# Patient Record
Sex: MALE | Race: WHITE | NOT HISPANIC OR LATINO | Employment: OTHER | URBAN - METROPOLITAN AREA
[De-identification: names, ages, dates, MRNs, and addresses within clinical notes are randomized per-mention and may not be internally consistent; named-entity substitution may affect disease eponyms.]

---

## 2017-01-05 ENCOUNTER — TELEPHONE (OUTPATIENT)
Dept: INTERNAL MEDICINE | Facility: CLINIC | Age: 72
End: 2017-01-05

## 2017-01-05 NOTE — TELEPHONE ENCOUNTER
----- Message from Lynsey Caldwell sent at 1/5/2017  9:59 AM CST -----  Regarding: [facility]  Contact: [phone]  [facility], they were only suppose to do 1 but did 2 and hit something wrong and he now has internal bleeding.     Please call [phone]    [email or web address]    01/04/2017 had x2 bx of parathyroid and he had internal bleeding. [facility] wanted to [facility] but he refused. No bleeding today.  [facility]. Pt does not know what the test is for.  appt with Dr Duffy 01/12 at 10AM.  Mi Pratt RN 12:57 PM on 1/5/2017.

## 2017-01-12 ENCOUNTER — OFFICE VISIT (OUTPATIENT)
Dept: INTERNAL MEDICINE | Facility: CLINIC | Age: 72
End: 2017-01-12

## 2017-01-12 VITALS — DIASTOLIC BLOOD PRESSURE: 81 MMHG | SYSTOLIC BLOOD PRESSURE: 123 MMHG | HEART RATE: 92 BPM

## 2017-01-12 DIAGNOSIS — I10 BENIGN ESSENTIAL HYPERTENSION: Primary | ICD-10-CM

## 2017-01-12 ASSESSMENT — PAIN SCALES - GENERAL: PAINLEVEL: MODERATE PAIN (5)

## 2017-01-12 NOTE — PATIENT INSTRUCTIONS
[facility] Medication Refill Request Information:  * Please contact your pharmacy regarding ANY request for medication refills.  ** [facility] Prescription Fax = [phone]  * Please allow 3 business days for routine medication refills.  * Please allow 5 business days for controlled substance medication refills.     [facility] Test Result notification information:  *You will be notified with in 7-10 days of your appointment day regarding the results of your test.  If you are on MyChart you will be notified as soon as the provider has reviewed the results and signed off on them.      Primary Care Center [phone] (4th Floor [facility])

## 2017-01-12 NOTE — NURSING NOTE
Chief Complaint   Patient presents with     Throat Problem     pt states having throat pain since having biopsy last week     Frostbite     pt states having frostbite on fingers of both hands     Hypertension     pt would like to discuss high blood pressure, new medication from ANDRE Turner CMA at 9:40 AM on 1/12/2017.

## 2017-01-12 NOTE — PROGRESS NOTES
HPI:    Pt. Comes in for follow up today. He was seen in Dermatology 11/28/16. He was seen in ortho for R knee pain 11/30/16. He was seen in the ED 12/4/16 for increased BP.  No other HEENT, cardiopulmonary, abdominal, , neurological complaints. No F/C/NS. He is being see at [facility] for elevated Ca and possible hyperparathyroidism. He parathyroid bx, last week. He may have R knee surgery/replacement?.     PE:    Vitals noted gen nad cooperative alert,  HEENT, ears normal oropharynx clear no exudate, neck supple nl rom no adenopathy, no tenderness, no bruising, no neck swelling,  LCTA, B, RRR, S1, S2, murmur present, . B ankle swelling L more than R. Good distal pulses. No B knee swelling. Grossly normal neurological exam.           A/P:    1. HTN; he states he is on 10 mg Lisinopril and BP well controlled today  2. Cardiac echo for murmur ordered 12/26/16 and I recommend he schedule this.  3. He will continue to follow in ortho for knee complaints  4. B LE U/S 12/26/16 negative for DVT for leg swelling  5. He will continue to be seen at [facility] for elevated Ca and parathyroid issues and had bx. Last week  6. Increased cholesterol done on 12/26/16; I recommend he start medication. He was on Simvastatin but stopped this. He DOES not want to restart at this point and wants to work on exercise/diet and recheck.   7. He states colonoscopy at [facility] about 2 years ago.       I will see him back in about 3 weeks.       Total time spent 25 minutes.  More than 50% of the time spent with Mr. Velasco on counseling / coordinating his care

## 2017-01-12 NOTE — MR AVS SNAPSHOT
After Visit Summary   1/12/2017    Lux Velasco    MRN: [id number]           Patient Information     Date Of Birth          1945        Visit Information        Provider Department      1/12/2017 10:00 AM Nam Duffy MD [facility]        Care Instructions    [facility] Medication Refill Request Information:  * Please contact your pharmacy regarding ANY request for medication refills.  ** [facility] Prescription Fax = [phone]  * Please allow 3 business days for routine medication refills.  * Please allow 5 business days for controlled substance medication refills.     [facility] Test Result notification information:  *You will be notified with in 7-10 days of your appointment day regarding the results of your test.  If you are on MyChart you will be notified as soon as the provider has reviewed the results and signed off on them.      Primary Care Center [phone] (4th Floor [facility])           Follow-ups after your visit        Your next 10 appointments already scheduled     Jan 17, 2017  2:30 PM   Ech Complete with [id number]   [facility] Echo ([facility])    [address] [place] [address] [phone]           1.  Please bring or wear a comfortable two-piece outfit. 2.  You may eat, drink and take your normal medicines. 3.  For any questions that cannot be answered, please contact the ordering physician            Feb 13, 2017 12:05 PM   (Arrive by 11:50 AM)   Return Visit with Nam Duffy MD   [facility] ([facility])    [address] [place] [address] [phone]              Who to contact     Please call your clinic at [phone] to:    Ask questions about your health    Make or cancel appointments    Discuss your medicines    Learn about your test results    Speak to your doctor   If you have compliments or  concerns about an experience at your clinic, or if you wish to file a complaint, please contact [facility] Patient Relations at [phone] or email us at [email or web address]         Additional Information About Your Visit        [employer] Information     [employer] is an electronic gateway that provides easy, online access to your medical records. With [employer], you can request a clinic appointment, read your test results, renew a prescription or communicate with your care team.     To sign up for [employer] visit the website at [email or web address]   You will be asked to enter the access code listed below, as well as some personal information. Please follow the directions to create your username and password.     Your access code is: [email or web address]  Expires: 2017  2:00 PM     Your access code will  in 90 days. If you need help or a new code, please contact your [facility] or call [phone] for assistance.        Care EveryWhere ID     This is your Care EveryWhere ID. This could be used by other organizations to access your [place] medical records  [id number]        Your Vitals Were     Pulse                   92            Blood Pressure from Last 3 Encounters:   17 123/81   16 150/90   16 141/87    Weight from Last 3 Encounters:   16 110.768 kg (244 lb 3.2 oz)   16 122.471 kg (270 lb)   16 120.203 kg (265 lb)              Today, you had the following     No orders found for display       Primary Care Provider Office Phone # Fax #    Nam Duffy [phone]       [address] [address]        Thank you!     Thank you for choosing [facility]  for your care. Our goal is always to provide you with excellent care. Hearing back from our patients is one way we can continue to improve our services. Please take a few minutes  to complete the written survey that you may receive in the mail after your visit with us. Thank you!             Your Updated Medication List - Protect others around you: Learn how to safely use, store and throw away your medicines at www.disposemymeds.org.          This list is accurate as of: 1/12/17 10:30 AM.  Always use your most recent med list.                   Brand Name Dispense Instructions for use    acetaminophen 500 MG Caps      Take 2 tablets by mouth daily.       acetic acid-hydrocortisone otic solution    ACETASOL HC    10 mL    Place 4 drops into both ears 2 times daily.       ammonium lactate 12 % cream    AMLACTIN     Apply  topically daily.       artificial tears Soln      Use one drop to both eye PRN.       calcium carbonate 500 MG chewable tablet    TUMS    150 tablet    Take 1 tablet (500 mg) by mouth daily       diclofenac 1 % Gel topical gel    VOLTAREN    100 g    Apply 4 grams to knees four times daily using enclosed dosing card.       Fish Oil 500 MG Caps          LISINOPRIL PO      Take 10 mg by mouth daily       METFORMIN HCL PO      Take by mouth 2 times daily (with meals)       omeprazole 20 MG CR capsule    priLOSEC     Take 20 mg by mouth daily.       TAMSULOSIN HCL PO      Take 0.4 mg by mouth daily       VITAMIN D (CHOLECALCIFEROL) PO      Take 1,000 Units by mouth daily

## 2017-01-17 ENCOUNTER — RADIANT APPOINTMENT (OUTPATIENT)
Dept: CARDIOLOGY | Facility: CLINIC | Age: 72
End: 2017-01-17
Attending: INTERNAL MEDICINE

## 2017-01-17 DIAGNOSIS — I10 BENIGN ESSENTIAL HYPERTENSION: ICD-10-CM

## 2017-01-29 ENCOUNTER — TRANSFERRED RECORDS (OUTPATIENT)
Dept: HEALTH INFORMATION MANAGEMENT | Facility: CLINIC | Age: 72
End: 2017-01-29

## 2017-01-31 ENCOUNTER — TELEPHONE (OUTPATIENT)
Dept: INTERNAL MEDICINE | Facility: CLINIC | Age: 72
End: 2017-01-31

## 2017-01-31 NOTE — TELEPHONE ENCOUNTER
----- Message from Sheridan Isidro sent at 1/31/2017 12:19 PM CST -----  Regarding: Pt requesting call back  Contact: [phone]  Pt called requesting an appointment with Dr. Duffy as soon as possible. I let the pt know that the appointment he has scheduled 2/13/17 is the soonest available. I offered to add him on a wait list and check other provider's schedules- he declined. Pt stated he needs more medication for his coughing and bronchitis symptoms. I offered the appointment with Dr. Mckinney 2/2/17 at 7:00am, he declines. Pt is requesting a call back; pt can be reached at [phone]. Pt stated he will be available in about an hour from now.    Pt called and he is  demanding to see Dr Duffy for a cough. [facility] codeine for cough 5 days ago. Pt states neither are helping with his cough. Pt also states he prostrate problems-would not elaborate. Wants to be seen by Dr Duffy-offered other providers and pt refused.  Mi Pratt RN 1:35 PM on 1/31/2017.

## 2017-02-02 NOTE — TELEPHONE ENCOUNTER
"Dear Mi;    I advise he see anyone in Clinic and if worse go to ED or urgent care. JIM Duffy    Pt called and REFUSED all providers except Dr duffy. Pt states that the other Dr will only \"pat him on the back and send him on his way\". Encouraged pt to be seen by one of the PCP Dr -PT REFUSES. Pt has an appt with Dr Duffy on the 13th and will wait till then.  Mi Pratt RN 10:43 AM on 2/2/2017.    "

## 2017-02-13 ENCOUNTER — OFFICE VISIT (OUTPATIENT)
Dept: INTERNAL MEDICINE | Facility: CLINIC | Age: 72
End: 2017-02-13

## 2017-02-13 VITALS
WEIGHT: 239 LBS | DIASTOLIC BLOOD PRESSURE: 88 MMHG | HEART RATE: 97 BPM | SYSTOLIC BLOOD PRESSURE: 150 MMHG | BODY MASS INDEX: 33.33 KG/M2 | OXYGEN SATURATION: 95 %

## 2017-02-13 DIAGNOSIS — I10 BENIGN ESSENTIAL HYPERTENSION: ICD-10-CM

## 2017-02-13 DIAGNOSIS — R05.9 COUGH: Primary | ICD-10-CM

## 2017-02-13 RX ORDER — LOSARTAN POTASSIUM 50 MG/1
50 TABLET ORAL DAILY
Qty: 30 TABLET | Refills: 3 | Status: SHIPPED | OUTPATIENT
Start: 2017-02-13 | End: 2018-11-27 | Stop reason: ALTCHOICE

## 2017-02-13 RX ORDER — GUAIFENESIN/DEXTROMETHORPHAN 1200-60MG
1 TABLET, EXTENDED RELEASE 12 HR ORAL EVERY 12 HOURS
COMMUNITY
End: 2017-03-13

## 2017-02-13 ASSESSMENT — PAIN SCALES - GENERAL: PAINLEVEL: MODERATE PAIN (4)

## 2017-02-13 NOTE — MR AVS SNAPSHOT
After Visit Summary   2/13/2017    Lux Velasco    MRN: [id number]           Patient Information     Date Of Birth          1945        Visit Information        Provider Department      2/13/2017 12:05 PM Nam Duffy MD [facility]        Today's Diagnoses     Cough    -  1       Follow-ups after your visit        Your next 10 appointments already scheduled     Feb 13, 2017  1:50 PM CST   (Arrive by 1:35 PM)   XR CHEST 2 VIEWS with UCXR1   [facility] Imaging [facility] Xray ([facility])    [address] [address] [phone]           Please bring a list of your current medicines to your exam. (Include vitamins, minerals and over-thecounter medicines.) Leave your valuables at home.  Tell your doctor if there is a chance you may be pregnant.  You do not need to do anything special for this exam.            Mar 06, 2017 11:45 AM CST   (Arrive by 11:30 AM)   Return Visit with Corinne Gleason PA-C   [facility] Dermatology ([facility])    [address] [address] [phone]              Who to contact     Please call your clinic at [phone] to:    Ask questions about your health    Make or cancel appointments    Discuss your medicines    Learn about your test results    Speak to your doctor   If you have compliments or concerns about an experience at your clinic, or if you wish to file a complaint, please contact [facility] Patient Relations at [phone] or email us at [email or web address]         Additional Information About Your Visit        MyChart Information     [employer] is an electronic gateway that provides easy, online access to your medical records. With [employer], you can request a clinic appointment, read your test results, renew a prescription or communicate with your care team.     To sign up for  [employer] visit the website at [email or web address]   You will be asked to enter the access code listed below, as well as some personal information. Please follow the directions to create your username and password.     Your access code is: [email or web address]  Expires: 2017 12:47 PM     Your access code will  in 90 days. If you need help or a new code, please contact your [facility] or call [phone] for assistance.        Care EveryWhere ID     This is your Care EveryWhere ID. This could be used by other organizations to access your [place] medical records  [id number]        Your Vitals Were     Pulse Pulse Oximetry BMI (Body Mass Index)             97 95% 33.33 kg/m2          Blood Pressure from Last 3 Encounters:   17 150/88   17 123/81   16 150/90    Weight from Last 3 Encounters:   17 108.4 kg (239 lb)   16 110.8 kg (244 lb 3.2 oz)   16 122.5 kg (270 lb)              We Performed the Following     XR Chest 2 Views        Primary Care Provider Office Phone # Fax #    Nam Duffy [phone]       [address] [address]        Thank you!     Thank you for choosing [facility]  for your care. Our goal is always to provide you with excellent care. Hearing back from our patients is one way we can continue to improve our services. Please take a few minutes to complete the written survey that you may receive in the mail after your visit with us. Thank you!             Your Updated Medication List - Protect others around you: Learn how to safely use, store and throw away your medicines at www.disposemymeds.org.          This list is accurate as of: 17 12:47 PM.  Always use your most recent med list.                   Brand Name Dispense Instructions for use    acetaminophen 500 MG Caps      Take 2 tablets by mouth daily.       acetic acid-hydrocortisone  otic solution    ACETASOL HC    10 mL    Place 4 drops into both ears 2 times daily.       ammonium lactate 12 % cream    AMLACTIN     Apply  topically daily.       artificial tears Soln      Use one drop to both eye PRN.       calcium carbonate 500 MG chewable tablet    TUMS    150 tablet    Take 1 tablet (500 mg) by mouth daily       diclofenac 1 % Gel topical gel    VOLTAREN    100 g    Apply 4 grams to knees four times daily using enclosed dosing card.       Fish Oil 500 MG Caps          FLUTICASONE PROPIONATE NA      Spray 50 mcg in nostril       LISINOPRIL PO      Take 10 mg by mouth daily       METFORMIN HCL PO      Take by mouth 2 times daily (with meals)       MUCUS-DM MAX  MG Tb12      Take 1 tablet by mouth every 12 hours       omeprazole 20 MG CR capsule    priLOSEC     Take 20 mg by mouth daily.       TAMSULOSIN HCL PO      Take 0.4 mg by mouth daily       VITAMIN D (CHOLECALCIFEROL) PO      Take 1,000 Units by mouth daily

## 2017-02-13 NOTE — NURSING NOTE
Chief Complaint   Patient presents with     Cough     Patient here for follow up of bronchitis.       Mikayla Carnes CMA at 12:15 PM on 2/13/2017.

## 2017-02-13 NOTE — LETTER
Patient:  Lux Velasco  :   1945  MRN:     [id number]        Mr. Lux Velasco  [address]     [facility] [address]        2017    Dear Mr. Velasco,    Thank you for choosing the [facility] for your healthcare needs.  We appreciate the opportunity to serve you.    The following are your recent test results.     Results for orders placed or performed in visit on 17   XR Chest 2 Views    Narrative    EXAMINATION: XR CHEST 2 VW  2017 1:58 PM      CLINICAL HISTORY: Cough     COMPARISON: 2014        FINDINGS:  PA and lateral views of the chest. Cardiac silhouette within normal  limits. Left greater than right bibasilar linear opacities. No pleural  effusion or pneumothorax. Partially visualized upper abdomen is  unremarkable.        Impression    IMPRESSION:  Left greater than right bibasilar linear opacities most favoring  atelectasis.    I have personally reviewed the examination and initial interpretation  and I agree with the findings.    MIKE MOSQUEDA MD   Your Chest X-ray does not suggest pneumonia. Please follow up as we discussed.     Please contact us if you have any questions or concerns.  We look forward to serving your needs in the future.      Sincerely,    Nam Duffy MD/ky

## 2017-02-13 NOTE — PATIENT INSTRUCTIONS
[facility] Medication Refill Request Information:  * Please contact your pharmacy regarding ANY request for medication refills.  ** [facility] Prescription Fax = [phone]  * Please allow 3 business days for routine medication refills.  * Please allow 5 business days for controlled substance medication refills.     [facility] Test Result notification information:  *You will be notified with in 7-10 days of your appointment day regarding the results of your test.  If you are on MyChart you will be notified as soon as the provider has reviewed the results and signed off on them.    [facility] [phone]

## 2017-02-13 NOTE — PROGRESS NOTES
HPI:    Pt. Comes in for follow up today. He was seen in Dermatology 11/28/16. He was seen in ortho for R knee pain 11/30/16. He was seen in the ED 12/4/16 for increased BP.  No other HEENT, cardiopulmonary, abdominal, , neurological complaints. No F/C/NS. He is being see at [facility] for elevated Ca and possible hyperparathyroidism. He parathyroid bx, last week. He may have R knee surgery/replacement?.     PE:    Vitals noted gen nad cooperative alert,  HEENT, ears normal oropharynx clear no exudate, neck supple nl rom no adenopathy, no tenderness, no bruising, no neck swelling,  LCTA, B, RRR, S1, S2, murmur present,  B ankle swelling L more than R. Good distal pulses. No B knee swelling. Grossly normal neurological exam.       Preliminary read on CXR no obvious acute cardiopulmonary findings    A/P:    1. HTN; he states he is on 10 mg Lisinopril and BP elevated today  2. Cardiac echo for murmur done 1/17/17 as stable.  3. He will continue to follow in ortho for knee complaints  4. B LE U/S 12/26/16 negative for DVT for leg swelling  5. He will continue to be seen at [facility] for elevated Ca and parathyroid issues and had bx. Last week  6. Increased cholesterol done on 12/26/16; I recommend he start medication. He was on Simvastatin but stopped this. He DOES not want to restart at this point and wants to work on exercise/diet and recheck.   7. He states colonoscopy at [facility] about 2 years ago.   8. Cough; he states [facility]. CXR today. Will stop Lisinopril and start Losartan. Will send in Rx. For Singular and then follow up in about 1-2 weeks            Total time spent 15 minutes.  More than 50% of the time spent with Mr. Velasco on counseling / coordinating his care

## 2017-02-14 ENCOUNTER — TELEPHONE (OUTPATIENT)
Dept: INTERNAL MEDICINE | Facility: CLINIC | Age: 72
End: 2017-02-14

## 2017-02-14 NOTE — TELEPHONE ENCOUNTER
----- Message from Archana Henao sent at 2/14/2017 10:22 AM CST -----  Regarding: Sooner appt for pt.  Contact: [phone]  Hello,    Pt called today to make a follow up appt for his wife, and himself. Pt would like to get before the first on March. Chelsea explained to the pt that our first available isn't until March 1st, and he stated that he needed to get in before due to his wife having surgery on the 1st. Pt can be reached at: [phone].    Pt calling for appt-March 6th at 10:05AM with Dr Duffy.  Mi Pratt RN 10:55 AM on 2/14/2017.

## 2017-02-16 ENCOUNTER — TELEPHONE (OUTPATIENT)
Dept: INTERNAL MEDICINE | Facility: CLINIC | Age: 72
End: 2017-02-16

## 2017-02-16 DIAGNOSIS — R05.9 COUGH: ICD-10-CM

## 2017-02-16 RX ORDER — MONTELUKAST SODIUM 10 MG/1
10 TABLET ORAL AT BEDTIME
Qty: 5 TABLET | Refills: 0 | COMMUNITY
Start: 2017-02-16 | End: 2017-02-17

## 2017-02-16 RX ORDER — CODEINE PHOSPHATE AND GUAIFENESIN 10; 100 MG/5ML; MG/5ML
1-2 SOLUTION ORAL EVERY 4 HOURS PRN
Qty: 420 ML | Refills: 0 | COMMUNITY
Start: 2017-02-16 | End: 2017-02-17

## 2017-02-16 NOTE — TELEPHONE ENCOUNTER
----- Message from Anita Patel RN sent at 2/16/2017 11:18 AM CST -----  Regarding: FW: PT requesting stronger RX  Contact: [phone]      ----- Message -----     From: Sadaf Talia     Sent: 2/16/2017   9:32 AM       To: Mi Pratt RN  Subject: PT requesting stronger RX                        PT called today to relay some info to Dr. Duffy and see if he could get a stronger RX. PT stated he still has bronchitis and is coughing a lot and the losartan (COZAAR) 50 MG tablet isn't working. PT said the only thing giving him relief is Robitussin. PT also wanted to pass along some info about his daughter Jumana Velasco ([id number]). Jumana's right ankle pain has subsided but she is now experiencing some cracking in the heel and it is still having some swelling. Pt was wondering if they should come in for an X-Ray. Please follow up with PT at [phone].    Thank You!  Talia  Call Center    Please DO NOT send this message and/or reply back to sender.  Call Center Representatives DO NOT respond to messages.

## 2017-02-16 NOTE — TELEPHONE ENCOUNTER
Dear Amaya    I reviewed Lux's CXR from 2/13/17 and no obvious infection. He told me he had just finished doxycycline. The cozaar was a substitute for Lisinopril (that can cause cough).    (1) If clinically much worse, I recommend he be seen again and I placed future Pertussis PCR this encounter    (2) placed historical order for Robitussin; please call into his pharmacy of choice    (3) placed historical order for Singular this encounter    (4) he should keep his 3/16/17 follow up with me    Thanks, JIM Duffy

## 2017-02-17 RX ORDER — CODEINE PHOSPHATE AND GUAIFENESIN 10; 100 MG/5ML; MG/5ML
1-2 SOLUTION ORAL EVERY 4 HOURS PRN
Qty: 420 ML | Refills: 0 | Status: SHIPPED | OUTPATIENT
Start: 2017-02-17 | End: 2017-02-27

## 2017-02-17 RX ORDER — MONTELUKAST SODIUM 10 MG/1
10 TABLET ORAL AT BEDTIME
Qty: 5 TABLET | Refills: 0 | Status: SHIPPED | OUTPATIENT
Start: 2017-02-17 | End: 2017-03-13

## 2017-02-17 NOTE — TELEPHONE ENCOUNTER
I spoke to patient this morning he is still very adamant that he has an infection. He is willing to try the Singular and Robitussin with codeine. He tentatively scheduled an appointment for Tuesday morning if he is not doing better.  RX sent to [facility] pharmacy.

## 2017-02-21 ENCOUNTER — OFFICE VISIT (OUTPATIENT)
Dept: INTERNAL MEDICINE | Facility: CLINIC | Age: 72
End: 2017-02-21

## 2017-02-21 VITALS
RESPIRATION RATE: 20 BRPM | SYSTOLIC BLOOD PRESSURE: 160 MMHG | DIASTOLIC BLOOD PRESSURE: 84 MMHG | BODY MASS INDEX: 33.89 KG/M2 | OXYGEN SATURATION: 94 % | HEART RATE: 63 BPM | WEIGHT: 243 LBS

## 2017-02-21 DIAGNOSIS — R05.9 COUGH: ICD-10-CM

## 2017-02-21 DIAGNOSIS — R05.9 COUGH: Primary | ICD-10-CM

## 2017-02-21 LAB
ALBUMIN SERPL-MCNC: 3.6 G/DL (ref 3.4–5)
ALP SERPL-CCNC: 88 U/L (ref 40–150)
ALT SERPL W P-5'-P-CCNC: 59 U/L (ref 0–70)
ANION GAP SERPL CALCULATED.3IONS-SCNC: 9 MMOL/L (ref 3–14)
AST SERPL W P-5'-P-CCNC: 33 U/L (ref 0–45)
BILIRUB SERPL-MCNC: 0.6 MG/DL (ref 0.2–1.3)
BUN SERPL-MCNC: 12 MG/DL (ref 7–30)
CALCIUM SERPL-MCNC: 10.6 MG/DL (ref 8.5–10.1)
CHLORIDE SERPL-SCNC: 107 MMOL/L (ref 94–109)
CO2 SERPL-SCNC: 26 MMOL/L (ref 20–32)
CREAT SERPL-MCNC: 0.73 MG/DL (ref 0.66–1.25)
ERYTHROCYTE [DISTWIDTH] IN BLOOD BY AUTOMATED COUNT: 13.6 % (ref 10–15)
GFR SERPL CREATININE-BSD FRML MDRD: ABNORMAL ML/MIN/1.7M2
GLUCOSE SERPL-MCNC: 102 MG/DL (ref 70–99)
HCT VFR BLD AUTO: 41 % (ref 40–53)
HGB BLD-MCNC: 13.4 G/DL (ref 13.3–17.7)
MCH RBC QN AUTO: 27.6 PG (ref 26.5–33)
MCHC RBC AUTO-ENTMCNC: 32.7 G/DL (ref 31.5–36.5)
MCV RBC AUTO: 85 FL (ref 78–100)
PLATELET # BLD AUTO: 157 10E9/L (ref 150–450)
POTASSIUM SERPL-SCNC: 4.2 MMOL/L (ref 3.4–5.3)
PROT SERPL-MCNC: 7 G/DL (ref 6.8–8.8)
RBC # BLD AUTO: 4.85 10E12/L (ref 4.4–5.9)
SODIUM SERPL-SCNC: 142 MMOL/L (ref 133–144)
WBC # BLD AUTO: 5.2 10E9/L (ref 4–11)

## 2017-02-21 ASSESSMENT — PAIN SCALES - GENERAL: PAINLEVEL: MODERATE PAIN (4)

## 2017-02-21 NOTE — PROGRESS NOTES
HPI:    Pt. Comes in for follow up today. He was seen in Dermatology 11/28/16. He was seen in ortho for R knee pain 11/30/16. He was seen in the ED 12/4/16 for increased BP.  No other HEENT, cardiopulmonary, abdominal, , neurological complaints. No F/C/NS. He is being see at [facility] for elevated Ca and possible hyperparathyroidism. He parathyroid bx, last week. He may have R knee surgery/replacement?. Cough is less on Z-pack (from [facility]) and Singular.    PE:    Vitals noted gen nad cooperative alert,  HEENT, ears normal oropharynx clear no exudate, neck supple nl rom no adenopathy, no tenderness, no bruising, no neck swelling,  LCTA, B, RRR, S1, S2, murmur present,  B ankle swelling L more than R. Good distal pulses. No B knee swelling. Grossly normal neurological exam.         A/P:    1. HTN; he is now on 50 mg Losartan. Will check labs today  2. Cardiac echo for murmur done 1/17/17 as stable.  3. He will continue to follow in ortho for knee complaints  4. B LE U/S 12/26/16 negative for DVT for leg swelling  5. He will continue to be seen at [facility] for elevated Ca and parathyroid issues and had bx. Last week  6. Increased cholesterol done on 12/26/16; I recommend he start medication. He was on Simvastatin but stopped this. He DOES not want to restart at this point and wants to work on exercise/diet and recheck.   7. He states colonoscopy at [facility] about 2 years ago.   8. Cough; getting less now on Z-pack and Singular.       I will see him back in 3 weeks for BP and cough      Total time spent 15 minutes.  More than 50% of the time spent with Mr. Velasco on counseling / coordinating his care

## 2017-02-21 NOTE — NURSING NOTE
Chief Complaint   Patient presents with     Cough     Pt is here to follow up on a cough.     Jacey Stanton LPN February 21, 2017 7:47 AM

## 2017-02-21 NOTE — PATIENT INSTRUCTIONS
Primary Care Center Medication Refill Request Information:  * Please contact your pharmacy regarding ANY request for medication refills.  ** [facility] Prescription Fax = [phone]  * Please allow 3 business days for routine medication refills.  * Please allow 5 business days for controlled substance medication refills.     Primary Care Center Test Result notification information:  *You will be notified with in 7-10 days of your appointment day regarding the results of your test.  If you are on MyChart you will be notified as soon as the provider has reviewed the results and signed off on them.

## 2017-02-21 NOTE — MR AVS SNAPSHOT
After Visit Summary   2/21/2017    Lux Velasco    MRN: [id number]           Patient Information     Date Of Birth          1945        Visit Information        Provider Department      2/21/2017 7:35 AM Nam Duffy MD [facility]        Today's Diagnoses     Cough    -  1      Care Instructions    Primary Care Center Medication Refill Request Information:  * Please contact your pharmacy regarding ANY request for medication refills.  ** [facility] Prescription Fax = [phone]  * Please allow 3 business days for routine medication refills.  * Please allow 5 business days for controlled substance medication refills.     Primary Care Center Test Result notification information:  *You will be notified with in 7-10 days of your appointment day regarding the results of your test.  If you are on MyChart you will be notified as soon as the provider has reviewed the results and signed off on them.          Follow-ups after your visit        Your next 10 appointments already scheduled     Feb 21, 2017  8:45 AM CST   LAB with [facility] ([facility])    [address] [place] [id number] [phone]           Patient must bring picture ID.  Patient should be prepared to give a urine specimen  Please do not eat 10-12 hours before your appointment if you are coming in fasting for labs on lipids, cholesterol, or glucose (sugar).  Pregnant women should follow their Care Team instructions. Water with medications is okay. Do not drink coffee or other fluids.   If you have concerns about taking  your medications, please ask at office or if scheduling via [employer], send a message by clicking on Secure Messaging, Message Your Care Team.            Mar 06, 2017 10:05 AM CST   (Arrive by 9:50 AM)   Return Visit with Nam Duffy MD   [facility] ([facility])    [address] [place] [address]  Floor  [facility] [id number] [phone]            Mar 06, 2017 11:45 AM CST   (Arrive by 11:30 AM)   Return Visit with [facility] ([facility])    [address] [facility]  3rd Floor  [facility] [id number] [phone]              Future tests that were ordered for you today     Open Future Orders        Priority Expected Expires Ordered    CBC with platelets Routine 2017 3/7/2017 2017    Comprehensive metabolic panel Routine 2017            Who to contact     Please call your clinic at [phone] to:    Ask questions about your health    Make or cancel appointments    Discuss your medicines    Learn about your test results    Speak to your doctor   If you have compliments or concerns about an experience at your clinic, or if you wish to file a complaint, please contact [facility] Patient Relations at [phone] or email us at [email or web address]         Additional Information About Your Visit        Flurry Information     Flurry is an electronic gateway that provides easy, online access to your medical records. With Flurry, you can request a clinic appointment, read your test results, renew a prescription or communicate with your care team.     To sign up for Flurry visit the website at [email or web address]   You will be asked to enter the access code listed below, as well as some personal information. Please follow the directions to create your username and password.     Your access code is: [email or web address]  Expires: 2017 12:47 PM     Your access code will  in 90 days. If you need help or a new code, please contact your [facility] or call [phone] for assistance.        Care EveryWhere ID     This is your Care EveryWhere ID. This could be used by other organizations to access your [facility]  records  [id number]        Your Vitals Were     Pulse Respirations Pulse Oximetry BMI (Body Mass Index)          63 20 94% 33.89 kg/m2         Blood Pressure from Last 3 Encounters:   02/21/17 160/84   02/13/17 150/88   01/12/17 123/81    Weight from Last 3 Encounters:   02/21/17 110.2 kg (243 lb)   02/13/17 108.4 kg (239 lb)   12/28/16 110.8 kg (244 lb 3.2 oz)               Primary Care Provider Office Phone # Fax #    Nam Duffy [phone]       [facility] [address] [address]        Thank you!     Thank you for choosing [facility]  for your care. Our goal is always to provide you with excellent care. Hearing back from our patients is one way we can continue to improve our services. Please take a few minutes to complete the written survey that you may receive in the mail after your visit with us. Thank you!             Your Updated Medication List - Protect others around you: Learn how to safely use, store and throw away your medicines at www.disposemymeds.org.          This list is accurate as of: 2/21/17  8:11 AM.  Always use your most recent med list.                   Brand Name Dispense Instructions for use    acetaminophen 500 MG Caps      Take 2 tablets by mouth daily.       acetic acid-hydrocortisone otic solution    ACETASOL HC    10 mL    Place 4 drops into both ears 2 times daily.       ammonium lactate 12 % cream    AMLACTIN     Apply  topically daily.       artificial tears Soln      Use one drop to both eye PRN.       calcium carbonate 500 MG chewable tablet    TUMS    150 tablet    Take 1 tablet (500 mg) by mouth daily       diclofenac 1 % Gel topical gel    VOLTAREN    100 g    Apply 4 grams to knees four times daily using enclosed dosing card.       Fish Oil 500 MG Caps          FLUTICASONE PROPIONATE NA      Spray 50 mcg in nostril       guaiFENesin-codeine 100-10 MG/5ML Soln solution    ROBITUSSIN AC    420 mL    Take  5-10 mLs by mouth every 4 hours as needed for cough       LISINOPRIL PO      Take 10 mg by mouth daily       losartan 50 MG tablet    COZAAR    30 tablet    Take 1 tablet (50 mg) by mouth daily       METFORMIN HCL PO      Take by mouth 2 times daily (with meals)       montelukast 10 MG tablet    SINGULAIR    5 tablet    Take 1 tablet (10 mg) by mouth At Bedtime       MUCUS-DM MAX  MG Tb12      Take 1 tablet by mouth every 12 hours       omeprazole 20 MG CR capsule    priLOSEC     Take 20 mg by mouth daily.       TAMSULOSIN HCL PO      Take 0.4 mg by mouth daily       VITAMIN D (CHOLECALCIFEROL) PO      Take 1,000 Units by mouth daily

## 2017-02-22 ENCOUNTER — DOCUMENTATION ONLY (OUTPATIENT)
Dept: VASCULAR SURGERY | Facility: CLINIC | Age: 72
End: 2017-02-22

## 2017-02-23 DIAGNOSIS — R05.9 COUGH: ICD-10-CM

## 2017-02-24 RX ORDER — MONTELUKAST SODIUM 10 MG/1
TABLET ORAL
Qty: 5 TABLET | Refills: 0 | OUTPATIENT
Start: 2017-02-24

## 2017-02-27 DIAGNOSIS — R05.9 COUGH: ICD-10-CM

## 2017-02-27 RX ORDER — CODEINE PHOSPHATE AND GUAIFENESIN 10; 100 MG/5ML; MG/5ML
1-2 SOLUTION ORAL EVERY 4 HOURS PRN
Qty: 420 ML | Refills: 0 | Status: SHIPPED | OUTPATIENT
Start: 2017-02-27 | End: 2018-10-26

## 2017-02-27 NOTE — TELEPHONE ENCOUNTER
Cheratussin w/ codeine Syrup      Last Written Prescription Date:  02/17/17  Last Fill Quantity: 420,   # refills: 0  [place] Office Visit with [facility] prescribing provider: 02/21/17  Future Office visit:       Routing refill request to provider for review/approval because:  Drug not on the [facility] or [facility] refill protocol or controlled substance    Armond Laura  [facility]  [phone]    Refill for Cheratussin w/ codeine Syrup sent to pharmacy.  Mi Pratt RN 3:08 PM on 2/27/2017.

## 2017-03-03 ENCOUNTER — TRANSFERRED RECORDS (OUTPATIENT)
Dept: HEALTH INFORMATION MANAGEMENT | Facility: CLINIC | Age: 72
End: 2017-03-03

## 2017-03-06 ENCOUNTER — OFFICE VISIT (OUTPATIENT)
Dept: DERMATOLOGY | Facility: CLINIC | Age: 72
End: 2017-03-06

## 2017-03-06 ENCOUNTER — OFFICE VISIT (OUTPATIENT)
Dept: INTERNAL MEDICINE | Facility: CLINIC | Age: 72
End: 2017-03-06

## 2017-03-06 ENCOUNTER — PRE VISIT (OUTPATIENT)
Dept: SURGERY | Facility: CLINIC | Age: 72
End: 2017-03-06

## 2017-03-06 VITALS
SYSTOLIC BLOOD PRESSURE: 128 MMHG | WEIGHT: 248.1 LBS | DIASTOLIC BLOOD PRESSURE: 81 MMHG | BODY MASS INDEX: 34.6 KG/M2 | HEART RATE: 75 BPM

## 2017-03-06 DIAGNOSIS — R05.9 COUGH: ICD-10-CM

## 2017-03-06 DIAGNOSIS — K62.5 RECTAL BLEEDING: Primary | ICD-10-CM

## 2017-03-06 DIAGNOSIS — Z87.2 HISTORY OF ACTINIC KERATOSES: ICD-10-CM

## 2017-03-06 DIAGNOSIS — L82.0 INFLAMED SEBORRHEIC KERATOSIS: Primary | ICD-10-CM

## 2017-03-06 DIAGNOSIS — E21.5 PARATHYROID ABNORMALITY (H): ICD-10-CM

## 2017-03-06 DIAGNOSIS — L57.0 ACTINIC KERATOSIS: ICD-10-CM

## 2017-03-06 RX ORDER — MONTELUKAST SODIUM 10 MG/1
10 TABLET ORAL AT BEDTIME
Qty: 7 TABLET | Refills: 0 | Status: SHIPPED | OUTPATIENT
Start: 2017-03-06 | End: 2017-03-13

## 2017-03-06 ASSESSMENT — PAIN SCALES - GENERAL
PAINLEVEL: MODERATE PAIN (4)
PAINLEVEL: NO PAIN (0)
PAINLEVEL: NO PAIN (1)

## 2017-03-06 NOTE — TELEPHONE ENCOUNTER
1.  Date/reason for appt: 3/14/17 8:45AM Rectal Bleeding Per [facility]  2.  Referring provider: Dr. Duffy   3.  Call to patient (Yes / No - short description): No, pt was referred.   4.  Previous care at / records requested from:  O[facility] notes w/ Dr. Duffy - 03/06/17   [facility] fax cover sheet to redarius hardy     Colonoscopy

## 2017-03-06 NOTE — PROGRESS NOTES
HPI:    Pt. Comes in for follow up today. He was seen in Dermatology 11/28/16. He was seen in ortho for R knee pain 11/30/16. He was seen in the ED 12/4/16 for increased BP.  No other HEENT, cardiopulmonary, abdominal, , neurological complaints. No F/C/NS. He is being see at [facility] for elevated Ca and possible hyperparathyroidism. He parathyroid bx, last week. He may have R knee surgery/replacement?. Cough is less on Z-pack (from [facility]) and Singular.    PE:    Vitals noted gen nad cooperative alert,  HEENT, ears normal oropharynx clear no exudate, neck supple nl rom no adenopathy, no tenderness, no bruising, no neck swelling,  LCTA, B, RRR, S1, S2, murmur present,  B ankle swelling L more than R. Good distal pulses. No B knee swelling. Grossly normal neurological exam.         A/P:    1. HTN; he is now on 50 mg Losartan. Labs checked 1/17  2. Cardiac echo for murmur done 1/17/17 as stable.  3. He will continue to follow in ortho for knee complaints  4. B LE U/S 12/26/16 negative for DVT for leg swelling  5. He will continue to be seen at [facility] for elevated Ca and parathyroid issues and had bx. Several weeks ago. He wants opinion here and placed referral to Dr. Short  6. Increased cholesterol done on 12/26/16; I recommend he start medication. He was on Simvastatin but stopped this. He DOES not want to restart at this point and wants to work on exercise/diet and recheck.   7. He states colonoscopy at [facility] about 2 years ago. Will refer to C/R for rectal bleeding today; constipation he can try Miralax  8. Cough; gone and treated with  Z-pack and Singular.  Gave another Rx. For Singular for 7 days if cough resumes      I will see him back in in about 3 weeks. He states surgery at [facility] 3/22/17 for parathyroids. As above he wants another opinion here.      Total time spent 25 minutes.  More than 50% of the time spent with Mr. Velasco on counseling / coordinating his care

## 2017-03-06 NOTE — NURSING NOTE
Dermatology Rooming Note    Lux Velasco's goals for this visit include:   Chief Complaint   Patient presents with     Derm Problem     AK recheck on the right forearm.      Krysta Wilson CMA

## 2017-03-06 NOTE — LETTER
3/6/2017       RE: Lux Velasco  [address]     [facility] [address]     Dear Colleague,    Thank you for referring your patient, Lux Velasco, to the [facility] DERMATOLOGY at [facility]. Please see a copy of my visit note below.    [facility] Dermatology Note    Dermatology Problem List:  1. History of NMSC: BCC - right upper back s/p ED&C, Unknown Type - right cheek  2. Actinic keratoses s/p cryo  3. Verruca vulgaris, right second finger s/p cryo  4  bx - right forearm hypertrophic ak, left lower inferior orbital rim actinic keratosis a/p biopsy 11/28/16    CC:   Chief Complaint   Patient presents with     Derm Problem     AK recheck on the right forearm.      Date of Service: Mar 6, 2017    History of Present Illness:  Mr. Lux Velasco is a 71 year old male who presents after three months for a follow of of actinic keratoses and biopsies on his right forearm and left lower inferior orbital rim. These returned as an hypertrophic actinic keratosis and actinic keratosis, respectively. He also had five actinic keratoses treated on his face and one on his right forearm. He thinks he has some rough patches on his arms and hands. The keeps picking at the areas. The patient reports no other lesions of concern at this time.    Otherwise, the patient reports no painful, bleeding, nonhealing, or pruritic lesions, and denies new or changing moles.    Past Medical History:   Patient Active Problem List   Diagnosis     Plantar fascial fibromatosis     Ear pain     Left arm weakness     History of agent Orange exposure     Cervicalgia     Degenerative arthritis of cervical spine     Stroke (H)     Myocardial infarct (H)     Shoulder pain     Hypercalcemia     HPTH (hyperparathyroidism) (H)     Skin exam, screening for cancer     Primary hyperparathyroidism (H)     Past Medical History   Diagnosis Date     Diabetes mellitus type II 2005     History  of agent [place] exposure 4/17/2013     Myocardial infarct (H) 4/17/2013     Primary hyperparathyroidism (H) Dx approx 2003     Stroke (H) 4/17/2013     Past Surgical History   Procedure Laterality Date     Mohs micrographic procedure       Biopsy of skin lesion       Social History:  Not addressed at this visit.    Family History:  Melanoma in parents.     Medications:  Current Outpatient Prescriptions   Medication Sig Dispense Refill     montelukast (SINGULAIR) 10 MG tablet Take 1 tablet (10 mg) by mouth At Bedtime 7 tablet 0     guaiFENesin-codeine (ROBITUSSIN AC) 100-10 MG/5ML SOLN solution Take 5-10 mLs by mouth every 4 hours as needed for cough 420 mL 0     montelukast (SINGULAIR) 10 MG tablet Take 1 tablet (10 mg) by mouth At Bedtime 5 tablet 0     FLUTICASONE PROPIONATE NA Spray 50 mcg in nostril       Dextromethorphan-Guaifenesin (MUCUS-DM MAX)  MG TB12 Take 1 tablet by mouth every 12 hours       losartan (COZAAR) 50 MG tablet Take 1 tablet (50 mg) by mouth daily 30 tablet 3     LISINOPRIL PO Take 10 mg by mouth daily       VITAMIN D, CHOLECALCIFEROL, PO Take 1,000 Units by mouth daily       TAMSULOSIN HCL PO Take 0.4 mg by mouth daily       METFORMIN HCL PO Take by mouth 2 times daily (with meals)       Omega-3 Fatty Acids (FISH OIL) 500 MG CAPS        diclofenac (VOLTAREN) 1 % GEL Apply 4 grams to knees four times daily using enclosed dosing card. 100 g 1     calcium carbonate (TUMS) 500 MG chewable tablet Take 1 tablet (500 mg) by mouth daily 150 tablet 0     acetaminophen 500 MG CAPS Take 2 tablets by mouth daily.       acetic acid-hydrocortisone (ACETASOL HC) otic solution Place 4 drops into both ears 2 times daily. 10 mL 10     ammonium lactate (AMLACTIN) 12 % cream Apply  topically daily.       artificial tears SOLN Use one drop to both eye PRN.       omeprazole (PRILOSEC) 20 MG capsule Take 20 mg by mouth daily.       Allergies:  Allergies   Allergen Reactions     Eggs Other (See Comments)      Pt states no longer having reaction, childhood allergy, eats eggs       Penicillins Other (See Comments)     Pt states PCN allergy is due to egg allergy     Propoxyphene Other (See Comments)     Pain     Review of Systems:  - Skin: As above in HPI. No additional skin concerns.    Physical exam:  Vitals: There were no vitals taken for this visit.  GEN: This is a well developed, well-nourished male in no acute distress, in a pleasant mood.      SKIN: Exam includes the head/face, neck, both arms, chest, back, abdomen, digits and/or nails was examined, including the lower legs.  -  No gritty, pink papules on the face, including the left lower inferior orbital rim  There is a light pink atrophic patch on the right forearm.    - There is a 1.5 cm pink scaly plaque on the right forearm  - There are a few scattered waxy, stuck on papules on an erythematous base on his dorsal hands and forearms.   - No other lesions of concern on areas examined.     Impression/Plan:  1. Actinic keratosis x 3 on the forearms  - Cryotherapy procedure note: After verbal consent and discussion of risks and benefits including but no limited to dyspigmentation/scar, blister, and pain, 3 was(were) treated with 1-2mm freeze border for 2 cycles with liquid nitrogen. Post cryotherapy instructions were provided.   2. Hx of actinic keratoses on the right forearm and left lower inferior orbital rim per pathology. No signs of recurrence.   3.  Inflamed Seborrheic keratoses - on the dorsal hands and forearms x 6.   Cryotherapy procedure note: After verbal consent and discussion of risks and benefits including but no limited to dyspigmentation/scar, blister, and pain, 6 was(were) treated with 1-2mm freeze border for 2 cycles with liquid nitrogen. Post cryotherapy instructions were provided.           Staff Involved:  Staff Only    All risk, benefits and alternatives were discussed with patient.  Patient is in agreement and understands the assessment and  plan.  All questions were answered.  Sun Screen Education was given.   Return to Clinic for annual screening, or sooner as needed per concerns.   Corinne Gleason PA-C

## 2017-03-06 NOTE — PROGRESS NOTES
[facility] Dermatology Note    Dermatology Problem List:  1. History of NMSC: BCC - right upper back s/p ED&C, Unknown Type - right cheek  2. Actinic keratoses s/p cryo  3. Verruca vulgaris, right second finger s/p cryo  4  bx - right forearm hypertrophic ak, left lower inferior orbital rim actinic keratosis a/p biopsy 11/28/16    CC:   Chief Complaint   Patient presents with     Derm Problem     AK recheck on the right forearm.      Date of Service: Mar 6, 2017    History of Present Illness:  Mr. Lux Velasco is a 71 year old male who presents after three months for a follow of of actinic keratoses and biopsies on his right forearm and left lower inferior orbital rim. These returned as an hypertrophic actinic keratosis and actinic keratosis, respectively. He also had five actinic keratoses treated on his face and one on his right forearm. He thinks he has some rough patches on his arms and hands. The keeps picking at the areas. The patient reports no other lesions of concern at this time.    Otherwise, the patient reports no painful, bleeding, nonhealing, or pruritic lesions, and denies new or changing moles.    Past Medical History:   Patient Active Problem List   Diagnosis     Plantar fascial fibromatosis     Ear pain     Left arm weakness     History of agent Orange exposure     Cervicalgia     Degenerative arthritis of cervical spine     Stroke (H)     Myocardial infarct (H)     Shoulder pain     Hypercalcemia     HPTH (hyperparathyroidism) (H)     Skin exam, screening for cancer     Primary hyperparathyroidism (H)     Past Medical History   Diagnosis Date     Diabetes mellitus type II 2005     History of agent Orange exposure 4/17/2013     Myocardial infarct (H) 4/17/2013     Primary hyperparathyroidism (H) Dx approx 2003     Stroke (H) 4/17/2013     Past Surgical History   Procedure Laterality Date     Mohs micrographic procedure       Biopsy of skin lesion       Social History:  Not  addressed at this visit.    Family History:  Melanoma in parents.     Medications:  Current Outpatient Prescriptions   Medication Sig Dispense Refill     montelukast (SINGULAIR) 10 MG tablet Take 1 tablet (10 mg) by mouth At Bedtime 7 tablet 0     guaiFENesin-codeine (ROBITUSSIN AC) 100-10 MG/5ML SOLN solution Take 5-10 mLs by mouth every 4 hours as needed for cough 420 mL 0     montelukast (SINGULAIR) 10 MG tablet Take 1 tablet (10 mg) by mouth At Bedtime 5 tablet 0     FLUTICASONE PROPIONATE NA Spray 50 mcg in nostril       Dextromethorphan-Guaifenesin (MUCUS-DM MAX)  MG TB12 Take 1 tablet by mouth every 12 hours       losartan (COZAAR) 50 MG tablet Take 1 tablet (50 mg) by mouth daily 30 tablet 3     LISINOPRIL PO Take 10 mg by mouth daily       VITAMIN D, CHOLECALCIFEROL, PO Take 1,000 Units by mouth daily       TAMSULOSIN HCL PO Take 0.4 mg by mouth daily       METFORMIN HCL PO Take by mouth 2 times daily (with meals)       Omega-3 Fatty Acids (FISH OIL) 500 MG CAPS        diclofenac (VOLTAREN) 1 % GEL Apply 4 grams to knees four times daily using enclosed dosing card. 100 g 1     calcium carbonate (TUMS) 500 MG chewable tablet Take 1 tablet (500 mg) by mouth daily 150 tablet 0     acetaminophen 500 MG CAPS Take 2 tablets by mouth daily.       acetic acid-hydrocortisone (ACETASOL HC) otic solution Place 4 drops into both ears 2 times daily. 10 mL 10     ammonium lactate (AMLACTIN) 12 % cream Apply  topically daily.       artificial tears SOLN Use one drop to both eye PRN.       omeprazole (PRILOSEC) 20 MG capsule Take 20 mg by mouth daily.       Allergies:  Allergies   Allergen Reactions     Eggs Other (See Comments)     Pt states no longer having reaction, childhood allergy, eats eggs       Penicillins Other (See Comments)     Pt states PCN allergy is due to egg allergy     Propoxyphene Other (See Comments)     Pain     Review of Systems:  - Skin: As above in HPI. No additional skin  concerns.    Physical exam:  Vitals: There were no vitals taken for this visit.  GEN: This is a well developed, well-nourished male in no acute distress, in a pleasant mood.      SKIN: Exam includes the head/face, neck, both arms, chest, back, abdomen, digits and/or nails was examined, including the lower legs.  -  No gritty, pink papules on the face, including the left lower inferior orbital rim  There is a light pink atrophic patch on the right forearm.    - There is a 1.5 cm pink scaly plaque on the right forearm  - There are a few scattered waxy, stuck on papules on an erythematous base on his dorsal hands and forearms.   - No other lesions of concern on areas examined.     Impression/Plan:  1. Actinic keratosis x 3 on the forearms  - Cryotherapy procedure note: After verbal consent and discussion of risks and benefits including but no limited to dyspigmentation/scar, blister, and pain, 3 was(were) treated with 1-2mm freeze border for 2 cycles with liquid nitrogen. Post cryotherapy instructions were provided.   2. Hx of actinic keratoses on the right forearm and left lower inferior orbital rim per pathology. No signs of recurrence.   3.  Inflamed Seborrheic keratoses - on the dorsal hands and forearms x 6.   Cryotherapy procedure note: After verbal consent and discussion of risks and benefits including but no limited to dyspigmentation/scar, blister, and pain, 6 was(were) treated with 1-2mm freeze border for 2 cycles with liquid nitrogen. Post cryotherapy instructions were provided.           Staff Involved:  Staff Only    All risk, benefits and alternatives were discussed with patient.  Patient is in agreement and understands the assessment and plan.  All questions were answered.  Sun Screen Education was given.   Return to Clinic for annual screening, or sooner as needed per concerns.   Corinne Gleason PA-C

## 2017-03-06 NOTE — PATIENT INSTRUCTIONS
Primary Care Center Phone Number [phone]  Primary Care Center Medication Refill Request Information:  * Please contact your pharmacy regarding ANY request for medication refills.  ** [facility] Prescription Fax = [phone]  * Please allow 3 business days for routine medication refills.  * Please allow 5 business days for controlled substance medication refills.     Primary Care Center Test Result notification information:  *You will be notified with in 7-10 days of your appointment day regarding the results of your test.  If you are on MyChart you will be notified as soon as the provider has reviewed the results and signed off on them.      Please schedule the following appointments:  Colon and Rectal Surgery [phone] ([facility] 4th floor)    [phone] ([facility] 4th floor)

## 2017-03-06 NOTE — PATIENT INSTRUCTIONS
Cryotherapy    What is it?    Use of a very cold liquid, such as liquid nitrogen, to freeze and destroy abnormal skin cells that need to be removed    What should I expect?    Tenderness and redness    A small blister that might grow and fill with dark purple blood. There may be crusting.    More than one treatment may be needed if the lesions do not go away.    How do I care for the treated area?    Gently wash the area with your hands when bathing.    Use a thin layer of Vaseline to help with healing. You may use a Band-Aid.     The area should heal within 7-10 days and may leave behind a pink or lighter color.     Do not use an antibiotic or Neosporin ointment.     You may take acetaminophen (Tylenol) for pain.     Call your Doctor if you have:    Severe pain    Signs of infection (warmth, redness, cloudy yellow drainage, and or a bad smell)    Questions or concerns    Who should I call with questions?       [facility]: [phone]       [facility]: [phone]       For urgent needs outside of business hours call the [facility] at [phone]        and ask for the dermatology resident on call

## 2017-03-06 NOTE — MR AVS SNAPSHOT
After Visit Summary   3/6/2017    Lux Velasco    MRN: [id number]           Patient Information     Date Of Birth          1945        Visit Information        Provider Department      3/6/2017 10:05 AM Nam Duffy MD [facility]        Today's Diagnoses     Rectal bleeding    -  1    Parathyroid abnormality (H)        Cough          Care Instructions    Primary Care Center Phone Number [phone]  Primary Care Center Medication Refill Request Information:  * Please contact your pharmacy regarding ANY request for medication refills.  ** [facility] Prescription Fax = [phone]  * Please allow 3 business days for routine medication refills.  * Please allow 5 business days for controlled substance medication refills.     Primary Care Center Test Result notification information:  *You will be notified with in 7-10 days of your appointment day regarding the results of your test.  If you are on MyChart you will be notified as soon as the provider has reviewed the results and signed off on them.      Please schedule the following appointments:  Colon and Rectal Surgery [phone] ([facility] 4th [facility])    [phone] ([facility] 4th [facility])          Follow-ups after your visit        Additional Services     COLORECTAL SURGERY REFERRAL       Rectal bleeding            OTOLARYNGOLOGY REFERRAL       Parathyroid surgery opinion wants to see Dr. Short                  Your next 10 appointments already scheduled     Mar 06, 2017 11:45 AM CST   (Arrive by 11:30 AM)   Return Visit with Corinne Gleason PA-C   [facility] Dermatology ([facility] [place])    [address] [place] [address]  3rd Floor  [facility] [address] [phone]            Mar 13, 2017  1:00 PM CDT   (Arrive by 12:45 PM)   New Patient Visit with Julianna Short MD   [facility] Ear Nose and Throat ([facility] [place])    [address] [facility]  4th Floor  [facility] [address]    [phone]            Mar 14, 2017  8:45 AM CDT   (Arrive by 8:30 AM)   New Patient Visit with ORION Sargent [facility] Colon and Rectal Surgery ([facility] [place])    [address] [place] [address] [phone]              Who to contact     Please call your clinic at [phone] to:    Ask questions about your health    Make or cancel appointments    Discuss your medicines    Learn about your test results    Speak to your doctor   If you have compliments or concerns about an experience at your clinic, or if you wish to file a complaint, please contact [facility] Patient Relations at [phone] or email us at [email or web address]         Additional Information About Your Visit        MyChart Information     CircleCI is an electronic gateway that provides easy, online access to your medical records. With CircleCI, you can request a clinic appointment, read your test results, renew a prescription or communicate with your care team.     To sign up for CircleCI visit the website at [email or web address]   You will be asked to enter the access code listed below, as well as some personal information. Please follow the directions to create your username and password.     Your access code is: [email or web address]  Expires: 2017 12:47 PM     Your access code will  in 90 days. If you need help or a new code, please contact your [facility] or call [phone] for assistance.        Care EveryWhere ID     This is your Care EveryWhere ID. This could be used by other organizations to access your [place] medical records  [id number]        Your Vitals Were     Pulse BMI (Body Mass Index)                75 34.6 kg/m2           Blood Pressure from Last 3 Encounters:   17 128/81   17 160/84   17 150/88    Weight from Last 3 Encounters:   17 112.5 kg  (248 lb 1.6 oz)   02/21/17 110.2 kg (243 lb)   02/13/17 108.4 kg (239 lb)              We Performed the Following     COLORECTAL SURGERY REFERRAL     OTOLARYNGOLOGY REFERRAL          Today's Medication Changes          These changes are accurate as of: 3/6/17 10:31 AM.  If you have any questions, ask your nurse or doctor.               These medicines have changed or have updated prescriptions.        Dose/Directions    * montelukast 10 MG tablet   Commonly known as:  SINGULAIR   This may have changed:  Another medication with the same name was added. Make sure you understand how and when to take each.   Used for:  Cough   Changed by:  Nam Duffy MD        Dose:  10 mg   Take 1 tablet (10 mg) by mouth At Bedtime   Quantity:  5 tablet   Refills:  0       * montelukast 10 MG tablet   Commonly known as:  SINGULAIR   This may have changed:  You were already taking a medication with the same name, and this prescription was added. Make sure you understand how and when to take each.   Used for:  Cough   Changed by:  Nam Duffy MD        Dose:  10 mg   Take 1 tablet (10 mg) by mouth At Bedtime   Quantity:  7 tablet   Refills:  0       * Notice:  This list has 2 medication(s) that are the same as other medications prescribed for you. Read the directions carefully, and ask your doctor or other care provider to review them with you.         Where to get your medicines      Some of these will need a paper prescription and others can be bought over the counter.  Ask your nurse if you have questions.     Bring a paper prescription for each of these medications     montelukast 10 MG tablet                Primary Care Provider Office Phone # Fax #    Nam Duffy [phone]       [facility] [address] [place] [address]        Thank you!     Thank you for choosing [facility] PRIMARY CARE [facility]  for your care. Our goal is always to provide you with excellent care. Hearing  back from our patients is one way we can continue to improve our services. Please take a few minutes to complete the written survey that you may receive in the mail after your visit with us. Thank you!             Your Updated Medication List - Protect others around you: Learn how to safely use, store and throw away your medicines at www.disposemymeds.org.          This list is accurate as of: 3/6/17 10:31 AM.  Always use your most recent med list.                   Brand Name Dispense Instructions for use    acetaminophen 500 MG Caps      Take 2 tablets by mouth daily.       acetic acid-hydrocortisone otic solution    ACETASOL HC    10 mL    Place 4 drops into both ears 2 times daily.       ammonium lactate 12 % cream    AMLACTIN     Apply  topically daily.       artificial tears Soln      Use one drop to both eye PRN.       calcium carbonate 500 MG chewable tablet    TUMS    150 tablet    Take 1 tablet (500 mg) by mouth daily       diclofenac 1 % Gel topical gel    VOLTAREN    100 g    Apply 4 grams to knees four times daily using enclosed dosing card.       Fish Oil 500 MG Caps          FLUTICASONE PROPIONATE NA      Spray 50 mcg in nostril       guaiFENesin-codeine 100-10 MG/5ML Soln solution    ROBITUSSIN AC    420 mL    Take 5-10 mLs by mouth every 4 hours as needed for cough       LISINOPRIL PO      Take 10 mg by mouth daily       losartan 50 MG tablet    COZAAR    30 tablet    Take 1 tablet (50 mg) by mouth daily       METFORMIN HCL PO      Take by mouth 2 times daily (with meals)       * montelukast 10 MG tablet    SINGULAIR    5 tablet    Take 1 tablet (10 mg) by mouth At Bedtime       * montelukast 10 MG tablet    SINGULAIR    7 tablet    Take 1 tablet (10 mg) by mouth At Bedtime       MUCUS-DM MAX  MG Tb12      Take 1 tablet by mouth every 12 hours       omeprazole 20 MG CR capsule    priLOSEC     Take 20 mg by mouth daily.       TAMSULOSIN HCL PO      Take 0.4 mg by mouth daily       VITAMIN D  (CHOLECALCIFEROL) PO      Take 1,000 Units by mouth daily       * Notice:  This list has 2 medication(s) that are the same as other medications prescribed for you. Read the directions carefully, and ask your doctor or other care provider to review them with you.

## 2017-03-06 NOTE — NURSING NOTE
Chief Complaint   Patient presents with     Recheck Medication     pt is here for a medication follow up        Linn Stokes CMA at 9:45 AM on 3/6/2017

## 2017-03-06 NOTE — MR AVS SNAPSHOT
After Visit Summary   3/6/2017    Lux Velasco    MRN: [id number]           Patient Information     Date Of Birth          1945        Visit Information        Provider Department      3/6/2017 11:45 AM Corinne Gleason PA-C [facility] Dermatology        Care Instructions    Cryotherapy    What is it?    Use of a very cold liquid, such as liquid nitrogen, to freeze and destroy abnormal skin cells that need to be removed    What should I expect?    Tenderness and redness    A small blister that might grow and fill with dark purple blood. There may be crusting.    More than one treatment may be needed if the lesions do not go away.    How do I care for the treated area?    Gently wash the area with your hands when bathing.    Use a thin layer of Vaseline to help with healing. You may use a Band-Aid.     The area should heal within 7-10 days and may leave behind a pink or lighter color.     Do not use an antibiotic or Neosporin ointment.     You may take acetaminophen (Tylenol) for pain.     Call your Doctor if you have:    Severe pain    Signs of infection (warmth, redness, cloudy yellow drainage, and or a bad smell)    Questions or concerns    Who should I call with questions?       [facility]: [phone]       [facility]: [phone]       For urgent needs outside of business hours call the [facility] at [phone]        and ask for the dermatology resident on call          Follow-ups after your visit        Your next 10 appointments already scheduled     Mar 06, 2017 11:45 AM CST   (Arrive by 11:30 AM)   Return Visit with SAPPHIRE Salter [facility] Dermatology ([facility])    [address] [place] [address] [phone]            Mar 13, 2017  1:00 PM CDT   (Arrive by 12:45 PM)   New Patient Visit with Julianna Short MD   [facility] Ear Nose and  Throat ([facility] [place])    [address] [facility] [address] [facility]  4th [facility] [address] [phone]            Mar 14, 2017  8:45 AM CDT   (Arrive by 8:30 AM)   New Patient Visit with ORION Sargent [facility] Colon and Rectal Surgery ([facility])    [address] [address] [phone]              Who to contact     Please call your clinic at [phone] to:    Ask questions about your health    Make or cancel appointments    Discuss your medicines    Learn about your test results    Speak to your doctor   If you have compliments or concerns about an experience at your clinic, or if you wish to file a complaint, please contact [facility] Patient Relations at [phone] or email us at [email or web address]         Additional Information About Your Visit        Sequenthart Information     Vinja is an electronic gateway that provides easy, online access to your medical records. With Vinja, you can request a clinic appointment, read your test results, renew a prescription or communicate with your care team.     To sign up for Vinja visit the website at [email or web address]   You will be asked to enter the access code listed below, as well as some personal information. Please follow the directions to create your username and password.     Your access code is: [email or web address]  Expires: 2017 12:47 PM     Your access code will  in 90 days. If you need help or a new code, please contact your [facility] or call [phone] for assistance.        Care EveryWhere ID     This is your Care EveryWhere ID. This could be used by other organizations to access your [place] medical records  [id number]         Blood Pressure from Last 3 Encounters:   17 128/81   17 160/84   17 150/88    Weight from Last 3 Encounters:    03/06/17 112.5 kg (248 lb 1.6 oz)   02/21/17 110.2 kg (243 lb)   02/13/17 108.4 kg (239 lb)              Today, you had the following     No orders found for display         Today's Medication Changes          These changes are accurate as of: 3/6/17 11:29 AM.  If you have any questions, ask your nurse or doctor.               These medicines have changed or have updated prescriptions.        Dose/Directions    * montelukast 10 MG tablet   Commonly known as:  SINGULAIR   This may have changed:  Another medication with the same name was added. Make sure you understand how and when to take each.   Used for:  Cough   Changed by:  Nam Duffy MD        Dose:  10 mg   Take 1 tablet (10 mg) by mouth At Bedtime   Quantity:  5 tablet   Refills:  0       * montelukast 10 MG tablet   Commonly known as:  SINGULAIR   This may have changed:  You were already taking a medication with the same name, and this prescription was added. Make sure you understand how and when to take each.   Used for:  Cough   Changed by:  Nam Duffy MD        Dose:  10 mg   Take 1 tablet (10 mg) by mouth At Bedtime   Quantity:  7 tablet   Refills:  0       * Notice:  This list has 2 medication(s) that are the same as other medications prescribed for you. Read the directions carefully, and ask your doctor or other care provider to review them with you.         Where to get your medicines      Some of these will need a paper prescription and others can be bought over the counter.  Ask your nurse if you have questions.     Bring a paper prescription for each of these medications     montelukast 10 MG tablet                Primary Care Provider Office Phone # Fax #    Nam Duffy [phone]       [address] [address]        Thank you!     Thank you for choosing [facility]  for your care. Our goal is always to provide you with excellent care. Hearing back from our  patients is one way we can continue to improve our services. Please take a few minutes to complete the written survey that you may receive in the mail after your visit with us. Thank you!             Your Updated Medication List - Protect others around you: Learn how to safely use, store and throw away your medicines at www.disposemymeds.org.          This list is accurate as of: 3/6/17 11:29 AM.  Always use your most recent med list.                   Brand Name Dispense Instructions for use    acetaminophen 500 MG Caps      Take 2 tablets by mouth daily.       acetic acid-hydrocortisone otic solution    ACETASOL HC    10 mL    Place 4 drops into both ears 2 times daily.       ammonium lactate 12 % cream    AMLACTIN     Apply  topically daily.       artificial tears Soln      Use one drop to both eye PRN.       calcium carbonate 500 MG chewable tablet    TUMS    150 tablet    Take 1 tablet (500 mg) by mouth daily       diclofenac 1 % Gel topical gel    VOLTAREN    100 g    Apply 4 grams to knees four times daily using enclosed dosing card.       Fish Oil 500 MG Caps          FLUTICASONE PROPIONATE NA      Spray 50 mcg in nostril       guaiFENesin-codeine 100-10 MG/5ML Soln solution    ROBITUSSIN AC    420 mL    Take 5-10 mLs by mouth every 4 hours as needed for cough       LISINOPRIL PO      Take 10 mg by mouth daily       losartan 50 MG tablet    COZAAR    30 tablet    Take 1 tablet (50 mg) by mouth daily       METFORMIN HCL PO      Take by mouth 2 times daily (with meals)       * montelukast 10 MG tablet    SINGULAIR    5 tablet    Take 1 tablet (10 mg) by mouth At Bedtime       * montelukast 10 MG tablet    SINGULAIR    7 tablet    Take 1 tablet (10 mg) by mouth At Bedtime       MUCUS-DM MAX  MG Tb12      Take 1 tablet by mouth every 12 hours       omeprazole 20 MG CR capsule    priLOSEC     Take 20 mg by mouth daily.       TAMSULOSIN HCL PO      Take 0.4 mg by mouth daily       VITAMIN D  (CHOLECALCIFEROL) PO      Take 1,000 Units by mouth daily       * Notice:  This list has 2 medication(s) that are the same as other medications prescribed for you. Read the directions carefully, and ask your doctor or other care provider to review them with you.

## 2017-03-07 ENCOUNTER — PRE VISIT (OUTPATIENT)
Dept: OTOLARYNGOLOGY | Facility: CLINIC | Age: 72
End: 2017-03-07

## 2017-03-07 NOTE — TELEPHONE ENCOUNTER
1.  Date/reason for appt:3/13/17, Parathyroid abnormality   2.  Referring provider: TEZ HERNANDEZ  3.  Call to patient (Yes / No - short description): No, referred   4.  Previous care at / records requested from:   [facility]- office notes are in epic.

## 2017-03-08 NOTE — TELEPHONE ENCOUNTER
[facility], stating [facility] are no [facility] pertaining to pt's dx's. Still need records for the pt at the [facility] in regards to his outside recs and will fax another cover sheet.

## 2017-03-09 NOTE — TELEPHONE ENCOUNTER
Records received from [facility].   Included  Office notes: 12/2/16, 8/20/15, 6/19/13, 3/3/17, 2/6/17  Radiology reports: CTA chest on 1/29/17   Chest on 1/29/17   parathyroid on 1/6/17, 12/17/12   transcatheter biopsy on 1/4/17   US thyroid on 11/29/16   Bone density on 11/29/16   Abdomen on 11/15/16, 6/14/12   Localization of tumor on 12/17/12  Op/Procedure notes: left eye on 12/7/16, colonoscopy on 8/20/15, echo on 3/2/11

## 2017-03-13 ENCOUNTER — OFFICE VISIT (OUTPATIENT)
Dept: OTOLARYNGOLOGY | Facility: CLINIC | Age: 72
End: 2017-03-13

## 2017-03-13 ENCOUNTER — OFFICE VISIT (OUTPATIENT)
Dept: SURGERY | Facility: CLINIC | Age: 72
End: 2017-03-13

## 2017-03-13 ENCOUNTER — ANESTHESIA EVENT (OUTPATIENT)
Dept: SURGERY | Facility: AMBULATORY SURGERY CENTER | Age: 72
End: 2017-03-13

## 2017-03-13 ENCOUNTER — ALLIED HEALTH/NURSE VISIT (OUTPATIENT)
Dept: SURGERY | Facility: CLINIC | Age: 72
End: 2017-03-13

## 2017-03-13 VITALS
OXYGEN SATURATION: 95 % | WEIGHT: 262 LBS | DIASTOLIC BLOOD PRESSURE: 71 MMHG | HEIGHT: 70 IN | RESPIRATION RATE: 14 BRPM | HEART RATE: 56 BPM | SYSTOLIC BLOOD PRESSURE: 137 MMHG | BODY MASS INDEX: 37.51 KG/M2 | TEMPERATURE: 97.7 F

## 2017-03-13 VITALS — BODY MASS INDEX: 37.51 KG/M2 | HEIGHT: 70 IN | WEIGHT: 262 LBS

## 2017-03-13 DIAGNOSIS — Z01.818 PRE-OP EXAM: Primary | ICD-10-CM

## 2017-03-13 DIAGNOSIS — E21.3 HYPERPARATHYROIDISM (H): Primary | ICD-10-CM

## 2017-03-13 ASSESSMENT — PAIN SCALES - GENERAL: PAINLEVEL: NO PAIN (0)

## 2017-03-13 NOTE — ADDENDUM NOTE
Addendum  created 03/13/17 1615 by Leonor Ames APRN CNP    Actions taken from a BestPractice Advisory, SmartSet accessed

## 2017-03-13 NOTE — TELEPHONE ENCOUNTER
Received Rad. Reports and 2 Disc from [facility] - Will send to film room  Included:   Disc 1:  NM, OT, CT  12/12/17 Parathyroid Scan (appears to not be related)   CR Chest & CTA Chest  1/29/17   NM, OT, CT  1/06/17 Parathyroid Planer (appears to not be related)   Disc 2:  XR ABD Exam 6/14/12  ABD KUB 11/15/16  DXA Bone. Thy U/S Soft neck tissue & DXA Bone 11/29/16 (appears to not be related)   IR Transcatheter Bx's 1/04/17(appears to not be related)

## 2017-03-13 NOTE — MR AVS SNAPSHOT
After Visit Summary   3/13/2017    Lux Velasco    MRN: [id number]           Patient Information     Date Of Birth          1945        Visit Information        Provider Department      3/13/2017 2:00 PM Rn, [facility]        Care Instructions    Preparing for Your Surgery      Name:  Lux Velasco   MRN:  [id number]   :  1945   Today's Date:  3/13/2017     Arriving for surgery:  Surgery date:  3/21/17  Surgery time:  10:00  Arrival time:  08:30  Please come to:     [facility]  [address] [place] [id number]    -  Proceed to the 5th floor to check into the Ambulatory Surgery Center.              >> There will be patient concierges on the 1st and 5th floor, for assistance or an escort, if you would like.              >> Please call [phone] with any questions.    What can I eat or drink?  -  You may have solid food or milk products until 8 hours prior to your surgery.  -  You may have water, apple juice or 7up/Sprite until 2 hours prior to your surgery.    Which medicines can I take?  -  Do NOT take these medications in the morning, the day of surgery:  Aspirin and ibuprofen x 7 days before surgery, supplements, losartan and lisinopril and metformin if normally taken in the morning.    -  Please take these medications the day of surgery:  Tylenol if needed, and prilosec if normally taken in the morning.    How do I prepare myself?  -  Take two showers: one the night before surgery; and one the morning of surgery.         Use Scrubcare or Hibiclens to wash from neck down.  You may use your own shampoo and conditioner. No other hair products.   -  Do NOT use lotion, powder, deodorant, or antiperspirant the day of your surgery.  -  Do NOT wear any makeup, fingernail polish or jewelry.  -  Begin using Incentive Spirometer 1 week prior to surgery.  Use 4 times per day, up to 5-10 breaths each time.  Bring Incentive  Spirometer to hospital.  -Do not bring your own medications to the hospital, except for inhalers and eye drops.  -  Bring your ID and insurance card.    Questions or Concerns:  If you have questions or concerns, please call the  Preoperative Assessment Center, Monday-Friday 7AM-7PM:  [phone]                  Follow-ups after your visit        Your next 10 appointments already scheduled     Mar 13, 2017  2:30 PM CDT   (Arrive by 2:15 PM)   PAC EVALUATION with  Pac Abeba 4   [facility] ([facility])    [address] [place] [address]  4th [facility] [address]   [phone]            Mar 13, 2017  3:20 PM CDT   (Arrive by 3:05 PM)   PAC Anesthesia Consult with  Pac Anesthesiologist   [facility] ([facility])    [address] [place] [address]  4th [facility] [address]   [phone]            Mar 14, 2017  8:45 AM CDT   (Arrive by 8:30 AM)   New Patient Visit with ORION Sargent [facility] ([facility])    [address] [place] [address]  4th [facility] [address]   [phone]            Mar 21, 2017   Procedure with Julianna Short MD   [facility] ([facility])    [address] [place] [address]  5th [facility] [address]   [phone]           Located in the Clinics and Surgery Center at [address] [place] [address].   parking is very convenient and highly recommended.  is a $6 flat rate fee.  Both  and self parkers should enter the main arrival plaza from [facility]; parking attendants will direct you based on your parking preference.            Apr 17, 2017  1:45 PM CDT   (Arrive by 1:30 PM)   Return Visit with Julianna Short MD   [facility] Nose [facility] Th[facility]at ([facility])    [address] [place] [address]  Floor  [facility] [address] [phone]              Who to contact     Please call your clinic at [phone] to:    Ask questions about your health    Make or cancel appointments    Discuss your medicines    Learn about your test results    Speak to your doctor   If you have compliments or concerns about an experience at your clinic, or if you wish to file a complaint, please contact [facility] Patient Relations at [phone] or email us at [email or web address]         Additional Information About Your Visit        MetaCure Information     MetaCure is an electronic gateway that provides easy, online access to your medical records. With MetaCure, you can request a clinic appointment, read your test results, renew a prescription or communicate with your care team.     To sign up for MetaCure visit the website at [email or web address]   You will be asked to enter the access code listed below, as well as some personal information. Please follow the directions to create your username and password.     Your access code is: [email or web address]  Expires: 2017  1:47 PM     Your access code will  in 90 days. If you need help or a new code, please contact your [facility] or call [phone] for assistance.        Care EveryWhere ID     This is your Care EveryWhere ID. This could be used by other organizations to access your [facility] medical records  [id number]         Blood Pressure from Last 3 Encounters:   17 128/81   17 160/84   17 150/88    Weight from Last 3 Encounters:   17 118.8 kg (262 lb)   17 112.5 kg (248 lb 1.6 oz)   17 110.2 kg (243 lb)              Today, you had the following     No orders found for display       Primary Care Provider Office Phone # Fax #    Nam Duffy [phone]       [facility] [address] [place] [address]        Thank  you!     Thank you for choosing [facility]  for your care. Our goal is always to provide you with excellent care. Hearing back from our patients is one way we can continue to improve our services. Please take a few minutes to complete the written survey that you may receive in the mail after your visit with us. Thank you!             Your Updated Medication List - Protect others around you: Learn how to safely use, store and throw away your medicines at www.disposemymeds.org.          This list is accurate as of: 3/13/17  2:29 PM.  Always use your most recent med list.                   Brand Name Dispense Instructions for use    acetaminophen 500 MG Caps      Take 2 tablets by mouth daily.       acetic acid-hydrocortisone otic solution    ACETASOL HC    10 mL    Place 4 drops into both ears 2 times daily.       ammonium lactate 12 % cream    AMLACTIN     Apply  topically daily.       artificial tears Soln      Use one drop to both eye PRN.       calcium carbonate 500 MG chewable tablet    TUMS    150 tablet    Take 1 tablet (500 mg) by mouth daily       diclofenac 1 % Gel topical gel    VOLTAREN    100 g    Apply 4 grams to knees four times daily using enclosed dosing card.       Fish Oil 500 MG Caps          FLUTICASONE PROPIONATE NA      Spray 50 mcg in nostril       guaiFENesin-codeine 100-10 MG/5ML Soln solution    ROBITUSSIN AC    420 mL    Take 5-10 mLs by mouth every 4 hours as needed for cough       LISINOPRIL PO      Take 10 mg by mouth daily       losartan 50 MG tablet    COZAAR    30 tablet    Take 1 tablet (50 mg) by mouth daily       METFORMIN HCL PO      Take by mouth 2 times daily (with meals)       * montelukast 10 MG tablet    SINGULAIR    5 tablet    Take 1 tablet (10 mg) by mouth At Bedtime       * montelukast 10 MG tablet    SINGULAIR    7 tablet    Take 1 tablet (10 mg) by mouth At Bedtime       MUCUS-DM MAX  MG Tb12      Take 1 tablet by mouth every 12 hours        omeprazole 20 MG CR capsule    priLOSEC     Take 20 mg by mouth daily.       TAMSULOSIN HCL PO      Take 0.4 mg by mouth daily       VITAMIN D (CHOLECALCIFEROL) PO      Take 1,000 Units by mouth daily       * Notice:  This list has 2 medication(s) that are the same as other medications prescribed for you. Read the directions carefully, and ask your doctor or other care provider to review them with you.

## 2017-03-13 NOTE — ANESTHESIA PREPROCEDURE EVALUATION
Anesthesia Evaluation     . Pt has had prior anesthetic. Type: General      ROS/MED HX    ENT/Pulmonary:     (+)SEYMOUR risk factors hypertension, obese, , . .    Neurologic:     (+)CVA date: 1999 without deficits    Cardiovascular:     (+) hypertension-range: well managed, ---. : . . . :. . Previous cardiac testing Echodate:01/2017results:Interpretation Summary  Global and regional left ventricular function is normal with an EF of 60-65%.  Global right ventricular function is normal.  Trileaflet aortic sclerosis without stenosis.  No pericardial effusion is present.date: results:ECG reviewed date: results:12/16- NSR with PVC, T wave abnormality (unchanged) date: results:          METS/Exercise Tolerance:  >4 METS   Hematologic:  - neg hematologic  ROS       Musculoskeletal:  - neg musculoskeletal ROS       GI/Hepatic:     (+) GERD Asymptomatic on medication,       Renal/Genitourinary:  - ROS Renal section negative       Endo:     (+) type II DM Last HgA1c: ? - states he is well manage Not using insulin - not using insulin pump Normal glucose range: 100s  not previously admitted for DM/DKA thyroid problem .      Psychiatric:         Infectious Disease:  - neg infectious disease ROS       Malignancy:      - no malignancy   Other:    (+) No chance of pregnancy C-spine cleared: N/A, no H/O Chronic Pain,no other significant disability              Physical Exam  Normal systems: cardiovascular and pulmonary    Airway   Mallampati: III  TM distance: <3 FB  Neck ROM: full    Dental   (+) missing    Cardiovascular   Rhythm and rate: regular and normal  (+) murmur     PE comment: Hx of murmur      Pulmonary    breath sounds clear to auscultation    Other findings: Last Basic Metabolic Panel:  Lab Results      Component                Value               Date                      NA                       142                 02/21/2017             Lab Results      Component                Value               Date                       POTASSIUM                4.2                 02/21/2017            Lab Results      Component                Value               Date                      CHLORIDE                 107                 02/21/2017            Lab Results      Component                Value               Date                      TRINA                      10.6                02/21/2017            Lab Results      Component                Value               Date                      CO2                      26                  02/21/2017            Lab Results      Component                Value               Date                      BUN                      12                  02/21/2017            Lab Results      Component                Value               Date                      CR                       0.73                02/21/2017            Lab Results      Component                Value               Date                      GLC                      102                 02/21/2017            CBC RESULTS: Lab Test        02/21/17                       0855          WBC          5.2           RBC          4.85          HGB          13.4          HCT          41.0          MCV          85            MCH          27.6          MCHC         32.7          RDW          13.6          PLT          157                    PAC Discussion and Assessment    ASA Classification: 3  Case is suitable for: ASC  Anesthetic techniques and relevant risks discussed: GA  Invasive monitoring and risk discussed: No  Types:   Possibility and Risk of blood transfusion discussed: No  NPO instructions given: NPO after midnight  Additional anesthetic preparation and risks discussed:   Needs early admission to pre-op area:   Other:     PAC Resident/NP Anesthesia Assessment:  ASSESSMENT and PLAN  Lux Velasco is a 71 year old male scheduled to undergo  Parathyroidectomy with Dr. Short  on 03/21/2017  at [facility].  . He has the following specific operative considerations:     1. 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  > 4 METS without symptoms, Recent ECHO EF 60-65%, LVH, aortic sclerosis no stenosis.    - Instructed to hold Losartan and Lisinopril the DOS  2. ENDO- DM2- well managed per patient, Hyperparathyroidism- last calcium 10.6  - instructed to hold Metformin the DOS  - pre-prandial sugars in 100s  3. GI- GERD- well managed  - Continue Omeprazole the DOS  4. Denies previous complications from anesthesia.       - RCRI :  6.6%   risk of major adverse cardiac event.   - VTE risk:1.8%  - SEYMOUR # of risks /8 = Moderate risk- denies snoring or apneic periods  - Risk of PONV score = 1 If > 2, anti-emetic intervention recommended.Patient was discussed with Dr. Torrez.    ORION Hernandez CNP        Mid-Level Provider/Resident:   Date:   Time:     Attending Anesthesiologist Anesthesia Assessment:  STAFF:  71 y.o. man with hypercalcemia (10.6) disease for parathroidectomy  by Dr. OWENS  [place] [facility]   History summarized above.  REcent ECHO =   Global and regional left ventricular function is normal with an EF of 60-65%.  Global right ventricular function is normal.  Trileaflet aortic sclerosis without stenosis.  No pericardial effusion is present.  Instructions given and questions answered.   Final plans by anesthesiology team on DOS.   ---rcp      Reviewed and Signed by PAC Anesthesiologist  Anesthesiologist: lali  Date: 3/13  Time: 1530  Pass/Fail: Pass  Disposition:     PAC Pharmacist Assessment:        Pharmacist:   Date:   Time:      Anesthesia Plan      History & Physical Review  History and physical reviewed and following examination; no interval change.    ASA Status:  2 .    NPO Status:  > 8 hours    Plan for General and ETT with Intravenous induction. Maintenance will be Balanced.    PONV prophylaxis:  Ondansetron (or other 5HT-3) and Dexamethasone or  Solumedrol       Postoperative Care  Postoperative pain management:  Oral pain medications and Multi-modal analgesia.      Consents  Anesthetic plan, risks, benefits and alternatives discussed with:  Patient..                          .

## 2017-03-13 NOTE — PATIENT INSTRUCTIONS
Preparing for Your Surgery      Name:  Lux Velasco   MRN:  [id number]   :  1945   Today's Date:  3/13/2017     Arriving for surgery:  Surgery date:  3/21/17  Surgery time:  10:00  Arrival time:  08:30  Please come to:     [facility]  [address] [place] [id number]    -  Proceed to the 5th floor to check into the Ambulatory Surgery Center.              >> There will be patient concierges on the 1st and 5th floor, for assistance or an escort, if you would like.              >> Please call [phone] with any questions.    What can I eat or drink?  -  You may have solid food or milk products until 8 hours prior to your surgery.  -  You may have water, apple juice or 7up/Sprite until 2 hours prior to your surgery.    Which medicines can I take?  -  Do NOT take these medications in the morning, the day of surgery:  Aspirin and ibuprofen x 7 days before surgery, supplements and lisinopril and metformin if normally taken in the morning.    -  Please take these medications the day of surgery:  Tylenol if needed, and prilosec prostate medications    How do I prepare myself?  -  Take two showers: one the night before surgery; and one the morning of surgery.         Use Scrubcare or Hibiclens to wash from neck down.  You may use your own shampoo and conditioner. No other hair products.   -  Do NOT use lotion, powder, deodorant, or antiperspirant the day of your surgery.  -  Do NOT wear any makeup, fingernail polish or jewelry.   -Do not bring your own medications to the hospital, except for inhalers and eye drops.  -  Bring your ID and insurance card.    Questions or Concerns:  If you have questions or concerns, please call the  Preoperative Assessment Center, Monday-Friday 7AM-7PM:  [phone]

## 2017-03-13 NOTE — PATIENT INSTRUCTIONS
Nurse teaching given on parathyroidectomy and the patient expresses understanding and acceptance of instructions. Kushal Muller 3/13/2017 1:43 PM

## 2017-03-13 NOTE — NURSING NOTE
Chief Complaint   Patient presents with     Consult     parathyroid abnormality     Joce Ford LPN

## 2017-03-13 NOTE — Clinical Note
Dr Short,  I had the pleasure of seeing your patient in the PAC. Please find the attached H&P.  If you have any questions or concerns I can be reached in the PAC.  Best Regards,  BROCK Gordon  [facility] Phone: [phone] Fax: [phone]

## 2017-03-13 NOTE — MR AVS SNAPSHOT
After Visit Summary   3/13/2017    Lux Velasco    MRN: [id number]           Patient Information     Date Of Birth          1945        Visit Information        Provider Department      3/13/2017 1:00 PM Julianna Short MD [facility] Ear Nose and Throat        Today's Diagnoses     Hyperparathyroidism (H)    -  1      Care Instructions    Nurse teaching given on parathyroidectomy and the patient expresses understanding and acceptance of instructions. Kushal Muller 3/13/2017 1:43 PM        Follow-ups after your visit        Additional Services     PAC Visit Referral (For [facility] Only)       Does this visit require an Anesthesia consult?  Yes - Evaluate for medical necessity related to one of the following conditions:  Heart Failure    H&P done by:  N/A and Other (Specify): H&P in pacs       Please be aware that coverage of these services is subject to the terms and limitations of your health insurance plan.  Call member services at your health plan with any benefit or coverage questions.      Please bring the following to your appointment:  >>   Any x-rays, CTs or MRIs which have been performed.  Contact the facility where they were done to arrange for  prior to your scheduled appointment.  Any new CT, MRI or other procedures ordered by your specialist must be performed at a [facility] facility or coordinated by your clinic's referral office.    >>   List of current medications  >>   This referral request   >>   Any documents/labs given to you for this referral                  Your next 10 appointments already scheduled     Mar 30, 2017  9:00 AM CDT   (Arrive by 8:45 AM)   Return Visit with Nam Duffy MD   [facility] ([facility])    [address] [place] [address]  [facility] [id number] [phone]            Apr 17, 2017 12:00 PM CDT   (Arrive by 11:45 AM)   NEW BENIGN PROSTATIC HYPERTROPHY with Pawan Denney DO   [facility]  "Urology and Inst for Prostate and Urologic Cancers ([facility])    [address] [address] [phone]            2017  1:45 PM CDT   (Arrive by 1:30 PM)   Return Visit with Julianna Short MD   [facility] Ear Nose [facility] Throat ([facility])    [address] [address] [phone]              Who to contact     Please call your clinic at [phone] to:    Ask questions about your health    Make or cancel appointments    Discuss your medicines    Learn about your test results    Speak to your doctor   If you have compliments or concerns about an experience at your clinic, or if you wish to file a complaint, please contact [facility] Patient Relations at [phone] or email us at [email or web address]         Additional Information About Your Visit        [employer] Information     Vimessa is an electronic gateway that provides easy, online access to your medical records. With Vimessa, you can request a clinic appointment, read your test results, renew a prescription or communicate with your care team.     To sign up for Vimessa visit the website at [email or web address]   You will be asked to enter the access code listed below, as well as some personal information. Please follow the directions to create your username and password.     Your access code is: [email or web address]  Expires: 2017  1:47 PM     Your access code will  in 90 days. If you need help or a new code, please contact your [facility] or call [phone] for assistance.        Care EveryWhere ID     This is your Care EveryWhere ID. This could be used by other organizations to access your [place] medical records  [id number]        Your Vitals Were     Height BMI (Body Mass Index)                1.78 m (5' 10.08\") 37.51 kg/m2           Blood " Pressure from Last 3 Encounters:   03/27/17 144/81   03/21/17 146/87   03/14/17 110/76    Weight from Last 3 Encounters:   03/21/17 120.3 kg (265 lb 4.8 oz)   03/14/17 120.3 kg (265 lb 4.8 oz)   03/13/17 118.8 kg (262 lb)              We Performed the Following     PAC Visit Referral (For [facility] Only)     Sameera-Operative Worksheet (Head & Neck)          Today's Medication Changes          These changes are accurate as of: 3/13/17 11:59 PM.  If you have any questions, ask your nurse or doctor.               These medicines have changed or have updated prescriptions.        Dose/Directions    losartan 50 MG tablet   Commonly known as:  COZAAR   This may have changed:  when to take this   Used for:  Benign essential hypertension        Dose:  50 mg   Take 1 tablet (50 mg) by mouth daily   Quantity:  30 tablet   Refills:  3                Primary Care Provider Office Phone # Fax #    Nam Duffy [phone]       [address] [address]        Thank you!     Thank you for choosing [facility] EAR NOSE AND TH[facility]AT  for your care. Our goal is always to provide you with excellent care. Hearing back from our patients is one way we can continue to improve our services. Please take a few minutes to complete the written survey that you may receive in the mail after your visit with us. Thank you!             Your Updated Medication List - Protect others around you: Learn how to safely use, store and throw away your medicines at www.disposemymeds.org.          This list is accurate as of: 3/13/17 11:59 PM.  Always use your most recent med list.                   Brand Name Dispense Instructions for use    acetaminophen 500 MG Caps      Take 2 tablets by mouth daily.       acetic acid-hydrocortisone otic solution    ACETASOL HC    10 mL    Place 4 drops into both ears 2 times daily.       ammonium lactate 12 % cream    AMLACTIN     Apply  topically daily.       artificial  tears Soln      Use one drop to both eye PRN.       FLUTICASONE PROPIONATE NA      Spray 50 mcg in nostril       guaiFENesin-codeine 100-10 MG/5ML Soln solution    ROBITUSSIN AC    420 mL    Take 5-10 mLs by mouth every 4 hours as needed for cough       HYDROcodone-acetaminophen 5-325 MG per tablet    NORCO    20 tablet    Take 1-2 tablets by mouth every 4 hours as needed for other (Moderate to Severe Pain)       losartan 50 MG tablet    COZAAR    30 tablet    Take 1 tablet (50 mg) by mouth daily       METFORMIN HCL PO      Take by mouth 2 times daily (with meals)       omeprazole 20 MG CR capsule    priLOSEC     Take 20 mg by mouth daily.       senna-docusate 8.6-50 MG per tablet    SENOKOT-S;PERICOLACE    10 tablet    Take 1-2 tablets by mouth 2 times daily as needed for constipation Take while on oral narcotics to prevent or treat constipation.       TAMSULOSIN HCL PO      Take 0.4 mg by mouth every morning       VITAMIN D (CHOLECALCIFEROL) PO      Take 1,000 Units by mouth every morning

## 2017-03-13 NOTE — NURSING NOTE
Relevant Diagnosis: hyperparathyroidism   Teaching Topic: parathyroidectomy   Person(s) involved in teaching: Patient     Teaching Concerns Addressed:  Pre op teaching included the need for an H&P, NPO status pre op, hospital routines, expected recovery, activity  restrictions, antimicrobial scrub, s/s of infection, pain control methods and the importance of follow up appointments.  The patient voiced an understanding of all instructions and will call with questions.     Motivation Level:  Asks Questions:   Yes  Eager to Learn:   Yes  Cooperative:   Yes  Receptive (willing/able to accept information):   Yes     Patient  demonstrates understanding of the following:  Reason for the appointment, diagnosis and treatment plan:   Yes  Knowledge of proper use of medications and conditions for which they are ordered (with special attention to potential side effects or drug interactions):   Yes  Which situations necessitate calling provider and whom to contact:   Yes        Proper use and care of  (medical equip, care aids, etc.):   NA  Nutritional needs and diet plan:   Yes  Pain management techniques:   Yes  Patient instructed on hand hygiene:  Yes  How and/when to access community resources:   NA     Infection Prevention:  Patient   demonstrates understanding of the following:  Surgical procedure site care taught   Signs and symptoms of infection taught Yes  Wound care taught Yes     Instructional Materials Used/Given: Pre op booklet.

## 2017-03-13 NOTE — LETTER
3/13/2017     RE: Lux Velasco  [address]   [place] [facility] [address]     Dear Colleague,    Thank you for referring your patient, Lux Velasco, to the [facility] EAR NOSE AND THROAT at [facility]. Please see a copy of my visit note below.    REASON FOR CONSULTATION:  I was asked by Dr. Nam Duffy to see this patient for surgical options for primary hyperparathyroidism.      HISTORY OF PRESENT ILLNESS:  This is a 71-year-old gentleman who has been evaluated at the [facility] until recently.  He has been noted to be hypercalcemic since 2006.  A parathyroid hormone level was checked in 2010.  The most recent calcium from 02/2017 is 11.1 and elevated with a PTH at that same time was 119 and a minimally low vitamin D level.  Based on this, the patient then underwent an ultrasound of the neck including a sestamibi SPECT CT scan.  The ultrasound of the neck did identify some small very tiny nodules within the right lobe of the thyroid and then about a 1.5 cm lesion just posterior to the inferior aspect of the right lobe of the thyroid.  On the CT SPECT sestamibi scan the impression was findings as described above may represent a right inferior parathyroid adenoma.      Regarding symptoms of the patient's hyperparathyroidism, he admits to some memory changes, sleep patterns have been altered and muscle and joint fatigue more so than that he would expect.  He has no nephrolithiasis.  No recent fractures.  There is no family history of parathyroid abnormalities.  No recent fractures.  No family history of parathyroid or thyroid abnormalities.      PAST MEDICAL HISTORY:     1.  Angina.   2.  Obesity/   3.  Esophageal reflux.   4.  Hypercholesterolemia.   5.  Mild cognitive impairment.   6.  Hypercalcemia.   7.  Type 2 diabetes.      PAST SURGICAL HISTORY:  Includes multiple orthopedic surgeries.      ALLERGIES:  Eggs, penicillin, acetaminophen,  and lisinopril.       MEDICATIONS:  Have been reviewed and are available in the EMR.      REVIEW OF SYSTEMS:  A 10-point review of systems is pertinent for that noted in the HPI.      PHYSICAL EXAMINATION:  The neck is soft.  There are no palpable masses present.  The thyroid gland is palpable and moves nicely with swallowing.  No nodules were noted within the thyroid gland.  No cervical lymphadenopathy identified.      LABORATORY DATA AND RADIOGRAPHIC IMAGES:  Are discussed above.      ASSESSMENT:  Primary hyperparathyroidism.      PLAN:  Based on the above, I would recommend the patient undergo a neck exploration, resection of parathyroid adenoma.  Intraoperative parathyroid hormone testing will also be utilized.  I discussed the surgical procedure with the patient, including but not limited to the risks of bleeding, infection, injury to the recurrent laryngeal nerve or nerves or missed parathyroid adenoma.  The patient is aware of this and has agreed to proceed with surgery and we will schedule him for surgery accordingly.         HERNANDEZ HERNANDEZ MD        D: 2017 14:43   T: 2017 06:30   MT: nh    Name:     MANDY GARCIA   MRN:      [id number]        Account:      [id number]   :      1945           Service Date: 2017    Document: [id number]

## 2017-03-13 NOTE — H&P
Pre-Operative H & P     CC:  Preoperative exam to assess for increased cardiopulmonary risk while undergoing surgery and anesthesia.    Date of Encounter: 3/13/2017  Primary Care Physician:  Nam Duffy  Lux Velasco is a 71 year old male who presents for pre-operative H & P in preparation for Parathyroidectomy with Dr. Short  on 03/21/2017  at [facility].   He has been diagnosed with hyperparathyroidism and has elevated calcium levels.    He also has a history of an MI in approximately 1999 with CABG, DM2, HTN and stroke in 1999 with no residual symptoms.  He can perform > 4 METS and denies chest pain, SOB, orthopnea, palpitations, or current edema.    He denies cough, congestion, sore throat, rhinitis or fever/chills.    History is obtained from the patient.     Past Medical History  Past Medical History   Diagnosis Date     Diabetes mellitus type II 2005     History of agent Orange exposure 4/17/2013     Myocardial infarct (H) 4/17/2013     Primary hyperparathyroidism (H) Dx approx 2003     Stroke (H) 4/17/2013       Past Surgical History  Past Surgical History   Procedure Laterality Date     Mohs micrographic procedure       Biopsy of skin lesion         Hx of Blood transfusions/reactions: Denies     Hx of abnormal bleeding or anti-platelet use:Denies    Menstrual history: No LMP for male patient.:     Steroid use in the last year: Denies    Personal or FH with difficulty with Anesthesia:  Denies    Prior to Admission Medications  Current Outpatient Prescriptions   Medication Sig Dispense Refill     montelukast (SINGULAIR) 10 MG tablet Take 1 tablet (10 mg) by mouth At Bedtime 7 tablet 0     guaiFENesin-codeine (ROBITUSSIN AC) 100-10 MG/5ML SOLN solution Take 5-10 mLs by mouth every 4 hours as needed for cough 420 mL 0     montelukast (SINGULAIR) 10 MG tablet Take 1 tablet (10 mg) by mouth At Bedtime 5 tablet 0     FLUTICASONE PROPIONATE NA [place] 50  mcg in nostril       Dextromethorphan-Guaifenesin (MUCUS-DM MAX)  MG TB12 Take 1 tablet by mouth every 12 hours       losartan (COZAAR) 50 MG tablet Take 1 tablet (50 mg) by mouth daily 30 tablet 3     LISINOPRIL PO Take 10 mg by mouth daily       VITAMIN D, CHOLECALCIFEROL, PO Take 1,000 Units by mouth daily       TAMSULOSIN HCL PO Take 0.4 mg by mouth daily       METFORMIN HCL PO Take by mouth 2 times daily (with meals)       Omega-3 Fatty Acids (FISH OIL) 500 MG CAPS        diclofenac (VOLTAREN) 1 % GEL Apply 4 grams to knees four times daily using enclosed dosing card. 100 g 1     calcium carbonate (TUMS) 500 MG chewable tablet Take 1 tablet (500 mg) by mouth daily 150 tablet 0     acetaminophen 500 MG CAPS Take 2 tablets by mouth daily.       acetic acid-hydrocortisone (ACETASOL HC) otic solution Place 4 drops into both ears 2 times daily. 10 mL 10     ammonium lactate (AMLACTIN) 12 % cream Apply  topically daily.       artificial tears SOLN Use one drop to both eye PRN.       omeprazole (PRILOSEC) 20 MG capsule Take 20 mg by mouth daily.         Allergies  Allergies   Allergen Reactions     Eggs Other (See Comments)     Pt states no longer having reaction, childhood allergy, eats eggs       Penicillins Other (See Comments)     Pt states PCN allergy is due to egg allergy     Propoxyphene Other (See Comments)     Pain       Social History  Social History     Social History     Marital status:      Spouse name: N/A     Number of children: N/A     Years of education: N/A     Occupational History     Not on file.     Social History Main Topics     Smoking status: Former Smoker     Smokeless tobacco: Former User     Alcohol use Yes      Comment: occasionally     Drug use: Not on file     Sexual activity: Not on file     Other Topics Concern     Not on file     Social History Narrative       Family History  Family History   Problem Relation Age of Onset     Melanoma Mother      Melanoma Father       "CANCER No family hx of      no skin cancer     Glaucoma No family hx of      Macular Degeneration No family hx of        Review of Systems  Functional status: Independent in ADL's.   METS > 4 .  If no, limited by .    The complete review of systems is negative other than noted in the HPI or here.     C: NEGATIVE for fever, chills, change in weight  INTEGUMENTARY/SKIN: NEGATIVE for worrisome rashes, moles or lesions  E/M: NEGATIVE for ear, mouth and throat problems  R: NEGATIVE for significant cough or SOB  CV: NEGATIVE for chest pain, palpitations or peripheral edema  GI: NEGATIVE for nausea, abdominal pain, heartburn, or change in bowel habits  : negative for dysuria, hematuria, decreased urinary stream,  MUSCULOSKELETAL: NEGATIVE for significant arthralgias or myalgia  NEURO: NEGATIVE for weakness, dizziness or paresthesias  ENDOCRINE: NEGATIVE for temperature intolerance, skin/hair changes  HEME/ALLERGY/IMMUNE: NEGATIVE for bleeding problems  PSYCHIATRIC: NEGATIVE for changes in mood or affect      Temp: 97.7  F (36.5  C) Temp src: Oral BP: 137/71 Pulse: 56   Resp: 14 SpO2: 95 %         262 lbs 0 oz  5' 10\"   Body mass index is 37.59 kg/(m^2).       Physical Exam  Constitutional: Awake, alert, cooperative, no apparent distress, and appears stated age.  Eyes: Pupils equal, round and reactive to light, extra ocular muscles intact, sclera clear, conjunctiva normal.  HENT: Normocephalic, oral pharynx with moist mucus membranes, abnormal dentition. No goiter appreciated.   Respiratory: Clear to auscultation bilaterally, no crackles or wheezing.  Cardiovascular: Regular rate and rhythm, normal S1 and S2, and didn't assess a murmur today.  Carotids +2, no bruits. Trace bilateral pedal edema. Palpable pulses to radial  DP and PT arteries.   GI: Normal bowel sounds, soft, non-distended, non-tender, difficult to assess masses and hepatosplenomegaly d/t body habitus  Lymph/Hematologic: No cervical lymphadenopathy and no " supraclavicular lymphadenopathy.  Genitourinary:  N/A  Skin: Warm and dry.  No rashes at anticipated surgical site.   Musculoskeletal: Full ROM of neck. There is no redness, warmth, or swelling of the joints. Gross motor strength is normal.    Neurologic: Awake, alert, oriented to name, place and time. Cranial nerves II-XII are grossly intact. Gait is normal.   Neuropsychiatric: Calm, cooperative. Normal affect.     Labs: (personally reviewed)  Last Basic Metabolic Panel:  Lab Results   Component Value Date     2017      Lab Results   Component Value Date    POTASSIUM 4.2 2017     Lab Results   Component Value Date    CHLORIDE 107 2017     Lab Results   Component Value Date    TRINA 10.6 2017     Lab Results   Component Value Date    CO2 26 2017     Lab Results   Component Value Date    BUN 12 2017     Lab Results   Component Value Date    CR 0.73 2017     Lab Results   Component Value Date     2017     CBC RESULTS:   Recent Labs   Lab Test  17   0855   WBC  5.2   RBC  4.85   HGB  13.4   HCT  41.0   MCV  85   MCH  27.6   MCHC  32.7   RDW  13.6   PLT  157       EK- NSR with PVC, T wave abnormality (unchanged)  Cardiac echo:    Left Ventricle  Left ventricular size is normal. Global and regional left ventricular  function is normal with an EF of 60-65%. Mild concentric wall thickening  consistent with left ventricular hypertrophy is present. Left ventricular  diastolic function is indeterminate.         Outside records reviewed from:     ASSESSMENT and PLAN  Lux Velasco is a 71 year old male scheduled to undergo  Parathyroidectomy with Dr. Short  on 2017  at [facility].  . He has the following specific operative considerations:     1. Cardiac- HTN, MI with CABG late  or early , HLD.  > 4 METS without symptoms, ECHO EF 60-65%, LVH, aortic sclerosis no stenosis.    - Instructed to hold  Losartan and Lisinopril the DOS  2. ENDO- DM2- well managed per patient, Hyperparathyroidism- last calcium 10.6  - instructed to hold Metformin the DOS  - pre-prandial sugars in 100s  3. GI- GERD- well managed  - Continue Omeprazole the DOS  4. Denies previous complications from anesthesia.       - RCRI :  6.6%   risk of major adverse cardiac event.   - Anesthesia considerations:  Refer to PAC assessment in anesthesia records  - VTE risk:1.8%  - SEYMOUR # of risks /8 = Moderate risk- denies snoring or apneic periods  - Risk of PONV score = 1 If > 2, anti-emetic intervention recommended.Patient was discussed with Dr. Torrez.    ORION Hernandez CNP  Preoperative Assessment Center  [facility]  Phone: [phone]  Fax: [phone]

## 2017-03-14 ENCOUNTER — OFFICE VISIT (OUTPATIENT)
Dept: SURGERY | Facility: CLINIC | Age: 72
End: 2017-03-14

## 2017-03-14 VITALS
WEIGHT: 265.3 LBS | DIASTOLIC BLOOD PRESSURE: 76 MMHG | BODY MASS INDEX: 37.14 KG/M2 | OXYGEN SATURATION: 92 % | TEMPERATURE: 97.5 F | HEIGHT: 71 IN | SYSTOLIC BLOOD PRESSURE: 110 MMHG | HEART RATE: 54 BPM

## 2017-03-14 DIAGNOSIS — K59.09 OTHER CONSTIPATION: Primary | ICD-10-CM

## 2017-03-14 DIAGNOSIS — K64.8 INTERNAL HEMORRHOIDS: ICD-10-CM

## 2017-03-14 ASSESSMENT — PAIN SCALES - GENERAL: PAINLEVEL: MODERATE PAIN (4)

## 2017-03-14 NOTE — MR AVS SNAPSHOT
After Visit Summary   3/14/2017    Lux Velasco    MRN: [id number]           Patient Information     Date Of Birth          1945        Visit Information        Provider Department      3/14/2017 8:45 AM Georgina Ramirez APRN CNP [facility] Colon and Rectal Surgery        Care Instructions    1. Citrucel. Start twice a day and increase after one week to three times a day.  2. High fiber diet and drink a lot of water  3. Take Miralax once a day. After one week can increase up to twice a day if still constipated. After one more week can increase up to three times a day if still constipated.  4. If constipation does not resolve with the above interventions, would recommend you meet with gastroenterology.        Follow-ups after your visit        Your next 10 appointments already scheduled     Mar 21, 2017   Procedure with Julianna Short MD   [facility] ([facility] [place])    [address] [place] [address]  5th [facility] [address] [phone]           Located in the Clinics and Surgery Center at [address] [place] [address].   parking is very convenient and highly recommended.  is a $6 flat rate fee.  Both  and self parkers should enter the main arrival plaza from [facility]; parking attendants will direct you based on your parking preference.            Apr 17, 2017  1:45 PM CDT   (Arrive by 1:30 PM)   Return Visit with Julianna Short MD   [facility] Nose [facility] Throat ([facility] [place])    [address] [place] [address]  4th [facility] [address] [phone]              Who to contact     Please call your clinic at [phone] to:    Ask questions about your health    Make or cancel appointments    Discuss your medicines    Learn about your test results    Speak to your doctor   If you have compliments or concerns about an experience at your clinic, or if you wish to file  "a complaint, please contact [facility] Patient Relations at [phone] or email us at [email or web address]         Additional Information About Your Visit        Haofangtong Information     Haofangtong is an electronic gateway that provides easy, online access to your medical records. With Haofangtong, you can request a clinic appointment, read your test results, renew a prescription or communicate with your care team.     To sign up for Haofangtong visit the website at [email or web address]   You will be asked to enter the access code listed below, as well as some personal information. Please follow the directions to create your username and password.     Your access code is: [email or web address]  Expires: 2017  1:47 PM     Your access code will  in 90 days. If you need help or a new code, please contact your [facility] or call [phone] for assistance.        Care EveryWhere ID     This is your Care EveryWhere ID. This could be used by other organizations to access your [facility] medical records  [id number]        Your Vitals Were     Pulse Temperature Height Pulse Oximetry BMI (Body Mass Index)       54 97.5  F (36.4  C) (Oral) 5' 11\" 92% 37 kg/m2        Blood Pressure from Last 3 Encounters:   17 110/76   17 137/71   17 128/81    Weight from Last 3 Encounters:   17 265 lb 4.8 oz   17 262 lb   17 262 lb              Today, you had the following     No orders found for display         Today's Medication Changes          These changes are accurate as of: 3/14/17  9:20 AM.  If you have any questions, ask your nurse or doctor.               These medicines have changed or have updated prescriptions.        Dose/Directions    losartan 50 MG tablet   Commonly known as:  COZAAR   This may have changed:  when to take this   Used for:  Benign essential hypertension        Dose:  50 mg   Take 1 tablet (50 mg) by mouth " daily   Quantity:  30 tablet   Refills:  3                Primary Care Provider Office Phone # Fax #    Nam Duffy [phone]       [address] [address]        Thank you!     Thank you for choosing [facility] COLON AND RECTAL SURGERY  for your care. Our goal is always to provide you with excellent care. Hearing back from our patients is one way we can continue to improve our services. Please take a few minutes to complete the written survey that you may receive in the mail after your visit with us. Thank you!             Your Updated Medication List - Protect others around you: Learn how to safely use, store and throw away your medicines at www.disposemymeds.org.          This list is accurate as of: 3/14/17  9:20 AM.  Always use your most recent med list.                   Brand Name Dispense Instructions for use    acetaminophen 500 MG Caps      Take 2 tablets by mouth daily.       acetic acid-hydrocortisone otic solution    ACETASOL HC    10 mL    Place 4 drops into both ears 2 times daily.       ammonium lactate 12 % cream    AMLACTIN     Apply  topically daily.       artificial tears Soln      Use one drop to both eye PRN.       FLUTICASONE PROPIONATE NA      Spray 50 mcg in nostril       guaiFENesin-codeine 100-10 MG/5ML Soln solution    ROBITUSSIN AC    420 mL    Take 5-10 mLs by mouth every 4 hours as needed for cough       losartan 50 MG tablet    COZAAR    30 tablet    Take 1 tablet (50 mg) by mouth daily       METFORMIN HCL PO      Take by mouth 2 times daily (with meals)       omeprazole 20 MG CR capsule    priLOSEC     Take 20 mg by mouth daily.       TAMSULOSIN HCL PO      Take 0.4 mg by mouth every morning       VITAMIN D (CHOLECALCIFEROL) PO      Take 1,000 Units by mouth every morning

## 2017-03-14 NOTE — PATIENT INSTRUCTIONS
1. Citrucel. Start twice a day and increase after one week to three times a day.  2. High fiber diet and drink a lot of water  3. Take Miralax once a day. After one week can increase up to twice a day if still constipated. After one more week can increase up to three times a day if still constipated.  4. If constipation does not resolve with the above interventions, would recommend you meet with gastroenterology.

## 2017-03-14 NOTE — LETTER
"3/14/2017      RE: Lux Velasco  [address]     [facility] [address]       Colon and Rectal Surgery Consult Clinic Note    Date: 3/14/2017     Referring provider:  Nam Duffy MD  [address] [address]     RE: Lux Velasco  : 1945  MAXWELL: 3/14/2017    Lux Velasco is a very pleasant 71 year old male with a past medical history of type 2 diabetes, MI, and CVA with a recent diagnosis of rectal bleeding.  Given these findings they were subsequently sent to the Colon and Rectal Surgery Clinic for an opinion on this and a new patient consultation.     Lxu plans to undergo a parathyroidectomy next week. He presents today for constipation. He states that he has had constipation for many years and that it is getting progressively worse. He says he is a \"bad case\". He is currently taking docusate, prune juice, and mineral oil. He has a bowel movement daily but states that this is hard and he has to strain. He has some pain when straining but this is not significant. He has noticed occasional small spots of bright red blood with bowel movements but reports that this is rare. He denies any tissue that comes in and out with bowel movements. He has never had any anorectal procedures. He denies any family history of colon cancer. He is not on any blood thinners.  He underwent a colonoscopy on 2015 which was normal except for diverticulosis. He reportedly has had polyps in the past which were hyperplastic.    Assessment/Plan: 71 year old male with constipation. No change in bowel habits but constipation for many years that has been worsening. Recommended he start a daily fiber supplement such as Citrucel and increase this up to three times a day. He can additionally take daily Miralax and use this up to three times a day. If symptoms persist, recommended he meet with a gastroenterologist. He denies any abdominal pain, nausea, vomiting, or " unexplained weight loss. I do not feel a repeat colonoscopy is needed at this time, although the patient is fairly insistent that he needs a colonoscopy every 3 years. Only 2 years from last colonoscopy and we can reevaluate if constipation continues or if rectal bleeding returns.   Patient's questions were answered to his stated satisfaction and he is in agreement with this plan.    Medical history:  Past Medical History   Diagnosis Date     Diabetes mellitus type II 2005     History of agent Orange exposure 4/17/2013     Myocardial infarct (H) 01/01/1999     Primary hyperparathyroidism (H) Dx approx 2003     Stroke (H) 01/01/1998     recovered fully       Surgical history:  Past Surgical History   Procedure Laterality Date     Mohs micrographic procedure       Biopsy of skin lesion       Bypass graft artery coronary         Problem list:    Patient Active Problem List    Diagnosis Date Noted     Primary hyperparathyroidism (H)      Priority: Medium     Skin exam, screening for cancer 08/20/2013     Priority: Medium     Hypercalcemia 07/17/2013     Priority: Medium     HPTH (hyperparathyroidism) (H) 07/17/2013     Priority: Medium     Shoulder pain 05/21/2013     Priority: Medium     Left arm weakness 04/17/2013     Priority: Medium     History of agent Orange exposure 04/17/2013     Priority: Medium     Cervicalgia 04/17/2013     Priority: Medium     Degenerative arthritis of cervical spine 04/17/2013     Priority: Medium     Stroke (H) 04/17/2013     Priority: Medium     Myocardial infarct (H) 04/17/2013     Priority: Medium     Ear pain 07/16/2012     Priority: Medium     Plantar fascial fibromatosis 07/10/2012     Priority: Medium       Medications:  Current Outpatient Prescriptions   Medication Sig Dispense Refill     guaiFENesin-codeine (ROBITUSSIN AC) 100-10 MG/5ML SOLN solution Take 5-10 mLs by mouth every 4 hours as needed for cough 420 mL 0     FLUTICASONE PROPIONATE NA Spray 50 mcg in nostril        "losartan (COZAAR) 50 MG tablet Take 1 tablet (50 mg) by mouth daily (Patient taking differently: Take 50 mg by mouth every morning ) 30 tablet 3     VITAMIN D, CHOLECALCIFEROL, PO Take 1,000 Units by mouth every morning        TAMSULOSIN HCL PO Take 0.4 mg by mouth every morning        METFORMIN HCL PO Take by mouth 2 times daily (with meals)       acetaminophen 500 MG CAPS Take 2 tablets by mouth daily.       acetic acid-hydrocortisone (ACETASOL HC) otic solution Place 4 drops into both ears 2 times daily. 10 mL 10     ammonium lactate (AMLACTIN) 12 % cream Apply  topically daily.       artificial tears SOLN Use one drop to both eye PRN.       omeprazole (PRILOSEC) 20 MG capsule Take 20 mg by mouth daily.         Allergies:  Allergies   Allergen Reactions     Eggs Other (See Comments)     Pt states no longer having reaction, childhood allergy, eats eggs       Penicillins Other (See Comments)     Pt states PCN allergy is due to egg allergy     Propoxyphene Other (See Comments)     Pain       Family history:  Family History   Problem Relation Age of Onset     Melanoma Mother      Melanoma Father      CANCER No family hx of      no skin cancer     Glaucoma No family hx of      Macular Degeneration No family hx of        Social history:  Social History   Substance Use Topics     Smoking status: Former Smoker     Quit date: 1970     Smokeless tobacco: Former User     Alcohol use Yes      Comment: occasionally    Marital status: .      Nursing Notes:   Ivania Cuba LPN  3/14/2017  8:56 AM  Signed  Chief Complaint   Patient presents with     Clinic Care Coordination - Initial       Vitals:    03/14/17 0854   BP: 110/76   Pulse: 54   Temp: 97.5  F (36.4  C)   TempSrc: Oral   SpO2: 92%   Weight: 265 lb 4.8 oz   Height: 5' 11\"       Body mass index is 37 kg/(m^2).    Ivania DO LPN                             Physical Examination: Performed in the presence of IOANA Guevara  /76  Pulse 54  Temp 97.5  F (36.4  C) " "(Oral)  Ht 5' 11\"  Wt 265 lb 4.8 oz  SpO2 92%  BMI 37 kg/m2  General: alert, oriented, in no acute distress, sitting comfortably  HEENT: mucous membranes moist  Perianal external examination:  Perianal skin: Intact with no excoriation or lichenification.  Lesions: No.  Eversion of buttocks: There was not evidence of an anal fissure. Details: N/A.  Skin tags or external hemorrhoids: None.  Digital rectal examination: Was performed.   Sphincter tone: Good.  Palpable lesions: No.  Prostate: Normal.  Other: None..    Anoscopy: Was performed.   Hemorrhoids: Yes. Grade 1-2 internal hemorrhoids without bleeding  Lesions: No.    Total face to face time was 30 minutes, >50% counseling.    ORION Robertson, NP-C  Colon and Rectal Surgery   [facility]    This note was created using speech recognition software and may contain unintended word substitutions.      ORION Robertson CNP      "

## 2017-03-14 NOTE — PROGRESS NOTES
"Colon and Rectal Surgery Consult Clinic Note    Date: 3/14/2017     Referring provider:  Nam Duffy MD  [address] [address]     RE: Lux Velasco  : 1945  MAXWELL: 3/14/2017    Lux Velasco is a very pleasant 71 year old male with a past medical history of type 2 diabetes, MI, and CVA with a recent diagnosis of rectal bleeding.  Given these findings they were subsequently sent to the Colon and Rectal Surgery Clinic for an opinion on this and a new patient consultation.     Lux plans to undergo a parathyroidectomy next week. He presents today for constipation. He states that he has had constipation for many years and that it is getting progressively worse. He says he is a \"bad case\". He is currently taking docusate, prune juice, and mineral oil. He has a bowel movement daily but states that this is hard and he has to strain. He has some pain when straining but this is not significant. He has noticed occasional small spots of bright red blood with bowel movements but reports that this is rare. He denies any tissue that comes in and out with bowel movements. He has never had any anorectal procedures. He denies any family history of colon cancer. He is not on any blood thinners.  He underwent a colonoscopy on 2015 which was normal except for diverticulosis. He reportedly has had polyps in the past which were hyperplastic.    Assessment/Plan: 71 year old male with constipation. No change in bowel habits but constipation for many years that has been worsening. Recommended he start a daily fiber supplement such as Citrucel and increase this up to three times a day. He can additionally take daily Miralax and use this up to three times a day. If symptoms persist, recommended he meet with a gastroenterologist. He denies any abdominal pain, nausea, vomiting, or unexplained weight loss. I do not feel a repeat colonoscopy is needed at this time, although the " patient is fairly insistent that he needs a colonoscopy every 3 years. Only 2 years from last colonoscopy and we can reevaluate if constipation continues or if rectal bleeding returns.   Patient's questions were answered to his stated satisfaction and he is in agreement with this plan.    Medical history:  Past Medical History   Diagnosis Date     Diabetes mellitus type II 2005     History of agent Orange exposure 4/17/2013     Myocardial infarct (H) 01/01/1999     Primary hyperparathyroidism (H) Dx approx 2003     Stroke (H) 01/01/1998     recovered fully       Surgical history:  Past Surgical History   Procedure Laterality Date     Mohs micrographic procedure       Biopsy of skin lesion       Bypass graft artery coronary         Problem list:    Patient Active Problem List    Diagnosis Date Noted     Primary hyperparathyroidism (H)      Priority: Medium     Skin exam, screening for cancer 08/20/2013     Priority: Medium     Hypercalcemia 07/17/2013     Priority: Medium     HPTH (hyperparathyroidism) (H) 07/17/2013     Priority: Medium     Shoulder pain 05/21/2013     Priority: Medium     Left arm weakness 04/17/2013     Priority: Medium     History of agent Orange exposure 04/17/2013     Priority: Medium     Cervicalgia 04/17/2013     Priority: Medium     Degenerative arthritis of cervical spine 04/17/2013     Priority: Medium     Stroke (H) 04/17/2013     Priority: Medium     Myocardial infarct (H) 04/17/2013     Priority: Medium     Ear pain 07/16/2012     Priority: Medium     Plantar fascial fibromatosis 07/10/2012     Priority: Medium       Medications:  Current Outpatient Prescriptions   Medication Sig Dispense Refill     guaiFENesin-codeine (ROBITUSSIN AC) 100-10 MG/5ML SOLN solution Take 5-10 mLs by mouth every 4 hours as needed for cough 420 mL 0     FLUTICASONE PROPIONATE NA Spray 50 mcg in nostril       losartan (COZAAR) 50 MG tablet Take 1 tablet (50 mg) by mouth daily (Patient taking differently: Take  "50 mg by mouth every morning ) 30 tablet 3     VITAMIN D, CHOLECALCIFEROL, PO Take 1,000 Units by mouth every morning        TAMSULOSIN HCL PO Take 0.4 mg by mouth every morning        METFORMIN HCL PO Take by mouth 2 times daily (with meals)       acetaminophen 500 MG CAPS Take 2 tablets by mouth daily.       acetic acid-hydrocortisone (ACETASOL HC) otic solution Place 4 drops into both ears 2 times daily. 10 mL 10     ammonium lactate (AMLACTIN) 12 % cream Apply  topically daily.       artificial tears SOLN Use one drop to both eye PRN.       omeprazole (PRILOSEC) 20 MG capsule Take 20 mg by mouth daily.         Allergies:  Allergies   Allergen Reactions     Eggs Other (See Comments)     Pt states no longer having reaction, childhood allergy, eats eggs       Penicillins Other (See Comments)     Pt states PCN allergy is due to egg allergy     Propoxyphene Other (See Comments)     Pain       Family history:  Family History   Problem Relation Age of Onset     Melanoma Mother      Melanoma Father      CANCER No family hx of      no skin cancer     Glaucoma No family hx of      Macular Degeneration No family hx of        Social history:  Social History   Substance Use Topics     Smoking status: Former Smoker     Quit date: 1970     Smokeless tobacco: Former User     Alcohol use Yes      Comment: occasionally    Marital status: .      Nursing Notes:   Ivania Cuba LPN  3/14/2017  8:56 AM  Signed  Chief Complaint   Patient presents with     Clinic Care Coordination - Initial       Vitals:    03/14/17 0854   BP: 110/76   Pulse: 54   Temp: 97.5  F (36.4  C)   TempSrc: Oral   SpO2: 92%   Weight: 265 lb 4.8 oz   Height: 5' 11\"       Body mass index is 37 kg/(m^2).    Ivania DO LPN                             Physical Examination: Performed in the presence of IOANA Guevara  /76  Pulse 54  Temp 97.5  F (36.4  C) (Oral)  Ht 5' 11\"  Wt 265 lb 4.8 oz  SpO2 92%  BMI 37 kg/m2  General: alert, oriented, in no acute " distress, sitting comfortably  HEENT: mucous membranes moist  Perianal external examination:  Perianal skin: Intact with no excoriation or lichenification.  Lesions: No.  Eversion of buttocks: There was not evidence of an anal fissure. Details: N/A.  Skin tags or external hemorrhoids: None.  Digital rectal examination: Was performed.   Sphincter tone: Good.  Palpable lesions: No.  Prostate: Normal.  Other: None..    Anoscopy: Was performed.   Hemorrhoids: Yes. Grade 1-2 internal hemorrhoids without bleeding  Lesions: No.    Total face to face time was 30 minutes, >50% counseling.    ORION Robertson, NP-C  Colon and Rectal Surgery   [facility]    This note was created using speech recognition software and may contain unintended word substitutions.

## 2017-03-14 NOTE — NURSING NOTE
"Chief Complaint   Patient presents with     Clinic Care Coordination - Initial       Vitals:    03/14/17 0854   BP: 110/76   Pulse: 54   Temp: 97.5  F (36.4  C)   TempSrc: Oral   SpO2: 92%   Weight: 265 lb 4.8 oz   Height: 5' 11\"       Body mass index is 37 kg/(m^2).    Ivania DO LPN                          "

## 2017-03-15 ENCOUNTER — TELEPHONE (OUTPATIENT)
Dept: NURSING | Facility: CLINIC | Age: 72
End: 2017-03-15

## 2017-03-16 NOTE — TELEPHONE ENCOUNTER
Call Type: Triage Call    Presenting Problem: Lux says he started a new Prostate medication  Fenastraide, [facility], about 3-4 days ago.  He says it makes him have to void every 30-60minutes. He is asking  if he can stop taking it. Advised he would have to speak with the  prescriber.  Triage Note:  Guideline Title: Medication Questions - Adult  Recommended Disposition: Provide Health Information  Original Inclination: Wanted to speak with a nurse  Override Disposition:  Intended Action: Follow advice given  Physician Contacted: No  Caller has medication question(s) that was answered with available resources ?  YES  Pregnant and has medication questions regarding prescribed and/or nonprescribed  medication(s) not covered by available resources ? NO  Breastfeeding and has medication questions regarding prescribed and/or  nonprescribed medication(s) not covered by available resources ? NO  Requested information on how to safely dispose of  or unused medications ?  NO  Sign(s) or symptom(s) associated with a diagnosed condition or with a new illness  ? NO  Prescription ordered today and not available at pharmacy putting patient at  clinical risk ? NO  Recurrence of a symptom(s) or illness post prescribed medication treatment AND  provider instructed patient to call if symptom(s) returned. ? NO  Unable to obtain prescribed medication related to available resources AND  situation poses immediate clinical risk ? NO  Pharmacy calling to clarify prescription order. ? NO  Requests refill of prescribed medication that does NOT have a valid refill; lack  of medication may cause clinical risk to patient if not available. ? NO  Has questions about prescribed and/or nonprescribed medications not covered by  available resources ? NO  Pharmacy calling with prescription question; answered per department policy. ? NO  Requests refill of prescribed medication without valid refills OR requests refill  of  prescribed medication with valid refills but does not have prescription number  (no RX container); lack of medication does not put patient at clinical risk ? NO  Physician Instructions:  Care Advice:

## 2017-03-19 ENCOUNTER — TELEPHONE (OUTPATIENT)
Dept: INTERNAL MEDICINE | Facility: CLINIC | Age: 72
End: 2017-03-19

## 2017-03-19 DIAGNOSIS — N40.0 BENIGN NODULAR PROSTATIC HYPERPLASIA WITHOUT LOWER URINARY TRACT SYMPTOMS: Primary | ICD-10-CM

## 2017-03-20 RX ORDER — ACETAMINOPHEN 325 MG/1
975 TABLET ORAL ONCE
Status: CANCELLED | OUTPATIENT
Start: 2017-03-20 | End: 2017-03-20

## 2017-03-21 ENCOUNTER — ANESTHESIA (OUTPATIENT)
Dept: SURGERY | Facility: AMBULATORY SURGERY CENTER | Age: 72
End: 2017-03-21

## 2017-03-21 ENCOUNTER — HOSPITAL ENCOUNTER (OUTPATIENT)
Facility: AMBULATORY SURGERY CENTER | Age: 72
End: 2017-03-21
Attending: SURGERY

## 2017-03-21 ENCOUNTER — PRE VISIT (OUTPATIENT)
Dept: UROLOGY | Facility: CLINIC | Age: 72
End: 2017-03-21

## 2017-03-21 VITALS
DIASTOLIC BLOOD PRESSURE: 87 MMHG | RESPIRATION RATE: 16 BRPM | HEIGHT: 71 IN | TEMPERATURE: 97.4 F | SYSTOLIC BLOOD PRESSURE: 146 MMHG | BODY MASS INDEX: 37.14 KG/M2 | WEIGHT: 265.3 LBS | OXYGEN SATURATION: 94 %

## 2017-03-21 DIAGNOSIS — E83.52 HYPERCALCEMIA: ICD-10-CM

## 2017-03-21 DIAGNOSIS — E21.0 HYPERPARATHYROIDISM, PRIMARY (H): ICD-10-CM

## 2017-03-21 DIAGNOSIS — E21.0 PRIMARY HYPERPARATHYROIDISM (H): Primary | ICD-10-CM

## 2017-03-21 LAB
GLUCOSE BLDC GLUCOMTR-MCNC: 113 MG/DL (ref 70–99)
PTH-INTACT SERPL-MCNC: 236 PG/ML (ref 12–72)

## 2017-03-21 PROCEDURE — 88305 TISSUE EXAM BY PATHOLOGIST: CPT | Performed by: SURGERY

## 2017-03-21 PROCEDURE — 83970 ASSAY OF PARATHORMONE: CPT | Performed by: SURGERY

## 2017-03-21 PROCEDURE — 88331 PATH CONSLTJ SURG 1 BLK 1SPC: CPT | Performed by: SURGERY

## 2017-03-21 RX ORDER — OXYCODONE HYDROCHLORIDE 5 MG/1
5 TABLET ORAL
Status: COMPLETED | OUTPATIENT
Start: 2017-03-21 | End: 2017-03-21

## 2017-03-21 RX ORDER — LIDOCAINE 40 MG/G
CREAM TOPICAL
Status: DISCONTINUED | OUTPATIENT
Start: 2017-03-21 | End: 2017-03-21 | Stop reason: HOSPADM

## 2017-03-21 RX ORDER — EPHEDRINE SULFATE 50 MG/ML
INJECTION, SOLUTION INTRAMUSCULAR; INTRAVENOUS; SUBCUTANEOUS PRN
Status: DISCONTINUED | OUTPATIENT
Start: 2017-03-21 | End: 2017-03-21

## 2017-03-21 RX ORDER — ONDANSETRON 4 MG/1
4 TABLET, ORALLY DISINTEGRATING ORAL EVERY 30 MIN PRN
Status: DISCONTINUED | OUTPATIENT
Start: 2017-03-21 | End: 2017-03-22 | Stop reason: HOSPADM

## 2017-03-21 RX ORDER — BUPIVACAINE HYDROCHLORIDE 2.5 MG/ML
INJECTION, SOLUTION EPIDURAL; INFILTRATION; INTRACAUDAL PRN
Status: DISCONTINUED | OUTPATIENT
Start: 2017-03-21 | End: 2017-03-21 | Stop reason: HOSPADM

## 2017-03-21 RX ORDER — SODIUM CHLORIDE, SODIUM LACTATE, POTASSIUM CHLORIDE, CALCIUM CHLORIDE 600; 310; 30; 20 MG/100ML; MG/100ML; MG/100ML; MG/100ML
INJECTION, SOLUTION INTRAVENOUS CONTINUOUS
Status: DISCONTINUED | OUTPATIENT
Start: 2017-03-21 | End: 2017-03-21 | Stop reason: HOSPADM

## 2017-03-21 RX ORDER — NALOXONE HYDROCHLORIDE 0.4 MG/ML
.1-.4 INJECTION, SOLUTION INTRAMUSCULAR; INTRAVENOUS; SUBCUTANEOUS
Status: DISCONTINUED | OUTPATIENT
Start: 2017-03-21 | End: 2017-03-22 | Stop reason: HOSPADM

## 2017-03-21 RX ORDER — ACETAMINOPHEN 325 MG/1
975 TABLET ORAL ONCE
Status: COMPLETED | OUTPATIENT
Start: 2017-03-21 | End: 2017-03-21

## 2017-03-21 RX ORDER — PROPOFOL 10 MG/ML
INJECTION, EMULSION INTRAVENOUS PRN
Status: DISCONTINUED | OUTPATIENT
Start: 2017-03-21 | End: 2017-03-21

## 2017-03-21 RX ORDER — GLYCOPYRROLATE 0.2 MG/ML
INJECTION, SOLUTION INTRAMUSCULAR; INTRAVENOUS PRN
Status: DISCONTINUED | OUTPATIENT
Start: 2017-03-21 | End: 2017-03-21

## 2017-03-21 RX ORDER — FENTANYL CITRATE 50 UG/ML
INJECTION, SOLUTION INTRAMUSCULAR; INTRAVENOUS PRN
Status: DISCONTINUED | OUTPATIENT
Start: 2017-03-21 | End: 2017-03-21

## 2017-03-21 RX ORDER — HYDROCODONE BITARTRATE AND ACETAMINOPHEN 5; 325 MG/1; MG/1
1-2 TABLET ORAL EVERY 4 HOURS PRN
Qty: 20 TABLET | Refills: 0 | Status: SHIPPED | OUTPATIENT
Start: 2017-03-21 | End: 2017-10-10

## 2017-03-21 RX ORDER — AMOXICILLIN 250 MG
1-2 CAPSULE ORAL 2 TIMES DAILY PRN
Qty: 10 TABLET | Refills: 0 | Status: SHIPPED | OUTPATIENT
Start: 2017-03-21

## 2017-03-21 RX ORDER — SODIUM CHLORIDE, SODIUM LACTATE, POTASSIUM CHLORIDE, CALCIUM CHLORIDE 600; 310; 30; 20 MG/100ML; MG/100ML; MG/100ML; MG/100ML
INJECTION, SOLUTION INTRAVENOUS CONTINUOUS
Status: DISCONTINUED | OUTPATIENT
Start: 2017-03-21 | End: 2017-03-22 | Stop reason: HOSPADM

## 2017-03-21 RX ORDER — ACETAMINOPHEN 325 MG/1
650 TABLET ORAL
Status: DISCONTINUED | OUTPATIENT
Start: 2017-03-21 | End: 2017-03-22 | Stop reason: HOSPADM

## 2017-03-21 RX ORDER — FENTANYL CITRATE 50 UG/ML
25-50 INJECTION, SOLUTION INTRAMUSCULAR; INTRAVENOUS
Status: DISCONTINUED | OUTPATIENT
Start: 2017-03-21 | End: 2017-03-21 | Stop reason: HOSPADM

## 2017-03-21 RX ORDER — HYDROCODONE BITARTRATE AND ACETAMINOPHEN 5; 325 MG/1; MG/1
1-2 TABLET ORAL
Status: DISCONTINUED | OUTPATIENT
Start: 2017-03-21 | End: 2017-03-22 | Stop reason: HOSPADM

## 2017-03-21 RX ORDER — ONDANSETRON 2 MG/ML
4 INJECTION INTRAMUSCULAR; INTRAVENOUS EVERY 30 MIN PRN
Status: DISCONTINUED | OUTPATIENT
Start: 2017-03-21 | End: 2017-03-22 | Stop reason: HOSPADM

## 2017-03-21 RX ORDER — LIDOCAINE HYDROCHLORIDE 20 MG/ML
INJECTION, SOLUTION INFILTRATION; PERINEURAL PRN
Status: DISCONTINUED | OUTPATIENT
Start: 2017-03-21 | End: 2017-03-21

## 2017-03-21 RX ADMIN — Medication 200 MCG: at 09:59

## 2017-03-21 RX ADMIN — Medication 200 MCG: at 09:46

## 2017-03-21 RX ADMIN — LIDOCAINE HYDROCHLORIDE 100 MG: 20 INJECTION, SOLUTION INFILTRATION; PERINEURAL at 09:10

## 2017-03-21 RX ADMIN — Medication 200 MCG: at 09:32

## 2017-03-21 RX ADMIN — FENTANYL CITRATE 25 MCG: 50 INJECTION, SOLUTION INTRAMUSCULAR; INTRAVENOUS at 11:09

## 2017-03-21 RX ADMIN — GLYCOPYRROLATE 0.2 MG: 0.2 INJECTION, SOLUTION INTRAMUSCULAR; INTRAVENOUS at 09:30

## 2017-03-21 RX ADMIN — EPHEDRINE SULFATE 15 MG: 50 INJECTION, SOLUTION INTRAMUSCULAR; INTRAVENOUS; SUBCUTANEOUS at 09:24

## 2017-03-21 RX ADMIN — OXYCODONE HYDROCHLORIDE 5 MG: 5 TABLET ORAL at 10:56

## 2017-03-21 RX ADMIN — FENTANYL CITRATE 25 MCG: 50 INJECTION, SOLUTION INTRAMUSCULAR; INTRAVENOUS at 11:16

## 2017-03-21 RX ADMIN — FENTANYL CITRATE 100 MCG: 50 INJECTION, SOLUTION INTRAMUSCULAR; INTRAVENOUS at 09:10

## 2017-03-21 RX ADMIN — EPHEDRINE SULFATE 10 MG: 50 INJECTION, SOLUTION INTRAMUSCULAR; INTRAVENOUS; SUBCUTANEOUS at 09:50

## 2017-03-21 RX ADMIN — PROPOFOL 100 MG: 10 INJECTION, EMULSION INTRAVENOUS at 09:15

## 2017-03-21 RX ADMIN — SODIUM CHLORIDE, SODIUM LACTATE, POTASSIUM CHLORIDE, CALCIUM CHLORIDE: 600; 310; 30; 20 INJECTION, SOLUTION INTRAVENOUS at 08:54

## 2017-03-21 RX ADMIN — FENTANYL CITRATE 25 MCG: 50 INJECTION, SOLUTION INTRAMUSCULAR; INTRAVENOUS at 11:24

## 2017-03-21 RX ADMIN — Medication 200 MCG: at 09:38

## 2017-03-21 RX ADMIN — Medication 0.15 MCG/KG/MIN: at 10:07

## 2017-03-21 RX ADMIN — FENTANYL CITRATE 25 MCG: 50 INJECTION, SOLUTION INTRAMUSCULAR; INTRAVENOUS at 11:03

## 2017-03-21 RX ADMIN — ACETAMINOPHEN 975 MG: 325 TABLET ORAL at 08:54

## 2017-03-21 RX ADMIN — PROPOFOL 300 MG: 10 INJECTION, EMULSION INTRAVENOUS at 09:10

## 2017-03-21 NOTE — BRIEF OP NOTE
[facility]    Brief Operative Note    Pre-operative diagnosis: Hyperparathryoidism  Post-operative diagnosis Same  Procedure: Procedure(s):  Right Inferior Parathyroidectomy with intraop nerve monitoring - Wound Class: I-Clean  Surgeon: Surgeon(s) and Role:     * Julianna Short MD - Primary  Anesthesia: General   Estimated blood loss: 5 ml  Drains: None  Specimens:   ID Type Source Tests Collected by Time Destination   A : Right inferior Parathyroid Tissue Neck SURGICAL PATHOLOGY EXAM Julianna Short MD 3/21/2017  9:44 AM      Findings:   Right inferior parathyroid c/w adenoma, partially intrathyroidal at right inferior pole of thyroid. Right superior parathyroid identified and appeared WNL. Pre and post-removal PTH drawn (at time of incision, and 15 mins post-excision)..  Complications: None.  Implants: None.

## 2017-03-21 NOTE — ANESTHESIA POSTPROCEDURE EVALUATION
Patient: [facility]    Procedure(s):  Right Inferior Parathyroidectomy with intraop nerve monitoring - Wound Class: I-Clean    Diagnosis:Hyperparathryoidism  Diagnosis Additional Information: No value filed.    Anesthesia Type:  General, ETT    Note:  Anesthesia Post Evaluation    Patient location during evaluation: PACU  Patient participation: Able to fully participate in evaluation  Level of consciousness: awake and alert  Pain management: adequate  Airway patency: patent  Cardiovascular status: hemodynamically stable  Respiratory status: nonlabored ventilation, spontaneous ventilation and room air  Hydration status: euvolemic  PONV: none     Anesthetic complications: None          Last vitals:  Vitals:    03/21/17 0825   BP: (!) 154/93   Resp: 16   Temp: 35.8  C (96.4  F)   SpO2: 96%         Electronically Signed By: Marc Alan MD  March 21, 2017  10:43 AM

## 2017-03-21 NOTE — IP AVS SNAPSHOT
[facility]    [address] [place] [address]    Phone:  [phone]    Fax:  [phone]                                       After Visit Summary   3/21/2017    Lux Velasco    MRN: [id number]           After Visit Summary Signature Page     I have received my discharge instructions, and my questions have been answered. I have discussed any challenges I see with this plan with the nurse or doctor.    ..........................................................................................................................................  Patient/Patient Representative Signature      ..........................................................................................................................................  Patient Representative Print Name and Relationship to Patient    ..................................................               ................................................  Date                                            Time    ..........................................................................................................................................  Reviewed by Signature/Title    ...................................................              ..............................................  Date                                                            Time

## 2017-03-21 NOTE — ANESTHESIA CARE TRANSFER NOTE
Patient: Lux Salper    Procedure(s):  Right Inferior Parathyroidectomy with intraop nerve monitoring - Wound Class: I-Clean    Diagnosis: Hyperparathryoidism  Diagnosis Additional Information: No value filed.    Anesthesia Type:   General, ETT     Note:  Airway :Room Air  Patient transferred to:PACU  Comments: VSS/WNL. Responds to commands.      Vitals: (Last set prior to Anesthesia Care Transfer)    CRNA VITALS  3/21/2017 1010 - 3/21/2017 1046      3/21/2017             Resp Rate (observed): (!)  2                Electronically Signed By: ORION Mccollum CRNA  March 21, 2017  10:46 AM

## 2017-03-21 NOTE — DISCHARGE INSTRUCTIONS
"[facility]  Home Care Following Anesthesia  For 24 hours after surgery:  1. Get plenty of rest.  A responsible adult must stay with you for at least 24 hours after you leave the surgery center.  2. Do not drive or use heavy equipment.  If you have weakness or tingling, don't drive or use heavy equipment until this feeling goes away.   3. Do not drink alcohol.   4. Avoid strenuous or risky activities.  Ask for help when climbing stairs.  5. You may feel lightheaded.  IF so, sit for a few minutes before standing.  Have someone help you get up.   6. If you have nausea (feel sick to your stomach): Drink only clear liquids such as apple juice, ginger ale, broth or 7-Up.  Rest may also help.  Be sure to drink enough fluids.  Move to a regular diet as you feel able.   7. You may have a slight fever.  Call the doctor if your fever is over 100 F (37.7 C) (taken under the tongue) or lasts longer than 24 hours.  8. You may have a dry mouth, a sore throat, muscle aches or trouble sleeping. These should go away after 24 hours.  9. Do not make important or legal decisions.        Today you received a Marcaine or bupivacaine block to numb the nerves near your surgery site.  This is a block using local anesthetic or \"numbing\" medication injected around the nerves to anesthetize or \"numb\" the area supplied by those nerves.  This block is injected into the muscle layer near your surgical site.  The medication may numb the location where you had surgery for 6-18 hours, but may last up to 24 hours.  If your surgical site is an arm or leg you should be careful with your affected limb, since it is possible to injure your limb without being aware of it due to the numbing.  Until full feeling returns, you should guard against bumping or hitting your limb, and avoid extreme hot or cold temperatures on the skin.  As the block wears off, the feeling will return as a tingling or prickly sensation near your " surgical site.  You will experience more discomfort from your incision as the feeling returns.  You may want to take a pain pill (a narcotic or Tylenol if this was prescribed by your surgeon) when you start to experience mild pain before the pain beccomes more severe.  If your pain medications do not control your pain you should notifiy your surgeon.    Tips for taking pain medications  To get the best pain relief possible, remember these points:    Take pain medications as directed, before pain becomes severe.    Pain medication can upset your stomach: taking it with food may help.    Constipation is a common side effect of pain medication. Drink plenty of  fluids.    Eat foods high in fiber. Take a stool softener if recommended by your doctor or pharmacist.    Do not drink alcohol, drive or operate machinery while taking pain medications.    Ask about other ways to control pain, such as with heat, ice or relaxation.    Call a doctor for any of the followin. Signs of infection (fever, growing tenderness at the surgery site, a large amount of drainage or bleeding, severe pain, foul-smelling drainage, redness, swelling).  2. It has been over 8 to 10 hours since surgery and you are still not able to urinate (pass water).  3. Headache for over 24 hours.  4. Numbness, tingling or weakness the day after surgery (if you had spinal anesthesia).  Your doctor is:  Dr. Julianna Short, ENT Otolaryngology: [phone]  Or dial [phone] and ask for the resident on call for:  ENT Otolaryngology  For emergency care, call the:  [facility]:  [phone] (TTY for hearing impaired: [phone])

## 2017-03-21 NOTE — OR NURSING
Patient c/o pain in throat and hypertensive in phase 1.  Ice pack placed on incision.  Given fentanyl 25 mcg iv and oxycodone 5 mg po with no relief.  Given another fentanyl 25 mcg iv x 3 with no relief.  Patient says his throat is sore.  Instructed that he should expect some soreness after his procedure.  Blood pressure decreased with fentanyl.  Anesthesia notified of pain, hypertension and some occasional PACs.  Anesthesia was okay with patient moving to phase 2 when blood pressure decreased. Dr. Short notified of pain.  Appears comfortable, talking easily, swallowing well.  Instructed patient and daughter about discharge instructions and meds (norco and sennakot).  Patient said he needed oxycodone instead.  Patient believed that hydrocodone was not strong enough.  Instructed patient that hydrocodone is very strong and similar to oxycodone.  Dr. Short notified of patient's request but she was in surgery and unable to write a script for two hours.  Patient felt that he wasn't being prescribed oxycodone because it's addictive.  Instructed patient that hydrocodone is a narcotic and also addictive.  Patient said he hasn't taken hydrocodone before.  Reassured patient that hydrocodone is very strong and should do well at controlling his pain.  Reinstructed patient about dosage and timing of norco.  Instructed patient not to take acetaminophen/tylenol at the same time as norco.  Highlighted Dr. Short's phone number in his discharge instructions.  Told him to call the number if he has uncontrolled pain.  Patient comfortable at discharge.

## 2017-03-21 NOTE — IP AVS SNAPSHOT
MRN:[id number]                      After Visit Summary   3/21/2017    Lux Velasco    MRN: [id number]           Thank you!     Thank you for choosing [facility] for your care. Our goal is always to provide you with excellent care. Hearing back from our patients is one way we can continue to improve our services. Please take a few minutes to complete the written survey that you may receive in the mail after you visit with us. Thank you!        Patient Information     Date Of Birth          1945        About your hospital stay     You were admitted on:  March 21, 2017 [facility]    You were discharged on:  March 21, 2017       Who to Call     For medical emergencies, please call 911.  For non-urgent questions about your medical care, please call your primary care provider or clinic, [phone]  For questions related to your surgery, please call your surgery clinic        Attending Provider     Provider Specialty    Julianna Sohrt MD General Surgery       Primary Care Provider Office Phone # Fax #    Nam Duffy [phone]       [address] [address]        After Care Instructions     Diet Instructions       Resume pre procedure diet            Discharge Instructions       Patient to follow up with surgeon in as previously scheduled (4/17/17)            Discharge Instructions - Lifting restrictions       Lifting Restrictions 10 pounds and no strenuous activity until seen at Post-op follow up appointment            No Alcohol       For 24 hours following procedure            No Aspirin, Ibuprofen or Naproxen products       for 7 - 10 days following surgery            No driving or operating machinery       until the day after procedure. Do not drive or operate machinery while taking narcotic pain medication.            Notify Physician        Please notify   Deja (cell phone [phone]) if you experience increasing pain, fever above 101 degrees, wound breakdown, sustained bleeding from the wound site, or increasing redness, swelling, and/or pus draining from the wound site which may indicate infection. If you experience symptoms that are sudden, severe, or potentially life-threatening please present to the emergency department or call 911            Wound care       There is surgical glue on your incision; this will wear off on its own in 10-14 days. The sutures are all underneath the skin and will dissolve; they do not need to be removed. You may shower and let water run over the incision, but do not soak or scrub the surgical site.                  Your next 10 appointments already scheduled     Apr 17, 2017 12:00 PM CDT   (Arrive by 11:45 AM)   NEW BENIGN PROSTATIC HYPERTROPHY with Pawan Denney DO   [facility] ([facility])    [address] [place] [id number] [phone]            Apr 17, 2017  1:45 PM CDT   (Arrive by 1:30 PM)   Return Visit with Julianna Short MD   [facility] ([facility])    [address] [place] [id number] [phone]              Further instructions from your care team       [facility]  Home Care Following Anesthesia  For 24 hours after surgery:  1. Get plenty of rest.  A responsible adult must stay with you for at least 24 hours after you leave the surgery center.  2. Do not drive or use heavy equipment.  If you have weakness or tingling, don't drive or use heavy equipment until this feeling goes away.   3. Do not drink alcohol.   4. Avoid strenuous or risky activities.  Ask for help when climbing stairs.  5. You may feel lightheaded.  IF so, sit for a few minutes before standing.  Have someone help you get up.  "  6. If you have nausea (feel sick to your stomach): Drink only clear liquids such as apple juice, ginger ale, broth or 7-Up.  Rest may also help.  Be sure to drink enough fluids.  Move to a regular diet as you feel able.   7. You may have a slight fever.  Call the doctor if your fever is over 100 F (37.7 C) (taken under the tongue) or lasts longer than 24 hours.  8. You may have a dry mouth, a sore throat, muscle aches or trouble sleeping. These should go away after 24 hours.  9. Do not make important or legal decisions.        Today you received a Marcaine or bupivacaine block to numb the nerves near your surgery site.  This is a block using local anesthetic or \"numbing\" medication injected around the nerves to anesthetize or \"numb\" the area supplied by those nerves.  This block is injected into the muscle layer near your surgical site.  The medication may numb the location where you had surgery for 6-18 hours, but may last up to 24 hours.  If your surgical site is an arm or leg you should be careful with your affected limb, since it is possible to injure your limb without being aware of it due to the numbing.  Until full feeling returns, you should guard against bumping or hitting your limb, and avoid extreme hot or cold temperatures on the skin.  As the block wears off, the feeling will return as a tingling or prickly sensation near your surgical site.  You will experience more discomfort from your incision as the feeling returns.  You may want to take a pain pill (a narcotic or Tylenol if this was prescribed by your surgeon) when you start to experience mild pain before the pain beccomes more severe.  If your pain medications do not control your pain you should notifiy your surgeon.    Tips for taking pain medications  To get the best pain relief possible, remember these points:    Take pain medications as directed, before pain becomes severe.    Pain medication can upset your stomach: taking it with food may " "help.    Constipation is a common side effect of pain medication. Drink plenty of  fluids.    Eat foods high in fiber. Take a stool softener if recommended by your doctor or pharmacist.    Do not drink alcohol, drive or operate machinery while taking pain medications.    Ask about other ways to control pain, such as with heat, ice or relaxation.    Call a doctor for any of the followin. Signs of infection (fever, growing tenderness at the surgery site, a large amount of drainage or bleeding, severe pain, foul-smelling drainage, redness, swelling).  2. It has been over 8 to 10 hours since surgery and you are still not able to urinate (pass water).  3. Headache for over 24 hours.  4. Numbness, tingling or weakness the day after surgery (if you had spinal anesthesia).  Your doctor is:  Dr. Julianna Short, ENT Otolaryngology: [phone]  Or dial [phone] and ask for the resident on call for:  ENT Otolaryngology  For emergency care, call the:  [facility]:  [phone] (TTY for hearing impaired: [phone])                Pending Results     No orders found from 3/19/2017 to 3/22/2017.            Admission Information     Date & Time Provider Department Dept. Phone    3/21/2017 Julinana Short MD [facility] [phone]      Your Vitals Were     Blood Pressure Temperature Respirations Height Weight Pulse Oximetry    154/93 96.4  F (35.8  C) (Temporal) 16 1.803 m (5' 10.98\") 120.3 kg (265 lb 4.8 oz) 96%    BMI (Body Mass Index)                   37.02 kg/m2           [employer] Information     [employer] is an electronic gateway that provides easy, online access to your medical records. With [employer], you can request a clinic appointment, read your test results, renew a prescription or communicate with your care team.     To sign up for [employer] visit the website at [email or web address]   You will be asked to enter the access code listed below, as well as " some personal information. Please follow the directions to create your username and password.     Your access code is: [email or web address]  Expires: 2017  1:47 PM     Your access code will  in 90 days. If you need help or a new code, please contact your [facility] or call [phone] for assistance.        Care EveryWhere ID     This is your Care EveryWhere ID. This could be used by other organizations to access your [facility] medical records  [id number]           Review of your medicines      START taking        Dose / Directions    HYDROcodone-acetaminophen 5-325 MG per tablet   Commonly known as:  NORCO   Used for:  Hypercalcemia, Primary hyperparathyroidism (H)        Dose:  1-2 tablet   Take 1-2 tablets by mouth every 4 hours as needed for other (Moderate to Severe Pain)   Quantity:  20 tablet   Refills:  0       senna-docusate 8.6-50 MG per tablet   Commonly known as:  SENOKOT-S;PERICOLACE   Used for:  Hypercalcemia, Primary hyperparathyroidism (H)        Dose:  1-2 tablet   Take 1-2 tablets by mouth 2 times daily as needed for constipation Take while on oral narcotics to prevent or treat constipation.   Quantity:  10 tablet   Refills:  0         CONTINUE these medicines which may have CHANGED, or have new prescriptions. If we are uncertain of the size of tablets/capsules you have at home, strength may be listed as something that might have changed.        Dose / Directions    losartan 50 MG tablet   Commonly known as:  COZAAR   This may have changed:  when to take this   Used for:  Benign essential hypertension        Dose:  50 mg   Take 1 tablet (50 mg) by mouth daily   Quantity:  30 tablet   Refills:  3         CONTINUE these medicines which have NOT CHANGED        Dose / Directions    acetaminophen 500 MG Caps        Dose:  2 tablet   Take 2 tablets by mouth daily.   Refills:  0       acetic acid-hydrocortisone otic solution   Commonly known as:  ACETASOL HC   Used for:   Otitis externa        Dose:  4 drop   Place 4 drops into both ears 2 times daily.   Quantity:  10 mL   Refills:  10       ammonium lactate 12 % cream   Commonly known as:  AMLACTIN        Apply  topically daily.   Refills:  0       artificial tears Soln        Use one drop to both eye PRN.   Refills:  0       FLUTICASONE PROPIONATE NA   Used for:  Cough        Dose:  50 mcg   Spray 50 mcg in nostril   Refills:  0       guaiFENesin-codeine 100-10 MG/5ML Soln solution   Commonly known as:  ROBITUSSIN AC   Used for:  Cough        Dose:  1-2 tsp.   Take 5-10 mLs by mouth every 4 hours as needed for cough   Quantity:  420 mL   Refills:  0       METFORMIN HCL PO   Used for:  Benign nodular prostatic hyperplasia without lower urinary tract symptoms, Need for prophylactic vaccination against Streptococcus pneumoniae (pneumococcus), Screening for AAA (abdominal aortic aneurysm), History of smoking        Take by mouth 2 times daily (with meals)   Refills:  0       omeprazole 20 MG CR capsule   Commonly known as:  priLOSEC        Dose:  20 mg   Take 20 mg by mouth daily.   Refills:  0       TAMSULOSIN HCL PO   Used for:  Benign nodular prostatic hyperplasia without lower urinary tract symptoms, Need for prophylactic vaccination against Streptococcus pneumoniae (pneumococcus), Screening for AAA (abdominal aortic aneurysm), History of smoking        Dose:  0.4 mg   Take 0.4 mg by mouth every morning   Refills:  0       VITAMIN D (CHOLECALCIFEROL) PO        Dose:  1000 Units   Take 1,000 Units by mouth every morning   Refills:  0            Where to get your medicines      These medications were sent to [facility] [place], [place] - [address] [facility] [address] [place] [address] [address] [address]    Hours:  TRANSPLANT PHONE NUMBER [phone] Phone:  [phone]     senna-docusate 8.6-50 MG per tablet         Some of these will need a paper prescription and others can be bought over the  counter. Ask your nurse if you have questions.     Bring a paper prescription for each of these medications     HYDROcodone-acetaminophen 5-325 MG per tablet                Protect others around you: Learn how to safely use, store and throw away your medicines at www.disposemymeds.org.             Medication List: This is a list of all your medications and when to take them. Check marks below indicate your daily home schedule. Keep this list as a reference.      Medications           Morning Afternoon Evening Bedtime As Needed    acetaminophen 500 MG Caps   Take 2 tablets by mouth daily.                                acetic acid-hydrocortisone otic solution   Commonly known as:  ACETASOL HC   Place 4 drops into both ears 2 times daily.                                ammonium lactate 12 % cream   Commonly known as:  AMLACTIN   Apply  topically daily.                                artificial tears Soln   Use one drop to both eye PRN.                                FLUTICASONE PROPIONATE NA   Spray 50 mcg in nostril                                guaiFENesin-codeine 100-10 MG/5ML Soln solution   Commonly known as:  ROBITUSSIN AC   Take 5-10 mLs by mouth every 4 hours as needed for cough                                HYDROcodone-acetaminophen 5-325 MG per tablet   Commonly known as:  NORCO   Take 1-2 tablets by mouth every 4 hours as needed for other (Moderate to Severe Pain)                                losartan 50 MG tablet   Commonly known as:  COZAAR   Take 1 tablet (50 mg) by mouth daily                                METFORMIN HCL PO   Take by mouth 2 times daily (with meals)                                omeprazole 20 MG CR capsule   Commonly known as:  priLOSEC   Take 20 mg by mouth daily.                                senna-docusate 8.6-50 MG per tablet   Commonly known as:  SENOKOT-S;PERICOLACE   Take 1-2 tablets by mouth 2 times daily as needed for constipation Take while on oral narcotics to prevent  or treat constipation.                                TAMSULOSIN HCL PO   Take 0.4 mg by mouth every morning                                VITAMIN D (CHOLECALCIFEROL) PO   Take 1,000 Units by mouth every morning

## 2017-03-21 NOTE — ANESTHESIA POSTPROCEDURE EVALUATION
Patient: [facility]    Procedure(s):  Right Inferior Parathyroidectomy with intraop nerve monitoring - Wound Class: I-Clean    Diagnosis:Hyperparathryoidism  Diagnosis Additional Information: No value filed.    Anesthesia Type:  General, ETT    Note:  Anesthesia Post Evaluation    Patient location during evaluation: PACU  Patient participation: Able to fully participate in evaluation  Level of consciousness: awake and alert  Pain management: adequate  Airway patency: patent  Cardiovascular status: acceptable and hemodynamically stable  Respiratory status: acceptable  Hydration status: acceptable  PONV: none     Anesthetic complications: None          Last vitals:  Vitals:    03/21/17 1100 03/21/17 1109 03/21/17 1115   BP: (!) 175/101 (!) 166/100 (!) 158/94   Resp: 14 12 14   Temp: 36.2  C (97.2  F)  36.2  C (97.2  F)   SpO2: 94% 95% 94%         Electronically Signed By: Win Bhandari MD  March 21, 2017  11:19 AM

## 2017-03-22 ENCOUNTER — TELEPHONE (OUTPATIENT)
Dept: INTERNAL MEDICINE | Facility: CLINIC | Age: 72
End: 2017-03-22

## 2017-03-22 ENCOUNTER — TELEPHONE (OUTPATIENT)
Dept: OTOLARYNGOLOGY | Facility: CLINIC | Age: 72
End: 2017-03-22

## 2017-03-22 LAB — COPATH REPORT: NORMAL

## 2017-03-22 NOTE — TELEPHONE ENCOUNTER
"Patient calls post op parathyroid removal yesterday with concerns as follows:  \" No one told me anything about my surgery after it was done\"  He wonders how many parathyroid glands were removed- op note not final, asked to review at post op visit.  \"I was given no pain medication- I was promised pain medication. It was the worst night of my life\"- prescribed Hydrocodone, which he said was \"tylenol- worthless'  Attempt to review care and symptoms, and usual protocol post surgery was met with continued review of sameissues as above.  Patient affect seemed angry and not able to engage well with review and triage questions.   Will page Dr. Short to clarify recommendations for care, and if additional pain medication is to be prescribed, will need to arrange for  here.  "

## 2017-03-22 NOTE — PROGRESS NOTES
REASON FOR CONSULTATION:  I was asked by Dr. Nam Duffy to see this patient for surgical options for primary hyperparathyroidism.      HISTORY OF PRESENT ILLNESS:  This is a 71-year-old gentleman who has been evaluated at the [facility] until recently.  He has been noted to be hypercalcemic since 2006.  A parathyroid hormone level was checked in 2010.  The most recent calcium from 02/2017 is 11.1 and elevated with a PTH at that same time was 119 and a minimally low vitamin D level.  Based on this, the patient then underwent an ultrasound of the neck including a sestamibi SPECT CT scan.  The ultrasound of the neck did identify some small very tiny nodules within the right lobe of the thyroid and then about a 1.5 cm lesion just posterior to the inferior aspect of the right lobe of the thyroid.  On the CT SPECT sestamibi scan the impression was findings as described above may represent a right inferior parathyroid adenoma.      Regarding symptoms of the patient's hyperparathyroidism, he admits to some memory changes, sleep patterns have been altered and muscle and joint fatigue more so than that he would expect.  He has no nephrolithiasis.  No recent fractures.  There is no family history of parathyroid abnormalities.  No recent fractures.  No family history of parathyroid or thyroid abnormalities.      PAST MEDICAL HISTORY:     1.  Angina.   2.  Obesity/   3.  Esophageal reflux.   4.  Hypercholesterolemia.   5.  Mild cognitive impairment.   6.  Hypercalcemia.   7.  Type 2 diabetes.      PAST SURGICAL HISTORY:  Includes multiple orthopedic surgeries.      ALLERGIES:  Eggs, penicillin, acetaminophen,  and lisinopril.      MEDICATIONS:  Have been reviewed and are available in the EMR.      REVIEW OF SYSTEMS:  A 10-point review of systems is pertinent for that noted in the HPI.      PHYSICAL EXAMINATION:  The neck is soft.  There are no palpable masses present.  The thyroid gland is palpable and moves nicely  with swallowing.  No nodules were noted within the thyroid gland.  No cervical lymphadenopathy identified.      LABORATORY DATA AND RADIOGRAPHIC IMAGES:  Are discussed above.      ASSESSMENT:  Primary hyperparathyroidism.      PLAN:  Based on the above, I would recommend the patient undergo a neck exploration, resection of parathyroid adenoma.  Intraoperative parathyroid hormone testing will also be utilized.  I discussed the surgical procedure with the patient, including but not limited to the risks of bleeding, infection, injury to the recurrent laryngeal nerve or nerves or missed parathyroid adenoma.  The patient is aware of this and has agreed to proceed with surgery and we will schedule him for surgery accordingly.         HERNANDEZ HERNANDEZ MD             D: 2017 14:43   T: 2017 06:30   MT: nh      Name:     MANDY GARCIA   MRN:      [id number]        Account:      [id number]   :      1945           Service Date: 2017      Document: [id number]

## 2017-03-22 NOTE — TELEPHONE ENCOUNTER
Reviewed with Dr Short.   She reviewed his surgery postoperatively yesterday with him during his time in post-anesthesia area.  Will review again at post op appointment as well.   Removed large parathyroid gland and surgery went well.We expect him to continue to feel better both post parathyroidectomy and/ PTH levels normalize, and as surgical site heals. The surgery  was 35 minutes long and went well from surgeon's perspective  Hydrocodone is medication that she is willing to prescribe given the surgical parameters.

## 2017-03-22 NOTE — TELEPHONE ENCOUNTER
"----- Message from Genaflavio Fam sent at 3/22/2017 11:25 AM CDT -----  Regarding: Dr Arreola - pt returning phone call   Contact: [phone]  Pt is returning a phone call from the nurse. Yesterday's surgery did not go too well. Per pt, stated it was scheduled for 10AM, however the clinic was trying to get a hold of him prior to that to let him know that they want him to be in surgery by 8am, so when he got there at 10AM, they rushed him into surgery, after surgery, per pt, no one came out to f/u with him about how the surgery went, and they called [facility] to get him 10 mins early, so then he was rushed out as well. Pt stated he was \"promised\" pain meds, and never got any but was given Tylenol. Pt stated that they messed up his arm b/c they put the IV in wrong and now his arm is swollen and he has been in a lot of pain since last night. Pt is req to speak to Dr Arreola's nurse about his experience. Pt can be reached at [phone], thanks    Pt had surgery 03/21/2017. Pt upset that he was rushed and did not get any pain medications. Pt given RX for Norco-pt states that is like Tylenol. Pt states he called the clinic for pain meds and he was refused. Pt was told he was not to eat dairy and ate quart last night to stop the pain. Pt states he woke up this morning and had blood on his pillow-not clear on how much blood. Pt kept stating that the Dr Short never talked to him post surgery. Pt upset that he was unable to go to the cafe to have soup before [facility] came to pick them up. Pt wants Dr Arreola know that he was ripped off and the experience was awful.  Mi Pratt RN 2:08 PM on 3/22/2017.          "

## 2017-03-23 ENCOUNTER — TELEPHONE (OUTPATIENT)
Dept: OTOLARYNGOLOGY | Facility: CLINIC | Age: 72
End: 2017-03-23

## 2017-03-23 NOTE — TELEPHONE ENCOUNTER
"The patient was called in regards to the surgery he had on 3-21-17. He reports that his pain is much better, but that he was \"suffering\" for the first 2 days. He acknowledges getting the pain pills, (Vicodin) but states they did not help at all. He also listed an array of complaints related to the surgery and all communications and interactions surrounding it. The RN apologized for that and that our goal is always to give the best care. He ended the call abruptly.   "

## 2017-03-26 ENCOUNTER — TELEPHONE (OUTPATIENT)
Dept: OTHER | Facility: CLINIC | Age: 72
End: 2017-03-26

## 2017-03-27 ENCOUNTER — HOSPITAL ENCOUNTER (EMERGENCY)
Facility: CLINIC | Age: 72
Discharge: HOME | End: 2017-03-27
Attending: EMERGENCY MEDICINE | Admitting: EMERGENCY MEDICINE
Payer: MEDICARE

## 2017-03-27 VITALS
OXYGEN SATURATION: 96 % | HEART RATE: 71 BPM | DIASTOLIC BLOOD PRESSURE: 81 MMHG | RESPIRATION RATE: 16 BRPM | SYSTOLIC BLOOD PRESSURE: 144 MMHG | TEMPERATURE: 97.5 F

## 2017-03-27 DIAGNOSIS — G89.18 POSTOPERATIVE PAIN: ICD-10-CM

## 2017-03-27 DIAGNOSIS — T82.898A: ICD-10-CM

## 2017-03-27 DIAGNOSIS — E11.9 DM TYPE 2 (DIABETES MELLITUS, TYPE 2) (H): ICD-10-CM

## 2017-03-27 LAB
ANION GAP SERPL CALCULATED.3IONS-SCNC: 9 MMOL/L (ref 3–14)
BASOPHILS # BLD AUTO: 0 10E9/L (ref 0–0.2)
BASOPHILS NFR BLD AUTO: 0.4 %
BUN SERPL-MCNC: 10 MG/DL (ref 7–30)
CALCIUM SERPL-MCNC: 8 MG/DL (ref 8.5–10.1)
CHLORIDE SERPL-SCNC: 106 MMOL/L (ref 94–109)
CO2 SERPL-SCNC: 26 MMOL/L (ref 20–32)
CREAT SERPL-MCNC: 0.82 MG/DL (ref 0.66–1.25)
DIFFERENTIAL METHOD BLD: ABNORMAL
EOSINOPHIL # BLD AUTO: 0.3 10E9/L (ref 0–0.7)
EOSINOPHIL NFR BLD AUTO: 5.6 %
ERYTHROCYTE [DISTWIDTH] IN BLOOD BY AUTOMATED COUNT: 13.7 % (ref 10–15)
GFR SERPL CREATININE-BSD FRML MDRD: ABNORMAL ML/MIN/1.7M2
GLUCOSE SERPL-MCNC: 104 MG/DL (ref 70–99)
HCT VFR BLD AUTO: 36.9 % (ref 40–53)
HGB BLD-MCNC: 12 G/DL (ref 13.3–17.7)
IMM GRANULOCYTES # BLD: 0 10E9/L (ref 0–0.4)
IMM GRANULOCYTES NFR BLD: 0 %
LYMPHOCYTES # BLD AUTO: 1.6 10E9/L (ref 0.8–5.3)
LYMPHOCYTES NFR BLD AUTO: 36.2 %
MCH RBC QN AUTO: 27.5 PG (ref 26.5–33)
MCHC RBC AUTO-ENTMCNC: 32.5 G/DL (ref 31.5–36.5)
MCV RBC AUTO: 85 FL (ref 78–100)
MONOCYTES # BLD AUTO: 0.3 10E9/L (ref 0–1.3)
MONOCYTES NFR BLD AUTO: 6 %
NEUTROPHILS # BLD AUTO: 2.3 10E9/L (ref 1.6–8.3)
NEUTROPHILS NFR BLD AUTO: 51.8 %
NRBC # BLD AUTO: 0 10*3/UL
NRBC BLD AUTO-RTO: 0 /100
PLATELET # BLD AUTO: 134 10E9/L (ref 150–450)
POTASSIUM SERPL-SCNC: 4 MMOL/L (ref 3.4–5.3)
RBC # BLD AUTO: 4.36 10E12/L (ref 4.4–5.9)
SODIUM SERPL-SCNC: 141 MMOL/L (ref 133–144)
WBC # BLD AUTO: 4.5 10E9/L (ref 4–11)

## 2017-03-27 PROCEDURE — A9270 NON-COVERED ITEM OR SERVICE: HCPCS | Mod: GY | Performed by: EMERGENCY MEDICINE

## 2017-03-27 PROCEDURE — 85025 COMPLETE CBC W/AUTO DIFF WBC: CPT | Performed by: EMERGENCY MEDICINE

## 2017-03-27 PROCEDURE — 99283 EMERGENCY DEPT VISIT LOW MDM: CPT | Mod: Z6 | Performed by: EMERGENCY MEDICINE

## 2017-03-27 PROCEDURE — 80048 BASIC METABOLIC PNL TOTAL CA: CPT | Performed by: EMERGENCY MEDICINE

## 2017-03-27 PROCEDURE — 25000132 ZZH RX MED GY IP 250 OP 250 PS 637: Mod: GY | Performed by: EMERGENCY MEDICINE

## 2017-03-27 PROCEDURE — 99283 EMERGENCY DEPT VISIT LOW MDM: CPT | Performed by: EMERGENCY MEDICINE

## 2017-03-27 RX ORDER — OXYCODONE HYDROCHLORIDE 5 MG/1
5 TABLET ORAL ONCE
Status: COMPLETED | OUTPATIENT
Start: 2017-03-27 | End: 2017-03-27

## 2017-03-27 RX ORDER — OXYCODONE HYDROCHLORIDE 5 MG/1
5 TABLET ORAL EVERY 6 HOURS PRN
Qty: 12 TABLET | Refills: 0 | Status: SHIPPED | OUTPATIENT
Start: 2017-03-27 | End: 2017-03-30

## 2017-03-27 RX ADMIN — OXYCODONE HYDROCHLORIDE 5 MG: 5 TABLET ORAL at 05:27

## 2017-03-27 ASSESSMENT — ENCOUNTER SYMPTOMS
ARTHRALGIAS: 1
WOUND: 1

## 2017-03-27 NOTE — ED AVS SNAPSHOT
[facility], [place], Emergency Department    [address] [place] [id number]    Phone:  [phone]                                       Lux Velasco   MRN: [id number]    Department:  [facility], Emergency Department   Date of Visit:  3/27/2017           After Visit Summary Signature Page     I have received my discharge instructions, and my questions have been answered. I have discussed any challenges I see with this plan with the nurse or doctor.    ..........................................................................................................................................  Patient/Patient Representative Signature      ..........................................................................................................................................  Patient Representative Print Name and Relationship to Patient    ..................................................               ................................................  Date                                            Time    ..........................................................................................................................................  Reviewed by Signature/Title    ...................................................              ..............................................  Date                                                            Time

## 2017-03-27 NOTE — DISCHARGE INSTRUCTIONS
-Please make an appointment to follow up with your primary doctor and Dr. Short as scheduled or as soon as possible if not improving.      Pain Management After Surgery  Once you re home after surgery, you may have some pain, since even minor surgery causes swelling and breakdown of tissue. When it comes to effective pain management, the tips you may have learned in the hospital also work at home. To get the best pain relief possible, remember these points:  Special note: Be sure to follow any specific post-operative instructions from your surgeon or nurse.     Use your medicine only as directed    If your pain is not relieved or if it gets worse, call your health care provider.    If pain lessens, try taking your medicine less often or in smaller doses.  Remember that medicines need time to work    Most pain relievers taken by mouth need at least 20 to 30 minutes to take effect. They may not reach their maximum effect for close to an hour.     Take pain medicine at regular times as directed. Don t wait until the pain gets bad to take it.  Time your medicine    Try to time your medicine so that you take it before beginning an activity, such as dressing or sitting at the table for dinner.    Taking your medicine at night may help you get a good night s rest.  Eat lots of fruit and vegetables    Constipation is a common side effect with some pain medicines. Eating fruit, vegetables, and other foods high in fiber can help.    Drink lots of fluids.    Talk to your health care provider about taking a preventive bowel regimen.  Avoid drinking alcohol while taking pain medicine    Mixing alcohol and pain medicine can cause dizziness and slow your respiratory system. It can even be fatal.  Avoid driving or operating machinery while taking pain medicines that can cause drowsiness.    3800-0376 [employer]. [address] [place] [address], [place] [address]. All rights reserved. This information is not intended as a  substitute for professional medical care. Always follow your healthcare professional's instructions.

## 2017-03-27 NOTE — ED NOTES
"Pt arrived to ED with throat pain s/t parathyroidectomy 3/21. Pt states \"the pain hasn't gone away\". Pt also presents with left wrist pain where he states an IV was attempted 3/21 but failed and the pain has persisted.   "

## 2017-03-27 NOTE — ED PROVIDER NOTES
History     Chief Complaint   Patient presents with     Throat Pain     Wrist Pain     HPI  Lux Velasco is a 71 year old male with a history of diabetes, MI, and primary hyperparathyroidism who presents to the ER with post-operative pain.  Patient had partial parathyroid gland removal on March 21 with otolaryngology, Dr. Short.  He states that since then he's had persistent pain at the site that has improved but still hurting.  He has used up all his hydrocodone and states that Tylenol and ibuprofen are not covering the pain.  He has tried calling his primary doctor and ENT about this and was told to come to the ER if the pain was uncontrolled.  Overnight he had difficulty sleeping as he still had pain at the site and around the area from the surgery and so came to the ER.  He denies any fevers or chills, no drainage from site, no breakdown aside, no redness or swelling.  He reports that much of the postoperative bruising and redness have gone down.  Additional patient is concerned about the IV site on the left wrist.  He states that there was a large bruiser after surgery and he was instructed to perform range of motion exercises of his wrist which only helped a little bit and he still feels some aching in that right wrist area where the IV was.    I have reviewed the Medications, Allergies, Past Medical and Surgical History, and Social History in the TBS system.  Past Medical History:   Diagnosis Date     Diabetes mellitus type II 2005     History of agent Orange exposure 4/17/2013     Myocardial infarct (H) 01/01/1999     Primary hyperparathyroidism (H) Dx approx 2003     Stroke (H) 01/01/1998    recovered fully       Past Surgical History:   Procedure Laterality Date     BIOPSY OF SKIN LESION       BYPASS GRAFT ARTERY CORONARY       MOHS MICROGRAPHIC PROCEDURE       PARATHYROIDECTOMY N/A 3/21/2017    Procedure: PARATHYROIDECTOMY;  Surgeon: Julianna Short MD;  Location: [facility] OR     PARATHYROIDECTOMY          Family History   Problem Relation Age of Onset     Melanoma Mother      Melanoma Father      CANCER No family hx of      no skin cancer     Glaucoma No family hx of      Macular Degeneration No family hx of        Social History   Substance Use Topics     Smoking status: Former Smoker     Quit date: 1970     Smokeless tobacco: Former User     Alcohol use Yes      Comment: rarely        Allergies   Allergen Reactions     Eggs Other (See Comments)     Pt states no longer having reaction, childhood allergy, eats eggs       Penicillins Other (See Comments)     Pt states PCN allergy is due to egg allergy     Propoxyphene Other (See Comments)     Pain     No current facility-administered medications for this encounter.      Current Outpatient Prescriptions   Medication     oxyCODONE (ROXICODONE) 5 MG IR tablet     HYDROcodone-acetaminophen (NORCO) 5-325 MG per tablet     senna-docusate (SENOKOT-S;PERICOLACE) 8.6-50 MG per tablet     guaiFENesin-codeine (ROBITUSSIN AC) 100-10 MG/5ML SOLN solution     FLUTICASONE PROPIONATE NA     losartan (COZAAR) 50 MG tablet     VITAMIN D, CHOLECALCIFEROL, PO     TAMSULOSIN HCL PO     METFORMIN HCL PO     acetaminophen 500 MG CAPS     acetic acid-hydrocortisone (ACETASOL HC) otic solution     ammonium lactate (AMLACTIN) 12 % cream     artificial tears SOLN     omeprazole (PRILOSEC) 20 MG capsule     Review of Systems   Musculoskeletal: Positive for arthralgias.   Skin: Positive for wound.   All other systems reviewed and are negative.      Physical Exam   BP: 155/90  Pulse: 72  Temp: 97.5  F (36.4  C)  Resp: 14  SpO2: 99 %  Physical Exam   Constitutional: He appears well-developed and well-nourished. No distress.   HENT:   Head: Normocephalic and atraumatic.   Mouth/Throat: Oropharynx is clear and moist.   Neck: Normal range of motion. Neck supple.       Cardiovascular: Normal rate and normal heart sounds.    Pulmonary/Chest: Effort normal and breath sounds normal. No respiratory  distress. He exhibits no tenderness.   Abdominal: Soft.   Musculoskeletal: Normal range of motion. He exhibits no edema.        Left wrist: He exhibits tenderness.        Arms:  Neurological: He is alert.   Skin: Skin is warm and dry. He is not diaphoretic. No erythema. No pallor.   Psychiatric: His mood appears anxious.   Nursing note and vitals reviewed.    ED Course     ED Course     Procedures        Critical Care time:  none        Labs Ordered and Resulted from Time of ED Arrival Up to the Time of Departure from the ED   CBC WITH PLATELETS DIFFERENTIAL - Abnormal; Notable for the following:        Result Value    RBC Count 4.36 (*)     Hemoglobin 12.0 (*)     Hematocrit 36.9 (*)     Platelet Count 134 (*)     All other components within normal limits   BASIC METABOLIC PANEL - Abnormal; Notable for the following:     Glucose 104 (*)     Calcium 8.0 (*)     All other components within normal limits     Assessments & Plan (with Medical Decision Making)   I was physically present and have reviewed and verified the accuracy of this note documented by (myself).     Disclaimer: This note consists of symbols derived from keyboarding, dictation, and/or voice recognition software. As a result, there may be errors in the script that have gone undetected.  Please consider this when interpreting information found in the chart.These sections of the chart were reviewed for accuracy to the best of my knowledge and ability.    Patient was clinically assessed and consented to treatment. After assessment, medical decision making and workup were discussed with the patient. The patient was agreeable to plan for testing, workup, and treatment.  Lux Velasco is a 71 year old male who presents today for pain and surgical site and IV site.  Patient really anxious about his surgery and concerned about the pain in the incision.  On examination the wound does not have any breakdown, no drainage, no signs of swelling over increasing  tenderness consistent with infection.  I did agree to check some labs for any infectious markers as I do not feel imaging would be necessary unless there was dramatic rise in his leukocytosis.  Patient's white blood count was consistent with previous and no signs of leukocytosis.  Hemoglobin was stable and electrolytes were only remarkable for hypocalcemia of 8.0 which would be consistent with removal of the parathyroid gland.  Patient is scheduled to see his primary doctor to discuss calcium supplements or changes in his diet given the removal of the parathyroid gland.  At this time he had no signs of symptomatic hypocalcemia.  Patient was given his lab results and after discussion feel this is likely postoperative pain.  On his examination of his wrists as well that was no thrombophlebitis or signs of infection and most consistent with a hematoma at the IV site which is resolving.  Patient is ready taken Tylenol and Motrin tonight and will be given oxycodone for pain which gave him good relief.  I did agree to write him a small prescription to go home on and he is to talk to his primary doctor about any further pain medication but likely he will need it after another week.  He is to transition to solely Tylenol and ibuprofen and follow up with ENT and his primary doctor as scheduled.    I have reviewed the nursing notes.    I have reviewed the findings, diagnosis, plan and need for follow up with the patient.    Discharge Medication List as of 3/27/2017  6:10 AM      START taking these medications    Details   oxyCODONE (ROXICODONE) 5 MG IR tablet Take 1 tablet (5 mg) by mouth every 6 hours as needed for breakthrough pain or severe pain, Disp-12 tablet, R-0, Local Print             Final diagnoses:   Postoperative pain   Hematoma of IV site, initial encounter       3/27/2017   [facility], [place], EMERGENCY DEPARTMENT     Turner Vasquez MD  03/27/17 [id number]

## 2017-03-27 NOTE — ED AVS SNAPSHOT
[facility], Emergency Department    [address] [address]    Phone:  [phone]                                       Lux Velasco   MRN: [id number]    Department:  [facility], Emergency Department   Date of Visit:  3/27/2017           Patient Information     Date Of Birth          1945        Your diagnoses for this visit were:     Postoperative pain     Hematoma of IV site, initial encounter        You were seen by Turner Vasquez MD.        Discharge Instructions       -Please make an appointment to follow up with your primary doctor and Dr. Short as scheduled or as soon as possible if not improving.      Pain Management After Surgery  Once you re home after surgery, you may have some pain, since even minor surgery causes swelling and breakdown of tissue. When it comes to effective pain management, the tips you may have learned in the hospital also work at home. To get the best pain relief possible, remember these points:  Special note: Be sure to follow any specific post-operative instructions from your surgeon or nurse.     Use your medicine only as directed    If your pain is not relieved or if it gets worse, call your health care provider.    If pain lessens, try taking your medicine less often or in smaller doses.  Remember that medicines need time to work    Most pain relievers taken by mouth need at least 20 to 30 minutes to take effect. They may not reach their maximum effect for close to an hour.     Take pain medicine at regular times as directed. Don t wait until the pain gets bad to take it.  Time your medicine    Try to time your medicine so that you take it before beginning an activity, such as dressing or sitting at the table for dinner.    Taking your medicine at night may help you get a good night s rest.  Eat lots of fruit and vegetables    Constipation is a common side effect with some pain medicines. Eating fruit, vegetables, and other foods high  in fiber can help.    Drink lots of fluids.    Talk to your health care provider about taking a preventive bowel regimen.  Avoid drinking alcohol while taking pain medicine    Mixing alcohol and pain medicine can cause dizziness and slow your respiratory system. It can even be fatal.  Avoid driving or operating machinery while taking pain medicines that can cause drowsiness.    3474-9480 [employer]. [address] [place] [address], [place] [address]. All rights reserved. This information is not intended as a substitute for professional medical care. Always follow your healthcare professional's instructions.            Future Appointments        Provider Department Dept Phone Center    4/17/2017 12:00 PM Pawan Denney DO [facility] Urology and [facility] for Prostate and Urologic Cancers [phone] [facility]    4/17/2017 1:45 PM Julianna Short MD [facility] Ear Nose and Throat [phone] [facility]      24 Hour Appointment Hotline       To make an appointment at any [facility], call [phone] ([phone]). If you don't have a family doctor or clinic, we will help you find one. [facility] clinics are conveniently located to serve the needs of you and your family.             Review of your medicines      START taking        Dose / Directions Last dose taken    oxyCODONE 5 MG IR tablet   Commonly known as:  ROXICODONE   Dose:  5 mg   Quantity:  12 tablet        Take 1 tablet (5 mg) by mouth every 6 hours as needed for breakthrough pain or severe pain   Refills:  0          CONTINUE these medicines which may have CHANGED, or have new prescriptions. If we are uncertain of the size of tablets/capsules you have at home, strength may be listed as something that might have changed.        Dose / Directions Last dose taken    losartan 50 MG tablet   Commonly known as:  COZAAR   Dose:  50 mg   What changed:  when to take this   Quantity:  30 tablet        Take 1 tablet (50 mg) by mouth daily   Refills:  3           Our records show that you are taking the medicines listed below. If these are incorrect, please call your family doctor or clinic.        Dose / Directions Last dose taken    acetaminophen 500 MG Caps   Dose:  2 tablet        Take 2 tablets by mouth daily.   Refills:  0        acetic acid-hydrocortisone otic solution   Commonly known as:  ACETASOL HC   Dose:  4 drop   Quantity:  10 mL        Place 4 drops into both ears 2 times daily.   Refills:  10        ammonium lactate 12 % cream   Commonly known as:  AMLACTIN        Apply  topically daily.   Refills:  0        artificial tears Soln        Use one drop to both eye PRN.   Refills:  0        FLUTICASONE PROPIONATE NA   Dose:  50 mcg        Spray 50 mcg in nostril   Refills:  0        guaiFENesin-codeine 100-10 MG/5ML Soln solution   Commonly known as:  ROBITUSSIN AC   Dose:  1-2 tsp.   Quantity:  420 mL        Take 5-10 mLs by mouth every 4 hours as needed for cough   Refills:  0        HYDROcodone-acetaminophen 5-325 MG per tablet   Commonly known as:  NORCO   Dose:  1-2 tablet   Quantity:  20 tablet        Take 1-2 tablets by mouth every 4 hours as needed for other (Moderate to Severe Pain)   Refills:  0        METFORMIN HCL PO        Take by mouth 2 times daily (with meals)   Refills:  0        omeprazole 20 MG CR capsule   Commonly known as:  priLOSEC   Dose:  20 mg        Take 20 mg by mouth daily.   Refills:  0        senna-docusate 8.6-50 MG per tablet   Commonly known as:  SENOKOT-S;PERICOLACE   Dose:  1-2 tablet   Quantity:  10 tablet        Take 1-2 tablets by mouth 2 times daily as needed for constipation Take while on oral narcotics to prevent or treat constipation.   Refills:  0        TAMSULOSIN HCL PO   Dose:  0.4 mg        Take 0.4 mg by mouth every morning   Refills:  0        VITAMIN D (CHOLECALCIFEROL) PO   Dose:  1000 Units        Take 1,000 Units by mouth every morning   Refills:  0                Prescriptions were sent or printed at these  "locations (1 Prescription)                   Other Prescriptions                Printed at Department/Unit printer (1 of 1)         oxyCODONE (ROXICODONE) 5 MG IR tablet                Procedures and tests performed during your visit     Basic metabolic panel    CBC with platelets differential      Orders Needing Specimen Collection     None      Pending Results     No orders found from 3/25/2017 to 3/28/2017.            Pending Culture Results     No orders found from 3/25/2017 to 3/28/2017.            Thank you for choosing [facility]       Thank you for choosing [facility] for your care. Our goal is always to provide you with excellent care. Hearing back from our patients is one way we can continue to improve our services. Please take a few minutes to complete the written survey that you may receive in the mail after you visit with us. Thank you!        [employer] Information     [employer] lets you send messages to your doctor, view your test results, renew your prescriptions, schedule appointments and more. To sign up, go to [email or web address] . Click on \"Log in\" on the left side of the screen, which will take you to the Welcome page. Then click on \"Sign up Now\" on the right side of the page.     You will be asked to enter the access code listed below, as well as some personal information. Please follow the directions to create your username and password.     Your access code is: [email or web address]  Expires: 2017  1:47 PM     Your access code will  in 90 days. If you need help or a new code, please call your [facility] clinic or [phone].        Care EveryWhere ID     This is your Care EveryWhere ID. This could be used by other organizations to access your [facility] medical records  [id number]        After Visit Summary       This is your record. Keep this with you and show to your community pharmacist(s) and doctor(s) at your next visit.                  "

## 2017-03-27 NOTE — TELEPHONE ENCOUNTER
"03/26/2017    General Surgery Telephone Note    Was called by patient at 11p regarding ongoing pain in \"throat and IV site.\" Patient had parathyroidectomy on 3/21 with Dr. Short. He has called multiple times since surgery expressing concerns regarding his madina-operative and post-operative care. Throughout the conversation, he reiterated many of these compaints, which were not related to his reasons for calling tonight.    He notes that he is still having throat pain which is constant. Patient cannot specify whether the pain is incisional or inside his oropharynx. He states that hydrocodone and cool water helps with the pain. It hurts when he swallows, but says the pain is better than what is was immediately after surgery. He denies fevers, chills, nausea or vomiting. He is able to eat and drink without problem. Is intermittently constipated and he is frustrated by that because removing the parathyroid was supposed to fix that    He is also having L wrist pain where they put in IV. There is no redness swelling or purulent drainage. The area is black/blue. He has tried ice and circular motion but neither seem to help much.    He wants to know what he can have dairy products again. I explained that he will need to ask Dr. Short's office about that detail.    Patient requested stronger pain mediation than tylenol or motrin. I explained that I cannot prescribe narcotic pain medication over the phone. I told the patient that if he is in too much pain tonight, he could present to the ED for evaluation and treatment of his pain and if he felt unsafe driving to the ED to call 911 if any life threatening or emergent concerns. However, I explained that if he was able to make it through the night, he could call his PCP or Dr. Short's office in the AM regarding his ongoing pain.    Mikey Strickland, PGY-2  General Surgery Resident    "

## 2017-03-28 NOTE — OP NOTE
PREOPERATIVE DIAGNOSIS:  Primary hyperparathyroidism.      POSTOPERATIVE DIAGNOSIS:  Primary hyperparathyroidism.      SURGICAL PROCEDURE PERFORMED:  Neck exploration with resection of right inferior parathyroid adenoma with 30 minutes of intraoperative recurrent laryngeal nerve monitoring.      SURGEON:  Julianna Short MD      ASSISTANT:  Emelia Perez MD       ANESTHESIA:  General endotracheal with nerve monitoring endotracheal tube.      COMPLICATIONS:  None.      ESTIMATED BLOOD LOSS:  Less than 5 mL.      CLINICAL INDICATIONS FOR THE PROCEDURE:  This is a 71-year-old gentleman who has a long history of hypercalcemia.  A recent workup confirmed primary hyperparathyroidism.  A localizing scan localized this to the right side.  Based on this, it was recommended the patient undergo a neck exploration with resection of the parathyroid adenoma with intraoperative parathyroid hormone testing.  The patient was aware of this and agreed to proceed with surgery, and consent was obtained, and the site was marked.  I should note that I discussed with the patient prior to the consent the risks, including, but not limited to, bleeding, infection, injury to the recurrent laryngeal nerve or nerves, potential missed parathyroid adenoma and potential permanent hypocalcemia.      DETAILS OF PROCEDURE:  The patient was brought to the operating room in stable condition and placed on the operating table in supine position.  After appropriate general anesthesia was obtained, the patient was prepped and draped in a sterile fashion.  A time-out was then performed.  A preincision parathyroid hormone level was drawn.  A 4 cm incision was made over the anterior neck following a natural skin crease.  The platysma was then divided, and subplatysmal planes were then created.  The strap muscles were divided at the midline and retracted laterally.  Based on the localizing scan, we evaluated the right side first.  The right lobe of the  thyroid gland was grasped and retracted medially.  As soon as we rotated the gland medially, there was a large parathyroid gland that was very consistent with an adenoma.  This was dissected, and its blood supply was clipped and then divided.  This was sent for frozen section and confirmed to be a hypercellular parathyroid gland.  The recurrent laryngeal nerve was followed from an inferior to superior manner.  As we did so, anterior and lateral to this, we identified the right superior parathyroid gland.  This was of normal size and normal position.  We then inspected the left side.  The left lobe of the thyroid gland was retracted medially.  The left inferior parathyroid gland was of normal size and normal position.  We then irrigated the area and confirmed both nerves were intact visibly and with nerve stimulation.  Surgicel SNoW was then placed in the operative bed.  The strap muscles were then approximated at the midline using a 3-0 chromic interrupted suture.  The platysma was approximated using a 3-0 chromic interrupted suture.  The skin incision was approximated with a 5-0 Monocryl running subcuticular suture.  Prior to closing the wound, a 15 minute post excision parathyroid hormone level was drawn.  The skin was then dressed with Dermabond.  The patient was then extubated and returned to the recovery room in stable condition.        I was present during the entire surgical procedure.         HERNANDEZ HERNANDEZ MD             D: 2017 20:58   T: 2017 08:51   MT: fernanda      Name:     MANDY GARCIA   MRN:      [id number]        Account:        [id number]   :      1945           Procedure Date: 2017      Document: [id number]

## 2017-03-30 ENCOUNTER — OFFICE VISIT (OUTPATIENT)
Dept: INTERNAL MEDICINE | Facility: CLINIC | Age: 72
End: 2017-03-30

## 2017-03-30 VITALS
BODY MASS INDEX: 33.5 KG/M2 | DIASTOLIC BLOOD PRESSURE: 81 MMHG | RESPIRATION RATE: 22 BRPM | SYSTOLIC BLOOD PRESSURE: 159 MMHG | HEART RATE: 66 BPM | WEIGHT: 240.1 LBS

## 2017-03-30 DIAGNOSIS — D35.1 PARATHYROID ADENOMA: Primary | ICD-10-CM

## 2017-03-30 DIAGNOSIS — D35.1 PARATHYROID ADENOMA: ICD-10-CM

## 2017-03-30 LAB
ALBUMIN SERPL-MCNC: 3.7 G/DL (ref 3.4–5)
ALP SERPL-CCNC: 95 U/L (ref 40–150)
ALT SERPL W P-5'-P-CCNC: 54 U/L (ref 0–70)
ANION GAP SERPL CALCULATED.3IONS-SCNC: 9 MMOL/L (ref 3–14)
AST SERPL W P-5'-P-CCNC: 36 U/L (ref 0–45)
BASOPHILS # BLD AUTO: 0.1 10E9/L (ref 0–0.2)
BASOPHILS NFR BLD AUTO: 1.1 %
BILIRUB SERPL-MCNC: 0.4 MG/DL (ref 0.2–1.3)
BUN SERPL-MCNC: 10 MG/DL (ref 7–30)
CALCIUM SERPL-MCNC: 8.4 MG/DL (ref 8.5–10.1)
CHLORIDE SERPL-SCNC: 109 MMOL/L (ref 94–109)
CO2 SERPL-SCNC: 26 MMOL/L (ref 20–32)
CREAT SERPL-MCNC: 0.77 MG/DL (ref 0.66–1.25)
DIFFERENTIAL METHOD BLD: ABNORMAL
EOSINOPHIL # BLD AUTO: 0.3 10E9/L (ref 0–0.7)
EOSINOPHIL NFR BLD AUTO: 5.9 %
ERYTHROCYTE [DISTWIDTH] IN BLOOD BY AUTOMATED COUNT: 13.6 % (ref 10–15)
GFR SERPL CREATININE-BSD FRML MDRD: ABNORMAL ML/MIN/1.7M2
GLUCOSE SERPL-MCNC: 124 MG/DL (ref 70–99)
HCT VFR BLD AUTO: 38.9 % (ref 40–53)
HGB BLD-MCNC: 12.7 G/DL (ref 13.3–17.7)
IMM GRANULOCYTES # BLD: 0 10E9/L (ref 0–0.4)
IMM GRANULOCYTES NFR BLD: 0.2 %
LYMPHOCYTES # BLD AUTO: 1.5 10E9/L (ref 0.8–5.3)
LYMPHOCYTES NFR BLD AUTO: 26.9 %
MCH RBC QN AUTO: 27.7 PG (ref 26.5–33)
MCHC RBC AUTO-ENTMCNC: 32.6 G/DL (ref 31.5–36.5)
MCV RBC AUTO: 85 FL (ref 78–100)
MONOCYTES # BLD AUTO: 0.3 10E9/L (ref 0–1.3)
MONOCYTES NFR BLD AUTO: 6.1 %
NEUTROPHILS # BLD AUTO: 3.2 10E9/L (ref 1.6–8.3)
NEUTROPHILS NFR BLD AUTO: 59.8 %
NRBC # BLD AUTO: 0 10*3/UL
NRBC BLD AUTO-RTO: 0 /100
PLATELET # BLD AUTO: 188 10E9/L (ref 150–450)
POTASSIUM SERPL-SCNC: 3.7 MMOL/L (ref 3.4–5.3)
PROT SERPL-MCNC: 7.3 G/DL (ref 6.8–8.8)
PTH-INTACT SERPL-MCNC: 20 PG/ML (ref 12–72)
RBC # BLD AUTO: 4.58 10E12/L (ref 4.4–5.9)
SODIUM SERPL-SCNC: 143 MMOL/L (ref 133–144)
WBC # BLD AUTO: 5.4 10E9/L (ref 4–11)

## 2017-03-30 PROCEDURE — 83970 ASSAY OF PARATHORMONE: CPT | Performed by: INTERNAL MEDICINE

## 2017-03-30 ASSESSMENT — PAIN SCALES - GENERAL: PAINLEVEL: MILD PAIN (3)

## 2017-03-30 NOTE — MR AVS SNAPSHOT
After Visit Summary   3/30/2017    Lux Velasco    MRN: [id number]           Patient Information     Date Of Birth          1945        Visit Information        Provider Department      3/30/2017 9:00 AM Nam Duffy MD [facility]        Today's Diagnoses     Parathyroid adenoma    -  1      Care Instructions    Primary Care Center Medication Refill Request Information:  * Please contact your pharmacy regarding ANY request for medication refills.  ** [facility] Prescription Fax = [phone]  * Please allow 3 business days for routine medication refills.  * Please allow 5 business days for controlled substance medication refills.     Primary Care Center Test Result notification information:  *You will be notified with in 7-10 days of your appointment day regarding the results of your test.  If you are on MyChart you will be notified as soon as the provider has reviewed the results and signed off on them.      Primary Care Center   [facility]  [address] [place] [address] (4th Floor )   [place] [address] [phone]  [phone]          Follow-ups after your visit        Additional Services     ENDOCRINOLOGY ADULT REFERRAL       parathyroid surgery follow up for hypercalcemia                  Your next 10 appointments already scheduled     Mar 30, 2017  9:45 AM CDT   LAB with  LAB   [facility] ([facility])    [address] [place] [address]  [address] [facility] [address] [phone]           Patient must bring picture ID.  Patient should be prepared to give a urine specimen  Please do not eat 10-12 hours before your appointment if you are coming in fasting for labs on lipids, cholesterol, or glucose (sugar).  Pregnant women should follow their Care Team instructions. Water with medications is okay. Do not drink coffee or other fluids.   If you have concerns about taking  your medications, please ask at office or if scheduling via [employer], send a message  by clicking on Secure Messaging, Message Your Care Team.            Apr 17, 2017 12:00 PM CDT   (Arrive by 11:45 AM)   NEW BENIGN PROSTATIC HYPERTROPHY with Pawan Denney DO   [facility] Urology and Inst for Prostate and Urologic Cancers ([facility])    [address]  [address] [facility] [address] [phone]            Apr 17, 2017  1:45 PM CDT   (Arrive by 1:30 PM)   Return Visit with Julianna Short MD   [facility] Ear Nose and Throat ([facility])    [address] [place] [address]  [address]th [facility] [id number] [phone]            Apr 24, 2017  1:05 PM CDT   (Arrive by 12:50 PM)   Return Visit with Nam Duffy MD   [facility] ([facility])    [address] [place] [id number] [phone]            May 31, 2017  9:00 AM CDT   (Arrive by 8:45 AM)   RETURN ENDOCRINE with Marc Chou MD   [facility] Endocrinology ([facility])    [address] [place] [address]  3rd [facility] [id number] [phone]              Future tests that were ordered for you today     Open Future Orders        Priority Expected Expires Ordered    Parathyroid Hormone Intact Routine 3/30/2017 3/30/2018 3/30/2017    CBC with platelets differential Routine 3/30/2017 4/13/2017 3/30/2017    Comprehensive metabolic panel Routine 3/30/2017 3/30/2018 3/30/2017            Who to contact     Please call your clinic at [phone] to:    Ask questions about your health    Make or cancel appointments    Discuss your medicines    Learn about your test results    Speak to your doctor   If you have compliments or concerns about an experience at your clinic, or if you wish to file a complaint, please contact [facility] Patient Relations at [phone] or email us at [email or web address]         Additional Information About Your Visit         [employer] Information     [employer] is an electronic gateway that provides easy, online access to your medical records. With [employer], you can request a clinic appointment, read your test results, renew a prescription or communicate with your care team.     To sign up for [employer] visit the website at [email or web address]   You will be asked to enter the access code listed below, as well as some personal information. Please follow the directions to create your username and password.     Your access code is: [email or web address]  Expires: 2017  1:47 PM     Your access code will  in 90 days. If you need help or a new code, please contact your [facility] or call [phone] for assistance.        Care EveryWhere ID     This is your Care EveryWhere ID. This could be used by other organizations to access your [facility] medical records  [id number]        Your Vitals Were     Pulse Respirations BMI (Body Mass Index)             66 22 33.5 kg/m2          Blood Pressure from Last 3 Encounters:   17 159/81   17 144/81   17 146/87    Weight from Last 3 Encounters:   17 108.9 kg (240 lb 1.6 oz)   17 120.3 kg (265 lb 4.8 oz)   17 120.3 kg (265 lb 4.8 oz)              We Performed the Following     ENDOCRINOLOGY ADULT REFERRAL          Today's Medication Changes          These changes are accurate as of: 3/30/17  9:33 AM.  If you have any questions, ask your nurse or doctor.               These medicines have changed or have updated prescriptions.        Dose/Directions    losartan 50 MG tablet   Commonly known as:  COZAAR   This may have changed:  when to take this   Used for:  Benign essential hypertension        Dose:  50 mg   Take 1 tablet (50 mg) by mouth daily   Quantity:  30 tablet   Refills:  3         Stop taking these medicines if you haven't already. Please contact your care team if you have questions.     oxyCODONE 5 MG IR tablet   Commonly  known as:  ROXICODONE   Stopped by:  Nam Duffy MD           TAMSULOSIN HCL PO   Stopped by:  Nam Duffy MD                    Primary Care Provider Office Phone # Fax #    Nam Duffy [phone]       [address] [address]        Thank you!     Thank you for choosing [facility] PRIMARY CARE [facility]  for your care. Our goal is always to provide you with excellent care. Hearing back from our patients is one way we can continue to improve our services. Please take a few minutes to complete the written survey that you may receive in the mail after your visit with us. Thank you!             Your Updated Medication List - Protect others around you: Learn how to safely use, store and throw away your medicines at www.disposemymeds.org.          This list is accurate as of: 3/30/17  9:33 AM.  Always use your most recent med list.                   Brand Name Dispense Instructions for use    acetaminophen 500 MG Caps      Take 2 tablets by mouth daily.       acetic acid-hydrocortisone otic solution    ACETASOL HC    10 mL    Place 4 drops into both ears 2 times daily.       ammonium lactate 12 % cream    AMLACTIN     Apply  topically daily.       artificial tears Soln      Use one drop to both eye PRN.       FLUTICASONE PROPIONATE NA      Spray 50 mcg in nostril       guaiFENesin-codeine 100-10 MG/5ML Soln solution    ROBITUSSIN AC    420 mL    Take 5-10 mLs by mouth every 4 hours as needed for cough       HYDROcodone-acetaminophen 5-325 MG per tablet    NORCO    20 tablet    Take 1-2 tablets by mouth every 4 hours as needed for other (Moderate to Severe Pain)       losartan 50 MG tablet    COZAAR    30 tablet    Take 1 tablet (50 mg) by mouth daily       METFORMIN HCL PO      Take by mouth 2 times daily (with meals)       omeprazole 20 MG CR capsule    priLOSEC     Take 20 mg by mouth daily.       senna-docusate 8.6-50 MG per tablet     SENOKOT-S;PERICOLACE    10 tablet    Take 1-2 tablets by mouth 2 times daily as needed for constipation Take while on oral narcotics to prevent or treat constipation.       VITAMIN D (CHOLECALCIFEROL) PO      Take 1,000 Units by mouth every morning

## 2017-03-30 NOTE — PATIENT INSTRUCTIONS
Primary Care Center Medication Refill Request Information:  * Please contact your pharmacy regarding ANY request for medication refills.  ** [facility] Prescription Fax = [phone]  * Please allow 3 business days for routine medication refills.  * Please allow 5 business days for controlled substance medication refills.     Primary Care Center Test Result notification information:  *You will be notified with in 7-10 days of your appointment day regarding the results of your test.  If you are on MyChart you will be notified as soon as the provider has reviewed the results and signed off on them.      Primary Care Center   [facility]  [address] [place] [address] (4th Floor )   [place] [address] [phone]  [phone]

## 2017-03-30 NOTE — NURSING NOTE
Chief Complaint   Patient presents with     Hospital F/U     pt is here following thyroid surgery      Bella Turner CMA at 9:08 AM on 3/30/2017.

## 2017-03-30 NOTE — PROGRESS NOTES
HPI:    Pt. Comes in for follow up today. He had parathyroid surgery with Dr. Short 3/21/17 for hypercalcinemia. He was in the ED for pain 3/27/17. He was seen in Dermatology 11/28/16. He was seen in ortho for R knee pain 11/30/16. He was seen in the ED 12/4/16 for increased BP.  No other HEENT, cardiopulmonary, abdominal, , neurological complaints. No F/C/NS. Overall he is doing better. No F/C/NS. Less pain. He is eating his normal diet.     PE:    Vitals noted gen nad cooperative alert,  HEENT, ears normal oropharynx clear no exudate, neck supple nl rom no adenopathy, he has healing horizontal scar in the lower neck, no tenderness, no bruising, no neck swelling,  LCTA, B, RRR, S1, S2, Grossly normal neurological exam.         A/P:    1. Follow up parathyroid surgery. Will check labs and place endo referral to Dr. Chou    I will see him back in in about 3 weeks.       Total time spent 15 minutes.  More than 50% of the time spent with Mr. Velasco on counseling / coordinating his care

## 2017-04-05 ENCOUNTER — OFFICE VISIT (OUTPATIENT)
Dept: ENDOCRINOLOGY | Facility: CLINIC | Age: 72
End: 2017-04-05

## 2017-04-05 VITALS
WEIGHT: 255.9 LBS | DIASTOLIC BLOOD PRESSURE: 77 MMHG | HEIGHT: 71 IN | SYSTOLIC BLOOD PRESSURE: 148 MMHG | BODY MASS INDEX: 35.82 KG/M2 | HEART RATE: 80 BPM

## 2017-04-05 DIAGNOSIS — E21.0 PRIMARY HYPERPARATHYROIDISM (H): ICD-10-CM

## 2017-04-05 DIAGNOSIS — E11.9 TYPE 2 DIABETES MELLITUS WITHOUT COMPLICATION, WITHOUT LONG-TERM CURRENT USE OF INSULIN (H): ICD-10-CM

## 2017-04-05 DIAGNOSIS — R05.9 COUGH: ICD-10-CM

## 2017-04-05 DIAGNOSIS — E83.51 HYPOCALCEMIA: Primary | ICD-10-CM

## 2017-04-05 DIAGNOSIS — E83.51 HYPOCALCEMIA: ICD-10-CM

## 2017-04-05 LAB
ALBUMIN SERPL-MCNC: 3.7 G/DL (ref 3.4–5)
CALCIUM SERPL-MCNC: 8.4 MG/DL (ref 8.5–10.1)
HBA1C MFR BLD: 5.7 % (ref 4.3–6)
MAGNESIUM SERPL-MCNC: 2.2 MG/DL (ref 1.6–2.3)
PHOSPHATE SERPL-MCNC: 2.7 MG/DL (ref 2.5–4.5)
PTH-INTACT SERPL-MCNC: 60 PG/ML (ref 12–72)

## 2017-04-05 PROCEDURE — 82306 VITAMIN D 25 HYDROXY: CPT | Performed by: INTERNAL MEDICINE

## 2017-04-05 PROCEDURE — 83970 ASSAY OF PARATHORMONE: CPT | Performed by: INTERNAL MEDICINE

## 2017-04-05 RX ORDER — IBUPROFEN 200 MG
CAPSULE ORAL
Qty: 60 TABLET | Refills: 11 | Status: SHIPPED | OUTPATIENT
Start: 2017-04-05 | End: 2018-01-31

## 2017-04-05 ASSESSMENT — PAIN SCALES - GENERAL: PAINLEVEL: NO PAIN (0)

## 2017-04-05 NOTE — PATIENT INSTRUCTIONS
Dr. Chou will follow up with results of lab tests.  He will let you know whether to start the calcium pills or not.

## 2017-04-05 NOTE — LETTER
4/5/2017       RE: Lux Velasco  [address] [facility]   [place] [address]     Dear Colleague,    Thank you for referring your patient, Lux Velasco, to the [facility] ENDOCRINOLOGY at [facility]. Please see a copy of my visit note below.    DIAGNOSIS:  A 71-year-old man with primary hyperparathyroidism, status post recent parathyroidectomy of a 1.02 gram parathyroid adenoma.      HISTORY OF PRESENT ILLNESS:  Mr. Velasco is here for a visit.  I had seen him in the past, initially for what proved to be mild type 2 diabetes and then subsequently for primary hyperparathyroidism.  It has been about 2-1/2 years since I have seen him in clinic.  My understanding was he was getting in his care and followup for his endocrine issues with the [facility].      He is here today at the suggestion of Dr. Duffy, who sees him here in our Primary Care Clinic.  The patient had a recent parathyroidectomy of a single parathyroid adenoma done by Dr. Short on 03/21.  It was the same day outpatient procedure.      Subsequent to that, the patient has been having a variety of complaints including cramping in the left hand.  He is also complaining of constipation and says that he cannot sleep.  He is hoping we may have some assistance or answers for him.      I reviewed his history regarding the hyperparathyroidism.  He had known hyperparathyroidism which had been followed for a number of years.  It appears that it was initially diagnosed around 2002.  The patient has always had a number of ongoing non-specific complaints and it was not entirely clear whether which, if any of his complaints, may have been related to his hypercalcemia and hyperparathyroidism.      During the time we followed him, we felt that his calcium levels, although elevated, were not in a range that would necessitate surgery.  He had no evidence of renal damage.  He had a DEXA in the past that had been normal.   Our recommendation when we saw him last was to continue to follow things more conservatively.      It appears in the interim that he has had a progressive increase in his calcium levels with levels rising about 11.  Finally, he did come here to the [place] saw Dr. Short and underwent surgical treatment on 03/21 as noted above.      The patient reports that yesterday he noticed some cramping in the left hand.  Apparently this has not recurred today.  He is not having any perioral numbness or tingling.  He did not note any cramping today when the nurse checked his blood pressure.      He was discharged with vitamin D 1000 units a day after his surgery which he is taking.  He is not on any calcium supplement.  He has some milk in his diet with cereal in the morning.      He complains of constipation which seems to have predated his surgery, he was given a brief course of opiate pain medications postoperatively, but he says he stopped them in part because of concerns for constipation.  He says he has been taking several therapies for the constipation, I see he has prescriptions for Senokot in our records, he has also been using what sounds like over-the-counter fiber.      He complains of difficulty sleeping, it is not clear how long this has been going on.      Finally, he is complaining of some continued pain in the neck from his surgery.  It appears to be improving.  It now primarily if he extends the neck.      He was prescribed Metformin 1000 mg twice a day for type 2 diabetes.  [facility]  He said recently he changed to 1000 mg once a day because he felt that might be playing a role in his constipation and hand cramping.  He does not recall a recent hemoglobin A1c, so we did one in clinic today and it was quite good at 5.7%.      His other medications include Losartan 50 mg daily for blood pressure control.  He has omeprazole listed in his medications in our record.  He has a  prescription for Norco, but he says he has stopped taking this.      REVIEW OF SYSTEMS:  As above.      PHYSICAL EXAMINATION:  He appears anxious but well, weight was 255 pounds.  The last time we saw him in 08/2014 his weight was 236 pounds.  Pulse 80, blood pressure 148/77.  Chvostek sign was negative.  We applied a blood pressure cuff to his arm and trousseau was negative.  I did not palpate the neck because of his recent  surgery, but the neck  appears clean with no signs of infection.  Deep tendon reflexes are trace with normal relaxation phase.      LABORATORY:  We reviewed his records related to the recent events.  Preoperatively in February he had a calcium in our system of 10.6, PTH level in March preoperatively was 236.  A PTH postoperatively done on 03/30 was 20, calcium level postoperatively on 03/27 was 8, a recheck on 03/31 was 8.4.  Creatinine on 03/30 was 0.77.  Last vitamin D we have in our system was in 08/2014 at a value of 17.      ASSESSMENT:  A 71-year-old man who has had primary hyperparathyroidism which has been followed conservatively in the past.  The patient appears to have had a progressive increase in his calcium levels and underwent parathyroid surgery with removal of a single parathyroid adenoma.      Postoperatively, the patient's PTH has normalized and his calcium is coming down into the mildly low range.  He has had some recent symptoms of possible tetany, although his exam is unremarkable today for obvious signs of hypocalcemia.      The patient has a number of complaints which I do not think are related to his calcium status.      I discussed the above with Mr. Velasco.  Certainly we should recheck his calcium level, we can check a PTH at the same time.  He may need to go on a calcium supplement in addition to his vitamin D.      With regard to his diabetes, it appears to be very well controlled.  I recommend he go back on his full dose of metformin 1000 mg twice a day and as if  anything this may be helpful in terms of his constipation, it certainly will not make it worse.      [facility] so [facility] would [facility] of his calcium status postoperatively.      PLAN:   1.  Check calcium, PTH level today, we will also check a vitamin D.   2.  Continue the Vitamin D 1000 units once a day.   3.  We gave him a prescription for Calcium carbonate 500 mg tablets to take 2 tablets once a day.  I told him to keep the prescription and not fill it until we get the results of laboratory tests and then we would be in touch with him as to whether he should proceed with calcium supplementation or not.   4.  Reassured him that his diabetes is in quite good control, recommended he go ahead and go back on his metformin 1000 mg twice a day.   5.  I recommend follow up with his primary clinic either [facility] regarding his issues of [facility], difficulty sleeping.      I have not scheduled him back to see me as I think he can be followed through his other clinics.  We will follow up with him regarding the laboratory tests we are doing today and whether he should start a calcium supplement.         Again, thank you for allowing me to participate in the care of your patient.      Sincerely,    Marc Chou MD

## 2017-04-05 NOTE — NURSING NOTE
Chief Complaint   Patient presents with     RECHECK     Hyperparathyroidism      Nessa Branham CMA

## 2017-04-05 NOTE — MR AVS SNAPSHOT
After Visit Summary   4/5/2017    Lux Velasco    MRN: [id number]           Patient Information     Date Of Birth          1945        Visit Information        Provider Department      4/5/2017 10:00 AM Marc Chou MD [facility]        Today's Diagnoses     Hypocalcemia    -  1      Care Instructions    Dr. Chou will follow up with results of lab tests.  He will let you know whether to start the calcium pills or not.        Follow-ups after your visit        Follow-up notes from your care team     Return will fu with pt after labs.      Your next 10 appointments already scheduled     Apr 05, 2017 10:30 AM CDT   LAB with  LAB   [facility] ([facility])    [address] [address] [phone]           Patient must bring picture ID.  Patient should be prepared to give a urine specimen  Please do not eat 10-12 hours before your appointment if you are coming in fasting for labs on lipids, cholesterol, or glucose (sugar).  Pregnant women should follow their Care Team instructions. Water with medications is okay. Do not drink coffee or other fluids.   If you have concerns about taking  your medications, please ask at office or if scheduling via [facility], send a message by clicking on Secure Messaging, Message Your Care Team.            Apr 17, 2017 12:00 PM CDT   (Arrive by 11:45 AM)   NEW BENIGN PROSTATIC HYPERTROPHY with Pawan Denney DO   [facility] ([facility])    [address] [address] [phone]            Apr 17, 2017  1:45 PM CDT   (Arrive by 1:30 PM)   Return Visit with Julianna Short MD   [facility] ([facility])    [address] [address] [phone]            Apr 24, 2017  1:05 PM CDT   (Arrive by 12:50  PM)   Return Visit with Nam Duffy MD   [facility] ([facility])    [address] [place] [address] [phone]            May 12, 2017  1:30 PM CDT   (Arrive by 1:15 PM)   New Patient Visit with Mikey Tamayo MD   [facility] ([facility])    [address] [place] [address] [phone]              Future tests that were ordered for you today     Open Future Orders        Priority Expected Expires Ordered    25 Hydroxyvitamin D2 and D3 Routine  4/5/2018 4/5/2017    Albumin level Routine  4/5/2018 4/5/2017    Calcium Routine  4/5/2018 4/5/2017    Parathyroid Hormone Intact Routine  4/5/2018 4/5/2017    Phosphorus Routine  4/5/2018 4/5/2017    Magnesium Routine  4/5/2018 4/5/2017            Who to contact     Please call your clinic at [phone] to:    Ask questions about your health    Make or cancel appointments    Discuss your medicines    Learn about your test results    Speak to your doctor   If you have compliments or concerns about an experience at your clinic, or if you wish to file a complaint, please contact [facility] Patient Relations at [phone] or email us at [email or web address]         Additional Information About Your Visit        Lee[employer]art Information     [employer] is an electronic gateway that provides easy, online access to your medical records. With [email or web address], you can request a clinic appointment, read your test results, renew a prescription or communicate with your care team.     To sign up for [employer] visit the website at [email or web address]   You will be asked to enter the access code listed below, as well as some personal information. Please follow the directions to create your username and password.     Your access code is: [email or web address]  Expires: 5/14/2017  1:47 PM     Your access code  "will  in 90 days. If you need help or a new code, please contact your [facility] or call [phone] for assistance.        Care EveryWhere ID     This is your Care EveryWhere ID. This could be used by other organizations to access your [facility] medical records  [id number]        Your Vitals Were     Pulse Height BMI (Body Mass Index)             80 1.803 m (5' 11\") 35.69 kg/m2          Blood Pressure from Last 3 Encounters:   17 148/77   17 159/81   17 144/81    Weight from Last 3 Encounters:   17 116.1 kg (255 lb 14.4 oz)   17 108.9 kg (240 lb 1.6 oz)   17 120.3 kg (265 lb 4.8 oz)                 Today's Medication Changes          These changes are accurate as of: 17 10:25 AM.  If you have any questions, ask your nurse or doctor.               Start taking these medicines.        Dose/Directions    calcium carbonate 1250 (500 CA) MG Tabs tablet   Commonly known as:  OSCAL 500   Used for:  Hypocalcemia   Started by:  Marc Chou MD        Take 2 tablets once a day with food.   Quantity:  60 tablet   Refills:  11         These medicines have changed or have updated prescriptions.        Dose/Directions    losartan 50 MG tablet   Commonly known as:  COZAAR   This may have changed:  when to take this   Used for:  Benign essential hypertension        Dose:  50 mg   Take 1 tablet (50 mg) by mouth daily   Quantity:  30 tablet   Refills:  3            Where to get your medicines      Some of these will need a paper prescription and others can be bought over the counter.  Ask your nurse if you have questions.     Bring a paper prescription for each of these medications     calcium carbonate 1250 (500 CA) MG Tabs tablet                Primary Care Provider Office Phone # Fax #    Nam Duffy [phone]       [facility] [address] [address]        Thank you!     Thank you for " choosing [facility] ENDOCRINOLOGY  for your care. Our goal is always to provide you with excellent care. Hearing back from our patients is one way we can continue to improve our services. Please take a few minutes to complete the written survey that you may receive in the mail after your visit with us. Thank you!             Your Updated Medication List - Protect others around you: Learn how to safely use, store and throw away your medicines at www.disposemymeds.org.          This list is accurate as of: 4/5/17 10:25 AM.  Always use your most recent med list.                   Brand Name Dispense Instructions for use    acetaminophen 500 MG Caps      Take 2 tablets by mouth daily.       acetic acid-hydrocortisone otic solution    ACETASOL HC    10 mL    Place 4 drops into both ears 2 times daily.       ammonium lactate 12 % cream    AMLACTIN     Apply  topically daily.       artificial tears Soln      Use one drop to both eye PRN.       calcium carbonate 1250 (500 CA) MG Tabs tablet    OSCAL 500    60 tablet    Take 2 tablets once a day with food.       FLUTICASONE PROPIONATE NA      Spray 50 mcg in nostril       guaiFENesin-codeine 100-10 MG/5ML Soln solution    ROBITUSSIN AC    420 mL    Take 5-10 mLs by mouth every 4 hours as needed for cough       HYDROcodone-acetaminophen 5-325 MG per tablet    NORCO    20 tablet    Take 1-2 tablets by mouth every 4 hours as needed for other (Moderate to Severe Pain)       losartan 50 MG tablet    COZAAR    30 tablet    Take 1 tablet (50 mg) by mouth daily       METFORMIN HCL PO      Take by mouth 2 times daily (with meals)       omeprazole 20 MG CR capsule    priLOSEC     Take 20 mg by mouth daily.       senna-docusate 8.6-50 MG per tablet    SENOKOT-S;PERICOLACE    10 tablet    Take 1-2 tablets by mouth 2 times daily as needed for constipation Take while on oral narcotics to prevent or treat constipation.       VITAMIN D (CHOLECALCIFEROL) PO      Take 1,000 Units by mouth every  morning

## 2017-04-05 NOTE — PROGRESS NOTES
DIAGNOSIS:  A 71-year-old man with primary hyperparathyroidism, status post recent parathyroidectomy of a 1.02 gram parathyroid adenoma.      HISTORY OF PRESENT ILLNESS:  Mr. Velasco is here for a visit.  I had seen him in the past, initially for what proved to be mild type 2 diabetes and then subsequently for primary hyperparathyroidism.  It has been about 2-1/2 years since I have seen him in clinic.  My understanding was he was getting in his care and followup for his endocrine issues with the [facility].      He is here today at the suggestion of Dr. Duffy, who sees him here in our Primary Care Clinic.  The patient had a recent parathyroidectomy of a single parathyroid adenoma done by Dr. Short on 03/21.  It was the same day outpatient procedure.      Subsequent to that, the patient has been having a variety of complaints including cramping in the left hand.  He is also complaining of constipation and says that he cannot sleep.  He is hoping we may have some assistance or answers for him.      I reviewed his history regarding the hyperparathyroidism.  He had known hyperparathyroidism which had been followed for a number of years.  It appears that it was initially diagnosed around 2002.  The patient has always had a number of ongoing non-specific complaints and it was not entirely clear whether which, if any of his complaints, may have been related to his hypercalcemia and hyperparathyroidism.      During the time we followed him, we felt that his calcium levels, although elevated, were not in a range that would necessitate surgery.  He had no evidence of renal damage.  He had a DEXA in the past that had been normal.  Our recommendation when we saw him last was to continue to follow things more conservatively.      It appears in the interim that he has had a progressive increase in his calcium levels with levels rising about 11.  Finally, he did come here to the [place] saw Dr. Short and  underwent surgical treatment on 03/21 as noted above.      The patient reports that yesterday he noticed some cramping in the left hand.  Apparently this has not recurred today.  He is not having any perioral numbness or tingling.  He did not note any cramping today when the nurse checked his blood pressure.      He was discharged with vitamin D 1000 units a day after his surgery which he is taking.  He is not on any calcium supplement.  He has some milk in his diet with cereal in the morning.      He complains of constipation which seems to have predated his surgery, he was given a brief course of opiate pain medications postoperatively, but he says he stopped them in part because of concerns for constipation.  He says he has been taking several therapies for the constipation, I see he has prescriptions for Senokot in our records, he has also been using what sounds like over-the-counter fiber.      He complains of difficulty sleeping, it is not clear how long this has been going on.      Finally, he is complaining of some continued pain in the neck from his surgery.  It appears to be improving.  It now primarily if he extends the neck.      He was prescribed Metformin 1000 mg twice a day for type 2 diabetes.  [facility]  He said recently he changed to 1000 mg once a day because he felt that might be playing a role in his constipation and hand cramping.  He does not recall a recent hemoglobin A1c, so we did one in clinic today and it was quite good at 5.7%.      His other medications include Losartan 50 mg daily for blood pressure control.  He has omeprazole listed in his medications in our record.  He has a prescription for Norco, but he says he has stopped taking this.      REVIEW OF SYSTEMS:  As above.      PHYSICAL EXAMINATION:  He appears anxious but well, weight was 255 pounds.  The last time we saw him in 08/2014 his weight was 236 pounds.  Pulse 80, blood pressure 148/77.  Chvostek sign  was negative.  We applied a blood pressure cuff to his arm and trousseau was negative.  I did not palpate the neck because of his recent  surgery, but the neck  appears clean with no signs of infection.  Deep tendon reflexes are trace with normal relaxation phase.      LABORATORY:  We reviewed his records related to the recent events.  Preoperatively in February he had a calcium in our system of 10.6, PTH level in March preoperatively was 236.  A PTH postoperatively done on 03/30 was 20, calcium level postoperatively on 03/27 was 8, a recheck on 03/31 was 8.4.  Creatinine on 03/30 was 0.77.  Last vitamin D we have in our system was in 08/2014 at a value of 17.      ASSESSMENT:  A 71-year-old man who has had primary hyperparathyroidism which has been followed conservatively in the past.  The patient appears to have had a progressive increase in his calcium levels and underwent parathyroid surgery with removal of a single parathyroid adenoma.      Postoperatively, the patient's PTH has normalized and his calcium is coming down into the mildly low range.  He has had some recent symptoms of possible tetany, although his exam is unremarkable today for obvious signs of hypocalcemia.      The patient has a number of complaints which I do not think are related to his calcium status.      I discussed the above with Mr. Velasco.  Certainly we should recheck his calcium level, we can check a PTH at the same time.  He may need to go on a calcium supplement in addition to his vitamin D.      With regard to his diabetes, it appears to be very well controlled.  I recommend he go back on his full dose of metformin 1000 mg twice a day and as if anything this may be helpful in terms of his constipation, it certainly will not make it worse.      [facility] so I would [facility] of his calcium status postoperatively.      PLAN:   1.   Check calcium, PTH level today, we will also check a vitamin D.   2.  Continue the Vitamin D 1000 units once a day.   3.  We gave him a prescription for Calcium carbonate 500 mg tablets to take 2 tablets once a day.  I told him to keep the prescription and not fill it until we get the results of laboratory tests and then we would be in touch with him as to whether he should proceed with calcium supplementation or not.   4.  Reassured him that his diabetes is in quite good control, recommended he go ahead and go back on his metformin 1000 mg twice a day.   5.  I recommend follow up with his primary clinic either with [facility] his issues of [facility], difficulty sleeping.      I have not scheduled him back to see me as I think he can be followed through his other clinics.  We will follow up with him regarding the laboratory tests we are doing today and whether he should start a calcium supplement.

## 2017-04-07 ENCOUNTER — TELEPHONE (OUTPATIENT)
Dept: INTERNAL MEDICINE | Facility: CLINIC | Age: 72
End: 2017-04-07

## 2017-04-07 DIAGNOSIS — D50.0 IRON DEFICIENCY ANEMIA DUE TO CHRONIC BLOOD LOSS: Primary | ICD-10-CM

## 2017-04-07 LAB
DEPRECATED CALCIDIOL+CALCIFEROL SERPL-MC: NORMAL UG/L (ref 20–75)
VITAMIN D2 SERPL-MCNC: <5 UG/L
VITAMIN D3 SERPL-MCNC: 19 UG/L

## 2017-04-07 NOTE — TELEPHONE ENCOUNTER
Sent pt. Results letter future labs for mild anemia and MCV  84      Dear Lux;    Your labs tests are attached and I have a few suggests:    (1) I recommend you keep your 4/17/17 scheduled ENT follow up with Dr. Short    (2) I recommend  You keep your 4/24/17 scheduled follow up with me    (3) Your hemoglobin is down a little and I would like to check some additional lab tests when you see me on 4/24/17. I placed future lab orders for this today and you can call [phone] to schedule this appointment.    JIM Duffy MD

## 2017-04-17 ENCOUNTER — OFFICE VISIT (OUTPATIENT)
Dept: UROLOGY | Facility: CLINIC | Age: 72
End: 2017-04-17

## 2017-04-17 ENCOUNTER — OFFICE VISIT (OUTPATIENT)
Dept: OTOLARYNGOLOGY | Facility: CLINIC | Age: 72
End: 2017-04-17

## 2017-04-17 VITALS — BODY MASS INDEX: 36.94 KG/M2 | WEIGHT: 258 LBS | HEIGHT: 70 IN

## 2017-04-17 DIAGNOSIS — Z98.890 S/P PARATHYROIDECTOMY: Primary | ICD-10-CM

## 2017-04-17 DIAGNOSIS — Z90.89 S/P PARATHYROIDECTOMY: Primary | ICD-10-CM

## 2017-04-17 DIAGNOSIS — D50.0 IRON DEFICIENCY ANEMIA DUE TO CHRONIC BLOOD LOSS: ICD-10-CM

## 2017-04-17 DIAGNOSIS — Z98.890 S/P PARATHYROIDECTOMY: ICD-10-CM

## 2017-04-17 DIAGNOSIS — Z90.89 S/P PARATHYROIDECTOMY: ICD-10-CM

## 2017-04-17 DIAGNOSIS — R39.9 LOWER URINARY TRACT SYMPTOMS (LUTS): Primary | ICD-10-CM

## 2017-04-17 LAB
BASOPHILS # BLD AUTO: 0 10E9/L (ref 0–0.2)
BASOPHILS NFR BLD AUTO: 0.7 %
CALCIUM SERPL-MCNC: 9 MG/DL (ref 8.5–10.1)
DIFFERENTIAL METHOD BLD: ABNORMAL
EOSINOPHIL # BLD AUTO: 0.2 10E9/L (ref 0–0.7)
EOSINOPHIL NFR BLD AUTO: 4.1 %
ERYTHROCYTE [DISTWIDTH] IN BLOOD BY AUTOMATED COUNT: 13.7 % (ref 10–15)
FERRITIN SERPL-MCNC: 328 NG/ML (ref 26–388)
FOLATE SERPL-MCNC: 19.7 NG/ML
HCT VFR BLD AUTO: 40.6 % (ref 40–53)
HGB BLD-MCNC: 13.1 G/DL (ref 13.3–17.7)
IMM GRANULOCYTES # BLD: 0 10E9/L (ref 0–0.4)
IMM GRANULOCYTES NFR BLD: 0.2 %
IRON SATN MFR SERPL: 27 % (ref 15–46)
IRON SERPL-MCNC: 83 UG/DL (ref 35–180)
LYMPHOCYTES # BLD AUTO: 1.9 10E9/L (ref 0.8–5.3)
LYMPHOCYTES NFR BLD AUTO: 31.4 %
MCH RBC QN AUTO: 27.6 PG (ref 26.5–33)
MCHC RBC AUTO-ENTMCNC: 32.3 G/DL (ref 31.5–36.5)
MCV RBC AUTO: 86 FL (ref 78–100)
MONOCYTES # BLD AUTO: 0.4 10E9/L (ref 0–1.3)
MONOCYTES NFR BLD AUTO: 6.4 %
NEUTROPHILS # BLD AUTO: 3.4 10E9/L (ref 1.6–8.3)
NEUTROPHILS NFR BLD AUTO: 57.2 %
NRBC # BLD AUTO: 0 10*3/UL
NRBC BLD AUTO-RTO: 0 /100
PLATELET # BLD AUTO: 170 10E9/L (ref 150–450)
PTH-INTACT SERPL-MCNC: 34 PG/ML (ref 12–72)
RBC # BLD AUTO: 4.74 10E12/L (ref 4.4–5.9)
TIBC SERPL-MCNC: 306 UG/DL (ref 240–430)
VIT B12 SERPL-MCNC: 382 PG/ML (ref 193–986)
WBC # BLD AUTO: 5.9 10E9/L (ref 4–11)

## 2017-04-17 PROCEDURE — 83970 ASSAY OF PARATHORMONE: CPT | Performed by: SURGERY

## 2017-04-17 PROCEDURE — 82306 VITAMIN D 25 HYDROXY: CPT | Performed by: SURGERY

## 2017-04-17 PROCEDURE — 82746 ASSAY OF FOLIC ACID SERUM: CPT | Performed by: SURGERY

## 2017-04-17 RX ORDER — ALFUZOSIN HYDROCHLORIDE 10 MG/1
10 TABLET, EXTENDED RELEASE ORAL DAILY
Qty: 90 TABLET | Refills: 1 | Status: SHIPPED | OUTPATIENT
Start: 2017-04-17 | End: 2017-07-10

## 2017-04-17 ASSESSMENT — PAIN SCALES - GENERAL: PAINLEVEL: MILD PAIN (3)

## 2017-04-17 NOTE — MR AVS SNAPSHOT
After Visit Summary   4/17/2017    Lux Velasco    MRN: [id number]           Patient Information     Date Of Birth          1945        Visit Information        Provider Department      4/17/2017 12:00 PM Pawan Denney DO [facility] Urology and [facility] for Prostate and Urologic Cancers        Today's Diagnoses     Lower urinary tract symptoms (LUTS)    -  1      Care Instructions    Follow up with Sofiya CHIU on 7/17/17 at 1230 pm arrive to the visit with a full badder for urine test    It was a pleasure meeting with you today.  Thank you for allowing me and my team the privilege of caring for you today.  YOU are the reason we are here, and I truly hope we provided you with the excellent service you deserve.  Please let us know if there is anything else we can do for you so that we can be sure you are leaving completely satisfied with your care experience.                Follow-ups after your visit        Follow-up notes from your care team     Return in about 3 months (around 7/17/2017).      Your next 10 appointments already scheduled     Apr 17, 2017  1:45 PM CDT   (Arrive by 1:30 PM)   Return Visit with Julianna Short MD   [facility] Ear Nose and Throat ([facility])    [address] [place] [address]   [phone]            Apr 24, 2017  1:05 PM CDT   (Arrive by 12:50 PM)   Return Visit with Nam Duffy MD   [facility] Primary [facility] Clinic ([facility])    [address] [place] [address]   [phone]            May 12, 2017  1:30 PM CDT   (Arrive by 1:15 PM)   New Patient Visit with Mikey Tamayo MD   [facility] Ear Nose and Throat ([facility])    [address] [place] [address]   [phone]            Jul 17, 2017 12:30 PM CDT   (Arrive by 12:15 PM)   Return Visit with Sofiya Contreras PA-C   [facility] Urology  and [facility] for Prostate and Urologic Cancers ([facility])    [address]  [address] [facility] [address] [phone]              Who to contact     Please call your clinic at [phone] to:    Ask questions about your health    Make or cancel appointments    Discuss your medicines    Learn about your test results    Speak to your doctor   If you have compliments or concerns about an experience at your clinic, or if you wish to file a complaint, please contact [facility] Patient Relations at [phone] or email us at [email or web address]         Additional Information About Your Visit        [employer]ingo Information     Wizpertt is an electronic gateway that provides easy, online access to your medical records. With Skillset, you can request a clinic appointment, read your test results, renew a prescription or communicate with your care team.     To sign up for Wizpertt visit the website at [email or web address]   You will be asked to enter the access code listed below, as well as some personal information. Please follow the directions to create your username and password.     Your access code is: [email or web address]  Expires: 2017  1:47 PM     Your access code will  in 90 days. If you need help or a new code, please contact your [facility] or call [phone] for assistance.        Care EveryWhere ID     This is your Care EveryWhere ID. This could be used by other organizations to access your [place] medical records  [id number]         Blood Pressure from Last 3 Encounters:   17 148/77   17 159/81   17 144/81    Weight from Last 3 Encounters:   17 116.1 kg (255 lb 14.4 oz)   17 108.9 kg (240 lb 1.6 oz)   17 120.3 kg (265 lb 4.8 oz)              We Performed the Following     POST-VOID RESIDUAL BLADDER SCAN          Today's Medication Changes          These  changes are accurate as of: 4/17/17 12:35 PM.  If you have any questions, ask your nurse or doctor.               Start taking these medicines.        Dose/Directions    alfuzosin 10 MG 24 hr tablet   Commonly known as:  UROXATRAL   Used for:  Lower urinary tract symptoms (LUTS)   Started by:  Pawan Denney DO        Dose:  10 mg   Take 1 tablet (10 mg) by mouth daily   Quantity:  90 tablet   Refills:  1         These medicines have changed or have updated prescriptions.        Dose/Directions    losartan 50 MG tablet   Commonly known as:  COZAAR   This may have changed:  when to take this   Used for:  Benign essential hypertension        Dose:  50 mg   Take 1 tablet (50 mg) by mouth daily   Quantity:  30 tablet   Refills:  3            Where to get your medicines      Some of these will need a paper prescription and others can be bought over the counter.  Ask your nurse if you have questions.     Bring a paper prescription for each of these medications     alfuzosin 10 MG 24 hr tablet                Primary Care Provider Office Phone # Fax #    Nam Duffy [phone]       [address] [address]        Thank you!     Thank you for choosing [facility] UROLOGY AND [facility] FOR PROSTATE AND UROLOGIC CANCERS  for your care. Our goal is always to provide you with excellent care. Hearing back from our patients is one way we can continue to improve our services. Please take a few minutes to complete the written survey that you may receive in the mail after your visit with us. Thank you!             Your Updated Medication List - Protect others around you: Learn how to safely use, store and throw away your medicines at www.disposemymeds.org.          This list is accurate as of: 4/17/17 12:35 PM.  Always use your most recent med list.                   Brand Name Dispense Instructions for use    acetaminophen 500 MG Caps      Take 2 tablets by mouth daily.        acetic acid-hydrocortisone otic solution    ACETASOL HC    10 mL    Place 4 drops into both ears 2 times daily.       alfuzosin 10 MG 24 hr tablet    UROXATRAL    90 tablet    Take 1 tablet (10 mg) by mouth daily       ammonium lactate 12 % cream    AMLACTIN     Apply  topically daily.       artificial tears Soln      Use one drop to both eye PRN.       calcium carbonate 1250 (500 CA) MG Tabs tablet    OSCAL 500    60 tablet    Take 2 tablets once a day with food.       FINASTERIDE PO          FLUTICASONE PROPIONATE NA      Spray 50 mcg in nostril       guaiFENesin-codeine 100-10 MG/5ML Soln solution    ROBITUSSIN AC    420 mL    Take 5-10 mLs by mouth every 4 hours as needed for cough       HYDROcodone-acetaminophen 5-325 MG per tablet    NORCO    20 tablet    Take 1-2 tablets by mouth every 4 hours as needed for other (Moderate to Severe Pain)       losartan 50 MG tablet    COZAAR    30 tablet    Take 1 tablet (50 mg) by mouth daily       METFORMIN HCL PO      Take by mouth 2 times daily (with meals)       omeprazole 20 MG CR capsule    priLOSEC     Take 20 mg by mouth daily.       senna-docusate 8.6-50 MG per tablet    SENOKOT-S;PERICOLACE    10 tablet    Take 1-2 tablets by mouth 2 times daily as needed for constipation Take while on oral narcotics to prevent or treat constipation.       VITAMIN D (CHOLECALCIFEROL) PO      Take 1,000 Units by mouth every morning

## 2017-04-17 NOTE — MR AVS SNAPSHOT
After Visit Summary   4/17/2017    Lux Velasco    MRN: [id number]           Patient Information     Date Of Birth          1945        Visit Information        Provider Department      4/17/2017 1:45 PM Julianna Short MD [facility] Ear Nose and Throat        Today's Diagnoses     S/P parathyroidectomy (H)    -  1      Care Instructions    Follow up is on an as needed basis. For any questions or concerns please call the RN care coordinator.  Kushal Muller RN  [phone]            Follow-ups after your visit        Your next 10 appointments already scheduled     May 22, 2017  3:05 PM CDT   (Arrive by 2:50 PM)   Return Visit with Nam Duffy MD   [facility] Primary Care Clinic ([facility])    [address] [place] [id number] [phone]            Jul 06, 2017  9:00 AM CDT   (Arrive by 8:45 AM)   New Patient Visit with Mikey Tamayo MD   [facility] Ear Nose and Throat ([facility])    [address] [place] [id number] [phone]            Jul 10, 2017  1:00 PM CDT   (Arrive by 12:45 PM)   Return Visit with Sofiya Contreras PA-C   [facility] Urology and Inst for Prostate and Urologic Cancers ([facility])    [address] [place] [id number] [phone]            Aug 03, 2017  1:00 PM CDT   FULL PULMONARY FUNCTION with  PFL A   [facility] Pulmonary Function Testing ([facility])    [address] [place] [id number] [phone]            Aug 03, 2017  2:20 PM CDT   (Arrive by 2:05 PM)   New Patient Visit with Krysta Goddard MD   [facility] ([facility])    [address] [place] [id number] [phone]              Who to contact     Please call your clinic at [phone]  "to:    Ask questions about your health    Make or cancel appointments    Discuss your medicines    Learn about your test results    Speak to your doctor   If you have compliments or concerns about an experience at your clinic, or if you wish to file a complaint, please contact [facility] Patient Relations at [phone] or email us at [email or web address]         Additional Information About Your Visit        [employer] Services Information     Digitwhiz is an electronic gateway that provides easy, online access to your medical records. With Digitwhiz, you can request a clinic appointment, read your test results, renew a prescription or communicate with your care team.     To sign up for Digitwhiz visit the website at [email or web address]   You will be asked to enter the access code listed below, as well as some personal information. Please follow the directions to create your username and password.     Your access code is: [email or web address]  Expires: 2017  1:47 PM     Your access code will  in 90 days. If you need help or a new code, please contact your [facility] or call [phone] for assistance.        Care EveryWhere ID     This is your Care EveryWhere ID. This could be used by other organizations to access your [place] medical records  [id number]        Your Vitals Were     Height BMI (Body Mass Index)                1.79 m (5' 10.47\") 36.52 kg/m2           Blood Pressure from Last 3 Encounters:   17 161/83   17 151/82   17 148/77    Weight from Last 3 Encounters:   17 117 kg (258 lb)   17 117 kg (258 lb)   17 116.1 kg (255 lb 14.4 oz)              We Performed the Following     Calcium     CBC with platelets differential     Ferritin     Folate     Iron and iron binding capacity     Parathyroid Hormone Intact     Vitamin B12     Vitamin D Deficiency          Today's Medication Changes          These " changes are accurate as of: 4/17/17 11:59 PM.  If you have any questions, ask your nurse or doctor.               Start taking these medicines.        Dose/Directions    alfuzosin 10 MG 24 hr tablet   Commonly known as:  UROXATRAL   Used for:  Lower urinary tract symptoms (LUTS)   Started by:  Pawan Denney DO        Dose:  10 mg   Take 1 tablet (10 mg) by mouth daily   Quantity:  90 tablet   Refills:  1         These medicines have changed or have updated prescriptions.        Dose/Directions    losartan 50 MG tablet   Commonly known as:  COZAAR   This may have changed:  when to take this   Used for:  Benign essential hypertension        Dose:  50 mg   Take 1 tablet (50 mg) by mouth daily   Quantity:  30 tablet   Refills:  3            Where to get your medicines      Some of these will need a paper prescription and others can be bought over the counter.  Ask your nurse if you have questions.     Bring a paper prescription for each of these medications     alfuzosin 10 MG 24 hr tablet                Primary Care Provider Office Phone # Fax #    Nam Duffy [phone]       [address] [address]        Thank you!     Thank you for choosing [facility] EAR NOSE AND THROAT  for your care. Our goal is always to provide you with excellent care. Hearing back from our patients is one way we can continue to improve our services. Please take a few minutes to complete the written survey that you may receive in the mail after your visit with us. Thank you!             Your Updated Medication List - Protect others around you: Learn how to safely use, store and throw away your medicines at www.disposemymeds.org.          This list is accurate as of: 4/17/17 11:59 PM.  Always use your most recent med list.                   Brand Name Dispense Instructions for use    acetaminophen 500 MG Caps      Take 2 tablets by mouth daily.       acetic acid-hydrocortisone  otic solution    ACETASOL HC    10 mL    Place 4 drops into both ears 2 times daily.       alfuzosin 10 MG 24 hr tablet    UROXATRAL    90 tablet    Take 1 tablet (10 mg) by mouth daily       ammonium lactate 12 % cream    AMLACTIN     Apply  topically daily.       artificial tears Soln      Use one drop to both eye PRN.       calcium carbonate 1250 (500 CA) MG Tabs tablet    OSCAL 500    60 tablet    Take 2 tablets once a day with food.       FINASTERIDE PO          FLUTICASONE PROPIONATE NA      Spray 50 mcg in nostril       guaiFENesin-codeine 100-10 MG/5ML Soln solution    ROBITUSSIN AC    420 mL    Take 5-10 mLs by mouth every 4 hours as needed for cough       HYDROcodone-acetaminophen 5-325 MG per tablet    NORCO    20 tablet    Take 1-2 tablets by mouth every 4 hours as needed for other (Moderate to Severe Pain)       losartan 50 MG tablet    COZAAR    30 tablet    Take 1 tablet (50 mg) by mouth daily       METFORMIN HCL PO      Take by mouth 2 times daily (with meals)       omeprazole 20 MG CR capsule    priLOSEC     Take 20 mg by mouth daily.       senna-docusate 8.6-50 MG per tablet    SENOKOT-S;PERICOLACE    10 tablet    Take 1-2 tablets by mouth 2 times daily as needed for constipation Take while on oral narcotics to prevent or treat constipation.       VITAMIN D (CHOLECALCIFEROL) PO      Take 1,000 Units by mouth every morning

## 2017-04-17 NOTE — PATIENT INSTRUCTIONS
Follow up with Sofiya CHIU on 7/17/17 at 1230 pm arrive to the visit with a full badder for urine test    It was a pleasure meeting with you today.  Thank you for allowing me and my team the privilege of caring for you today.  YOU are the reason we are here, and I truly hope we provided you with the excellent service you deserve.  Please let us know if there is anything else we can do for you so that we can be sure you are leaving completely satisfied with your care experience.

## 2017-04-17 NOTE — NURSING NOTE
Chief Complaint   Patient presents with     Consult     BPH     pvr was obtained via ultrasound    Turner SPENCE

## 2017-04-17 NOTE — LETTER
4/17/2017       RE: Lux Velasco  [address]   [facility] [address]     Dear Colleague,    Thank you for referring your patient, Lux Velasco, to the [facility] UROLOGY AND INST FOR PROSTATE AND UROLOGIC CANCERS at [facility]. Please see a copy of my visit note below.    Urology Consultation  Name: Lux Velasco    MRN: [id number]   YOB: 1945                 Chief Complaint:   LUTS          History of Present Illness:   Mr. Lux Velasco is a 71 year old male seen in consultation for LUTS.    He has been on Finasteride for 3-4 weeks, he was also on tamsulosin, but stopped if after a week as he feels it increased his frequency.    Currently he voids ever 1-3 hours. He does have urgency with daily urge urinary urinary incontinence requiring a pad. Nocturia 2-3x/night (sometimes he wakes up to void, other times he wakes up for another reason such as anxiety and ends up having to void). In order to have a normal nights sleep, he does take cough medicine.     The most bothersome symptom he has is a slow, prolonged stream with post void residual.    Today his PVR was 40 cc.    He is also a bit constipated, noting small caliber stools. He had a colonoscopy in 2015, that was normal.           Past Medical History:     Past Medical History:   Diagnosis Date     Diabetes mellitus type II 2005     History of agent Orange exposure 4/17/2013     Myocardial infarct (H) 01/01/1999     Primary hyperparathyroidism (H) Dx approx 2003     Stroke (H) 01/01/1998    recovered fully            Past Surgical History:     Past Surgical History:   Procedure Laterality Date     BIOPSY OF SKIN LESION       BYPASS GRAFT ARTERY CORONARY       MOHS MICROGRAPHIC PROCEDURE       PARATHYROIDECTOMY N/A 3/21/2017    Procedure: PARATHYROIDECTOMY;  Surgeon: Julianna Short MD;  Location: [facility] OR     PARATHYROIDECTOMY              Social History:     Social History   Substance Use  Topics     Smoking status: Former Smoker     Quit date: 1970     Smokeless tobacco: Former User     Alcohol use Yes      Comment: rarely            Family History:     Family History   Problem Relation Age of Onset     Melanoma Mother      Melanoma Father      CANCER No family hx of      no skin cancer     Glaucoma No family hx of      Macular Degeneration No family hx of             Allergies:     Allergies   Allergen Reactions     Eggs Other (See Comments)     Pt states no longer having reaction, childhood allergy, eats eggs       Penicillins Other (See Comments)     Pt states PCN allergy is due to egg allergy     Propoxyphene Other (See Comments)     Pain            Medications:     Current Outpatient Prescriptions   Medication Sig     FINASTERIDE PO      calcium carbonate (OSCAL 500) 1250 (500 CA) MG TABS tablet Take 2 tablets once a day with food.     HYDROcodone-acetaminophen (NORCO) 5-325 MG per tablet Take 1-2 tablets by mouth every 4 hours as needed for other (Moderate to Severe Pain)     senna-docusate (SENOKOT-S;PERICOLACE) 8.6-50 MG per tablet Take 1-2 tablets by mouth 2 times daily as needed for constipation Take while on oral narcotics to prevent or treat constipation.     guaiFENesin-codeine (ROBITUSSIN AC) 100-10 MG/5ML SOLN solution Take 5-10 mLs by mouth every 4 hours as needed for cough     FLUTICASONE PROPIONATE NA Spray 50 mcg in nostril     losartan (COZAAR) 50 MG tablet Take 1 tablet (50 mg) by mouth daily (Patient taking differently: Take 50 mg by mouth every morning )     VITAMIN D, CHOLECALCIFEROL, PO Take 1,000 Units by mouth every morning      METFORMIN HCL PO Take by mouth 2 times daily (with meals)     acetaminophen 500 MG CAPS Take 2 tablets by mouth daily.     acetic acid-hydrocortisone (ACETASOL HC) otic solution Place 4 drops into both ears 2 times daily.     ammonium lactate (AMLACTIN) 12 % cream Apply  topically daily.     artificial tears SOLN Use one drop to both eye PRN.      "omeprazole (PRILOSEC) 20 MG capsule Take 20 mg by mouth daily.     No current facility-administered medications for this visit.              Review of Systems:    ROS: 10 point ROS neg other than the symptoms noted above in the HPI and PMH.          Physical Exam:   B/P: Data Unavailable, T: Data Unavailable, P: Data Unavailable, R: Data Unavailable  Estimated body mass index is 35.69 kg/(m^2) as calculated from the following:    Height as of 4/5/17: 1.803 m (5' 11\").    Weight as of 4/5/17: 116.1 kg (255 lb 14.4 oz).  General: age-appropriate appearing male in NAD, generally anxious/nervous  HEENT: Head AT/NC  Resp: unlabored, no audible wheezes  Back: bony spine is non-tender, flanks are nontender  Abdomen: moderate obesity , soft, non-distended, non-tender.   LE: warm and well perfused, no edema.   Neuro: grossly intact  Motor: excellent strength throughout  Skin: warm and dry       Labs:    All laboratory data reviewed with patient  none      Imaging:    All imaging reviewed with patient.  Significant for none      Outside records:    I spent 15 minutes reviewing outside records.           Assessment and Plan:   Mr. Lux Velasco is a 71 year old man with LUTS    -Finasteride maximal efficacy does take about 6 months for appropriate gland consolidation  -Tamsulosin not well tolerated, as such will try Uroxatrol  -If not improved with combo alpha blocker and finasteride after 6 months and PVR < 150, can add anticholinergic or Mirabegron, or consider TRUS to evaluate prostate size for pre-operative planning for TURP/HoLEP.      F/U 3 months with Sofiya Contreras or Shelbi Ordonez      I spent 20 minutes with the patient discussing the above mentioned findings and reviewing the assessment and plan, greater than 50% of which was spent on counseling and coordinating care. All questions were answered to the patients satisfaction.    Pawan Denney DO  Fellow/Instructor  Department of Urology  April 17, 2017       "

## 2017-04-17 NOTE — PROGRESS NOTES
Urology Consultation  Name: Lux Velasco    MRN: [id number]   YOB: 1945                 Chief Complaint:   LUTS          History of Present Illness:   Mr. Lux Velasco is a 71 year old male seen in consultation for LUTS.    He has been on Finasteride for 3-4 weeks, he was also on tamsulosin, but stopped if after a week as he feels it increased his frequency.    Currently he voids ever 1-3 hours. He does have urgency with daily urge urinary urinary incontinence requiring a pad. Nocturia 2-3x/night (sometimes he wakes up to void, other times he wakes up for another reason such as anxiety and ends up having to void). In order to have a normal nights sleep, he does take cough medicine.     The most bothersome symptom he has is a slow, prolonged stream with post void residual.    Today his PVR was 40 cc.    He is also a bit constipated, noting small caliber stools. He had a colonoscopy in 2015, that was normal.           Past Medical History:     Past Medical History:   Diagnosis Date     Diabetes mellitus type II 2005     History of agent Orange exposure 4/17/2013     Myocardial infarct (H) 01/01/1999     Primary hyperparathyroidism (H) Dx approx 2003     Stroke (H) 01/01/1998    recovered fully            Past Surgical History:     Past Surgical History:   Procedure Laterality Date     BIOPSY OF SKIN LESION       BYPASS GRAFT ARTERY CORONARY       MOHS MICROGRAPHIC PROCEDURE       PARATHYROIDECTOMY N/A 3/21/2017    Procedure: PARATHYROIDECTOMY;  Surgeon: Julianna Short MD;  [facility]     PARATHYROIDECTOMY              Social History:     Social History   Substance Use Topics     Smoking status: Former Smoker     Quit date: 1970     Smokeless tobacco: Former User     Alcohol use Yes      Comment: rarely            Family History:     Family History   Problem Relation Age of Onset     Melanoma Mother      Melanoma Father      CANCER No family hx of      no skin cancer     Glaucoma No family hx  of      Macular Degeneration No family hx of             Allergies:     Allergies   Allergen Reactions     Eggs Other (See Comments)     Pt states no longer having reaction, childhood allergy, eats eggs       Penicillins Other (See Comments)     Pt states PCN allergy is due to egg allergy     Propoxyphene Other (See Comments)     Pain            Medications:     Current Outpatient Prescriptions   Medication Sig     FINASTERIDE PO      calcium carbonate (OSCAL 500) 1250 (500 CA) MG TABS tablet Take 2 tablets once a day with food.     HYDROcodone-acetaminophen (NORCO) 5-325 MG per tablet Take 1-2 tablets by mouth every 4 hours as needed for other (Moderate to Severe Pain)     senna-docusate (SENOKOT-S;PERICOLACE) 8.6-50 MG per tablet Take 1-2 tablets by mouth 2 times daily as needed for constipation Take while on oral narcotics to prevent or treat constipation.     guaiFENesin-codeine (ROBITUSSIN AC) 100-10 MG/5ML SOLN solution Take 5-10 mLs by mouth every 4 hours as needed for cough     FLUTICASONE PROPIONATE NA Spray 50 mcg in nostril     losartan (COZAAR) 50 MG tablet Take 1 tablet (50 mg) by mouth daily (Patient taking differently: Take 50 mg by mouth every morning )     VITAMIN D, CHOLECALCIFEROL, PO Take 1,000 Units by mouth every morning      METFORMIN HCL PO Take by mouth 2 times daily (with meals)     acetaminophen 500 MG CAPS Take 2 tablets by mouth daily.     acetic acid-hydrocortisone (ACETASOL HC) otic solution Place 4 drops into both ears 2 times daily.     ammonium lactate (AMLACTIN) 12 % cream Apply  topically daily.     artificial tears SOLN Use one drop to both eye PRN.     omeprazole (PRILOSEC) 20 MG capsule Take 20 mg by mouth daily.     No current facility-administered medications for this visit.              Review of Systems:    ROS: 10 point ROS neg other than the symptoms noted above in the HPI and PMH.          Physical Exam:   B/P: Data Unavailable, T: Data Unavailable, P: Data Unavailable,  "R: Data Unavailable  Estimated body mass index is 35.69 kg/(m^2) as calculated from the following:    Height as of 4/5/17: 1.803 m (5' 11\").    Weight as of 4/5/17: 116.1 kg (255 lb 14.4 oz).  General: age-appropriate appearing male in NAD, generally anxious/nervous  HEENT: Head AT/NC  Resp: unlabored, no audible wheezes  Back: bony spine is non-tender, flanks are nontender  Abdomen: moderate obesity , soft, non-distended, non-tender.   LE: warm and well perfused, no edema.   Neuro: grossly intact  Motor: excellent strength throughout  Skin: warm and dry       Labs:    All laboratory data reviewed with patient  none      Imaging:    All imaging reviewed with patient.  Significant for none      Outside records:    I spent 15 minutes reviewing outside records.           Assessment and Plan:   Mr. Lux Velasco is a 71 year old man with LUTS    -Finasteride maximal efficacy does take about 6 months for appropriate gland consolidation  -Tamsulosin not well tolerated, as such will try Uroxatrol  -If not improved with combo alpha blocker and finasteride after 6 months and PVR < 150, can add anticholinergic or Mirabegron, or consider TRUS to evaluate prostate size for pre-operative planning for TURP/HoLEP.      F/U 3 months with Sofiya Contreras or Shelbi Ordonez      I spent 20 minutes with the patient discussing the above mentioned findings and reviewing the assessment and plan, greater than 50% of which was spent on counseling and coordinating care. All questions were answered to the patients satisfaction.    Pawan Denney DO  Fellow/Instructor  Department of Urology  April 17, 2017         "

## 2017-04-17 NOTE — PATIENT INSTRUCTIONS
Follow up is on an as needed basis. For any questions or concerns please call the RN care coordinator.  Kushal Muller RN  [phone]

## 2017-04-17 NOTE — LETTER
2017       RE: Mandy Garcia  [address]   [facility] [address]     Dear Colleague,    Thank you for referring your patient, Mandy Garcia, to the [facility] EAR NOSE AND THROAT at [facility]. Please see a copy of my visit note below.    This is a 71-year-old gentleman who underwent a neck exploration, resection of right inferior parathyroid adenoma.  His postoperative calcium at 6 days postop was 8.0.  He was taking 2 Tums 3 times a day, however, has not taken any Tums or vitamin D recently.  He has had no problems with voice quality, inspiration or swallowing.  No symptoms of hypocalcemia.      PHYSICAL EXAMINATION:  His wound is healing well.  The Dermabond was removed and the sutures were clipped at the skin edges.  There is no evidence of hematoma, seroma or infection.      The pathology was reviewed with the patient.      ASSESSMENT:  Followup status post resection of parathyroid adenoma.      PLAN:  Today, I will check a calcium, vitamin D and parathyroid hormone.  However, based on the last calcium from , I would expect that it is likely normalized.  The patient will follow up with his primary care physician hereafter.         HERNANDEZ HERNANDEZ MD         D: 2017 11:27   T: 2017 20:46   MT: FELIPE   Name:     MANDY GARCIA   MRN:      [id number]        Account:      [id number]   :      1945           Service Date: 2017   Document: [id number]

## 2017-04-18 LAB — DEPRECATED CALCIDIOL+CALCIFEROL SERPL-MC: 21 UG/L (ref 20–75)

## 2017-04-22 NOTE — PROGRESS NOTES
This is a 71-year-old gentleman who underwent a neck exploration, resection of right inferior parathyroid adenoma.  His postoperative calcium at 6 days postop was 8.0.  He was taking 2 Tums 3 times a day, however, has not taken any Tums or vitamin D recently.  He has had no problems with voice quality, inspiration or swallowing.  No symptoms of hypocalcemia.      PHYSICAL EXAMINATION:  His wound is healing well.  The Dermabond was removed and the sutures were clipped at the skin edges.  There is no evidence of hematoma, seroma or infection.      The pathology was reviewed with the patient.      ASSESSMENT:  Followup status post resection of parathyroid adenoma.      PLAN:  Today, I will check a calcium, vitamin D and parathyroid hormone.  However, based on the last calcium from , I would expect that it is likely normalized.  The patient will follow up with his primary care physician hereafter.         HERNANDEZ HERNANDEZ MD             D: 2017 11:27   T: 2017 20:46   MT: FELIPE      Name:     MANDY GARCIA   MRN:      [id number]        Account:      [id number]   :      1945           Service Date: 2017      Document: [id number]

## 2017-04-24 ENCOUNTER — TELEPHONE (OUTPATIENT)
Dept: NURSING | Facility: CLINIC | Age: 72
End: 2017-04-24

## 2017-04-24 ENCOUNTER — OFFICE VISIT (OUTPATIENT)
Dept: INTERNAL MEDICINE | Facility: CLINIC | Age: 72
End: 2017-04-24

## 2017-04-24 VITALS
HEART RATE: 89 BPM | SYSTOLIC BLOOD PRESSURE: 151 MMHG | BODY MASS INDEX: 36.52 KG/M2 | RESPIRATION RATE: 20 BRPM | WEIGHT: 258 LBS | OXYGEN SATURATION: 97 % | DIASTOLIC BLOOD PRESSURE: 82 MMHG | TEMPERATURE: 99 F

## 2017-04-24 DIAGNOSIS — R05.9 COUGH: Primary | ICD-10-CM

## 2017-04-24 RX ORDER — AZITHROMYCIN 250 MG/1
250 TABLET, FILM COATED ORAL DAILY
Qty: 6 TABLET | Refills: 1 | Status: SHIPPED | OUTPATIENT
Start: 2017-04-24 | End: 2017-05-02

## 2017-04-24 ASSESSMENT — PAIN SCALES - GENERAL: PAINLEVEL: MODERATE PAIN (4)

## 2017-04-24 NOTE — NURSING NOTE
"Chief Complaint   Patient presents with     Surgical Followup     Follow up thyroidectomy (3/21/17)     URI     Has had URI \"quite awhile but it's getting worse.Coughing alot\". coughing up yellow sputum,can't sleep   Anamaria Butler LPN 1:26 PM on 4/24/2017  "

## 2017-04-24 NOTE — TELEPHONE ENCOUNTER
Call Type: Triage Call    Presenting Problem: Lux states he started on Amoxicillin for a sinus  infection. He is asking if it is ok to take Tylenol in addition to  the antibiotic. Advised that it is.  Triage Note:  Guideline Title: Medication Questions - Adult  Recommended Disposition: Provide Health Information  Original Inclination: Wanted to speak with a nurse  Override Disposition:  Intended Action: Follow advice given  Physician Contacted: No  Caller has medication question(s) that was answered with available resources ?  YES  Pregnant and has medication questions regarding prescribed and/or nonprescribed  medication(s) not covered by available resources ? NO  Breastfeeding and has medication questions regarding prescribed and/or  nonprescribed medication(s) not covered by available resources ? NO  Requested information on how to safely dispose of  or unused medications ?  NO  Sign(s) or symptom(s) associated with a diagnosed condition or with a new illness  ? NO  Prescription ordered today and not available at pharmacy putting patient at  clinical risk ? NO  Recurrence of a symptom(s) or illness post prescribed medication treatment AND  provider instructed patient to call if symptom(s) returned. ? NO  Unable to obtain prescribed medication related to available resources AND  situation poses immediate clinical risk ? NO  Pharmacy calling to clarify prescription order. ? NO  Requests refill of prescribed medication that does NOT have a valid refill; lack  of medication may cause clinical risk to patient if not available. ? NO  Has questions about prescribed and/or nonprescribed medications not covered by  available resources ? NO  Pharmacy calling with prescription question; answered per department policy. ? NO  Requests refill of prescribed medication without valid refills OR requests refill  of prescribed medication with valid refills but does not have prescription number  (no RX container); lack of  medication does not put patient at clinical risk ? NO  Physician Instructions:  Care Advice:

## 2017-04-24 NOTE — MR AVS SNAPSHOT
After Visit Summary   4/24/2017    Lux Velasco    MRN: [id number]           Patient Information     Date Of Birth          1945        Visit Information        Provider Department      4/24/2017 1:05 PM Nam Duffy MD [facility]        Today's Diagnoses     Cough    -  1      Care Instructions    Primary Care Center Medication Refill Request Information:  * Please contact your pharmacy regarding ANY request for medication refills.  ** [facility] Prescription Fax = [phone]  * Please allow 3 business days for routine medication refills.  * Please allow 5 business days for controlled substance medication refills.     Primary Care Center Test Result notification information:  *You will be notified with in 7-10 days of your appointment day regarding the results of your test.  If you are on MyChart you will be notified as soon as the provider has reviewed the results and signed off on them.        Follow-ups after your visit        Your next 10 appointments already scheduled     Apr 24, 2017  2:25 PM CDT   (Arrive by 2:10 PM)   XR CHEST 2 VIEWS with [facility]1   [facility] Xray ([facility])    [address] [place] [address] [phone]           Please bring a list of your current medicines to your exam. (Include vitamins, minerals and over-thecounter medicines.) Leave your valuables at home.  Tell your doctor if there is a chance you may be pregnant.  You do not need to do anything special for this exam.            Apr 24, 2017  3:40 PM CDT   CT MAXILLOFACIAL W/O CONTRAST with UCCT2   [facility] CT ([facility])    [address] [address] [phone]           Please bring any scans or X-rays taken at other hospitals, if similar tests were done. Also bring a list of your medicines, including vitamins, minerals and over-the-counter drugs. It  is safest to leave personal items at home.  Be sure to tell your doctor:   If you have any allergies.   If there s any chance you are pregnant.   If you are breastfeeding.   If you have any special needs.  You do not need to do anything special to prepare.  Please wear loose clothing, such as a sweat suit or jogging clothes. Avoid snaps, zippers and other metal. We may ask you to undress and put on a hospital gown.            May 02, 2017  7:35 AM CDT   (Arrive by 7:20 AM)   Return Visit with Nam Duffy MD   [facility] ([facility])    [address] [place] [id number] [phone]            May 12, 2017  1:30 PM CDT   (Arrive by 1:15 PM)   New Patient Visit with Mikey Tamayo MD   [facility] Ear Nose and Throat ([facility])    [address] [place] [id number] [phone]            Jul 17, 2017 12:30 PM CDT   (Arrive by 12:15 PM)   Return Visit with Sofiya Contreras PA-C   [facility] Urology and [facility] ([facility])    [address] [place] [id number] [phone]              Who to contact     Please call your clinic at [phone] to:    Ask questions about your health    Make or cancel appointments    Discuss your medicines    Learn about your test results    Speak to your doctor   If you have compliments or concerns about an experience at your clinic, or if you wish to file a complaint, please contact [facility] Patient Relations at [phone] or email us at [email or web address]         Additional Information About Your Visit        [employer] Information     [employer] is an electronic gateway that provides easy, online access to your medical records. With [employer], you can request a clinic appointment, read your test results, renew a prescription or  communicate with your care team.     To sign up for [employer] visit the website at [email or web address]   You will be asked to enter the access code listed below, as well as some personal information. Please follow the directions to create your username and password.     Your access code is: [email or web address]  Expires: 2017  1:47 PM     Your access code will  in 90 days. If you need help or a new code, please contact your [facility] or call [phone] for assistance.        Care EveryWhere ID     This is your Care EveryWhere ID. This could be used by other organizations to access your [facility] medical records  [id number]        Your Vitals Were     Pulse Temperature Respirations Pulse Oximetry BMI (Body Mass Index)       89 99  F (37.2  C) (Oral) 20 97% 36.52 kg/m2        Blood Pressure from Last 3 Encounters:   17 151/82   17 148/77   17 159/81    Weight from Last 3 Encounters:   17 117 kg (258 lb)   17 117 kg (258 lb)   17 116.1 kg (255 lb 14.4 oz)              We Performed the Following     CT Maxillofacial w/o contrast (CT Sinus)     XR Chest 2 Views          Today's Medication Changes          These changes are accurate as of: 17  2:19 PM.  If you have any questions, ask your nurse or doctor.               Start taking these medicines.        Dose/Directions    azithromycin 250 MG tablet   Commonly known as:  ZITHROMAX   Used for:  Cough   Started by:  Nam Duffy MD        Dose:  250 mg   Take 1 tablet (250 mg) by mouth daily   Quantity:  6 tablet   Refills:  1         These medicines have changed or have updated prescriptions.        Dose/Directions    losartan 50 MG tablet   Commonly known as:  COZAAR   This may have changed:  when to take this   Used for:  Benign essential hypertension        Dose:  50 mg   Take 1 tablet (50 mg) by mouth daily   Quantity:  30 tablet   Refills:  3            Where to get your  medicines      These medications were sent to [facility] [place], [place] - [address]  [address], [facility] [address]    Hours:  TRANSPLANT PHONE NUMBER [phone] Phone:  [phone]     azithromycin 250 MG tablet                Primary Care Provider Office Phone # Fax #    Nam Duffy [phone]       [facility] [address] [facility] [address]        Thank you!     Thank you for choosing [facility]  for your care. Our goal is always to provide you with excellent care. Hearing back from our patients is one way we can continue to improve our services. Please take a few minutes to complete the written survey that you may receive in the mail after your visit with us. Thank you!             Your Updated Medication List - Protect others around you: Learn how to safely use, store and throw away your medicines at www.disposemymeds.org.          This list is accurate as of: 4/24/17  2:19 PM.  Always use your most recent med list.                   Brand Name Dispense Instructions for use    acetaminophen 500 MG Caps      Take 2 tablets by mouth daily.       acetic acid-hydrocortisone otic solution    ACETASOL HC    10 mL    Place 4 drops into both ears 2 times daily.       alfuzosin 10 MG 24 hr tablet    UROXATRAL    90 tablet    Take 1 tablet (10 mg) by mouth daily       ammonium lactate 12 % cream    AMLACTIN     Apply  topically daily.       amoxicillin-clavulanate 875-125 MG per tablet    AUGMENTIN     Take 1 tablet by mouth 2 times daily       artificial tears Soln      Use one drop to both eye PRN.       azithromycin 250 MG tablet    ZITHROMAX    6 tablet    Take 1 tablet (250 mg) by mouth daily       calcium carbonate 1250 (500 CA) MG Tabs tablet    OSCAL 500    60 tablet    Take 2 tablets once a day with food.       FINASTERIDE PO          FLUTICASONE PROPIONATE NA      Spray 50 mcg in  nostril       guaiFENesin-codeine 100-10 MG/5ML Soln solution    ROBITUSSIN AC    420 mL    Take 5-10 mLs by mouth every 4 hours as needed for cough       HYDROcodone-acetaminophen 5-325 MG per tablet    NORCO    20 tablet    Take 1-2 tablets by mouth every 4 hours as needed for other (Moderate to Severe Pain)       losartan 50 MG tablet    COZAAR    30 tablet    Take 1 tablet (50 mg) by mouth daily       METFORMIN HCL PO      Take by mouth 2 times daily (with meals)       omeprazole 20 MG CR capsule    priLOSEC     Take 20 mg by mouth daily.       senna-docusate 8.6-50 MG per tablet    SENOKOT-S;PERICOLACE    10 tablet    Take 1-2 tablets by mouth 2 times daily as needed for constipation Take while on oral narcotics to prevent or treat constipation.       VITAMIN D (CHOLECALCIFEROL) PO      Take 1,000 Units by mouth every morning

## 2017-04-24 NOTE — LETTER
Patient:  Lux Velasco  :   1945  MRN:     [id number]        Mr. Lux Velasco  [address]   [place] [facility] [address]        May 1, 2017    Dear Mr. Velasco,    Thank you for choosing the [facility] for your healthcare needs.  We appreciate the opportunity to serve you.    The following are your recent test results.     Dear Lux;     Your sinus CT scan does not show acute sinus disease. Please follow up with me as scheduled.     Results for orders placed or performed in visit on 17   XR Chest 2 Views    Narrative    EXAMINATION: XR CHEST 2 VW  2017 2:33 PM      CLINICAL HISTORY: Cough     COMPARISON: 2017        FINDINGS:  PA and lateral views of the chest. Cardiac silhouette within normal  limits. Left greater than right bibasilar linear opacities. No pleural  effusion or pneumothorax. Partially visualized upper abdomen is  unremarkable. Low lung volumes.        Impression    IMPRESSION:  Left greater than right bibasilar linear opacities most favoring  atelectasis in the setting of low lung volumes.    I have personally reviewed the examination and initial interpretation  and I agree with the findings.    MIKE MOSQUEDA MD   CT Maxillofacial w/o contrast (CT Sinus)    Narrative    CT MAXILLOFACIAL W/O CONTRAST 2017 2:39 PM    Provided History: Cough     Comparison: None     Technique:  Using thin collimation multidetector helical acquisition  technique, axial, coronal, and sagittal thin section CT images were  reconstructed through the paranasal sinuses. Images were reviewed in  bone and soft tissue windows.    Findings:   Maxillary sinuses: Mild bilateral mucosal thickening, primarily at the  inferior aspect.  Sphenoid sinus: clear.  Frontal sinus: Minimal mucosal thickening along the inferior aspect.  Ethmoid air cells: Minimal mucosal thickening along left anterior  ethmoid air cell.    The ostiomeatal units appear patent  bilaterally. The bony walls of the  paranasal sinuses are intact.    Normal retromaxillary and pterygopalatine fat.    The adenoid tonsils in the nasopharynx are unremarkable.     The visualized bones show no acute defects or suspicious focal  lesions. Minimal nasal septal deviation towards the left.      Impression    Impression:  Minimal inflammatory mucosal thickening along the floors  of the maxillary sinuses and to lesser extent along the inferior  aspect of the frontal sinus. No fluid levels to suggest acute  sinusitis.    I have personally reviewed the examination and initial interpretation  and I agree with the findings.    LULU HOLGUIN MD       Please contact your provider if you have any questions or concerns.  We look forward to serving your needs in the future.      Sincerely,  JIM Duffy

## 2017-04-24 NOTE — PROGRESS NOTES
HPI:    Pt. Comes in for follow up today. He had parathyroid surgery with Dr. Short 3/21/17 for hypercalcinemia. He was in the ED for pain 3/27/17. He was seen in Dermatology 11/28/16. He was seen in ortho for R knee pain 11/30/16. He was seen in the ED 12/4/16 for increased BP.  No other HEENT, cardiopulmonary, abdominal, , neurological complaints. No F/C/NS. Overall he is doing better. No F/C/NS. Less pain. He is eating his normal diet.     PE:    Vitals noted gen nad cooperative alert,  HEENT, ears normal oropharynx clear no exudate, neck supple nl rom no adenopathy, he has healing horizontal scar in the lower neck, no tenderness, no bruising, no neck swelling,  LCTA, B, RRR, S1, S2, Grossly normal neurological exam.         A/P:    1. Follow up parathyroid surgery. He was seen by Dr. Chou, [place] 4/5/17 and by Dr. Short 4/21/17; labs stable  2. URI; he was given Amoxicillin at the [facility]; CXR and Sinus CT scan today and switched to Z-pack    I will see him back next week      Total time spent 25 minutes.  More than 50% of the time spent with Mr. Velasco on counseling / coordinating his care

## 2017-04-24 NOTE — PATIENT INSTRUCTIONS
Primary Care Center Medication Refill Request Information:  * Please contact your pharmacy regarding ANY request for medication refills.  ** [facility] Prescription Fax = [phone]  * Please allow 3 business days for routine medication refills.  * Please allow 5 business days for controlled substance medication refills.     Primary Care Center Test Result notification information:  *You will be notified with in 7-10 days of your appointment day regarding the results of your test.  If you are on MyChart you will be notified as soon as the provider has reviewed the results and signed off on them.

## 2017-04-24 NOTE — LETTER
Patient:  Lux Velasco  :   1945  MRN:     [id number]        Mr. Lux Velasco  [address]   [facility] [address]        2017    Dear Mr. Velasco,    Thank you for choosing the [facility] for your healthcare needs.  We appreciate the opportunity to serve you.    The following are your recent test results.     Results for orders placed or performed in visit on 17   XR Chest 2 Views    Narrative    EXAMINATION: XR CHEST 2 VW  2017 2:33 PM      CLINICAL HISTORY: Cough     COMPARISON: 2017        FINDINGS:  PA and lateral views of the chest. Cardiac silhouette within normal  limits. Left greater than right bibasilar linear opacities. No pleural  effusion or pneumothorax. Partially visualized upper abdomen is  unremarkable. Low lung volumes.        Impression    IMPRESSION:  Left greater than right bibasilar linear opacities most favoring  atelectasis in the setting of low lung volumes.    I have personally reviewed the examination and initial interpretation  and I agree with the findings.    MIKE MOSQUEDA MD   CT Maxillofacial w/o contrast (CT Sinus)    Narrative    CT MAXILLOFACIAL W/O CONTRAST 2017 2:39 PM    Provided History: Cough     Comparison: None     Technique:  Using thin collimation multidetector helical acquisition  technique, axial, coronal, and sagittal thin section CT images were  reconstructed through the paranasal sinuses. Images were reviewed in  bone and soft tissue windows.    Findings:   Maxillary sinuses: Mild bilateral mucosal thickening, primarily at the  inferior aspect.  Sphenoid sinus: clear.  Frontal sinus: Minimal mucosal thickening along the inferior aspect.  Ethmoid air cells: Minimal mucosal thickening along left anterior  ethmoid air cell.    The ostiomeatal units appear patent bilaterally. The bony walls of the  paranasal sinuses are intact.    Normal retromaxillary and pterygopalatine  fat.    The adenoid tonsils in the nasopharynx are unremarkable.     The visualized bones show no acute defects or suspicious focal  lesions. Minimal nasal septal deviation towards the left.      Impression    Impression:  Minimal inflammatory mucosal thickening along the floors  of the maxillary sinuses and to lesser extent along the inferior  aspect of the frontal sinus. No fluid levels to suggest acute  sinusitis.    I have personally reviewed the examination and initial interpretation  and I agree with the findings.    LULU HOLGUIN MD       Your chest x-ray findings are stable.    I recommend you keep your follow up with me as scheduled.    Please contact us if you have any questions or concerns.  We look forward to serving your needs in the future.      Sincerely,    Nam Duffy MD/ky

## 2017-05-02 ENCOUNTER — OFFICE VISIT (OUTPATIENT)
Dept: INTERNAL MEDICINE | Facility: CLINIC | Age: 72
End: 2017-05-02

## 2017-05-02 VITALS
TEMPERATURE: 98.1 F | DIASTOLIC BLOOD PRESSURE: 83 MMHG | RESPIRATION RATE: 20 BRPM | SYSTOLIC BLOOD PRESSURE: 161 MMHG | HEART RATE: 70 BPM | OXYGEN SATURATION: 93 %

## 2017-05-02 DIAGNOSIS — I10 BENIGN ESSENTIAL HYPERTENSION: ICD-10-CM

## 2017-05-02 DIAGNOSIS — R03.0 ELEVATED BLOOD PRESSURE READING WITHOUT DIAGNOSIS OF HYPERTENSION: ICD-10-CM

## 2017-05-02 DIAGNOSIS — D50.0 IRON DEFICIENCY ANEMIA DUE TO CHRONIC BLOOD LOSS: ICD-10-CM

## 2017-05-02 DIAGNOSIS — R05.9 COUGH: ICD-10-CM

## 2017-05-02 DIAGNOSIS — R53.83 OTHER FATIGUE: ICD-10-CM

## 2017-05-02 DIAGNOSIS — R05.9 COUGH: Primary | ICD-10-CM

## 2017-05-02 DIAGNOSIS — I10 BENIGN ESSENTIAL HYPERTENSION: Primary | ICD-10-CM

## 2017-05-02 LAB
ALBUMIN SERPL-MCNC: 3.6 G/DL (ref 3.4–5)
ALP SERPL-CCNC: 92 U/L (ref 40–150)
ALT SERPL W P-5'-P-CCNC: 69 U/L (ref 0–70)
ANION GAP SERPL CALCULATED.3IONS-SCNC: 9 MMOL/L (ref 3–14)
AST SERPL W P-5'-P-CCNC: 40 U/L (ref 0–45)
BASOPHILS # BLD AUTO: 0 10E9/L (ref 0–0.2)
BASOPHILS NFR BLD AUTO: 0.4 %
BILIRUB SERPL-MCNC: 0.6 MG/DL (ref 0.2–1.3)
BUN SERPL-MCNC: 9 MG/DL (ref 7–30)
CALCIUM SERPL-MCNC: 8.4 MG/DL (ref 8.5–10.1)
CHLORIDE SERPL-SCNC: 107 MMOL/L (ref 94–109)
CO2 SERPL-SCNC: 26 MMOL/L (ref 20–32)
CREAT SERPL-MCNC: 0.71 MG/DL (ref 0.66–1.25)
DIFFERENTIAL METHOD BLD: ABNORMAL
EOSINOPHIL # BLD AUTO: 0.2 10E9/L (ref 0–0.7)
EOSINOPHIL NFR BLD AUTO: 3 %
ERYTHROCYTE [DISTWIDTH] IN BLOOD BY AUTOMATED COUNT: 13.2 % (ref 10–15)
FOLATE SERPL-MCNC: 20.4 NG/ML
GFR SERPL CREATININE-BSD FRML MDRD: ABNORMAL ML/MIN/1.7M2
GLUCOSE SERPL-MCNC: 108 MG/DL (ref 70–99)
HCT VFR BLD AUTO: 38.9 % (ref 40–53)
HGB BLD-MCNC: 12.7 G/DL (ref 13.3–17.7)
IMM GRANULOCYTES # BLD: 0 10E9/L (ref 0–0.4)
IMM GRANULOCYTES NFR BLD: 0.4 %
LYMPHOCYTES # BLD AUTO: 1.5 10E9/L (ref 0.8–5.3)
LYMPHOCYTES NFR BLD AUTO: 29.6 %
MCH RBC QN AUTO: 27.5 PG (ref 26.5–33)
MCHC RBC AUTO-ENTMCNC: 32.6 G/DL (ref 31.5–36.5)
MCV RBC AUTO: 84 FL (ref 78–100)
MONOCYTES # BLD AUTO: 0.4 10E9/L (ref 0–1.3)
MONOCYTES NFR BLD AUTO: 7.1 %
NEUTROPHILS # BLD AUTO: 2.9 10E9/L (ref 1.6–8.3)
NEUTROPHILS NFR BLD AUTO: 59.5 %
NRBC # BLD AUTO: 0 10*3/UL
NRBC BLD AUTO-RTO: 0 /100
PLATELET # BLD AUTO: 191 10E9/L (ref 150–450)
POTASSIUM SERPL-SCNC: 4 MMOL/L (ref 3.4–5.3)
PROT SERPL-MCNC: 7.3 G/DL (ref 6.8–8.8)
RBC # BLD AUTO: 4.62 10E12/L (ref 4.4–5.9)
SODIUM SERPL-SCNC: 142 MMOL/L (ref 133–144)
TROPONIN I SERPL-MCNC: NORMAL UG/L (ref 0–0.04)
TSH SERPL DL<=0.005 MIU/L-ACNC: 0.96 MU/L (ref 0.4–4)
WBC # BLD AUTO: 4.9 10E9/L (ref 4–11)

## 2017-05-02 PROCEDURE — 82746 ASSAY OF FOLIC ACID SERUM: CPT | Performed by: SURGERY

## 2017-05-02 ASSESSMENT — PAIN SCALES - GENERAL: PAINLEVEL: MODERATE PAIN (4)

## 2017-05-02 NOTE — NURSING NOTE
"Chief Complaint   Patient presents with     URI     \" I am still am sick. I have been sick for a couple of months\" Productive cough-yellow sputum,headache, body aches   Anamaria Butler LPN 7:50 AM on 5/2/2017  "

## 2017-05-02 NOTE — LETTER
Patient:  Lux Velasco  :   1945  MRN:     [id number]        Mr. Lux Velasco  [address]   [facility] [address]        May 5, 2017    Dear Mr. Velasco,    Thank you for choosing the [facility] for your healthcare needs.  We appreciate the opportunity to serve you.    The following are your recent test results.     Dear Lux;     Your CT scan shows some mild abnormalities and I recommend you see the Pulmonary doctors and I have placed a referral today and you can call [phone] to schedule this appointment.     Results for orders placed or performed in visit on 17   CT Chest w/o contrast    Narrative    EXAMINATION: CT CHEST W/O CONTRAST, 2017 8:56 AM    TECHNIQUE:  Helical CT images from the thoracic inlet through the lung  bases were obtained without IV contrast. Contrast dose: None    COMPARISON: 2017    HISTORY: Essential (primary) hypertension, Elevated blood-pressure  reading, without diagnosis of hypertension, Cough, Other fatigue    FINDINGS:    The central tracheobronchial tree is patent. Mild bronchial wall  thickening. Mild basilar predominant mosaic attenuation. Mild  bibasilar atelectasis. No pneumothorax or pleural effusion. No  suspicious pulmonary nodules or masses.    Heart size is normal. Normal caliber and configuration of the thoracic  great vessels. No thoracic adenopathy.    Small splenule in the splenic hilum. Partially visualized simple  appearing cyst in the superior pole of the left kidney. The upper  abdomen is otherwise within normal limits. No worrisome bony or soft  tissue lesions.      Impression    IMPRESSION: Mild bronchial wall thickening and basilar predominant  mosaic attenuation which can be seen in small airway disease.     I have personally reviewed the examination and initial interpretation  and I agree with the findings.    MIKE MOSQUEDA MD   EKG Performed in Clinic w/ Provider Reading  Fee   Result Value Ref Range    Interpretation ECG Click View Image link to view waveform and result        Please contact your provider if you have any questions or concerns.  We look forward to serving your needs in the future.      Sincerely,    JIM Duffy

## 2017-05-02 NOTE — MR AVS SNAPSHOT
After Visit Summary   5/2/2017    Lux Velasco    MRN: [id number]           Patient Information     Date Of Birth          1945        Visit Information        Provider Department      5/2/2017 7:35 AM Nam Duffy MD [facility]        Today's Diagnoses     Benign essential hypertension    -  1    Elevated blood pressure reading without diagnosis of hypertension         Cough        Other fatigue          Care Instructions    Primary Care Center Medication Refill Request Information:  * Please contact your pharmacy regarding ANY request for medication refills.  ** [facility] Prescription Fax = [phone]  * Please allow 3 business days for routine medication refills.  * Please allow 5 business days for controlled substance medication refills.     [facility] Center Test Result notification information:  *You will be notified with in 7-10 days of your appointment day regarding the results of your test.  If you are on MyChart you will be notified as soon as the provider has reviewed the results and signed off on them.    Primary Care Center [phone]         Follow-ups after your visit        Additional Services     PULMONARY MEDICINE REFERRAL       Chronic cough                  Your next 10 appointments already scheduled     May 02, 2017  9:20 AM CDT   (Arrive by 9:05 AM)   CT CHEST W/O CONTRAST with UCCT1   [facility] ([facility])    [address] [address] [phone]           Please bring any scans or X-rays taken at other hospitals, if similar tests were done. Also bring a list of your medicines, including vitamins, minerals and over-the-counter drugs. It is safest to leave personal items at home.  Be sure to tell your doctor:   If you have any allergies.   If there s any chance you are pregnant.   If you are breastfeeding.   If you have any special needs.  You do not need to do anything  special to prepare.  Please wear loose clothing, such as a sweat suit or jogging clothes. Avoid snaps, zippers and other metal. We may ask you to undress and put on a hospital gown.            May 12, 2017  1:30 PM CDT   (Arrive by 1:15 PM)   New Patient Visit with Mikey Tamayo MD   [facility] Nose [facility] Throat ([facility])    [address] [address]   [phone]            May 22, 2017  3:05 PM CDT   (Arrive by 2:50 PM)   Return Visit with Nam Duffy MD   [facility] ([facility])    [address] [place] [address]   [phone]            Jul 10, 2017  1:00 PM CDT   (Arrive by 12:45 PM)   Return Visit with Sofiya Contreras PA-C   [facility]rology [facility] Inst for Prostate and Urologic Cancers ([facility])    [address] [place] [address]   [phone]            Aug 03, 2017  1:00 PM CDT   FULL PULMONARY FUNCTION with  PFL A   [facility] Pulmonary Function Testing ([facility])    [address] [address]   [phone]            Aug 03, 2017  2:20 PM CDT   (Arrive by 2:05 PM)   New Patient Visit with Krysta Goddard MD   [facility] ([facility])    [address] [place] [address]   [phone]              Future tests that were ordered for you today     Open Future Orders        Priority Expected Expires Ordered    Pulmonary Function Test Routine  5/2/2018 5/2/2017    Troponin I Routine 5/2/2017 5/16/2017 5/2/2017    CBC with platelets differential Routine 5/2/2017 5/16/2017 5/2/2017    Comprehensive metabolic panel Routine 5/2/2017 5/2/2018 5/2/2017    TSH with free T4 reflex Routine 5/2/2017 5/2/2018 5/2/2017    24 Hour Blood Pressure Monitor - Adult Routine   2017            Who to contact     Please call your clinic at [phone] to:    Ask questions about your health    Make or cancel appointments    Discuss your medicines    Learn about your test results    Speak to your doctor   If you have compliments or concerns about an experience at your clinic, or if you wish to file a complaint, please contact [facility] Patient Relations at [phone] or email us at [email or web address]         Additional Information About Your Visit        [employer] Information     [employer] is an electronic gateway that provides easy, online access to your medical records. With [employer], you can request a clinic appointment, read your test results, renew a prescription or communicate with your care team.     To sign up for [employer] visit the website at [email or web address]   You will be asked to enter the access code listed below, as well as some personal information. Please follow the directions to create your username and password.     Your access code is: [email or web address]  Expires: 2017  1:47 PM     Your access code will  in 90 days. If you need help or a new code, please contact your [facility] or call [phone] for assistance.        Care EveryWhere ID     This is your Care EveryWhere ID. This could be used by other organizations to access your [place] medical records  [id number]        Your Vitals Were     Pulse Temperature Respirations Pulse Oximetry          70 98.1  F (36.7  C) 20 93%         Blood Pressure from Last 3 Encounters:   17 161/83   17 151/82   17 148/77    Weight from Last 3 Encounters:   17 117 kg (258 lb)   17 117 kg (258 lb)   17 116.1 kg (255 lb 14.4 oz)              We Performed the Following     CT Chest w/o contrast     EKG Performed in Clinic w/ Provider Reading Fee     PULMONARY MEDICINE REFERRAL          Today's Medication  Changes          These changes are accurate as of: 5/2/17  8:35 AM.  If you have any questions, ask your nurse or doctor.               These medicines have changed or have updated prescriptions.        Dose/Directions    losartan 50 MG tablet   Commonly known as:  COZAAR   This may have changed:  when to take this   Used for:  Benign essential hypertension        Dose:  50 mg   Take 1 tablet (50 mg) by mouth daily   Quantity:  30 tablet   Refills:  3                Primary Care Provider Office Phone # Fax #    Nam Duffy [phone]       [address] [address]        Thank you!     Thank you for choosing [facility]  for your care. Our goal is always to provide you with excellent care. Hearing back from our patients is one way we can continue to improve our services. Please take a few minutes to complete the written survey that you may receive in the mail after your visit with us. Thank you!             Your Updated Medication List - Protect others around you: Learn how to safely use, store and throw away your medicines at www.disposemymeds.org.          This list is accurate as of: 5/2/17  8:35 AM.  Always use your most recent med list.                   Brand Name Dispense Instructions for use    acetaminophen 500 MG Caps      Take 2 tablets by mouth daily.       acetic acid-hydrocortisone otic solution    ACETASOL HC    10 mL    Place 4 drops into both ears 2 times daily.       alfuzosin 10 MG 24 hr tablet    UROXATRAL    90 tablet    Take 1 tablet (10 mg) by mouth daily       ammonium lactate 12 % cream    AMLACTIN     Apply  topically daily.       amoxicillin-clavulanate 875-125 MG per tablet    AUGMENTIN     Take 1 tablet by mouth 2 times daily       artificial tears Soln      Use one drop to both eye PRN.       calcium carbonate 1250 (500 CA) MG Tabs tablet    OSCAL 500    60 tablet    Take 2 tablets once a day with food.        FINASTERIDE PO          FLUTICASONE PROPIONATE NA      Spray 50 mcg in nostril       guaiFENesin-codeine 100-10 MG/5ML Soln solution    ROBITUSSIN AC    420 mL    Take 5-10 mLs by mouth every 4 hours as needed for cough       HYDROcodone-acetaminophen 5-325 MG per tablet    NORCO    20 tablet    Take 1-2 tablets by mouth every 4 hours as needed for other (Moderate to Severe Pain)       losartan 50 MG tablet    COZAAR    30 tablet    Take 1 tablet (50 mg) by mouth daily       METFORMIN HCL PO      Take by mouth 2 times daily (with meals)       omeprazole 20 MG CR capsule    priLOSEC     Take 20 mg by mouth daily.       senna-docusate 8.6-50 MG per tablet    SENOKOT-S;PERICOLACE    10 tablet    Take 1-2 tablets by mouth 2 times daily as needed for constipation Take while on oral narcotics to prevent or treat constipation.       VITAMIN D (CHOLECALCIFEROL) PO      Take 1,000 Units by mouth every morning

## 2017-05-02 NOTE — PATIENT INSTRUCTIONS
[facility] Medication Refill Request Information:  * Please contact your pharmacy regarding ANY request for medication refills.  ** [facility] Prescription Fax = [phone]  * Please allow 3 business days for routine medication refills.  * Please allow 5 business days for controlled substance medication refills.     [facility] Test Result notification information:  *You will be notified with in 7-10 days of your appointment day regarding the results of your test.  If you are on MyChart you will be notified as soon as the provider has reviewed the results and signed off on them.    [facility] [phone]

## 2017-05-02 NOTE — PROGRESS NOTES
HPI:    Pt. Comes in for follow up today. He had parathyroid surgery with Dr. Short 3/21/17 for hypercalciemia. He was in the ED for pain 3/27/17. He was seen in Dermatology 11/28/16. He was seen in ortho for R knee pain 11/30/16. He was seen in the ED 12/4/16 for increased BP.  No other HEENT, cardiopulmonary, abdominal, , neurological complaints. No F/C/NS. Overall he is doing better. No F/C/NS. Less pain. He is eating his normal diet.     PE:    Vitals noted gen nad cooperative alert,  HEENT, ears normal oropharynx clear no exudate, neck supple nl rom no adenopathy, he has healing horizontal scar in the lower neck, no tenderness, no bruising, no neck swelling,  LCTA, B, RRR, S1, S2, Grossly normal neurological exam.     EKG: SR at 64; PAC. Chronic TWI III; less than 12/4/16. Flat T waves inferior leads; less than 12/4/16.    A/P:    1. Follow up parathyroid surgery. He was seen by Dr. Chou, Endo 4/5/17 and by Dr. Short 4/21/17; labs stable  2. URI; he was given Amoxicillin at the [facility]; CXR and Sinus CT scan today and switched to Z-pack. He had unremarkable sinus CT scan and CXR on 4/24/17. CT chest today; he is on Augmentin. He is has ENT follow up 5/12/17. Will get PFT's and have him see Pulmonary  3. PSA checked 12/16  4. Colonoscopy at [facility] 8/15 repeat in 10 years.   5. He has follow up 7/17 in Urology for BPH.  6. Increased BP; will get 24 hour BP monitor; he states he is taking his 50 mg Cozzar  7. Will check labs today as well as thyroid        Total time spent 40 minutes.  More than 50% of the time spent with Mr. Velasco on counseling / coordinating his care

## 2017-05-03 LAB — INTERPRETATION ECG - MUSE: NORMAL

## 2017-05-04 ENCOUNTER — PRE VISIT (OUTPATIENT)
Dept: OTOLARYNGOLOGY | Facility: CLINIC | Age: 72
End: 2017-05-04

## 2017-05-04 ENCOUNTER — TELEPHONE (OUTPATIENT)
Dept: INTERNAL MEDICINE | Facility: CLINIC | Age: 72
End: 2017-05-04

## 2017-05-04 DIAGNOSIS — D50.0 IRON DEFICIENCY ANEMIA DUE TO CHRONIC BLOOD LOSS: Primary | ICD-10-CM

## 2017-05-04 DIAGNOSIS — R05.9 COUGH: Primary | ICD-10-CM

## 2017-05-04 NOTE — TELEPHONE ENCOUNTER
1.  Date/reason for appt: 5/12/17 -- ear wax removal, last seen 9/12/13 with Dr. Js Epstein   2.  Referring provider: self  3.  Call to patient (Yes / No - short description): no, seen previously in ENT  4.  Previous care at / records requested from: [facility] -- records in Epic.  5.  Other: Per appt notes, pt has no outside records.

## 2017-05-04 NOTE — TELEPHONE ENCOUNTER
I sent pt. Results letter and placed pulmonary referral this encounter    Dear Lux;    Your CT scan shows some mild abnormalities and I recommend you see the Pulmonary doctors and I have placed a referral today and you can call [phone] to schedule this appointment    JIM Duffy

## 2017-05-04 NOTE — TELEPHONE ENCOUNTER
Sent Lux lab note and future labs this encounter        Dear Lux;    Your labs are stable. Please follow up with me as recommended. Your hemoglobin is a little on the lower side and I recommend just rechecking.    I placed a future laboratory order for this and you can get these done at our next visit on 5/22/17.    JIM Duffy

## 2017-05-17 ENCOUNTER — OFFICE VISIT (OUTPATIENT)
Dept: OPHTHALMOLOGY | Facility: CLINIC | Age: 72
End: 2017-05-17
Attending: OPHTHALMOLOGY
Payer: MEDICARE

## 2017-05-17 DIAGNOSIS — H01.01A ULCERATIVE BLEPHARITIS OF UPPER AND LOWER EYELIDS OF BOTH EYES: ICD-10-CM

## 2017-05-17 DIAGNOSIS — H01.01B ULCERATIVE BLEPHARITIS OF UPPER AND LOWER EYELIDS OF BOTH EYES: ICD-10-CM

## 2017-05-17 DIAGNOSIS — Z96.1 PSEUDOPHAKIA OF BOTH EYES: ICD-10-CM

## 2017-05-17 DIAGNOSIS — H04.123 DRY EYE SYNDROME, BILATERAL: Primary | ICD-10-CM

## 2017-05-17 PROCEDURE — 99213 OFFICE O/P EST LOW 20 MIN: CPT | Mod: ZF

## 2017-05-17 RX ORDER — AZITHROMYCIN 250 MG/1
250 TABLET, FILM COATED ORAL EVERY OTHER DAY
Qty: 36 TABLET | Refills: 1 | Status: SHIPPED | OUTPATIENT
Start: 2017-05-17 | End: 2017-07-10

## 2017-05-17 ASSESSMENT — EXTERNAL EXAM - RIGHT EYE: OD_EXAM: NORMAL

## 2017-05-17 ASSESSMENT — VISUAL ACUITY
OS_SC: 20/25
OS_SC+: -1
METHOD: SNELLEN - LINEAR
OD_SC: 20/30

## 2017-05-17 ASSESSMENT — EXTERNAL EXAM - LEFT EYE: OS_EXAM: NORMAL

## 2017-05-17 ASSESSMENT — CONF VISUAL FIELD
OD_NORMAL: 1
METHOD: COUNTING FINGERS
OS_NORMAL: 1

## 2017-05-17 ASSESSMENT — SLIT LAMP EXAM - LIDS
COMMENTS: MGD, BLEPHARITIS
COMMENTS: MGD, BLEPHARITIS

## 2017-05-17 ASSESSMENT — TONOMETRY
IOP_METHOD: ICARE
OS_IOP_MMHG: 14
OD_IOP_MMHG: 12

## 2017-05-17 NOTE — PROGRESS NOTES
HPI  Lux Velasco is a 71 year old male who presents in follow-up. Had CEIOL in both eyes about 2-4 months ago. Reports he has irritation/dryness of both eyes and that he uses artificial tears BID and ointment qHS and warm compresses and eyelid scrubs    POH: Dry eye/blepharitis  PMH: Diabetes type 2  FH: No FH of AMD or glc  SH: Former smoker    Assessment & Plan      (H04.123) Dry eye syndrome, bilateral  Comment: Using warm compresses and eyelid scrubs at least daily, ATs during day and ointment QHS  Plan:   -Start azithromycin 250mg M,W,Sa (avoid doxycycline bc of light sensitivity)  -Start fish oil 1000 mg daily    -Consider starting Xiidra/Restasis  -Consider punctal plugs at next visit if not improving.    (E11.9,  Z01.00) Diabetic eye exam (HCC)  Comment: Annual exam at [facility]    (Z96.1) Pseudophakia of both eyes  Comment: Patient not happy that he doesn't have 20/20 vision. Discussed lens exchange, contact lenses, PRK.  Plan: Would defer options until he gets glasses    (H52.13,  H52.203) Myopia with astigmatism and presbyopia, bilateral  Comment: Annual exam at [facility]  -----------------------------------------------------------------------------------    Patient disposition:   Return in 3 months or sooner as needed.    Teaching statement:  Complete documentation of historical and exam elements from today's encounter can be found in the full encounter summary report (not reduplicated in this progress note). I personally obtained the chief complaint(s) and history of present illness.  I confirmed and edited as necessary the review of systems, past medical/surgical history, family history, social history, and examination findings as documented by others; and I examined the patient myself. I personally reviewed the relevant tests, images, and reports as documented above.     I formulated and edited as necessary the assessment and plan and discussed the findings and management plan with the patient and  family.      Migdalia Hussein MD  Comprehensive Ophthalmology & Ocular Pathology  Department of Ophthalmology and Visual Neurosciences  [email or web address]  Pager [phone]

## 2017-05-17 NOTE — MR AVS SNAPSHOT
After Visit Summary   5/17/2017    Lux Velasco    MRN: [id number]           Patient Information     Date Of Birth          1945        Visit Information        Provider Department      5/17/2017 9:15 AM Migdalia Hussein MD [facility]        Today's Diagnoses     Dry eye syndrome, bilateral    -  1    Pseudophakia of both eyes        Ulcerative blepharitis of upper and lower eyelids of both eyes           Follow-ups after your visit        Follow-up notes from your care team     Return in about 3 months (around 8/17/2017) for ocular surface check.      Your next 10 appointments already scheduled     May 22, 2017  3:05 PM CDT   (Arrive by 2:50 PM)   Return Visit with Nam Duffy MD   [facility] ([facility])    [address] [place] [address]   [phone]            Jul 06, 2017  9:00 AM CDT   (Arrive by 8:45 AM)   New Patient Visit with Mikey Tamayo MD   [facility] ([facility])    [address] [place] [address]   [phone]            Jul 10, 2017  1:00 PM CDT   (Arrive by 12:45 PM)   Return Visit with Sofiya Contreras PA-C   [facility] for Prostate and Urologic Cancers ([facility])    [address] [place] [id number] [phone]            Aug 03, 2017  1:00 PM CDT   FULL PULMONARY FUNCTION with  PFL A   [facility] Pulmonary Function Testing ([facility])    [address] [place] [address]   [phone]            Aug 03, 2017  2:20 PM CDT   (Arrive by 2:05 PM)   New Patient Visit with Krysta Goddard MD   [facility] ([facility])    [address] [place] [id number] [phone]              Who to contact     Please call your  clinic at [phone] to:    Ask questions about your health    Make or cancel appointments    Discuss your medicines    Learn about your test results    Speak to your doctor   If you have compliments or concerns about an experience at your clinic, or if you wish to file a complaint, please contact [facility] Patient Relations at [phone] or email us at [email or web address]         Additional Information About Your Visit        [employer] System ([place]) Information     [employer] ([employer]) is an electronic gateway that provides easy, online access to your medical records. With [employer] ([employer]), you can request a clinic appointment, read your test results, renew a prescription or communicate with your care team.     To sign up for [employer] ([place]) visit the website at [email or web address]   You will be asked to enter the access code listed below, as well as some personal information. Please follow the directions to create your username and password.     Your access code is: [email or web address]  Expires: 2017  6:30 AM     Your access code will  in 90 days. If you need help or a new code, please contact your [facility] or call [phone] for assistance.        Care EveryWhere ID     This is your Care EveryWhere ID. This could be used by other organizations to access your [facility] medical records  [id number]         Blood Pressure from Last 3 Encounters:   17 161/83   17 151/82   17 148/77    Weight from Last 3 Encounters:   17 117 kg (258 lb)   17 117 kg (258 lb)   17 116.1 kg (255 lb 14.4 oz)              Today, you had the following     No orders found for display         Today's Medication Changes          These changes are accurate as of: 17 10:42 AM.  If you have any questions, ask your nurse or doctor.               Start taking these medicines.        Dose/Directions    azithromycin 250 MG tablet   Commonly known as:  ZITHROMAX    Used for:  Ulcerative blepharitis of upper and lower eyelids of both eyes   Started by:  Migdalia Hussein MD        Dose:  250 mg   Take 1 tablet (250 mg) by mouth every other day Mon, Wed, Sat   Quantity:  36 tablet   Refills:  1         These medicines have changed or have updated prescriptions.        Dose/Directions    losartan 50 MG tablet   Commonly known as:  COZAAR   This may have changed:  when to take this   Used for:  Benign essential hypertension        Dose:  50 mg   Take 1 tablet (50 mg) by mouth daily   Quantity:  30 tablet   Refills:  3            Where to get your medicines      Some of these will need a paper prescription and others can be bought over the counter.  Ask your nurse if you have questions.     Bring a paper prescription for each of these medications     azithromycin 250 MG tablet                Primary Care Provider Office Phone # Fax #    Nam Duffy [phone]       [address] [address]        Thank you!     Thank you for choosing EYE CLINIC  for your care. Our goal is always to provide you with excellent care. Hearing back from our patients is one way we can continue to improve our services. Please take a few minutes to complete the written survey that you may receive in the mail after your visit with us. Thank you!             Your Updated Medication List - Protect others around you: Learn how to safely use, store and throw away your medicines at www.disposemymeds.org.          This list is accurate as of: 5/17/17 10:42 AM.  Always use your most recent med list.                   Brand Name Dispense Instructions for use    acetaminophen 500 MG Caps      Take 2 tablets by mouth daily.       acetic acid-hydrocortisone otic solution    ACETASOL HC    10 mL    Place 4 drops into both ears 2 times daily.       alfuzosin 10 MG 24 hr tablet    UROXATRAL    90 tablet    Take 1 tablet (10 mg) by mouth daily       ammonium  lactate 12 % cream    AMLACTIN     Apply  topically daily.       amoxicillin-clavulanate 875-125 MG per tablet    AUGMENTIN     Take 1 tablet by mouth 2 times daily       artificial tears Soln      Use one drop to both eye PRN.       azithromycin 250 MG tablet    ZITHROMAX    36 tablet    Take 1 tablet (250 mg) by mouth every other day Mon, Wed, Sat       calcium carbonate 1250 (500 CA) MG Tabs tablet    OSCAL 500    60 tablet    Take 2 tablets once a day with food.       FINASTERIDE PO          FLUTICASONE PROPIONATE NA      Spray 50 mcg in nostril       guaiFENesin-codeine 100-10 MG/5ML Soln solution    ROBITUSSIN AC    420 mL    Take 5-10 mLs by mouth every 4 hours as needed for cough       HYDROcodone-acetaminophen 5-325 MG per tablet    NORCO    20 tablet    Take 1-2 tablets by mouth every 4 hours as needed for other (Moderate to Severe Pain)       losartan 50 MG tablet    COZAAR    30 tablet    Take 1 tablet (50 mg) by mouth daily       METFORMIN HCL PO      Take by mouth 2 times daily (with meals)       omeprazole 20 MG CR capsule    priLOSEC     Take 20 mg by mouth daily.       senna-docusate 8.6-50 MG per tablet    SENOKOT-S;PERICOLACE    10 tablet    Take 1-2 tablets by mouth 2 times daily as needed for constipation Take while on oral narcotics to prevent or treat constipation.       VITAMIN D (CHOLECALCIFEROL) PO      Take 1,000 Units by mouth every morning

## 2017-05-17 NOTE — NURSING NOTE
Chief Complaints and History of Present Illnesses   Patient presents with     Eye Problem Both Eyes     Red, tearing OU     HPI    Affected eye(s):  Both   Symptoms:     Blurred vision   Redness   Foreign body sensation   Tearing   Dryness   Itching   Burning   Photophobia   Eye discharge         Do you have eye pain now?:  No      Comments:  Pt states he does not want routine exam. [facility]. Wants eye irritation symptoms addressed. Has to continually clean on eyes.  Selena FISHER 9:16 AM May 17, 2017

## 2017-05-22 ENCOUNTER — OFFICE VISIT (OUTPATIENT)
Dept: INTERNAL MEDICINE | Facility: CLINIC | Age: 72
End: 2017-05-22

## 2017-05-22 VITALS
OXYGEN SATURATION: 95 % | TEMPERATURE: 97.3 F | HEART RATE: 78 BPM | BODY MASS INDEX: 34.05 KG/M2 | WEIGHT: 240.5 LBS | SYSTOLIC BLOOD PRESSURE: 121 MMHG | DIASTOLIC BLOOD PRESSURE: 76 MMHG

## 2017-05-22 DIAGNOSIS — R07.89 ATYPICAL CHEST PAIN: Primary | ICD-10-CM

## 2017-05-22 DIAGNOSIS — R07.89 ATYPICAL CHEST PAIN: ICD-10-CM

## 2017-05-22 LAB
ALBUMIN SERPL-MCNC: 3.6 G/DL (ref 3.4–5)
ALP SERPL-CCNC: 101 U/L (ref 40–150)
ALT SERPL W P-5'-P-CCNC: 62 U/L (ref 0–70)
ANION GAP SERPL CALCULATED.3IONS-SCNC: 9 MMOL/L (ref 3–14)
AST SERPL W P-5'-P-CCNC: 40 U/L (ref 0–45)
BASOPHILS # BLD AUTO: 0 10E9/L (ref 0–0.2)
BASOPHILS NFR BLD AUTO: 0.5 %
BILIRUB SERPL-MCNC: 0.4 MG/DL (ref 0.2–1.3)
BUN SERPL-MCNC: 14 MG/DL (ref 7–30)
CALCIUM SERPL-MCNC: 8.9 MG/DL (ref 8.5–10.1)
CHLORIDE SERPL-SCNC: 106 MMOL/L (ref 94–109)
CO2 SERPL-SCNC: 27 MMOL/L (ref 20–32)
CREAT SERPL-MCNC: 0.83 MG/DL (ref 0.66–1.25)
D DIMER PPP FEU-MCNC: 0.4 UG/ML FEU (ref 0–0.5)
DIFFERENTIAL METHOD BLD: ABNORMAL
EOSINOPHIL # BLD AUTO: 0.2 10E9/L (ref 0–0.7)
EOSINOPHIL NFR BLD AUTO: 3 %
ERYTHROCYTE [DISTWIDTH] IN BLOOD BY AUTOMATED COUNT: 13.9 % (ref 10–15)
GFR SERPL CREATININE-BSD FRML MDRD: ABNORMAL ML/MIN/1.7M2
GLUCOSE SERPL-MCNC: 137 MG/DL (ref 70–99)
HCT VFR BLD AUTO: 39.8 % (ref 40–53)
HGB BLD-MCNC: 12.9 G/DL (ref 13.3–17.7)
IMM GRANULOCYTES # BLD: 0 10E9/L (ref 0–0.4)
IMM GRANULOCYTES NFR BLD: 0.2 %
LYMPHOCYTES # BLD AUTO: 1.6 10E9/L (ref 0.8–5.3)
LYMPHOCYTES NFR BLD AUTO: 27.2 %
MCH RBC QN AUTO: 28.2 PG (ref 26.5–33)
MCHC RBC AUTO-ENTMCNC: 32.4 G/DL (ref 31.5–36.5)
MCV RBC AUTO: 87 FL (ref 78–100)
MONOCYTES # BLD AUTO: 0.4 10E9/L (ref 0–1.3)
MONOCYTES NFR BLD AUTO: 5.9 %
NEUTROPHILS # BLD AUTO: 3.7 10E9/L (ref 1.6–8.3)
NEUTROPHILS NFR BLD AUTO: 63.2 %
NRBC # BLD AUTO: 0 10*3/UL
NRBC BLD AUTO-RTO: 0 /100
PLATELET # BLD AUTO: 184 10E9/L (ref 150–450)
POTASSIUM SERPL-SCNC: 4.3 MMOL/L (ref 3.4–5.3)
PROT SERPL-MCNC: 7.5 G/DL (ref 6.8–8.8)
RBC # BLD AUTO: 4.58 10E12/L (ref 4.4–5.9)
SODIUM SERPL-SCNC: 142 MMOL/L (ref 133–144)
TROPONIN I SERPL-MCNC: NORMAL UG/L (ref 0–0.04)
WBC # BLD AUTO: 5.9 10E9/L (ref 4–11)

## 2017-05-22 PROCEDURE — 85379 FIBRIN DEGRADATION QUANT: CPT | Performed by: INTERNAL MEDICINE

## 2017-05-22 ASSESSMENT — PAIN SCALES - GENERAL: PAINLEVEL: MODERATE PAIN (5)

## 2017-05-22 NOTE — MR AVS SNAPSHOT
After Visit Summary   5/22/2017    Lux Velasco    MRN: [id number]           Patient Information     Date Of Birth          1945        Visit Information        Provider Department      5/22/2017 3:05 PM Nam Duffy MD [facility]        Today's Diagnoses     Atypical chest pain    -  1       Follow-ups after your visit        Your next 10 appointments already scheduled     May 23, 2017  3:00 PM CDT   CTA ANGIOGRAM CORONARY ARTERY with UUCT4   [facility], [place] ([facility])    [address] [facility] [address] [phone]           Please allow two hours for this test.  Follow the instructions below:   The day before your exam, drink extra fluids at least six 8-ounce glasses (unless your doctor wants you to restrict your fluids).   No caffeine and no smoking the day of the test.   Do not eat or drink for 3 hours before your exam. You may take your morning medicines with small sips of water.   You may have or need a blood test (creatinine test) within 30 days of your exam. Go to your clinic or Diagnostic Imaging Department for this test.   If you take Viagra, Levitra or Cialis, stop taking it for 48 hours before your test.   If you are diabetic and take oral hypoglycemics, do not take them on the day of your test. Also, wait 48 hours before re-starting metformin (Avandamet, Glucophage, Glucovance, Metaglip).   Do not take diuretics on day of the test. This includes Furosemide (Lasix), Torsemide, Bumetanide (Bumex), Metolazone (Zaroxolyn) and Hydrochlorothiazide.   Do not take NSAIDS on the day of the test. This includes ibuprofen (Advil or Motrin), Naproxen and Indomethacin.  Bring any scans or X-rays taken at other hospitals, if similar tests were done. Also bring a list of your medicines, including vitamins, minerals and over-the-counter drugs. It is safest to leave personal items at home.  Be sure to tell your  doctor:   If you have any allergies, including any reaction to contrast.   If there s any chance you are pregnant.   If you are breastfeeding.   If you have any special needs.  Please wear loose clothing, such as a sweat suit or jogging clothes. Avoid snaps, zippers and other metal. We may ask you to undress and put on a hospital gown.  If you have any questions, please call the Imaging Department where you will have your exam.            Jul 10, 2017 10:00 AM CDT   RETURN GENERAL with Migdalia Hussein MD   [facility] Clinic ([facility])    [address]  [address] [facility]  [address] [address]  [facility] [address]   [phone]            Jul 10, 2017  1:00 PM CDT   (Arrive by 12:45 PM)   Return Visit with Sofiya Contreras PA-C   [facility] for Prostate and Urologic Cancers ([facility])    [address] [place] [address]  [address] [facility] [address]   [phone]            Aug 03, 2017  1:00 PM CDT   FULL PULMONARY FUNCTION with UC PFL A   [facility] Pulmonary Function Testing ([facility])    [address] [facility]  [address] [address]   [phone]            Aug 03, 2017  2:20 PM CDT   (Arrive by 2:05 PM)   New Patient Visit with Krysta Goddard MD   [facility] ([facility])    [address] [place] [address]   [phone]            Aug 30, 2017 11:30 AM CDT   FULL PULMONARY FUNCTION with UC PFL D   [facility] Pulmonary Function Testing ([facility])    [address] [facility]  [address] [facility] [address]   [phone]            Aug 30, 2017 12:45 PM CDT   (Arrive by 12:30 PM)   New Patient Visit with Kenia Carranza MD   [facility] ([facility])    [address] [address]   [phone]               Future tests that were ordered for you today     Open Future Orders        Priority Expected Expires Ordered    CTA Angiogram Coronary Artery Routine  2018            Who to contact     Please call your clinic at [phone] to:    Ask questions about your health    Make or cancel appointments    Discuss your medicines    Learn about your test results    Speak to your doctor   If you have compliments or concerns about an experience at your clinic, or if you wish to file a complaint, please contact [facility] Patient Relations at [phone] or email us at [email or web address]         Additional Information About Your Visit        [email or web address] Information     WittyParrott is an electronic gateway that provides easy, online access to your medical records. With [employer], you can request a clinic appointment, read your test results, renew a prescription or communicate with your care team.     To sign up for [employer]Par[employer]tt visit the website at [email or web address]   You will be asked to enter the access code listed below, as well as some personal information. Please follow the directions to create your username and password.     Your access code is: [email or web address]  Expires: 2017  6:30 AM     Your access code will  in 90 days. If you need help or a new code, please contact your [facility] or call [phone] for assistance.        Care EveryWhere ID     This is your Care EveryWhere ID. This could be used by other organizations to access your [place] medical records  [id number]        Your Vitals Were     Pulse Temperature Pulse Oximetry BMI (Body Mass Index)          78 97.3  F (36.3  C) (Oral) 95% 34.05 kg/m2         Blood Pressure from Last 3 Encounters:   17 121/76   17 161/83   17 151/82    Weight from Last 3 Encounters:   17 109.1 kg (240 lb 8 oz)   17 117 kg (258 lb)   17 117 kg (258 lb)               We Performed the Following     EKG Performed in Clinic w/ Provider Reading Fee     XR Chest 2 Views          Today's Medication Changes          These changes are accurate as of: 5/22/17  5:27 PM.  If you have any questions, ask your nurse or doctor.               These medicines have changed or have updated prescriptions.        Dose/Directions    losartan 50 MG tablet   Commonly known as:  COZAAR   This may have changed:  when to take this   Used for:  Benign essential hypertension        Dose:  50 mg   Take 1 tablet (50 mg) by mouth daily   Quantity:  30 tablet   Refills:  3                Primary Care Provider Office Phone # Fax #    Nam Duffy [phone]       [place] [id number] [address] [address]        Thank you!     Thank you for choosing [facility]  for your care. Our goal is always to provide you with excellent care. Hearing back from our patients is one way we can continue to improve our services. Please take a few minutes to complete the written survey that you may receive in the mail after your visit with us. Thank you!             Your Updated Medication List - Protect others around you: Learn how to safely use, store and throw away your medicines at www.disposemymeds.org.          This list is accurate as of: 5/22/17  5:27 PM.  Always use your most recent med list.                   Brand Name Dispense Instructions for use    acetaminophen 500 MG Caps      Take 2 tablets by mouth daily.       acetic acid-hydrocortisone otic solution    ACETASOL HC    10 mL    Place 4 drops into both ears 2 times daily.       alfuzosin 10 MG 24 hr tablet    UROXATRAL    90 tablet    Take 1 tablet (10 mg) by mouth daily       ammonium lactate 12 % cream    AMLACTIN     Apply  topically daily.       amoxicillin-clavulanate 875-125 MG per tablet    AUGMENTIN     Take 1 tablet by mouth 2 times daily       artificial tears Soln      Use one drop  to both eye PRN.       azithromycin 250 MG tablet    ZITHROMAX    36 tablet    Take 1 tablet (250 mg) by mouth every other day Mon, Wed, Sat       calcium carbonate 1250 (500 CA) MG Tabs tablet    OSCAL 500    60 tablet    Take 2 tablets once a day with food.       FINASTERIDE PO          FLUTICASONE PROPIONATE NA      Spray 50 mcg in nostril       guaiFENesin-codeine 100-10 MG/5ML Soln solution    ROBITUSSIN AC    420 mL    Take 5-10 mLs by mouth every 4 hours as needed for cough       HYDROcodone-acetaminophen 5-325 MG per tablet    NORCO    20 tablet    Take 1-2 tablets by mouth every 4 hours as needed for other (Moderate to Severe Pain)       losartan 50 MG tablet    COZAAR    30 tablet    Take 1 tablet (50 mg) by mouth daily       METFORMIN HCL PO      Take by mouth 2 times daily (with meals)       omeprazole 20 MG CR capsule    priLOSEC     Take 20 mg by mouth daily.       senna-docusate 8.6-50 MG per tablet    SENOKOT-S;PERICOLACE    10 tablet    Take 1-2 tablets by mouth 2 times daily as needed for constipation Take while on oral narcotics to prevent or treat constipation.       VITAMIN D (CHOLECALCIFEROL) PO      Take 1,000 Units by mouth every morning

## 2017-05-22 NOTE — PROGRESS NOTES
HPI:    Pt. Comes in for follow up today. He had parathyroid surgery with Dr. Short 3/21/17 for hypercalciemia. He was in the ED for pain 3/27/17. He was seen in Dermatology 11/28/16. He was seen in ortho for R knee pain 11/30/16. He was seen in the ED 12/4/16 for increased BP.  He has chest pain last few days. He states this is chronic for him. He had more yesterday and a little today. He has some SOB No other HEENT, cardiopulmonary, abdominal, , neurological complaints. No F/C/NS. Overall he is doing better. No F/C/NS. Less pain. He is eating his normal diet.     PE:    Vitals noted gen nad cooperative alert,  HEENT, ears normal oropharynx clear no exudate, neck supple nl rom no adenopathy, he has healing horizontal scar in the lower neck, no tenderness, no bruising, no neck swelling,  LCTA, B, RRR, S1, S2, Grossly normal neurological exam.     EKG: SR at 79; Chronic TWI III;  Flat T waves inferior leads; no change from 12/4/16    A/P:    1. Follow up parathyroid surgery. He was seen by Dr. Chou, [place] 4/5/17 and by Dr. Short 4/21/17; labs stable  2. He has follow up in Pulmonary 8/17  3. PSA checked 12/16  4. Colonoscopy at [facility] 8/15 repeat in 10 years.   5. He has follow up 7/17 in Urology for BPH.  6. BP stable today  7. New Chest pain. I strongly recommended he go to the ED today but he declined. He is aware of missing fatal coronary artery disease. Ordered labs, EKG, and CXR today. Ordered D-dimer as well. I recommended if this is positive for him to get a CT pulmonary angiogram TODAY. Ordered CT coronary angiogram for ASAP. Recommended call 911 or go to ED for worse CP.    Total time spent 40 minutes.  More than 50% of the time spent with Mr. Velasco on counseling / coordinating his care        Addendum; I reviewed Lux's test that were back. Stable CBC, comp. Metabolic and negative troponin. D-dimer still pending. I still recommended he go to the ED but again he declined and he wanted to go  home. I stated that if the D-dimer was elevated he could have a blood clot in his lungs (pulmonary embolism) and I would still recommend he go to the ED but stated he was returning tomorrow for the CT coronary angiogram testing.     JIM Duffy

## 2017-05-22 NOTE — NURSING NOTE
Chief Complaint   Patient presents with     Cough     Patient here for ongoing cough and lung problems     Ana Garcia LPN at 3:12 PM on 5/22/2017.

## 2017-05-23 ENCOUNTER — HOSPITAL ENCOUNTER (OUTPATIENT)
Dept: CT IMAGING | Facility: CLINIC | Age: 72
Discharge: HOME | End: 2017-05-23
Attending: INTERNAL MEDICINE | Admitting: INTERNAL MEDICINE
Payer: MEDICARE

## 2017-05-23 VITALS — SYSTOLIC BLOOD PRESSURE: 105 MMHG | DIASTOLIC BLOOD PRESSURE: 76 MMHG | HEART RATE: 75 BPM | RESPIRATION RATE: 18 BRPM

## 2017-05-23 DIAGNOSIS — R07.89 ATYPICAL CHEST PAIN: ICD-10-CM

## 2017-05-23 LAB — INTERPRETATION ECG - MUSE: NORMAL

## 2017-05-23 PROCEDURE — 25000132 ZZH RX MED GY IP 250 OP 250 PS 637: Mod: GY | Performed by: INTERNAL MEDICINE

## 2017-05-23 PROCEDURE — A9270 NON-COVERED ITEM OR SERVICE: HCPCS | Mod: GY | Performed by: INTERNAL MEDICINE

## 2017-05-23 PROCEDURE — 75574 CT ANGIO HRT W/3D IMAGE: CPT

## 2017-05-23 PROCEDURE — 25000125 ZZHC RX 250: Performed by: INTERNAL MEDICINE

## 2017-05-23 PROCEDURE — 75574 CT ANGIO HRT W/3D IMAGE: CPT | Mod: 26 | Performed by: INTERNAL MEDICINE

## 2017-05-23 PROCEDURE — 25000128 H RX IP 250 OP 636: Performed by: INTERNAL MEDICINE

## 2017-05-23 RX ORDER — IOPAMIDOL 755 MG/ML
140 INJECTION, SOLUTION INTRAVASCULAR ONCE
Status: COMPLETED | OUTPATIENT
Start: 2017-05-23 | End: 2017-05-23

## 2017-05-23 RX ORDER — METOPROLOL TARTRATE 50 MG
50-100 TABLET ORAL
Status: COMPLETED | OUTPATIENT
Start: 2017-05-23 | End: 2017-05-23

## 2017-05-23 RX ORDER — NITROGLYCERIN 0.4 MG/1
0.4 TABLET SUBLINGUAL
Status: DISCONTINUED | OUTPATIENT
Start: 2017-05-23 | End: 2017-05-24 | Stop reason: HOSPADM

## 2017-05-23 RX ORDER — METOPROLOL TARTRATE 1 MG/ML
5-15 INJECTION, SOLUTION INTRAVENOUS
Status: DISCONTINUED | OUTPATIENT
Start: 2017-05-23 | End: 2017-05-24 | Stop reason: HOSPADM

## 2017-05-23 RX ORDER — ACYCLOVIR 200 MG/1
0-1 CAPSULE ORAL
Status: DISCONTINUED | OUTPATIENT
Start: 2017-05-23 | End: 2017-05-24 | Stop reason: HOSPADM

## 2017-05-23 RX ADMIN — METOPROLOL TARTRATE 100 MG: 100 TABLET, FILM COATED ORAL at 12:32

## 2017-05-23 RX ADMIN — SODIUM CHLORIDE, PRESERVATIVE FREE 100 ML: 5 INJECTION INTRAVENOUS at 14:54

## 2017-05-23 RX ADMIN — NITROGLYCERIN 0.4 MG: 0.4 TABLET SUBLINGUAL at 13:50

## 2017-05-23 RX ADMIN — IOPAMIDOL 125 ML: 755 INJECTION, SOLUTION INTRAVENOUS at 14:15

## 2017-05-23 NOTE — LETTER
Patient:  Lux Velasco  :   1945  MRN:     [id number]        Mr. Lux Velasco  [address]   [facility] [address]        May 24, 2017    Dear Mr. Velasco,    Thank you for choosing the [facility] for your healthcare needs.  We appreciate the opportunity to serve you.    The following are your recent test results.     Results for orders placed or performed during the hospital encounter of 17   CTA Angiogram Coronary Artery    Narrative    Procedure: CTA ANGIOGRAM CORONARY ARTERY   Examination Date: 2017 2:56 PM   Indication: 71 year-old male with CAD, DM, CVA who presents for  coronary CT for further evaluation of chest pain.   Ordering Provider: Nam Duffy MD  Overall quality of the study: Good.     PROCEDURE: ECG gated multi-slice computed tomography of the heart   with and without intravenous contrast  (Isovue 370, 125 mL, wasted 0  mL) was  performed on a Siemens Dual Source Flash scanner without  incident. Beta-blockers were used to optimize heart rate (Metoprolol  100 mg Oral/ Metoprolol 0 mg IV). Sublingual Nitrostat 0.4 mg was  given prior to scanning. Coronary artery calcium score was performed  using the Flash scanner protocol. CTA was performed in the spiral mode  at a heart rate of 48 bpm with 120 kVp. Images were reconstructed and  analyzed on a [employer] workstation. Scan protocol was optimized to  minimize radiation exposure. The total radiation exposure including  calcium score was calculated to be 787 DLP, and 11 mSv.        Impression    IMPRESSION:  1.  Minimal non-obstructive coronary artery disease.   2.  Total Agatston score 102 placing the patient in the 39th  percentile when compared to age and gender matched control group.  3.  Please review Radiology report for incidental noncardiac findings  that will follow separately.    FINDINGS:    CORONARY CALCIUM SCORE    Total Agatston calcium score: 102   Left  main: 72  Left anterior descendin  Left circumflex: 1  Right coronary artery: 27   This places the patient in the 39th  percentile when compared to age  and gender matched control group.    CORONARY ANGIOGRAPHY    DOMINANCE: Right dominant system.   Normal coronary origins and course.    LEFT MAIN:   The left main arises normally from the left coronary cusp and is  widely patent without any detectable stenosis.   There is focal calcification at the left main ostium without causing  any detectable stenosis.    LEFT ANTERIOR DESCENDING:   The left anterior descending and its 2 diagonal branches are widely  patent with minimal luminal irregularities.     LEFT CIRCUMFLEX:   The left circumflex and its major marginal branches (OM1, OM2) are  patent with minimal luminal irregularities.     RIGHT CORONARY ARTERY:   Distal RCA: Minimal (<25%) stenosis composed of noncalcified plaque.  The remainder of the right coronary and the posterior descending  artery are patent with minimal luminal irregularities.    ADDITIONAL FINDINGS:     The proximal ascending aorta is normal in size. Mild calcification of  the aortic valve.  Normal pulmonary venous anatomy with all four pulmonary veins draining  into the left atrium.    There is no left ventricular mass or thrombus.   Normal pericardial thickness. There is no pericardial effusion.  Please review Radiology report for incidental noncardiac findings that  will follow separately.    I have personally reviewed the examination and initial interpretation  and I agree with the findings.    VICKY JACOBO MD   Radiologist Consult For Cardiology    Narrative    Exam: Radiology consult for cardiology 2017 2:56 PM     History: Other chest pain    Comparison: Chest CT, 2017 and outside chest CT, 2017    TECHNIQUE: Cardiac CT angiography images from the aortic root through  the diaphragm were reviewed.    FINDINGS: The heart size is within normal limits. No pericardial  or  pleural effusion. No pneumothorax. No enlarged lymph nodes in the  visualized chest. The visualized portions of the great vessels of the  chest are within normal limits. The previously noted bronchial wall  thickening is most conspicuous in the current study. Linear opacities  are most consistent with scarring or atelectasis. No suspicious bony  lesions. Limited evaluation of the abdomen is within normal limits.      Impression    IMPRESSION: No acute findings in the chest, please refer to the  cardiology report dictated separately for further details.    I have personally reviewed the examination and initial interpretation  and I agree with the findings.    MIKE MOSQUEDA MD       Your CT scan looks fine.    Please contact us if you have any questions or concerns.  We look forward to serving your needs in the future.      Sincerely,    Nam Duffy MD/ky

## 2017-05-23 NOTE — PROGRESS NOTES
Pt arrived for CTA.  Test, meds and side effects reviewed with pt.  Resting HR greater than 70  bpm.  Pt not on beta blocker at home, given 100 mg PO Metoprolol per protocol.  Pt given  0.4 mg SL nitro on CTA table per protocol.  CTA completed, pt tolerated procedure well.  Post monitoring completed and VSS.  D/C instructions reviewed with pt and they verbalized understanding of need to increase PO fluids today.  D/C to [facility] waiting room accompanied by staff.

## 2017-05-23 NOTE — LETTER
Patient:  Lux Velasco  :   1945  MRN:     [id number]        Mr. Lux Velasco  [address]   [facility] [address]        May 25, 2017    Dear Mr. Velasco,    Thank you for choosing the [facility] for your healthcare needs.  We appreciate the opportunity to serve you.    The following are your recent test results.     Dear Lux;      Your coronary CT scan looks fine     I recommend you follow up with me in a few weeks. You can call [phone] to schedule this appointment     Results for orders placed or performed during the hospital encounter of 17   CTA Angiogram Coronary Artery    Narrative    Procedure: CTA ANGIOGRAM CORONARY ARTERY   Examination Date: 2017 2:56 PM   Indication: 71 year-old male with CAD, DM, CVA who presents for  coronary CT for further evaluation of chest pain.   Ordering Provider: Nam Duffy MD  Overall quality of the study: Good.     PROCEDURE: ECG gated multi-slice computed tomography of the heart   with and without intravenous contrast  (Isovue 370, 125 mL, wasted 0  mL) was  performed on a Siemens Dual Source Flash scanner without  incident. Beta-blockers were used to optimize heart rate (Metoprolol  100 mg Oral/ Metoprolol 0 mg IV). Sublingual Nitrostat 0.4 mg was  given prior to scanning. Coronary artery calcium score was performed  using the Flash scanner protocol. CTA was performed in the spiral mode  at a heart rate of 48 bpm with 120 kVp. Images were reconstructed and  analyzed on a [employer] workstation. Scan protocol was optimized to  minimize radiation exposure. The total radiation exposure including  calcium score was calculated to be 787 DLP, and 11 mSv.        Impression    IMPRESSION:  1.  Minimal non-obstructive coronary artery disease.   2.  Total Agatston score 102 placing the patient in the 39th  percentile when compared to age and gender matched control group.  3.  Please review  Radiology report for incidental noncardiac findings  that will follow separately.    FINDINGS:    CORONARY CALCIUM SCORE    Total Agatston calcium score: 102   Left main: 72  Left anterior descendin  Left circumflex: 1  Right coronary artery: 27   This places the patient in the 39th  percentile when compared to age  and gender matched control group.    CORONARY ANGIOGRAPHY    DOMINANCE: Right dominant system.   Normal coronary origins and course.    LEFT MAIN:   The left main arises normally from the left coronary cusp and is  widely patent without any detectable stenosis.   There is focal calcification at the left main ostium without causing  any detectable stenosis.    LEFT ANTERIOR DESCENDING:   The left anterior descending and its 2 diagonal branches are widely  patent with minimal luminal irregularities.     LEFT CIRCUMFLEX:   The left circumflex and its major marginal branches (OM1, OM2) are  patent with minimal luminal irregularities.     RIGHT CORONARY ARTERY:   Distal RCA: Minimal (<25%) stenosis composed of noncalcified plaque.  The remainder of the right coronary and the posterior descending  artery are patent with minimal luminal irregularities.    ADDITIONAL FINDINGS:     The proximal ascending aorta is normal in size. Mild calcification of  the aortic valve.  Normal pulmonary venous anatomy with all four pulmonary veins draining  into the left atrium.    There is no left ventricular mass or thrombus.   Normal pericardial thickness. There is no pericardial effusion.  Please review Radiology report for incidental noncardiac findings that  will follow separately.    I have personally reviewed the examination and initial interpretation  and I agree with the findings.    VICKY JACOBO MD   Radiologist Consult For Cardiology    Narrative    Exam: Radiology consult for cardiology 2017 2:56 PM     History: Other chest pain    Comparison: Chest CT, 2017 and outside chest CT,  1/29/2017    TECHNIQUE: Cardiac CT angiography images from the aortic root through  the diaphragm were reviewed.    FINDINGS: The heart size is within normal limits. No pericardial or  pleural effusion. No pneumothorax. No enlarged lymph nodes in the  visualized chest. The visualized portions of the great vessels of the  chest are within normal limits. The previously noted bronchial wall  thickening is most conspicuous in the current study. Linear opacities  are most consistent with scarring or atelectasis. No suspicious bony  lesions. Limited evaluation of the abdomen is within normal limits.      Impression    IMPRESSION: No acute findings in the chest, please refer to the  cardiology report dictated separately for further details.    I have personally reviewed the examination and initial interpretation  and I agree with the findings.    MIKE MOSQUEDA MD       Please contact your provider if you have any questions or concerns.  We look forward to serving your needs in the future.      Sincerely,    JIM Duffy MD

## 2017-06-26 ENCOUNTER — PRE VISIT (OUTPATIENT)
Dept: UROLOGY | Facility: CLINIC | Age: 72
End: 2017-06-26

## 2017-06-26 NOTE — TELEPHONE ENCOUNTER
Patient is coming in to see Sofiya Contreras PA-C for BPH, called patient to please come with a full bladder.

## 2017-07-10 ENCOUNTER — OFFICE VISIT (OUTPATIENT)
Dept: OPHTHALMOLOGY | Facility: CLINIC | Age: 72
End: 2017-07-10
Attending: OPHTHALMOLOGY
Payer: MEDICARE

## 2017-07-10 ENCOUNTER — OFFICE VISIT (OUTPATIENT)
Dept: UROLOGY | Facility: CLINIC | Age: 72
End: 2017-07-10

## 2017-07-10 VITALS
DIASTOLIC BLOOD PRESSURE: 77 MMHG | HEART RATE: 60 BPM | BODY MASS INDEX: 35.96 KG/M2 | SYSTOLIC BLOOD PRESSURE: 121 MMHG | WEIGHT: 254 LBS

## 2017-07-10 DIAGNOSIS — H04.123 DRY EYES, BILATERAL: ICD-10-CM

## 2017-07-10 DIAGNOSIS — R39.9 LOWER URINARY TRACT SYMPTOMS (LUTS): Primary | ICD-10-CM

## 2017-07-10 DIAGNOSIS — H01.01A ULCERATIVE BLEPHARITIS OF UPPER AND LOWER EYELIDS OF BOTH EYES: Primary | ICD-10-CM

## 2017-07-10 DIAGNOSIS — H01.01B ULCERATIVE BLEPHARITIS OF UPPER AND LOWER EYELIDS OF BOTH EYES: Primary | ICD-10-CM

## 2017-07-10 PROCEDURE — 68761 CLOSE TEAR DUCT OPENING: CPT | Mod: ZF | Performed by: OPHTHALMOLOGY

## 2017-07-10 PROCEDURE — 99213 OFFICE O/P EST LOW 20 MIN: CPT | Mod: ZF

## 2017-07-10 RX ORDER — DOXYCYCLINE 100 MG/1
100 CAPSULE ORAL 2 TIMES DAILY
Qty: 60 CAPSULE | Refills: 1 | Status: SHIPPED | OUTPATIENT
Start: 2017-07-10 | End: 2017-10-11

## 2017-07-10 RX ORDER — ALFUZOSIN HYDROCHLORIDE 10 MG/1
10 TABLET, EXTENDED RELEASE ORAL DAILY
Qty: 90 TABLET | Refills: 3 | Status: SHIPPED | OUTPATIENT
Start: 2017-07-10 | End: 2018-10-26

## 2017-07-10 RX ORDER — FINASTERIDE 5 MG/1
5 TABLET, FILM COATED ORAL DAILY
Qty: 90 TABLET | Refills: 3 | Status: SHIPPED | OUTPATIENT
Start: 2017-07-10 | End: 2018-10-26

## 2017-07-10 ASSESSMENT — REFRACTION_WEARINGRX
OS_ADD: +2.50
OD_CYLINDER: +2.25
OD_AXIS: 178
OS_SPHERE: -4.50
OS_CYLINDER: SPHERE
SPECS_TYPE: BIFOCAL
OD_SPHERE: -3.50
OD_ADD: +2.50

## 2017-07-10 ASSESSMENT — SLIT LAMP EXAM - LIDS: COMMENTS: MGD, BLEPHARITIS

## 2017-07-10 ASSESSMENT — VISUAL ACUITY
OD_CC: 20/20
OD_CC+: -2
OS_CC: 20/50
CORRECTION_TYPE: GLASSES
METHOD: SNELLEN - LINEAR

## 2017-07-10 ASSESSMENT — TONOMETRY
IOP_METHOD: TONOPEN
OD_IOP_MMHG: 14
OS_IOP_MMHG: 13

## 2017-07-10 ASSESSMENT — CONF VISUAL FIELD
METHOD: COUNTING FINGERS
OD_NORMAL: 1
OS_NORMAL: 1

## 2017-07-10 ASSESSMENT — EXTERNAL EXAM - LEFT EYE: OS_EXAM: NORMAL

## 2017-07-10 ASSESSMENT — EXTERNAL EXAM - RIGHT EYE: OD_EXAM: NORMAL

## 2017-07-10 NOTE — PATIENT INSTRUCTIONS
1. Continue taking alfuzosin (uroxatral) - blue pill - once daily in the morning.  2. Continue taking finasteride (Proscar) - white pill - once daily in the evening.    Refills of these medications were sent to the [facility] and will be on file when you need refills.    See me back in 6 months. Call [phone] sooner with any issues.    Sofiya Contreras PA-C  Urology

## 2017-07-10 NOTE — MR AVS SNAPSHOT
After Visit Summary   7/10/2017    Lux Velasco    MRN: [id number]           Patient Information     Date Of Birth          1945        Visit Information        Provider Department      7/10/2017 1:00 PM Sofiya Contreras PA-C [facility]        Today's Diagnoses     Lower urinary tract symptoms (LUTS)          Care Instructions    1. Continue taking alfuzosin (uroxatral) - blue pill - once daily in the morning.  2. Continue taking finasteride (Proscar) - white pill - once daily in the evening.    Refills of these medications were sent to the [facility] and will be on file when you need refills.    See me back in 6 months. Call [phone] sooner with any issues.    Sofiya Contreras PA-C  Urology          Follow-ups after your visit        Your next 10 appointments already scheduled     Aug 03, 2017  1:00 PM CDT   FULL PULMONARY FUNCTION with UC PFL A   [facility] Pulmonary Function Testing ([facility])    [address] [place] [address]   [phone]            Aug 03, 2017  2:20 PM CDT   (Arrive by 2:05 PM)   New Patient Visit with Krysta Goddard MD   [facility] ([facility])    [address] [place] [address]   [phone]            Aug 30, 2017 11:30 AM CDT   FULL PULMONARY FUNCTION with UC PFL MITRA   [facility] Function Testing ([facility])    [address] [place] [address]   [phone]            Aug 30, 2017 12:45 PM CDT   (Arrive by 12:30 PM)   New Patient Visit with Kenia Carranza MD   [facility] ([facility])    [address] [place] [address]   [phone]            Oct 11, 2017 10:30 AM CDT   RETURN GENERAL with Migdalia  SILVANA Hussein MD   [facility] Clinic ([facility])    [address]  [address]  [facility] [address] [phone]            Edmund 15, 2018 12:30 PM CST   (Arrive by 12:15 PM)   Return Visit with Sofiya Contreras PA-C   [facility] and [facility]rolog[facility] ([facility])    [address]  4th [address]  [place] [address] [phone]              Who to contact     Please call your clinic at [phone] to:    Ask questions about your health    Make or cancel appointments    Discuss your medicines    Learn about your test results    Speak to your doctor   If you have compliments or concerns about an experience at your clinic, or if you wish to file a complaint, please contact [facility] Patient Relations at [phone] or email us at [email or web address]         Additional Information About Your Visit        [email or web address] Payments Information     kajeet is an electronic gateway that provides easy, online access to your medical records. With kajeet, you can request a clinic appointment, read your test results, renew a prescription or communicate with your care team.     To sign up for kajeet visit the website at [email or web address]   You will be asked to enter the access code listed below, as well as some personal information. Please follow the directions to create your username and password.     Your access code is: [email or web address]  Expires: 2017  6:30 AM     Your access code will  in 90 days. If you need help or a new code, please contact your [facility] or call [phone] for assistance.        Care EveryWhere ID     This is your Care EveryWhere ID. This could be used by other organizations to access your [facility] medical records  [id number]        Your Vitals Were     Pulse BMI (Body Mass Index)                60 35.96 kg/m2           Blood  Pressure from Last 3 Encounters:   07/10/17 121/77   05/23/17 105/76   05/22/17 121/76    Weight from Last 3 Encounters:   07/10/17 115.2 kg (254 lb)   05/22/17 109.1 kg (240 lb 8 oz)   04/24/17 117 kg (258 lb)              Today, you had the following     No orders found for display         Today's Medication Changes          These changes are accurate as of: 7/10/17  1:48 PM.  If you have any questions, ask your nurse or doctor.               Start taking these medicines.        Dose/Directions    doxycycline 100 MG capsule   Commonly known as:  VIBRAMYCIN   Used for:  Ulcerative blepharitis of upper and lower eyelids of both eyes   Started by:  Migdalia Hussein MD        Dose:  100 mg   Take 1 capsule (100 mg) by mouth 2 times daily   Quantity:  60 capsule   Refills:  1       finasteride 5 MG tablet   Commonly known as:  PROSCAR   Used for:  Lower urinary tract symptoms (LUTS)   Started by:  Sofiya Contreras PA-C        Dose:  5 mg   Take 1 tablet (5 mg) by mouth daily   Quantity:  90 tablet   Refills:  3         These medicines have changed or have updated prescriptions.        Dose/Directions    losartan 50 MG tablet   Commonly known as:  COZAAR   This may have changed:  when to take this   Used for:  Benign essential hypertension        Dose:  50 mg   Take 1 tablet (50 mg) by mouth daily   Quantity:  30 tablet   Refills:  3         Stop taking these medicines if you haven't already. Please contact your care team if you have questions.     azithromycin 250 MG tablet   Commonly known as:  ZITHROMAX   Stopped by:  Migdalia Hussein MD                Where to get your medicines      These medications were sent to [place], [place] - [facility] [address] [place] [address] [address] [address]    Hours:  TRANSPLANT PHONE NUMBER [phone] Phone:  [phone]     alfuzosin 10 MG 24 hr tablet    finasteride 5 MG tablet         Some of these will need a paper  prescription and others can be bought over the counter.  Ask your nurse if you have questions.     Bring a paper prescription for each of these medications     doxycycline 100 MG capsule                Primary Care Provider Office Phone # Fax #    Nam Duffy [phone]       [address] [address]        Equal Access to Services     [facility]: Hadii aad ku hadasho Soomaali, waaxda luqadaha, qaybta kaalmada adeegyada, waxay idiin hayaan adeeg kharash laparul . So [facility] [phone].    ATENCIÓN: Si habla español, tiene a back disposición servicios gratuitos de asistencia lingüística. Haydee al [phone].    We comply with applicable federal civil rights laws and [place] laws. We do not discriminate on the basis of race, color, national origin, age, disability sex, sexual orientation or gender identity.            Thank you!     Thank you for choosing [facility] AND [facility] PR[facility] UROLOGIC [facility]  for your care. Our goal is always to provide you with excellent care. Hearing back from our patients is one way we can continue to improve our services. Please take a few minutes to complete the written survey that you may receive in the mail after your visit with us. Thank you!             Your Updated Medication List - Protect others around you: Learn how to safely use, store and throw away your medicines at www.disposemymeds.org.          This list is accurate as of: 7/10/17  1:48 PM.  Always use your most recent med list.                   Brand Name Dispense Instructions for use Diagnosis    acetaminophen 500 MG Caps      Take 2 tablets by mouth daily.        acetic acid-hydrocortisone otic solution    ACETASOL HC    10 mL    Place 4 drops into both ears 2 times daily.    Otitis externa       alfuzosin 10 MG 24 hr tablet    UROXATRAL    90 tablet    Take 1 tablet (10 mg) by mouth daily    Lower urinary tract symptoms (LUTS)        ammonium lactate 12 % cream    AMLACTIN     Apply  topically daily.        amoxicillin-clavulanate 875-125 MG per tablet    AUGMENTIN     Take 1 tablet by mouth 2 times daily    Cough       artificial tears Soln      Use one drop to both eye PRN.        calcium carbonate 1250 (500 CA) MG Tabs tablet    OSCAL 500    60 tablet    Take 2 tablets once a day with food.    Hypocalcemia       doxycycline 100 MG capsule    VIBRAMYCIN    60 capsule    Take 1 capsule (100 mg) by mouth 2 times daily    Ulcerative blepharitis of upper and lower eyelids of both eyes       finasteride 5 MG tablet    PROSCAR    90 tablet    Take 1 tablet (5 mg) by mouth daily    Lower urinary tract symptoms (LUTS)       FLUTICASONE PROPIONATE NA      Spray 50 mcg in nostril    Cough       guaiFENesin-codeine 100-10 MG/5ML Soln solution    ROBITUSSIN AC    420 mL    Take 5-10 mLs by mouth every 4 hours as needed for cough    Cough       HYDROcodone-acetaminophen 5-325 MG per tablet    NORCO    20 tablet    Take 1-2 tablets by mouth every 4 hours as needed for other (Moderate to Severe Pain)    Hypercalcemia, Primary hyperparathyroidism (H)       losartan 50 MG tablet    COZAAR    30 tablet    Take 1 tablet (50 mg) by mouth daily    Benign essential hypertension       METFORMIN HCL PO      Take by mouth 2 times daily (with meals)    Benign nodular prostatic hyperplasia without lower urinary tract symptoms, Need for prophylactic vaccination against Streptococcus pneumoniae (pneumococcus), Screening for AAA (abdominal aortic aneurysm), History of smoking       omeprazole 20 MG CR capsule    priLOSEC     Take 20 mg by mouth daily.        senna-docusate 8.6-50 MG per tablet    SENOKOT-S;PERICOLACE    10 tablet    Take 1-2 tablets by mouth 2 times daily as needed for constipation Take while on oral narcotics to prevent or treat constipation.    Hypercalcemia, Primary hyperparathyroidism (H)       VITAMIN D (CHOLECALCIFEROL) PO      Take 1,000 Units by  mouth every morning

## 2017-07-10 NOTE — MR AVS SNAPSHOT
After Visit Summary   7/10/2017    Lux Velasco    MRN: [id number]           Patient Information     Date Of Birth          1945        Visit Information        Provider Department      7/10/2017 10:00 AM Migdalia Hussein MD [facility]        Today's Diagnoses     Ulcerative blepharitis of upper and lower eyelids of both eyes    -  1       Follow-ups after your visit        Your next 10 appointments already scheduled     Jul 10, 2017  1:00 PM CDT   (Arrive by 12:45 PM)   Return Visit with Sofiya Contreras PA-C   [facility] Prostate [facility] Urologic Cancers ([facility])    [address]  [address] [facility] [address]   [phone]            Aug 03, 2017  1:00 PM CDT   FULL PULMONARY FUNCTION with UC PFL A   [facility] Pulmonary Function Testing ([facility])    [address]  [address] [facility] [address]   [phone]            Aug 03, 2017  2:20 PM CDT   (Arrive by 2:05 PM)   New Patient Visit with Krysta Goddard MD   [facility] ([facility])    [address]  [address] [facility] [address]   [phone]            Aug 30, 2017 11:30 AM CDT   FULL PULMONARY FUNCTION with UC PFL D   [facility] Function [facility] ([facility])    [address]  [address] [facility] [address]   [phone]            Aug 30, 2017 12:45 PM CDT   (Arrive by 12:30 PM)   New Patient Visit with Kenia Carranza MD   [facility] ([facility])    [address]  [address] [facility] [address]   [phone]            Oct 11, 2017 10:30 AM CDT   RETURN GENERAL with Migdalia Hussein MD   [facility] ([facility])    [address]  [address]  [facility] [address]   [phone]               Who to contact     Please call your clinic at [phone] to:    Ask questions about your health    Make or cancel appointments    Discuss your medicines    Learn about your test results    Speak to your doctor   If you have compliments or concerns about an experience at your clinic, or if you wish to file a complaint, please contact [facility] Patient Relations at [phone] or email us at [email or web address]         Additional Information About Your Visit        [employer] Information     [employer] is an electronic gateway that provides easy, online access to your medical records. With [employer], you can request a clinic appointment, read your test results, renew a prescription or communicate with your care team.     To sign up for [employer] visit the website at [email or web address]   You will be asked to enter the access code listed below, as well as some personal information. Please follow the directions to create your username and password.     Your access code is: [email or web address]  Expires: 2017  6:30 AM     Your access code will  in 90 days. If you need help or a new code, please contact your [facility] Clinic or call [phone] for assistance.        Care EveryWhere ID     This is your Care EveryWhere ID. This could be used by other organizations to access your [place] medical records  [id number]         Blood Pressure from Last 3 Encounters:   17 105/76   17 121/76   17 161/83    Weight from Last 3 Encounters:   17 109.1 kg (240 lb 8 oz)   17 117 kg (258 lb)   17 117 kg (258 lb)              Today, you had the following     No orders found for display         Today's Medication Changes          These changes are accurate as of: 7/10/17 11:47 AM.  If you have any questions, ask your nurse or doctor.               Start taking these medicines.        Dose/Directions    doxycycline 100 MG capsule    Commonly known as:  VIBRAMYCIN   Used for:  Ulcerative blepharitis of upper and lower eyelids of both eyes   Started by:  Migdalia Hussein MD        Dose:  100 mg   Take 1 capsule (100 mg) by mouth 2 times daily   Quantity:  60 capsule   Refills:  1         These medicines have changed or have updated prescriptions.        Dose/Directions    losartan 50 MG tablet   Commonly known as:  COZAAR   This may have changed:  when to take this   Used for:  Benign essential hypertension        Dose:  50 mg   Take 1 tablet (50 mg) by mouth daily   Quantity:  30 tablet   Refills:  3         Stop taking these medicines if you haven't already. Please contact your care team if you have questions.     azithromycin 250 MG tablet   Commonly known as:  ZITHROMAX   Stopped by:  Migdalia Hussein MD                Where to get your medicines      Some of these will need a paper prescription and others can be bought over the counter.  Ask your nurse if you have questions.     Bring a paper prescription for each of these medications     doxycycline 100 MG capsule                Primary Care Provider Office Phone # Fax #    Nam Duffy [phone]       [address] [address]        Equal Access to Services     [facility]: Hadpedro Alicia, waelijah marshall, qazeenatta gregoria blanco, taty gambino. [facility] [phone].    ATENCIÓN: Si habla español, tiene a back disposición servicios gratuitos de asistencia lingüística. [facility] [phone].    We comply with applicable federal civil rights laws and [place] laws. We do not discriminate on the basis of race, color, national origin, age, disability sex, sexual orientation or gender identity.            Thank you!     Thank you for choosing [facility]  for your care. Our goal is always to provide you with excellent care. Hearing back from our patients is one way we can continue to  improve our services. Please take a few minutes to complete the written survey that you may receive in the mail after your visit with us. Thank you!             Your Updated Medication List - Protect others around you: Learn how to safely use, store and throw away your medicines at www.disposemymeds.org.          This list is accurate as of: 7/10/17 11:47 AM.  Always use your most recent med list.                   Brand Name Dispense Instructions for use Diagnosis    acetaminophen 500 MG Caps      Take 2 tablets by mouth daily.        acetic acid-hydrocortisone otic solution    ACETASOL HC    10 mL    Place 4 drops into both ears 2 times daily.    Otitis externa       alfuzosin 10 MG 24 hr tablet    UROXATRAL    90 tablet    Take 1 tablet (10 mg) by mouth daily    Lower urinary tract symptoms (LUTS)       ammonium lactate 12 % cream    AMLACTIN     Apply  topically daily.        amoxicillin-clavulanate 875-125 MG per tablet    AUGMENTIN     Take 1 tablet by mouth 2 times daily    Cough       artificial tears Soln      Use one drop to both eye PRN.        calcium carbonate 1250 (500 CA) MG Tabs tablet    OSCAL 500    60 tablet    Take 2 tablets once a day with food.    Hypocalcemia       doxycycline 100 MG capsule    VIBRAMYCIN    60 capsule    Take 1 capsule (100 mg) by mouth 2 times daily    Ulcerative blepharitis of upper and lower eyelids of both eyes       FINASTERIDE PO           FLUTICASONE PROPIONATE NA      Spray 50 mcg in nostril    Cough       guaiFENesin-codeine 100-10 MG/5ML Soln solution    ROBITUSSIN AC    420 mL    Take 5-10 mLs by mouth every 4 hours as needed for cough    Cough       HYDROcodone-acetaminophen 5-325 MG per tablet    NORCO    20 tablet    Take 1-2 tablets by mouth every 4 hours as needed for other (Moderate to Severe Pain)    Hypercalcemia, Primary hyperparathyroidism (H)       losartan 50 MG tablet    COZAAR    30 tablet    Take 1 tablet (50 mg) by mouth daily    Benign essential  hypertension       METFORMIN HCL PO      Take by mouth 2 times daily (with meals)    Benign nodular prostatic hyperplasia without lower urinary tract symptoms, Need for prophylactic vaccination against Streptococcus pneumoniae (pneumococcus), Screening for AAA (abdominal aortic aneurysm), History of smoking       omeprazole 20 MG CR capsule    priLOSEC     Take 20 mg by mouth daily.        senna-docusate 8.6-50 MG per tablet    SENOKOT-S;PERICOLACE    10 tablet    Take 1-2 tablets by mouth 2 times daily as needed for constipation Take while on oral narcotics to prevent or treat constipation.    Hypercalcemia, Primary hyperparathyroidism (H)       VITAMIN D (CHOLECALCIFEROL) PO      Take 1,000 Units by mouth every morning

## 2017-07-10 NOTE — NURSING NOTE
"Chief Complaint   Patient presents with     RECHECK     Frequent urination and BPH.       Initial /77  Pulse 60  Wt 115.2 kg (254 lb)  BMI 35.96 kg/m2 Estimated body mass index is 35.96 kg/(m^2) as calculated from the following:    Height as of 4/17/17: 1.79 m (5' 10.47\").    Weight as of this encounter: 115.2 kg (254 lb).  Medication Reconciliation: complete     Doreen Orr, student    "

## 2017-07-10 NOTE — PROGRESS NOTES
HPI  Lux Velasco is a 71 year old male who presents in follow-up for eye dryness. Had CEIOL in both eyes in 2016, and he feels as though the vision is getting worse in both eyes. Reports he has irritation/dryness of both eyes and that he uses artificial tears BID and ointment qHS, sometimes BID, and warm compresses and eyelid scrubs. He stopped the azithromycin because of constipation.     POH: Dry eye/blepharitis  PMH: Diabetes type 2  FH: No FH of AMD or glc  SH: Former smoker    Assessment & Plan      (H04.123) Dry eye syndrome, bilateral  Comment: Using warm compresses and eyelid scrubs at least daily, ATs during day and ointment QHS, sometimes BID.  Did not tolerate azitheromycin (constipation)  Plan:   Continue above regiment  Try doxycycline 100mg PO BID for 1 month, then daily for 2 months (decrease to 50mg on follow-up)  Discussed r/b/a of punctal plugs and he would like to proceed. (1.0 plug placed RLL, 0.9 LLL)    -Consider starting Xiidra/Restasis    (E11.9,  Z01.00) Diabetic eye exam (HCC)  Comment: Annual exam at [facility]    (Z96.1) Pseudophakia of both eyes  Comment: Patient not happy that he doesn't have 20/20 vision. Discussed lens exchange, contact lenses, PRK - do not recommend any of this until ocular surface improved.  Plan: Would defer options for now    (H52.13,  H52.203) Myopia with astigmatism and presbyopia, bilateral  Comment: Annual exam at [facility]  -----------------------------------------------------------------------------------    Patient disposition:   Return in 3 months for ocular surface check or sooner as needed.    Teaching statement:  Complete documentation of historical and exam elements from today's encounter can be found in the full encounter summary report (not reduplicated in this progress note). I personally obtained the chief complaint(s) and history of present illness.  I confirmed and edited as necessary the review of systems, past medical/surgical history, family history,  social history, and examination findings as documented by others; and I examined the patient myself. I personally reviewed the relevant tests, images, and reports as documented above.     I formulated and edited as necessary the assessment and plan and discussed the findings and management plan with the patient and family.      Migdalia Hussein MD  Comprehensive Ophthalmology & Ocular Pathology  Department of Ophthalmology and Visual Neurosciences  [email or web address]  Pager [phone]

## 2017-07-10 NOTE — PROGRESS NOTES
UROLOGY OFFICE VISIT - FOLLOW UP    CC: follow up LUTS    HPI: Mr. Lux Velasco is a 71 year old male who returns for follow up of LUTS. Seen in initial consultation by Dr. Denney on 4/17/17. HPI from that date as follows:     He has been on Finasteride for 3-4 weeks, he was also on tamsulosin, but stopped if after a week as he feels it increased his frequency. Currently he voids ever 1-3 hours. He does have urgency with daily urge urinary urinary incontinence requiring a pad. Nocturia 2-3x/night (sometimes he wakes up to void, other times he wakes up for another reason such as anxiety and ends up having to void). In order to have a normal nights sleep, he does take cough medicine. The most bothersome symptom he has is a slow, prolonged stream with post void residual. Today his PVR was 40 cc. He is also a bit constipated, noting small caliber stools. He had a colonoscopy in 2015, that was normal.    INTERVAL HISTORY:  He was started on Uroxatrol in place of tamsulosin which is better tolerated. He has noted significant improvement in his symptoms over the past few months. Frequency and urgency improved and he has not been incontinence in a few months which he is very pleased with. If he misses a dose of either medication, he feels his stream will be noticeably slower/weaker. But with both medications, he is currently happy with the way things are.     PEx  /77  Pulse 60  Wt 115.2 kg (254 lb)  BMI 35.96 kg/m2  GEN: well-appearing male in NAD  RESP: no increased respiratory effort    ASSESSMENT/PLAN  Mr. Lux Velasco is a 71 year old man with LUTS - improved with combination therapy with alfuzosin and finasteride.     He is happy with the way things are with both of these medications.   - Continue alfuzosin 10 mg daily. Refills provided.  - Continue finasteride 5 mg daily. Refills provided    F/U 6 months with me with uroflow/PVR.    - If urgency, incontinence worsen and PVR < 150, could add anticholinergic or  Jb. If obstructive LUTS worsen, consider TRUS to evaluate prostate size for pre-operative planning for TURP/HoLEP.     I spent 20 minutes with the patient discussing the above mentioned findings and reviewing the assessment and plan, greater than 50% of which was spent on counseling and coordinating care. All questions were answered to the patients satisfaction.     Sofiya Contreras PA-C  Department of Urology  July 10, 2017

## 2017-07-10 NOTE — NURSING NOTE
"Chief Complaints and History of Present Illnesses   Patient presents with     Follow Up For     2 month follow up  Dry eye syndrome, bilateral     HPI    Affected eye(s):  Both   Symptoms:     No floaters   No flashes   No redness   No Dryness         Do you have eye pain now?:  No      Comments:  Pt states vision seems better since last visit. Pt having less \"crud\" in the corner of both eyes. Pt notes that it is difficult to see after using ointment in the morning. Pt concerned that his vision is not 20/20 like it was before.  DM2 BS: unknown to pt.  Lab Results       Component                Value               Date                       A1C                      6.6                 12/08/2008                 A1C                      5.8                 06/26/2007                 A1C                      5.6                 02/07/2007                 A1C                      5.8                 09/06/2006                 A1C                      5.8                 04/26/2006              Macie Silva [facility] July 10, 2017 10:28 AM               "

## 2017-07-10 NOTE — LETTER
7/10/2017       RE: Lux Velasco  [address]   [place] [address]     Dear Colleague,    Thank you for referring your patient, Lux Velasco, to the [facility] UR[facility] AND [facility]ST FOR PROSTATE AND UROLOGIC CANCERS at [facility]. Please see a copy of my visit note below.    UROLOGY OFFICE VISIT - FOLLOW UP    CC: follow up LUTS    HPI: Mr. Lux Velasco is a 71 year old male  who returns for follow up of LUTS. Seen in initial consultation by Dr. Denney on 4/17/17. HPI from that date as follows:     He has been on Finasteride for 3-4 weeks, he was also on tamsulosin, but stopped if after a week as he feels it increased his frequency. Currently he voids ever 1-3 hours. He does have urgency with daily urge urinary urinary incontinence requiring a pad. Nocturia 2-3x/night (sometimes he wakes up to void, other times he wakes up for another reason such as anxiety and ends up having to void). In order to have a normal nights sleep, he does take cough medicine. The most bothersome symptom he has is a slow, prolonged stream with post void residual. Today his PVR was 40 cc. He is also a bit constipated, noting small caliber stools. He had a colonoscopy in 2015, that was normal.    INTERVAL HISTORY:  He was started on Uroxatrol in place of tamsulosin which is better tolerated. He has noted significant improvement in his symptoms over the past few months. Frequency and urgency improved and he has not been incontinence in a few months which he is very pleased with. If he misses a dose of either medication, he feels his stream will be noticeably slower/weaker. But with both medications, he is currently happy with the way things are.     PEx  /77  Pulse 60  Wt 115.2 kg (254 lb)  BMI 35.96 kg/m2  GEN: well-appearing male in NAD  RESP: no increased respiratory effort    ASSESSMENT/PLAN  Mr. Lux Velasco is a 71 year old man with LUTS - improved with combination therapy with  alfuzosin and finasteride.     He is happy with the way things are with both of these medications.   - Continue alfuzosin 10 mg daily. Refills provided.  - Continue finasteride 5 mg daily. Refills provided    F/U 6 months with me with uroflow/PVR.    - If urgency, incontinence worsen and PVR < 150, could add anticholinergic or Mirabegron. If obstructive LUTS worsen, consider TRUS to evaluate prostate size for pre-operative planning for TURP/HoLEP.     I spent 20 minutes with the patient discussing the above mentioned findings and reviewing the assessment and plan, greater than 50% of which was spent on counseling and coordinating care. All questions were answered to the patients satisfaction.     Sofiya Contreras PA-C  Department of Urology  July 10, 2017

## 2017-08-23 ENCOUNTER — PRE VISIT (OUTPATIENT)
Dept: PULMONOLOGY | Facility: CLINIC | Age: 72
End: 2017-08-23

## 2017-08-23 NOTE — TELEPHONE ENCOUNTER
1.  Date/reason for appt:8/30/17, COPD  2.  Referring provider: TEZ HERNANDEZ  3.  Call to patient (Yes / No - short description): No, referred   4.  Previous care at / records requested from:   [facility]

## 2017-08-30 ENCOUNTER — OFFICE VISIT (OUTPATIENT)
Dept: PULMONOLOGY | Facility: CLINIC | Age: 72
End: 2017-08-30
Attending: INTERNAL MEDICINE
Payer: MEDICARE

## 2017-08-30 VITALS
DIASTOLIC BLOOD PRESSURE: 80 MMHG | BODY MASS INDEX: 36.36 KG/M2 | HEIGHT: 70 IN | OXYGEN SATURATION: 96 % | HEART RATE: 61 BPM | SYSTOLIC BLOOD PRESSURE: 134 MMHG | WEIGHT: 254 LBS

## 2017-08-30 DIAGNOSIS — R06.02 SHORTNESS OF BREATH: Primary | ICD-10-CM

## 2017-08-30 DIAGNOSIS — R05.9 COUGH: ICD-10-CM

## 2017-08-30 RX ORDER — ALBUTEROL SULFATE 90 UG/1
2 AEROSOL, METERED RESPIRATORY (INHALATION) EVERY 4 HOURS PRN
Qty: 1 INHALER | Refills: 3 | Status: SHIPPED | OUTPATIENT
Start: 2017-08-30

## 2017-08-30 ASSESSMENT — PAIN SCALES - GENERAL: PAINLEVEL: NO PAIN (0)

## 2017-08-30 NOTE — PROGRESS NOTES
"[facility]  Pulmonary Medicine - Initial Visit Clinic Note  August 30, 2017         ASSESSMENT & PLAN   Mr. Lux Velasco is a 71-year old male with history of CAD/MI, CVA in 2003 (no sequela), obesity (BMI 36), who was referred for evaluation of dyspnea, cough, and chest pain since at least 2015. He also had CT chest from 5/2017 showing possible evidence of small airways disease and PFT suggestive of air trapping.     1. Shortness of breath  2. Chest pain  3. Cough  4. Obesity (BMI 36) and weight gain over the years  5. Physical deconditioning  6. GERD    Suspect etiology for dyspnea since 2015 is multifactorial including possible small airways disease (such as asthma) vs chronic physical deconditioning in the setting of increasing weight/obesity. He has very minimal tobacco use history (<2 pack years, quit in the 1970s). Chest pain and cough also could be related possible small airways disease. Review of cardiac work-up negative, though I couldn't find results for his stress test. He has minimal coronary disease on CTA and his TTE results are normal. I suspect weight reduction and increasing physical activities should help in the long run. Given history of heartburn, would consider PPI twice daily as this could also worsen cough.     RECOMMENDATIONS:  1. Trial albuterol prior to activities and as needed for dyspnea and chest pain. Teaching done today.  2. Consider PPI twice daily.  3. Encouraged increasing physical activities and weight loss. Patient very hesitant to push himself.    Routine health maintenance: Up-to-date on immunizations -- PPSV 23 (2005) and PCV 13 (2016).     RTC in 3 months.      Patient was staffed with Dr. Vasquez.    Kenia Carranza MD   Pulmonary and Critical Care Medicine Fellow       Today's visit note:     REASON FOR VISIT: Referred by Dr. Duffy for evaluation of \"cough.\"    HISTORY OF PRESENT ILLNESS:  Mr. Lux Velasco is a 71-year old male with history of CAD/MI " "and CVA in 2003 (no sequela), who was referred for evaluation of \"cough.\"     Patient reports 2 years of slightly worsening shortness of breath, both at rest and with activities. He is only able to walk 1-2 blocks before stopping to catch his breath. Before 2 years ago, he thinks he was able to walk twice as much. He also reports sharp, dull, and/or throbbing chest pain >2 years ago, even before he had MI. His PCP has been evaluating his cardiac status -- patient reports negative stress test, though I couldn't find the test results. He also had a CTA angiogram coronaries showing \"minimal non-obstructive CAD.\" He also reports 2 years of dry, intermittent cough. Denies seasonal allergies/allergic rhinitis symptoms. He does report heartburn, he reports is well controlled with once daily omeprazole 20 mg. He reports intermittent wheezing also.     He also reports weight gain over the years. He has decreased his physical activities. He reports prior episodes of bronchitis.     Of note, he also had a CT chest in 5/2/2017 showing \"mild bronchial wall thickening and basilar-predominant mosaic attenuation.\"     He has tried an inhaler in the past, but does not recall what inhaler it was. He doesn't think it helped much.     REVIEW OF SYSTEMS:  12-systems reviewed with pertinent positives and negatives mentioned in the HPI, and:  He has stable bilateral lower extremity swelling.     Pulmonary ROS:  Reports remote history of tobacco use (smoked for 2 years unclear how much, last in the 1970s). He was in the . 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     PHYSICAL EXAM:  /80 (BP Location: Right arm, Patient Position: Sitting, Cuff Size: Adult Large)  Pulse 61  Ht 1.778 m (5' 10\")  Wt 115.2 kg (254 lb)  SpO2 96%  BMI 36.45 kg/m2    Constitutional:    Awake, alert and in no apparent distress   Eyes:    Anicteric, PERRL.   ENT:    Oral " mucosa moist without lesions    Neck:    Supple without supraclavicular or cervical lymphadenopathy    Lungs:    Good air entry both lungs. No crackles. No rhonchi. No wheezes.    Cardiovascular:    Normal S1 and S2. No JVD, murmur, rub, jessee.   Musculoskeletal:    Trace bilateral lower extremity edema, left slightly more than right.    Neurologic:    Alert and conversant.    Skin:    Warm, dry. No rash on limited exam.               Past Medical and Surgical History:     Past Medical History:   Diagnosis Date     Diabetes mellitus type II 2005     History of agent Orange exposure 4/17/2013     Myocardial infarct (H) 01/01/1999     Primary hyperparathyroidism (H) Dx approx 2003     Stroke (H) 01/01/1998    recovered fully     Past Surgical History:   Procedure Laterality Date     BIOPSY OF SKIN LESION       BYPASS GRAFT ARTERY CORONARY       MOHS MICROGRAPHIC PROCEDURE       PARATHYROIDECTOMY N/A 3/21/2017    Procedure: PARATHYROIDECTOMY;  Surgeon: Julianna Short MD;  Location: [facility] OR     PARATHYROIDECTOMY             Family History:     Family History   Problem Relation Age of Onset     Melanoma Mother      Melanoma Father      CANCER No family hx of      no skin cancer     Glaucoma No family hx of      Macular Degeneration No family hx of      No known lung diseases in the family.          Social History:     Social History     Social History     Marital status:      Spouse name: N/A     Number of children: N/A     Years of education: N/A     Occupational History     Not on file.     Social History Main Topics     Smoking status: Former Smoker     Quit date: 1970     Smokeless tobacco: Former User     Alcohol use Yes      Comment: rarely     Drug use: No     Sexual activity: Not on file     Other Topics Concern     Not on file     Social History Narrative            Medications:     Current Outpatient Prescriptions   Medication     doxycycline (VIBRAMYCIN) 100 MG capsule     alfuzosin (UROXATRAL)  10 MG 24 hr tablet     finasteride (PROSCAR) 5 MG tablet     amoxicillin-clavulanate (AUGMENTIN) 875-125 MG per tablet     calcium carbonate (OSCAL 500) 1250 (500 CA) MG TABS tablet     HYDROcodone-acetaminophen (NORCO) 5-325 MG per tablet     senna-docusate (SENOKOT-S;PERICOLACE) 8.6-50 MG per tablet     guaiFENesin-codeine (ROBITUSSIN AC) 100-10 MG/5ML SOLN solution     FLUTICASONE PROPIONATE NA     losartan (COZAAR) 50 MG tablet     VITAMIN D, CHOLECALCIFEROL, PO     METFORMIN HCL PO     acetaminophen 500 MG CAPS     acetic acid-hydrocortisone (ACETASOL HC) otic solution     ammonium lactate (AMLACTIN) 12 % cream     artificial tears SOLN     omeprazole (PRILOSEC) 20 MG capsule     No current facility-administered medications for this visit.               Data:   All laboratory and imaging data reviewed.      PFT:   8/30/2017: Reviewed by me. Normal spirometry. Lung volumes suggestive of air trapping (elevated RV and RV/TLC ratio). DLCO is 118%.     CT chest 5/2/2017: Reviewed by me. Slightly thickened bronchi especially in the bases. There is significant motion artifact. Per radiology official read, there is evidence of mosaic attenuation, suggestive of small airways disease.     CXR 8/30/2017: Reviewed by me. No acute airway abnormalities.       I reviewed the patient's history, obtained additional history, conducted an examination to confirm key findings and reviewed the studies and labs. Discussed the case with Dr. Carranza and agree with the findings and plan as outlined in the note as written above.    Briefly, nonspecific chest symptoms that could be consistent mild intermittent asthma. Minimal physical activity, no indication of severe disease causing symptoms (normal PFTs, stress, minimal CAD on CTC). Counseled on the importance and benefit of physical activity.      Lauren Vasquez MD

## 2017-08-30 NOTE — PATIENT INSTRUCTIONS
1. Try albuterol 2 puffs as needed 10-15 minutes before activities. You can also use the inhaler when you are having shortness of breath or chest pain.   2. Lose weight.   3. Stay physical active. Push your self.   4. Follow-up in clinic in 3 months.

## 2017-08-30 NOTE — LETTER
8/30/2017       RE: Lux Velasco  [address]   [facility] [id number]     Dear Colleague,    Thank you for referring your patient, Lux Velasco, to the [facility] LUNG SCIENCE AND [facility] at [facility]. Please see a copy of my visit note below.    [facility]  Pulmonary Medicine - Initial Visit Clinic Note  August 30, 2017         ASSESSMENT & PLAN   Mr. Lux Velasco is a 71-year old male with history of CAD/MI, CVA in 2003 (no sequela), obesity (BMI 36), who was referred for evaluation of dyspnea, cough, and chest pain since at least 2015. He also had CT chest from 5/2017 showing possible evidence of small airways disease and PFT suggestive of air trapping.     1. Shortness of breath  2. Chest pain  3. Cough  4. Obesity (BMI 36) and weight gain over the years  5. Physical deconditioning  6. GERD    Suspect etiology for dyspnea since 2015 is multifactorial including possible small airways disease (such as asthma) vs chronic physical deconditioning in the setting of increasing weight/obesity. He has very minimal tobacco use history (<2 pack years, quit in the 1970s). Chest pain and cough also could be related possible small airways disease. Review of cardiac work-up negative, though I couldn't find results for his stress test. He has minimal coronary disease on CTA and his TTE results are normal. I suspect weight reduction and increasing physical activities should help in the long run. Given history of heartburn, would consider PPI twice daily as this could also worsen cough.     RECOMMENDATIONS:  1. Trial albuterol prior to activities and as needed for dyspnea and chest pain. Teaching done today.  2. Consider PPI twice daily.  3. Encouraged increasing physical activities and weight loss. Patient very hesitant to push himself.    Routine health maintenance: Up-to-date on immunizations -- PPSV 23 (2005) and PCV 13 (2016).     RTC in 3  "months.      Patient was staffed with Dr. Vasquez.    Kenia Carranza MD   Pulmonary and Critical Care Medicine Fellow       Today's visit note:     REASON FOR VISIT: Referred by Dr. Duffy for evaluation of \"cough.\"    HISTORY OF PRESENT ILLNESS:  Mr. Lux Velasco is a 71-year old male with history of CAD/MI and CVA in 2003 (no sequela), who was referred for evaluation of \"cough.\"     Patient reports 2 years of slightly worsening shortness of breath, both at rest and with activities. He is only able to walk 1-2 blocks before stopping to catch his breath. Before 2 years ago, he thinks he was able to walk twice as much. He also reports sharp, dull, and/or throbbing chest pain >2 years ago, even before he had MI. His PCP has been evaluating his cardiac status -- patient reports negative stress test, though I couldn't find the test results. He also had a CTA angiogram coronaries showing \"minimal non-obstructive CAD.\" He also reports 2 years of dry, intermittent cough. Denies seasonal allergies/allergic rhinitis symptoms. He does report heartburn, he reports is well controlled with once daily omeprazole 20 mg. He reports intermittent wheezing also.     He also reports weight gain over the years. He has decreased his physical activities. He reports prior episodes of bronchitis.     Of note, he also had a CT chest in 5/2/2017 showing \"mild bronchial wall thickening and basilar-predominant mosaic attenuation.\"     He has tried an inhaler in the past, but does not recall what inhaler it was. He doesn't think it helped much.     REVIEW OF SYSTEMS:  12-systems reviewed with pertinent positives and negatives mentioned in the HPI, and:  He has stable bilateral lower extremity swelling.     Pulmonary ROS:  Reports remote history of tobacco use (smoked for 2 years unclear how much, last in the 1970s). He was in the . 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     PHYSICAL EXAM:  /80 (BP Location: Right arm, Patient Position: Sitting, Cuff Size: Adult Large)  Pulse 61  Ht 1.778 m (5' 10\")  Wt 115.2 kg (254 lb)  SpO2 96%  BMI 36.45 kg/m2    Constitutional:    Awake, alert and in no apparent distress   Eyes:    Anicteric, PERRL.   ENT:    Oral mucosa moist without lesions    Neck:    Supple without supraclavicular or cervical lymphadenopathy    Lungs:    Good air entry both lungs. No crackles. No rhonchi. No wheezes.    Cardiovascular:    Normal S1 and S2. No JVD, murmur, rub, jessee.   Musculoskeletal:    Trace bilateral lower extremity edema, left slightly more than right.    Neurologic:    Alert and conversant.    Skin:    Warm, dry. No rash on limited exam.               Past Medical and Surgical History:     Past Medical History:   Diagnosis Date     Diabetes mellitus type II 2005     History of agent Orange exposure 4/17/2013     Myocardial infarct (H) 01/01/1999     Primary hyperparathyroidism (H) Dx approx 2003     Stroke (H) 01/01/1998    recovered fully     Past Surgical History:   Procedure Laterality Date     BIOPSY OF SKIN LESION       BYPASS GRAFT ARTERY CORONARY       MOHS MICROGRAPHIC PROCEDURE       PARATHYROIDECTOMY N/A 3/21/2017    Procedure: PARATHYROIDECTOMY;  Surgeon: Julianna Short MD;  Location: [facility] OR     PARATHYROIDECTOMY             Family History:     Family History   Problem Relation Age of Onset     Melanoma Mother      Melanoma Father      CANCER No family hx of      no skin cancer     Glaucoma No family hx of      Macular Degeneration No family hx of      No known lung diseases in the family.          Social History:     Social History     Social History     Marital status:      Spouse name: N/A     Number of children: N/A     Years of education: N/A     Occupational History     Not on file.     Social History Main Topics     Smoking status: Former Smoker     Quit date: 1970     Smokeless tobacco: " Former User     Alcohol use Yes      Comment: rarely     Drug use: No     Sexual activity: Not on file     Other Topics Concern     Not on file     Social History Narrative            Medications:     Current Outpatient Prescriptions   Medication     doxycycline (VIBRAMYCIN) 100 MG capsule     alfuzosin (UROXATRAL) 10 MG 24 hr tablet     finasteride (PROSCAR) 5 MG tablet     amoxicillin-clavulanate (AUGMENTIN) 875-125 MG per tablet     calcium carbonate (OSCAL 500) 1250 (500 CA) MG TABS tablet     HYDROcodone-acetaminophen (NORCO) 5-325 MG per tablet     senna-docusate (SENOKOT-S;PERICOLACE) 8.6-50 MG per tablet     guaiFENesin-codeine (ROBITUSSIN AC) 100-10 MG/5ML SOLN solution     FLUTICASONE PROPIONATE NA     losartan (COZAAR) 50 MG tablet     VITAMIN D, CHOLECALCIFEROL, PO     METFORMIN HCL PO     acetaminophen 500 MG CAPS     acetic acid-hydrocortisone (ACETASOL HC) otic solution     ammonium lactate (AMLACTIN) 12 % cream     artificial tears SOLN     omeprazole (PRILOSEC) 20 MG capsule     No current facility-administered medications for this visit.               Data:   All laboratory and imaging data reviewed.      PFT:   8/30/2017: Reviewed by me. Normal spirometry. Lung volumes suggestive of air trapping (elevated RV and RV/TLC ratio). DLCO is 118%.     CT chest 5/2/2017: Reviewed by me. Slightly thickened bronchi especially in the bases. There is significant motion artifact. Per radiology official read, there is evidence of mosaic attenuation, suggestive of small airways disease.     CXR 8/30/2017: Reviewed by me. No acute airway abnormalities.       I reviewed the patient's history, obtained additional history, conducted an examination to confirm key findings and reviewed the studies and labs. Discussed the case with Dr. Carranza and agree with the findings and plan as outlined in the note as written above.    Briefly, nonspecific chest symptoms that could be consistent mild intermittent asthma. Minimal  physical activity, no indication of severe disease causing symptoms (normal PFTs, stress, minimal CAD on CTC). Counseled on the importance and benefit of physical activity.      Lauren Vasquez MD      Again, thank you for allowing me to participate in the care of your patient.      Sincerely,    Kenia Carranza MD

## 2017-08-30 NOTE — MR AVS SNAPSHOT
After Visit Summary   8/30/2017    Lux Velasco    MRN: [id number]           Patient Information     Date Of Birth          1945        Visit Information        Provider Department      8/30/2017 12:45 PM Kenia Carranza MD [facility]        Today's Diagnoses     Shortness of breath    -  1      Care Instructions    1. Try albuterol 2 puffs as needed 10-15 minutes before activities. You can also use the inhaler when you are having shortness of breath or chest pain.   2. Lose weight.   3. Stay physical active. Push your self.   4. Follow-up in clinic in 3 months.           Follow-ups after your visit        Follow-up notes from your care team     Return in about 3 months (around 11/30/2017).      Your next 10 appointments already scheduled     Oct 11, 2017 10:30 AM CDT   RETURN GENERAL with Migdalia Hussein MD   Eye Clinic ([facility])    [address] [facility]  [address]  [facility] [address] [phone]            Nov 22, 2017 12:55 PM CST   (Arrive by 12:40 PM)   Return Visit with Kenia Carranza MD   [facility] for Lung Science and Health ([facility])    [address]  3rd [facility] [address] [phone]            Edmund 15, 2018 12:30 PM CST   (Arrive by 12:15 PM)   Return Visit with Sofiya Contreras PA-C   [facility] Urology and [facility] for Prostate and Urologic Cancers ([facility])    [address]  4th [facility] [address] [phone]              Who to contact     If you have questions or need follow up information about today's clinic visit or your schedule please contact [facility] LUNG SCIENCE AND HEALTH directly at [phone].  Normal or non-critical lab and imaging results will be communicated to you by MyChart, letter or phone within 4 business days after the clinic has received the  "results. If you do not hear from us within 7 days, please contact the clinic through [employer] or phone. If you have a critical or abnormal lab result, we will notify you by phone as soon as possible.  Submit refill requests through [employer] or call your pharmacy and they will forward the refill request to us. Please allow 3 business days for your refill to be completed.          Additional Information About Your Visit        [employer] Information     [employer] lets you send messages to your doctor, view your test results, renew your prescriptions, schedule appointments and more. To sign up, go to [email or web address] . Click on \"Log in\" on the left side of the screen, which will take you to the Welcome page. Then click on \"Sign up Now\" on the right side of the page.     You will be asked to enter the access code listed below, as well as some personal information. Please follow the directions to create your username and password.     Your access code is: [email or web address]  Expires: 2017  6:30 AM     Your access code will  in 90 days. If you need help or a new code, please call your [facility] clinic or [phone].        Care EveryWhere ID     This is your Care EveryWhere ID. This could be used by other organizations to access your [facility] medical records  [id number]        Your Vitals Were     Pulse Height Pulse Oximetry BMI (Body Mass Index)          61 1.778 m (5' 10\") 96% 36.45 kg/m2         Blood Pressure from Last 3 Encounters:   17 134/80   07/10/17 121/77   17 105/76    Weight from Last 3 Encounters:   17 115.2 kg (254 lb)   07/10/17 115.2 kg (254 lb)   17 109.1 kg (240 lb 8 oz)              Today, you had the following     No orders found for display         Today's Medication Changes          These changes are accurate as of: 17  2:07 PM.  If you have any questions, ask your nurse or doctor.               Start taking these medicines.        Dose/Directions    albuterol " 108 (90 BASE) MCG/ACT Inhaler   Commonly known as:  PROAIR HFA   Used for:  Shortness of breath   Started by:  Kenia Carranza MD        Dose:  2 puff   Inhale 2 puffs into the lungs every 4 hours as needed for shortness of breath / dyspnea, wheezing or other (use prior to exertion/activities)   Quantity:  1 Inhaler   Refills:  3         These medicines have changed or have updated prescriptions.        Dose/Directions    losartan 50 MG tablet   Commonly known as:  COZAAR   This may have changed:  when to take this   Used for:  Benign essential hypertension        Dose:  50 mg   Take 1 tablet (50 mg) by mouth daily   Quantity:  30 tablet   Refills:  3            Where to get your medicines      Some of these will need a paper prescription and others can be bought over the counter.  Ask your nurse if you have questions.     Bring a paper prescription for each of these medications     albuterol 108 (90 BASE) MCG/ACT Inhaler                Primary Care Provider Office Phone # Fax #    Nam Duffy [phone]       [address] [address]        Equal Access to Services     [facility]: Hadii suresh navao Sokailee, waaxda luqadaha, qaybta kaalmada adetanya, taty chase . So [facility] [phone].    ATENCIÓN: Si habla español, tiene a back disposición servicios gratuitos de asistencia lingüística. Llame al [phone].    We comply with applicable federal civil rights laws and [place] laws. We do not discriminate on the basis of race, color, national origin, age, disability sex, sexual orientation or gender identity.            Thank you!     Thank you for choosing [facility] FOR LUNG SCIENCE AND HEALTH  for your care. Our goal is always to provide you with excellent care. Hearing back from our patients is one way we can continue to improve our services. Please take a few minutes to complete the written survey that you may  receive in the mail after your visit with us. Thank you!             Your Updated Medication List - Protect others around you: Learn how to safely use, store and throw away your medicines at www.disposemymeds.org.          This list is accurate as of: 8/30/17  2:07 PM.  Always use your most recent med list.                   Brand Name Dispense Instructions for use Diagnosis    acetaminophen 500 MG Caps      Take 2 tablets by mouth daily.        acetic acid-hydrocortisone otic solution    ACETASOL HC    10 mL    Place 4 drops into both ears 2 times daily.    Otitis externa       albuterol 108 (90 BASE) MCG/ACT Inhaler    PROAIR HFA    1 Inhaler    Inhale 2 puffs into the lungs every 4 hours as needed for shortness of breath / dyspnea, wheezing or other (use prior to exertion/activities)    Shortness of breath       alfuzosin 10 MG 24 hr tablet    UROXATRAL    90 tablet    Take 1 tablet (10 mg) by mouth daily    Lower urinary tract symptoms (LUTS)       ammonium lactate 12 % cream    AMLACTIN     Apply  topically daily.        amoxicillin-clavulanate 875-125 MG per tablet    AUGMENTIN     Take 1 tablet by mouth 2 times daily    Cough       artificial tears Soln      Use one drop to both eye PRN.        calcium carbonate 1250 (500 CA) MG Tabs tablet    OSCAL 500    60 tablet    Take 2 tablets once a day with food.    Hypocalcemia       doxycycline 100 MG capsule    VIBRAMYCIN    60 capsule    Take 1 capsule (100 mg) by mouth 2 times daily    Ulcerative blepharitis of upper and lower eyelids of both eyes       finasteride 5 MG tablet    PROSCAR    90 tablet    Take 1 tablet (5 mg) by mouth daily    Lower urinary tract symptoms (LUTS)       FLUTICASONE PROPIONATE NA      Spray 50 mcg in nostril    Cough       guaiFENesin-codeine 100-10 MG/5ML Soln solution    ROBITUSSIN AC    420 mL    Take 5-10 mLs by mouth every 4 hours as needed for cough    Cough       HYDROcodone-acetaminophen 5-325 MG per tablet    NORCO    20  tablet    Take 1-2 tablets by mouth every 4 hours as needed for other (Moderate to Severe Pain)    Hypercalcemia, Primary hyperparathyroidism (H)       losartan 50 MG tablet    COZAAR    30 tablet    Take 1 tablet (50 mg) by mouth daily    Benign essential hypertension       METFORMIN HCL PO      Take by mouth 2 times daily (with meals)    Benign nodular prostatic hyperplasia without lower urinary tract symptoms, Need for prophylactic vaccination against Streptococcus pneumoniae (pneumococcus), Screening for AAA (abdominal aortic aneurysm), History of smoking       omeprazole 20 MG CR capsule    priLOSEC     Take 20 mg by mouth daily.        senna-docusate 8.6-50 MG per tablet    SENOKOT-S;PERICOLACE    10 tablet    Take 1-2 tablets by mouth 2 times daily as needed for constipation Take while on oral narcotics to prevent or treat constipation.    Hypercalcemia, Primary hyperparathyroidism (H)       VITAMIN D (CHOLECALCIFEROL) PO      Take 1,000 Units by mouth every morning

## 2017-09-06 ENCOUNTER — TELEPHONE (OUTPATIENT)
Dept: INTERNAL MEDICINE | Facility: CLINIC | Age: 72
End: 2017-09-06

## 2017-09-06 LAB
DLCOUNC-%PRED-PRE: 118 %
DLCOUNC-PRE: 30.64 ML/MIN/MMHG
DLCOUNC-PRED: 25.94 ML/MIN/MMHG
ERV-%PRED-PRE: 52 %
ERV-PRE: 0.36 L
ERV-PRED: 0.68 L
EXPTIME-PRE: 4.31 SEC
FEF2575-%PRED-PRE: 151 %
FEF2575-PRE: 3.6 L/SEC
FEF2575-PRED: 2.38 L/SEC
FEFMAX-%PRED-PRE: 87 %
FEFMAX-PRE: 7.27 L/SEC
FEFMAX-PRED: 8.31 L/SEC
FEV1-%PRED-PRE: 98 %
FEV1-PRE: 3.17 L
FEV1FEV6-PRE: 84 %
FEV1FEV6-PRED: 77 %
FEV1FVC-PRE: 83 %
FEV1FVC-PRED: 75 %
FEV1SVC-PRE: 95 %
FEV1SVC-PRED: 66 %
FIFMAX-PRE: 5.31 L/SEC
FRCPLETH-%PRED-PRE: 113 %
FRCPLETH-PRE: 4.29 L
FRCPLETH-PRED: 3.78 L
FVC-%PRED-PRE: 88 %
FVC-PRE: 3.82 L
FVC-PRED: 4.29 L
IC-%PRED-PRE: 71 %
IC-PRE: 2.99 L
IC-PRED: 4.19 L
RVPLETH-%PRED-PRE: 144 %
RVPLETH-PRE: 3.93 L
RVPLETH-PRED: 2.73 L
TLCPLETH-%PRED-PRE: 99 %
TLCPLETH-PRE: 7.29 L
TLCPLETH-PRED: 7.33 L
VA-%PRED-PRE: 89 %
VA-PRE: 6.24 L
VC-%PRED-PRE: 68 %
VC-PRE: 3.35 L
VC-PRED: 4.87 L

## 2017-09-06 NOTE — TELEPHONE ENCOUNTER
----- Message from Ayesha Frey sent at 9/6/2017 11:01 AM CDT -----  Regarding: PT WANTS TO KNOW STATUS OF GETTING HIS TALL TOILET   Contact: [phone]  SPECIAL PAPER WORK WAS SENT TO [facility] BUT NOTHING CAME BACK FROM DR. HERNANDEZ. THESE PAPER WORK IS FOR HIS TALL TOILET. PT CAN BE REACHED [phone].    THANK YOU,  AYESHA BUTTS

## 2017-09-18 ENCOUNTER — TRANSFERRED RECORDS (OUTPATIENT)
Dept: HEALTH INFORMATION MANAGEMENT | Facility: CLINIC | Age: 72
End: 2017-09-18

## 2017-10-09 ENCOUNTER — NURSE TRIAGE (OUTPATIENT)
Dept: NURSING | Facility: CLINIC | Age: 72
End: 2017-10-09

## 2017-10-09 NOTE — TELEPHONE ENCOUNTER
"Caller states he has a boil at his belt line for  several days; [facility] but he has not heard from them and today is a holiday.  Boil is > 1\" and red, hard painful and oozing a small amount of pus  Inquiring where he can go to get it drained  REiage protocol and home care reviewed with patient  Contacted [facility] and was able to secure an appointment for patient tomorrow a.m. with PCP  Reason for Disposition    Boil > 1/2 inch across (> 12 mm; larger than a marble)    Protocols used: BOIL (SKIN ABSCESS)-ADULT-    Mirta Benito RN  FNA    "

## 2017-10-10 ENCOUNTER — OFFICE VISIT (OUTPATIENT)
Dept: INTERNAL MEDICINE | Facility: CLINIC | Age: 72
End: 2017-10-10

## 2017-10-10 ENCOUNTER — OFFICE VISIT (OUTPATIENT)
Dept: SURGERY | Facility: CLINIC | Age: 72
End: 2017-10-10

## 2017-10-10 VITALS
TEMPERATURE: 98.7 F | WEIGHT: 277.8 LBS | SYSTOLIC BLOOD PRESSURE: 122 MMHG | BODY MASS INDEX: 39.77 KG/M2 | HEIGHT: 70 IN | OXYGEN SATURATION: 96 % | DIASTOLIC BLOOD PRESSURE: 71 MMHG | HEART RATE: 66 BPM

## 2017-10-10 VITALS
HEART RATE: 66 BPM | WEIGHT: 277.5 LBS | DIASTOLIC BLOOD PRESSURE: 71 MMHG | BODY MASS INDEX: 39.82 KG/M2 | SYSTOLIC BLOOD PRESSURE: 122 MMHG | RESPIRATION RATE: 16 BRPM

## 2017-10-10 DIAGNOSIS — M62.838 MUSCLE SPASM: ICD-10-CM

## 2017-10-10 DIAGNOSIS — M62.838 MUSCLE SPASM: Primary | ICD-10-CM

## 2017-10-10 DIAGNOSIS — R53.83 OTHER FATIGUE: ICD-10-CM

## 2017-10-10 DIAGNOSIS — L72.3 SEBACEOUS CYST: ICD-10-CM

## 2017-10-10 DIAGNOSIS — L72.3 SEBACEOUS CYST: Primary | ICD-10-CM

## 2017-10-10 DIAGNOSIS — R73.09 INCREASED GLUCOSE LEVEL: ICD-10-CM

## 2017-10-10 DIAGNOSIS — Z23 NEED FOR PROPHYLACTIC VACCINATION WITH TETANUS-DIPHTHERIA (TD): ICD-10-CM

## 2017-10-10 LAB
ALBUMIN SERPL-MCNC: 3.4 G/DL (ref 3.4–5)
ALP SERPL-CCNC: 92 U/L (ref 40–150)
ALT SERPL W P-5'-P-CCNC: 39 U/L (ref 0–70)
ANION GAP SERPL CALCULATED.3IONS-SCNC: 5 MMOL/L (ref 3–14)
AST SERPL W P-5'-P-CCNC: 21 U/L (ref 0–45)
BASOPHILS # BLD AUTO: 0 10E9/L (ref 0–0.2)
BASOPHILS NFR BLD AUTO: 0.8 %
BILIRUB SERPL-MCNC: 0.4 MG/DL (ref 0.2–1.3)
BUN SERPL-MCNC: 16 MG/DL (ref 7–30)
CALCIUM SERPL-MCNC: 8.8 MG/DL (ref 8.5–10.1)
CHLORIDE SERPL-SCNC: 105 MMOL/L (ref 94–109)
CO2 SERPL-SCNC: 27 MMOL/L (ref 20–32)
CREAT SERPL-MCNC: 0.81 MG/DL (ref 0.66–1.25)
DIFFERENTIAL METHOD BLD: ABNORMAL
EOSINOPHIL # BLD AUTO: 0.2 10E9/L (ref 0–0.7)
EOSINOPHIL NFR BLD AUTO: 4.7 %
ERYTHROCYTE [DISTWIDTH] IN BLOOD BY AUTOMATED COUNT: 13.6 % (ref 10–15)
GFR SERPL CREATININE-BSD FRML MDRD: >90 ML/MIN/1.7M2
GLUCOSE SERPL-MCNC: 147 MG/DL (ref 70–99)
HBA1C MFR BLD: 6.8 % (ref 4.3–6)
HCT VFR BLD AUTO: 37.9 % (ref 40–53)
HGB BLD-MCNC: 12.3 G/DL (ref 13.3–17.7)
IMM GRANULOCYTES # BLD: 0 10E9/L (ref 0–0.4)
IMM GRANULOCYTES NFR BLD: 0.2 %
LYMPHOCYTES # BLD AUTO: 1.7 10E9/L (ref 0.8–5.3)
LYMPHOCYTES NFR BLD AUTO: 32.9 %
MAGNESIUM SERPL-MCNC: 1.9 MG/DL (ref 1.6–2.3)
MCH RBC QN AUTO: 27.3 PG (ref 26.5–33)
MCHC RBC AUTO-ENTMCNC: 32.5 G/DL (ref 31.5–36.5)
MCV RBC AUTO: 84 FL (ref 78–100)
MONOCYTES # BLD AUTO: 0.4 10E9/L (ref 0–1.3)
MONOCYTES NFR BLD AUTO: 7.1 %
NEUTROPHILS # BLD AUTO: 2.8 10E9/L (ref 1.6–8.3)
NEUTROPHILS NFR BLD AUTO: 54.3 %
NRBC # BLD AUTO: 0 10*3/UL
NRBC BLD AUTO-RTO: 0 /100
PLATELET # BLD AUTO: 140 10E9/L (ref 150–450)
POTASSIUM SERPL-SCNC: 4 MMOL/L (ref 3.4–5.3)
PROT SERPL-MCNC: 7.4 G/DL (ref 6.8–8.8)
RBC # BLD AUTO: 4.5 10E12/L (ref 4.4–5.9)
SODIUM SERPL-SCNC: 137 MMOL/L (ref 133–144)
TSH SERPL DL<=0.005 MIU/L-ACNC: 1.91 MU/L (ref 0.4–4)
WBC # BLD AUTO: 5.1 10E9/L (ref 4–11)

## 2017-10-10 RX ORDER — CLINDAMYCIN HCL 150 MG
150 CAPSULE ORAL 3 TIMES DAILY
COMMUNITY
End: 2018-01-05

## 2017-10-10 ASSESSMENT — PAIN SCALES - GENERAL: PAINLEVEL: MILD PAIN (3)

## 2017-10-10 NOTE — MR AVS SNAPSHOT
After Visit Summary   10/10/2017    Lux Velasco    MRN: [id number]           Patient Information     Date Of Birth          1945        Visit Information        Provider Department      10/10/2017 7:05 AM Nam Duffy MD [facility]        Today's Diagnoses     Muscle spasm    -  1    Other fatigue        Sebaceous cyst        Need for prophylactic vaccination with tetanus-diphtheria (TD)        Increased glucose level          Care Instructions    Primary Care Center: [phone]     Primary Care Center Medication Refill Request Information:  * Please contact your pharmacy regarding ANY request for medication refills.  ** [facility] Prescription Fax = [phone]  * Please allow 3 business days for routine medication refills.  * Please allow 5 business days for controlled substance medication refills.     Primary Care Center Test Result notification information:  *You will be notified with in 7-10 days of your appointment day regarding the results of your test.  If you are on MyChart you will be notified as soon as the provider has reviewed the results and signed off on them.            Follow-ups after your visit        Additional Services     GENERAL SURG ADULT REFERRAL       Back cyst                  Your next 10 appointments already scheduled     Oct 11, 2017 10:30 AM CDT   RETURN GENERAL with Migdalia Hussein MD   Eye Clinic ([facility])    [address]  [address] [facility]  [address] [address]  [facility] [address]   [phone]            Nov 22, 2017 12:55 PM CST   (Arrive by 12:40 PM)   Return Visit with Kenia Carranza MD   [facility] ([facility])    [address]  3rd [facility] [address] [phone]            Dec 06, 2017 10:45 AM CST   (Arrive by 10:30 AM)   Return Visit with Corinne Gleason PA-C   [facility] ([facility])    [address]  3rd [facility] [address] [phone]            2018  1:00 PM CST   (Arrive by 12:45 PM)   Return Visit with Sofiya Contreras PA-C   [facility] Urology and [facility] for Prostate and Urologic Cancers ([facility])    [address]  4th [facility] [address] [phone]              Who to contact     Please call your clinic at [phone] to:    Ask questions about your health    Make or cancel appointments    Discuss your medicines    Learn about your test results    Speak to your doctor   If you have compliments or concerns about an experience at your clinic, or if you wish to file a complaint, please contact [facility] Patient Relations at [phone] or email us at [email or web address]         Additional Information About Your Visit        Zymetishart Information     [employer] is an electronic gateway that provides easy, online access to your medical records. With Placely, you can request a clinic appointment, read your test results, renew a prescription or communicate with your care team.     To sign up for Placely visit the website at [email or web address]   You will be asked to enter the access code listed below, as well as some personal information. Please follow the directions to create your username and password.     Your access code is: [email or web address]  Expires: 2017  6:30 AM     Your access code will  in 90 days. If you need help or a new code, please contact your [facility] Clinic or call [phone] for assistance.        Care EveryWhere ID     This is your Care EveryWhere ID. This could be used by other organizations to access your [place] medical records  [id number]        Your Vitals Were     Pulse Respirations BMI (Body Mass Index)             66 16 39.82 kg/m2          Blood Pressure from Last 3 Encounters:   10/10/17 122/71    10/10/17 122/71   08/30/17 134/80    Weight from Last 3 Encounters:   10/10/17 126 kg (277 lb 12.8 oz)   10/10/17 125.9 kg (277 lb 8 oz)   08/30/17 115.2 kg (254 lb)              We Performed the Following     GENERAL SURG ADULT REFERRAL     Hemoglobin A1c     TDAP ( BOOSTRIX AGES 10-64)          Today's Medication Changes          These changes are accurate as of: 10/10/17  3:14 PM.  If you have any questions, ask your nurse or doctor.               These medicines have changed or have updated prescriptions.        Dose/Directions    losartan 50 MG tablet   Commonly known as:  COZAAR   This may have changed:  when to take this   Used for:  Benign essential hypertension        Dose:  50 mg   Take 1 tablet (50 mg) by mouth daily   Quantity:  30 tablet   Refills:  3         Stop taking these medicines if you haven't already. Please contact your care team if you have questions.     HYDROcodone-acetaminophen 5-325 MG per tablet   Commonly known as:  NORCO   Stopped by:  Nam Duffy MD                    Primary Care Provider Office Phone # Fax #    Nam Duffy [phone] [address] [address]        Equal Access to Services     [facility]: Hadii suresh navao Sokailee, waaxda luqadaha, qaybta kaalmada adeegyada, taty gambino. So [facility] [phone].    ATENCIÓN: Si habla español, tiene a back disposición servicios gratuitos de asistencia lingüística. Llame al [phone].    We comply with applicable federal civil rights laws and [place] laws. We do not discriminate on the basis of race, color, national origin, age, disability, sex, sexual orientation, or gender identity.            Thank you!     Thank you for choosing [facility]  for your care. Our goal is always to provide you with excellent care. Hearing back from our patients is one way we can continue to improve our services. Please take a few minutes  to complete the written survey that you may receive in the mail after your visit with us. Thank you!             Your Updated Medication List - Protect others around you: Learn how to safely use, store and throw away your medicines at www.disposemymeds.org.          This list is accurate as of: 10/10/17  3:14 PM.  Always use your most recent med list.                   Brand Name Dispense Instructions for use Diagnosis    acetaminophen 500 MG Caps      Take 2 tablets by mouth daily.        acetic acid-hydrocortisone otic solution    ACETASOL HC    10 mL    Place 4 drops into both ears 2 times daily.    Otitis externa       albuterol 108 (90 BASE) MCG/ACT Inhaler    PROAIR HFA    1 Inhaler    Inhale 2 puffs into the lungs every 4 hours as needed for shortness of breath / dyspnea, wheezing or other (use prior to exertion/activities)    Shortness of breath       alfuzosin 10 MG 24 hr tablet    UROXATRAL    90 tablet    Take 1 tablet (10 mg) by mouth daily    Lower urinary tract symptoms (LUTS)       ammonium lactate 12 % cream    AMLACTIN     Apply  topically daily.        amoxicillin-clavulanate 875-125 MG per tablet    AUGMENTIN     Take 1 tablet by mouth 2 times daily    Cough       artificial tears Soln      Use one drop to both eye PRN.        calcium carbonate 1250 (500 CA) MG Tabs tablet    OSCAL 500    60 tablet    Take 2 tablets once a day with food.    Hypocalcemia       clindamycin 150 MG capsule    CLEOCIN     Take 150 mg by mouth 3 times daily    Muscle spasm, Other fatigue       doxycycline 100 MG capsule    VIBRAMYCIN    60 capsule    Take 1 capsule (100 mg) by mouth 2 times daily    Ulcerative blepharitis of upper and lower eyelids of both eyes       finasteride 5 MG tablet    PROSCAR    90 tablet    Take 1 tablet (5 mg) by mouth daily    Lower urinary tract symptoms (LUTS)       FLUTICASONE PROPIONATE NA      Spray 50 mcg in nostril    Cough       guaiFENesin-codeine 100-10 MG/5ML Soln solution     ROBITUSSIN AC    420 mL    Take 5-10 mLs by mouth every 4 hours as needed for cough    Cough       losartan 50 MG tablet    COZAAR    30 tablet    Take 1 tablet (50 mg) by mouth daily    Benign essential hypertension       METFORMIN HCL PO      Take by mouth 2 times daily (with meals)    Benign nodular prostatic hyperplasia without lower urinary tract symptoms, Need for prophylactic vaccination against Streptococcus pneumoniae (pneumococcus), Screening for AAA (abdominal aortic aneurysm), History of smoking       omeprazole 20 MG CR capsule    priLOSEC     Take 20 mg by mouth daily.        senna-docusate 8.6-50 MG per tablet    SENOKOT-S;PERICOLACE    10 tablet    Take 1-2 tablets by mouth 2 times daily as needed for constipation Take while on oral narcotics to prevent or treat constipation.    Hypercalcemia, Primary hyperparathyroidism (H)       VITAMIN D (CHOLECALCIFEROL) PO      Take 1,000 Units by mouth every morning

## 2017-10-10 NOTE — LETTER
10/10/2017       RE: Lux Velasco  [address]   Located within [facility] [address]     Dear Colleague,    Thank you for referring your patient, Lux Velasco, to the [facility] GENERAL SURGERY at [facility]. Please see a copy of my visit note below.    Patient with recurrent left flank cyst. Presently taking antibiotics and covering with bandage. Some drainage. No fevers or other constitutional symptoms.   Past medical and surgical history reviewed and noted in chart. History stroke. At home with daughter who assists with cares.  [facility] now with [facility].  PHYSICAL EXAM  General appearance- healthy, alert, has slow speech and reaction.  Neck- Neck is supple with no obvious adenopathy.  Lungs- Respiratory effort unlabored.  Abdomen overweight soft.  Left flank with 4 cm inflamed cyst without fluctuance.    Impression: Inflamed sebaceous cyst left flank.  Recommend: Hot pack TID (daughter to assist)  Continue po antibiotics as RX until finished.  Follow up for excision of cyst in 3 weeks (postpone if cyst still inflamed.  My RN coordinator to make arrangements for excision under local anesthesia at [facility].  The total time spent with this patient was 10 minutes.  Of this time, greater than 50% was spent counseling and coordinating care.        Sincerely,    Jose Higgins MD

## 2017-10-10 NOTE — NURSING NOTE
Pre and Post op Patient Education/Teaching Flowsheet  Relevant Diagnosis:  Mass  Teaching Topic:  Pre and post op teaching  Person(s) Involved in teaching:  Patient     Motivation Level:  Asks Questions:  Yes  Eager to Learn:  Yes  Cooperative:  Yes  Receptive (willing/able to accept information):  Yes- with repeating (hx stroke)  Any cultural factors/Christian beliefs that may influence understanding or compliance?  No    Patient/caregiver/family demonstrates understanding of the following:  Reason for the appointment, diagnosis, and treatment plan:  Yes  Patient demonstrates understanding of the following:  Pre-op bowel prep:  No  Post-op pain management recommendations (medications, ice compress, binder/athletic supporter (if applicable), etc.:  Yes  Inguinal hernia patients:  Post-op urinary retention- discussed signs/symptoms and visit to ER for Lloyd catheter placement and to stay in place for at least 48 hours:  NA  Restrictions:  NA  Medications to take the day of surgery:  Per PCP  Blood thinner medications discussed and when to stop (if applicable):  Yes  Wound care:  Yes  Diabetes medication management (if applicable):  Per PCP  Which situations necessitate calling provider and whom to contact:  Discussed how to contact the hospital, nurse, and clinic scheduling staff if necessary      Date and time of surgery:  TBD  Location of surgery:  [facility] [place]- 5th Floor Procedure area  History and Physical and any other testing necessary prior to surgery:  At time of procedure  Required time line for completion of History and Physical and any pre-op testing:  As above  Discuss need for someone to drive patient home and stay with them for 24 hours:  No  Pre-op showering/scrub information with Surgical Scrub:  Yes  NPO Guidelines:  No restrictions      Infection Prevention: Patient demonstrates understanding of the following:  Patient instructed on hand hygiene:  Yes  Surgical  procedure site care will be taught and will be reviewed at the time of discharge  Signs and symptoms of infection taught:  Yes  Wound care reviewed and will be taught at the time of discharge  Central venous catheter care will be taught at the time of discharge (if applicable)    Post-op follow-up:  Instructional materials used/given/mailed:  [facility] Booklet, post op teaching sheet, Map, Soap, and arrival/location information    Surgical instructions mailed to patient

## 2017-10-10 NOTE — NURSING NOTE
Chief Complaint   Patient presents with     Musculoskeletal Problem     patient states he has an infection in his lower back     KATHY CHOI at 7:01 AM on 10/10/2017.

## 2017-10-10 NOTE — PATIENT INSTRUCTIONS
Mass/lump Excision Teaching Sheet      1.  Wound care:  Remove the gauze dressing 24 hours after procedure but leave the medical tape in place in place.  The tape should stay on for 5-7 days.  You may remove the Steri-Strips after one week.   Please wash your hands before touching your incisions or removing dressings    2.  You may resume all of your home medications after surgery.  Please do not start Aspirin or blood thinners until the day after surgery.    3.  You may shower 24 hours after surgery.  Do not submerge yourself in water for 7 days (bath, whirlpool, hot tub, pool, lake, etc).      4.  Restrictions:  None    5.  Pain management:  Apply ice pack to incision area for 24 hours protecting the skin with a cloth.  Take prescribed pain medication as directed and only as needed (if applicable).  Please do not take any additional Acetaminophen or Tylenol products while taking narcotic pain medications.  We encourage the use of Ibuprofen, Advil, or Motrin after surgery except if you have an allergy, ulcer, kidney problems, or it is contraindicated by a provider.    6.  Our office will call you 2-4 days after your procedure to review post-op teaching, answer questions, and help arrange after surgery care.    7.  Watch for signs of infection:  Redness of the wound, drainage, increasing pain, and/or fever/chills (greater than 101 degrees F).    9.  Constipation:  Please take prescribed stool softener as directed.  You may stop taking it when you are no longer taking narcotic pain medications and your bowels have returned to normal.  If you become constipated it is safe to take an over-the-counter laxative as directed on the bottle.    10.  Driving:  You may drive  when you are no longer taking prescription pain medications  and you feel comfortable operating a vehicle.       11.  Diet:  You may resume your regular diet after surgery.      12.  Pathology:  Pathology results are generally available after 5-7 business  days.    If you have questions or concerns please contact our office Monday through Friday during regular office hours at [phone].  If you are calling during nonbusiness hours, the weekend, or holiday please call the hospital  at [phone] and ask for the on-call General Surgeon.

## 2017-10-10 NOTE — PATIENT INSTRUCTIONS
[facility]: [phone]     [facility] Medication Refill Request Information:  * Please contact your pharmacy regarding ANY request for medication refills.  ** [facility] Prescription Fax = [phone]  * Please allow 3 business days for routine medication refills.  * Please allow 5 business days for controlled substance medication refills.     [facility] Test Result notification information:  *You will be notified with in 7-10 days of your appointment day regarding the results of your test.  If you are on MyChart you will be notified as soon as the provider has reviewed the results and signed off on them.

## 2017-10-10 NOTE — PROGRESS NOTES
HPI:    Pt. Comes in for follow up today. He had parathyroid surgery with Dr. Short 3/21/17 for hypercalciemia. He was seen in Dermatology 11/28/16. He was seen in ortho for R knee pain 11/30/16. No other HEENT, cardiopulmonary, abdominal, , neurological complaints. No F/C/NS. Overall he is doing better. No F/C/NS. Less pain. He is eating his normal diet. R heel pain seen both at [facility] and [facility] and getting PT. Back cyst on Clindamycin since last Friday. He has on and off muscle spasms.     PE:    Vitals noted gen nad cooperative alert,  HEENT, ears normal oropharynx clear no exudate, neck supple nl rom no adenopathy, he has healing horizontal scar in the lower neck, no tenderness, no bruising, no neck swelling,  LCTA, B, RRR, S1, S2, Grossly normal neurological exam. Lower L back with open draining wound with erythema. R heel w/o erythema, no swelling; minimal tenderness.       A/P:    1. Follow up parathyroid surgery. He was seen by Dr. Chou, Endo 4/5/17 and by Dr. Short 4/21/17; labs stable  2. He was seen in  Pulmonary 8/30/17  3. PSA checked 12/16  4. Colonoscopy at [facility] 8/15 repeat in 10 years.   5. He has follow up 7/17 in Urology for BPH.  6. BP stable today  7. He had minimal non-obstructive coronary CTA 5/23/17.  8. Back cyst. On clindamycin; needs Td, Gen. Surgery referral today  9. Seeing [facility] for R heel pain  10. Labs for muscle spasms stable today  11. He has egg allergy and does not get flu vaccination.   12. Ordered A1C for elevated glucose    Total time spent 25 minutes.  More than 50% of the time spent with Mr. Velasco on counseling / coordinating his care

## 2017-10-10 NOTE — NURSING NOTE
"Chief Complaint   Patient presents with     Consult     consult        Vitals:    10/10/17 1006   BP: 122/71   BP Location: Left arm   Patient Position: Chair   Cuff Size: Adult Large   Pulse: 66   Temp: 98.7  F (37.1  C)   SpO2: 96%   Weight: 277 lb 12.8 oz   Height: 5' 10\"       Body mass index is 39.86 kg/(m^2).      Donnell Neil MA                            "

## 2017-10-10 NOTE — PROGRESS NOTES
Patient with recurrent left flank cyst. Presently taking antibiotics and covering with bandage. Some drainage. No fevers or other constitutional symptoms.   Past medical and surgical history reviewed and noted in chart. History stroke. At home with daughter who assists with cares.  [facility], now with recurrence.  PHYSICAL EXAM  General appearance- healthy, alert, has slow speech and reaction.  Neck- Neck is supple with no obvious adenopathy.  Lungs- Respiratory effort unlabored.  Abdomen overweight soft.  Left flank with 4 cm inflamed cyst without fluctuance.    Impression: Inflamed sebaceous cyst left flank.  Recommend: Hot pack TID (daughter to assist)  Continue po antibiotics as RX until finished.  Follow up for excision of cyst in 3 weeks (postpone if cyst still inflamed.  My RN coordinator to make arrangements for excision under local anesthesia at [facility].  The total time spent with this patient was 10 minutes.  Of this time, greater than 50% was spent counseling and coordinating care.

## 2017-10-10 NOTE — MR AVS SNAPSHOT
After Visit Summary   10/10/2017    Lux Velasco    MRN: [id number]           Patient Information     Date Of Birth          1945        Visit Information        Provider Department      10/10/2017 9:45 AM Jose Higgins MD [facility]        Care Instructions    Mass/lump Excision Teaching Sheet      1.  Wound care:  Remove the gauze dressing 24 hours after procedure but leave the medical tape in place in place.  The tape should stay on for 5-7 days.  You may remove the Steri-Strips after one week.   Please wash your hands before touching your incisions or removing dressings    2.  You may resume all of your home medications after surgery.  Please do not start Aspirin or blood thinners until the day after surgery.    3.  You may shower 24 hours after surgery.  Do not submerge yourself in water for 7 days (bath, whirlpool, hot tub, pool, lake, etc).      4.  Restrictions:  None    5.  Pain management:  Apply ice pack to incision area for 24 hours protecting the skin with a cloth.  Take prescribed pain medication as directed and only as needed (if applicable).  Please do not take any additional Acetaminophen or Tylenol products while taking narcotic pain medications.  We encourage the use of Ibuprofen, Advil, or Motrin after surgery except if you have an allergy, ulcer, kidney problems, or it is contraindicated by a provider.    6.  Our office will call you 2-4 days after your procedure to review post-op teaching, answer questions, and help arrange after surgery care.    7.  Watch for signs of infection:  Redness of the wound, drainage, increasing pain, and/or fever/chills (greater than 101 degrees F).    9.  Constipation:  Please take prescribed stool softener as directed.  You may stop taking it when you are no longer taking narcotic pain medications and your bowels have returned to normal.  If you become constipated it is safe to take an over-the-counter laxative as directed  on the bottle.    10.  Driving:  You may drive  when you are no longer taking prescription pain medications  and you feel comfortable operating a vehicle.       11.  Diet:  You may resume your regular diet after surgery.      12.  Pathology:  Pathology results are generally available after 5-7 business days.    If you have questions or concerns please contact our office Monday through Friday during regular office hours at [phone].  If you are calling during nonbusiness hours, the weekend, or holiday please call the hospital  at [phone] and ask for the on-call General Surgeon.              Follow-ups after your visit        Your next 10 appointments already scheduled     Oct 11, 2017 10:30 AM CDT   RETURN GENERAL with Migdalia Hussein MD   Eye Clinic ([facility])    [facility]  [address] [id number] [phone]            Nov 22, 2017 12:55 PM CST   (Arrive by 12:40 PM)   Return Visit with Kenia Carranza MD   [facility] ([facility] [place])    [address] [place] [id number] [phone]            Dec 06, 2017 10:45 AM CST   (Arrive by 10:30 AM)   Return Visit with Corinne Gleason PA-C   [facility] ([facility])    [address] [place] [id number] [phone]            Jan 08, 2018  1:00 PM CST   (Arrive by 12:45 PM)   Return Visit with SAPPHIRE Muhammad [facility] ([facility])    [address] [place] [id number] [phone]              Who to contact     Please call your clinic at [phone] to:    Ask questions about your health    Make or cancel appointments    Discuss your medicines    Learn about your test results    Speak to your doctor   If you have  "compliments or concerns about an experience at your clinic, or if you wish to file a complaint, please contact [facility] Patient Relations at [phone] or email us at [email or web address]         Additional Information About Your Visit        [employer] Information     [employer] is an electronic gateway that provides easy, online access to your medical records. With [employer], you can request a clinic appointment, read your test results, renew a prescription or communicate with your care team.     To sign up for [employer] visit the website at [email or web address]   You will be asked to enter the access code listed below, as well as some personal information. Please follow the directions to create your username and password.     Your access code is: [email or web address]  Expires: 2017  6:30 AM     Your access code will  in 90 days. If you need help or a new code, please contact your [facility] or call [phone] for assistance.        Care EveryWhere ID     This is your Care EveryWhere ID. This could be used by other organizations to access your [place] medical records  [id number]        Your Vitals Were     Pulse Temperature Height Pulse Oximetry BMI (Body Mass Index)       66 98.7  F (37.1  C) 1.778 m (5' 10\") 96% 39.86 kg/m2        Blood Pressure from Last 3 Encounters:   10/10/17 122/71   10/10/17 122/71   17 134/80    Weight from Last 3 Encounters:   10/10/17 126 kg (277 lb 12.8 oz)   10/10/17 125.9 kg (277 lb 8 oz)   17 115.2 kg (254 lb)              Today, you had the following     No orders found for display         Today's Medication Changes          These changes are accurate as of: 10/10/17 10:41 AM.  If you have any questions, ask your nurse or doctor.               These medicines have changed or have updated prescriptions.        Dose/Directions    losartan 50 MG tablet   Commonly known as:  COZAAR   This may " have changed:  when to take this   Used for:  Benign essential hypertension        Dose:  50 mg   Take 1 tablet (50 mg) by mouth daily   Quantity:  30 tablet   Refills:  3         Stop taking these medicines if you haven't already. Please contact your care team if you have questions.     HYDROcodone-acetaminophen 5-325 MG per tablet   Commonly known as:  NORCO   Stopped by:  Nam Duffy MD                    Primary Care Provider Office Phone # Fax #    Nam Duffy [phone] [address] [address]        Equal Access to Services     [facility]: Hadii aad ku hadasho Soomaali, waaxda luqadaha, qaybta kaalmada adeegyajer, taty chase . So [facility] [phone].    ATENCIÓN: Si habla español, tiene a back disposición servicios gratuitos de asistencia lingüística. Ll[facility] [phone].    We comply with applicable federal civil rights laws and [place] laws. We do not discriminate on the basis of race, color, national origin, age, disability, sex, sexual orientation, or gender identity.            Thank you!     Thank you for choosing [facility]  for your care. Our goal is always to provide you with excellent care. Hearing back from our patients is one way we can continue to improve our services. Please take a few minutes to complete the written survey that you may receive in the mail after your visit with us. Thank you!             Your Updated Medication List - Protect others around you: Learn how to safely use, store and throw away your medicines at www.disposemymeds.org.          This list is accurate as of: 10/10/17 10:41 AM.  Always use your most recent med list.                   Brand Name Dispense Instructions for use Diagnosis    acetaminophen 500 MG Caps      Take 2 tablets by mouth daily.        acetic acid-hydrocortisone otic solution    ACETASOL HC    10 mL    Place 4 drops into both ears 2 times daily.     Otitis externa       albuterol 108 (90 BASE) MCG/ACT Inhaler    PROAIR HFA    1 Inhaler    Inhale 2 puffs into the lungs every 4 hours as needed for shortness of breath / dyspnea, wheezing or other (use prior to exertion/activities)    Shortness of breath       alfuzosin 10 MG 24 hr tablet    UROXATRAL    90 tablet    Take 1 tablet (10 mg) by mouth daily    Lower urinary tract symptoms (LUTS)       ammonium lactate 12 % cream    AMLACTIN     Apply  topically daily.        amoxicillin-clavulanate 875-125 MG per tablet    AUGMENTIN     Take 1 tablet by mouth 2 times daily    Cough       artificial tears Soln      Use one drop to both eye PRN.        calcium carbonate 1250 (500 CA) MG Tabs tablet    OSCAL 500    60 tablet    Take 2 tablets once a day with food.    Hypocalcemia       clindamycin 150 MG capsule    CLEOCIN     Take 150 mg by mouth 3 times daily    Muscle spasm, Other fatigue       doxycycline 100 MG capsule    VIBRAMYCIN    60 capsule    Take 1 capsule (100 mg) by mouth 2 times daily    Ulcerative blepharitis of upper and lower eyelids of both eyes       finasteride 5 MG tablet    PROSCAR    90 tablet    Take 1 tablet (5 mg) by mouth daily    Lower urinary tract symptoms (LUTS)       FLUTICASONE PROPIONATE NA      Spray 50 mcg in nostril    Cough       guaiFENesin-codeine 100-10 MG/5ML Soln solution    ROBITUSSIN AC    420 mL    Take 5-10 mLs by mouth every 4 hours as needed for cough    Cough       losartan 50 MG tablet    COZAAR    30 tablet    Take 1 tablet (50 mg) by mouth daily    Benign essential hypertension       METFORMIN HCL PO      Take by mouth 2 times daily (with meals)    Benign nodular prostatic hyperplasia without lower urinary tract symptoms, Need for prophylactic vaccination against Streptococcus pneumoniae (pneumococcus), Screening for AAA (abdominal aortic aneurysm), History of smoking       omeprazole 20 MG CR capsule    priLOSEC     Take 20 mg by mouth daily.        senna-docusate  8.6-50 MG per tablet    SENOKOT-S;PERICOLACE    10 tablet    Take 1-2 tablets by mouth 2 times daily as needed for constipation Take while on oral narcotics to prevent or treat constipation.    Hypercalcemia, Primary hyperparathyroidism (H)       VITAMIN D (CHOLECALCIFEROL) PO      Take 1,000 Units by mouth every morning

## 2017-10-11 ENCOUNTER — OFFICE VISIT (OUTPATIENT)
Dept: OPHTHALMOLOGY | Facility: CLINIC | Age: 72
End: 2017-10-11
Attending: OPHTHALMOLOGY
Payer: MEDICARE

## 2017-10-11 ENCOUNTER — CARE COORDINATION (OUTPATIENT)
Dept: SURGERY | Facility: CLINIC | Age: 72
End: 2017-10-11

## 2017-10-11 DIAGNOSIS — L72.3 SEBACEOUS CYST: Primary | ICD-10-CM

## 2017-10-11 DIAGNOSIS — H01.01A ULCERATIVE BLEPHARITIS OF UPPER AND LOWER EYELIDS OF BOTH EYES: ICD-10-CM

## 2017-10-11 DIAGNOSIS — H01.01B ULCERATIVE BLEPHARITIS OF UPPER AND LOWER EYELIDS OF BOTH EYES: ICD-10-CM

## 2017-10-11 PROCEDURE — 99213 OFFICE O/P EST LOW 20 MIN: CPT | Mod: ZF

## 2017-10-11 RX ORDER — DOXYCYCLINE 100 MG/1
100 CAPSULE ORAL DAILY
Qty: 30 CAPSULE | Refills: 11 | Status: SHIPPED | OUTPATIENT
Start: 2017-10-11 | End: 2019-08-12

## 2017-10-11 RX ORDER — DOXYCYCLINE 100 MG/1
CAPSULE ORAL DAILY
COMMUNITY
End: 2018-01-31

## 2017-10-11 RX ORDER — DOXYCYCLINE 100 MG/1
100 CAPSULE ORAL DAILY
Qty: 30 CAPSULE | Refills: 11 | Status: SHIPPED | OUTPATIENT
Start: 2017-10-11 | End: 2017-10-11

## 2017-10-11 ASSESSMENT — REFRACTION_WEARINGRX
OD_SPHERE: -0.50
OD_ADD: +2.00
SPECS_TYPE: BIFOCAL
OS_CYLINDER: +0.50
OS_ADD: +2.00
OS_SPHERE: -0.50
OD_AXIS: 007
OS_AXIS: 180
OD_CYLINDER: +1.50

## 2017-10-11 ASSESSMENT — VISUAL ACUITY
METHOD: SNELLEN - LINEAR
CORRECTION_TYPE: GLASSES
OS_CC: 20/20
OD_CC: 20/20

## 2017-10-11 ASSESSMENT — EXTERNAL EXAM - LEFT EYE: OS_EXAM: NORMAL

## 2017-10-11 ASSESSMENT — TONOMETRY
OD_IOP_MMHG: 12
OS_IOP_MMHG: 14
IOP_METHOD: ICARE

## 2017-10-11 ASSESSMENT — CONF VISUAL FIELD
OD_NORMAL: 1
OS_NORMAL: 1

## 2017-10-11 ASSESSMENT — EXTERNAL EXAM - RIGHT EYE: OD_EXAM: NORMAL

## 2017-10-11 NOTE — PROGRESS NOTES
YING Velasco is a 71 year old male who presents in follow-up for eye dryness. He has noted improvement with his current regimen and his vision has gotten better.    POH: Dry eye/blepharitis  PMH: Diabetes type 2  FH: No FH of AMD or glc  SH: Former smoker    Assessment & Plan      (H04.123) Dry eye syndrome, bilateral  Comment: Using warm compresses and eyelid scrubs at least daily, ATs during day and ointment QHS, sometimes BID.  Did not tolerate azithromycin (constipation)  Plan:   Warm compresses and eyelid scrubs BID  ATs during day  Ointment QHS  Doxycycline 100mg daily  LL plug in place (1.0 OD, 0.9 OS)    -Consider starting Xiidra/Restasis if needed in the future    (E11.9,  Z01.00) Diabetic eye exam (HCC)  Comment: Annual exam at [facility]    (Z96.1) Pseudophakia of both eyes  Comment: Vision improved with ocular surface lubrication  Plan: Observe    (H52.13,  H52.203) Myopia with astigmatism and presbyopia, bilateral  Comment: Annual exam at [facility]  -----------------------------------------------------------------------------------    Patient disposition:   Return in 6 months for ocular surface check or sooner as needed.    Teaching statement:  Complete documentation of historical and exam elements from today's encounter can be found in the full encounter summary report (not reduplicated in this progress note). I personally obtained the chief complaint(s) and history of present illness.  I confirmed and edited as necessary the review of systems, past medical/surgical history, family history, social history, and examination findings as documented by others; and I examined the patient myself. I personally reviewed the relevant tests, images, and reports as documented above.     I formulated and edited as necessary the assessment and plan and discussed the findings and management plan with the patient and family.      Migdalia Hussein MD  Comprehensive Ophthalmology & Ocular Pathology  Department of  Ophthalmology and Visual Neurosciences  [email or web address]  Pager [phone]

## 2017-10-11 NOTE — LETTER
Lux Velasco  [address]   [facility] [address]      SURGERY PACKET            Your surgery is scheduled:    Date: November 3, 2017  ________________________________    Time: 1:30 PM  ________________________________    Please arrive at:  12:45 PM  ______________________    Surgeon's Name: Dr. Gambino  _______________________        Pre-Op Physical Fax Numbers:          [facility] Pre-Admissions  Fax: [phone]  Phone: [phone]        Your surgery is located at:  [facility]- 5th Floor  [address] [place] [address]  [phone]       Before Your Surgery  For Patients and Visitors at [place]    Welcome  As you get ready for surgery, you may have a lot of questions.  This brochure will help you know what to expect before and after surgery.  You and your family are the most important members of your health care team.  You will need to take an active role in your care.    Be sure to ask questions and learn all that you can about your surgery.  If you have any safety concerns, we urge you to tell a nurse as soon as possible.   This brochure is for information only.  It does not replace the advice of your doctor.  Always follow your doctor's advice.    Please tell us if you need a .    GETTING READY FOR SURGERY  Always follow your surgeon's instructions.  If you don't, your surgery could be canceled.  Please use the following checklist.    Within 30 Days of Surgery:    Have a pre-surgery physical exam with your family doctor or partner- this will be done by Dr. Gambino at the time of your procedure.  Tell the doctor if:    You are allergic to latex or rubber  (Latex and rubber gloves are often used in medical care).    You are taking any medicines (including aspirin), vitamins (Vitamin E, Fish Oil, Omegas) or herbal products.  You will need to stop taking some medicines 5 days before surgery.    You have any medical  problems (allergies, diabetes or heart disease, for example).    You have a pacemaker or an AICD (automatic implanted cardiac defibrillator).  If you do, please bring the ID card with you on the day of surgery.    You are a smoker.  People who smoke have a higher risk of infection after surgery.  Ask your doctor how you can quit smoking.  Within 7 days of Surgery:    Pre-register with the hospital.  Please use the hospital's phone number listed on the first page of this brochure.  Or, to register online, go to [email or web address].      Prior to your surgical procedure, a nurse will be contacting you to obtain a health history ([phone]).  Additionally, someone from the Admissions Department will also contact you for preregistration ([phone]).      The Day Before Surgery:     Call your surgeon if there are any changes in your health.  This includes signs of a cold or flu (such as a sore throat, runny nose, cough, rash or fever).    Do not smoke, drink alcohol or take over-the-counter medicine (unless your surgeon tells you to) for 24 hours before and after surgery.    If you take prescribed drugs:  You may need to stop them until after the surgery.  Follow your doctor's orders.  You may resume Aspirin and/or blood thinners after your surgery as directed by your physician/surgeon.    You may eat/drink as you wish- no restrictions.  A nurse will call you within a few days of surgery to go over these and other instructions.  If you do not hear from them, please call them at [phone]  The Day of Surgery:    Take a shower or bath the night before and the morning of surgery.  Use antiseptic soap or the soap your surgeon gave you.  Gently clean the skin.  Do not shave or scrub your surgery site.    Please remove deodorant, cologne, scented lotion, makeup, nail polish and jewelry (including rings and body piercings).  If you wear artificial nails, please remove at least one nail before coming to the  hospital.    Wear clean, loose clothing to the hospital.    Bring these items to the hospital:  1. Your insurance card.  2. Money for parking and co-pays (for medicines or the surgery), if needed.   3. A list of all the medicines you take.  Include vitamins, minerals, herbs and over-the-counter drugs.  Note any drug allergies.  4. A copy of your advance health care directive, if you have one.  This tells us what treatment you would want -- and who would make health care decisions -- if you could no longer speak for yourself.  You may request this form in advance or download it from www."[employer][email or web address].  5. A case for any glasses, contact lenses, hearing aids or dentures.  6. Your inhaler or CPAP machine, if you use these at home.  Leave extra cash, jewelry and other valuables at home.    When You Arrive:  When you get to the hospital, you will:    Check in.  If you are under age 18, you must be with a parent or legal guardian.    Sign consent forms, if you haven't already.  These forms state that you know the risks and benefits of surgery.  When you sign the forms, you give us permission to do the surgery.  Do not sign them unless you understand what will happen during and after your surgery.  If you have any questions about your surgery, ask to speak with your doctor before you sign the forms.  If you don't understand the answers, ask again.    Receive a copy of the Patients Bill of Rights.  If you do not receive a copy, please ask for one.    Change into hospital clothes.  Your belongings will be placed in a bag.  We will return them to you after surgery.    Meet with the anesthesia provider.  He or she will tell you what kind of anesthesia (medicine) will be used to keep you comfortable during surgery.  Remember: It's okay to remind doctors and nurses to wash their hands before touching you.   In most cases, your surgeon will use a marker to write his or her initials on the surgery site.  This ensures that  "the exact site is operated on.  For safety reasons, we will ask you the same questions many times.  For example, we may ask your name and birth date over and over again.  Friends and family can stay with you until it's time for surgery.  While you're in surgery, they will be in the waiting area.  Please note that cell phones are not allowed in some patient care areas.  If you have questions about what will happen in the operating room, talk to your care team.  After Surgery:    We will move you to a recovery room where we will watch you closely.  If you have any pain or discomfort, tell your nurse.  He or she will try to make you comfortable.      If you are going home we will move you to another room.  Friends and family may be able to join you.  The length of time you spend in recovery depends on the type of medicine you received, your medical condition, and the type of surgery you had.  Dealing with pain:  A nurse will check your comfort level often during your stay.  He or she will work with you to manage your pain.  Remember:    All pain is real.  There are many ways to control pain.  We can help you decide what works best for you.    Ask for pain medicine when you need it.  Don't try to \"tough it out\" -- this can make you feel worse.  Always take your medicine as ordered.    Medicine doesn't work the same for everyone.  If your medicine isn't working tell your nurse.  There may be other medicines or treatments we can try.  Going Home:  We will let you know when you're ready to leave the hospital.  Before you leave, we will tell you how to care for yourself at home and prevent infections.  If you do not understand something, please say so.  We will answer any questions you have.  We will then help you get ready to leave.  You must have an adult with you for the first 24 hours after you leave the hospital. Take it easy when you get home.  You will need some time to recover -- you may be more tired than you " realize at first.  Rest and relax for at least the first 24 hours at home.  You'll feel better and heal faster if you take good care of yourself.                                                                              Pre-Operative Surgery Scrub    Purpose:   The skin harbors a large variety of bacteria, both infectious and noninfectious.  Showering with an antiseptic soap prior to an invasive procedure will decrease the number of transient and resident (good and bad) bacteria that is normally found on the skin.    Procedure:      Shower or bathe with 1/2 of the bottle of antiseptic soap (enclosed) the evening before and 1/2 of the bottle the morning of surgery (bathing the day of the procedure is most important).       Apply the soap at full strength (use the entire bottle).  Gently clean the skin, rinse, and dry with a clean towel that is freshly laundered (out of the dryer) and then put on clean clothing that is freshly laundered.        We have given you information regarding pre-op showering.  We recommend that patients wash with an antiseptic soap prior to surgery.  This cleanser will be given to you at the clinic or mailed to your home.  It is advised that you liberally wash the specific area surgery area the night before, and again in the morning before the surgery.  Do not apply lotion afterward.  We would like to keep the skin as clean as possible.    Thank you for following these important instructions.      You have been scheduled for surgery and we would like to give you some information that will assist in helping get the best possible outcome.      Before Surgery:   If for any reason you decide not to have the surgery, please contact our office.  We can easily cancel or reschedule the procedure. Please call the  at [phone].      Any pain related to the surgery that occurs before the surgery needs to be reported and managed by your primary care or referring doctor.      Please keep  in mind that the time of surgery is subject to change.  Make sure you have nothing to eat or drink after midnight.  If your surgery is later in the afternoon, this recommendation might change, but not until the day before surgery after the actual time of the surgery has been established.      After Surgery:  When you are discharged from the recovery room, the nurses will review instructions with you and your caregiver.      Please wash your hands every time you touch the wound or change bandages or dressings.      Do not submerge the wound in water.  You may not use a bathtub or hot tub until the wound is closed.  The wait time frame is generally 2-3 weeks but any open area can be a source of incoming bacteria, so it is better to be on the safe side and avoid the tub until your wound is fully healed.      You may take a shower 24 hours after surgery.  Double check with your surgeon if it is ok for water to run over a wound, whether it has been sutured, stapled, glued or is open.  You may gently wash the wound using the antiseptic soap provided for your pre-surgery showering (do not use a washcloth).  Any mild soap will work as well.      Many surgical wounds will have small white strips of tape on them called Steri Strips.  Do not remove these.  The edges will curl and fall off within 7-10 days with normal showering.      If you are going home with sutures (stitches) or staples, you must return to the clinic to have them taken out, usually within 1-2 weeks.      Signs and symptoms of infection include:  1. Fever, temperature over 101.5 ' F  2. Redness  3. Swelling  4. Increasing pain  5. Green or yellow drainage which may or may not have a foul odor.    Symptoms of infection need to be reported to your surgery office. Please call the nurse at [phone], Option #3.    If you or your  are deaf or hard of hearing, or prefer a language other than English, please let us know.  We have many free services,  including interpreters and other aids to help you communicate. You may ask for help  through any member of your care team or by calling Language Services at [phone], option 2.

## 2017-10-11 NOTE — NURSING NOTE
Chief Complaints and History of Present Illnesses   Patient presents with     Follow Up For     Dry eye syndrome, bilateral     HPI    Affected eye(s):  Both   Symptoms:     No blurred vision   No decreased vision   Dryness         Do you have eye pain now?:  No      Comments:  Follow up for Dry eye syndrome, bilateral.    The patient is using the lids scrubs in the AM, the ketotifen fumarate twice daily,  Refresh Plus three times daily and the warm compresses   daily.    NATALIA Amanda 10:37 AM 10/11/2017

## 2017-10-11 NOTE — MR AVS SNAPSHOT
After Visit Summary   10/11/2017    Lux Velasco    MRN: [id number]           Patient Information     Date Of Birth          1945        Visit Information        Provider Department      10/11/2017 10:30 AM Migdalia Hussein MD [facility]        Today's Diagnoses     Ulcerative blepharitis of upper and lower eyelids of both eyes           Follow-ups after your visit        Follow-up notes from your care team     Return in about 6 months (around 4/11/2018) for ocular surface check.      Your next 10 appointments already scheduled     Nov 03, 2017   Procedure with Marybeth Gambino MD   [facility] ([facility])    [address] [place] [address]  [address]th [facility] [address]   [phone]           Located in the Clinics and Surgery Center at [address] [place] [address].   parking is very convenient and highly recommended.  is a $6 flat rate fee.  Both  and self parkers should enter the main arrival plaza from [facility]; parking attendants will direct you based on your parking preference.            Nov 22, 2017 12:55 PM CST   (Arrive by 12:40 PM)   Return Visit with Kenia Carranza MD   [facility] ([facility])    [address] [place] [address]  [address]rd [facility] [address]   [phone]            Dec 06, 2017 10:45 AM CST   (Arrive by 10:30 AM)   Return Visit with Corinne Gleason PA-C   [facility] ([facility])    [address] [place] [address]  3rd [facility] [address]   [phone]            Jan 08, 2018  1:00 PM CST   (Arrive by 12:45 PM)   Return Visit with Sofiya Contreras PA-C   [facility] ([facility])    [address] [place] [address]  [address]th [facility] [address]   [phone]            Apr 11, 2018 10:30 AM CDT    RETURN GENERAL with Migdalia Hussein MD   Eye Clinic ([facility])    Shahbaz Webb g  [address] [facility]  9th Fl Clin [address]  [facility] [id number] [phone]              Who to contact     Please call your clinic at [phone] to:    Ask questions about your health    Make or cancel appointments    Discuss your medicines    Learn about your test results    Speak to your doctor   If you have compliments or concerns about an experience at your clinic, or if you wish to file a complaint, please contact [facility] Patient Relations at [phone] or email us at [email or web address]         Additional Information About Your Visit        [employer] Information     [employer] is an electronic gateway that provides easy, online access to your medical records. With [employer], you can request a clinic appointment, read your test results, renew a prescription or communicate with your care team.     To sign up for [employer] visit the website at [email or web address]   You will be asked to enter the access code listed below, as well as some personal information. Please follow the directions to create your username and password.     Your access code is: [email or web address]  Expires: 2017  6:30 AM     Your access code will  in 90 days. If you need help or a new code, please contact your [facility] or call [phone] for assistance.        Care EveryWhere ID     This is your Care EveryWhere ID. This could be used by other organizations to access your [facility] medical records  [id number]         Blood Pressure from Last 3 Encounters:   10/10/17 122/71   10/10/17 122/71   17 134/80    Weight from Last 3 Encounters:   10/10/17 126 kg (277 lb 12.8 oz)   10/10/17 125.9 kg (277 lb 8 oz)   17 115.2 kg (254 lb)              Today, you had the following     No orders found for display         Today's Medication Changes           These changes are accurate as of: 10/11/17 11:43 AM.  If you have any questions, ask your nurse or doctor.               These medicines have changed or have updated prescriptions.        Dose/Directions    * doxycycline 100 MG capsule   Commonly known as:  VIBRAMYCIN   This may have changed:  Another medication with the same name was added. Make sure you understand how and when to take each.   Changed by:  Migdalia Hussein MD        Take by mouth daily   Refills:  0       * doxycycline 100 MG capsule   Commonly known as:  VIBRAMYCIN   This may have changed:  You were already taking a medication with the same name, and this prescription was added. Make sure you understand how and when to take each.   Used for:  Ulcerative blepharitis of upper and lower eyelids of both eyes   Changed by:  Migdalia Hussein MD        Dose:  100 mg   Take 1 capsule (100 mg) by mouth daily   Quantity:  30 capsule   Refills:  11       losartan 50 MG tablet   Commonly known as:  COZAAR   This may have changed:  when to take this   Used for:  Benign essential hypertension        Dose:  50 mg   Take 1 tablet (50 mg) by mouth daily   Quantity:  30 tablet   Refills:  3       * Notice:  This list has 2 medication(s) that are the same as other medications prescribed for you. Read the directions carefully, and ask your doctor or other care provider to review them with you.         Where to get your medicines      Some of these will need a paper prescription and others can be bought over the counter.  Ask your nurse if you have questions.     Bring a paper prescription for each of these medications     doxycycline 100 MG capsule                Primary Care Provider Office Phone # Fax #    Nam Duffy [phone]       [address] [address]        Equal Access to Services     SANDHYA DYE AH: Christiana Alicia, janel marshall, taty borrero  laparul gambino. So [facility] [phone].    ATENCIÓN: Si habla damon, tiene a back disposición servicios gratuitos de asistencia lingüística. Haydee darling [phone].    We comply with applicable federal civil rights laws and [place] laws. We do not discriminate on the basis of race, color, national origin, age, disability, sex, sexual orientation, or gender identity.            Thank you!     Thank you for choosing [facility]  for your care. Our goal is always to provide you with excellent care. Hearing back from our patients is one way we can continue to improve our services. Please take a few minutes to complete the written survey that you may receive in the mail after your visit with us. Thank you!             Your Updated Medication List - Protect others around you: Learn how to safely use, store and throw away your medicines at www.disposemymeds.org.          This list is accurate as of: 10/11/17 11:43 AM.  Always use your most recent med list.                   Brand Name Dispense Instructions for use Diagnosis    acetaminophen 500 MG Caps      Take 2 tablets by mouth daily.        acetic acid-hydrocortisone otic solution    ACETASOL HC    10 mL    Place 4 drops into both ears 2 times daily.    Otitis externa       albuterol 108 (90 BASE) MCG/ACT Inhaler    PROAIR HFA    1 Inhaler    Inhale 2 puffs into the lungs every 4 hours as needed for shortness of breath / dyspnea, wheezing or other (use prior to exertion/activities)    Shortness of breath       alfuzosin 10 MG 24 hr tablet    UROXATRAL    90 tablet    Take 1 tablet (10 mg) by mouth daily    Lower urinary tract symptoms (LUTS)       ammonium lactate 12 % cream    AMLACTIN     Apply  topically daily.        amoxicillin-clavulanate 875-125 MG per tablet    AUGMENTIN     Take 1 tablet by mouth 2 times daily    Cough       artificial tears Soln      Use one drop to both eye PRN.        calcium carbonate 1250 (500 CA) MG Tabs tablet    OSCAL 500    60 tablet    Take  2 tablets once a day with food.    Hypocalcemia       clindamycin 150 MG capsule    CLEOCIN     Take 150 mg by mouth 3 times daily    Muscle spasm, Other fatigue       * doxycycline 100 MG capsule    VIBRAMYCIN     Take by mouth daily        * doxycycline 100 MG capsule    VIBRAMYCIN    30 capsule    Take 1 capsule (100 mg) by mouth daily    Ulcerative blepharitis of upper and lower eyelids of both eyes       finasteride 5 MG tablet    PROSCAR    90 tablet    Take 1 tablet (5 mg) by mouth daily    Lower urinary tract symptoms (LUTS)       FLUTICASONE PROPIONATE NA      Spray 50 mcg in nostril    Cough       guaiFENesin-codeine 100-10 MG/5ML Soln solution    ROBITUSSIN AC    420 mL    Take 5-10 mLs by mouth every 4 hours as needed for cough    Cough       * ketotifen 0.025 % Soln ophthalmic solution    ZADITOR/REFRESH ANTI-ITCH     Place 1 drop into both eyes 2 times daily        * ketotifen 0.025 % Soln ophthalmic solution    ZADITOR/REFRESH ANTI-ITCH     Place 1 drop into both eyes 2 times daily        losartan 50 MG tablet    COZAAR    30 tablet    Take 1 tablet (50 mg) by mouth daily    Benign essential hypertension       METFORMIN HCL PO      Take by mouth 2 times daily (with meals)    Benign nodular prostatic hyperplasia without lower urinary tract symptoms, Need for prophylactic vaccination against Streptococcus pneumoniae (pneumococcus), Screening for AAA (abdominal aortic aneurysm), History of smoking       omeprazole 20 MG CR capsule    priLOSEC     Take 20 mg by mouth daily.        senna-docusate 8.6-50 MG per tablet    SENOKOT-S;PERICOLACE    10 tablet    Take 1-2 tablets by mouth 2 times daily as needed for constipation Take while on oral narcotics to prevent or treat constipation.    Hypercalcemia, Primary hyperparathyroidism (H)       VITAMIN D (CHOLECALCIFEROL) PO      Take 1,000 Units by mouth every morning        * Notice:  This list has 4 medication(s) that are the same as other medications  prescribed for you. Read the directions carefully, and ask your doctor or other care provider to review them with you.

## 2017-10-12 ENCOUNTER — TELEPHONE (OUTPATIENT)
Dept: INTERNAL MEDICINE | Facility: CLINIC | Age: 72
End: 2017-10-12

## 2017-10-12 DIAGNOSIS — D69.6 PLATELETS DECREASED (H): Primary | ICD-10-CM

## 2017-10-12 DIAGNOSIS — R73.09 INCREASED GLUCOSE LEVEL: ICD-10-CM

## 2017-10-12 DIAGNOSIS — D50.0 IRON DEFICIENCY ANEMIA DUE TO CHRONIC BLOOD LOSS: ICD-10-CM

## 2017-10-12 NOTE — TELEPHONE ENCOUNTER
Sent letter future testing    Dear Lux;    Your lab results are attached and I have some recommendations:    (1) Your glucose is elevated as well as the A1C test. This may well be related to early diabetes. I recommend continued exercise and watch your diet and we should follow up on this in a month or so.    (2) You have some mild anemia and lower platelets  And I recommend we check some additional tests and and an abdominal ultrasound. I have placed future orders for this today    (3) I also recommend you see me back after above in a month or so to review all your tests    You can call [phone] to schedule all these appointments.    JIM Duffy MD

## 2017-10-30 ENCOUNTER — TRANSFERRED RECORDS (OUTPATIENT)
Dept: HEALTH INFORMATION MANAGEMENT | Facility: CLINIC | Age: 72
End: 2017-10-30

## 2017-11-03 ENCOUNTER — HOSPITAL ENCOUNTER (OUTPATIENT)
Facility: AMBULATORY SURGERY CENTER | Age: 72
End: 2017-11-03
Attending: SURGERY

## 2017-11-03 VITALS
DIASTOLIC BLOOD PRESSURE: 70 MMHG | TEMPERATURE: 98.6 F | OXYGEN SATURATION: 99 % | SYSTOLIC BLOOD PRESSURE: 120 MMHG | RESPIRATION RATE: 16 BRPM

## 2017-11-03 DIAGNOSIS — Z98.890 HX OF REMOVAL OF CYST: Primary | ICD-10-CM

## 2017-11-03 LAB — GLUCOSE BLDC GLUCOMTR-MCNC: 116 MG/DL (ref 70–99)

## 2017-11-03 PROCEDURE — 88304 TISSUE EXAM BY PATHOLOGIST: CPT | Performed by: SURGERY

## 2017-11-03 RX ORDER — BUPIVACAINE HYDROCHLORIDE 5 MG/ML
INJECTION, SOLUTION PERINEURAL PRN
Status: DISCONTINUED | OUTPATIENT
Start: 2017-11-03 | End: 2017-11-03 | Stop reason: HOSPADM

## 2017-11-03 RX ORDER — MAGNESIUM HYDROXIDE 1200 MG/15ML
LIQUID ORAL PRN
Status: DISCONTINUED | OUTPATIENT
Start: 2017-11-03 | End: 2017-11-03 | Stop reason: HOSPADM

## 2017-11-03 RX ORDER — OXYCODONE AND ACETAMINOPHEN 5; 325 MG/1; MG/1
1-2 TABLET ORAL EVERY 6 HOURS PRN
Qty: 20 TABLET | Refills: 0 | Status: SHIPPED | OUTPATIENT
Start: 2017-11-03 | End: 2018-01-31

## 2017-11-03 NOTE — IP AVS SNAPSHOT
[facility]    [address] [place] [address]    Phone:  [phone]    Fax:  [phone]                                       After Visit Summary   11/3/2017    Lux Velasco    MRN: [id number]           After Visit Summary Signature Page     I have received my discharge instructions, and my questions have been answered. I have discussed any challenges I see with this plan with the nurse or doctor.    ..........................................................................................................................................  Patient/Patient Representative Signature      ..........................................................................................................................................  Patient Representative Print Name and Relationship to Patient    ..................................................               ................................................  Date                                            Time    ..........................................................................................................................................  Reviewed by Signature/Title    ...................................................              ..............................................  Date                                                            Time

## 2017-11-03 NOTE — PROGRESS NOTES
Patient seen and evaluated.  Cyst area of left lower back examined.  There is no sign of active infection.  There is an opening with leakage of cyst contents when pressure placed on surrounding skin. Risks of surgery including recurrence discussed.  He is understanding of discussion and agreeable to proceed.  He is also understanding of the fact that I am running behind and there will be a delay.

## 2017-11-03 NOTE — DISCHARGE INSTRUCTIONS
[facility]  Home Care Following Your Procedure  Call a doctor if you have signs of infection (fever, growing tenderness at the surgery site, a large amount of drainage or bleeding, severe pain, foul-smelling drainage, redness, swelling).  Your doctor is:  Dr. Marybeth Gambino, General Surgery: [phone]                                    Or dial [phone] and ask for the resident on call for:  General Surgery  For emergency care, call the:  [place]:  [phone] (TTY for hearing impaired: [phone])  Caring for Your Incision    You ll need to care for your incision after surgery and certain medical procedures. To close an incision, your doctor used sutures (stitches), steri-strips, staples, or dermabond. Follow the tips on this sheet to help heal and prevent infection of your incision.   Types of Incision Closure:    Surgical Sutures (stitches) are placed by sewing the edges of an incision together with surgical thread. Sutures are either absorbable or non-absorbable. Absorbable sutures break down in the body over time. Non-absorbable sutures need to be removed.     Steri-strips are made of adhesive (sticky) material to help hold the edges of an incision together. Steri-strips usually fall off by themselves in 7 to 10 days.     Surgical Staples are made or steel or titanium. They are often used to close shallow incisions. They are not used on certain body areas, such as the face and hands. This is because these areas have nerves that are close to the surface. Staples are usually removed within a week.     Dermabond (skin glue) is used to close a cut or small incision. The skin glue is less painful than stitches (sutures). In some cases, a lower layer of skin may be sutured before Dermabond is applied. The skin glue closes the cut/incision within a few minutes. It also provides a water-resistant covering. No bandage is required. Dermabond peels off on its own within  5 to 10 days.  Home Care for Your  Incision:    Keep the incision clean and dry. You should bathe only as directed by the doctor. It is okay to wash around the incision, but don t spray water directly on it.     Check the incision site daily for pain, redness, drainage, swelling, or separation of the incision edges.     If you have a dressing over the incision, change the dressing as directed by the doctor.    Make sure any clothing that touches the incision is loose fitting. This will prevent rubbing. If the incision is on the head, avoid wearing caps or other head coverings. These may rub against the incision.    Avoid rough play, contact sports, or physical activities. This can put you at risk of opening an incision.     As your incision heals, the skin may appear pink or red. It may also feel slightly bumpy or raised. This is called a healing ridge. Over time, the color should fade and the raised skin will become less noticeable.   Call the doctor right away if you have any of the following:    Increased pain, redness, drainage, swelling or bleeding at the incision site    Numbness, coldness or tingling around the incision site  Fever of 101 F degrees or higher

## 2017-11-03 NOTE — IP AVS SNAPSHOT
MRN:[id number]                      After Visit Summary   11/3/2017    Lux Velasco    MRN: [id number]           Thank you!     Thank you for choosing [facility] for your care. Our goal is always to provide you with excellent care. Hearing back from our patients is one way we can continue to improve our services. Please take a few minutes to complete the written survey that you may receive in the mail after you visit with us. Thank you!        Patient Information     Date Of Birth          1945        About your hospital stay     You were admitted on:  November 3, 2017 You last received care in the:  [facility]    You were discharged on:  November 3, 2017       Who to Call     For medical emergencies, please call 911.  For non-urgent questions about your medical care, please call your primary care provider or clinic, [phone]  For questions related to your surgery, please call your surgery clinic        Attending Provider     Provider Specialty    Marybeth Gambino MD General Surgery       Primary Care Provider Office Phone # Fax #    Nam Duffy [phone]      Your next 10 appointments already scheduled     Nov 22, 2017 12:55 PM CST   (Arrive by 12:40 PM)   Return Visit with Kenia Carranza MD   [facility] ([facility])    [address] [place] [id number] [phone]            Nov 24, 2017 10:05 AM CST   (Arrive by 9:50 AM)   Return Visit with Nam Duffy MD   [facility] ([facility])    [address] [place] [id number] [phone]            Dec 06, 2017 10:45 AM CST   (Arrive by 10:30 AM)   Return Visit with Corinne Gleason PA-C   [facility]at[facility] ([facility])    [address] [place] [address]  [address]   [phone]            Jan 08, 2018  1:00 PM CST   (Arrive by 12:45 PM)   Return Visit with Sofiya Contreras PA-C   [facility] ([facility])    [address]  4th Floor  [facility] [address] [phone]            Apr 11, 2018 10:30 AM CDT   RETURN GENERAL with Migdalia Hussein MD   Eye Clinic ([facility])    [address]  [address]  [address]  [facility] [address] [phone]              Further instructions from your care team       [facility]  Home Care Following Your Procedure  Call a doctor if you have signs of infection (fever, growing tenderness at the surgery site, a large amount of drainage or bleeding, severe pain, foul-smelling drainage, redness, swelling).  Your doctor is:  Dr. Marybeth Gambino, General Surgery: [phone]                                    Or dial [phone] and ask for the resident on call for:  General Surgery  For emergency care, call the:  [place]:  [phone] (TTY for hearing impaired: [phone])  Caring for Your Incision    You ll need to care for your incision after surgery and certain medical procedures. To close an incision, your doctor used sutures (stitches), steri-strips, staples, or dermabond. Follow the tips on this sheet to help heal and prevent infection of your incision.   Types of Incision Closure:    Surgical Sutures (stitches) are placed by sewing the edges of an incision together with surgical thread. Sutures are either absorbable or non-absorbable. Absorbable sutures break down in the body over time. Non-absorbable sutures need to be removed.     Steri-strips are made of adhesive (sticky) material to help hold the edges of an incision together. Steri-strips usually fall off by themselves in 7 to 10 days.     Surgical Staples are made or steel or titanium. They are often used  to close shallow incisions. They are not used on certain body areas, such as the face and hands. This is because these areas have nerves that are close to the surface. Staples are usually removed within a week.     Dermabond (skin glue) is used to close a cut or small incision. The skin glue is less painful than stitches (sutures). In some cases, a lower layer of skin may be sutured before Dermabond is applied. The skin glue closes the cut/incision within a few minutes. It also provides a water-resistant covering. No bandage is required. Dermabond peels off on its own within 5 to 10 days.  Home Care for Your  Incision:    Keep the incision clean and dry. You should bathe only as directed by the doctor. It is okay to wash around the incision, but don t spray water directly on it.     Check the incision site daily for pain, redness, drainage, swelling, or separation of the incision edges.     If you have a dressing over the incision, change the dressing as directed by the doctor.    Make sure any clothing that touches the incision is loose fitting. This will prevent rubbing. If the incision is on the head, avoid wearing caps or other head coverings. These may rub against the incision.    Avoid rough play, contact sports, or physical activities. This can put you at risk of opening an incision.     As your incision heals, the skin may appear pink or red. It may also feel slightly bumpy or raised. This is called a healing ridge. Over time, the color should fade and the raised skin will become less noticeable.   Call the doctor right away if you have any of the following:    Increased pain, redness, drainage, swelling or bleeding at the incision site    Numbness, coldness or tingling around the incision site  Fever of 101 F degrees or higher                  Pending Results     No orders found from 11/1/2017 to 11/4/2017.            Admission Information     Date & Time Provider Department Dept. Phone    11/3/2017  Marybeth Gambino MD [facility] [phone]      Your Vitals Were     Blood Pressure Temperature Respirations Pulse Oximetry          142/79 97.4  F (36.3  C) (Oral) 16 98%        MyChart Information     [employer]Ot is an electronic gateway that provides easy, online access to your medical records. With [email or web address], you can request a clinic appointment, read your test results, renew a prescription or communicate with your care team.     To sign up for OQOt visit the website at [email or web address]   You will be asked to enter the access code listed below, as well as some personal information. Please follow the directions to create your username and password.     Your access code is: [email or web address]  Expires: 2017  6:30 AM     Your access code will  in 90 days. If you need help or a new code, please contact your [facility] or call [phone] for assistance.        Care EveryWhere ID     This is your Care EveryWhere ID. This could be used by other organizations to access your [facility] medical records  [id number]        Equal Access to Services     SANDHYA DYE : Hadii suresh Alicia, waelijah marshall, qahusam kaalperez blanco, taty chase . So [facility] [phone].    ATENCIÓN: Si habla español, tiene a back disposición servicios gratuitos de asistencia lingüística. Llame al [phone].    We comply with applicable federal civil rights laws and [place] laws. We do not discriminate on the basis of race, color, national origin, age, disability, sex, sexual orientation, or gender identity.               Review of your medicines      UNREVIEWED medicines. Ask your doctor about these medicines        Dose / Directions    acetaminophen 500 MG Caps        Dose:  2 tablet   Take 2 tablets by mouth daily.   Refills:  0       acetic acid-hydrocortisone otic solution   Commonly known as:  ACETASOL HC   Used  for:  Otitis externa        Dose:  4 drop   Place 4 drops into both ears 2 times daily.   Quantity:  10 mL   Refills:  10       albuterol 108 (90 BASE) MCG/ACT Inhaler   Commonly known as:  PROAIR HFA   Used for:  Shortness of breath        Dose:  2 puff   Inhale 2 puffs into the lungs every 4 hours as needed for shortness of breath / dyspnea, wheezing or other (use prior to exertion/activities)   Quantity:  1 Inhaler   Refills:  3       alfuzosin 10 MG 24 hr tablet   Commonly known as:  UROXATRAL   Used for:  Lower urinary tract symptoms (LUTS)        Dose:  10 mg   Take 1 tablet (10 mg) by mouth daily   Quantity:  90 tablet   Refills:  3       ammonium lactate 12 % cream   Commonly known as:  AMLACTIN        Apply  topically daily.   Refills:  0       amoxicillin-clavulanate 875-125 MG per tablet   Commonly known as:  AUGMENTIN   Used for:  Cough        Dose:  1 tablet   Take 1 tablet by mouth 2 times daily   Refills:  0       artificial tears Soln        Use one drop to both eye PRN.   Refills:  0       calcium carbonate 1250 (500 CA) MG Tabs tablet   Commonly known as:  OSCAL 500   Used for:  Hypocalcemia        Take 2 tablets once a day with food.   Quantity:  60 tablet   Refills:  11       clindamycin 150 MG capsule   Commonly known as:  CLEOCIN   Used for:  Muscle spasm, Other fatigue        Dose:  150 mg   Take 150 mg by mouth 3 times daily   Refills:  0       * doxycycline 100 MG capsule   Commonly known as:  VIBRAMYCIN        Take by mouth daily   Refills:  0       * doxycycline 100 MG capsule   Commonly known as:  VIBRAMYCIN   Used for:  Ulcerative blepharitis of upper and lower eyelids of both eyes        Dose:  100 mg   Take 1 capsule (100 mg) by mouth daily   Quantity:  30 capsule   Refills:  11       finasteride 5 MG tablet   Commonly known as:  PROSCAR   Used for:  Lower urinary tract symptoms (LUTS)        Dose:  5 mg   Take 1 tablet (5 mg) by mouth daily   Quantity:  90 tablet   Refills:  3        FLUTICASONE PROPIONATE NA   Used for:  Cough        Dose:  50 mcg   Spray 50 mcg in nostril   Refills:  0       guaiFENesin-codeine 100-10 MG/5ML Soln solution   Commonly known as:  ROBITUSSIN AC   Used for:  Cough        Dose:  1-2 tsp.   Take 5-10 mLs by mouth every 4 hours as needed for cough   Quantity:  420 mL   Refills:  0       * ketotifen 0.025 % Soln ophthalmic solution   Commonly known as:  ZADITOR/REFRESH ANTI-ITCH        Dose:  1 drop   Place 1 drop into both eyes 2 times daily   Refills:  0       * ketotifen 0.025 % Soln ophthalmic solution   Commonly known as:  ZADITOR/REFRESH ANTI-ITCH        Dose:  1 drop   Place 1 drop into both eyes 2 times daily   Refills:  0       losartan 50 MG tablet   Commonly known as:  COZAAR   Used for:  Benign essential hypertension        Dose:  50 mg   Take 1 tablet (50 mg) by mouth daily   Quantity:  30 tablet   Refills:  3       METFORMIN HCL PO   Used for:  Benign nodular prostatic hyperplasia without lower urinary tract symptoms, Need for prophylactic vaccination against Streptococcus pneumoniae (pneumococcus), Screening for AAA (abdominal aortic aneurysm), History of smoking        Take by mouth 2 times daily (with meals)   Refills:  0       omeprazole 20 MG CR capsule   Commonly known as:  priLOSEC        Dose:  20 mg   Take 20 mg by mouth daily.   Refills:  0       senna-docusate 8.6-50 MG per tablet   Commonly known as:  SENOKOT-S;PERICOLACE   Used for:  Hypercalcemia, Primary hyperparathyroidism (H)        Dose:  1-2 tablet   Take 1-2 tablets by mouth 2 times daily as needed for constipation Take while on oral narcotics to prevent or treat constipation.   Quantity:  10 tablet   Refills:  0       VITAMIN D (CHOLECALCIFEROL) PO        Dose:  1000 Units   Take 1,000 Units by mouth every morning   Refills:  0       * Notice:  This list has 4 medication(s) that are the same as other medications prescribed for you. Read the directions carefully, and ask your doctor or  other care provider to review them with you.             Protect others around you: Learn how to safely use, store and throw away your medicines at www.disposemymeds.org.             Medication List: This is a list of all your medications and when to take them. Check marks below indicate your daily home schedule. Keep this list as a reference.      Medications           Morning Afternoon Evening Bedtime As Needed    acetaminophen 500 MG Caps   Take 2 tablets by mouth daily.                                acetic acid-hydrocortisone otic solution   Commonly known as:  ACETASOL HC   Place 4 drops into both ears 2 times daily.                                albuterol 108 (90 BASE) MCG/ACT Inhaler   Commonly known as:  PROAIR HFA   Inhale 2 puffs into the lungs every 4 hours as needed for shortness of breath / dyspnea, wheezing or other (use prior to exertion/activities)                                alfuzosin 10 MG 24 hr tablet   Commonly known as:  UROXATRAL   Take 1 tablet (10 mg) by mouth daily                                ammonium lactate 12 % cream   Commonly known as:  AMLACTIN   Apply  topically daily.                                amoxicillin-clavulanate 875-125 MG per tablet   Commonly known as:  AUGMENTIN   Take 1 tablet by mouth 2 times daily                                artificial tears Soln   Use one drop to both eye PRN.                                calcium carbonate 1250 (500 CA) MG Tabs tablet   Commonly known as:  OSCAL 500   Take 2 tablets once a day with food.                                clindamycin 150 MG capsule   Commonly known as:  CLEOCIN   Take 150 mg by mouth 3 times daily                                * doxycycline 100 MG capsule   Commonly known as:  VIBRAMYCIN   Take by mouth daily                                * doxycycline 100 MG capsule   Commonly known as:  VIBRAMYCIN   Take 1 capsule (100 mg) by mouth daily                                finasteride 5 MG tablet   Commonly  known as:  PROSCAR   Take 1 tablet (5 mg) by mouth daily                                FLUTICASONE PROPIONATE NA   Spray 50 mcg in nostril                                guaiFENesin-codeine 100-10 MG/5ML Soln solution   Commonly known as:  ROBITUSSIN AC   Take 5-10 mLs by mouth every 4 hours as needed for cough                                * ketotifen 0.025 % Soln ophthalmic solution   Commonly known as:  ZADITOR/REFRESH ANTI-ITCH   Place 1 drop into both eyes 2 times daily                                * ketotifen 0.025 % Soln ophthalmic solution   Commonly known as:  ZADITOR/REFRESH ANTI-ITCH   Place 1 drop into both eyes 2 times daily                                losartan 50 MG tablet   Commonly known as:  COZAAR   Take 1 tablet (50 mg) by mouth daily                                METFORMIN HCL PO   Take by mouth 2 times daily (with meals)                                omeprazole 20 MG CR capsule   Commonly known as:  priLOSEC   Take 20 mg by mouth daily.                                senna-docusate 8.6-50 MG per tablet   Commonly known as:  SENOKOT-S;PERICOLACE   Take 1-2 tablets by mouth 2 times daily as needed for constipation Take while on oral narcotics to prevent or treat constipation.                                VITAMIN D (CHOLECALCIFEROL) PO   Take 1,000 Units by mouth every morning                                * Notice:  This list has 4 medication(s) that are the same as other medications prescribed for you. Read the directions carefully, and ask your doctor or other care provider to review them with you.

## 2017-11-06 ENCOUNTER — CARE COORDINATION (OUTPATIENT)
Dept: SURGERY | Facility: CLINIC | Age: 72
End: 2017-11-06

## 2017-11-06 NOTE — PROGRESS NOTES
RN Post-Op/Post-Discharge Care Coordination Note    Mr. Lux Velasco is a 71 year old male who underwent excision of a left flank mass on 11/3 with  Dr. Marybeth Gambino.  Spoke with Patient.    Support  Patient able to care for self independently- daughter helping with wound assessment/drsg if needed     Health Status  Fevers/chills: Patient denies any fever or chills.  Nausea/Vomiting: Patient reports nausea and vomiting.  Eating/drinking: Patient is able to eat and drink without any complaints.  Drains (DAVE): N/A  Incisions: Patient denies any signs and symptoms of infection.  Sutures intact without redness or drainage  Pain: Minimal and improving.  No longer taking Percocet or analgesics.  New Medications:  Percocet    Activity/Restrictions  No restrictions    Equipment  None    Pathology reviewed with patient:  No: Not yet available    All of his questions were answered.  He will call this office if he has any further questions and/or concerns.      External sutures need to be removed 11/17 or 11/20.  Patient may be hunting and would like to be called 11/16 to arrange suture removal.    Whom and When to Call  Patient acknowledges understanding of how to manage any medication changes and   when to seek medical care.     Patient advised that if after hour medical concerns arise to please call [phone] and choose option 4 to speak to the physician on call.     A copy of this note was routed to the primary surgeon.

## 2017-11-08 LAB — COPATH REPORT: NORMAL

## 2017-11-13 NOTE — OP NOTE
DATE OF PROCEDURE:  2017      PREOPERATIVE DIAGNOSIS:  Mass.      POSTOPERATIVE DIAGNOSIS:  Mass.      ANESTHESIA:  Local.      ESTIMATED BLOOD LOSS:  40 mL.      INDICATIONS FOR PROCEDURE:  Mandy Velasco is a 71-year-old healthy male with a cyst that was infected recently with some drainage.  He received antibiotics and now presents for excision of the area to minimize risk of recurrence.  The patient has been doing well since being off the antibiotics.  Risks and benefits of procedure were discussed with the patient and he was agreeable to proceed.      DESCRIPTION OF PROCEDURE:  The patient was placed in a lateral position and the area was prepped and draped in the usual sterile fashion.  The area that was affected was palpated and a 5 cm long elliptical excision was made after local had been infiltrated.  Incision was carried through the skin and then included some underlying soft tissue.  All tissue that was dissected through was healthy-appearing.  Once the specimen was removed, it was examined on the back table and the opening of the skin was probed and no hole in the capsule was noted.  The wound was irrigated.  Good hemostasis.  Then proceded with closing the wound with several 3-0 Vicryls in the dermis and 6 individual 2-0 nylon vertical mattress sutures in the skin.  The patient tolerated the procedure well.  Dressings were applied.  The patient was taken to recovery in good condition.         GLORY CYR MD             D: 2017 15:38   T: 2017 20:21   MT: LQ      Name:     MANDY VELASCO   MRN:      [id number]        Account:        [id number]   :      1945           Procedure Date: 2017      Document: [id number]

## 2017-11-15 ENCOUNTER — CARE COORDINATION (OUTPATIENT)
Dept: SURGERY | Facility: CLINIC | Age: 72
End: 2017-11-15

## 2017-11-15 ENCOUNTER — OFFICE VISIT (OUTPATIENT)
Dept: SURGERY | Facility: CLINIC | Age: 72
End: 2017-11-15

## 2017-11-15 VITALS
BODY MASS INDEX: 40.26 KG/M2 | HEIGHT: 70 IN | HEART RATE: 64 BPM | TEMPERATURE: 98.1 F | SYSTOLIC BLOOD PRESSURE: 145 MMHG | OXYGEN SATURATION: 97 % | DIASTOLIC BLOOD PRESSURE: 82 MMHG | WEIGHT: 281.2 LBS

## 2017-11-15 DIAGNOSIS — Z09 FOLLOW-UP EXAMINATION FOLLOWING SURGERY: Primary | ICD-10-CM

## 2017-11-15 ASSESSMENT — PAIN SCALES - GENERAL: PAINLEVEL: MILD PAIN (3)

## 2017-11-15 NOTE — LETTER
11/15/2017       RE: Lux Velasco  [address]   [facility] [address]     Dear Colleague,    Thank you for referring your patient, Lux Velasco, to the [facility] SURGERY at [facility]. Please see a copy of my visit note below.    patinet here for followup after removal of cyst on back.  Doing well.  Stitches are bothering him.  No fevers/chills.  No drainage noted.  On exam, he has a well healing wound.  Stitches removed without difficulty.  Dressing applied.    A/p - normal postoperative recovery.  No further f/u needed unless new issues arise.    Again, thank you for allowing me to participate in the care of your patient.      Sincerely,    Marybeth Gambino MD

## 2017-11-15 NOTE — NURSING NOTE
"Chief Complaint   Patient presents with     Surgical Followup     Post op wound check, suture removal       Vitals:    11/15/17 1520   BP: 145/82   Pulse: 64   Temp: 98.1  F (36.7  C)   TempSrc: Oral   SpO2: 97%   Weight: 281 lb 3.2 oz   Height: 5' 10\"       Body mass index is 40.35 kg/(m^2).    Mario Huffman CMA                          "

## 2017-11-15 NOTE — PROGRESS NOTES
Called and spoke with patient about scheduling an appointment. Discussed that Dr. Gambino is seeing a patient in clinic today. Patient states that he could come in today for evaluation of his wound and to have his sutures removed. He is scheduled to see Dr. Gambino 11/15 at 3:30.      Susan Gómez Dawn Ann, RN; Phillip Rob RN        Phone Number: [phone]                     Pt calling wanting to get his stitches removed from  and was not happy with first available on 11/22/17. Pt is asking for a call back about possible stitch removal this week.   Pt can be reached at [phone]     Thank you,   Susan

## 2017-11-16 NOTE — PROGRESS NOTES
patinet here for followup after removal of cyst on back.  Doing well.  Stitches are bothering him.  No fevers/chills.  No drainage noted.  On exam, he has a well healing wound.  Stitches removed without difficulty.  Dressing applied.    A/p - normal postoperative recovery.  No further f/u needed unless new issues arise.

## 2017-11-30 ENCOUNTER — OFFICE VISIT (OUTPATIENT)
Dept: INTERNAL MEDICINE | Facility: CLINIC | Age: 72
End: 2017-11-30

## 2017-11-30 ENCOUNTER — OFFICE VISIT (OUTPATIENT)
Dept: PULMONOLOGY | Facility: CLINIC | Age: 72
End: 2017-11-30
Attending: INTERNAL MEDICINE
Payer: MEDICARE

## 2017-11-30 VITALS
DIASTOLIC BLOOD PRESSURE: 75 MMHG | OXYGEN SATURATION: 96 % | HEART RATE: 64 BPM | SYSTOLIC BLOOD PRESSURE: 130 MMHG | RESPIRATION RATE: 16 BRPM

## 2017-11-30 DIAGNOSIS — R35.1 BENIGN PROSTATIC HYPERPLASIA WITH NOCTURIA: Primary | ICD-10-CM

## 2017-11-30 DIAGNOSIS — Z12.5 ENCOUNTER FOR SCREENING FOR MALIGNANT NEOPLASM OF PROSTATE: ICD-10-CM

## 2017-11-30 DIAGNOSIS — N40.1 BENIGN PROSTATIC HYPERPLASIA WITH NOCTURIA: ICD-10-CM

## 2017-11-30 DIAGNOSIS — Z77.098 HISTORY OF AGENT ORANGE EXPOSURE: ICD-10-CM

## 2017-11-30 DIAGNOSIS — E66.01 MORBID OBESITY (H): ICD-10-CM

## 2017-11-30 DIAGNOSIS — J45.20 MILD INTERMITTENT ASTHMA, UNSPECIFIED WHETHER COMPLICATED: Primary | ICD-10-CM

## 2017-11-30 DIAGNOSIS — D69.6 PLATELETS DECREASED (H): ICD-10-CM

## 2017-11-30 DIAGNOSIS — D50.0 IRON DEFICIENCY ANEMIA DUE TO CHRONIC BLOOD LOSS: ICD-10-CM

## 2017-11-30 DIAGNOSIS — R73.09 INCREASED GLUCOSE LEVEL: ICD-10-CM

## 2017-11-30 DIAGNOSIS — R06.09 DYSPNEA ON EXERTION: ICD-10-CM

## 2017-11-30 DIAGNOSIS — N40.1 BENIGN PROSTATIC HYPERPLASIA WITH NOCTURIA: Primary | ICD-10-CM

## 2017-11-30 DIAGNOSIS — R35.1 BENIGN PROSTATIC HYPERPLASIA WITH NOCTURIA: ICD-10-CM

## 2017-11-30 LAB
ALBUMIN SERPL-MCNC: 3.6 G/DL (ref 3.4–5)
ALP SERPL-CCNC: 80 U/L (ref 40–150)
ALT SERPL W P-5'-P-CCNC: 56 U/L (ref 0–70)
ANION GAP SERPL CALCULATED.3IONS-SCNC: 8 MMOL/L (ref 3–14)
AST SERPL W P-5'-P-CCNC: 25 U/L (ref 0–45)
BASOPHILS # BLD AUTO: 0 10E9/L (ref 0–0.2)
BASOPHILS NFR BLD AUTO: 0.6 %
BILIRUB SERPL-MCNC: 0.3 MG/DL (ref 0.2–1.3)
BUN SERPL-MCNC: 16 MG/DL (ref 7–30)
CALCIUM SERPL-MCNC: 9.1 MG/DL (ref 8.5–10.1)
CHLORIDE SERPL-SCNC: 107 MMOL/L (ref 94–109)
CO2 SERPL-SCNC: 26 MMOL/L (ref 20–32)
CREAT SERPL-MCNC: 0.82 MG/DL (ref 0.66–1.25)
DIFFERENTIAL METHOD BLD: ABNORMAL
EOSINOPHIL # BLD AUTO: 0.3 10E9/L (ref 0–0.7)
EOSINOPHIL NFR BLD AUTO: 5.3 %
ERYTHROCYTE [DISTWIDTH] IN BLOOD BY AUTOMATED COUNT: 14.2 % (ref 10–15)
FERRITIN SERPL-MCNC: 172 NG/ML (ref 26–388)
FOLATE SERPL-MCNC: 12.6 NG/ML
GFR SERPL CREATININE-BSD FRML MDRD: >90 ML/MIN/1.7M2
GLUCOSE SERPL-MCNC: 119 MG/DL (ref 70–99)
HBA1C MFR BLD: 6.3 % (ref 4.3–6)
HBV CORE AB SERPL QL IA: NONREACTIVE
HBV SURFACE AB SERPL IA-ACNC: 0.26 M[IU]/ML
HBV SURFACE AG SERPL QL IA: NONREACTIVE
HCT VFR BLD AUTO: 37.9 % (ref 40–53)
HCV AB SERPL QL IA: NONREACTIVE
HGB BLD-MCNC: 11.9 G/DL (ref 13.3–17.7)
IMM GRANULOCYTES # BLD: 0 10E9/L (ref 0–0.4)
IMM GRANULOCYTES NFR BLD: 0.2 %
IRON SATN MFR SERPL: 22 % (ref 15–46)
IRON SERPL-MCNC: 61 UG/DL (ref 35–180)
LYMPHOCYTES # BLD AUTO: 1.6 10E9/L (ref 0.8–5.3)
LYMPHOCYTES NFR BLD AUTO: 32.7 %
MCH RBC QN AUTO: 27.4 PG (ref 26.5–33)
MCHC RBC AUTO-ENTMCNC: 31.4 G/DL (ref 31.5–36.5)
MCV RBC AUTO: 87 FL (ref 78–100)
MONOCYTES # BLD AUTO: 0.3 10E9/L (ref 0–1.3)
MONOCYTES NFR BLD AUTO: 6.5 %
NEUTROPHILS # BLD AUTO: 2.7 10E9/L (ref 1.6–8.3)
NEUTROPHILS NFR BLD AUTO: 54.7 %
NRBC # BLD AUTO: 0 10*3/UL
NRBC BLD AUTO-RTO: 0 /100
PLATELET # BLD AUTO: 141 10E9/L (ref 150–450)
POTASSIUM SERPL-SCNC: 4.4 MMOL/L (ref 3.4–5.3)
PROT SERPL-MCNC: 7.3 G/DL (ref 6.8–8.8)
PSA SERPL-ACNC: 0.06 UG/L (ref 0–4)
RBC # BLD AUTO: 4.34 10E12/L (ref 4.4–5.9)
RETICS # AUTO: 67.7 10E9/L (ref 25–95)
RETICS/RBC NFR AUTO: 1.6 % (ref 0.5–2)
SODIUM SERPL-SCNC: 140 MMOL/L (ref 133–144)
TIBC SERPL-MCNC: 276 UG/DL (ref 240–430)
VIT B12 SERPL-MCNC: 355 PG/ML (ref 193–986)
WBC # BLD AUTO: 4.9 10E9/L (ref 4–11)

## 2017-11-30 PROCEDURE — 86803 HEPATITIS C AB TEST: CPT | Performed by: INTERNAL MEDICINE

## 2017-11-30 PROCEDURE — 82746 ASSAY OF FOLIC ACID SERUM: CPT | Performed by: INTERNAL MEDICINE

## 2017-11-30 PROCEDURE — 86706 HEP B SURFACE ANTIBODY: CPT | Performed by: INTERNAL MEDICINE

## 2017-11-30 PROCEDURE — G0499 HEPB SCREEN HIGH RISK INDIV: HCPCS | Performed by: INTERNAL MEDICINE

## 2017-11-30 PROCEDURE — 86704 HEP B CORE ANTIBODY TOTAL: CPT | Performed by: INTERNAL MEDICINE

## 2017-11-30 RX ORDER — FLUTICASONE PROPIONATE 110 UG/1
1 AEROSOL, METERED RESPIRATORY (INHALATION) 2 TIMES DAILY
Qty: 3 INHALER | Refills: 1 | Status: SHIPPED | OUTPATIENT
Start: 2017-11-30 | End: 2018-10-29 | Stop reason: ALTCHOICE

## 2017-11-30 ASSESSMENT — PAIN SCALES - GENERAL: PAINLEVEL: NO PAIN (0)

## 2017-11-30 NOTE — PROGRESS NOTES
Pulmonary Clinic Return Visit  History of Present Illness    Mr. Lux Velasco is a 71-year old male with history of CAD/MI, CVA in 2003 (no sequela), obesity (BMI 40), who presents to pulmonary clinic today for follow up of SOB.  He was previously seen by one of our fellows in August 2017 for initial consultation.  At that time a CT chest from 5/2017 was reviewed and notable for slightly thickened bronchi at lung bases and mosaic attenuation.  Dr. Carranza felt that his shortness of breath was multi-factorial in etiology and due in parts to asthma, obesity, and deconditioning.  She recommended PPI, albuterol as needed and regular aerobic exercise.    The patient returns to clinic today feeling relatively symptomatically unchanged. He continues to have trouble breathing including dyspnea on exertion as well as some shortness of breath at rest.  His dyspnea has not worsened since his last visit though. He endorses a mild occasional cough productive of clear phlegm. He did try the albuterol inhaler a few times but stopped using it as he felt it didn't help at all. He denies any fevers, chills, or recent upper or lower respiratory infections. He continues to have occasional chest pain which occurs independent of his shortness of breath. He has plans to discuss this with his PCP at an upcoming appointment.  Review of medical records suggests he has continued to gain weight (BMI 36 > 40) since July.  He is trying to improve his functional status. He has joined the [email or web address] and has been walking some.  He has a history of agent orange exposure and wonders whether any of his respiratory symptoms could be related to this.  [facility] symptoms associated with his exposure.      Review of Systems:  10 of 14 systems reviewed and are negative unless otherwise stated in HPI.    Past Medical History:   Diagnosis Date     Diabetes mellitus type II 2005      History of agent Orange exposure 4/17/2013     Hypertension      Myocardial infarct 01/01/1999     Primary hyperparathyroidism (H) Dx approx 2003     Stroke (H) 01/01/1998    recovered fully       Past Surgical History:   Procedure Laterality Date     BIOPSY OF SKIN LESION       BYPASS GRAFT ARTERY CORONARY       CATARACT IOL, RT/LT Bilateral 2017    [facility]     EXCISE MASS LOWER EXTREMITY Left 11/3/2017    Procedure: EXCISE MASS LOWER EXTREMITY;  Excision Mass Left Flank;  Surgeon: Marybeth Gambino MD;  Location:  OR     MOHS MICROGRAPHIC PROCEDURE       PARATHYROIDECTOMY N/A 3/21/2017    Procedure: PARATHYROIDECTOMY;  Surgeon: Julianna Short MD;  Location: [facility] OR     PARATHYROIDECTOMY         Family History   Problem Relation Age of Onset     Melanoma Mother      Melanoma Father      CANCER No family hx of      no skin cancer     Glaucoma No family hx of      Macular Degeneration No family hx of        Social History     Social History     Marital status:      Spouse name: N/A     Number of children: N/A     Years of education: N/A     Social History Main Topics     Smoking status: Former Smoker     Quit date: 1970     Smokeless tobacco: Former User     Alcohol use Yes      Comment: rarely     Drug use: No     Sexual activity: Not Asked     Other Topics Concern     None     Social History Narrative         Allergies   Allergen Reactions     Clindamycin      Eggs Other (See Comments)     Pt states no longer having reaction, childhood allergy, eats eggs       Penicillins Other (See Comments)     Pt states PCN allergy is due to egg allergy     Propoxyphene Other (See Comments)     Pain         Current Outpatient Prescriptions:      fluticasone (FLOVENT HFA) 110 MCG/ACT Inhaler, Inhale 1 puff into the lungs 2 times daily, Disp: 3 Inhaler, Rfl: 1     oxyCODONE-acetaminophen (PERCOCET) 5-325 MG per tablet, Take 1-2 tablets by mouth every 6 hours as needed for moderate to severe pain or  pain (moderate to severe), Disp: 20 tablet, Rfl: 0     ketotifen (ZADITOR/REFRESH ANTI-ITCH) 0.025 % SOLN ophthalmic solution, Place 1 drop into both eyes 2 times daily, Disp: , Rfl:      doxycycline (VIBRAMYCIN) 100 MG capsule, Take by mouth daily, Disp: , Rfl:      ketotifen (ZADITOR/REFRESH ANTI-ITCH) 0.025 % SOLN ophthalmic solution, Place 1 drop into both eyes 2 times daily, Disp: , Rfl:      doxycycline (VIBRAMYCIN) 100 MG capsule, Take 1 capsule (100 mg) by mouth daily, Disp: 30 capsule, Rfl: 11     clindamycin (CLEOCIN) 150 MG capsule, Take 150 mg by mouth 3 times daily, Disp: , Rfl:      albuterol (PROAIR HFA) 108 (90 BASE) MCG/ACT Inhaler, Inhale 2 puffs into the lungs every 4 hours as needed for shortness of breath / dyspnea, wheezing or other (use prior to exertion/activities), Disp: 1 Inhaler, Rfl: 3     alfuzosin (UROXATRAL) 10 MG 24 hr tablet, Take 1 tablet (10 mg) by mouth daily, Disp: 90 tablet, Rfl: 3     finasteride (PROSCAR) 5 MG tablet, Take 1 tablet (5 mg) by mouth daily, Disp: 90 tablet, Rfl: 3     amoxicillin-clavulanate (AUGMENTIN) 875-125 MG per tablet, Take 1 tablet by mouth 2 times daily, Disp: , Rfl:      calcium carbonate (OSCAL 500) 1250 (500 CA) MG TABS tablet, Take 2 tablets once a day with food., Disp: 60 tablet, Rfl: 11     senna-docusate (SENOKOT-S;PERICOLACE) 8.6-50 MG per tablet, Take 1-2 tablets by mouth 2 times daily as needed for constipation Take while on oral narcotics to prevent or treat constipation., Disp: 10 tablet, Rfl: 0     guaiFENesin-codeine (ROBITUSSIN AC) 100-10 MG/5ML SOLN solution, Take 5-10 mLs by mouth every 4 hours as needed for cough, Disp: 420 mL, Rfl: 0     FLUTICASONE PROPIONATE NA, Spray 50 mcg in nostril, Disp: , Rfl:      losartan (COZAAR) 50 MG tablet, Take 1 tablet (50 mg) by mouth daily (Patient taking differently: Take 50 mg by mouth every morning ), Disp: 30 tablet, Rfl: 3     VITAMIN D, CHOLECALCIFEROL, PO, Take 1,000 Units by mouth every  morning , Disp: , Rfl:      METFORMIN HCL PO, Take by mouth 2 times daily (with meals), Disp: , Rfl:      acetaminophen 500 MG CAPS, Take 2 tablets by mouth daily., Disp: , Rfl:      acetic acid-hydrocortisone (ACETASOL HC) otic solution, Place 4 drops into both ears 2 times daily., Disp: 10 mL, Rfl: 10     ammonium lactate (AMLACTIN) 12 % cream, Apply  topically daily., Disp: , Rfl:      artificial tears SOLN, Use one drop to both eye PRN., Disp: , Rfl:      omeprazole (PRILOSEC) 20 MG capsule, Take 20 mg by mouth daily., Disp: , Rfl:       Physical Exam:  /75 (BP Location: Right arm, Patient Position: Chair, Cuff Size: Adult Large)  Pulse 64  Resp 16  SpO2 96%  GENERAL: Well developed, well nourished, alert, and in no apparent distress.  HEENT: Normocephalic, atraumatic. PERRL, EOMI. Oral mucosa is moist. No perioral cyanosis.  NECK: supple, no masses, no thyromegaly.  RESP:  Normal respiratory effort.  CTAB.  No rales, wheezes, rhonchi.  Normal to percussion.  No cyanosis or clubbing.  CV: Normal S1, S2, regular rhythm, normal rate. No murmur.  No LE edema.   ABDOMEN:  Soft, non-tender, non-distended.   SKIN: warm and dry. No rash.  NEURO: AAOx3.  Normal gait.  No focal neuro deficits.  PSYCH: mentation appears normal. and affect normal/bright    Results:  No new results.    Assessment and Plan:   Lux Velasco is a 71 year old male with a history of  CAD/MI, CVA in 2003 (no sequela), obesity (BMI 36), who presents to pulmonary clinic today for follow up of SOB.  Since he was last seen in clinic he very briefly used his albuterol but stopped this due to no perceived benefit. He is otherwise symptomatically unchanged and persistent shortness of breath but that dyspnea and at rest. A agree with the previous assessment in that his shortness of breath is likely multifactorial in etiology due in part to possible underlying asthma given findings of inflammation and mosaic attenuation on his CT, as well as due to  obesity and deconditioning. I have again stressed to him the importance of weight loss as well as regular aerobic activity. I have a clotted his efforts at walking at the [facility] and encouraged him to keep this up. In regards to his likely asthma, I have provided him with a prescription for a low-dose inhaled corticosteroid to be used daily to see if this will help his symptoms. 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 I explained to him that asthma is an extremely common diagnosis and that there is no definitive test that I know of to prove causation between the development of asthma and his exposures.  I told him I did not know what the [facility] decision would be on this, but that I would defer to them and their judgement.  Questions and concerns were answered to the patient's satisfaction.  He was provided with my contact information should new questions or concerns arise in the interim.  he should return to clinic in 6 months for follow up.  He is up to date on a seasonal influenza vaccine, pneumovax (2005), and Prevnar (2013).     Nya Jacques MD  Pulmonary and Critical Care Medicine    The above note was dictated using voice recognition software and may include typographical errors. Please contact the author for any clarifications.  I spent a total of 30 minutes face to face with Lux Velasco during today's office visit. Over 50% of this time was spent counseling the patient and/or coordinating care regarding their pulmonary disease.

## 2017-11-30 NOTE — MR AVS SNAPSHOT
After Visit Summary   11/30/2017    Lux Velasco    MRN: [id number]           Patient Information     Date Of Birth          1945        Visit Information        Provider Department      11/30/2017 10:00 AM María Jacques MD [facility]        Today's Diagnoses     Mild intermittent asthma, unspecified whether complicated    -  1    Dyspnea on exertion           Follow-ups after your visit        Follow-up notes from your care team     Return in about 6 months (around 5/30/2018).      Your next 10 appointments already scheduled     Nov 30, 2017 11:30 AM CST   (Arrive by 11:15 AM)   Return Visit with Nam Duffy MD   [facility] ([facility])    [address] [place] [address]  [address] [facility] [address]   [phone]            Dec 06, 2017 10:45 AM CST   (Arrive by 10:30 AM)   Return Visit with Corinne Gleason PA-C   [facility] ([facility])    [address] [place] [address]  [address] [facility] [address]   [phone]            Jan 08, 2018  1:00 PM CST   (Arrive by 12:45 PM)   Return Visit with Sofiya Contreras PA-C   [facility] ([facility])    [address] [place] [address]  [address] [facility] [address] [phone]            Apr 11, 2018 10:30 AM CDT   RETURN GENERAL with Migdalia Hussein MD   Eye Clinic ([facility])    [address]  [address] [facility]  [address] [address]  [facility] [address]   [phone]            May 31, 2018 11:00 AM CDT   (Arrive by 10:45 AM)   Return Visit with María Jacques MD   [facility] ([facility])    [address] [place] [address]  [address] [facility] [id number] [phone]              Who to contact     If you have questions or need follow up information about today's clinic visit or  "your schedule please contact [facility] directly at [phone].  Normal or non-critical lab and imaging results will be communicated to you by MyChart, letter or phone within 4 business days after the clinic has received the results. If you do not hear from us within 7 days, please contact the clinic through [employer] or phone. If you have a critical or abnormal lab result, we will notify you by phone as soon as possible.  Submit refill requests through [employer] or call your pharmacy and they will forward the refill request to us. Please allow 3 business days for your refill to be completed.          Additional Information About Your Visit        Cure[employer]achar[employer]iny Post Information     Sigasi lets you send messages to your doctor, view your test results, renew your prescriptions, schedule appointments and more. To sign up, go to [email or web address] . Click on \"Log in\" on the left side of the screen, which will take you to the Welcome page. Then click on \"Sign up Now\" on the right side of the page.     You will be asked to enter the access code listed below, as well as some personal information. Please follow the directions to create your username and password.     Your access code is: [email or web address]  Expires: 2018  6:30 AM     Your access code will  in 90 days. If you need help or a new code, please call your [facility] clinic or [phone].        Care EveryWhere ID     This is your Care EveryWhere ID. This could be used by other organizations to access your [facility] medical records  [id number]        Your Vitals Were     Pulse Respirations Pulse Oximetry             64 16 96%          Blood Pressure from Last 3 Encounters:   17 130/75   11/15/17 145/82   17 120/70    Weight from Last 3 Encounters:   11/15/17 127.6 kg (281 lb 3.2 oz)   10/10/17 126 kg (277 lb 12.8 oz)   10/10/17 125.9 kg (277 lb 8 oz)              Today, you had the following     No orders found for " display         Today's Medication Changes          These changes are accurate as of: 11/30/17 11:07 AM.  If you have any questions, ask your nurse or doctor.               Start taking these medicines.        Dose/Directions    fluticasone 110 MCG/ACT Inhaler   Commonly known as:  FLOVENT HFA   Used for:  Mild intermittent asthma, unspecified whether complicated, Dyspnea on exertion   Started by:  María Jacques MD        Dose:  1 puff   Inhale 1 puff into the lungs 2 times daily   Quantity:  3 Inhaler   Refills:  1         These medicines have changed or have updated prescriptions.        Dose/Directions    losartan 50 MG tablet   Commonly known as:  COZAAR   This may have changed:  when to take this   Used for:  Benign essential hypertension        Dose:  50 mg   Take 1 tablet (50 mg) by mouth daily   Quantity:  30 tablet   Refills:  3            Where to get your medicines      Some of these will need a paper prescription and others can be bought over the counter.  Ask your nurse if you have questions.     Bring a paper prescription for each of these medications     fluticasone 110 MCG/ACT Inhaler                Primary Care Provider Office Phone # Fax #    Nam Duffy [phone] [address] [address]        Equal Access to Services     [facility] AH: Hadii suresh traore hadgayathrio Sokailee, waaxda luqadaha, qaybta kaalmada adejerardoyada, taty gambino. So [facility] [phone].    ATENCIÓN: Si habla español, tiene a back disposición servicios gratuitos de asistencia lingüística. Llame al [phone].    We comply with applicable federal civil rights laws and [place] laws. We do not discriminate on the basis of race, color, national origin, age, disability, sex, sexual orientation, or gender identity.            Thank you!     Thank you for choosing [facility]  for your care. Our goal is always to provide you  with excellent care. Hearing back from our patients is one way we can continue to improve our services. Please take a few minutes to complete the written survey that you may receive in the mail after your visit with us. Thank you!             Your Updated Medication List - Protect others around you: Learn how to safely use, store and throw away your medicines at www.disposemymeds.org.          This list is accurate as of: 11/30/17 11:07 AM.  Always use your most recent med list.                   Brand Name Dispense Instructions for use Diagnosis    acetaminophen 500 MG Caps      Take 2 tablets by mouth daily.        acetic acid-hydrocortisone otic solution    ACETASOL HC    10 mL    Place 4 drops into both ears 2 times daily.    Otitis externa       albuterol 108 (90 BASE) MCG/ACT Inhaler    PROAIR HFA    1 Inhaler    Inhale 2 puffs into the lungs every 4 hours as needed for shortness of breath / dyspnea, wheezing or other (use prior to exertion/activities)    Shortness of breath       alfuzosin 10 MG 24 hr tablet    UROXATRAL    90 tablet    Take 1 tablet (10 mg) by mouth daily    Lower urinary tract symptoms (LUTS)       ammonium lactate 12 % cream    AMLACTIN     Apply  topically daily.        amoxicillin-clavulanate 875-125 MG per tablet    AUGMENTIN     Take 1 tablet by mouth 2 times daily    Cough       artificial tears Soln      Use one drop to both eye PRN.        calcium carbonate 1250 (500 CA) MG Tabs tablet    OSCAL 500    60 tablet    Take 2 tablets once a day with food.    Hypocalcemia       clindamycin 150 MG capsule    CLEOCIN     Take 150 mg by mouth 3 times daily    Muscle spasm, Other fatigue       * doxycycline 100 MG capsule    VIBRAMYCIN     Take by mouth daily        * doxycycline 100 MG capsule    VIBRAMYCIN    30 capsule    Take 1 capsule (100 mg) by mouth daily    Ulcerative blepharitis of upper and lower eyelids of both eyes       finasteride 5 MG tablet    PROSCAR    90 tablet    Take 1  tablet (5 mg) by mouth daily    Lower urinary tract symptoms (LUTS)       fluticasone 110 MCG/ACT Inhaler    FLOVENT HFA    3 Inhaler    Inhale 1 puff into the lungs 2 times daily    Mild intermittent asthma, unspecified whether complicated, Dyspnea on exertion       FLUTICASONE PROPIONATE NA      Spray 50 mcg in nostril    Cough       guaiFENesin-codeine 100-10 MG/5ML Soln solution    ROBITUSSIN AC    420 mL    Take 5-10 mLs by mouth every 4 hours as needed for cough    Cough       * ketotifen 0.025 % Soln ophthalmic solution    ZADITOR/REFRESH ANTI-ITCH     Place 1 drop into both eyes 2 times daily        * ketotifen 0.025 % Soln ophthalmic solution    ZADITOR/REFRESH ANTI-ITCH     Place 1 drop into both eyes 2 times daily        losartan 50 MG tablet    COZAAR    30 tablet    Take 1 tablet (50 mg) by mouth daily    Benign essential hypertension       METFORMIN HCL PO      Take by mouth 2 times daily (with meals)    Benign nodular prostatic hyperplasia without lower urinary tract symptoms, Need for prophylactic vaccination against Streptococcus pneumoniae (pneumococcus), Screening for AAA (abdominal aortic aneurysm), History of smoking       omeprazole 20 MG CR capsule    priLOSEC     Take 20 mg by mouth daily.        oxyCODONE-acetaminophen 5-325 MG per tablet    PERCOCET    20 tablet    Take 1-2 tablets by mouth every 6 hours as needed for moderate to severe pain or pain (moderate to severe)    Hx of removal of cyst       senna-docusate 8.6-50 MG per tablet    SENOKOT-S;PERICOLACE    10 tablet    Take 1-2 tablets by mouth 2 times daily as needed for constipation Take while on oral narcotics to prevent or treat constipation.    Hypercalcemia, Primary hyperparathyroidism (H)       VITAMIN D (CHOLECALCIFEROL) PO      Take 1,000 Units by mouth every morning        * Notice:  This list has 4 medication(s) that are the same as other medications prescribed for you. Read the directions carefully, and ask your doctor or  other care provider to review them with you.

## 2017-11-30 NOTE — PROGRESS NOTES
HPI:    Pt. Comes in for follow up today. He had parathyroid surgery with Dr. Short 3/21/17 for hypercalciemia. He was seen in Dermatology 11/28/16.  No other HEENT, cardiopulmonary, abdominal, , neurological complaints. No F/C/NS. No F/C/NS.  He is eating his normal diet.    PE:    Vitals noted gen nad cooperative alert,  HEENT, ears normal oropharynx clear no exudate, neck supple nl rom no adenopathy, he has healing horizontal scar in the lower neck, no tenderness, no bruising, no neck swelling,  LCTA, B, RRR, S1, S2, Grossly normal neurological exam. Lower L back with healed surgical scar, no drainage, no erythema, no swelling and no tenderness.. R heel w/o erythema, no swelling; minimal tenderness.       A/P:    1. Follow up parathyroid surgery. He was seen by Dr. Chou, Endo 4/5/17 and by Dr. Short 4/21/17; labs stable  2. He was seen in  Pulmonary 8/30/17 and again today by Dr. Jacques 11/30/17. He was given a Rx. For daily flovent  3. PSA checked 12/16. Ordered PSA todasy  4. Colonoscopy at [facility] 8/15 repeat in 10 years.   5. He was seen  7/17 in Urology for BPH and has follow up 1/8/2018  6. BP stable today  7. He had minimal non-obstructive coronary CTA 5/23/17.  8. Back cyst. He had surgery with Dr. Gambino 11/3/17. Doing well.   9. Seeing [place] ortho for R heel pain on 10/30/17 and note scanned into EMR.  10. He has egg allergy and does not get flu vaccination.   11. Ordered A1C for elevated glucose for today  12. Ordered labs 10/12/17 for mild anemia and lower platelets and he will do this today    Total time spent 25 minutes.  More than 50% of the time spent with Mr. Velasco on counseling / coordinating his care

## 2017-11-30 NOTE — LETTER
Patient:  Lux Velasco  :   1945  MRN:     [id number]        Mr. Lux Velasco  [address]   [place] [address]        2017    Dear Mr. Velasco,    Thank you for choosing the [facility] for your healthcare needs.  We appreciate the opportunity to serve you.    The following are your recent test results.     Dear Lux;     The results of your laboratory tests are attached and you have a lower hemoglobin and platelets. I have the following recommendations:     (1) Schedule and complete the ordered (17) abdominal ultrasound     (2) I recommend you see the hematology doctors for the laboratory abnormalities and I placed a Hematology referral today     (3) I would like to see you back in my clinic afterwards to go over all the results.     You can call [phone] to schedule ALL these appointments.       JIM Duffy MD     Results for orders placed or performed in visit on 17   Hemoglobin A1c   Result Value Ref Range    Hemoglobin A1C 6.3 (H) 4.3 - 6.0 %   CBC with platelets differential   Result Value Ref Range    WBC 4.9 4.0 - 11.0 10e9/L    RBC Count 4.34 (L) 4.4 - 5.9 10e12/L    Hemoglobin 11.9 (L) 13.3 - 17.7 g/dL    Hematocrit 37.9 (L) 40.0 - 53.0 %    MCV 87 78 - 100 fl    MCH 27.4 26.5 - 33.0 pg    MCHC 31.4 (L) 31.5 - 36.5 g/dL    RDW 14.2 10.0 - 15.0 %    Platelet Count 141 (L) 150 - 450 10e9/L    Diff Method Automated Method     % Neutrophils 54.7 %    % Lymphocytes 32.7 %    % Monocytes 6.5 %    % Eosinophils 5.3 %    % Basophils 0.6 %    % Immature Granulocytes 0.2 %    Nucleated RBCs 0 0 /100    Absolute Neutrophil 2.7 1.6 - 8.3 10e9/L    Absolute Lymphocytes 1.6 0.8 - 5.3 10e9/L    Absolute Monocytes 0.3 0.0 - 1.3 10e9/L    Absolute Eosinophils 0.3 0.0 - 0.7 10e9/L    Absolute Basophils 0.0 0.0 - 0.2 10e9/L    Abs Immature Granulocytes 0.0 0 - 0.4 10e9/L    Absolute Nucleated RBC 0.0    Reticulocyte count    Result Value Ref Range    % Retic 1.6 0.5 - 2.0 %    Absolute Retic 67.7 25 - 95 10e9/L   Comprehensive metabolic panel   Result Value Ref Range    Sodium 140 133 - 144 mmol/L    Potassium 4.4 3.4 - 5.3 mmol/L    Chloride 107 94 - 109 mmol/L    Carbon Dioxide 26 20 - 32 mmol/L    Anion Gap 8 3 - 14 mmol/L    Glucose 119 (H) 70 - 99 mg/dL    Urea Nitrogen 16 7 - 30 mg/dL    Creatinine 0.82 0.66 - 1.25 mg/dL    GFR Estimate >90 >60 mL/min/1.7m2    GFR Estimate If Black >90 >60 mL/min/1.7m2    Calcium 9.1 8.5 - 10.1 mg/dL    Bilirubin Total 0.3 0.2 - 1.3 mg/dL    Albumin 3.6 3.4 - 5.0 g/dL    Protein Total 7.3 6.8 - 8.8 g/dL    Alkaline Phosphatase 80 40 - 150 U/L    ALT 56 0 - 70 U/L    AST 25 0 - 45 U/L   Iron and iron binding capacity   Result Value Ref Range    Iron 61 35 - 180 ug/dL    Iron Binding Cap 276 240 - 430 ug/dL    Iron Saturation Index 22 15 - 46 %   Ferritin   Result Value Ref Range    Ferritin 172 26 - 388 ng/mL   Vitamin B12   Result Value Ref Range    Vitamin B12 355 193 - 986 pg/mL   Folate   Result Value Ref Range    Folate 12.6 >5.4 ng/mL   Hepatitis B core antibody   Result Value Ref Range    Hepatitis B Core Madelyn Nonreactive NR^Nonreactive   Hepatitis B Surface Antibody   Result Value Ref Range    Hepatitis B Surface Antibody 0.26 <8.00 m[IU]/mL   Hepatitis B surface antigen   Result Value Ref Range    Hep B Surface Agn Nonreactive NR^Nonreactive   HCV Screen with Reflex   Result Value Ref Range    Hepatitis C Antibody Nonreactive NR^Nonreactive     Please contact your provider if you have any questions or concerns.  We look forward to serving your needs in the future.

## 2017-11-30 NOTE — LETTER
11/30/2017       RE: Lux Velasco  [address]   [facility] [address]     Dear Colleague,    Thank you for referring your patient, Lux Velasco, to the [facility]. Please see a copy of my visit note below.    Pulmonary Clinic Return Visit  History of Present Illness    Mr. Lux Velasco is a 71-year old male with history of CAD/MI, CVA in 2003 (no sequela), obesity (BMI 40), who presents to pulmonary clinic today for follow up of SOB.  He was previously seen by one of our fellows in August 2017 for initial consultation.  At that time a CT chest from 5/2017 was reviewed and notable for slightly thickened bronchi at lung bases and mosaic attenuation.  Dr. Carranza felt that his shortness of breath was multi-factorial in etiology and due in parts to asthma, obesity, and deconditioning.  She recommended PPI, albuterol as needed and regular aerobic exercise.    The patient returns to clinic today feeling relatively symptomatically unchanged. He continues to have trouble breathing including dyspnea on exertion as well as some shortness of breath at rest.  His dyspnea has not worsened since his last visit though. He endorses a mild occasional cough productive of clear phlegm. He did try the albuterol inhaler a few times but stopped using it as he felt it didn't help at all. He denies any fevers, chills, or recent upper or lower respiratory infections. He continues to have occasional chest pain which occurs independent of his shortness of breath. He has plans to discuss this with his PCP at an upcoming appointment.  Review of medical records suggests he has continued to gain weight (BMI 36 > 40) since July.  He is trying to improve his functional status. He has joined the [employer] and has been walking some.  He has a history of agent orange exposure and wonders whether any of his respiratory symptoms could be related to this.   [facility] symptoms associated with his exposure.      Review of Systems:  10 of 14 systems reviewed and are negative unless otherwise stated in HPI.    Past Medical History:   Diagnosis Date     Diabetes mellitus type II 2005     History of agent Orange exposure 4/17/2013     Hypertension      Myocardial infarct 01/01/1999     Primary hyperparathyroidism (H) Dx approx 2003     Stroke (H) 01/01/1998    recovered fully       Past Surgical History:   Procedure Laterality Date     BIOPSY OF SKIN LESION       BYPASS GRAFT ARTERY CORONARY       CATARACT IOL, RT/LT Bilateral 2017    [facility]     EXCISE MASS LOWER EXTREMITY Left 11/3/2017    Procedure: EXCISE MASS LOWER EXTREMITY;  Excision Mass Left Flank;  Surgeon: Marybeth Gambino MD;  Location:  OR     MOHS MICROGRAPHIC PROCEDURE       PARATHYROIDECTOMY N/A 3/21/2017    Procedure: PARATHYROIDECTOMY;  Surgeon: Julianna Short MD;  Location:  OR     PARATHYROIDECTOMY         Family History   Problem Relation Age of Onset     Melanoma Mother      Melanoma Father      CANCER No family hx of      no skin cancer     Glaucoma No family hx of      Macular Degeneration No family hx of        Social History     Social History     Marital status:      Spouse name: N/A     Number of children: N/A     Years of education: N/A     Social History Main Topics     Smoking status: Former Smoker     Quit date: 1970     Smokeless tobacco: Former User     Alcohol use Yes      Comment: rarely     Drug use: No     Sexual activity: Not Asked     Other Topics Concern     None     Social History Narrative         Allergies   Allergen Reactions     Clindamycin      Eggs Other (See Comments)     Pt states no longer having reaction, childhood allergy, eats eggs       Penicillins Other (See Comments)     Pt states PCN allergy is due to egg allergy     Propoxyphene Other (See Comments)      Pain         Current Outpatient Prescriptions:      fluticasone (FLOVENT HFA) 110 MCG/ACT Inhaler, Inhale 1 puff into the lungs 2 times daily, Disp: 3 Inhaler, Rfl: 1     oxyCODONE-acetaminophen (PERCOCET) 5-325 MG per tablet, Take 1-2 tablets by mouth every 6 hours as needed for moderate to severe pain or pain (moderate to severe), Disp: 20 tablet, Rfl: 0     ketotifen (ZADITOR/REFRESH ANTI-ITCH) 0.025 % SOLN ophthalmic solution, Place 1 drop into both eyes 2 times daily, Disp: , Rfl:      doxycycline (VIBRAMYCIN) 100 MG capsule, Take by mouth daily, Disp: , Rfl:      ketotifen (ZADITOR/REFRESH ANTI-ITCH) 0.025 % SOLN ophthalmic solution, Place 1 drop into both eyes 2 times daily, Disp: , Rfl:      doxycycline (VIBRAMYCIN) 100 MG capsule, Take 1 capsule (100 mg) by mouth daily, Disp: 30 capsule, Rfl: 11     clindamycin (CLEOCIN) 150 MG capsule, Take 150 mg by mouth 3 times daily, Disp: , Rfl:      albuterol (PROAIR HFA) 108 (90 BASE) MCG/ACT Inhaler, Inhale 2 puffs into the lungs every 4 hours as needed for shortness of breath / dyspnea, wheezing or other (use prior to exertion/activities), Disp: 1 Inhaler, Rfl: 3     alfuzosin (UROXATRAL) 10 MG 24 hr tablet, Take 1 tablet (10 mg) by mouth daily, Disp: 90 tablet, Rfl: 3     finasteride (PROSCAR) 5 MG tablet, Take 1 tablet (5 mg) by mouth daily, Disp: 90 tablet, Rfl: 3     amoxicillin-clavulanate (AUGMENTIN) 875-125 MG per tablet, Take 1 tablet by mouth 2 times daily, Disp: , Rfl:      calcium carbonate (OSCAL 500) 1250 (500 CA) MG TABS tablet, Take 2 tablets once a day with food., Disp: 60 tablet, Rfl: 11     senna-docusate (SENOKOT-S;PERICOLACE) 8.6-50 MG per tablet, Take 1-2 tablets by mouth 2 times daily as needed for constipation Take while on oral narcotics to prevent or treat constipation., Disp: 10 tablet, Rfl: 0     guaiFENesin-codeine (ROBITUSSIN AC) 100-10 MG/5ML SOLN solution, Take 5-10 mLs by mouth every 4 hours as needed for cough, Disp: 420 mL, Rfl:  0     FLUTICASONE PROPIONATE NA, Spray 50 mcg in nostril, Disp: , Rfl:      losartan (COZAAR) 50 MG tablet, Take 1 tablet (50 mg) by mouth daily (Patient taking differently: Take 50 mg by mouth every morning ), Disp: 30 tablet, Rfl: 3     VITAMIN D, CHOLECALCIFEROL, PO, Take 1,000 Units by mouth every morning , Disp: , Rfl:      METFORMIN HCL PO, Take by mouth 2 times daily (with meals), Disp: , Rfl:      acetaminophen 500 MG CAPS, Take 2 tablets by mouth daily., Disp: , Rfl:      acetic acid-hydrocortisone (ACETASOL HC) otic solution, Place 4 drops into both ears 2 times daily., Disp: 10 mL, Rfl: 10     ammonium lactate (AMLACTIN) 12 % cream, Apply  topically daily., Disp: , Rfl:      artificial tears SOLN, Use one drop to both eye PRN., Disp: , Rfl:      omeprazole (PRILOSEC) 20 MG capsule, Take 20 mg by mouth daily., Disp: , Rfl:       Physical Exam:  /75 (BP Location: Right arm, Patient Position: Chair, Cuff Size: Adult Large)  Pulse 64  Resp 16  SpO2 96%  GENERAL: Well developed, well nourished, alert, and in no apparent distress.  HEENT: Normocephalic, atraumatic. PERRL, EOMI. Oral mucosa is moist. No perioral cyanosis.  NECK: supple, no masses, no thyromegaly.  RESP:  Normal respiratory effort.  CTAB.  No rales, wheezes, rhonchi.  Normal to percussion.  No cyanosis or clubbing.  CV: Normal S1, S2, regular rhythm, normal rate. No murmur.  No LE edema.   ABDOMEN:  Soft, non-tender, non-distended.   SKIN: warm and dry. No rash.  NEURO: AAOx3.  Normal gait.  No focal neuro deficits.  PSYCH: mentation appears normal. and affect normal/bright    Results:  No new results.    Assessment and Plan:   Lux Velasco is a 71 year old male with a history of  CAD/MI, CVA in 2003 (no sequela), obesity (BMI 36), who presents to pulmonary clinic today for follow up of SOB.  Since he was last seen in clinic he very briefly used his albuterol but stopped this due to no perceived benefit. He is otherwise symptomatically  unchanged and persistent shortness of breath but that dyspnea and at rest. A agree with the previous assessment in that his shortness of breath is likely multifactorial in etiology due in part to possible underlying asthma given findings of inflammation and mosaic attenuation on his CT, as well as due to obesity and deconditioning. I have again stressed to him the importance of weight loss as well as regular aerobic activity. I have a clotted his efforts at walking at the [facility] and encouraged him to keep this up. In regards to his likely asthma, I have provided him with a prescription for a low-dose inhaled corticosteroid to be used daily to see if this will help his symptoms. 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 I explained to him that asthma is an extremely common diagnosis and that there is no definitive test that I know of to prove causation between the development of asthma and his exposures.  I told him I did not know what the [facility] decision would be on this, but that I would defer to them and their judgement.  Questions and concerns were answered to the patient's satisfaction.  He was provided with my contact information should new questions or concerns arise in the interim.  he should return to clinic in 6 months for follow up.  He is up to date on a seasonal influenza vaccine, pneumovax (2005), and Prevnar (2013).     Nya Jacques MD  Pulmonary and Critical Care Medicine    The above note was dictated using voice recognition software and may include typographical errors. Please contact the author for any clarifications.  I spent a total of 30 minutes face to face with Lux Velasco during today's office visit. Over 50% of this time was spent counseling  the patient and/or coordinating care regarding their pulmonary disease.              Again, thank you for allowing me to participate in the care of your patient.      Sincerely,    María Jacques MD

## 2017-11-30 NOTE — NURSING NOTE
Chief Complaint   Patient presents with     Results     Patient here to go over results.     Ana Garcia LPN at 11:21 AM on 11/30/2017.

## 2017-11-30 NOTE — NURSING NOTE
Chief Complaint   Patient presents with     Breathing Problem     Patient is  being seen for follow up of breathing issues      Shannan Corona CMA at 10:15 AM on 11/30/2017

## 2017-11-30 NOTE — MR AVS SNAPSHOT
After Visit Summary   11/30/2017    Lux Velasco    MRN: [id number]           Patient Information     Date Of Birth          1945        Visit Information        Provider Department      11/30/2017 11:30 AM Nam Duffy MD [facility]        Today's Diagnoses     Benign prostatic hyperplasia with nocturia    -  1    Encounter for screening for malignant neoplasm of prostate         Platelets decreased (H)        Iron deficiency anemia due to chronic blood loss        Increased glucose level           Follow-ups after your visit        Your next 10 appointments already scheduled     Dec 06, 2017 10:45 AM CST   (Arrive by 10:30 AM)   Return Visit with Corinne Gleason PA-C   [facility] ([facility])    [address]  3rd Floor  [place] [address] [phone]            Jan 08, 2018  1:00 PM CST   (Arrive by 12:45 PM)   Return Visit with Sofiya Contreras PA-C   [facility] ([facility])    [address]  4th [address]  [place] [address] [phone]            Apr 11, 2018 10:30 AM CDT   RETURN GENERAL with Migdalia Hussein MD   [facility] ([facility])    [address]  [address]  [address] [address]  [place] [address] [phone]            May 31, 2018 11:00 AM CDT   (Arrive by 10:45 AM)   Return Visit with María Jacques MD   [facility] ([facility])    [address]  3rd [facility] [address] [phone]              Who to contact     Please call your clinic at [phone] to:    Ask questions about your health    Make or cancel appointments    Discuss your medicines    Learn about your test results    Speak to your doctor   If you have compliments or concerns about an experience at your clinic, or if you wish to file a  complaint, please contact [facility] Patient Relations at [phone] or email us at [email or web address]         Additional Information About Your Visit        [facility] Information     [employer] is an electronic gateway that provides easy, online access to your medical records. With [employer], you can request a clinic appointment, read your test results, renew a prescription or communicate with your care team.     To sign up for [employer] visit the website at [email or web address]   You will be asked to enter the access code listed below, as well as some personal information. Please follow the directions to create your username and password.     Your access code is: [email or web address]  Expires: 2018  6:30 AM     Your access code will  in 90 days. If you need help or a new code, please contact your [facility] or call [phone] for assistance.        Care EveryWhere ID     This is your Care EveryWhere ID. This could be used by other organizations to access your [place] medical records  [id number]         Blood Pressure from Last 3 Encounters:   17 130/75   11/15/17 145/82   17 120/70    Weight from Last 3 Encounters:   11/15/17 127.6 kg (281 lb 3.2 oz)   10/10/17 126 kg (277 lb 12.8 oz)   10/10/17 125.9 kg (277 lb 8 oz)              We Performed the Following     CBC with platelets differential     Comprehensive metabolic panel     Ferritin     Folate     HCV Screen with Reflex     Hemoglobin A1c     Hepatitis B core antibody     Hepatitis B Surface Antibody     Hepatitis B surface antigen     Iron and iron binding capacity     Reticulocyte count     US Abdomen complete     Vitamin B12          Today's Medication Changes          These changes are accurate as of: 17  4:08 PM.  If you have any questions, ask your nurse or doctor.               Start taking these medicines.        Dose/Directions    fluticasone 110 MCG/ACT  Inhaler   Commonly known as:  FLOVENT HFA   Used for:  Mild intermittent asthma, unspecified whether complicated, Dyspnea on exertion   Started by:  María Jacques MD        Dose:  1 puff   Inhale 1 puff into the lungs 2 times daily   Quantity:  3 Inhaler   Refills:  1         These medicines have changed or have updated prescriptions.        Dose/Directions    losartan 50 MG tablet   Commonly known as:  COZAAR   This may have changed:  when to take this   Used for:  Benign essential hypertension        Dose:  50 mg   Take 1 tablet (50 mg) by mouth daily   Quantity:  30 tablet   Refills:  3            Where to get your medicines      Some of these will need a paper prescription and others can be bought over the counter.  Ask your nurse if you have questions.     Bring a paper prescription for each of these medications     fluticasone 110 MCG/ACT Inhaler                Primary Care Provider Office Phone # Fax #    Nam Duffy [phone] [address] [address]        Equal Access to Services     [facility] Hospital[facility] : Hadii suresh traore hadasho Soomaali, waaxda luqadaha, qaybta kaalmada adeegyada, waxay gómezin haygenoveva chase . So [facility] [phone].    ATENCIÓN: Si habla español, tiene a back disposición servicios gratuitos de asistencia lingüística. Llame al [phone].    We comply with applicable federal civil rights laws and [place] laws. We do not discriminate on the basis of race, color, national origin, age, disability, sex, sexual orientation, or gender identity.            Thank you!     Thank you for choosing [facility]  for your care. Our goal is always to provide you with excellent care. Hearing back from our patients is one way we can continue to improve our services. Please take a few minutes to complete the written survey that you may receive in the mail after your visit with us. Thank you!             Your Updated Medication  List - Protect others around you: Learn how to safely use, store and throw away your medicines at www.disposemymeds.org.          This list is accurate as of: 11/30/17  4:08 PM.  Always use your most recent med list.                   Brand Name Dispense Instructions for use Diagnosis    acetaminophen 500 MG Caps      Take 2 tablets by mouth daily.        acetic acid-hydrocortisone otic solution    ACETASOL HC    10 mL    Place 4 drops into both ears 2 times daily.    Otitis externa       albuterol 108 (90 BASE) MCG/ACT Inhaler    PROAIR HFA    1 Inhaler    Inhale 2 puffs into the lungs every 4 hours as needed for shortness of breath / dyspnea, wheezing or other (use prior to exertion/activities)    Shortness of breath       alfuzosin 10 MG 24 hr tablet    UROXATRAL    90 tablet    Take 1 tablet (10 mg) by mouth daily    Lower urinary tract symptoms (LUTS)       ammonium lactate 12 % cream    AMLACTIN     Apply  topically daily.        amoxicillin-clavulanate 875-125 MG per tablet    AUGMENTIN     Take 1 tablet by mouth 2 times daily    Cough       artificial tears Soln      Use one drop to both eye PRN.        calcium carbonate 1250 (500 CA) MG Tabs tablet    OSCAL 500    60 tablet    Take 2 tablets once a day with food.    Hypocalcemia       clindamycin 150 MG capsule    CLEOCIN     Take 150 mg by mouth 3 times daily    Muscle spasm, Other fatigue       * doxycycline 100 MG capsule    VIBRAMYCIN     Take by mouth daily        * doxycycline 100 MG capsule    VIBRAMYCIN    30 capsule    Take 1 capsule (100 mg) by mouth daily    Ulcerative blepharitis of upper and lower eyelids of both eyes       finasteride 5 MG tablet    PROSCAR    90 tablet    Take 1 tablet (5 mg) by mouth daily    Lower urinary tract symptoms (LUTS)       fluticasone 110 MCG/ACT Inhaler    FLOVENT HFA    3 Inhaler    Inhale 1 puff into the lungs 2 times daily    Mild intermittent asthma, unspecified whether complicated, Dyspnea on exertion        FLUTICASONE PROPIONATE NA      Spray 50 mcg in nostril    Cough       guaiFENesin-codeine 100-10 MG/5ML Soln solution    ROBITUSSIN AC    420 mL    Take 5-10 mLs by mouth every 4 hours as needed for cough    Cough       * ketotifen 0.025 % Soln ophthalmic solution    ZADITOR/REFRESH ANTI-ITCH     Place 1 drop into both eyes 2 times daily        * ketotifen 0.025 % Soln ophthalmic solution    ZADITOR/REFRESH ANTI-ITCH     Place 1 drop into both eyes 2 times daily        losartan 50 MG tablet    COZAAR    30 tablet    Take 1 tablet (50 mg) by mouth daily    Benign essential hypertension       METFORMIN HCL PO      Take by mouth 2 times daily (with meals)    Benign nodular prostatic hyperplasia without lower urinary tract symptoms, Need for prophylactic vaccination against Streptococcus pneumoniae (pneumococcus), Screening for AAA (abdominal aortic aneurysm), History of smoking       omeprazole 20 MG CR capsule    priLOSEC     Take 20 mg by mouth daily.        oxyCODONE-acetaminophen 5-325 MG per tablet    PERCOCET    20 tablet    Take 1-2 tablets by mouth every 6 hours as needed for moderate to severe pain or pain (moderate to severe)    Hx of removal of cyst       senna-docusate 8.6-50 MG per tablet    SENOKOT-S;PERICOLACE    10 tablet    Take 1-2 tablets by mouth 2 times daily as needed for constipation Take while on oral narcotics to prevent or treat constipation.    Hypercalcemia, Primary hyperparathyroidism (H)       VITAMIN D (CHOLECALCIFEROL) PO      Take 1,000 Units by mouth every morning        * Notice:  This list has 4 medication(s) that are the same as other medications prescribed for you. Read the directions carefully, and ask your doctor or other care provider to review them with you.

## 2017-11-30 NOTE — LETTER
Patient:  Lux Velasco  :   1945  MRN:     [id number]        Mr. Lux Velasco  [address]   [facility] [address]        2017    Dear Mr. Velasco,    Thank you for choosing the [facility] for your healthcare needs.  We appreciate the opportunity to serve you.    The following are your recent test results.     This abdominal ultrasound has some findings that I recommend follow up evaluation:     (1) I recommend you see the Gastrointestinal doctors for abnormal liver findings on the ultrasound and I placed a referral today     (2) You have a large kidney cyst that I recommend you see the Urology doctors and I placed a referral today     (3) Your aorta is somewhat enlarged and I ordered a full ultrasound today     (4) I would like to see you back in my clinic in a month or so after you have completed the above     You can call [phone] to schedule all these appointments     Results for orders placed or performed in visit on 17   US Abdomen complete    Narrative    EXAMINATION: US ABDOMEN COMPLETE,  12/15/2017 1:06 PM     COMPARISON: Ultrasound 2/3/2011, CT 2017    HISTORY: Thrombocytopenia    TECHNIQUE: The abdomen was scanned in standard fashion with  specialized ultrasound transducer(s) using both gray-scale and limited  color Doppler techniques.    Findings:  Liver: The hepatic echogenicity is diffusely increased, compatible  with hepatic steatosis. No focal hepatic masses identified. The liver  is borderline enlarged measuring up to 18.8 cm. The main portal vein  is patent with antegrade flow, measuring 1.8 cm.    Gallbladder:  There is no wall thickening, pericholecystic fluid,  positive sonographic Galindo's sign or evidence for cholelithiasis.    Bile Ducts: Both the intra- and extrahepatic biliary system are of  normal caliber.  The common bile duct measures 6 mm in diameter.    Pancreas: Visualized portions of the  head and body of the pancreas are  unremarkable.     Kidneys: Both kidneys are of normal echotexture, without solid mass or  hydronephrosis.   The right kidney measures 13.8 cm in length. The  left kidney measures 14.6 cm in length. There is an anechoic avascular  exophytic cyst in the upper pole of the left kidney measuring 7.0 x  5.5 x 6.2 cm. This previously measured 3.4 x 2.6 x 4.5 cm on 2/3/2011.      Spleen: The spleen is enlarged measuring 17.5 cm in sagittal  dimension. Small soft tissue nodule near the splenic hilum presumably  a small splenule.    Aorta and IVC: The visualized portions of the aorta and IVC are  unremarkable. The proximal aorta measures 3.5 cm in diameter and the  IVC measures 2.4 cm in diameter.    Fluid: No evidence of ascites or pleural effusions.      Impression    Impression:   1.  Borderline enlarged diffusely hyperechoic liver is most compatible  with intrinsic parenchymal disease such as steatosis.  2.  Splenomegaly and mild dilation of the portal vein measuring up to  1.8 cm. These findings are nonspecific, but can be seen in portal  hypertension.  3.  Large exophytic left renal cyst, increased in size when compared  to prior examination, now measuring up to 7.0 cm.  4. Aneurysmal dilatation of the upper abdominal aorta, measuring up to  3.5 cm.    I have personally reviewed the examination and initial interpretation  and I agree with the findings.    CAYLA ANDRADE MD   Hemoglobin A1c   Result Value Ref Range    Hemoglobin A1C 6.3 (H) 4.3 - 6.0 %   CBC with platelets differential   Result Value Ref Range    WBC 4.9 4.0 - 11.0 10e9/L    RBC Count 4.34 (L) 4.4 - 5.9 10e12/L    Hemoglobin 11.9 (L) 13.3 - 17.7 g/dL    Hematocrit 37.9 (L) 40.0 - 53.0 %    MCV 87 78 - 100 fl    MCH 27.4 26.5 - 33.0 pg    MCHC 31.4 (L) 31.5 - 36.5 g/dL    RDW 14.2 10.0 - 15.0 %    Platelet Count 141 (L) 150 - 450 10e9/L    Diff Method Automated Method     % Neutrophils 54.7 %    % Lymphocytes 32.7 %    %  Monocytes 6.5 %    % Eosinophils 5.3 %    % Basophils 0.6 %    % Immature Granulocytes 0.2 %    Nucleated RBCs 0 0 /100    Absolute Neutrophil 2.7 1.6 - 8.3 10e9/L    Absolute Lymphocytes 1.6 0.8 - 5.3 10e9/L    Absolute Monocytes 0.3 0.0 - 1.3 10e9/L    Absolute Eosinophils 0.3 0.0 - 0.7 10e9/L    Absolute Basophils 0.0 0.0 - 0.2 10e9/L    Abs Immature Granulocytes 0.0 0 - 0.4 10e9/L    Absolute Nucleated RBC 0.0    Reticulocyte count   Result Value Ref Range    % Retic 1.6 0.5 - 2.0 %    Absolute Retic 67.7 25 - 95 10e9/L   Comprehensive metabolic panel   Result Value Ref Range    Sodium 140 133 - 144 mmol/L    Potassium 4.4 3.4 - 5.3 mmol/L    Chloride 107 94 - 109 mmol/L    Carbon Dioxide 26 20 - 32 mmol/L    Anion Gap 8 3 - 14 mmol/L    Glucose 119 (H) 70 - 99 mg/dL    Urea Nitrogen 16 7 - 30 mg/dL    Creatinine 0.82 0.66 - 1.25 mg/dL    GFR Estimate >90 >60 mL/min/1.7m2    GFR Estimate If Black >90 >60 mL/min/1.7m2    Calcium 9.1 8.5 - 10.1 mg/dL    Bilirubin Total 0.3 0.2 - 1.3 mg/dL    Albumin 3.6 3.4 - 5.0 g/dL    Protein Total 7.3 6.8 - 8.8 g/dL    Alkaline Phosphatase 80 40 - 150 U/L    ALT 56 0 - 70 U/L    AST 25 0 - 45 U/L   Iron and iron binding capacity   Result Value Ref Range    Iron 61 35 - 180 ug/dL    Iron Binding Cap 276 240 - 430 ug/dL    Iron Saturation Index 22 15 - 46 %   Ferritin   Result Value Ref Range    Ferritin 172 26 - 388 ng/mL   Vitamin B12   Result Value Ref Range    Vitamin B12 355 193 - 986 pg/mL   Folate   Result Value Ref Range    Folate 12.6 >5.4 ng/mL   Hepatitis B core antibody   Result Value Ref Range    Hepatitis B Core Madelyn Nonreactive NR^Nonreactive   Hepatitis B Surface Antibody   Result Value Ref Range    Hepatitis B Surface Antibody 0.26 <8.00 m[IU]/mL   Hepatitis B surface antigen   Result Value Ref Range    Hep B Surface Agn Nonreactive NR^Nonreactive   HCV Screen with Reflex   Result Value Ref Range    Hepatitis C Antibody Nonreactive NR^Nonreactive     Please  contact your provider if you have any questions or concerns.  We look forward to serving your needs in the future.      Sincerely,    JIM Duffy MD

## 2017-12-06 ENCOUNTER — OFFICE VISIT (OUTPATIENT)
Dept: DERMATOLOGY | Facility: CLINIC | Age: 72
End: 2017-12-06

## 2017-12-06 DIAGNOSIS — L57.0 AK (ACTINIC KERATOSIS): ICD-10-CM

## 2017-12-06 DIAGNOSIS — L21.9 DERMATITIS, SEBORRHEIC: ICD-10-CM

## 2017-12-06 DIAGNOSIS — L82.0 INFLAMED SEBORRHEIC KERATOSIS: Primary | ICD-10-CM

## 2017-12-06 DIAGNOSIS — Z12.83 SKIN CANCER SCREENING: ICD-10-CM

## 2017-12-06 ASSESSMENT — PAIN SCALES - GENERAL
PAINLEVEL: NO PAIN (0)
PAINLEVEL: MILD PAIN (2)

## 2017-12-06 NOTE — MR AVS SNAPSHOT
After Visit Summary   12/6/2017    Lux Velasco    MRN: [id number]           Patient Information     Date Of Birth          1945        Visit Information        Provider Department      12/6/2017 10:45 AM Corinne Gleason PA-C [facility] Dermatology        Today's Diagnoses     Inflamed seborrheic keratosis    -  1    AK (actinic keratosis)        Skin cancer screening        Dermatitis, seborrheic          Care Instructions    Cryotherapy    What is it?    Use of a very cold liquid, such as liquid nitrogen, to freeze and destroy abnormal skin cells that need to be removed    What should I expect?    Tenderness and redness    A small blister that might grow and fill with dark purple blood. There may be crusting.    More than one treatment may be needed if the lesions do not go away.    How do I care for the treated area?    Gently wash the area with your hands when bathing.    Use a thin layer of Vaseline to help with healing. You may use a Band-Aid.     The area should heal within 7-10 days and may leave behind a pink or lighter color.     Do not use an antibiotic or Neosporin ointment.     You may take acetaminophen (Tylenol) for pain.     Call your Doctor if you have:    Severe pain    Signs of infection (warmth, redness, cloudy yellow drainage, and or a bad smell)    Questions or concerns    Who should I call with questions?       [facility]: [phone]       [facility]: [phone]       For urgent needs outside of business hours call the [facility] at [phone]        and ask for the dermatology resident on call          Follow-ups after your visit        Your next 10 appointments already scheduled     Jan 08, 2018  1:00 PM CST   (Arrive by 12:45 PM)   Return Visit with SAPPHIRE [facility] Urology and [facility] ([facility])    [address] [place] [address]  [address]  4th Floor  [facility] [address] [phone]            Apr 11, 2018 10:30 AM CDT   RETURN GENERAL with Migdalia Hussein MD   Eye Clinic ([facility])    [address]  [address] [facility] [address] [facility] [address]  9th Fl Clin [address]a  [facility] [address] [phone]            May 31, 2018 11:00 AM CDT   (Arrive by 10:45 AM)   Return Visit with María Jacques MD   [facility] ([facility])    [address]  3rd Floor  [facility] [id number] [phone]              Who to contact     Please call your clinic at [phone] to:    Ask questions about your health    Make or cancel appointments    Discuss your medicines    Learn about your test results    Speak to your doctor   If you have compliments or concerns about an experience at your clinic, or if you wish to file a complaint, please contact [facility] Patient Relations at [phone] or email us at [email or web address]         Additional Information About Your Visit        Care EveryWhere ID     This is your Care EveryWhere ID. This could be used by other organizations to access your [facility] medical records  [id number]         Blood Pressure from Last 3 Encounters:   11/30/17 130/75   11/15/17 145/82   11/03/17 120/70    Weight from Last 3 Encounters:   11/15/17 127.6 kg (281 lb 3.2 oz)   10/10/17 126 kg (277 lb 12.8 oz)   10/10/17 125.9 kg (277 lb 8 oz)              We Performed the Following     DESTRUCT BENIGN LESION, UP TO 14     DESTRUCT PREMALIGNANT LESION, 2-14     DESTRUCT PREMALIGNANT LESION, FIRST          Today's Medication Changes          These changes are accurate as of: 12/6/17 11:19 AM.  If you have any questions, ask your nurse or doctor.               These medicines have changed or have updated prescriptions.        Dose/Directions    losartan 50 MG tablet   Commonly known as:  COZAAR   This may have changed:  when to  take this   Used for:  Benign essential hypertension        Dose:  50 mg   Take 1 tablet (50 mg) by mouth daily   Quantity:  30 tablet   Refills:  3                Primary Care Provider Office Phone # Fax #    Nam Duffy [phone] [address] [address]        Equal Access to Services     SRIKANTHJUAN HARDIK : Hadii aad ku hadasho Soomaali, waaxda luqadaha, qaybta kaalmada adeegyada, waxay idiin hayaan adejerardo khbarbiesh lamaryannn . So [facility] [phone].    ATENCIÓN: Si habla español, tiene a back disposición servicios gratuitos de asistencia lingüística. Llame al [phone].    We comply with applicable federal civil rights laws and [place] laws. We do not discriminate on the basis of race, color, national origin, age, disability, sex, sexual orientation, or gender identity.            Thank you!     Thank you for choosing [facility] DERMATOLOGY  for your care. Our goal is always to provide you with excellent care. Hearing back from our patients is one way we can continue to improve our services. Please take a few minutes to complete the written survey that you may receive in the mail after your visit with us. Thank you!             Your Updated Medication List - Protect others around you: Learn how to safely use, store and throw away your medicines at www.disposemymeds.org.          This list is accurate as of: 12/6/17 11:19 AM.  Always use your most recent med list.                   Brand Name Dispense Instructions for use Diagnosis    acetaminophen 500 MG Caps      Take 2 tablets by mouth daily.        acetic acid-hydrocortisone otic solution    ACETASOL HC    10 mL    Place 4 drops into both ears 2 times daily.    Otitis externa       albuterol 108 (90 BASE) MCG/ACT Inhaler    PROAIR HFA    1 Inhaler    Inhale 2 puffs into the lungs every 4 hours as needed for shortness of breath / dyspnea, wheezing or other (use prior to exertion/activities)    Shortness of breath        alfuzosin 10 MG 24 hr tablet    UROXATRAL    90 tablet    Take 1 tablet (10 mg) by mouth daily    Lower urinary tract symptoms (LUTS)       ammonium lactate 12 % cream    AMLACTIN     Apply  topically daily.        amoxicillin-clavulanate 875-125 MG per tablet    AUGMENTIN     Take 1 tablet by mouth 2 times daily    Cough       artificial tears Soln      Use one drop to both eye PRN.        calcium carbonate 1250 (500 CA) MG Tabs tablet    OSCAL 500    60 tablet    Take 2 tablets once a day with food.    Hypocalcemia       clindamycin 150 MG capsule    CLEOCIN     Take 150 mg by mouth 3 times daily    Muscle spasm, Other fatigue       * doxycycline 100 MG capsule    VIBRAMYCIN     Take by mouth daily        * doxycycline 100 MG capsule    VIBRAMYCIN    30 capsule    Take 1 capsule (100 mg) by mouth daily    Ulcerative blepharitis of upper and lower eyelids of both eyes       finasteride 5 MG tablet    PROSCAR    90 tablet    Take 1 tablet (5 mg) by mouth daily    Lower urinary tract symptoms (LUTS)       fluticasone 110 MCG/ACT Inhaler    FLOVENT HFA    3 Inhaler    Inhale 1 puff into the lungs 2 times daily    Mild intermittent asthma, unspecified whether complicated, Dyspnea on exertion       FLUTICASONE PROPIONATE NA      Spray 50 mcg in nostril    Cough       guaiFENesin-codeine 100-10 MG/5ML Soln solution    ROBITUSSIN AC    420 mL    Take 5-10 mLs by mouth every 4 hours as needed for cough    Cough       * ketotifen 0.025 % Soln ophthalmic solution    ZADITOR/REFRESH ANTI-ITCH     Place 1 drop into both eyes 2 times daily        * ketotifen 0.025 % Soln ophthalmic solution    ZADITOR/REFRESH ANTI-ITCH     Place 1 drop into both eyes 2 times daily        losartan 50 MG tablet    COZAAR    30 tablet    Take 1 tablet (50 mg) by mouth daily    Benign essential hypertension       METFORMIN HCL PO      Take by mouth 2 times daily (with meals)    Benign nodular prostatic hyperplasia without lower urinary tract  symptoms, Need for prophylactic vaccination against Streptococcus pneumoniae (pneumococcus), Screening for AAA (abdominal aortic aneurysm), History of smoking       omeprazole 20 MG CR capsule    priLOSEC     Take 20 mg by mouth daily.        oxyCODONE-acetaminophen 5-325 MG per tablet    PERCOCET    20 tablet    Take 1-2 tablets by mouth every 6 hours as needed for moderate to severe pain or pain (moderate to severe)    Hx of removal of cyst       senna-docusate 8.6-50 MG per tablet    SENOKOT-S;PERICOLACE    10 tablet    Take 1-2 tablets by mouth 2 times daily as needed for constipation Take while on oral narcotics to prevent or treat constipation.    Hypercalcemia, Primary hyperparathyroidism (H)       VITAMIN D (CHOLECALCIFEROL) PO      Take 1,000 Units by mouth every morning        * Notice:  This list has 4 medication(s) that are the same as other medications prescribed for you. Read the directions carefully, and ask your doctor or other care provider to review them with you.

## 2017-12-06 NOTE — PROGRESS NOTES
[facility] Dermatology Note    Dermatology Problem List:  1. History of NMSC  - BCC, right upper back s/p ED&C  - Unknown Type, right cheek  2. Actinic keratoses s/p cryo  - Hypertrophic AK, right forearm s/p biopsy 11/28/16  - AK, left lower inferior orbital rim s/p biopsy 11/28/16  3. Verruca vulgaris, right second finger s/p cryo  4. ISKs s/p cryo  5. Skin cancer screening 12/6/17    CC:   Chief Complaint   Patient presents with     Skin Check     Personal hx of BCC. Lux has a few spots of concern today.     Date of Service: Dec 6, 2017    History of Present Illness:  Mr. Lux Velasco is a 71 year old male with a history of BCC presents for a skin check. The patient was 3/6/17 when he had actinic keratoses and inflamed seborrheic keratoses treated with cryotherapy. Today the patient reports that he has an irritated lesion on his right cheek that catches when he shaves. He also has noticed some rough spots on right wrist area. He notes that he had a cyst removed in general surgery on his lower back a few weeks ago that is still healing. He also reports that his skin gets red on his left lower leg, but it improves after he puts amlactin 12% lotion on the area. [facility]    He notes that he has some sores on his scalp which he notices when he bowers his hair. He washes his scalp every few days with Selsun Blue shampoo. He states that this was recommended by his cousin when he had some dandruff.     He notes that he is on doxycyline daily.  He notes that he had cataract surgery in both eyes and the doxycyline is helping his eyes along with drops and ointment. He notes that he is allergic to eggs. The patient reports no other lesions of concern at this time.    Otherwise, the patient reports no painful, bleeding, nonhealing, or pruritic lesions, and denies new or changing moles.    Past Medical History:   Patient Active Problem List   Diagnosis     Plantar fascial  fibromatosis     Ear pain     Left arm weakness     History of agent Orange exposure     Cervicalgia     Degenerative arthritis of cervical spine     Stroke (H)     Myocardial infarct     Shoulder pain     Hypercalcemia     HPTH (hyperparathyroidism) (H)     Skin exam, screening for cancer     Primary hyperparathyroidism (H)     Past Medical History:   Diagnosis Date     Diabetes mellitus type II 2005     History of agent Orange exposure 4/17/2013     Hypertension      Myocardial infarct 01/01/1999     Primary hyperparathyroidism (H) Dx approx 2003     Stroke (H) 01/01/1998    recovered fully     Past Surgical History:   Procedure Laterality Date     BIOPSY OF SKIN LESION       BYPASS GRAFT ARTERY CORONARY       CATARACT IOL, RT/LT Bilateral 2017    [facility]     EXCISE MASS LOWER EXTREMITY Left 11/3/2017    Procedure: EXCISE MASS LOWER EXTREMITY;  Excision Mass Left Flank;  Surgeon: Marybeth Gambino MD;  Location: [facility] OR     MOHS MICROGRAPHIC PROCEDURE       PARATHYROIDECTOMY N/A 3/21/2017    Procedure: PARATHYROIDECTOMY;  Surgeon: Julianna Short MD;  Location: [facility] OR     PARATHYROIDECTOMY       Social History:  Not addressed at this visit.    Family History:  Melanoma in parents.     Medications:  Current Outpatient Prescriptions   Medication Sig Dispense Refill     fluticasone (FLOVENT HFA) 110 MCG/ACT Inhaler Inhale 1 puff into the lungs 2 times daily 3 Inhaler 1     oxyCODONE-acetaminophen (PERCOCET) 5-325 MG per tablet Take 1-2 tablets by mouth every 6 hours as needed for moderate to severe pain or pain (moderate to severe) 20 tablet 0     ketotifen (ZADITOR/REFRESH ANTI-ITCH) 0.025 % SOLN ophthalmic solution Place 1 drop into both eyes 2 times daily       doxycycline (VIBRAMYCIN) 100 MG capsule Take by mouth daily       ketotifen (ZADITOR/REFRESH ANTI-ITCH) 0.025 % SOLN ophthalmic solution Place 1 drop into both eyes 2 times daily       doxycycline (VIBRAMYCIN) 100 MG capsule Take 1 capsule  (100 mg) by mouth daily 30 capsule 11     clindamycin (CLEOCIN) 150 MG capsule Take 150 mg by mouth 3 times daily       albuterol (PROAIR HFA) 108 (90 BASE) MCG/ACT Inhaler Inhale 2 puffs into the lungs every 4 hours as needed for shortness of breath / dyspnea, wheezing or other (use prior to exertion/activities) 1 Inhaler 3     alfuzosin (UROXATRAL) 10 MG 24 hr tablet Take 1 tablet (10 mg) by mouth daily 90 tablet 3     finasteride (PROSCAR) 5 MG tablet Take 1 tablet (5 mg) by mouth daily 90 tablet 3     amoxicillin-clavulanate (AUGMENTIN) 875-125 MG per tablet Take 1 tablet by mouth 2 times daily       calcium carbonate (OSCAL 500) 1250 (500 CA) MG TABS tablet Take 2 tablets once a day with food. 60 tablet 11     senna-docusate (SENOKOT-S;PERICOLACE) 8.6-50 MG per tablet Take 1-2 tablets by mouth 2 times daily as needed for constipation Take while on oral narcotics to prevent or treat constipation. 10 tablet 0     guaiFENesin-codeine (ROBITUSSIN AC) 100-10 MG/5ML SOLN solution Take 5-10 mLs by mouth every 4 hours as needed for cough 420 mL 0     FLUTICASONE PROPIONATE NA Spray 50 mcg in nostril       losartan (COZAAR) 50 MG tablet Take 1 tablet (50 mg) by mouth daily (Patient taking differently: Take 50 mg by mouth every morning ) 30 tablet 3     VITAMIN D, CHOLECALCIFEROL, PO Take 1,000 Units by mouth every morning        METFORMIN HCL PO Take by mouth 2 times daily (with meals)       acetaminophen 500 MG CAPS Take 2 tablets by mouth daily.       acetic acid-hydrocortisone (ACETASOL HC) otic solution Place 4 drops into both ears 2 times daily. 10 mL 10     ammonium lactate (AMLACTIN) 12 % cream Apply  topically daily.       artificial tears SOLN Use one drop to both eye PRN.       omeprazole (PRILOSEC) 20 MG capsule Take 20 mg by mouth daily.       Allergies:  Allergies   Allergen Reactions     Clindamycin      Eggs Other (See Comments)     Pt states no longer having reaction, childhood allergy, eats eggs        Penicillins Other (See Comments)     Pt states PCN allergy is due to egg allergy     Propoxyphene Other (See Comments)     Pain     Review of Systems:  - Skin: As above in HPI. No additional skin concerns.    Physical exam:  Vitals: There were no vitals taken for this visit.  GEN: This is a well developed, well-nourished male in no acute distress, in a pleasant mood.      SKIN: Waist-up skin which includes the head/face, neck, both arms, chest, back, abdomen, digits and/or nails was examined, including bilateral lower legs. Defers further skin exam.   - Xerosis noted to lower legs  - Minimal scale to scalp, no papules or pustules  - Stuck on tan to brown papule on a base of erythema on the right cheek  - Gritty pink papules on the right dorsal hand, left dorsal hand  - No other lesions of concern on areas examined.     Impression/Plan:  1. History of NMSC    Continue regular skin checks.    2. Xerosis - legs    Continue using am lactin 12% lotion on body daily.    3. Patient reported folliculitis    Increase washing scalp more often    Continue using Selsun Blue shampoo, alternating with Head and Shoulders shampoo    4. Inflamed seborrheic keratosis - right cheek    Cryotherapy procedure note: After verbal consent and discussion of risks and benefits including but no limited to dyspigmentation/scar, blister, and pain, 1 was(were) treated with 1-2mm freeze border for 2 cycles with liquid nitrogen. Post cryotherapy instructions were provided.     4. Actinic keratoses - right dorsal hand, left dorsal hand    Cryotherapy procedure note: After verbal consent and discussion of risks and benefits including but no limited to dyspigmentation/scar, blister, and pain, 2 was(were) treated with 1-2mm freeze border for 2 cycles with liquid nitrogen. Post cryotherapy instructions were provided.     Follow up in 1 year, earlier for new or changing lesions.    Staff Involved:  Staff Only  Scribe Disclosure:   Sirena LOPEZ am  serving as a scribe to document services personally performed by Corinne Gleason PA-C, based on data collection and the provider's statements to me.    Provider Disclosure:   The documentation recorded by the scribe accurately reflects the services I personally performed and the decisions made by me.    All risks, benefits and alternatives were discussed with patient.  Patient is in agreement and understands the assessment and plan.  All questions were answered.  Sun Screen Education was given.   Return to Clinic annually or sooner as needed.   Corinne Gleason PA-C   [facility]

## 2017-12-06 NOTE — NURSING NOTE
Dermatology Rooming Note    Lux Velasco's goals for this visit include:   Chief Complaint   Patient presents with     Skin Check     Personal hx of BCC. Lux has a few spots of concern today.     Rhiannon Rehman, CMA

## 2017-12-06 NOTE — PATIENT INSTRUCTIONS
Cryotherapy    What is it?    Use of a very cold liquid, such as liquid nitrogen, to freeze and destroy abnormal skin cells that need to be removed    What should I expect?    Tenderness and redness    A small blister that might grow and fill with dark purple blood. There may be crusting.    More than one treatment may be needed if the lesions do not go away.    How do I care for the treated area?    Gently wash the area with your hands when bathing.    Use a thin layer of Vaseline to help with healing. You may use a Band-Aid.     The area should heal within 7-10 days and may leave behind a pink or lighter color.     Do not use an antibiotic or Neosporin ointment.     You may take acetaminophen (Tylenol) for pain.     Call your Doctor if you have:    Severe pain    Signs of infection (warmth, redness, cloudy yellow drainage, and or a bad smell)    Questions or concerns    Who should I call with questions?       [facility]: [phone]       [facility]: [phone]       For urgent needs outside of business hours call the [facility] at [phone]        and ask for the dermatology resident on call

## 2017-12-06 NOTE — LETTER
12/6/2017       RE: Lux Velasco  [address]   [place] [address]     Dear Colleague,    Thank you for referring your patient, Lux Velasco, to the [facility] DERMATOLOGY at [facility]. Please see a copy of my visit note below.    [facility] Dermatology Note    Dermatology Problem List:  1. History of NMSC  - BCC, right upper back s/p ED&C  - Unknown Type, right cheek  2. Actinic keratoses s/p cryo  - Hypertrophic AK, right forearm s/p biopsy 11/28/16  - AK, left lower inferior orbital rim s/p biopsy 11/28/16  3. Verruca vulgaris, right second finger s/p cryo  4. ISKs s/p cryo  5. Skin cancer screening 12/6/17    CC:   Chief Complaint   Patient presents with     Skin Check     Personal hx of BCC. Lux has a few spots of concern today.     Date of Service: Dec 6, 2017    History of Present Illness:  Mr. Lux Velasco is a 71 year old male with a history of BCC presents for a skin check. The patient was 3/6/17 when he had actinic keratoses and inflamed seborrheic keratoses treated with cryotherapy. Today the patient reports that he has an irritated lesion on his right cheek that catches when he shaves. He also has noticed some rough spots on right wrist area. He notes that he had a cyst removed in general surgery on his lower back a few weeks ago that is still healing. He also reports that his skin gets red on his left lower leg, but it improves after he puts amlactin 12% lotion on the area. [facility]    He notes that he has some sores on his scalp which he notices when he bowers his hair. He washes his scalp every few days with Selsun Blue shampoo. He states that this was recommended by his cousin when he had some dandruff.     He notes that he is on doxycyline daily.  He notes that he had cataract surgery in both eyes and the doxycyline is helping his eyes along with drops and ointment. He notes that he is allergic to  eggs. The patient reports no other lesions of concern at this time.    Otherwise, the patient reports no painful, bleeding, nonhealing, or pruritic lesions, and denies new or changing moles.    Past Medical History:   Patient Active Problem List   Diagnosis     Plantar fascial fibromatosis     Ear pain     Left arm weakness     History of agent Orange exposure     Cervicalgia     Degenerative arthritis of cervical spine     Stroke (H)     Myocardial infarct     Shoulder pain     Hypercalcemia     HPTH (hyperparathyroidism) (H)     Skin exam, screening for cancer     Primary hyperparathyroidism (H)     Past Medical History:   Diagnosis Date     Diabetes mellitus type II 2005     History of agent Orange exposure 4/17/2013     Hypertension      Myocardial infarct 01/01/1999     Primary hyperparathyroidism (H) Dx approx 2003     Stroke (H) 01/01/1998    recovered fully     Past Surgical History:   Procedure Laterality Date     BIOPSY OF SKIN LESION       BYPASS GRAFT ARTERY CORONARY       CATARACT IOL, RT/LT Bilateral 2017    [facility]     EXCISE MASS LOWER EXTREMITY Left 11/3/2017    Procedure: EXCISE MASS LOWER EXTREMITY;  Excision Mass Left Flank;  Surgeon: Marybeth Gambino MD;  Location: [facility] OR     MOHS MICROGRAPHIC PROCEDURE       PARATHYROIDECTOMY N/A 3/21/2017    Procedure: PARATHYROIDECTOMY;  Surgeon: Julianna Short MD;  Location: [facility] OR     PARATHYROIDECTOMY       Social History:  Not addressed at this visit.    Family History:  Melanoma in parents.     Medications:  Current Outpatient Prescriptions   Medication Sig Dispense Refill     fluticasone (FLOVENT HFA) 110 MCG/ACT Inhaler Inhale 1 puff into the lungs 2 times daily 3 Inhaler 1     oxyCODONE-acetaminophen (PERCOCET) 5-325 MG per tablet Take 1-2 tablets by mouth every 6 hours as needed for moderate to severe pain or pain (moderate to severe) 20 tablet 0     ketotifen (ZADITOR/REFRESH ANTI-ITCH) 0.025 % SOLN ophthalmic solution Place 1  drop into both eyes 2 times daily       doxycycline (VIBRAMYCIN) 100 MG capsule Take by mouth daily       ketotifen (ZADITOR/REFRESH ANTI-ITCH) 0.025 % SOLN ophthalmic solution Place 1 drop into both eyes 2 times daily       doxycycline (VIBRAMYCIN) 100 MG capsule Take 1 capsule (100 mg) by mouth daily 30 capsule 11     clindamycin (CLEOCIN) 150 MG capsule Take 150 mg by mouth 3 times daily       albuterol (PROAIR HFA) 108 (90 BASE) MCG/ACT Inhaler Inhale 2 puffs into the lungs every 4 hours as needed for shortness of breath / dyspnea, wheezing or other (use prior to exertion/activities) 1 Inhaler 3     alfuzosin (UROXATRAL) 10 MG 24 hr tablet Take 1 tablet (10 mg) by mouth daily 90 tablet 3     finasteride (PROSCAR) 5 MG tablet Take 1 tablet (5 mg) by mouth daily 90 tablet 3     amoxicillin-clavulanate (AUGMENTIN) 875-125 MG per tablet Take 1 tablet by mouth 2 times daily       calcium carbonate (OSCAL 500) 1250 (500 CA) MG TABS tablet Take 2 tablets once a day with food. 60 tablet 11     senna-docusate (SENOKOT-S;PERICOLACE) 8.6-50 MG per tablet Take 1-2 tablets by mouth 2 times daily as needed for constipation Take while on oral narcotics to prevent or treat constipation. 10 tablet 0     guaiFENesin-codeine (ROBITUSSIN AC) 100-10 MG/5ML SOLN solution Take 5-10 mLs by mouth every 4 hours as needed for cough 420 mL 0     FLUTICASONE PROPIONATE NA Spray 50 mcg in nostril       losartan (COZAAR) 50 MG tablet Take 1 tablet (50 mg) by mouth daily (Patient taking differently: Take 50 mg by mouth every morning ) 30 tablet 3     VITAMIN D, CHOLECALCIFEROL, PO Take 1,000 Units by mouth every morning        METFORMIN HCL PO Take by mouth 2 times daily (with meals)       acetaminophen 500 MG CAPS Take 2 tablets by mouth daily.       acetic acid-hydrocortisone (ACETASOL HC) otic solution Place 4 drops into both ears 2 times daily. 10 mL 10     ammonium lactate (AMLACTIN) 12 % cream Apply  topically daily.       artificial  tears SOLN Use one drop to both eye PRN.       omeprazole (PRILOSEC) 20 MG capsule Take 20 mg by mouth daily.       Allergies:  Allergies   Allergen Reactions     Clindamycin      Eggs Other (See Comments)     Pt states no longer having reaction, childhood allergy, eats eggs       Penicillins Other (See Comments)     Pt states PCN allergy is due to egg allergy     Propoxyphene Other (See Comments)     Pain     Review of Systems:  - Skin: As above in HPI. No additional skin concerns.    Physical exam:  Vitals: There were no vitals taken for this visit.  GEN: This is a well developed, well-nourished male in no acute distress, in a pleasant mood.      SKIN: Waist-up skin which includes the head/face, neck, both arms, chest, back, abdomen, digits and/or nails was examined, including bilateral lower legs. Defers further skin exam.   - Xerosis noted to lower legs  - Minimal scale to scalp, no papules or pustules  - Stuck on tan to brown papule on a base of erythema on the right cheek  - Gritty pink papules on the right dorsal hand, left dorsal hand  - No other lesions of concern on areas examined.     Impression/Plan:  1. History of NMSC    Continue regular skin checks.    2. Xerosis - legs    Continue using am lactin 12% lotion on body daily.    3. Patient reported folliculitis    Increase washing scalp more often    Continue using Selsun Blue shampoo, alternating with Head and Shoulders shampoo    4. Inflamed seborrheic keratosis - right cheek    Cryotherapy procedure note: After verbal consent and discussion of risks and benefits including but no limited to dyspigmentation/scar, blister, and pain, 1 was(were) treated with 1-2mm freeze border for 2 cycles with liquid nitrogen. Post cryotherapy instructions were provided.     4. Actinic keratoses - right dorsal hand, left dorsal hand    Cryotherapy procedure note: After verbal consent and discussion of risks and benefits including but no limited to dyspigmentation/scar,  blister, and pain, 2 was(were) treated with 1-2mm freeze border for 2 cycles with liquid nitrogen. Post cryotherapy instructions were provided.     Follow up in 1 year, earlier for new or changing lesions.    Staff Involved:  Staff Only  Scribe Disclosure:   I, Sirena Patel, am serving as a scribe to document services personally performed by Corinne Gleason PA-C, based on data collection and the provider's statements to me.    Provider Disclosure:   The documentation recorded by the scribe accurately reflects the services I personally performed and the decisions made by me.    All risks, benefits and alternatives were discussed with patient.  Patient is in agreement and understands the assessment and plan.  All questions were answered.  Sun Screen Education was given.   Return to Clinic annually or sooner as needed.   Corinne Gleason PA-C   [facility]

## 2017-12-07 ENCOUNTER — TELEPHONE (OUTPATIENT)
Dept: INTERNAL MEDICINE | Facility: CLINIC | Age: 72
End: 2017-12-07

## 2017-12-07 DIAGNOSIS — D64.9 ANEMIA, UNSPECIFIED TYPE: Primary | ICD-10-CM

## 2017-12-07 NOTE — TELEPHONE ENCOUNTER
Placed hematology referral for anemia and low platelets this encounter          Dear Lux;    The results of your laboratory tests are attached and you have a lower hemoglobin and platelets. I have the following recommendations:    (1) Schedule and complete the ordered (11/30/17) abdominal ultrasound    (2) I recommend you see the hematology doctors for the laboratory abnormalities and I placed a Hematology referral today    (3) I would like to see you back in my clinic afterwards to go over all the results.    You can call [phone] to schedule ALL these appointments.      JIM Duffy MD

## 2017-12-11 ENCOUNTER — PRE VISIT (OUTPATIENT)
Dept: UROLOGY | Facility: CLINIC | Age: 72
End: 2017-12-11

## 2017-12-11 NOTE — TELEPHONE ENCOUNTER
Patient is coming in to see Sofiya Contreras PA-C for LUTS, called patient and left a message to please come with a full bladder for a flow/PVR

## 2017-12-15 ENCOUNTER — RADIANT APPOINTMENT (OUTPATIENT)
Dept: ULTRASOUND IMAGING | Facility: CLINIC | Age: 72
End: 2017-12-15
Attending: INTERNAL MEDICINE
Payer: MEDICARE

## 2017-12-28 ENCOUNTER — TELEPHONE (OUTPATIENT)
Dept: INTERNAL MEDICINE | Facility: CLINIC | Age: 72
End: 2017-12-28

## 2017-12-28 DIAGNOSIS — R94.5 ABNORMAL RESULTS OF LIVER FUNCTION STUDIES: ICD-10-CM

## 2017-12-28 DIAGNOSIS — I10 BENIGN ESSENTIAL HYPERTENSION: Primary | ICD-10-CM

## 2017-12-28 DIAGNOSIS — N28.1 RENAL CYST: ICD-10-CM

## 2017-12-28 NOTE — TELEPHONE ENCOUNTER
Ordered tests and referrals        Dear Lux;    This abdominal ultrasound has some findings that I recommend follow up evaluation:    (1) I recommend you see the Gastrointestinal doctors for abnormal liver findings on the ultrasound and I placed a referral today    (2) You have a large kidney cyst that I recommend you see the Urology doctors and I placed a referral today    (3) Your aorta is somewhat enlarged and I ordered a full ultrasound today    (4) I would like to see you back in my clinic in a month or so after you have completed the above    You can call [phone] to schedule all these appointments    JIM Duffy MD      NA x15 rings.  Mi Pratt RN 5:40 PM on 1/2/2018.

## 2018-01-03 ENCOUNTER — TELEPHONE (OUTPATIENT)
Dept: INTERNAL MEDICINE | Facility: CLINIC | Age: 73
End: 2018-01-03

## 2018-01-03 NOTE — TELEPHONE ENCOUNTER
----- Message from Isai Plunkett sent at 1/2/2018 10:35 AM CST -----  Regarding: Dr. Duffy, Request provider to provider contact to Hepatology + Urology Clinics  Brandon Stark,     Happy New Year!    Patient called to schedule off of referrals issued by Dr. Duffy for Hepatology and Urology. First available in Hepatology is late March 2018. Patient would like Dr. Duffy to call in on his behalf to get him in sooner. He says it is urgent and he might be dead before March. Please follow-up. If you have questions please call patient at phones: [phone] or [phone].     Appt set up for Jan 5th at 2:30 pm with Dr Duffy.

## 2018-01-05 ENCOUNTER — OFFICE VISIT (OUTPATIENT)
Dept: INTERNAL MEDICINE | Facility: CLINIC | Age: 73
End: 2018-01-05
Payer: MEDICARE

## 2018-01-05 ENCOUNTER — ONCOLOGY VISIT (OUTPATIENT)
Dept: ONCOLOGY | Facility: CLINIC | Age: 73
End: 2018-01-05
Attending: INTERNAL MEDICINE
Payer: MEDICARE

## 2018-01-05 ENCOUNTER — RADIANT APPOINTMENT (OUTPATIENT)
Dept: ULTRASOUND IMAGING | Facility: CLINIC | Age: 73
End: 2018-01-05
Attending: INTERNAL MEDICINE
Payer: MEDICARE

## 2018-01-05 ENCOUNTER — RADIANT APPOINTMENT (OUTPATIENT)
Dept: GENERAL RADIOLOGY | Facility: CLINIC | Age: 73
End: 2018-01-05
Payer: MEDICARE

## 2018-01-05 VITALS
HEART RATE: 80 BPM | OXYGEN SATURATION: 97 % | HEIGHT: 70 IN | DIASTOLIC BLOOD PRESSURE: 100 MMHG | SYSTOLIC BLOOD PRESSURE: 165 MMHG | TEMPERATURE: 97.7 F | RESPIRATION RATE: 18 BRPM

## 2018-01-05 VITALS — RESPIRATION RATE: 16 BRPM

## 2018-01-05 DIAGNOSIS — D64.9 ANEMIA, UNSPECIFIED TYPE: ICD-10-CM

## 2018-01-05 DIAGNOSIS — R06.02 SHORTNESS OF BREATH: ICD-10-CM

## 2018-01-05 DIAGNOSIS — R53.82 CHRONIC FATIGUE: ICD-10-CM

## 2018-01-05 DIAGNOSIS — R60.0 BILATERAL LEG EDEMA: ICD-10-CM

## 2018-01-05 DIAGNOSIS — R06.02 SOB (SHORTNESS OF BREATH): Primary | ICD-10-CM

## 2018-01-05 DIAGNOSIS — R16.1 SPLENOMEGALY: ICD-10-CM

## 2018-01-05 DIAGNOSIS — I10 BENIGN ESSENTIAL HYPERTENSION: ICD-10-CM

## 2018-01-05 DIAGNOSIS — R06.02 SOB (SHORTNESS OF BREATH): ICD-10-CM

## 2018-01-05 DIAGNOSIS — R16.1 SPLENOMEGALY: Primary | ICD-10-CM

## 2018-01-05 LAB
ALBUMIN SERPL-MCNC: 3.8 G/DL (ref 3.4–5)
ALP SERPL-CCNC: 88 U/L (ref 40–150)
ALT SERPL W P-5'-P-CCNC: 52 U/L (ref 0–70)
ANION GAP SERPL CALCULATED.3IONS-SCNC: 4 MMOL/L (ref 3–14)
AST SERPL W P-5'-P-CCNC: 29 U/L (ref 0–45)
BASOPHILS # BLD AUTO: 0 10E9/L (ref 0–0.2)
BASOPHILS NFR BLD AUTO: 0.7 %
BILIRUB SERPL-MCNC: 0.4 MG/DL (ref 0.2–1.3)
BUN SERPL-MCNC: 15 MG/DL (ref 7–30)
CALCIUM SERPL-MCNC: 8.8 MG/DL (ref 8.5–10.1)
CHLORIDE SERPL-SCNC: 105 MMOL/L (ref 94–109)
CO2 SERPL-SCNC: 30 MMOL/L (ref 20–32)
CREAT SERPL-MCNC: 0.91 MG/DL (ref 0.66–1.25)
CRP SERPL-MCNC: <2.9 MG/L (ref 0–8)
D DIMER PPP FEU-MCNC: 0.5 UG/ML FEU (ref 0–0.5)
DAT POLY-SP REAG RBC QL: NORMAL
DIFFERENTIAL METHOD BLD: ABNORMAL
EOSINOPHIL # BLD AUTO: 0.2 10E9/L (ref 0–0.7)
EOSINOPHIL NFR BLD AUTO: 4 %
ERYTHROCYTE [DISTWIDTH] IN BLOOD BY AUTOMATED COUNT: 13.4 % (ref 10–15)
GFR SERPL CREATININE-BSD FRML MDRD: 82 ML/MIN/1.7M2
GLUCOSE SERPL-MCNC: 114 MG/DL (ref 70–99)
HCT VFR BLD AUTO: 40.7 % (ref 40–53)
HGB BLD-MCNC: 13 G/DL (ref 13.3–17.7)
IMM GRANULOCYTES # BLD: 0 10E9/L (ref 0–0.4)
IMM GRANULOCYTES NFR BLD: 0.2 %
LYMPHOCYTES # BLD AUTO: 1.7 10E9/L (ref 0.8–5.3)
LYMPHOCYTES NFR BLD AUTO: 29.9 %
MCH RBC QN AUTO: 27.5 PG (ref 26.5–33)
MCHC RBC AUTO-ENTMCNC: 31.9 G/DL (ref 31.5–36.5)
MCV RBC AUTO: 86 FL (ref 78–100)
MONOCYTES # BLD AUTO: 0.4 10E9/L (ref 0–1.3)
MONOCYTES NFR BLD AUTO: 6.7 %
NEUTROPHILS # BLD AUTO: 3.3 10E9/L (ref 1.6–8.3)
NEUTROPHILS NFR BLD AUTO: 58.5 %
NRBC # BLD AUTO: 0 10*3/UL
NRBC BLD AUTO-RTO: 0 /100
NT-PROBNP SERPL-MCNC: 193 PG/ML (ref 0–125)
PLATELET # BLD AUTO: 160 10E9/L (ref 150–450)
POTASSIUM SERPL-SCNC: 4 MMOL/L (ref 3.4–5.3)
PROT SERPL-MCNC: 7.9 G/DL (ref 6.8–8.8)
RBC # BLD AUTO: 4.73 10E12/L (ref 4.4–5.9)
SODIUM SERPL-SCNC: 139 MMOL/L (ref 133–144)
TROPONIN I SERPL-MCNC: <0.015 UG/L (ref 0–0.04)
WBC # BLD AUTO: 5.7 10E9/L (ref 4–11)

## 2018-01-05 PROCEDURE — 99205 OFFICE O/P NEW HI 60 MIN: CPT | Mod: ZP | Performed by: INTERNAL MEDICINE

## 2018-01-05 PROCEDURE — 86140 C-REACTIVE PROTEIN: CPT | Performed by: INTERNAL MEDICINE

## 2018-01-05 PROCEDURE — 82784 ASSAY IGA/IGD/IGG/IGM EACH: CPT | Performed by: INTERNAL MEDICINE

## 2018-01-05 PROCEDURE — G0463 HOSPITAL OUTPT CLINIC VISIT: HCPCS | Mod: ZF

## 2018-01-05 PROCEDURE — 00000402 ZZHCL STATISTIC TOTAL PROTEIN: Performed by: INTERNAL MEDICINE

## 2018-01-05 PROCEDURE — 84484 ASSAY OF TROPONIN QUANT: CPT | Performed by: INTERNAL MEDICINE

## 2018-01-05 PROCEDURE — 85379 FIBRIN DEGRADATION QUANT: CPT | Performed by: INTERNAL MEDICINE

## 2018-01-05 PROCEDURE — 80053 COMPREHEN METABOLIC PANEL: CPT | Performed by: INTERNAL MEDICINE

## 2018-01-05 PROCEDURE — 82668 ASSAY OF ERYTHROPOIETIN: CPT | Performed by: INTERNAL MEDICINE

## 2018-01-05 PROCEDURE — 85025 COMPLETE CBC W/AUTO DIFF WBC: CPT | Performed by: INTERNAL MEDICINE

## 2018-01-05 PROCEDURE — 83010 ASSAY OF HAPTOGLOBIN QUANT: CPT | Performed by: INTERNAL MEDICINE

## 2018-01-05 PROCEDURE — 84165 PROTEIN E-PHORESIS SERUM: CPT | Performed by: INTERNAL MEDICINE

## 2018-01-05 PROCEDURE — 86880 COOMBS TEST DIRECT: CPT | Performed by: INTERNAL MEDICINE

## 2018-01-05 PROCEDURE — 83880 ASSAY OF NATRIURETIC PEPTIDE: CPT | Performed by: INTERNAL MEDICINE

## 2018-01-05 PROCEDURE — 36415 COLL VENOUS BLD VENIPUNCTURE: CPT

## 2018-01-05 PROCEDURE — 86334 IMMUNOFIX E-PHORESIS SERUM: CPT | Performed by: INTERNAL MEDICINE

## 2018-01-05 ASSESSMENT — PAIN SCALES - GENERAL
PAINLEVEL: SEVERE PAIN (6)

## 2018-01-05 NOTE — PATIENT INSTRUCTIONS
[facility] Center Phone Number [phone]  [facility] Medication Refill Request Information:  * Please contact your pharmacy regarding ANY request for medication refills.  ** [facility] Prescription Fax = [phone]  * Please allow 3 business days for routine medication refills.  * Please allow 5 business days for controlled substance medication refills.     [facility] Test Result notification information:  *You will be notified with in 7-10 days of your appointment day regarding the results of your test.  If you are on MyChart you will be notified as soon as the provider has reviewed the results and signed off on them.

## 2018-01-05 NOTE — PROGRESS NOTES
Hematology consult note    Reason for consult: Mild anemia, thrombocytopenia-referred by Dr. Duffy    History of present illness: Mr. Cantu is a 72-year-old man who is been noted on some routine labs to have a mild anemia with hemoglobin of 11.9 and mild thrombocytopenia with platelets of 141.  He is already had ferritin and B12 checked which were both normal.  Also he had an abdominal ultrasound on 12/15/17 which showed liver steatosis, splenomegaly at 17.5 cm, left renal cyst, and a possible AAA.    He denies any bleeding symptoms.  He does complain of shortness of breath.  He cannot really describe how long the difficulty breathing is been going on but looking back to the chart it has been for many months to years.  He also has some chronic coughing with no sputum production.  He has been seen by pulmonology but is not using his inhalers because he says they do not help.  He also notes over the past few weeks to months that he has had bilateral leg edema.  He also complains of some chest pain that comes and goes.  He reports that he has agent orange exposure many years ago when he served 2 years in [employer].  He complains of some dry eyes.  He denies any fevers, chills, sweats, anorexia.    Past medical history:  -Hypertension  -Questionable history of MI, has had fairly recent coronary angiogram that showed no significant lesions, he has not had any stents or cardiac surgery.  -History of stroke in 1989 with left-sided weakness resolved  -Reactive airway disease  -Diabetes mellitus  -History of kidney stones  -Prostatic hypertrophy  -Status post parathyroid resection 3/21/17 for hypercalcemia  -Status post hernia repair with mesh placement 6 or 7 years ago.  -Recent skin abscess excised left lower back.    Medications:  Flovent inhaler twice daily-he says he is not using this  Ketotifen (Refresh anti-itch) eyedrops twice daily  Doxycycline 100 mg daily  Albuterol inhaler as needed-she says is not using  "this  alfuzosin 10 mg daily  Finasteride 5 mg daily  Calcium carbonate 2 tablets daily  Senokot as needed  Losartan 50 mg daily  Vitamin D thousand units daily  Metformin (unknown dose) twice daily  Omeprazole 20 mg daily  Artificial tears as needed    Family history: Mother  of throat cancer, she was a smoker.  Older brother had a brain tumor.    Social: Distant history of smoking quit in , drinks alcohol on rare occasions, he lives with his daughter.    Review of Systems:  As in history above.  He complains of some bilateral leg edema that he says is gotten worse over the past few weeks.  The rest of the >10 point ROS is negative.      PHYSICAL EXAMINATION:  BP (!) 165/100 (BP Location: Left arm, Patient Position: Sitting, Cuff Size: Adult Large)  Pulse 80  Temp 97.7  F (36.5  C) (Tympanic)  Resp 18  Ht 1.778 m (5' 10\")  SpO2 97%    General appearance:  Patient is 71 yo man in no acute distress.     HEENT:  No pallor, icterus, or mucositis.  No thyromegaly.   Lymph nodes:  No cervical, supraclavicular, axillary, or inguinal lymphadenopathy.   Lungs:  Clear to auscultation bilaterally.   Heart:  Regular rate and rhythm; no S3 S4 or murmer.  No wheezing or crackles.   Abdomen:  Obese. Positive bowel sounds, soft and nontender, nondistended.  No hepatomegaly. No splenomegaly appreciated.    Extremities:  No joint swelling or tenderness.  2+ bilateral ankle edema.     Skin:  Healing ~ 5 cm surgical scar left lower back. No erythema, exudate or tenderness around wound. No rash, no petechiae or ecchymoses.    Labs and imaging:  Reviewed.     Assessment and recommendation: 72-year-old man with mild anemia and thrombocytopenia who has a spleen enlarged at 17.5 cm.  The mildly low hemoglobin and platelets could be related to the splenomegaly.  Interestingly today his hemoglobin is better than a few weeks ago, and the platelet count is normal.  However I am still concerned about the enlarged spleen.  His " reticulocyte count is not been elevated and there is nothing to suggest that he has chronic hemolysis as a cause of splenomegaly.  He may have some liver dysfunction although usually I do not see splenomegaly unless there is a more cirrhotic appearing liver not just steatosis.  However liver disease is still possible because of his mildly enlarged spleen.  There is also a concern for lymphoma in this patient with agent orange exposure.  He had chest CT in May that showed no adenopathy so I do not think that needs to be repeated to be worthwhile to check an abdomen and pelvis with contrast to see if he has any abdominal lymphadenopathy.  If he does then a biopsy would be pursued.  If he has only the splenomegaly I would not do a splenectomy trying to make a diagnosis of lymphoma but would monitor.    He has bilateral leg edema.  I discussed with Dr. Duffy who saw him right after seeing me.  His NT-BNP is mildly elevated.  He might have some mild diastolic dysfunction contributing to the leg edema.  Dr. Duffy will follow up on his edema and symptoms of shortness of breath, chest pain, cough.    I will be in touch with the patient after I had the CT results and we can decide from there if he needs additional workup or follow-up with me.  Neelam Marina MD  Hematology    Addendum: On CT scan 1/12/18-no adenopathy and spleen is only borderline enlarged. Since platelet count is now normal, and spleen not significantly enlarged, I recommend no further evaluation at this time, and does not need routine f/u in hematology clinic.  Neelam Marina MD

## 2018-01-05 NOTE — LETTER
January 11, 2018       TO: Lux Velasco  [address]   [facility] [address]       Dear Mr. Velasco,    I am writing to inform you of your test results. The hemoglobin and platelets are improved, which is good. There is no abnormal protein in your blood, and the erythropoietin hormone is normal (signals your bone marrow to make red blood cells). The N erminal -pro BNP is slightly high, which indicates you may have some mild heart failure. I spoke with Dr. Duffy about your case and he will follow up with that. You should still go ahead with the abdominal CT exam, and I will be in touch about those results.  Sincerely,  Neelam Marina MD  Hematology        Resulted Orders   CBC with platelets differential   Result Value Ref Range    WBC 5.7 4.0 - 11.0 10e9/L    RBC Count 4.73 4.4 - 5.9 10e12/L    Hemoglobin 13.0 (L) 13.3 - 17.7 g/dL    Hematocrit 40.7 40.0 - 53.0 %    MCV 86 78 - 100 fl    MCH 27.5 26.5 - 33.0 pg    MCHC 31.9 31.5 - 36.5 g/dL    RDW 13.4 10.0 - 15.0 %    Platelet Count 160 150 - 450 10e9/L    Diff Method Automated Method     % Neutrophils 58.5 %    % Lymphocytes 29.9 %    % Monocytes 6.7 %    % Eosinophils 4.0 %    % Basophils 0.7 %    % Immature Granulocytes 0.2 %    Nucleated RBCs 0 0 /100    Absolute Neutrophil 3.3 1.6 - 8.3 10e9/L    Absolute Lymphocytes 1.7 0.8 - 5.3 10e9/L    Absolute Monocytes 0.4 0.0 - 1.3 10e9/L    Absolute Eosinophils 0.2 0.0 - 0.7 10e9/L    Absolute Basophils 0.0 0.0 - 0.2 10e9/L    Abs Immature Granulocytes 0.0 0 - 0.4 10e9/L    Absolute Nucleated RBC 0.0    Haptoglobin   Result Value Ref Range    Haptoglobin 109 35 - 175 mg/dL   Direct antiglobulin test   Result Value Ref Range    ELIZABETH  Broad Spectrum Neg    Erythropoietin   Result Value Ref Range    Erythropoietin 14 4 - 27 mU/mL      Comment:      (Note)  INTERPRETIVE INFORMATION: Erythropoietin  Normal serum concentrations of erythropoietin for 95% of   individuals with normal hematocrits  range from 4-27 mU/mL.  As the hematocrit is lowered by iron deficiency, aplastic,   or hemolytic anemia, the concentration of erythropoietin   increases as shown in the graph below. In the absence of   anemia, elevated concentrations are seen in renal tumors,   as a manifestation of renal transplant rejection, and in   secondary polycythemia. Low values may be observed in   hemochromatosis.       Expected Erythropoietin Concentrations in Patients                    with Uncomplicated Anemia    Erythropoietin (mU/mL)               100,000 - +                         +                10,000 - +.......                         + .......                 1,000 - +    .......                         +     ........                   100 - +       ........                         +        ........                    10 - +          ........                           +---+---+---+---+---+---+                        10   20  30  40  50  60  70                               (Hematocrit %)           (Contributions To Nephrology 1988:66:54-62)  Decreased erythropoietin concentrations with an elevated   hematocrit are observed in patients with polycythemia rubra   vera, and with a decreased hematocrit in patients with HIV   infection who are receiving AZT.  Patients on AZT who have   anemia and erythropoietin concentrations of less than or   equal to 500 mU/mL may benefit from therapy with   recombinant EPO (:1587-4250,1990).  Performed by [employer],  [address] [place] [id number] [phone]  [email or web address], Tae Zhou MD, Lab. Director     CRP inflammation   Result Value Ref Range    CRP Inflammation <2.9 0.0 - 8.0 mg/L   Protein electrophoresis   Result Value Ref Range    Albumin Fraction 4.3 3.7 - 5.1 g/dL    Alpha 1 Fraction 0.3 0.2 - 0.4 g/dL    Alpha 2 Fraction 0.7 0.5 - 0.9 g/dL    Beta Fraction 0.8 0.6 - 1.0 g/dL    Gamma Fraction 1.2 0.7 - 1.6 g/dL    Monoclonal Peak 0.0 0.0 g/dL    ELP  Interpretation:       Essentially normal electrophoretic pattern. No monoclonal protein seen. Pathologic   significance requires clinical correlation. GEGE Howe M.D., Ph.D., Pathologist ([phone]).      Protein Immunofixation Serum   Result Value Ref Range    Immunofixation ELP       No monoclonal protein seen on immunofixation.  Pathological significance requires clinical   correlation.        Comment:      GEGE Howe M.D., Ph.D  Pathologist ([phone])      IGG 1260 695 - 1620 mg/dL     70 - 380 mg/dL    IGM 51 (L) 60 - 265 mg/dL   N terminal pro BNP outpatient   Result Value Ref Range    N-Terminal Pro Bnp 193 (H) 0 - 125 pg/mL      Comment:         Reference range shown and results flagged as abnormal are for the outpatient,   non acute settings. Establishing a baseline value for each individual patient   is useful for follow-up.  Suggested inpatient cut points for confirming diagnosis of CHF in an acute   setting are:   >450 pg/mL (age 18 to less than 50)   >900 pg/mL (age 50 to less than 75)   >1800 pg/mL (75 yrs and older)  An inpatient or emergency department NT-proPBNP <300 pg/mL effectively rules   out acute CHF, with 99% negative predictive value.      D dimer quantitative   Result Value Ref Range    D Dimer 0.5 0.0 - 0.50 ug/ml FEU      Comment:      This D-dimer assay is intended for use in conjunction with a clinical pretest   probability assessment model to exclude pulmonary embolism (PE) and deep   venous thrombosis (DVT) in outpatients suspected of PE or DVT. The cut-off   value is 0.5 ug/mL FEU.     Troponin I   Result Value Ref Range    Troponin I ES <0.015 0.000 - 0.045 ug/L      Comment:      The 99th percentile for upper reference range is 0.045 ug/L.  Troponin values   in the range of 0.045 - 0.120 ug/L may be associated with risks of adverse   clinical events.     Comprehensive metabolic panel   Result Value Ref Range    Sodium 139 133 - 144 mmol/L    Potassium 4.0  3.4 - 5.3 mmol/L    Chloride 105 94 - 109 mmol/L    Carbon Dioxide 30 20 - 32 mmol/L    Anion Gap 4 3 - 14 mmol/L    Glucose 114 (H) 70 - 99 mg/dL    Urea Nitrogen 15 7 - 30 mg/dL    Creatinine 0.91 0.66 - 1.25 mg/dL    GFR Estimate 82 >60 mL/min/1.7m2      Comment:      Non  GFR Calc    GFR Estimate If Black >90 >60 mL/min/1.7m2      Comment:       GFR Calc    Calcium 8.8 8.5 - 10.1 mg/dL    Bilirubin Total 0.4 0.2 - 1.3 mg/dL    Albumin 3.8 3.4 - 5.0 g/dL    Protein Total 7.9 6.8 - 8.8 g/dL    Alkaline Phosphatase 88 40 - 150 U/L    ALT 52 0 - 70 U/L    AST 29 0 - 45 U/L

## 2018-01-05 NOTE — NURSING NOTE
"Oncology Rooming Note    January 5, 2018 1:25 PM   Lux Velasco is a 72 year old male who presents for:    Chief Complaint   Patient presents with     Oncology Clinic Visit     New patient visit related to Anemia     Initial Vitals: BP (!) 165/100 (BP Location: Left arm, Patient Position: Sitting, Cuff Size: Adult Large)  Pulse 80  Temp 97.7  F (36.5  C) (Tympanic)  Resp 18  Ht 1.778 m (5' 10\")  SpO2 97% Estimated body mass index is 40.35 kg/(m^2) as calculated from the following:    Height as of 11/15/17: 1.778 m (5' 10\").    Weight as of 11/15/17: 127.6 kg (281 lb 3.2 oz). There is no height or weight on file to calculate BSA.  Severe Pain (6) Comment: Generalized   No LMP for male patient.  Allergies reviewed: Yes  Medications reviewed: Yes    Medications: Medication refills not needed today.  Pharmacy name entered into [employer]:    [facility] [place], [place] - [address] [place] [address] [phone]  [place] [facility] [facility]    Clinical concerns: No new concerns. Provider was notified.    10 minutes for nursing intake (face to face time)     Angelica Hwang LPN            "

## 2018-01-05 NOTE — MR AVS SNAPSHOT
After Visit Summary   1/5/2018    Lux Velasco    MRN: [id number]           Patient Information     Date Of Birth          1945        Visit Information        Provider Department      1/5/2018 2:30 PM Nam Duffy MD [facility]        Today's Diagnoses     SOB (shortness of breath)    -  1      Care Instructions    Primary Care Center Phone Number [phone]  Primary Care Center Medication Refill Request Information:  * Please contact your pharmacy regarding ANY request for medication refills.  ** [facility] Prescription Fax = [phone]  * Please allow 3 business days for routine medication refills.  * Please allow 5 business days for controlled substance medication refills.     Primary Care Center Test Result notification information:  *You will be notified with in 7-10 days of your appointment day regarding the results of your test.  If you are on MyChart you will be notified as soon as the provider has reviewed the results and signed off on them.                  Follow-ups after your visit        Additional Services     CARDIOLOGY EVAL ADULT REFERRAL       CP and SOB            PULMONARY MEDICINE REFERRAL       ZAMARRIPA and SOB                  Your next 10 appointments already scheduled     Jan 12, 2018  2:40 PM CST   (Arrive by 2:25 PM)   CT ABDOMEN PELVIS W CONTRAST with UCCT2   [facility] ([facility])    [address] [address] [phone]           Please bring any scans or X-rays taken at other hospitals, if similar tests were done. Also bring a list of your medicines, including vitamins, minerals and over-the-counter drugs. It is safest to leave personal items at home.  Be sure to tell your doctor:   If you have any allergies.   If there s any chance you are pregnant.   If you are breastfeeding.   If you have any special needs.  You may have contrast for this exam. To prepare:   Do not eat  or drink for 2 hours before your exam. If you need to take medicine, you may take it with small sips of water. (We may ask you to take liquid medicine as well.)   The day before your exam, drink extra fluids at least six 8-ounce glasses (unless your doctor tells you to restrict your fluids).  Patients over 70 or patients with diabetes or kidney problems:   If you haven t had a blood test (creatinine test) within the last 30 days, go to your clinic or Diagnostic Imaging Department for this test.  If you have diabetes:   If your kidney function is normal, continue taking your metformin (Avandamet, Glucophage, Glucovance, Metaglip) on the day of your exam.   If your kidney function is abnormal, wait 48 hours before restarting this medicine.  You will have oral contrast for this exam:   You will drink the contrast at home. Get this from your clinic or Diagnostic Imaging Department. Please follow the directions given.  Please wear loose clothing, such as a sweat suit or jogging clothes. Avoid snaps, zippers and other metal. We may ask you to undress and put on a hospital gown.  If you have any questions, please call the Imaging Department where you will have your exam.            Jan 12, 2018  3:40 PM CST   (Arrive by 3:25 PM)   Return Visit with Marc Chapa MD   [facility] ([facility])    [address]  4th Floor  [facility] [address]   [phone]            Mar 19, 2018  1:00 PM CDT   (Arrive by 12:45 PM)   New General Liver with Jani Sanderson MD   [facility] ([facility])    [address]  [address]  [facility] [address]   [phone]            Apr 11, 2018 10:30 AM CDT   RETURN GENERAL with Migdalia Hussein MD   Eye Clinic ([facility])    [facility]  [address]  [address]  [facility] [address]   [phone]            May 31, 2018 11:00 AM  CDT   (Arrive by 10:45 AM)   Return Visit with María Jacques MD   [facility] ([facility])    [address]  [address] [place] [address] [phone]              Future tests that were ordered for you today     Open Future Orders        Priority Expected Expires Ordered    Echocardiogram Routine  1/5/2019 1/5/2018    CT Abdomen pelvis w contrast* Routine  1/5/2019 1/5/2018            Who to contact     Please call your clinic at [phone] to:    Ask questions about your health    Make or cancel appointments    Discuss your medicines    Learn about your test results    Speak to your doctor   If you have compliments or concerns about an experience at your clinic, or if you wish to file a complaint, please contact [facility] Patient Relations at [phone] or email us at [email or web address]         Additional Information About Your Visit        Care EveryWhere ID     This is your Care EveryWhere ID. This could be used by other organizations to access your [facility] medical records  [id number]        Your Vitals Were     Respirations                   16            Blood Pressure from Last 3 Encounters:   01/05/18 (!) 165/100   11/30/17 130/75   11/15/17 145/82    Weight from Last 3 Encounters:   11/15/17 127.6 kg (281 lb 3.2 oz)   10/10/17 126 kg (277 lb 12.8 oz)   10/10/17 125.9 kg (277 lb 8 oz)              We Performed the Following     CARDIOLOGY EVAL ADULT REFERRAL     EKG Performed in Clinic w/ Provider Reading Fee     PULMONARY MEDICINE REFERRAL     XR Chest 2 Views          Today's Medication Changes          These changes are accurate as of: 1/5/18  5:01 PM.  If you have any questions, ask your nurse or doctor.               These medicines have changed or have updated prescriptions.        Dose/Directions    losartan 50 MG tablet   Commonly known as:  COZAAR   This may have changed:   when to take this   Used for:  Benign essential hypertension        Dose:  50 mg   Take 1 tablet (50 mg) by mouth daily   Quantity:  30 tablet   Refills:  3         Stop taking these medicines if you haven't already. Please contact your care team if you have questions.     amoxicillin-clavulanate 875-125 MG per tablet   Commonly known as:  AUGMENTIN   Stopped by:  Neelam Marina MD           clindamycin 150 MG capsule   Commonly known as:  CLEOCIN   Stopped by:  Neelam Marina MD                    Primary Care Provider Office Phone # Fax #    Nam ZAFAR MD Tati [phone]       [address] [address]        Equal Access to Services     [facility]: Hadii aad ku hadasho Sokailee, waaxda luqadaha, qaybta kaalmada adeegyada, taty chase . So [facility] [phone].    ATENCIÓN: Si habla español, tiene a back disposición servicios gratuitos de asistencia lingüística. Llame al [phone].    We comply with applicable federal civil rights laws and [place] laws. We do not discriminate on the basis of race, color, national origin, age, disability, sex, sexual orientation, or gender identity.            Thank you!     Thank you for choosing [facility]  for your care. Our goal is always to provide you with excellent care. Hearing back from our patients is one way we can continue to improve our services. Please take a few minutes to complete the written survey that you may receive in the mail after your visit with us. Thank you!             Your Updated Medication List - Protect others around you: Learn how to safely use, store and throw away your medicines at www.disposemymeds.org.          This list is accurate as of: 1/5/18  5:01 PM.  Always use your most recent med list.                   Brand Name Dispense Instructions for use Diagnosis    acetaminophen 500 MG Caps      Take 2 tablets by mouth daily.        acetic  acid-hydrocortisone otic solution    ACETASOL HC    10 mL    Place 4 drops into both ears 2 times daily.    Otitis externa       albuterol 108 (90 BASE) MCG/ACT Inhaler    PROAIR HFA    1 Inhaler    Inhale 2 puffs into the lungs every 4 hours as needed for shortness of breath / dyspnea, wheezing or other (use prior to exertion/activities)    Shortness of breath       alfuzosin 10 MG 24 hr tablet    UROXATRAL    90 tablet    Take 1 tablet (10 mg) by mouth daily    Lower urinary tract symptoms (LUTS)       ammonium lactate 12 % cream    AMLACTIN     Apply  topically daily.        artificial tears Soln      Use one drop to both eye PRN.        calcium carbonate 1250 (500 CA) MG Tabs tablet    OSCAL 500    60 tablet    Take 2 tablets once a day with food.    Hypocalcemia       * doxycycline 100 MG capsule    VIBRAMYCIN     Take by mouth daily        * doxycycline 100 MG capsule    VIBRAMYCIN    30 capsule    Take 1 capsule (100 mg) by mouth daily    Ulcerative blepharitis of upper and lower eyelids of both eyes       finasteride 5 MG tablet    PROSCAR    90 tablet    Take 1 tablet (5 mg) by mouth daily    Lower urinary tract symptoms (LUTS)       fluticasone 110 MCG/ACT Inhaler    FLOVENT HFA    3 Inhaler    Inhale 1 puff into the lungs 2 times daily    Mild intermittent asthma, unspecified whether complicated, Dyspnea on exertion       FLUTICASONE PROPIONATE NA      Spray 50 mcg in nostril    Cough       guaiFENesin-codeine 100-10 MG/5ML Soln solution    ROBITUSSIN AC    420 mL    Take 5-10 mLs by mouth every 4 hours as needed for cough    Cough       * ketotifen 0.025 % Soln ophthalmic solution    ZADITOR/REFRESH ANTI-ITCH     Place 1 drop into both eyes 2 times daily        * ketotifen 0.025 % Soln ophthalmic solution    ZADITOR/REFRESH ANTI-ITCH     Place 1 drop into both eyes 2 times daily        losartan 50 MG tablet    COZAAR    30 tablet    Take 1 tablet (50 mg) by mouth daily    Benign essential hypertension        METFORMIN HCL PO      Take by mouth 2 times daily (with meals)    Benign nodular prostatic hyperplasia without lower urinary tract symptoms, Need for prophylactic vaccination against Streptococcus pneumoniae (pneumococcus), Screening for AAA (abdominal aortic aneurysm), History of smoking       omeprazole 20 MG CR capsule    priLOSEC     Take 20 mg by mouth daily.        oxyCODONE-acetaminophen 5-325 MG per tablet    PERCOCET    20 tablet    Take 1-2 tablets by mouth every 6 hours as needed for moderate to severe pain or pain (moderate to severe)    Hx of removal of cyst       senna-docusate 8.6-50 MG per tablet    SENOKOT-S;PERICOLACE    10 tablet    Take 1-2 tablets by mouth 2 times daily as needed for constipation Take while on oral narcotics to prevent or treat constipation.    Hypercalcemia, Primary hyperparathyroidism (H)       VITAMIN D (CHOLECALCIFEROL) PO      Take 1,000 Units by mouth every morning        * Notice:  This list has 4 medication(s) that are the same as other medications prescribed for you. Read the directions carefully, and ask your doctor or other care provider to review them with you.

## 2018-01-05 NOTE — MR AVS SNAPSHOT
After Visit Summary   1/5/2018    Lux Velasco    MRN: [id number]           Patient Information     Date Of Birth          1945        Visit Information        Provider Department      1/5/2018 1:30 PM Neelam Marina MD [facility]        Today's Diagnoses     Splenomegaly    -  1    Anemia, unspecified type        Bilateral leg edema        Chronic fatigue        Shortness of breath            Follow-ups after your visit        Follow-up notes from your care team     Return if symptoms worsen or fail to improve.      Your next 10 appointments already scheduled     Jan 05, 2018  3:30 PM CST   [facility] with  [facility]   [facility] ([facility])    [address]  [address]  [facility] [address] [phone]            Jan 12, 2018  2:40 PM CST   (Arrive by 2:25 PM)   CT ABDOMEN PELVIS W CONTRAST with UCCT2   [facility] CT ([facility])    [address]  1st Floor  [facility] [address] [phone]           Please bring any scans or X-rays taken at other hospitals, if similar tests were done. Also bring a list of your medicines, including vitamins, minerals and over-the-counter drugs. It is safest to leave personal items at home.  Be sure to tell your doctor:   If you have any allergies.   If there s any chance you are pregnant.   If you are breastfeeding.   If you have any special needs.  You may have contrast for this exam. To prepare:   Do not eat or drink for 2 hours before your exam. If you need to take medicine, you may take it with small sips of water. (We may ask you to take liquid medicine as well.)   The day before your exam, drink extra fluids at least six 8-ounce glasses (unless your doctor tells you to restrict your fluids).  Patients over 70 or patients with diabetes or kidney problems:   If you haven t had a blood test (creatinine test)  within the last 30 days, go to your clinic or Diagnostic Imaging Department for this test.  If you have diabetes:   If your kidney function is normal, continue taking your metformin (Avandamet, Glucophage, Glucovance, Metaglip) on the day of your exam.   If your kidney function is abnormal, wait 48 hours before restarting this medicine.  You will have oral contrast for this exam:   You will drink the contrast at home. Get this from your clinic or Diagnostic Imaging Department. Please follow the directions given.  Please wear loose clothing, such as a sweat suit or jogging clothes. Avoid snaps, zippers and other metal. We may ask you to undress and put on a hospital gown.  If you have any questions, please call the Imaging Department where you will have your exam.            Jan 12, 2018  3:40 PM CST   (Arrive by 3:25 PM)   Return Visit with Marc Chapa MD   [facility] Prostate and Urologic Cancers ([facility])    [address]  4th Floor  [facility] [address] [phone]            Mar 19, 2018  1:00 PM CDT   (Arrive by 12:45 PM)   New General Liver with Jani Sanderson MD   [facility] Hepatology ([facility])    [address]  [address]  [facility] [address] [phone]            Apr 11, 2018 10:30 AM CDT   RETURN GENERAL with Migdalia Hussein MD   Eye Clinic ([facility])    [facility]  [address]  [address] [address]  [facility] [address]   [phone]            May 31, 2018 11:00 AM CDT   (Arrive by 10:45 AM)   Return Visit with María Jacques MD   [facility] ([facility])    [address]  [address]  [facility] [id number] [phone]              Future tests that were ordered for you today     Open Future Orders        Priority Expected Expires Ordered    IgG Routine 1/5/2018 1/12/2018 1/5/2018    IgM  "Routine 1/5/2018 1/12/2018 1/5/2018    N terminal pro BNP outpatient Routine 1/5/2018 1/12/2018 1/5/2018    CBC with platelets differential Routine 1/5/2018 1/12/2018 1/5/2018    Haptoglobin Routine 1/5/2018 1/12/2018 1/5/2018    Direct antiglobulin test Routine 1/5/2018 1/12/2018 1/5/2018    Erythropoietin Routine 1/5/2018 1/12/2018 1/5/2018    CRP inflammation Routine 1/5/2018 1/12/2018 1/5/2018    Protein electrophoresis Routine 1/5/2018 1/12/2018 1/5/2018    Protein Immunofixation Serum Routine 1/5/2018 1/12/2018 1/5/2018    IgA Routine 1/5/2018 1/12/2018 1/5/2018    CT Abdomen pelvis w contrast* Routine  1/5/2019 1/5/2018            Who to contact     If you have questions or need follow up information about today's clinic visit or your schedule please contact [facility]IC directly at [phone].  Normal or non-critical lab and imaging results will be communicated to you by MyChart, letter or phone within 4 business days after the clinic has received the results. If you do not hear from us within 7 days, please contact the clinic through MyChart or phone. If you have a critical or abnormal lab result, we will notify you by phone as soon as possible.  Submit refill requests through [employer] or call your pharmacy and they will forward the refill request to us. Please allow 3 business days for your refill to be completed.          Additional Information About Your Visit        Care EveryWhere ID     This is your Care EveryWhere ID. This could be used by other organizations to access your [place] medical records  [id number]        Your Vitals Were     Pulse Temperature Respirations Height Pulse Oximetry       80 97.7  F (36.5  C) (Tympanic) 18 1.778 m (5' 10\") 97%        Blood Pressure from Last 3 Encounters:   01/05/18 (!) 165/100   11/30/17 130/75   11/15/17 145/82    Weight from Last 3 Encounters:   11/15/17 127.6 kg (281 lb 3.2 oz)   10/10/17 126 kg (277 lb 12.8 oz)   10/10/17 125.9 kg " (277 lb 8 oz)                 Today's Medication Changes          These changes are accurate as of: 1/5/18  2:40 PM.  If you have any questions, ask your nurse or doctor.               These medicines have changed or have updated prescriptions.        Dose/Directions    losartan 50 MG tablet   Commonly known as:  COZAAR   This may have changed:  when to take this   Used for:  Benign essential hypertension        Dose:  50 mg   Take 1 tablet (50 mg) by mouth daily   Quantity:  30 tablet   Refills:  3         Stop taking these medicines if you haven't already. Please contact your care team if you have questions.     amoxicillin-clavulanate 875-125 MG per tablet   Commonly known as:  AUGMENTIN   Stopped by:  Neelam Marina MD           clindamycin 150 MG capsule   Commonly known as:  CLEOCIN   Stopped by:  Neelam Marina MD                    Primary Care Provider Office Phone # Fax #    Nam ZAFAR MD Tati [phone]       [address] [address]        Equal Access to Services     [facility]: Hadii aad ku hadasho So[place]ali, waaxda luqadaha, qaybta kaalmada adeegyada, waxay idiin hayaan lali chase . So [facility] [phone].    ATENCIÓN: Si habla español, tiene a back disposición servicios gratuitos de asistencia lingüística. [facility] [phone].    We comply with applicable federal civil rights laws and [place] laws. We do not discriminate on the basis of race, color, national origin, age, disability, sex, sexual orientation, or gender identity.            Thank you!     Thank you for choosing [facility]  for your care. Our goal is always to provide you with excellent care. Hearing back from our patients is one way we can continue to improve our services. Please take a few minutes to complete the written survey that you may receive in the mail after your visit with us. Thank you!             Your Updated Medication List - Protect others  around you: Learn how to safely use, store and throw away your medicines at www.disposemymeds.org.          This list is accurate as of: 1/5/18  2:40 PM.  Always use your most recent med list.                   Brand Name Dispense Instructions for use Diagnosis    acetaminophen 500 MG Caps      Take 2 tablets by mouth daily.        acetic acid-hydrocortisone otic solution    ACETASOL HC    10 mL    Place 4 drops into both ears 2 times daily.    Otitis externa       albuterol 108 (90 BASE) MCG/ACT Inhaler    PROAIR HFA    1 Inhaler    Inhale 2 puffs into the lungs every 4 hours as needed for shortness of breath / dyspnea, wheezing or other (use prior to exertion/activities)    Shortness of breath       alfuzosin 10 MG 24 hr tablet    UROXATRAL    90 tablet    Take 1 tablet (10 mg) by mouth daily    Lower urinary tract symptoms (LUTS)       ammonium lactate 12 % cream    AMLACTIN     Apply  topically daily.        artificial tears Soln      Use one drop to both eye PRN.        calcium carbonate 1250 (500 CA) MG Tabs tablet    OSCAL 500    60 tablet    Take 2 tablets once a day with food.    Hypocalcemia       * doxycycline 100 MG capsule    VIBRAMYCIN     Take by mouth daily        * doxycycline 100 MG capsule    VIBRAMYCIN    30 capsule    Take 1 capsule (100 mg) by mouth daily    Ulcerative blepharitis of upper and lower eyelids of both eyes       finasteride 5 MG tablet    PROSCAR    90 tablet    Take 1 tablet (5 mg) by mouth daily    Lower urinary tract symptoms (LUTS)       fluticasone 110 MCG/ACT Inhaler    FLOVENT HFA    3 Inhaler    Inhale 1 puff into the lungs 2 times daily    Mild intermittent asthma, unspecified whether complicated, Dyspnea on exertion       FLUTICASONE PROPIONATE NA      Spray 50 mcg in nostril    Cough       guaiFENesin-codeine 100-10 MG/5ML Soln solution    ROBITUSSIN AC    420 mL    Take 5-10 mLs by mouth every 4 hours as needed for cough    Cough       * ketotifen 0.025 % Soln  ophthalmic solution    ZADITOR/REFRESH ANTI-ITCH     Place 1 drop into both eyes 2 times daily        * ketotifen 0.025 % Soln ophthalmic solution    ZADITOR/REFRESH ANTI-ITCH     Place 1 drop into both eyes 2 times daily        losartan 50 MG tablet    COZAAR    30 tablet    Take 1 tablet (50 mg) by mouth daily    Benign essential hypertension       METFORMIN HCL PO      Take by mouth 2 times daily (with meals)    Benign nodular prostatic hyperplasia without lower urinary tract symptoms, Need for prophylactic vaccination against Streptococcus pneumoniae (pneumococcus), Screening for AAA (abdominal aortic aneurysm), History of smoking       omeprazole 20 MG CR capsule    priLOSEC     Take 20 mg by mouth daily.        oxyCODONE-acetaminophen 5-325 MG per tablet    PERCOCET    20 tablet    Take 1-2 tablets by mouth every 6 hours as needed for moderate to severe pain or pain (moderate to severe)    Hx of removal of cyst       senna-docusate 8.6-50 MG per tablet    SENOKOT-S;PERICOLACE    10 tablet    Take 1-2 tablets by mouth 2 times daily as needed for constipation Take while on oral narcotics to prevent or treat constipation.    Hypercalcemia, Primary hyperparathyroidism (H)       VITAMIN D (CHOLECALCIFEROL) PO      Take 1,000 Units by mouth every morning        * Notice:  This list has 4 medication(s) that are the same as other medications prescribed for you. Read the directions carefully, and ask your doctor or other care provider to review them with you.

## 2018-01-05 NOTE — LETTER
Patient:  Lux Velasco  :   1945  MRN:     [id number]        Mr. Lux Velasco  [address] [place] [address]        January 10, 2018    Dear Mr. Velasco,    Thank you for choosing the [facility] for your healthcare needs.  We appreciate the opportunity to serve you.    The following are your recent test results.     Dear Lux;     Your labs look fine       Results for orders placed or performed in visit on 18   CBC with platelets differential   Result Value Ref Range    WBC 5.7 4.0 - 11.0 10e9/L    RBC Count 4.73 4.4 - 5.9 10e12/L    Hemoglobin 13.0 (L) 13.3 - 17.7 g/dL    Hematocrit 40.7 40.0 - 53.0 %    MCV 86 78 - 100 fl    MCH 27.5 26.5 - 33.0 pg    MCHC 31.9 31.5 - 36.5 g/dL    RDW 13.4 10.0 - 15.0 %    Platelet Count 160 150 - 450 10e9/L    Diff Method Automated Method     % Neutrophils 58.5 %    % Lymphocytes 29.9 %    % Monocytes 6.7 %    % Eosinophils 4.0 %    % Basophils 0.7 %    % Immature Granulocytes 0.2 %    Nucleated RBCs 0 0 /100    Absolute Neutrophil 3.3 1.6 - 8.3 10e9/L    Absolute Lymphocytes 1.7 0.8 - 5.3 10e9/L    Absolute Monocytes 0.4 0.0 - 1.3 10e9/L    Absolute Eosinophils 0.2 0.0 - 0.7 10e9/L    Absolute Basophils 0.0 0.0 - 0.2 10e9/L    Abs Immature Granulocytes 0.0 0 - 0.4 10e9/L    Absolute Nucleated RBC 0.0    Haptoglobin   Result Value Ref Range    Haptoglobin 109 35 - 175 mg/dL   Erythropoietin   Result Value Ref Range    Erythropoietin 14 4 - 27 mU/mL   CRP inflammation   Result Value Ref Range    CRP Inflammation <2.9 0.0 - 8.0 mg/L   Protein electrophoresis   Result Value Ref Range    Albumin Fraction 4.3 3.7 - 5.1 g/dL    Alpha 1 Fraction 0.3 0.2 - 0.4 g/dL    Alpha 2 Fraction 0.7 0.5 - 0.9 g/dL    Beta Fraction 0.8 0.6 - 1.0 g/dL    Gamma Fraction 1.2 0.7 - 1.6 g/dL    Monoclonal Peak 0.0 0.0 g/dL    ELP Interpretation:       Essentially normal electrophoretic pattern. No monoclonal protein seen.  Pathologic   significance requires clinical correlation. GEGE Howe M.D., Ph.D., Pathologist ([phone]).      Protein Immunofixation Serum   Result Value Ref Range    Immunofixation ELP       No monoclonal protein seen on immunofixation.  Pathological significance requires clinical   correlation.      IGG 1260 695 - 1620 mg/dL     70 - 380 mg/dL    IGM 51 (L) 60 - 265 mg/dL   N terminal pro BNP outpatient   Result Value Ref Range    N-Terminal Pro Bnp 193 (H) 0 - 125 pg/mL   D dimer quantitative   Result Value Ref Range    D Dimer 0.5 0.0 - 0.50 ug/ml FEU   Troponin I   Result Value Ref Range    Troponin I ES <0.015 0.000 - 0.045 ug/L   Comprehensive metabolic panel   Result Value Ref Range    Sodium 139 133 - 144 mmol/L    Potassium 4.0 3.4 - 5.3 mmol/L    Chloride 105 94 - 109 mmol/L    Carbon Dioxide 30 20 - 32 mmol/L    Anion Gap 4 3 - 14 mmol/L    Glucose 114 (H) 70 - 99 mg/dL    Urea Nitrogen 15 7 - 30 mg/dL    Creatinine 0.91 0.66 - 1.25 mg/dL    GFR Estimate 82 >60 mL/min/1.7m2    GFR Estimate If Black >90 >60 mL/min/1.7m2    Calcium 8.8 8.5 - 10.1 mg/dL    Bilirubin Total 0.4 0.2 - 1.3 mg/dL    Albumin 3.8 3.4 - 5.0 g/dL    Protein Total 7.9 6.8 - 8.8 g/dL    Alkaline Phosphatase 88 40 - 150 U/L    ALT 52 0 - 70 U/L    AST 29 0 - 45 U/L   Direct antiglobulin test   Result Value Ref Range    ELIZABETH  Broad Spectrum Neg        Please contact your provider if you have any questions or concerns.  We look forward to serving your needs in the future.      Sincerely,      Dr Duffy/JENNIFER

## 2018-01-05 NOTE — LETTER
1/5/2018       RE: Lux Velasco  [address]   [facility] [address]     Dear Colleague,    Thank you for referring your patient, Lux Velasco, to the [facility]. Please see a copy of my visit note below.    Hematology consult note    Reason for consult: Mild anemia, thrombocytopenia-referred by Dr. Duffy    History of present illness: Mr. Cantu is a 72-year-old man who is been noted on some routine labs to have a mild anemia with hemoglobin of 11.9 and mild thrombocytopenia with platelets of 141.  He is already had ferritin and B12 checked which were both normal.  Also he had an abdominal ultrasound on 12/15/17 which showed liver steatosis, splenomegaly at 17.5 cm, left renal cyst, and a possible AAA.    He denies any bleeding symptoms.  He does complain of shortness of breath.  He cannot really describe how long the difficulty breathing is been going on but looking back to the chart it has been for many months to years.  He also has some chronic coughing with no sputum production.  He has been seen by pulmonology but is not using his inhalers because he says they do not help.  He also notes over the past few weeks to months that he has had bilateral leg edema.  He also complains of some chest pain that comes and goes.  He reports that he has agent orange exposure many years ago when he served 2 years in [place].  He complains of some dry eyes.  He denies any fevers, chills, sweats, anorexia.    Past medical history:  -Hypertension  -Questionable history of MI, has had fairly recent coronary angiogram that showed no significant lesions, he has not had any stents or cardiac surgery.  -History of stroke in 1989 with left-sided weakness resolved  -Reactive airway disease  -Diabetes mellitus  -History of kidney stones  -Prostatic hypertrophy  -Status post parathyroid resection 3/21/17 for hypercalcemia  -Status post hernia repair with mesh placement 6 or 7 years ago.  -Recent  "skin abscess excised left lower back.    Medications:  Flovent inhaler twice daily-he says he is not using this  Ketotifen (Refresh anti-itch) eyedrops twice daily  Doxycycline 100 mg daily  Albuterol inhaler as needed-she says is not using this  alfuzosin 10 mg daily  Finasteride 5 mg daily  Calcium carbonate 2 tablets daily  Senokot as needed  Losartan 50 mg daily  Vitamin D thousand units daily  Metformin (unknown dose) twice daily  Omeprazole 20 mg daily  Artificial tears as needed    Family history: Mother  of throat cancer, she was a smoker.  Older brother had a brain tumor.    Social: Distant history of smoking quit in , drinks alcohol on rare occasions, he lives with his daughter.    Review of Systems:  As in history above.  He complains of some bilateral leg edema that he says is gotten worse over the past few weeks.  The rest of the >10 point ROS is negative.      PHYSICAL EXAMINATION:  BP (!) 165/100 (BP Location: Left arm, Patient Position: Sitting, Cuff Size: Adult Large)  Pulse 80  Temp 97.7  F (36.5  C) (Tympanic)  Resp 18  Ht 1.778 m (5' 10\")  SpO2 97%    General appearance:  Patient is 73 yo man in no acute distress.     HEENT:  No pallor, icterus, or mucositis.  No thyromegaly.   Lymph nodes:  No cervical, supraclavicular, axillary, or inguinal lymphadenopathy.   Lungs:  Clear to auscultation bilaterally.   Heart:  Regular rate and rhythm; no S3 S4 or murmer.  No wheezing or crackles.   Abdomen:  Obese. Positive bowel sounds, soft and nontender, nondistended.  No hepatomegaly. No splenomegaly appreciated.    Extremities:  No joint swelling or tenderness.  2+ bilateral ankle edema.     Skin:  Healing ~ 5 cm surgical scar left lower back. No erythema, exudate or tenderness around wound. No rash, no petechiae or ecchymoses.    Labs and imaging:  Reviewed.     Assessment and recommendation: 72-year-old man with mild anemia and thrombocytopenia who has a spleen enlarged at 17.5 cm.  The " mildly low hemoglobin and platelets could be related to the splenomegaly.  Interestingly today his hemoglobin is better than a few weeks ago,   In the platelet count is normal.  However I am still concerned about the enlarged spleen.  His reticulocyte count is not been elevated and there is nothing to suggest that he has chronic hemolysis as a cause of splenomegaly.  He may have some liver dysfunction although usually I do not see splenomegaly unless there is a more cirrhotic appearing liver not just steatosis.  However liver disease is still possible because of his mildly enlarged spleen.  There is also a concern for lymphoma in this patient with agent orange exposure.  He had chest CT in May that showed no adenopathy so I do not think that needs to be repeated to be worthwhile to check an abdomen and pelvis with contrast to see if he has any abdominal lymphadenopathy.  If he does then a biopsy would be pursued.  If he has only the splenomegaly I would not do a splenectomy trying to make a diagnosis of lymphoma but would monitor.    He has bilateral leg edema.  I discussed with Dr. Duffy who saw him right after seeing me.  His NT-BNP is mildly elevated.  He might have some mild diastolic dysfunction contributing to the leg edema.  Dr. Duffy will follow up on his edema and symptoms of shortness of breath, chest pain, cough.    I will be in touch with the patient after I had the CT results and we can decide from there if he needs additional workup or follow-up with me.  Neelam Marina MD  Hematology

## 2018-01-05 NOTE — NURSING NOTE
Chief Complaint   Patient presents with     Results     pt is here to review results        Linn Stokes CMA at 3:05 PM on 1/5/2018

## 2018-01-05 NOTE — PROGRESS NOTES
HPI:    Pt. Comes in for follow up today. He had parathyroid surgery with Dr. Short 3/21/17 for hypercalciemia. He was seen in Dermatology 11/28/16.  He states somewhat increased resting and ZAMARRIPA last several months and last few weeks. He states a few weeks of B ankle swelling. He states chronic central  chest pain that he states is unchanged over the last several months.      PE:    Vitals noted gen nad cooperative alert,  HEENT, ears normal oropharynx clear no exudate, neck supple nl rom no adenopathy, he has healing horizontal scar in the lower neck, no tenderness, no bruising, no neck swelling,  LCTA, B, RRR, S1, S2, systolic murmur present,  Grossly normal neurological exam. Minimal B LE swelling at ankles. No B lower leg tenderness.     EKG; SR with PAC's at 71; No acute findings. No significant change from 5/22/17. I personally reviewed with Dr. Jimenez Cardiology. Old S1,Q3, T3    CXR 8/30/17; no acute pulmonary disease    PFT's 8/30/17 normal    Resting echo 1/17/17 normal EF 60-65%    CTA 5/23/17; minimal non-obstructive CAD    A/P:    1. Follow up parathyroid surgery. He was seen by Dr. Chou, Endo 4/5/17 and by Dr. Short 4/21/17; labs stable  2. He was seen in  Pulmonary 8/30/17 and again today by Dr. Jacques 11/30/17. He was given a Rx. For daily flovent  3. PSA checked 11/30/17  4. Colonoscopy at [facility] 8/15 repeat in 10 years.   5. He was seen  7/17 in Urology for BPH and has follow up 1/8/2018  6. BP elevated  Today. Would increase Losartan to 100 mg PO QD   7. He had minimal non-obstructive coronary CTA 5/23/17.  8. Back cyst. He had surgery with Dr. Gambino 11/3/17. Doing well.   9. Seeing [facility] for R heel pain on 10/30/17 and note scanned into EMR.  10. He has egg allergy and does not get flu vaccination.   11.  A1C for elevated glucose for 11/30/17 6.3%  12. Ordered labs 10/12/17 for mild anemia and lower platelets seen by Dr. Marina, Hematology today 1/5/2018  13. He has GI  appt. For hepatic steatosis (liver U/S done 11/30/17) in 3/18  14. He has Urology appt. With Dr. Chapa, renal cyst 1/12/18.   15. ZAMARRIPA/SOB; CXR today, D-dimer and refer back to Pulmonary.   16. Chest pain. I recommended Lux be seen in the ED but he declined this recommendation. Will get repeat resting echo, EKG, troponin, Refer to Cardiology and consider Hilda scan. RHC?       Total time spent 40 minutes.  More than 50% of the time spent with Mr. Velasco on counseling / coordinating his care

## 2018-01-08 LAB
ALBUMIN SERPL ELPH-MCNC: 4.3 G/DL (ref 3.7–5.1)
ALPHA1 GLOB SERPL ELPH-MCNC: 0.3 G/DL (ref 0.2–0.4)
ALPHA2 GLOB SERPL ELPH-MCNC: 0.7 G/DL (ref 0.5–0.9)
B-GLOBULIN SERPL ELPH-MCNC: 0.8 G/DL (ref 0.6–1)
GAMMA GLOB SERPL ELPH-MCNC: 1.2 G/DL (ref 0.7–1.6)
HAPTOGLOB SERPL-MCNC: 109 MG/DL (ref 35–175)
INTERPRETATION ECG - MUSE: NORMAL
M PROTEIN SERPL ELPH-MCNC: 0 G/DL
PROT PATTERN SERPL ELPH-IMP: NORMAL

## 2018-01-09 LAB
EPO SERPL-ACNC: 14 MU/ML (ref 4–27)
IGA SERPL-MCNC: 123 MG/DL (ref 70–380)
IGG SERPL-MCNC: 1260 MG/DL (ref 695–1620)
IGM SERPL-MCNC: 51 MG/DL (ref 60–265)
PROT PATTERN SERPL IFE-IMP: ABNORMAL

## 2018-01-10 ENCOUNTER — TRANSFERRED RECORDS (OUTPATIENT)
Dept: HEALTH INFORMATION MANAGEMENT | Facility: CLINIC | Age: 73
End: 2018-01-10

## 2018-01-10 ENCOUNTER — PRE VISIT (OUTPATIENT)
Dept: UROLOGY | Facility: CLINIC | Age: 73
End: 2018-01-10

## 2018-01-12 ENCOUNTER — RADIANT APPOINTMENT (OUTPATIENT)
Dept: CT IMAGING | Facility: CLINIC | Age: 73
End: 2018-01-12
Attending: INTERNAL MEDICINE
Payer: MEDICARE

## 2018-01-12 ENCOUNTER — OFFICE VISIT (OUTPATIENT)
Dept: UROLOGY | Facility: CLINIC | Age: 73
End: 2018-01-12
Payer: MEDICARE

## 2018-01-12 VITALS — DIASTOLIC BLOOD PRESSURE: 83 MMHG | HEIGHT: 70 IN | SYSTOLIC BLOOD PRESSURE: 142 MMHG | HEART RATE: 62 BPM

## 2018-01-12 DIAGNOSIS — N13.8 BENIGN PROSTATIC HYPERPLASIA WITH URINARY OBSTRUCTION: Primary | ICD-10-CM

## 2018-01-12 DIAGNOSIS — R53.82 CHRONIC FATIGUE: ICD-10-CM

## 2018-01-12 DIAGNOSIS — R60.0 BILATERAL LEG EDEMA: ICD-10-CM

## 2018-01-12 DIAGNOSIS — R16.1 SPLENOMEGALY: ICD-10-CM

## 2018-01-12 DIAGNOSIS — N40.1 BENIGN PROSTATIC HYPERPLASIA WITH URINARY OBSTRUCTION: Primary | ICD-10-CM

## 2018-01-12 DIAGNOSIS — D64.9 ANEMIA, UNSPECIFIED TYPE: ICD-10-CM

## 2018-01-12 RX ORDER — OXYBUTYNIN CHLORIDE 5 MG/1
2.5 TABLET ORAL AT BEDTIME
Qty: 42 TABLET | Refills: 3 | Status: SHIPPED | OUTPATIENT
Start: 2018-01-12 | End: 2018-02-01

## 2018-01-12 RX ORDER — IOPAMIDOL 755 MG/ML
135 INJECTION, SOLUTION INTRAVASCULAR ONCE
Status: COMPLETED | OUTPATIENT
Start: 2018-01-12 | End: 2018-01-12

## 2018-01-12 RX ADMIN — IOPAMIDOL 135 ML: 755 INJECTION, SOLUTION INTRAVASCULAR at 13:06

## 2018-01-12 ASSESSMENT — PAIN SCALES - GENERAL: PAINLEVEL: MODERATE PAIN (5)

## 2018-01-12 NOTE — PATIENT INSTRUCTIONS
Follow up with Sofiya Contreras PA-C    It was a pleasure meeting with you today.  Thank you for allowing me and my team the privilege of caring for you today.  YOU are the reason we are here, and I truly hope we provided you with the excellent service you deserve.  Please let us know if there is anything else we can do for you so that we can be sure you are leaving completely satisfied with your care experience.

## 2018-01-12 NOTE — DISCHARGE INSTRUCTIONS

## 2018-01-12 NOTE — MR AVS SNAPSHOT
After Visit Summary   1/12/2018    Lux Velasco    MRN: [id number]           Patient Information     Date Of Birth          1945        Visit Information        Provider Department      1/12/2018 3:40 PM Marc Chapa MD [facility] Urologic C[facility]        Today's Diagnoses     Benign prostatic hyperplasia with urinary obstruction    -  1      Care Instructions    Follow up with Sofiya Contreras PA-C    It was a pleasure meeting with you today.  Thank you for allowing me and my team the privilege of caring for you today.  YOU are the reason we are here, and I truly hope we provided you with the excellent service you deserve.  Please let us know if there is anything else we can do for you so that we can be sure you are leaving completely satisfied with your care experience.              Follow-ups after your visit        Your next 10 appointments already scheduled     Jan 20, 2018 11:00 AM CST   (Arrive by 10:45 AM)   New Patient Visit with Jorge Hanks MD   [facility] ([facility])    [address]  [address]  [place] [facility] [address] [phone]            Jan 31, 2018  2:05 PM CST   (Arrive by 1:50 PM)   Return Visit with Nam Duffy MD   [facility] ([facility])    [address]  4th [address]  [facility] [address] [phone]            Mar 19, 2018  1:00 PM CDT   (Arrive by 12:45 PM)   New General Liver with Jani Sanderson MD   [facility] Hepatology ([facility])    [address]  [address]  [facility] [address] [phone]            Apr 11, 2018 10:30 AM CDT   RETURN GENERAL with Migdalia Hussein MD   [facility] ([facility])    [address]  [address]  [address]  [place] [facility] [address] [phone]            May 31, 2018 11:00 AM CDT   (Arrive by 10:45 AM)   Return Visit with  "María Jacques MD   [facility] ([facility])    [address]  [address]  [facility] [address] [phone]              Who to contact     Please call your clinic at [phone] to:    Ask questions about your health    Make or cancel appointments    Discuss your medicines    Learn about your test results    Speak to your doctor   If you have compliments or concerns about an experience at your clinic, or if you wish to file a complaint, please contact [facility] Patient Relations at [phone] or email us at [email or web address]         Additional Information About Your Visit        Care EveryWhere ID     This is your Care EveryWhere ID. This could be used by other organizations to access your [facility] medical records  [id number]        Your Vitals Were     Pulse Height                62 1.778 m (5' 10\")           Blood Pressure from Last 3 Encounters:   No data found for BP    Weight from Last 3 Encounters:   No data found for Wt              Today, you had the following     No orders found for display         Today's Medication Changes          These changes are accurate as of: 1/12/18 11:59 PM.  If you have any questions, ask your nurse or doctor.               Start taking these medicines.        Dose/Directions    oxybutynin 5 MG tablet   Commonly known as:  DITROPAN   Used for:  Benign prostatic hyperplasia with urinary obstruction   Started by:  Marc Chapa MD        Dose:  2.5 mg   Take 0.5 tablets (2.5 mg) by mouth At Bedtime   Quantity:  42 tablet   Refills:  3         These medicines have changed or have updated prescriptions.        Dose/Directions    losartan 50 MG tablet   Commonly known as:  COZAAR   This may have changed:  when to take this   Used for:  Benign essential hypertension        Dose:  50 mg   Take 1 tablet (50 mg) by mouth daily   Quantity:  30 tablet   Refills: "  3            Where to get your medicines      These medications were sent to [facility] [place], [place] - [address]  [address], [facility] [address]    Hours:  TRANSPLANT PHONE NUMBER [phone] Phone:  [phone]     oxybutynin 5 MG tablet                Primary Care Provider Office Phone # Fax #    Nam Duffy [phone]       [address] [address]        Equal Access to Services     SANDHYA DYE : Hadii aad ku hadasho Soomaali, waaxda luqadaha, qaybta kaalmada adeegyada, waxay idiin hayaan adeeg chivo chase . So [facility] [phone].    ATENCIÓN: Si habla español, tiene a back disposición servicios gratuitos de asistencia lingüística. Llame al [phone].    We comply with applicable federal civil rights laws and [place] laws. We do not discriminate on the basis of race, color, national origin, age, disability, sex, sexual orientation, or gender identity.            Thank you!     Thank you for choosing [facility]OLOG[facility] AND [facility] FOR PROSTATE AND UROLOGIC CANCERS  for your care. Our goal is always to provide you with excellent care. Hearing back from our patients is one way we can continue to improve our services. Please take a few minutes to complete the written survey that you may receive in the mail after your visit with us. Thank you!             Your Updated Medication List - Protect others around you: Learn how to safely use, store and throw away your medicines at www.disposemymeds.org.          This list is accurate as of: 1/12/18 11:59 PM.  Always use your most recent med list.                   Brand Name Dispense Instructions for use Diagnosis    acetaminophen 500 MG Caps      Take 2 tablets by mouth daily.        acetic acid-hydrocortisone otic solution    ACETASOL HC    10 mL    Place 4 drops into both ears 2 times daily.    Otitis externa       albuterol 108 (90 BASE) MCG/ACT Inhaler     PROAIR HFA    1 Inhaler    Inhale 2 puffs into the lungs every 4 hours as needed for shortness of breath / dyspnea, wheezing or other (use prior to exertion/activities)    Shortness of breath       alfuzosin 10 MG 24 hr tablet    UROXATRAL    90 tablet    Take 1 tablet (10 mg) by mouth daily    Lower urinary tract symptoms (LUTS)       ammonium lactate 12 % cream    AMLACTIN     Apply  topically daily.        artificial tears Soln      Use one drop to both eye PRN.        calcium carbonate 1250 (500 CA) MG Tabs tablet    OSCAL 500    60 tablet    Take 2 tablets once a day with food.    Hypocalcemia       * doxycycline 100 MG capsule    VIBRAMYCIN     Take by mouth daily        * doxycycline 100 MG capsule    VIBRAMYCIN    30 capsule    Take 1 capsule (100 mg) by mouth daily    Ulcerative blepharitis of upper and lower eyelids of both eyes       finasteride 5 MG tablet    PROSCAR    90 tablet    Take 1 tablet (5 mg) by mouth daily    Lower urinary tract symptoms (LUTS)       fluticasone 110 MCG/ACT Inhaler    FLOVENT HFA    3 Inhaler    Inhale 1 puff into the lungs 2 times daily    Mild intermittent asthma, unspecified whether complicated, Dyspnea on exertion       FLUTICASONE PROPIONATE NA      Spray 50 mcg in nostril    Cough       guaiFENesin-codeine 100-10 MG/5ML Soln solution    ROBITUSSIN AC    420 mL    Take 5-10 mLs by mouth every 4 hours as needed for cough    Cough       * ketotifen 0.025 % Soln ophthalmic solution    ZADITOR/REFRESH ANTI-ITCH     Place 1 drop into both eyes 2 times daily        * ketotifen 0.025 % Soln ophthalmic solution    ZADITOR/REFRESH ANTI-ITCH     Place 1 drop into both eyes 2 times daily        losartan 50 MG tablet    COZAAR    30 tablet    Take 1 tablet (50 mg) by mouth daily    Benign essential hypertension       METFORMIN HCL PO      Take by mouth 2 times daily (with meals)    Benign nodular prostatic hyperplasia without lower urinary tract symptoms, Need for prophylactic  vaccination against Streptococcus pneumoniae (pneumococcus), Screening for AAA (abdominal aortic aneurysm), History of smoking       omeprazole 20 MG CR capsule    priLOSEC     Take 20 mg by mouth daily.        oxybutynin 5 MG tablet    DITROPAN    42 tablet    Take 0.5 tablets (2.5 mg) by mouth At Bedtime    Benign prostatic hyperplasia with urinary obstruction       oxyCODONE-acetaminophen 5-325 MG per tablet    PERCOCET    20 tablet    Take 1-2 tablets by mouth every 6 hours as needed for moderate to severe pain or pain (moderate to severe)    Hx of removal of cyst       senna-docusate 8.6-50 MG per tablet    SENOKOT-S;PERICOLACE    10 tablet    Take 1-2 tablets by mouth 2 times daily as needed for constipation Take while on oral narcotics to prevent or treat constipation.    Hypercalcemia, Primary hyperparathyroidism (H)       VITAMIN D (CHOLECALCIFEROL) PO      Take 1,000 Units by mouth every morning        * Notice:  This list has 4 medication(s) that are the same as other medications prescribed for you. Read the directions carefully, and ask your doctor or other care provider to review them with you.

## 2018-01-12 NOTE — PROGRESS NOTES
ASSESSMENT AND PLAN    Left Renal Cysts  CT from today shows Bosniak 1 renal cysts.  These do not need further follow-up unless he develops flank pain.      BPH/LUTS  POST VOID RESIDUAL today is 45cc.  He is not interested in surgery.  I will start low dose Qhs ditropan.  Follow up with Sofiya Contreras.  _______________________________________________________________________    CHIEF COMPLAINT  It was my pleasure to see Lux Velasco who is a 72 year old male for evaluation of renal mass     HPI   He is here for his left renal cysts.  He denies any left flank pain or symptoms from the cysts.    He is taking medications (uroxatrol/finasteride) for prostate problems, which are not entirely effective. He reports urinary symptoms , primarily frequency (every 2 hours). He is seeing Sofiya Contreras for this.      RADIOLOGIC IMAGING  CT abdomen pelvis with contrast 1/12/2018   Findings:   Borderline enlarged spleen. Enhancing lesion in the spleen measuring  10 mm. This is unchanged since 8/17/2010, likely representing  hemangioma. Small splenule which is slightly hyperenhancing relative  to spleen also unchanged. Mild atrophy of the pancreas. Tiny hypodense  lesions of both kidneys are too small to characterize. At the upper  pole of the left kidney there is an exophytic lesion consistent with  benign cyst cyst abutting the skin in the posterior back as seen on  series 3 image 127 measuring up to 2.2 x 1.7 cm, new since 2010 but  similar since 5/22/2007, likely suspicious cyst. Nonspecific  perinephric stranding. The liver, gallbladder and adrenal glands are  unremarkable.      No abnormally dilated loops of small bowel or colon. No free air or  free fluid. No abdominal or pelvic lymphadenopathy. Large broad-based  ventral abdominal hernia containing large and small bowel. Tiny  adjacent fat-containing ventral hernia on the left. Bilateral  fat-containing inguinal hernias. Aortoiliac calcification.     Bibasilar atelectasis. T12  vertebral hemangioma. Degenerative findings  of the spine.         Impression:   1. Borderline enlargement of the spleen.  2. Stable presumed splenic hemangioma.  3. No lymphadenopathy in the abdomen or pelvis.    I reviewed the recent radiologic imaging and reports described above.  Patient Active Problem List    Diagnosis Date Noted     Primary hyperparathyroidism (H)      Priority: Medium     Skin exam, screening for cancer 08/20/2013     Priority: Medium     Hypercalcemia 07/17/2013     Priority: Medium     HPTH (hyperparathyroidism) (H) 07/17/2013     Priority: Medium     Shoulder pain 05/21/2013     Priority: Medium     Left arm weakness 04/17/2013     Priority: Medium     History of agent Orange exposure 04/17/2013     Priority: Medium     Cervicalgia 04/17/2013     Priority: Medium     Degenerative arthritis of cervical spine 04/17/2013     Priority: Medium     Stroke (H) 04/17/2013     Priority: Medium     Myocardial infarct 04/17/2013     Priority: Medium     Ear pain 07/16/2012     Priority: Medium     Plantar fascial fibromatosis 07/10/2012     Priority: Medium     Past Medical History:   Diagnosis Date     Diabetes mellitus type II 2005     History of agent Orange exposure 4/17/2013     Hypertension      Myocardial infarct 01/01/1999     Primary hyperparathyroidism (H) Dx approx 2003     Stroke (H) 01/01/1998    recovered fully     Past Surgical History:   Procedure Laterality Date     BIOPSY OF SKIN LESION       BYPASS GRAFT ARTERY CORONARY       CATARACT IOL, RT/LT Bilateral 2017    [facility]     EXCISE MASS LOWER EXTREMITY Left 11/3/2017    Procedure: EXCISE MASS LOWER EXTREMITY;  Excision Mass Left Flank;  Surgeon: Marybeth Gambino MD;  Location:  OR     MOHS MICROGRAPHIC PROCEDURE       PARATHYROIDECTOMY N/A 3/21/2017    Procedure: PARATHYROIDECTOMY;  Surgeon: Julianna Short MD;  Location:  OR     PARATHYROIDECTOMY       Current Outpatient Prescriptions   Medication Sig  Dispense Refill     fluticasone (FLOVENT HFA) 110 MCG/ACT Inhaler Inhale 1 puff into the lungs 2 times daily 3 Inhaler 1     oxyCODONE-acetaminophen (PERCOCET) 5-325 MG per tablet Take 1-2 tablets by mouth every 6 hours as needed for moderate to severe pain or pain (moderate to severe) 20 tablet 0     ketotifen (ZADITOR/REFRESH ANTI-ITCH) 0.025 % SOLN ophthalmic solution Place 1 drop into both eyes 2 times daily       doxycycline (VIBRAMYCIN) 100 MG capsule Take by mouth daily       ketotifen (ZADITOR/REFRESH ANTI-ITCH) 0.025 % SOLN ophthalmic solution Place 1 drop into both eyes 2 times daily       doxycycline (VIBRAMYCIN) 100 MG capsule Take 1 capsule (100 mg) by mouth daily 30 capsule 11     albuterol (PROAIR HFA) 108 (90 BASE) MCG/ACT Inhaler Inhale 2 puffs into the lungs every 4 hours as needed for shortness of breath / dyspnea, wheezing or other (use prior to exertion/activities) 1 Inhaler 3     alfuzosin (UROXATRAL) 10 MG 24 hr tablet Take 1 tablet (10 mg) by mouth daily 90 tablet 3     finasteride (PROSCAR) 5 MG tablet Take 1 tablet (5 mg) by mouth daily 90 tablet 3     calcium carbonate (OSCAL 500) 1250 (500 CA) MG TABS tablet Take 2 tablets once a day with food. 60 tablet 11     senna-docusate (SENOKOT-S;PERICOLACE) 8.6-50 MG per tablet Take 1-2 tablets by mouth 2 times daily as needed for constipation Take while on oral narcotics to prevent or treat constipation. 10 tablet 0     guaiFENesin-codeine (ROBITUSSIN AC) 100-10 MG/5ML SOLN solution Take 5-10 mLs by mouth every 4 hours as needed for cough 420 mL 0     FLUTICASONE PROPIONATE NA Spray 50 mcg in nostril       losartan (COZAAR) 50 MG tablet Take 1 tablet (50 mg) by mouth daily (Patient taking differently: Take 50 mg by mouth every morning ) 30 tablet 3     VITAMIN D, CHOLECALCIFEROL, PO Take 1,000 Units by mouth every morning        METFORMIN HCL PO Take by mouth 2 times daily (with meals)       acetaminophen 500 MG CAPS Take 2 tablets by mouth  "daily.       acetic acid-hydrocortisone (ACETASOL HC) otic solution Place 4 drops into both ears 2 times daily. 10 mL 10     ammonium lactate (AMLACTIN) 12 % cream Apply  topically daily.       artificial tears SOLN Use one drop to both eye PRN.       omeprazole (PRILOSEC) 20 MG capsule Take 20 mg by mouth daily.        Allergies   Allergen Reactions     Clindamycin      Eggs Other (See Comments)     Pt states no longer having reaction, childhood allergy, eats eggs       Penicillins Other (See Comments)     Pt states PCN allergy is due to egg allergy     Propoxyphene Other (See Comments)     Pain     Family History   Problem Relation Age of Onset     Melanoma Mother      Melanoma Father      CANCER No family hx of      no skin cancer     Glaucoma No family hx of      Macular Degeneration No family hx of       Social History     Occupational History     Not on file.     Social History Main Topics     Smoking status: Former Smoker     Quit date: 1970     Smokeless tobacco: Former User     Alcohol use Yes      Comment: rarely     Drug use: No     Sexual activity: Not on file       REVIEW OF SYSTEMS  The following systems were evaluated:   Constitutional, Eyes, Ears/Nose/Throat, Respiratory, Cardiovascular, Gastrointestinal, Genitourinary, Musculoskeletal, Skin/Integument, Neurologic, Psychiatric, Hematologic/Lymphatic, Allergic/Immunologic, Endocrine.  The only pertinent positives were as follows:  None.    PHYSICAL EXAM  Vitals:    01/12/18 1609   BP: 142/83   Pulse: 62   Height: 1.778 m (5' 10\")     Wt Readings from Last 3 Encounters:   11/15/17 127.6 kg (281 lb 3.2 oz)   10/10/17 126 kg (277 lb 12.8 oz)   10/10/17 125.9 kg (277 lb 8 oz)     Constitutional: Alert, no acute distress  Psychiatric: Normal mood and affect  Head: Normocephalic.  Neck: Neck supple. No adenopathy. Thyroid symmetric, normal size  Back: No spinal tenderness.  No costovertebral angle tenderness  Cardiovascular: RRR. No murmurs, clicks gallops " or rub  Respiratory: Good diaphragmatic excursion. Lungs clear  Gastrointestinal: Abdomen soft, non-tender. No masses, organomegaly. No hernia  Skin: no suspicious lesions or rashes on abdomen  Extremities: No lower extremity edema.  No clubbing or cyanosis.  Neurologic: Cranial nerves grossly intact.  Equal strength and sensation on bilateral extremities.   : Deferred     Recent Labs   Lab Test  01/05/18   1559  11/30/17   1215  10/10/17   0735  05/22/17   1633   WBC  5.7  4.9  5.1  5.9   HGB  13.0*  11.9*  12.3*  12.9*   HCT  40.7  37.9*  37.9*  39.8*   PLT  160  141*  140*  184     Recent Labs   Lab Test  01/05/18   1559  11/30/17   1215  10/10/17   0735  05/22/17   1633   NA  139  140  137  142   POTASSIUM  4.0  4.4  4.0  4.3   CHLORIDE  105  107  105  106   CO2  30  26  27  27   ANIONGAP  4  8  5  9   GLC  114*  119*  147*  137*   BUN  15  16  16  14   CR  0.91  0.82  0.81  0.83   GFRESTIMATED  82  >90  >90  >90  Non African American GFR Calc     GFRESTBLACK  >90  >90  >90  >90  African American GFR Calc     TRINA  8.8  9.1  8.8  8.9     Recent Labs   Lab Test  12/26/16   1638   COLOR  Yellow   APPEARANCE  Clear   URINEGLC  Negative   URINEBILI  Negative   URINEKETONE  Negative   SG  1.020   UBLD  Negative   URINEPH  6.0   PROTEIN  Negative   NITRITE  Negative   LEUKEST  Negative   RBCU  2   WBCU  1       Scribe Disclosure:   I,Alphonso Mcdonald, am serving as a scribe; to document services personally performed by Marc Chapa MD based on data collection and the provider's statements to me.     Alphonso Mcdonald served as the scribe for this patient's visit and documented my history and physical exam.  I performed the history and physical exam.  I have edited and agree with the note.   I reviewed the described radiologic imaging and reports. I reviewed the listed laboratory values.  RUTH Chapa MD        Patient Care Team:  Nam Duffy MD as PCP - General (Internal Medicine)  Ashok  Colton Llanes MD as MD (Family Medicine - Sports Medicine)  Mario Alberto Baptiste MD as MD (Orthopedics)  Pawan Denney DO as MD (Urology)  Estephania Rivas, RN as Registered Nurse  Sofiya Contreras PA-C as Physician Assistant (Physician Assistant)  Marc Chapa MD as MD (Urology)  SELF, REFERRED    Copy to patient  MANDY RADHA  [address] [address]

## 2018-01-12 NOTE — LETTER
1/12/2018       RE: Lux Vleasco  [address]   [facility] [address]     Dear Colleague,    Thank you for referring your patient, Lux Velasco, to the [facility] UROLOGY AND INST FOR PROSTATE AND UROLOGIC CANCERS at [facility]. Please see a copy of my visit note below.      ASSESSMENT AND PLAN    Left Renal Cysts  CT from today shows Bosniak 1 renal cysts.  These do not need further follow-up unless he develops flank pain.      BPH/LUTS  POST VOID RESIDUAL today is 45cc.  He is not interested in surgery.  I will start low dose Qhs ditropan.  Follow up with Sofiya Contreras.  _______________________________________________________________________    CHIEF COMPLAINT  It was my pleasure to see Lux Velasco who is a 72 year old male for evaluation of renal mass     HPI   He is here for his left renal cysts.  He denies any left flank pain or symptoms from the cysts.    He is taking medications (uroxatrol/finasteride) for prostate problems, which are not entirely effective. He reports urinary symptoms , primarily frequency (every 2 hours). He is seeing Sofiya Contreras for this.      RADIOLOGIC IMAGING  CT abdomen pelvis with contrast 1/12/2018   Findings:   Borderline enlarged spleen. Enhancing lesion in the spleen measuring  10 mm. This is unchanged since 8/17/2010, likely representing  hemangioma. Small splenule which is slightly hyperenhancing relative  to spleen also unchanged. Mild atrophy of the pancreas. Tiny hypodense  lesions of both kidneys are too small to characterize. At the upper  pole of the left kidney there is an exophytic lesion consistent with  benign cyst cyst abutting the skin in the posterior back as seen on  series 3 image 127 measuring up to 2.2 x 1.7 cm, new since 2010 but  similar since 5/22/2007, likely suspicious cyst. Nonspecific  perinephric stranding. The liver, gallbladder and adrenal glands are  unremarkable.      No abnormally dilated loops  of small bowel or colon. No free air or  free fluid. No abdominal or pelvic lymphadenopathy. Large broad-based  ventral abdominal hernia containing large and small bowel. Tiny  adjacent fat-containing ventral hernia on the left. Bilateral  fat-containing inguinal hernias. Aortoiliac calcification.     Bibasilar atelectasis. T12 vertebral hemangioma. Degenerative findings  of the spine.         Impression:   1. Borderline enlargement of the spleen.  2. Stable presumed splenic hemangioma.  3. No lymphadenopathy in the abdomen or pelvis.    I reviewed the recent radiologic imaging and reports described above.  Patient Active Problem List    Diagnosis Date Noted     Primary hyperparathyroidism (H)      Priority: Medium     Skin exam, screening for cancer 08/20/2013     Priority: Medium     Hypercalcemia 07/17/2013     Priority: Medium     HPTH (hyperparathyroidism) (H) 07/17/2013     Priority: Medium     Shoulder pain 05/21/2013     Priority: Medium     Left arm weakness 04/17/2013     Priority: Medium     History of agent Orange exposure 04/17/2013     Priority: Medium     Cervicalgia 04/17/2013     Priority: Medium     Degenerative arthritis of cervical spine 04/17/2013     Priority: Medium     Stroke (H) 04/17/2013     Priority: Medium     Myocardial infarct 04/17/2013     Priority: Medium     Ear pain 07/16/2012     Priority: Medium     Plantar fascial fibromatosis 07/10/2012     Priority: Medium     Past Medical History:   Diagnosis Date     Diabetes mellitus type II 2005     History of agent Orange exposure 4/17/2013     Hypertension      Myocardial infarct 01/01/1999     Primary hyperparathyroidism (H) Dx approx 2003     Stroke (H) 01/01/1998    recovered fully     Past Surgical History:   Procedure Laterality Date     BIOPSY OF SKIN LESION       BYPASS GRAFT ARTERY CORONARY       CATARACT IOL, RT/LT Bilateral 2017    done at [facility]     EXCISE MASS LOWER EXTREMITY Left 11/3/2017    Procedure: EXCISE MASS LOWER  EXTREMITY;  Excision Mass Left Flank;  Surgeon: Marybeth Gambino MD;  Location: [facility] OR     MOHS MICROGRAPHIC PROCEDURE       PARATHYROIDECTOMY N/A 3/21/2017    Procedure: PARATHYROIDECTOMY;  Surgeon: Julianna Short MD;  Location: [facility] OR     PARATHYROIDECTOMY       Current Outpatient Prescriptions   Medication Sig Dispense Refill     fluticasone (FLOVENT HFA) 110 MCG/ACT Inhaler Inhale 1 puff into the lungs 2 times daily 3 Inhaler 1     oxyCODONE-acetaminophen (PERCOCET) 5-325 MG per tablet Take 1-2 tablets by mouth every 6 hours as needed for moderate to severe pain or pain (moderate to severe) 20 tablet 0     ketotifen (ZADITOR/REFRESH ANTI-ITCH) 0.025 % SOLN ophthalmic solution Place 1 drop into both eyes 2 times daily       doxycycline (VIBRAMYCIN) 100 MG capsule Take by mouth daily       ketotifen (ZADITOR/REFRESH ANTI-ITCH) 0.025 % SOLN ophthalmic solution Place 1 drop into both eyes 2 times daily       doxycycline (VIBRAMYCIN) 100 MG capsule Take 1 capsule (100 mg) by mouth daily 30 capsule 11     albuterol (PROAIR HFA) 108 (90 BASE) MCG/ACT Inhaler Inhale 2 puffs into the lungs every 4 hours as needed for shortness of breath / dyspnea, wheezing or other (use prior to exertion/activities) 1 Inhaler 3     alfuzosin (UROXATRAL) 10 MG 24 hr tablet Take 1 tablet (10 mg) by mouth daily 90 tablet 3     finasteride (PROSCAR) 5 MG tablet Take 1 tablet (5 mg) by mouth daily 90 tablet 3     calcium carbonate (OSCAL 500) 1250 (500 CA) MG TABS tablet Take 2 tablets once a day with food. 60 tablet 11     senna-docusate (SENOKOT-S;PERICOLACE) 8.6-50 MG per tablet Take 1-2 tablets by mouth 2 times daily as needed for constipation Take while on oral narcotics to prevent or treat constipation. 10 tablet 0     guaiFENesin-codeine (ROBITUSSIN AC) 100-10 MG/5ML SOLN solution Take 5-10 mLs by mouth every 4 hours as needed for cough 420 mL 0     FLUTICASONE PROPIONATE NA Spray 50 mcg in nostril       losartan  "(COZAAR) 50 MG tablet Take 1 tablet (50 mg) by mouth daily (Patient taking differently: Take 50 mg by mouth every morning ) 30 tablet 3     VITAMIN D, CHOLECALCIFEROL, PO Take 1,000 Units by mouth every morning        METFORMIN HCL PO Take by mouth 2 times daily (with meals)       acetaminophen 500 MG CAPS Take 2 tablets by mouth daily.       acetic acid-hydrocortisone (ACETASOL HC) otic solution Place 4 drops into both ears 2 times daily. 10 mL 10     ammonium lactate (AMLACTIN) 12 % cream Apply  topically daily.       artificial tears SOLN Use one drop to both eye PRN.       omeprazole (PRILOSEC) 20 MG capsule Take 20 mg by mouth daily.        Allergies   Allergen Reactions     Clindamycin      Eggs Other (See Comments)     Pt states no longer having reaction, childhood allergy, eats eggs       Penicillins Other (See Comments)     Pt states PCN allergy is due to egg allergy     Propoxyphene Other (See Comments)     Pain     Family History   Problem Relation Age of Onset     Melanoma Mother      Melanoma Father      CANCER No family hx of      no skin cancer     Glaucoma No family hx of      Macular Degeneration No family hx of       Social History     Occupational History     Not on file.     Social History Main Topics     Smoking status: Former Smoker     Quit date: 1970     Smokeless tobacco: Former User     Alcohol use Yes      Comment: rarely     Drug use: No     Sexual activity: Not on file       REVIEW OF SYSTEMS  The following systems were evaluated:   Constitutional, Eyes, Ears/Nose/Throat, Respiratory, Cardiovascular, Gastrointestinal, Genitourinary, Musculoskeletal, Skin/Integument, Neurologic, Psychiatric, Hematologic/Lymphatic, Allergic/Immunologic, Endocrine.  The only pertinent positives were as follows:  None.    PHYSICAL EXAM  Vitals:    01/12/18 1609   BP: 142/83   Pulse: 62   Height: 1.778 m (5' 10\")     Wt Readings from Last 3 Encounters:   11/15/17 127.6 kg (281 lb 3.2 oz)   10/10/17 126 kg " (277 lb 12.8 oz)   10/10/17 125.9 kg (277 lb 8 oz)     Constitutional: Alert, no acute distress  Psychiatric: Normal mood and affect  Head: Normocephalic.  Neck: Neck supple. No adenopathy. Thyroid symmetric, normal size  Back: No spinal tenderness.  No costovertebral angle tenderness  Cardiovascular: RRR. No murmurs, clicks gallops or rub  Respiratory: Good diaphragmatic excursion. Lungs clear  Gastrointestinal: Abdomen soft, non-tender. No masses, organomegaly. No hernia  Skin: no suspicious lesions or rashes on abdomen  Extremities: No lower extremity edema.  No clubbing or cyanosis.  Neurologic: Cranial nerves grossly intact.  Equal strength and sensation on bilateral extremities.   : Deferred     Recent Labs   Lab Test  01/05/18   1559  11/30/17   1215  10/10/17   0735  05/22/17   1633   WBC  5.7  4.9  5.1  5.9   HGB  13.0*  11.9*  12.3*  12.9*   HCT  40.7  37.9*  37.9*  39.8*   PLT  160  141*  140*  184     Recent Labs   Lab Test  01/05/18   1559  11/30/17   1215  10/10/17   0735  05/22/17   1633   NA  139  140  137  142   POTASSIUM  4.0  4.4  4.0  4.3   CHLORIDE  105  107  105  106   CO2  30  26  27  27   ANIONGAP  4  8  5  9   GLC  114*  119*  147*  137*   BUN  15  16  16  14   CR  0.91  0.82  0.81  0.83   GFRESTIMATED  82  >90  >90  >90  Non African American GFR Calc     GFRESTBLACK  >90  >90  >90  >90  African American GFR Calc     TRINA  8.8  9.1  8.8  8.9     Recent Labs   Lab Test  12/26/16   1638   COLOR  Yellow   APPEARANCE  Clear   URINEGLC  Negative   URINEBILI  Negative   URINEKETONE  Negative   SG  1.020   UBLD  Negative   URINEPH  6.0   PROTEIN  Negative   NITRITE  Negative   LEUKEST  Negative   RBCU  2   WBCU  1       Scribe Disclosure:   Alpohnso LOPEZ, am serving as a scribe; to document services personally performed by Marc Chapa MD based on data collection and the provider's statements to me.     Alphonso Mcdonald served as the scribe for this patient's visit and documented my  history and physical exam.  I performed the history and physical exam.  I have edited and agree with the note.   I reviewed the described radiologic imaging and reports. I reviewed the listed laboratory values.     RUTH Chapa MD        Patient Care Team:  Nam Duffy MD as PCP - General (Internal Medicine)  Colton Pulido MD as MD (Family Medicine - Sports Medicine)  Mario Alberto Baptiste MD as MD (Orthopedics)  Pawan Denney DO as MD (Urology)  Estephania Rivas, RN as Registered Nurse  Sofiya Contreras PA-C as Physician Assistant (Physician Assistant)  Marc Chapa MD as MD (Urology)  SELF, REFERRED    Copy to patient  MANDY GARCIA  [address] [address]

## 2018-01-12 NOTE — LETTER
January 16, 2018       TO: Lux Velasco  [address]   [facility] [address]       Dear Mr. Velasco,    I am writing to inform you of your test results. Good news. On the CT scan you do not have any enlarged lymph nodes, and the spleen is only borderline enlarged. Therefore you do not have lymphoma. There is a cyst on your left kidney, but it is similar to previous, so likely a simple cyst (not suspicious). You do not need routine follow up in hematology clinic.   Sincerely,  Neelam Marina MD      Resulted Orders   CT Abdomen pelvis w contrast*    Narrative    Exam: CT abdomen pelvis with contrast 1/12/2018 1:21 PM.    History: Splenomegaly, assess lymphadenopathy.    Comparison: Ultrasound 12/15/2017, CT 5/2/2017. 8/17/2010    Technique: CT images from the lung bases through the symphysis pubis  after the uneventful administration of IV contrast     Findings:   Borderline enlarged spleen. Enhancing lesion in the spleen measuring  10 mm. This is unchanged since 8/17/2010, likely representing  hemangioma. Small splenule which is slightly hyperenhancing relative  to spleen also unchanged. Mild atrophy of the pancreas. Tiny hypodense  lesions of both kidneys are too small to characterize. At the upper  pole of the left kidney there is an exophytic lesion consistent with  benign cyst cyst abutting the skin in the posterior back as seen on  series 3 image 127 measuring up to 2.2 x 1.7 cm, new since 2010 but  similar since 5/22/2007, likely suspicious cyst. Nonspecific  perinephric stranding. The liver, gallbladder and adrenal glands are  unremarkable.     No abnormally dilated loops of small bowel or colon. No free air or  free fluid. No abdominal or pelvic lymphadenopathy. Large broad-based  ventral abdominal hernia containing large and small bowel. Tiny  adjacent fat-containing ventral hernia on the left. Bilateral  fat-containing inguinal hernias. Aortoiliac calcification.    Bibasilar  atelectasis. T12 vertebral hemangioma. Degenerative findings  of the spine.      Impression    Impression:   1. Borderline enlargement of the spleen.  2. Stable presumed splenic hemangioma.  3. No lymphadenopathy in the abdomen or pelvis.    I have personally reviewed the examination and initial interpretation  and I agree with the findings.    CARLOS BECERRA MD       Results for MANDY GARCIA (MRN [id number]) as of 1/16/2018 17:01   Ref. Range 1/5/2018 15:59   Sodium Latest Ref Range: 133 - 144 mmol/L 139   Potassium Latest Ref Range: 3.4 - 5.3 mmol/L 4.0   Chloride Latest Ref Range: 94 - 109 mmol/L 105   Carbon Dioxide Latest Ref Range: 20 - 32 mmol/L 30   Urea Nitrogen Latest Ref Range: 7 - 30 mg/dL 15   Creatinine Latest Ref Range: 0.66 - 1.25 mg/dL 0.91   GFR Estimate Latest Ref Range: >60 mL/min/1.7m2 82   GFR Estimate If Black Latest Ref Range: >60 mL/min/1.7m2 >90   Calcium Latest Ref Range: 8.5 - 10.1 mg/dL 8.8   Anion Gap Latest Ref Range: 3 - 14 mmol/L 4   Albumin Latest Ref Range: 3.4 - 5.0 g/dL 3.8   Protein Total Latest Ref Range: 6.8 - 8.8 g/dL 7.9   Bilirubin Total Latest Ref Range: 0.2 - 1.3 mg/dL 0.4   Alkaline Phosphatase Latest Ref Range: 40 - 150 U/L 88   ALT Latest Ref Range: 0 - 70 U/L 52   AST Latest Ref Range: 0 - 45 U/L 29   CRP Inflammation Latest Ref Range: 0.0 - 8.0 mg/L <2.9   Erythropoietin Latest Ref Range: 4 - 27 mU/mL 14   N-Terminal Pro Bnp Latest Ref Range: 0 - 125 pg/mL 193 (H)   Troponin I ES Latest Ref Range: 0.000 - 0.045 ug/L <0.015   Glucose Latest Ref Range: 70 - 99 mg/dL 114 (H)   WBC Latest Ref Range: 4.0 - 11.0 10e9/L 5.7   Hemoglobin Latest Ref Range: 13.3 - 17.7 g/dL 13.0 (L)   Hematocrit Latest Ref Range: 40.0 - 53.0 % 40.7   Platelet Count Latest Ref Range: 150 - 450 10e9/L 160   RBC Count Latest Ref Range: 4.4 - 5.9 10e12/L 4.73   MCV Latest Ref Range: 78 - 100 fl 86   MCH Latest Ref Range: 26.5 - 33.0 pg 27.5   MCHC Latest Ref Range: 31.5 - 36.5 g/dL 31.9   RDW  Latest Ref Range: 10.0 - 15.0 % 13.4   Diff Method Unknown Automated Method   % Neutrophils Latest Units: % 58.5   % Lymphocytes Latest Units: % 29.9   % Monocytes Latest Units: % 6.7   % Eosinophils Latest Units: % 4.0   % Basophils Latest Units: % 0.7   % Immature Granulocytes Latest Units: % 0.2   Nucleated RBCs Latest Ref Range: 0 /100 0   Absolute Neutrophil Latest Ref Range: 1.6 - 8.3 10e9/L 3.3   Absolute Lymphocytes Latest Ref Range: 0.8 - 5.3 10e9/L 1.7   Absolute Monocytes Latest Ref Range: 0.0 - 1.3 10e9/L 0.4   Absolute Eosinophils Latest Ref Range: 0.0 - 0.7 10e9/L 0.2   Absolute Basophils Latest Ref Range: 0.0 - 0.2 10e9/L 0.0   Abs Immature Granulocytes Latest Ref Range: 0 - 0.4 10e9/L 0.0   Absolute Nucleated RBC Unknown 0.0   D-Dimer Latest Ref Range: 0.0 - 0.50 ug/ml FEU 0.5   ELIZABETH  Broad Spectrum Unknown Neg   Albumin Fraction Latest Ref Range: 3.7 - 5.1 g/dL 4.3   Alpha 1 Fraction Latest Ref Range: 0.2 - 0.4 g/dL 0.3   Alpha 2 Fraction Latest Ref Range: 0.5 - 0.9 g/dL 0.7   Beta Fraction Latest Ref Range: 0.6 - 1.0 g/dL 0.8   ELP Interpretation: Unknown Essentially sulaiman...   Gamma Fraction Latest Ref Range: 0.7 - 1.6 g/dL 1.2   Haptoglobin Latest Ref Range: 35 - 175 mg/dL 109   IGA Latest Ref Range: 70 - 380 mg/dL 123   IGG Latest Ref Range: 695 - 1620 mg/dL 1260   IGM Latest Ref Range: 60 - 265 mg/dL 51 (L)   Immunofixation ELP Unknown No monoclonal pro...   Monoclonal Peak Latest Ref Range: 0.0 g/dL 0.0

## 2018-01-17 ENCOUNTER — TRANSFERRED RECORDS (OUTPATIENT)
Dept: HEALTH INFORMATION MANAGEMENT | Facility: CLINIC | Age: 73
End: 2018-01-17

## 2018-01-17 ENCOUNTER — MEDICAL CORRESPONDENCE (OUTPATIENT)
Dept: HEALTH INFORMATION MANAGEMENT | Facility: CLINIC | Age: 73
End: 2018-01-17

## 2018-01-18 ENCOUNTER — TRANSFERRED RECORDS (OUTPATIENT)
Dept: HEALTH INFORMATION MANAGEMENT | Facility: CLINIC | Age: 73
End: 2018-01-18

## 2018-01-20 ENCOUNTER — OFFICE VISIT (OUTPATIENT)
Dept: CARDIOLOGY | Facility: CLINIC | Age: 73
End: 2018-01-20
Attending: INTERNAL MEDICINE
Payer: MEDICARE

## 2018-01-20 ENCOUNTER — PRE VISIT (OUTPATIENT)
Dept: CARDIOLOGY | Facility: CLINIC | Age: 73
End: 2018-01-20

## 2018-01-20 VITALS
HEART RATE: 69 BPM | SYSTOLIC BLOOD PRESSURE: 135 MMHG | BODY MASS INDEX: 40.14 KG/M2 | HEIGHT: 71 IN | DIASTOLIC BLOOD PRESSURE: 79 MMHG | OXYGEN SATURATION: 94 % | WEIGHT: 286.7 LBS

## 2018-01-20 DIAGNOSIS — I20.9 ANGINA, CLASS III (H): Primary | ICD-10-CM

## 2018-01-20 DIAGNOSIS — R06.02 SOB (SHORTNESS OF BREATH): ICD-10-CM

## 2018-01-20 PROCEDURE — 99204 OFFICE O/P NEW MOD 45 MIN: CPT | Mod: ZP | Performed by: INTERNAL MEDICINE

## 2018-01-20 PROCEDURE — G0463 HOSPITAL OUTPT CLINIC VISIT: HCPCS | Mod: ZF

## 2018-01-20 RX ORDER — ISOSORBIDE MONONITRATE 30 MG/1
30 TABLET, EXTENDED RELEASE ORAL DAILY
Qty: 90 TABLET | Refills: 3 | Status: SHIPPED | OUTPATIENT
Start: 2018-01-20 | End: 2018-11-27

## 2018-01-20 ASSESSMENT — PAIN SCALES - GENERAL: PAINLEVEL: NO PAIN (0)

## 2018-01-20 NOTE — PATIENT INSTRUCTIONS
Patient Instructions:  It was a pleasure to see you in the cardiology clinic today.      If you have any questions, you can reach my nurse, Sakina Moura, at [phone].  Press Option #1 for the [facility], and then press Option #3 for nursing.  We are encouraging the use of NeurOpticst to communicate with your HealthCare Provider    Note the new medications: start a baby aspirin 81 mg by mouth everyday.  Imdur 30 mg by mouth by mouth daily  Stop the following medications: none      TAKE YOUR ATORVASTATIN FOR YOUR CHOLESTEROL    Follow the American Heart Association Diet and Lifestyle recommendations:  Limit saturated fat, trans fat, sodium, red meat, sweets and sugar-sweetened beverages. If you choose to eat red meat, compare labels and select the leanest cuts available.  Aim for at least 150 minutes of moderate physical activity or 75 minutes of vigorous physical activity - or an equal combination of both - each week.    The results from today include: none  Please follow up with phone call     Sincerely,    Jorge Hanks MD     If you have an urgent need after hours (8:00 am to 4:30 pm) please call [phone] and ask for the cardiology fellow on call.

## 2018-01-20 NOTE — NURSING NOTE
Diet: Patient instructed regarding a heart healthy diet, including discussion of reduced fat and sodium intake. Patient demonstrated understanding of this information and agreed to call with further questions or concerns.  New Medication: Patient was educated regarding newly prescribed medication, including discussion of  the indication, administration, side effects, and when to report to MD or RN. Patient demonstrated understanding of this information and agreed to call with further questions or concerns.  Return Appointment: Patient given instructions regarding scheduling next clinic visit. Patient demonstrated understanding of this information and agreed to call with further questions or concerns.  Medication Change: Patient was educated regarding prescribed medication change, including discussion of the indication, administration, side effects, and when to report to MD or RN. Patient demonstrated understanding of this information and agreed to call with further questions or concerns.  Patient stated he understood all health information given and agreed to call with further questions or concerns.

## 2018-01-20 NOTE — PROGRESS NOTES
"CARDIOLOGY NEW OFFICE VISIT    HPI: Lux Velasco is a 73 yo gentleman, risk factor profile (+) HTN, (+) Type II DM, (+) hyperlipidemia, (+) family Hx CAD, (-) tobacco use, presents to the cardiology clinic for evaluation of chest discomfort by referral from Dr. Duffy.     Mr. Roque has a long standing history of chest discomfort and ZAMARRIPA, going back to 1988.  He has had numerous noninvasive tests over the past decade.  He had a bicycle ECHO in May 2006 that showed normal LV function with a normal exercise response and no stress induced regional wall motion abnormalities. When I saw him for the first time in 2009, I obtained a stress nuclear study and that was normal.  He had an ECHO in Jan 2017 showing normal LV function with an LVEF 60-65% and trileaflet aortic sclerosis without stenosis.  He had a coronary CTA in May 2017 showing minimal nonobstructive CAD. He had an exercise nuclear study on Edmund 10, 2018, the results of which are pending at this time.  He recalls experiencing chest discomfort, SOB, and fatigue.  The patient's has no documented history of valvular heart disease, cardiac arrhythmia, and congestive heart failure.      The patient describes the most recent epsiode of chest discomfort occurring last night while sitting in an easy chair.  The chest discomfort was retromanubrial and \"painful\". It did not radiate.  He had associated SOB but no diaphoresis.  He took one SL NTG with relief in 10 minutes.  He experiences chest discomfort on a daily basis.  It occurs sporadically.  It can occur both at rest and with exertion.  The sharp chest discomfort is located over the upper left side of his chest and can feel both a dull ache as well as a ripping discomfort.   he pain can last anywhere from seconds to several minutes.  It is not associated with SOB or diaphoresis; he has had associated light-headedness but denies syncope.  The patient notes ZAMARRIPA with both walking quickly as well as walking up a hill " "(< 2 blocks). He also notes PND.  He denies PND, orthopnea,, palpitations, light-headedness, or syncope.   He had a stress nuclear study on Edmund 10, 2018 and notes he had a \"blocked\" artery and \"damage to the heart muscle.\"  [facility] and was told he had LVEF 56%, with small reversible defect in the apex of the LV and no wall motion abnormality.    The patient took ASA in the distant past but he had a single episode of epistaxis and decided to stop the ASA and has not resumed it.        PAST MEDICAL HISTORY:Past Medical History:   Diagnosis Date     Diabetes mellitus type II 2005     History of agent Orange exposure 4/17/2013     Hypertension      Myocardial infarct 01/01/1999     Primary hyperparathyroidism (H) Dx approx 2003     Stroke (H) 01/01/1998    recovered fully       CURRENT MEDICATIONS:  Current Outpatient Prescriptions   Medication Sig Dispense Refill     ketotifen (ZADITOR/REFRESH ANTI-ITCH) 0.025 % SOLN ophthalmic solution Place 1 drop into both eyes 2 times daily       doxycycline (VIBRAMYCIN) 100 MG capsule Take by mouth daily       ketotifen (ZADITOR/REFRESH ANTI-ITCH) 0.025 % SOLN ophthalmic solution Place 1 drop into both eyes 2 times daily       doxycycline (VIBRAMYCIN) 100 MG capsule Take 1 capsule (100 mg) by mouth daily 30 capsule 11     albuterol (PROAIR HFA) 108 (90 BASE) MCG/ACT Inhaler Inhale 2 puffs into the lungs every 4 hours as needed for shortness of breath / dyspnea, wheezing or other (use prior to exertion/activities) 1 Inhaler 3     alfuzosin (UROXATRAL) 10 MG 24 hr tablet Take 1 tablet (10 mg) by mouth daily 90 tablet 3     finasteride (PROSCAR) 5 MG tablet Take 1 tablet (5 mg) by mouth daily 90 tablet 3     senna-docusate (SENOKOT-S;PERICOLACE) 8.6-50 MG per tablet Take 1-2 tablets by mouth 2 times daily as needed for constipation Take while on oral narcotics to prevent or treat constipation. 10 tablet 0     guaiFENesin-codeine (ROBITUSSIN AC) 100-10 MG/5ML SOLN " solution Take 5-10 mLs by mouth every 4 hours as needed for cough 420 mL 0     losartan (COZAAR) 50 MG tablet Take 1 tablet (50 mg) by mouth daily (Patient taking differently: Take 50 mg by mouth every morning ) 30 tablet 3     VITAMIN D, CHOLECALCIFEROL, PO Take 1,000 Units by mouth every morning        METFORMIN HCL PO Take by mouth 2 times daily (with meals)       acetaminophen 500 MG CAPS Take 2 tablets by mouth daily.       acetic acid-hydrocortisone (ACETASOL HC) otic solution Place 4 drops into both ears 2 times daily. 10 mL 10     ammonium lactate (AMLACTIN) 12 % cream Apply  topically daily.       artificial tears SOLN Use one drop to both eye PRN.       omeprazole (PRILOSEC) 20 MG capsule Take 20 mg by mouth daily.       oxybutynin (DITROPAN) 5 MG tablet Take 0.5 tablets (2.5 mg) by mouth At Bedtime (Patient not taking: Reported on 1/20/2018) 42 tablet 3     fluticasone (FLOVENT HFA) 110 MCG/ACT Inhaler Inhale 1 puff into the lungs 2 times daily 3 Inhaler 1     oxyCODONE-acetaminophen (PERCOCET) 5-325 MG per tablet Take 1-2 tablets by mouth every 6 hours as needed for moderate to severe pain or pain (moderate to severe) (Patient not taking: Reported on 1/20/2018) 20 tablet 0     calcium carbonate (OSCAL 500) 1250 (500 CA) MG TABS tablet Take 2 tablets once a day with food. 60 tablet 11     FLUTICASONE PROPIONATE NA Spray 50 mcg in nostril         PAST SURGICAL HISTORY:  Past Surgical History:   Procedure Laterality Date     BIOPSY OF SKIN LESION       BYPASS GRAFT ARTERY CORONARY       CATARACT IOL, RT/LT Bilateral 2017    [facility]     EXCISE MASS LOWER EXTREMITY Left 11/3/2017    Procedure: EXCISE MASS LOWER EXTREMITY;  Excision Mass Left Flank;  Surgeon: Marybeth Gambino MD;  Location:  OR     MOHS MICROGRAPHIC PROCEDURE       PARATHYROIDECTOMY N/A 3/21/2017    Procedure: PARATHYROIDECTOMY;  Surgeon: Julianna Short MD;  Location:  OR     PARATHYROIDECTOMY    "      ALLERGIES  Clindamycin; Eggs; Penicillins; and Propoxyphene    FAMILY HX:  Family History   Problem Relation Age of Onset     Melanoma Mother      Melanoma Father      CANCER No family hx of      no skin cancer     Glaucoma No family hx of      Macular Degeneration No family hx of        SOCIAL HX:  Social History     Social History     Marital status:      Spouse name: N/A     Number of children: N/A     Years of education: N/A     Social History Main Topics     Smoking status: Former Smoker     Quit date: 1970     Smokeless tobacco: Former User     Alcohol use Yes      Comment: rarely     Drug use: No     Sexual activity: Not Asked     Other Topics Concern     None     Social History Narrative       ROS:  Constitutional: No fever, chills, or sweats. No weight gain/loss.   HEENT: No visual disturbance, ear ache, epistaxis, sore throat.   Allergies/Immunologic: Negative.   Respiratory: No cough, hemoptysis.   Cardiovascular: As per HPI.   GI: No nausea, vomiting, hematemesis, melena, or hematochezia.   : No urinary frequency, dysuria, or hematuria.   Integument: No rash.   Psychiatric: No anxiety / depression.   Neuro: No speech disturbance, focal sensory or motor deficit.   Endocrinology: No polyuria / polyphagia.   Musculoskeletal: No myalgia.    VITAL SIGNS:  /79 (BP Location: Left arm, Patient Position: Chair, Cuff Size: Adult Large)  Pulse 69  Ht 1.803 m (5' 11\")  Wt 130 kg (286 lb 11.2 oz)  SpO2 94%  BMI 39.99 kg/m2  Body mass index is 39.99 kg/(m^2).  Wt Readings from Last 2 Encounters:   01/20/18 130 kg (286 lb 11.2 oz)   11/15/17 127.6 kg (281 lb 3.2 oz)       PHYSICAL EXAM  Lux Velasco is a 72 year old male.in no acute distress.  HEENT: Eyes Nonicteric.  Neck: JVP normal.  Carotids +3/3 bilaterally without bruits.  Lungs: CTA.  Cor: RRR. Normal S1 and S2.  No murmur, rub, or gallop.  PMI in Lf 5th ICS.  Abd: Soft, nontender, nondistended.  NABS.  No pulsatile mass.  Extremities: " No C/C/E.  Pulses +3/3 symmetric in upper and lower extremities.  Neuro: Grossly intact.  Psych: A&O x 3.  Skin: No rash.    LABS  Recent Labs   Lab Test  18   1559  17   1215   WBC  5.7  4.9   HGB  13.0*  11.9*   HCT  40.7  37.9*   PLT  160  141*     Recent Labs   Lab Test  18   1559  17   1215   NA  139  140   POTASSIUM  4.0  4.4   CHLORIDE  105  107   CO2  30  26   GLC  114*  119*   BUN  15  16   CR  0.91  0.82   TRINA  8.8  9.1     Recent Labs   Lab Test  16   1640   CHOL  216*   HDL  49   LDL  140*   TRIG  137   NHDL  167*        EK18  NSR with PAC and incomplete RBBB.    Procedures:     EXERCISE BIKE ECHO: 2006  Normal exercise echocardiogram without evidence of coronary artery disease or stress induced ischemia at 72% predicted heart rate.  Normal resting LV function with EF of approximately 60-65%: normal response to exercise with increase to approximately 70-75%.  No stress induced regional wall motion abnormalities.    No ECG evidence of ischemia  No subjective evidence of ischemia.  Mild mitral regurgitation.  Mild aortic root dilation of 3.4 cm at the proximal ascending aorta.  Normal functional capacity.    ECHO: 17  Global and regional left ventricular function is normal with an EF of 60-65%.  Global right ventricular function is normal.  Trileaflet aortic sclerosis without stenosis.  No pericardial effusion is present.    EXERCISE STRESS NUCLEAR TEST:  09  1. Normal study.  2. There is no evidence of stress-induced ischemia and the target heart rate was achieved.    3. Left ventricular size is normal at rest.  Transient ischemic dilation of the left ventricle did not occur.    4. The left ventricular ejection fraction is 60 % and there are no regional wall motion abnormalities.    EXERCISE STRESS NUCLEAR TEST:  18  Stage I Modified Guicho Protocol  LVEF 56%  Small apical reversible defect.    CORONARY CTA:  IMPRESSION:  1.  Minimal non-obstructive  coronary artery disease.   2.  Total Agatston score 102 placing the patient in the 39th  percentile when compared to age and gender matched control group.  3.  Please review Radiology report for incidental noncardiac findings that will follow separately.     FINDINGS:     CORONARY CALCIUM SCORE     Total Agatston calcium score: 102   Left main: 72  Left anterior descendin  Left circumflex: 1  Right coronary artery: 27   This places the patient in the 39th  percentile when compared to age  and gender matched control group.     CORONARY ANGIOGRAPHY     DOMINANCE: Right dominant system.   Normal coronary origins and course.     LEFT MAIN:   The left main arises normally from the left coronary cusp and is widely patent without any detectable stenosis.   There is focal calcification at the left main ostium without causing any detectable stenosis.     LEFT ANTERIOR DESCENDING:   The left anterior descending and its 2 diagonal branches are widely patent with minimal luminal irregularities.      LEFT CIRCUMFLEX:   The left circumflex and its major marginal branches (OM1, OM2) are patent with minimal luminal irregularities.      RIGHT CORONARY ARTERY:   Distal RCA: Minimal (<25%) stenosis composed of noncalcified plaque.  The remainder of the right coronary and the posterior descending  artery are patent with minimal luminal irregularities.     ADDITIONAL FINDINGS:   The proximal ascending aorta is normal in size. Mild calcification of the aortic valve.  Normal pulmonary venous anatomy with all four pulmonary veins draining  into the left atrium.    There is no left ventricular mass or thrombus.   Normal pericardial thickness. There is no pericardial effusion.    CARDIAC CATH: None    ASSESSMENT AND PLAN:   1. Chest discomfort.  Mr. Velasco is a middle aged gentleman with multiple risk factors and a long-standing history of cehst discomfort.  The patient has had numerous studies.  His LV function is normal on ECHO and  stress nuclear study.  He had a coronary CTA last year that showed minimal plaque.  Therefore, it is unlikely that the patient has significant epicardial disease based on the coronary CTA.  His stress nuclear study this past week was positive but there was only one segment (apical) that demonstrated ischemia.  If this is true, the CTA could have missed disease in the distal segment of the LAD.  I had a long converasation with the patient describing choices.  As he is not on a nitrate, beta blocker and ASA and a statin, I would start here before proceeding with a coronary angiogram.  It is unclear if the patient is willing on taking ASA and a statin. We will start Imdur 30 mg qd.  If the patient should have worsening chest pain, I would proceed with coronary angiography.       2. Lipid profile.  The patient was previously on a statin.  The last LDL in 2017 was 140.  The goal LDL should be 70 mg/dl given his diabetic state. The patient was recently prescribes a statin but refused to take it.  I highly recommend he initiate a statin but will not prescribe one as he has a bottle at home and I do not want to take a chance that he is prescribed two different statins / doses.     3. Hypertension.  The patient is currently on Cozaar 50 mg qd.  He may follow up with his primar care provider for ongoing evaluation of his BP.  Given the diabetes, I would like to see BPs in the range of 110-120/70-80.     4. Type II Diabetes.  Continue Metformin.    FOLLOW UP:  Dr. Duffy or [facility].      Jorge Hanks MD    Divisions of Cardiology  [facility]  Patient Care Team:  Nam Duffy MD as PCP - General (Internal Medicine)  Colotn Pulido MD as MD (Family Medicine - Sports Medicine)  Mario Alberto Baptiste MD as MD (Orthopedics)  Pawan Denney DO as MD (Urology)  Estephania Rivas RN as Registered Nurse  Sofiya Contreras PA-C as Physician Assistant  (Physician Assistant)  Marc Chapa MD as MD (Urology)  TEZ HERNANDEZ

## 2018-01-20 NOTE — MR AVS SNAPSHOT
After Visit Summary   1/20/2018    Lux Velasco    MRN: [id number]           Patient Information     Date Of Birth          1945        Visit Information        Provider Department      1/20/2018 11:00 AM Jorge Hanks MD [facility] [place]        Today's Diagnoses     Angina, class III (H)    -  1    SOB (shortness of breath)          Care Instructions    Patient Instructions:  It was a pleasure to see you in the cardiology clinic today.      If you have any questions, you can reach my nurse, Sakina Moura, at [phone].  Press Option #1 for the [facility], and then press Option #3 for nursing.  We are encouraging the use of [employer] to communicate with your HealthCare Provider    Note the new medications: start a baby aspirin 81 mg by mouth everyday.  Imdur 30 mg by mouth by mouth daily  Stop the following medications: none      TAKE YOUR ATORVASTATIN FOR YOUR CHOLESTEROL    Follow the American Heart Association Diet and Lifestyle recommendations:  Limit saturated fat, trans fat, sodium, red meat, sweets and sugar-sweetened beverages. If you choose to eat red meat, compare labels and select the leanest cuts available.  Aim for at least 150 minutes of moderate physical activity or 75 minutes of vigorous physical activity - or an equal combination of both - each week.    The results from today include: none  Please follow up with phone call     Sincerely,    Jorge Hanks MD     If you have an urgent need after hours (8:00 am to 4:30 pm) please call [phone] and ask for the cardiology fellow on call.                    Follow-ups after your visit        Your next 10 appointments already scheduled     Jan 31, 2018  2:05 PM CST   (Arrive by 1:50 PM)   Return Visit with Nam Duffy MD   [facility] ([facility])    [address] [place] [id number] [phone]            Mar 19, 2018   "1:00 PM CDT   (Arrive by 12:45 PM)   New General Liver with Jani Sanderson MD   [facility] ([facility] [place])    [address]  [address]  [place] [facility] [id number] [phone]            Apr 11, 2018 10:30 AM CDT   RETURN GENERAL with Migdalia Hussein MD   [facility] ([facility])    [address]  [address]  [address]  [place] [facility] [facility] [id number] [phone]            May 31, 2018 11:00 AM CDT   (Arrive by 10:45 AM)   Return Visit with María Jacques MD   [facility] ([facility] [place])    [address]  [address]  [place] [id number] [phone]              Who to contact     If you have questions or need follow up information about today's clinic visit or your schedule please contact [facility] directly at [phone].  Normal or non-critical lab and imaging results will be communicated to you by MyChart, letter or phone within 4 business days after the clinic has received the results. If you do not hear from us within 7 days, please contact the clinic through MyChart or phone. If you have a critical or abnormal lab result, we will notify you by phone as soon as possible.  Submit refill requests through [employer] or call your pharmacy and they will forward the refill request to us. Please allow 3 business days for your refill to be completed.          Additional Information About Your Visit        Care EveryWhere ID     This is your Care EveryWhere ID. This could be used by other organizations to access your [facility] medical records  [id number]        Your Vitals Were     Pulse Height Pulse Oximetry BMI (Body Mass Index)          69 1.803 m (5' 11\") 94% 39.99 kg/m2         Blood Pressure from Last 3 Encounters:   01/20/18 135/79   01/12/18 142/83   01/05/18 (!) 165/100    Weight from Last 3 Encounters:   01/20/18 130 kg (286 lb 11.2 oz)   11/15/17 127.6 kg (281 lb " 3.2 oz)   10/10/17 126 kg (277 lb 12.8 oz)              Today, you had the following     No orders found for display         Today's Medication Changes          These changes are accurate as of: 1/20/18 12:04 PM.  If you have any questions, ask your nurse or doctor.               Start taking these medicines.        Dose/Directions    isosorbide mononitrate 30 MG 24 hr tablet   Commonly known as:  IMDUR   Used for:  Angina, class III (H)   Started by:  Jorge Hanks MD        Dose:  30 mg   Take 1 tablet (30 mg) by mouth daily   Quantity:  90 tablet   Refills:  3         These medicines have changed or have updated prescriptions.        Dose/Directions    losartan 50 MG tablet   Commonly known as:  COZAAR   This may have changed:  when to take this   Used for:  Benign essential hypertension        Dose:  50 mg   Take 1 tablet (50 mg) by mouth daily   Quantity:  30 tablet   Refills:  3            Where to get your medicines      Some of these will need a paper prescription and others can be bought over the counter.  Ask your nurse if you have questions.     Bring a paper prescription for each of these medications     isosorbide mononitrate 30 MG 24 hr tablet                Primary Care Provider Office Phone # Fax #    Nam CHARISMA MD Tati [phone]       [address] [address]        Equal Access to Services     SANDHYA DYE : Christiana navao [place], waaxda luqadaha, qaybta kaalmada adeegyada, taty gambino. So [facility] [phone].    ATENCIÓN: Si habla español, tiene a back disposición servicios gratuitos de asistencia lingüística. Llame al [phone].    We comply with applicable federal civil rights laws and [place] laws. We do not discriminate on the basis of race, color, national origin, age, disability, sex, sexual orientation, or gender identity.            Thank you!     Thank you for choosing [facility]  for your care.  Our goal is always to provide you with excellent care. Hearing back from our patients is one way we can continue to improve our services. Please take a few minutes to complete the written survey that you may receive in the mail after your visit with us. Thank you!             Your Updated Medication List - Protect others around you: Learn how to safely use, store and throw away your medicines at www.disposemymeds.org.          This list is accurate as of: 1/20/18 12:04 PM.  Always use your most recent med list.                   Brand Name Dispense Instructions for use Diagnosis    acetaminophen 500 MG Caps      Take 2 tablets by mouth daily.        acetic acid-hydrocortisone otic solution    ACETASOL HC    10 mL    Place 4 drops into both ears 2 times daily.    Otitis externa       albuterol 108 (90 BASE) MCG/ACT Inhaler    PROAIR HFA    1 Inhaler    Inhale 2 puffs into the lungs every 4 hours as needed for shortness of breath / dyspnea, wheezing or other (use prior to exertion/activities)    Shortness of breath       alfuzosin 10 MG 24 hr tablet    UROXATRAL    90 tablet    Take 1 tablet (10 mg) by mouth daily    Lower urinary tract symptoms (LUTS)       ammonium lactate 12 % cream    AMLACTIN     Apply  topically daily.        artificial tears Soln      Use one drop to both eye PRN.        calcium carbonate 1250 (500 CA) MG Tabs tablet    OSCAL 500    60 tablet    Take 2 tablets once a day with food.    Hypocalcemia       * doxycycline 100 MG capsule    VIBRAMYCIN     Take by mouth daily        * doxycycline 100 MG capsule    VIBRAMYCIN    30 capsule    Take 1 capsule (100 mg) by mouth daily    Ulcerative blepharitis of upper and lower eyelids of both eyes       finasteride 5 MG tablet    PROSCAR    90 tablet    Take 1 tablet (5 mg) by mouth daily    Lower urinary tract symptoms (LUTS)       fluticasone 110 MCG/ACT Inhaler    FLOVENT HFA    3 Inhaler    Inhale 1 puff into the lungs 2 times daily    Mild  intermittent asthma, unspecified whether complicated, Dyspnea on exertion       FLUTICASONE PROPIONATE NA      Spray 50 mcg in nostril    Cough       guaiFENesin-codeine 100-10 MG/5ML Soln solution    ROBITUSSIN AC    420 mL    Take 5-10 mLs by mouth every 4 hours as needed for cough    Cough       isosorbide mononitrate 30 MG 24 hr tablet    IMDUR    90 tablet    Take 1 tablet (30 mg) by mouth daily    Angina, class III (H)       * ketotifen 0.025 % Soln ophthalmic solution    ZADITOR/REFRESH ANTI-ITCH     Place 1 drop into both eyes 2 times daily        * ketotifen 0.025 % Soln ophthalmic solution    ZADITOR/REFRESH ANTI-ITCH     Place 1 drop into both eyes 2 times daily        losartan 50 MG tablet    COZAAR    30 tablet    Take 1 tablet (50 mg) by mouth daily    Benign essential hypertension       METFORMIN HCL PO      Take by mouth 2 times daily (with meals)    Benign nodular prostatic hyperplasia without lower urinary tract symptoms, Need for prophylactic vaccination against Streptococcus pneumoniae (pneumococcus), Screening for AAA (abdominal aortic aneurysm), History of smoking       omeprazole 20 MG CR capsule    priLOSEC     Take 20 mg by mouth daily.        oxybutynin 5 MG tablet    DITROPAN    42 tablet    Take 0.5 tablets (2.5 mg) by mouth At Bedtime    Benign prostatic hyperplasia with urinary obstruction       oxyCODONE-acetaminophen 5-325 MG per tablet    PERCOCET    20 tablet    Take 1-2 tablets by mouth every 6 hours as needed for moderate to severe pain or pain (moderate to severe)    Hx of removal of cyst       senna-docusate 8.6-50 MG per tablet    SENOKOT-S;PERICOLACE    10 tablet    Take 1-2 tablets by mouth 2 times daily as needed for constipation Take while on oral narcotics to prevent or treat constipation.    Hypercalcemia, Primary hyperparathyroidism (H)       VITAMIN D (CHOLECALCIFEROL) PO      Take 1,000 Units by mouth every morning        * Notice:  This list has 4 medication(s) that  are the same as other medications prescribed for you. Read the directions carefully, and ask your doctor or other care provider to review them with you.

## 2018-01-20 NOTE — NURSING NOTE
Chief Complaint   Patient presents with     New Patient     referral from Dr. Duffy for chest pain     Vitals were taken and medications were reconciled.     Lisset Weeks MA    10:45 AM

## 2018-01-31 ENCOUNTER — OFFICE VISIT (OUTPATIENT)
Dept: INTERNAL MEDICINE | Facility: CLINIC | Age: 73
End: 2018-01-31
Payer: MEDICARE

## 2018-01-31 VITALS — HEART RATE: 60 BPM | SYSTOLIC BLOOD PRESSURE: 165 MMHG | DIASTOLIC BLOOD PRESSURE: 82 MMHG

## 2018-01-31 DIAGNOSIS — R07.89 ATYPICAL CHEST PAIN: Primary | ICD-10-CM

## 2018-01-31 RX ORDER — ATORVASTATIN CALCIUM 40 MG/1
1 TABLET, FILM COATED ORAL DAILY
COMMUNITY

## 2018-01-31 ASSESSMENT — PAIN SCALES - GENERAL: PAINLEVEL: NO PAIN (0)

## 2018-01-31 NOTE — NURSING NOTE
Chief Complaint   Patient presents with     Results     Follow up after tests and cardiology.

## 2018-01-31 NOTE — NURSING NOTE
Rooming Note  Blood Pressure   BP Readings from Last 1 Encounters:   01/31/18 162/81    Single BP recheck started, 2:07 PM (4 minutes)

## 2018-01-31 NOTE — MR AVS SNAPSHOT
After Visit Summary   1/31/2018    Lux Velasco    MRN: [id number]           Patient Information     Date Of Birth          1945        Visit Information        Provider Department      1/31/2018 2:05 PM Nam Duffy MD [facility]         Follow-ups after your visit        Your next 10 appointments already scheduled     Mar 19, 2018  1:00 PM CDT   (Arrive by 12:45 PM)   New General Liver with Jani Sanderson MD   [facility] Hepatology ([facility])    [address]  [address]  [facility] [id number] [phone]            Apr 11, 2018 10:30 AM CDT   RETURN GENERAL with Migdalia Hussein MD   Eye Clinic ([facility])    [facility]  [address]  [address] [address]  [facility] [facility] [address]   [phone]            Apr 25, 2018  1:35 PM CDT   (Arrive by 1:20 PM)   Return Visit with Nam Duffy MD   [facility] ([facility])    [address]  4th Floor  [place] [id number] [phone]            May 31, 2018 11:00 AM CDT   (Arrive by 10:45 AM)   Return Visit with María Jacques MD   [facility] ([facility])    [address]  [address]  [place] [id number] [phone]              Who to contact     Please call your clinic at [phone] to:    Ask questions about your health    Make or cancel appointments    Discuss your medicines    Learn about your test results    Speak to your doctor   If you have compliments or concerns about an experience at your clinic, or if you wish to file a complaint, please contact [facility] Patient Relations at [phone] or email us at [email or web address]         Additional Information About Your Visit        Care EveryWhere ID     This is your Care EveryWhere ID. This could be used by other organizations to access  your [place] medical records  [id number]        Your Vitals Were     Pulse                   60            Blood Pressure from Last 3 Encounters:   01/31/18 165/82   01/20/18 135/79   01/12/18 142/83    Weight from Last 3 Encounters:   01/20/18 130 kg (286 lb 11.2 oz)   11/15/17 127.6 kg (281 lb 3.2 oz)   10/10/17 126 kg (277 lb 12.8 oz)              Today, you had the following     No orders found for display         Today's Medication Changes          These changes are accurate as of 1/31/18  2:27 PM.  If you have any questions, ask your nurse or doctor.               These medicines have changed or have updated prescriptions.        Dose/Directions    doxycycline 100 MG capsule   Commonly known as:  VIBRAMYCIN   This may have changed:  Another medication with the same name was removed. Continue taking this medication, and follow the directions you see here.   Used for:  Ulcerative blepharitis of upper and lower eyelids of both eyes        Dose:  100 mg   Take 1 capsule (100 mg) by mouth daily   Quantity:  30 capsule   Refills:  11       ketotifen 0.025 % Soln ophthalmic solution   Commonly known as:  ZADITOR/REFRESH ANTI-ITCH   This may have changed:  Another medication with the same name was removed. Continue taking this medication, and follow the directions you see here.        Dose:  1 drop   Place 1 drop into both eyes 2 times daily   Refills:  0       losartan 50 MG tablet   Commonly known as:  COZAAR   This may have changed:  when to take this   Used for:  Benign essential hypertension        Dose:  50 mg   Take 1 tablet (50 mg) by mouth daily   Quantity:  30 tablet   Refills:  3         Stop taking these medicines if you haven't already. Please contact your care team if you have questions.     acetic acid-hydrocortisone otic solution   Commonly known as:  ACETASOL HC           calcium carbonate 1250 (500 CA) MG Tabs tablet   Commonly known as:  OSCAL 500           FLUTICASONE PROPIONATE NA            oxyCODONE-acetaminophen 5-325 MG per tablet   Commonly known as:  PERCOCET                    Primary Care Provider Office Phone # Fax #    Nam Duffy [phone]        [address] [address]        Equal Access to Services     SANDHYA DYE : Hadii aad ku hadasho Soomaali, waaxda luqadaha, qaybta kaalmada adeegyada, waxay idiin hayaan adejerardo waldron laparul gambino. So [facility] [phone].    ATENCIÓN: Si habla español, tiene a back disposición servicios gratuitos de asistencia lingüística. Llame al [phone].    We comply with applicable federal civil rights laws and [place] laws. We do not discriminate on the basis of race, color, national origin, age, disability, sex, sexual orientation, or gender identity.            Thank you!     Thank you for choosing [facility]  for your care. Our goal is always to provide you with excellent care. Hearing back from our patients is one way we can continue to improve our services. Please take a few minutes to complete the written survey that you may receive in the mail after your visit with us. Thank you!             Your Updated Medication List - Protect others around you: Learn how to safely use, store and throw away your medicines at www.disposemymeds.org.          This list is accurate as of 1/31/18  2:27 PM.  Always use your most recent med list.                   Brand Name Dispense Instructions for use Diagnosis    acetaminophen 500 MG Caps      Take 2 tablets by mouth daily.        albuterol 108 (90 BASE) MCG/ACT Inhaler    PROAIR HFA    1 Inhaler    Inhale 2 puffs into the lungs every 4 hours as needed for shortness of breath / dyspnea, wheezing or other (use prior to exertion/activities)    Shortness of breath       alfuzosin 10 MG 24 hr tablet    UROXATRAL    90 tablet    Take 1 tablet (10 mg) by mouth daily    Lower urinary tract symptoms (LUTS)       ammonium lactate 12 % cream    AMLACTIN     Apply   topically daily.        artificial tears Soln      Use one drop to both eye PRN.        ASPIRIN PO      Take 81 mg by mouth daily        ATORVASTATIN CALCIUM PO      Take 40 mg by mouth daily        doxycycline 100 MG capsule    VIBRAMYCIN    30 capsule    Take 1 capsule (100 mg) by mouth daily    Ulcerative blepharitis of upper and lower eyelids of both eyes       finasteride 5 MG tablet    PROSCAR    90 tablet    Take 1 tablet (5 mg) by mouth daily    Lower urinary tract symptoms (LUTS)       fluticasone 110 MCG/ACT Inhaler    FLOVENT HFA    3 Inhaler    Inhale 1 puff into the lungs 2 times daily    Mild intermittent asthma, unspecified whether complicated, Dyspnea on exertion       guaiFENesin-codeine 100-10 MG/5ML Soln solution    ROBITUSSIN AC    420 mL    Take 5-10 mLs by mouth every 4 hours as needed for cough    Cough       isosorbide mononitrate 30 MG 24 hr tablet    IMDUR    90 tablet    Take 1 tablet (30 mg) by mouth daily    Angina, class III (H)       ketotifen 0.025 % Soln ophthalmic solution    ZADITOR/REFRESH ANTI-ITCH     Place 1 drop into both eyes 2 times daily        losartan 50 MG tablet    COZAAR    30 tablet    Take 1 tablet (50 mg) by mouth daily    Benign essential hypertension       METFORMIN HCL PO      Take by mouth 2 times daily (with meals)    Benign nodular prostatic hyperplasia without lower urinary tract symptoms, Need for prophylactic vaccination against Streptococcus pneumoniae (pneumococcus), Screening for AAA (abdominal aortic aneurysm), History of smoking       omeprazole 20 MG CR capsule    priLOSEC     Take 20 mg by mouth daily.        oxybutynin 5 MG tablet    DITROPAN    42 tablet    Take 0.5 tablets (2.5 mg) by mouth At Bedtime    Benign prostatic hyperplasia with urinary obstruction       senna-docusate 8.6-50 MG per tablet    SENOKOT-S;PERICOLACE    10 tablet    Take 1-2 tablets by mouth 2 times daily as needed for constipation Take while on oral narcotics to prevent or  treat constipation.    Hypercalcemia, Primary hyperparathyroidism (H)       VITAMIN D (CHOLECALCIFEROL) PO      Take 1,000 Units by mouth every morning

## 2018-02-01 ENCOUNTER — CARE COORDINATION (OUTPATIENT)
Dept: UROLOGY | Facility: CLINIC | Age: 73
End: 2018-02-01

## 2018-02-01 DIAGNOSIS — N13.8 BENIGN PROSTATIC HYPERPLASIA WITH URINARY OBSTRUCTION: ICD-10-CM

## 2018-02-01 DIAGNOSIS — N40.1 BENIGN PROSTATIC HYPERPLASIA WITH URINARY OBSTRUCTION: ICD-10-CM

## 2018-02-01 RX ORDER — OXYBUTYNIN CHLORIDE 5 MG/1
2.5 TABLET ORAL AT BEDTIME
Qty: 90 TABLET | Refills: 3 | Status: SHIPPED | OUTPATIENT
Start: 2018-02-01 | End: 2018-10-26

## 2018-02-01 RX ORDER — OXYBUTYNIN CHLORIDE 5 MG/1
2.5 TABLET ORAL AT BEDTIME
Qty: 90 TABLET | Refills: 3
Start: 2018-02-01 | End: 2018-02-01

## 2018-02-01 NOTE — PROGRESS NOTES
HPI:    Pt. Comes in for follow up today. He had parathyroid surgery with Dr. Short 3/21/17 for hypercalciemia. He was seen in Dermatology 11/28/16.  He has followed up with Dr. Sullivan Cardiology at [facility] as well as Dr. Hanks, Cardiology here 1/20/18. No new HEENT, cardiopulmonary, abdominal, , neurological complaints.      PE:    Vitals noted gen nad cooperative alert,  HEENT, ears normal oropharynx clear no exudate, neck supple nl rom no adenopathy, he has healing horizontal scar in the lower neck, no tenderness, no bruising, no neck swelling,  LCTA, B, RRR, S1, S2, systolic murmur present,  Grossly normal neurological exam. Minimal B LE swelling at ankles. No B lower leg tenderness.       CXR 8/30/17; no acute pulmonary disease    PFT's 8/30/17 normal    Resting echo 1/17/17 normal EF 60-65%    CTA 5/23/17; minimal non-obstructive CAD    A/P:    1. Follow up parathyroid surgery. He was seen by Dr. Chou, Endo 4/5/17 and by Dr. Short 4/21/17; labs stable  2. He was seen in  Pulmonary 8/30/17 and again today by Dr. Jacques 11/30/17. He was given a Rx. For daily flovent  3. PSA checked 11/30/17  4. Colonoscopy at [facility] 8/15 repeat in 10 years.   5. He was seen  7/17 in Urology for BPH he was seen by Dr. Chapa  1/12/2018 and he commented also on renal cyst  6. BP elevated  Today. Would increase Losartan to 100 mg PO QD   7. He had minimal non-obstructive coronary CTA 5/23/17. He was seen 1/20/18 by Dr. Hanks Cardiology  8. Back cyst. He had surgery with Dr. Gambino 11/3/17. Doing well.   9. Seeing [place] ortho for R heel pain on 10/30/17 and note scanned into EMR.  10. He has egg allergy and does not get flu vaccination.   11.  A1C for elevated glucose for 11/30/17 6.3%  12. Ordered labs 10/12/17 for mild anemia and lower platelets seen by Dr. Marina, Hematology  1/5/2018  13. He has GI appt. For hepatic steatosis (liver U/S done 11/30/17) in 3/18        Total time spent 25 minutes.  More  than 50% of the time spent with Mr. Velasco on counseling / coordinating his care

## 2018-02-01 NOTE — PROGRESS NOTES
Patient called looking for a paper print out of his Ditropan get mailed to him so [facility]. Aliza Butler RN

## 2018-03-06 ENCOUNTER — TELEPHONE (OUTPATIENT)
Dept: INTERNAL MEDICINE | Facility: CLINIC | Age: 73
End: 2018-03-06

## 2018-03-06 ENCOUNTER — TELEPHONE (OUTPATIENT)
Dept: DERMATOLOGY | Facility: CLINIC | Age: 73
End: 2018-03-06

## 2018-03-06 NOTE — TELEPHONE ENCOUNTER
----- Message from Nicolas Florez sent at 3/6/2018 10:30 AM CST -----  Regarding: Medical issues   Contact: [phone]  Pt wants to talk with you about his medical problems. Does not want to tell me more details. States I won't know anything medical related and wants to speak with you.     He states Thursday he is going in for angiogram.       Pt called and wants Dr Duffy to know and [facility]  States he needs a Dr letter for Ischemic heart disease and skin problems. Pt states his heart problems and skin problems are from Agent Orange from [place] War in 1970.  Mi Pratt RN 12:41 PM on 3/6/2018.

## 2018-03-06 NOTE — TELEPHONE ENCOUNTER
Can't comment on these issues related to Agent Orange.    JIM Duffy    Pt called and no answer.  Mi Pratt RN 11:04 AM on 3/8/2018.

## 2018-03-06 NOTE — TELEPHONE ENCOUNTER
Pt called and requested a letter from Dr. Shore and [facility] [place]. [facility] to [facility]. States he needs documentation that confirms he has a diagnosis of agent orange and how it is related to his skin and other health concerns.  Pt stated both providers can reach him at the number in his chart of they have any other questions. This message was sent to both providers for review

## 2018-03-08 NOTE — TELEPHONE ENCOUNTER
Attempted to call Mr. Velasco to discuss request of documentation of his agent orange exposure and potential correlation with his skin. There was no answer at his number listed at 4:35 3/8/18. I will try again when I am in clinic on Monday 3/12/18.

## 2018-03-12 NOTE — TELEPHONE ENCOUNTER
Spoke with Lux, who was in clinic today accompanying his daughter to her appointment. Discussed we can write a letter about his skin history including skin cancer but can not correlate this with his agent orange exposure.

## 2018-03-13 ENCOUNTER — TELEPHONE (OUTPATIENT)
Dept: INTERNAL MEDICINE | Facility: CLINIC | Age: 73
End: 2018-03-13

## 2018-03-13 NOTE — TELEPHONE ENCOUNTER
Spoke with pt, states that [facility]. Also [facility] increased isosorbide from 15 mg  To 30 mg, atorvastatin 20 mg to 40 mg daily. He will go to ER if worsens.

## 2018-03-13 NOTE — TELEPHONE ENCOUNTER
Dear Miquel-Mi;    (1) Does he have any follow up at the [facility] who did the [facility]?    (2) If severe/worse go to ED NOW    (3) I could see him tomorrow (3/14/2018)    ThanksJIM

## 2018-03-13 NOTE — TELEPHONE ENCOUNTER
Dr. Duffy    Pt calling, reports that [facility]. He had 30 % of artery blockage. After the angiogram he was advised to put an ice on the angiography site for 2-3 days and follow up with PCP. He is still having redness, swelling and pain on that site whenever he walks. Do you want to see him ?    Soon-Mi

## 2018-03-14 DIAGNOSIS — R94.5 ABNORMAL RESULTS OF LIVER FUNCTION STUDIES: Primary | ICD-10-CM

## 2018-03-19 ENCOUNTER — OFFICE VISIT (OUTPATIENT)
Dept: GASTROENTEROLOGY | Facility: CLINIC | Age: 73
End: 2018-03-19
Attending: INTERNAL MEDICINE
Payer: MEDICARE

## 2018-03-19 VITALS
DIASTOLIC BLOOD PRESSURE: 81 MMHG | TEMPERATURE: 98 F | HEART RATE: 87 BPM | BODY MASS INDEX: 40.42 KG/M2 | OXYGEN SATURATION: 96 % | HEIGHT: 71 IN | WEIGHT: 288.7 LBS | RESPIRATION RATE: 16 BRPM | SYSTOLIC BLOOD PRESSURE: 152 MMHG

## 2018-03-19 DIAGNOSIS — K76.0 NAFLD (NONALCOHOLIC FATTY LIVER DISEASE): Primary | ICD-10-CM

## 2018-03-19 DIAGNOSIS — R94.5 ABNORMAL RESULTS OF LIVER FUNCTION STUDIES: ICD-10-CM

## 2018-03-19 DIAGNOSIS — R74.8 ELEVATED LIVER ENZYMES: ICD-10-CM

## 2018-03-19 DIAGNOSIS — R16.1 SPLENOMEGALY, NOT ELSEWHERE CLASSIFIED: ICD-10-CM

## 2018-03-19 DIAGNOSIS — K76.0 NAFLD (NONALCOHOLIC FATTY LIVER DISEASE): ICD-10-CM

## 2018-03-19 PROCEDURE — 91200 LIVER ELASTOGRAPHY: CPT | Mod: ZF

## 2018-03-19 PROCEDURE — G0463 HOSPITAL OUTPT CLINIC VISIT: HCPCS

## 2018-03-19 ASSESSMENT — PAIN SCALES - GENERAL: PAINLEVEL: MODERATE PAIN (4)

## 2018-03-19 NOTE — MR AVS SNAPSHOT
After Visit Summary   3/19/2018    Lux Velasco    MRN: [id number]           Patient Information     Date Of Birth          1945        Visit Information        Provider Department      3/19/2018 1:00 PM Jani Sanderson MD [facility] Hepat[facility]        Today's Diagnoses     NAFLD (nonalcoholic fatty liver disease)    -  1    Elevated liver enzymes        Abnormal results of liver function studies        Splenomegaly, not elsewhere classified            Follow-ups after your visit        Your next 10 appointments already scheduled     Apr 11, 2018 10:30 AM CDT   RETURN GENERAL with Migdalia Hussein MD   Eye Clinic ([facility])    [address]  [address] [address]  [facility] [address]   [phone]            Apr 25, 2018  1:35 PM CDT   (Arrive by 1:20 PM)   Return Visit with Nam Duffy MD   [facility] ([facility])    [address]  4th [address]  [place] [facility] [id number] [phone]            May 31, 2018 11:00 AM CDT   (Arrive by 10:45 AM)   Return Visit with María Jacques MD   [facility] ([facility])    [address]  [address]  [facility] [id number] [phone]              Who to contact     If you have questions or need follow up information about today's clinic visit or your schedule please contact [facility] HEPAT[facility] directly at [phone].  Normal or non-critical lab and imaging results will be communicated to you by MyChart, letter or phone within 4 business days after the clinic has received the results. If you do not hear from us within 7 days, please contact the clinic through MyChart or phone. If you have a critical or abnormal lab result, we will notify you by phone as soon as possible.  Submit refill requests through [email or web address] or call your pharmacy and they will forward the refill request to us. Please allow 3  "business days for your refill to be completed.          Additional Information About Your Visit        MyChart Information     [employer] lets you send messages to your doctor, view your test results, renew your prescriptions, schedule appointments and more. To sign up, go to [email or web address] . Click on \"Log in\" on the left side of the screen, which will take you to the Welcome page. Then click on \"Sign up Now\" on the right side of the page.     You will be asked to enter the access code listed below, as well as some personal information. Please follow the directions to create your username and password.     Your access code is: [email or web address]  Expires: 2018  6:31 AM     Your access code will  in 90 days. If you need help or a new code, please call your [place] clinic or [phone].        Care EveryWhere ID     This is your Care EveryWhere ID. This could be used by other organizations to access your [place] medical records  [id number]        Your Vitals Were     Pulse Temperature Respirations Height Pulse Oximetry BMI (Body Mass Index)    87 98  F (36.7  C) (Oral) 16 1.803 m (5' 11\") 96% 40.27 kg/m2       Blood Pressure from Last 3 Encounters:   18 152/81   18 165/82   18 135/79    Weight from Last 3 Encounters:   18 131 kg (288 lb 11.2 oz)   18 130 kg (286 lb 11.2 oz)   11/15/17 127.6 kg (281 lb 3.2 oz)                 Today's Medication Changes          These changes are accurate as of 3/19/18 11:59 PM.  If you have any questions, ask your nurse or doctor.               These medicines have changed or have updated prescriptions.        Dose/Directions    losartan 50 MG tablet   Commonly known as:  COZAAR   This may have changed:  when to take this   Used for:  Benign essential hypertension        Dose:  50 mg   Take 1 tablet (50 mg) by mouth daily   Quantity:  30 tablet   Refills:  3                Primary Care Provider Office Phone # Fax #    Nam Duffy MD " [phone]       [address] [address]        Equal Access to Services     SANDHYA DYE : Christiana suresh traore marv Alicia, waesteeda luqjeremy, qazeenatta kabellada blela, taty gambino. So [facility] [phone].    ATENCIÓN: Si habla español, tiene a back disposición servicios gratuitos de asistencia lingüística. Llame al [phone].    We comply with applicable federal civil rights laws and [place] laws. We do not discriminate on the basis of race, color, national origin, age, disability, sex, sexual orientation, or gender identity.            Thank you!     Thank you for choosing [facility] HEPATOLOGY  for your care. Our goal is always to provide you with excellent care. Hearing back from our patients is one way we can continue to improve our services. Please take a few minutes to complete the written survey that you may receive in the mail after your visit with us. Thank you!             Your Updated Medication List - Protect others around you: Learn how to safely use, store and throw away your medicines at www.disposemymeds.org.          This list is accurate as of 3/19/18 11:59 PM.  Always use your most recent med list.                   Brand Name Dispense Instructions for use Diagnosis    acetaminophen 500 MG Caps      Take 2 tablets by mouth daily.        albuterol 108 (90 BASE) MCG/ACT Inhaler    PROAIR HFA    1 Inhaler    Inhale 2 puffs into the lungs every 4 hours as needed for shortness of breath / dyspnea, wheezing or other (use prior to exertion/activities)    Shortness of breath       alfuzosin 10 MG 24 hr tablet    UROXATRAL    90 tablet    Take 1 tablet (10 mg) by mouth daily    Lower urinary tract symptoms (LUTS)       ammonium lactate 12 % cream    AMLACTIN     Apply  topically daily.        artificial tears Soln      Use one drop to both eye PRN.        ASPIRIN PO      Take 81 mg by mouth daily        ATORVASTATIN CALCIUM PO      Take 40 mg  by mouth daily        doxycycline 100 MG capsule    VIBRAMYCIN    30 capsule    Take 1 capsule (100 mg) by mouth daily    Ulcerative blepharitis of upper and lower eyelids of both eyes       finasteride 5 MG tablet    PROSCAR    90 tablet    Take 1 tablet (5 mg) by mouth daily    Lower urinary tract symptoms (LUTS)       fluticasone 110 MCG/ACT Inhaler    FLOVENT HFA    3 Inhaler    Inhale 1 puff into the lungs 2 times daily    Mild intermittent asthma, unspecified whether complicated, Dyspnea on exertion       guaiFENesin-codeine 100-10 MG/5ML Soln solution    ROBITUSSIN AC    420 mL    Take 5-10 mLs by mouth every 4 hours as needed for cough    Cough       isosorbide mononitrate 30 MG 24 hr tablet    IMDUR    90 tablet    Take 1 tablet (30 mg) by mouth daily    Angina, class III (H)       ketotifen 0.025 % Soln ophthalmic solution    ZADITOR/REFRESH ANTI-ITCH     Place 1 drop into both eyes 2 times daily        losartan 50 MG tablet    COZAAR    30 tablet    Take 1 tablet (50 mg) by mouth daily    Benign essential hypertension       METFORMIN HCL PO      Take by mouth 2 times daily (with meals)    Benign nodular prostatic hyperplasia without lower urinary tract symptoms, Need for prophylactic vaccination against Streptococcus pneumoniae (pneumococcus), Screening for AAA (abdominal aortic aneurysm), History of smoking       omeprazole 20 MG CR capsule    priLOSEC     Take 20 mg by mouth daily.        oxybutynin 5 MG tablet    DITROPAN    90 tablet    Take 0.5 tablets (2.5 mg) by mouth At Bedtime    Benign prostatic hyperplasia with urinary obstruction       senna-docusate 8.6-50 MG per tablet    SENOKOT-S;PERICOLACE    10 tablet    Take 1-2 tablets by mouth 2 times daily as needed for constipation Take while on oral narcotics to prevent or treat constipation.    Hypercalcemia, Primary hyperparathyroidism (H)       VITAMIN D (CHOLECALCIFEROL) PO      Take 1,000 Units by mouth every morning

## 2018-03-19 NOTE — LETTER
3/19/2018       RE: Lux Velasco  [address]   [facility] [address]     Dear Colleague,    Thank you for referring your patient, Lux Velasco, to the [facility] at [facility]. Please see a copy of my visit note below.    Hepatology Clinic note  Lux Velasco   Date of Birth 1945  Date of Service 3/19/2018  Reason for consult: NAFLD  Requesting provider: Dr. Duffy        Impression and plan:   Lux Velasco is a 72 year old male with multiple medical problems including diabetes, hypertension, hyperparathyroidism, prior stroke, exposure to agent orange, who presents for evaluation of presumed fatty liver disease.    He certainly has a number of risk factors that would predispose him for nonalcoholic fatty liver disease.  The abnormal appearance of the liver has been present for at least the past 12 years.  Although his enzymes are technically within the normal range, there are considered elevated based on recent updates to normal values.  Has normal synthetic function.    Based on the long-standing abnormalities, recent thrombocytopenia, splenomegaly, I am concerned that he may have developed early cirrhosis/portal hypertension.  Spend the majority of the visit discussing the nature of chronic and advanced liver disease, potential complications, and plan of evaluation and management.    I suggest we start by checking a fibrosis again today.  If any evidence of fibrosis stage III or IV then I would suggest initiation of HCC screening, esophageal varices screening, monitoring for ascites and hepatic encephalopathy.    RTC in 6 months with labs and US    Jani Sanderson MD  [facility]    -----------------------------------------------------       HPI:   Lux Velasco is a 72 year old male with multiple medical problems including diabetes, hypertension, hyperparathyroidism, prior stroke, exposure to agent orange,  who presents for evaluation of presumed fatty liver disease.    Mr. Velasco was unsure why he is being seen in the liver clinic.  Denies any prior knowledge of liver abnormalities.  He denies episodes of jaundice, right upper quadrant pain, or hepatitis.  He is not able to recall risk factors associated with viral hepatitis.  Notes minimal alcohol use.    On further review of records, it appears that his imaging was suggestive of fatty appearing liver as far back as 2005.  His liver enzymes have been mildly elevated although technically within the normal range.  In addition, in late 2017 he was noted to have thrombocytopenia.  An abdominal ultrasound was suggestive of hepatomegaly with features of steatosis, as well as mild splenomegaly.  He was referred by Dr. Duffy for further evaluation.    At this time, denies chest pain, shortness of breath, abdominal pain, nausea, vomiting fevers, chills, bleeding, jaundice, confusion, rash, pruritis, leg swelling or abdominal distention. Has stable weight, appetite and bowel habits.         Past Medical History:     Past Medical History:   Diagnosis Date     Diabetes mellitus type II 2005     History of agent Orange exposure 4/17/2013     Hypertension      Myocardial infarct 01/01/1999     Primary hyperparathyroidism (H) Dx approx 2003     Stroke (H) 01/01/1998    recovered fully            Past Surgical History:     Past Surgical History:   Procedure Laterality Date     BIOPSY OF SKIN LESION       BYPASS GRAFT ARTERY CORONARY       CATARACT IOL, RT/LT [facility]    [facility]     EXCISE MASS LOWER EXTREMITY Left 11/3/2017    Procedure: EXCISE MASS LOWER EXTREMITY;  Excision Mass Left Flank;  Surgeon: Marybeth Gambino MD;  Location:  OR     MOHS MICROGRAPHIC PROCEDURE       PARATHYROIDECTOMY N/A 3/21/2017    Procedure: PARATHYROIDECTOMY;  Surgeon: Julianna Short MD;  Location:  OR     PARATHYROIDECTOMY              Medications:     Current  "Outpatient Prescriptions   Medication     ASPIRIN PO     ATORVASTATIN CALCIUM PO     isosorbide mononitrate (IMDUR) 30 MG 24 hr tablet     albuterol (PROAIR HFA) 108 (90 BASE) MCG/ACT Inhaler     artificial tears SOLN     oxybutynin (DITROPAN) 5 MG tablet     fluticasone (FLOVENT HFA) 110 MCG/ACT Inhaler     ketotifen (ZADITOR/REFRESH ANTI-ITCH) 0.025 % SOLN ophthalmic solution     doxycycline (VIBRAMYCIN) 100 MG capsule     alfuzosin (UROXATRAL) 10 MG 24 hr tablet     finasteride (PROSCAR) 5 MG tablet     senna-docusate (SENOKOT-S;PERICOLACE) 8.6-50 MG per tablet     guaiFENesin-codeine (ROBITUSSIN AC) 100-10 MG/5ML SOLN solution     losartan (COZAAR) 50 MG tablet     VITAMIN D, CHOLECALCIFEROL, PO     METFORMIN HCL PO     acetaminophen 500 MG CAPS     ammonium lactate (AMLACTIN) 12 % cream     omeprazole (PRILOSEC) 20 MG capsule     No current facility-administered medications for this visit.             Allergies:     Allergies   Allergen Reactions     Clindamycin      Eggs Other (See Comments)     Pt states no longer having reaction, childhood allergy, eats eggs       Penicillins Other (See Comments)     Pt states PCN allergy is due to egg allergy     Propoxyphene Other (See Comments)     Pain            Social History:      reports that he quit smoking about 48 years ago. He has quit using smokeless tobacco. He reports that he drinks alcohol. He reports that he does not use illicit drugs.   has no sexual activity history on file.           Family History:     Family History   Problem Relation Age of Onset     Melanoma Mother      Melanoma Father      CANCER No family hx of      no skin cancer     Glaucoma No family hx of      Macular Degeneration No family hx of             Review of Systems:   10 points ROS was obtained and highlighted in the HPI, otherwise negative.          Physical Exam:   VS:  /81  Pulse 87  Temp 98  F (36.7  C) (Oral)  Resp 16  Ht 1.803 m (5' 11\")  Wt 131 kg (288 lb 11.2 oz)  " SpO2 96%  BMI 40.27 kg/m2      Gen: A&Ox3, NAD  HEENT: non-icteric, oropharynx clear  CV: RRR  Lung: CTAB  Abd: soft, NT, ND  Ext: no edema, intact pulses.   Skin: no stigmata of chronic liver disease.   Neuro: no focal deficits, grossly intact, no asterixis   Psych: appropriate mood and affects       Data:   Reviewed in person and significant for:  Lab Results   Component Value Date    WBC 5.7 01/05/2018     Lab Results   Component Value Date    RBC 4.73 01/05/2018     Lab Results   Component Value Date    HGB 13.0 01/05/2018     Lab Results   Component Value Date    HCT 40.7 01/05/2018     No components found for: MCT  Lab Results   Component Value Date    MCV 86 01/05/2018     Lab Results   Component Value Date    MCH 27.5 01/05/2018     Lab Results   Component Value Date    MCHC 31.9 01/05/2018     Lab Results   Component Value Date    RDW 13.4 01/05/2018     Lab Results   Component Value Date     01/05/2018     Last Basic Metabolic Panel:  Lab Results   Component Value Date     01/05/2018      Lab Results   Component Value Date    POTASSIUM 4.0 01/05/2018     Lab Results   Component Value Date    CHLORIDE 105 01/05/2018     Lab Results   Component Value Date    TRINA 8.8 01/05/2018     Lab Results   Component Value Date    CO2 30 01/05/2018     Lab Results   Component Value Date    BUN 15 01/05/2018     Lab Results   Component Value Date    CR 0.91 01/05/2018     Lab Results   Component Value Date     01/05/2018     Liver Function Studies -   Recent Labs   Lab Test  01/05/18   1559   PROTTOTAL  7.9   ALBUMIN  3.8   BILITOTAL  0.4   ALKPHOS  88   AST  29   ALT  52       Again, thank you for allowing me to participate in the care of your patient.      Sincerely,    Jani Sanderson MD

## 2018-03-19 NOTE — NURSING NOTE
"Chief Complaint   Patient presents with     Allied Health Visit     consult       Initial /81  Pulse 87  Temp 98  F (36.7  C) (Oral)  Resp 16  Ht 1.803 m (5' 11\")  Wt 131 kg (288 lb 11.2 oz)  SpO2 96%  BMI 40.27 kg/m2 Estimated body mass index is 40.27 kg/(m^2) as calculated from the following:    Height as of this encounter: 1.803 m (5' 11\").    Weight as of this encounter: 131 kg (288 lb 11.2 oz).  Medication Reconciliation: complete    "

## 2018-03-19 NOTE — PROGRESS NOTES
Hepatology Clinic note  Lux Velasco   Date of Birth 1945  Date of Service 3/19/2018  Reason for consult: NAFLD  Requesting provider: Dr. Duffy        Impression and plan:   Lux Velasco is a 72 year old male with multiple medical problems including diabetes, hypertension, hyperparathyroidism, prior stroke, exposure to agent orange, who presents for evaluation of presumed fatty liver disease.    He certainly has a number of risk factors that would predispose him for nonalcoholic fatty liver disease.  The abnormal appearance of the liver has been present for at least the past 12 years.  Although his enzymes are technically within the normal range, there are considered elevated based on recent updates to normal values.  Has normal synthetic function.    Based on the long-standing abnormalities, recent thrombocytopenia, splenomegaly, I am concerned that he may have developed early cirrhosis/portal hypertension.  Spend the majority of the visit discussing the nature of chronic and advanced liver disease, potential complications, and plan of evaluation and management.    I suggest we start by checking a fibrosis again today.  If any evidence of fibrosis stage III or IV then I would suggest initiation of HCC screening, esophageal varices screening, monitoring for ascites and hepatic encephalopathy.    RTC in 6 months with labs and US    Jani Sanderson MD  [facility] Hep[facility]    -----------------------------------------------------       HPI:   Lux Velasco is a 72 year old male with multiple medical problems including diabetes, hypertension, hyperparathyroidism, prior stroke, exposure to agent orange, who presents for evaluation of presumed fatty liver disease.    Mr. Velasco was unsure why he is being seen in the liver clinic.  Denies any prior knowledge of liver abnormalities.  He denies episodes of jaundice, right upper quadrant pain, or hepatitis.  He is not able to recall risk  factors associated with viral hepatitis.  Notes minimal alcohol use.    On further review of records, it appears that his imaging was suggestive of fatty appearing liver as far back as 2005.  His liver enzymes have been mildly elevated although technically within the normal range.  In addition, in late 2017 he was noted to have thrombocytopenia.  An abdominal ultrasound was suggestive of hepatomegaly with features of steatosis, as well as mild splenomegaly.  He was referred by Dr. Duffy for further evaluation.    At this time, denies chest pain, shortness of breath, abdominal pain, nausea, vomiting fevers, chills, bleeding, jaundice, confusion, rash, pruritis, leg swelling or abdominal distention. Has stable weight, appetite and bowel habits.         Past Medical History:     Past Medical History:   Diagnosis Date     Diabetes mellitus type II 2005     History of agent Orange exposure 4/17/2013     Hypertension      Myocardial infarct 01/01/1999     Primary hyperparathyroidism (H) Dx approx 2003     Stroke (H) 01/01/1998    recovered fully            Past Surgical History:     Past Surgical History:   Procedure Laterality Date     BIOPSY OF SKIN LESION       BYPASS GRAFT ARTERY CORONARY       CATARACT IOL, RT/LT Bilateral 2017    [facility]     EXCISE MASS LOWER EXTREMITY Left 11/3/2017    Procedure: EXCISE MASS LOWER EXTREMITY;  Excision Mass Left Flank;  Surgeon: Marybeth Gambino MD;  Location:  OR     MOHS MICROGRAPHIC PROCEDURE       PARATHYROIDECTOMY N/A 3/21/2017    Procedure: PARATHYROIDECTOMY;  Surgeon: Julianna Short MD;  Location:  OR     PARATHYROIDECTOMY              Medications:     Current Outpatient Prescriptions   Medication     ASPIRIN PO     ATORVASTATIN CALCIUM PO     isosorbide mononitrate (IMDUR) 30 MG 24 hr tablet     albuterol (PROAIR HFA) 108 (90 BASE) MCG/ACT Inhaler     artificial tears SOLN     oxybutynin (DITROPAN) 5 MG tablet     fluticasone (FLOVENT HFA) 110  "MCG/ACT Inhaler     ketotifen (ZADITOR/REFRESH ANTI-ITCH) 0.025 % SOLN ophthalmic solution     doxycycline (VIBRAMYCIN) 100 MG capsule     alfuzosin (UROXATRAL) 10 MG 24 hr tablet     finasteride (PROSCAR) 5 MG tablet     senna-docusate (SENOKOT-S;PERICOLACE) 8.6-50 MG per tablet     guaiFENesin-codeine (ROBITUSSIN AC) 100-10 MG/5ML SOLN solution     losartan (COZAAR) 50 MG tablet     VITAMIN D, CHOLECALCIFEROL, PO     METFORMIN HCL PO     acetaminophen 500 MG CAPS     ammonium lactate (AMLACTIN) 12 % cream     omeprazole (PRILOSEC) 20 MG capsule     No current facility-administered medications for this visit.             Allergies:     Allergies   Allergen Reactions     Clindamycin      Eggs Other (See Comments)     Pt states no longer having reaction, childhood allergy, eats eggs       Penicillins Other (See Comments)     Pt states PCN allergy is due to egg allergy     Propoxyphene Other (See Comments)     Pain            Social History:      reports that he quit smoking about 48 years ago. He has quit using smokeless tobacco. He reports that he drinks alcohol. He reports that he does not use illicit drugs.   has no sexual activity history on file.           Family History:     Family History   Problem Relation Age of Onset     Melanoma Mother      Melanoma Father      CANCER No family hx of      no skin cancer     Glaucoma No family hx of      Macular Degeneration No family hx of             Review of Systems:   10 points ROS was obtained and highlighted in the HPI, otherwise negative.          Physical Exam:   VS:  /81  Pulse 87  Temp 98  F (36.7  C) (Oral)  Resp 16  Ht 1.803 m (5' 11\")  Wt 131 kg (288 lb 11.2 oz)  SpO2 96%  BMI 40.27 kg/m2      Gen: A&Ox3, NAD  HEENT: non-icteric, oropharynx clear  CV: RRR  Lung: CTAB  Abd: soft, NT, ND  Ext: no edema, intact pulses.   Skin: no stigmata of chronic liver disease.   Neuro: no focal deficits, grossly intact, no asterixis   Psych: appropriate mood and " affects       Data:   Reviewed in person and significant for:  Lab Results   Component Value Date    WBC 5.7 01/05/2018     Lab Results   Component Value Date    RBC 4.73 01/05/2018     Lab Results   Component Value Date    HGB 13.0 01/05/2018     Lab Results   Component Value Date    HCT 40.7 01/05/2018     No components found for: MCT  Lab Results   Component Value Date    MCV 86 01/05/2018     Lab Results   Component Value Date    MCH 27.5 01/05/2018     Lab Results   Component Value Date    MCHC 31.9 01/05/2018     Lab Results   Component Value Date    RDW 13.4 01/05/2018     Lab Results   Component Value Date     01/05/2018     Last Basic Metabolic Panel:  Lab Results   Component Value Date     01/05/2018      Lab Results   Component Value Date    POTASSIUM 4.0 01/05/2018     Lab Results   Component Value Date    CHLORIDE 105 01/05/2018     Lab Results   Component Value Date    TRINA 8.8 01/05/2018     Lab Results   Component Value Date    CO2 30 01/05/2018     Lab Results   Component Value Date    BUN 15 01/05/2018     Lab Results   Component Value Date    CR 0.91 01/05/2018     Lab Results   Component Value Date     01/05/2018     Liver Function Studies -   Recent Labs   Lab Test  01/05/18   1559   PROTTOTAL  7.9   ALBUMIN  3.8   BILITOTAL  0.4   ALKPHOS  88   AST  29   ALT  52

## 2018-04-11 ENCOUNTER — OFFICE VISIT (OUTPATIENT)
Dept: OPHTHALMOLOGY | Facility: CLINIC | Age: 73
End: 2018-04-11
Attending: OPHTHALMOLOGY
Payer: MEDICARE

## 2018-04-11 DIAGNOSIS — E11.9 TYPE 2 DIABETES MELLITUS WITHOUT OPHTHALMIC MANIFESTATIONS (H): ICD-10-CM

## 2018-04-11 DIAGNOSIS — Z96.1 PSEUDOPHAKIA OF BOTH EYES: ICD-10-CM

## 2018-04-11 DIAGNOSIS — H04.123 DRY EYE SYNDROME, BILATERAL: Primary | ICD-10-CM

## 2018-04-11 PROCEDURE — G0463 HOSPITAL OUTPT CLINIC VISIT: HCPCS | Mod: ZF

## 2018-04-11 PROCEDURE — 68761 CLOSE TEAR DUCT OPENING: CPT | Mod: ZF | Performed by: OPHTHALMOLOGY

## 2018-04-11 ASSESSMENT — CONF VISUAL FIELD
OS_NORMAL: 1
METHOD: COUNTING FINGERS
OD_NORMAL: 1

## 2018-04-11 ASSESSMENT — VISUAL ACUITY
METHOD: SNELLEN - LINEAR
OS_CC: 20/25
OD_CC: 20/20
CORRECTION_TYPE: GLASSES
OS_CC+: -1

## 2018-04-11 ASSESSMENT — EXTERNAL EXAM - RIGHT EYE: OD_EXAM: NORMAL

## 2018-04-11 ASSESSMENT — TONOMETRY
OD_IOP_MMHG: 14
IOP_METHOD: TONOPEN
OS_IOP_MMHG: 14

## 2018-04-11 ASSESSMENT — CUP TO DISC RATIO
OD_RATIO: 0.3
OS_RATIO: 0.3

## 2018-04-11 ASSESSMENT — EXTERNAL EXAM - LEFT EYE: OS_EXAM: NORMAL

## 2018-04-11 NOTE — MR AVS SNAPSHOT
After Visit Summary   4/11/2018    Lux Velasco    MRN: [id number]           Patient Information     Date Of Birth          1945        Visit Information        Provider Department      4/11/2018 10:30 AM Migdalia Hussein MD [facility]        Today's Diagnoses     Dry eye syndrome, bilateral - Both Eyes    -  1    Pseudophakia of both eyes - Both Eyes        Type 2 diabetes mellitus without ophthalmic manifestations (H) - Both Eyes           Follow-ups after your visit        Follow-up notes from your care team     Return in about 3 months (around 7/11/2018) for surface check.      Your next 10 appointments already scheduled     Apr 25, 2018  1:35 PM CDT   (Arrive by 1:20 PM)   Return Visit with Nam Duffy MD   [facility] ([facility])    [address] [place] [address]  4th Floor  [facility] [id number] [phone]            May 31, 2018 11:00 AM CDT   (Arrive by 10:45 AM)   Return Visit with María Jacques MD   [facility] ([facility])    [address] [place] [address]  [address] [place] [id number] [phone]            Jul 16, 2018 10:30 AM CDT   RETURN GENERAL with Migdalia Hussein MD   Eye Clinic ([facility])    [address]  [facility] [id number] [phone]              Who to contact     Please call your clinic at [phone] to:    Ask questions about your health    Make or cancel appointments    Discuss your medicines    Learn about your test results    Speak to your doctor            Additional Information About Your Visit        EQO Information     EQO is an electronic gateway that provides easy, online access to your medical records. With EQO, you can request a clinic appointment, read your test results, renew a prescription or communicate with your care team.     To sign up for EQO visit the  website at [email or web address]   You will be asked to enter the access code listed below, as well as some personal information. Please follow the directions to create your username and password.     Your access code is: [email or web address]  Expires: 2018  6:31 AM     Your access code will  in 90 days. If you need help or a new code, please contact your [facility] or call [phone] for assistance.        Care EveryWhere ID     This is your Care EveryWhere ID. This could be used by other organizations to access your [facility] medical records  [id number]         Blood Pressure from Last 3 Encounters:   18 152/81   18 165/82   18 135/79    Weight from Last 3 Encounters:   18 131 kg (288 lb 11.2 oz)   18 130 kg (286 lb 11.2 oz)   11/15/17 127.6 kg (281 lb 3.2 oz)              Today, you had the following     No orders found for display         Today's Medication Changes          These changes are accurate as of 18 11:30 AM.  If you have any questions, ask your nurse or doctor.               These medicines have changed or have updated prescriptions.        Dose/Directions    losartan 50 MG tablet   Commonly known as:  COZAAR   This may have changed:  when to take this   Used for:  Benign essential hypertension        Dose:  50 mg   Take 1 tablet (50 mg) by mouth daily   Quantity:  30 tablet   Refills:  3                Primary Care Provider Office Phone # Fax #    Nam Duffy [phone]        [address] [address]        Equal Access to Services     JUAN DYE AH: Hadpedro Alicia, waaxda rolando, qaybta ammyaltaty vick. So [facility] [phone].    ATENCIÓN: Si habla español, tiene a back disposición servicios gratuitos de asistencia lingüística. Llame al [phone].    We comply with applicable federal civil rights laws and [place]  laws. We do not discriminate on the basis of race, color, national origin, age, disability, sex, sexual orientation, or gender identity.            Thank you!     Thank you for choosing EYE CLINIC  for your care. Our goal is always to provide you with excellent care. Hearing back from our patients is one way we can continue to improve our services. Please take a few minutes to complete the written survey that you may receive in the mail after your visit with us. Thank you!             Your Updated Medication List - Protect others around you: Learn how to safely use, store and throw away your medicines at www.disposemymeds.org.          This list is accurate as of 4/11/18 11:30 AM.  Always use your most recent med list.                   Brand Name Dispense Instructions for use Diagnosis    acetaminophen 500 MG Caps      Take 2 tablets by mouth daily.        albuterol 108 (90 Base) MCG/ACT Inhaler    PROAIR HFA    1 Inhaler    Inhale 2 puffs into the lungs every 4 hours as needed for shortness of breath / dyspnea, wheezing or other (use prior to exertion/activities)    Shortness of breath       alfuzosin 10 MG 24 hr tablet    UROXATRAL    90 tablet    Take 1 tablet (10 mg) by mouth daily    Lower urinary tract symptoms (LUTS)       ammonium lactate 12 % cream    AMLACTIN     Apply  topically daily.        artificial tears Soln      Use one drop to both eye PRN.        ASPIRIN PO      Take 81 mg by mouth daily        ATORVASTATIN CALCIUM PO      Take 40 mg by mouth daily        doxycycline 100 MG capsule    VIBRAMYCIN    30 capsule    Take 1 capsule (100 mg) by mouth daily    Ulcerative blepharitis of upper and lower eyelids of both eyes       finasteride 5 MG tablet    PROSCAR    90 tablet    Take 1 tablet (5 mg) by mouth daily    Lower urinary tract symptoms (LUTS)       fluticasone 110 MCG/ACT Inhaler    FLOVENT HFA    3 Inhaler    Inhale 1 puff into the lungs 2 times daily    Mild intermittent asthma, unspecified  whether complicated, Dyspnea on exertion       guaiFENesin-codeine 100-10 MG/5ML Soln solution    ROBITUSSIN AC    420 mL    Take 5-10 mLs by mouth every 4 hours as needed for cough    Cough       isosorbide mononitrate 30 MG 24 hr tablet    IMDUR    90 tablet    Take 1 tablet (30 mg) by mouth daily    Angina, class III (H)       ketotifen 0.025 % Soln ophthalmic solution    ZADITOR/REFRESH ANTI-ITCH     Place 1 drop into both eyes 2 times daily        losartan 50 MG tablet    COZAAR    30 tablet    Take 1 tablet (50 mg) by mouth daily    Benign essential hypertension       METFORMIN HCL PO      Take by mouth 2 times daily (with meals)    Benign nodular prostatic hyperplasia without lower urinary tract symptoms, Need for prophylactic vaccination against Streptococcus pneumoniae (pneumococcus), Screening for AAA (abdominal aortic aneurysm), History of smoking       omeprazole 20 MG CR capsule    priLOSEC     Take 20 mg by mouth daily.        oxybutynin 5 MG tablet    DITROPAN    90 tablet    Take 0.5 tablets (2.5 mg) by mouth At Bedtime    Benign prostatic hyperplasia with urinary obstruction       senna-docusate 8.6-50 MG per tablet    SENOKOT-S;PERICOLACE    10 tablet    Take 1-2 tablets by mouth 2 times daily as needed for constipation Take while on oral narcotics to prevent or treat constipation.    Hypercalcemia, Primary hyperparathyroidism (H)       VITAMIN D (CHOLECALCIFEROL) PO      Take 1,000 Units by mouth every morning

## 2018-04-11 NOTE — NURSING NOTE
Chief Complaints and History of Present Illnesses   Patient presents with     Follow Up For     Dry eye syndrome, bilateral. Here for an ocular surface recheck. Also here for DM exam. Pt does not want an [facility] today[facility]     HPI    Affected eye(s):  Both   Symptoms:     No blurred vision   No decreased vision   Dryness   Burning         Do you have eye pain now?:  No      Comments:  Diligent about using gtts during the day and ointment in eyes at night. Does have sharp shooting pain when he does not use these things.  BS: Does not check regularly due to not having supplies.  Lab Results       Component                Value               Date                       A1C                      6.3                 11/30/2017                 A1C                      6.8                 10/10/2017                 A1C                      6.6                 12/08/2008                 A1C                      5.8                 06/26/2007                 A1C                      5.6                 02/07/2007

## 2018-04-11 NOTE — PROGRESS NOTES
HPI  Lux Velasco is a 72 year old male who presents for surface check and diabetic annual exam. He feels his vision a little more blurred in the left eye since last visit.     POH: Dry eye/blepharitis  PMH: Diabetes type 2 (HbA1c 5.7 4/5/2017), HTN (med controlled)  FH: No FH of AMD or glc  SH: Former smoker    Assessment & Plan      (H04.123) Dry eye syndrome, bilateral  Comment: Using warm compresses and eyelid scrubs at least daily, ATs during day and ointment QHS, sometimes BID.  Did not tolerate azithromycin (constipation)  Plan:   Warm compresses and eyelid scrubs BID  ATs during day  Ointment QHS  Doxycycline 100mg daily  Lower lid plugs not in place today; replaced to improve his left eye surface (1.0, 1.0)  - If plugs fall out again consider punctal cautery    -Consider starting Xiidra/Restasis if needed in the future    (E11.9,  Z01.00) Diabetic eye exam (HCC)  Comment: No signs of diabetic retinopathy today.     (Z96.1) Pseudophakia of both eyes  Comment: Vision improved with ocular surface lubrication  Plan: Observe    (H52.13,  H52.203) Myopia with astigmatism and presbyopia, bilateral  Comment: [facility]  -----------------------------------------------------------------------------------    Patient disposition:   Return in 3-4 months for surface check    Zain Hooper M.D.  PGY-2, Ophthalmology    Teaching statement:  Complete documentation of historical and exam elements from today's encounter can be found in the full encounter summary report (not reduplicated in this progress note). I personally obtained the chief complaint(s) and history of present illness.  I confirmed and edited as necessary the review of systems, past medical/surgical history, family history, social history, and examination findings as documented by others; and I examined the patient myself. I personally reviewed the relevant tests, images, and reports as documented above.     I formulated and edited as  necessary the assessment and plan and discussed the findings and management plan with the patient and family.    Migdalia Hussein MD  Comprehensive Ophthalmology & Ocular Pathology  Department of Ophthalmology and Visual Neurosciences  [email or web address]  Pager [phone]

## 2018-04-25 ENCOUNTER — RADIANT APPOINTMENT (OUTPATIENT)
Dept: CT IMAGING | Facility: CLINIC | Age: 73
End: 2018-04-25
Attending: INTERNAL MEDICINE
Payer: MEDICARE

## 2018-04-25 ENCOUNTER — RADIANT APPOINTMENT (OUTPATIENT)
Dept: GENERAL RADIOLOGY | Facility: CLINIC | Age: 73
End: 2018-04-25
Attending: INTERNAL MEDICINE
Payer: MEDICARE

## 2018-04-25 ENCOUNTER — OFFICE VISIT (OUTPATIENT)
Dept: INTERNAL MEDICINE | Facility: CLINIC | Age: 73
End: 2018-04-25
Payer: MEDICARE

## 2018-04-25 VITALS
WEIGHT: 285 LBS | SYSTOLIC BLOOD PRESSURE: 115 MMHG | RESPIRATION RATE: 18 BRPM | HEART RATE: 69 BPM | BODY MASS INDEX: 39.75 KG/M2 | OXYGEN SATURATION: 95 % | DIASTOLIC BLOOD PRESSURE: 72 MMHG

## 2018-04-25 DIAGNOSIS — Z13.89 SCREENING FOR DIABETIC PERIPHERAL NEUROPATHY: ICD-10-CM

## 2018-04-25 DIAGNOSIS — R42 DIZZINESS: ICD-10-CM

## 2018-04-25 DIAGNOSIS — R07.89 ATYPICAL CHEST PAIN: ICD-10-CM

## 2018-04-25 DIAGNOSIS — Z13.89 SCREENING FOR DIABETIC PERIPHERAL NEUROPATHY: Primary | ICD-10-CM

## 2018-04-25 DIAGNOSIS — R06.02 SOB (SHORTNESS OF BREATH): ICD-10-CM

## 2018-04-25 LAB
ALBUMIN SERPL-MCNC: 3.5 G/DL (ref 3.4–5)
ALP SERPL-CCNC: 94 U/L (ref 40–150)
ALT SERPL W P-5'-P-CCNC: 39 U/L (ref 0–70)
ANION GAP SERPL CALCULATED.3IONS-SCNC: 6 MMOL/L (ref 3–14)
AST SERPL W P-5'-P-CCNC: 24 U/L (ref 0–45)
BASOPHILS # BLD AUTO: 0 10E9/L (ref 0–0.2)
BASOPHILS NFR BLD AUTO: 0.5 %
BILIRUB SERPL-MCNC: 0.4 MG/DL (ref 0.2–1.3)
BUN SERPL-MCNC: 15 MG/DL (ref 7–30)
CALCIUM SERPL-MCNC: 8.8 MG/DL (ref 8.5–10.1)
CHLORIDE SERPL-SCNC: 108 MMOL/L (ref 94–109)
CO2 SERPL-SCNC: 26 MMOL/L (ref 20–32)
CREAT SERPL-MCNC: 1.05 MG/DL (ref 0.66–1.25)
D DIMER PPP FEU-MCNC: 0.4 UG/ML FEU (ref 0–0.5)
DIFFERENTIAL METHOD BLD: ABNORMAL
EOSINOPHIL # BLD AUTO: 0.3 10E9/L (ref 0–0.7)
EOSINOPHIL NFR BLD AUTO: 5 %
ERYTHROCYTE [DISTWIDTH] IN BLOOD BY AUTOMATED COUNT: 14.2 % (ref 10–15)
GFR SERPL CREATININE-BSD FRML MDRD: 69 ML/MIN/1.7M2
GLUCOSE SERPL-MCNC: 128 MG/DL (ref 70–99)
HCT VFR BLD AUTO: 38 % (ref 40–53)
HGB BLD-MCNC: 12.2 G/DL (ref 13.3–17.7)
IMM GRANULOCYTES # BLD: 0 10E9/L (ref 0–0.4)
IMM GRANULOCYTES NFR BLD: 0.4 %
LYMPHOCYTES # BLD AUTO: 1.8 10E9/L (ref 0.8–5.3)
LYMPHOCYTES NFR BLD AUTO: 32.1 %
MCH RBC QN AUTO: 27.7 PG (ref 26.5–33)
MCHC RBC AUTO-ENTMCNC: 32.1 G/DL (ref 31.5–36.5)
MCV RBC AUTO: 86 FL (ref 78–100)
MONOCYTES # BLD AUTO: 0.4 10E9/L (ref 0–1.3)
MONOCYTES NFR BLD AUTO: 7.6 %
NEUTROPHILS # BLD AUTO: 3.1 10E9/L (ref 1.6–8.3)
NEUTROPHILS NFR BLD AUTO: 54.4 %
NRBC # BLD AUTO: 0 10*3/UL
NRBC BLD AUTO-RTO: 0 /100
PLATELET # BLD AUTO: 150 10E9/L (ref 150–450)
POTASSIUM SERPL-SCNC: 4.4 MMOL/L (ref 3.4–5.3)
PROT SERPL-MCNC: 7.3 G/DL (ref 6.8–8.8)
RBC # BLD AUTO: 4.4 10E12/L (ref 4.4–5.9)
SODIUM SERPL-SCNC: 140 MMOL/L (ref 133–144)
TROPONIN I SERPL-MCNC: <0.015 UG/L (ref 0–0.04)
WBC # BLD AUTO: 5.6 10E9/L (ref 4–11)

## 2018-04-25 PROCEDURE — 85379 FIBRIN DEGRADATION QUANT: CPT | Performed by: INTERNAL MEDICINE

## 2018-04-25 ASSESSMENT — PAIN SCALES - GENERAL: PAINLEVEL: NO PAIN (0)

## 2018-04-25 NOTE — NURSING NOTE
Chief Complaint   Patient presents with     Finger     Patient is here to discuss Finger pain     Breathing Problem     Patient is having ongoing SOB     Reba Duran CMA 1:18 PM on 4/25/2018.

## 2018-04-25 NOTE — PROGRESS NOTES
HPI:    Pt. Comes in for follow up today. He had parathyroid surgery with Dr. Short 3/21/17 for hypercalciemia. He was seen in Dermatology 11/28/16.  He has followed up with Dr. Sullivan Cardiology at [facility] as well as Dr. Hanks, Cardiology here 1/20/18. He states chronic dizziness and chest pain and fatigue. He states he had cor angiogram at [facility] about one month ago that did not have obstructive CAD. He states he followed up in cardiology and they recommended he take more SL NTG. He states he takes several a day and some time 2-3 at a time. He already takes imdur and Losartan.  He does not think that taking more SL NTG is making him dizziness. He thinks maybe it does reduce his CP. He has chronic SOB. He denies any focal neurological complaints.       PE:    Vitals noted gen nad cooperative alert,  HEENT, ears normal oropharynx clear no exudate, neck supple nl rom no adenopathy,  LCTA, B, RRR, S1, S2, systolic murmur present. Neurological exam B UE/LE/proximal/distal is normal and symmetric for sensation, reflexes, and strength. Gait is normal.        CXR 8/30/17; no acute pulmonary disease    PFT's 8/30/17 normal    Resting echo 1/17/17 normal EF 60-65%    CTA 5/23/17; minimal non-obstructive CAD    EKG; SR with sinus PAC. Old Q wave III and TWI; no significant change from 1/5/2018.     A/P:    1. Follow up parathyroid surgery. He was seen by Dr. Chou, Endo 4/5/17 and by Dr. Short 4/21/17; labs stable  2. He was seen in  Pulmonary 8/30/17 and again  by Dr. Jacques 11/30/17. He was given a Rx. For daily flovent. He has follow up with Dr. Jacques 5/31/2018.   3. PSA checked 11/30/17  4. Colonoscopy at [facility] 8/15 repeat in 10 years.   5. He was seen  7/17 in Urology for BPH he was seen by Dr. Chapa  1/12/2018 and he commented also on renal cyst  6. BP elevated  Today. Would increase Losartan to 100 mg PO QD   7. He had minimal non-obstructive coronary CTA 5/23/17. He was seen 1/20/18 by   [facility]. As reported about regarding cor angiogram at [facility]. Today for dizziness, fatigue, and CP. Labs, EKG, troponin, D-dimer, CXR, Head CT. Future MRI/MRA brain, resting echo, Holter Monitor and cardiology follow up. Recommended he reduce his PRN (somewhat excessive) SL NTG use. I recommended he be seen in our ED today and he declined and he is aware of missing fatal cardiopulmonary disease.   8. He has egg allergy and does not get flu vaccination.   9.  A1C for elevated glucose for 11/30/17 6.3%  10. Ordered labs 10/12/17 for mild anemia and lower platelets seen by Dr. Marina, Hematology  1/5/2018  11. Seen by Dr. Sanderson GI appt. For hepatic steatosis (liver U/S done 11/30/17) in 3/19/2018        Total time spent 40 minutes.  More than 50% of the time spent with Mr. Velasco on counseling / coordinating his care

## 2018-04-25 NOTE — LETTER
Patient:  Lux Velasco  :   1945  MRN:     [id number]        Mr. Lux Velasco  [address]   [place] [address]        2018    Dear Mr. Velasco,    Thank you for choosing the [facility] for your healthcare needs.  We appreciate the opportunity to serve you.    The following are your recent test results.     Dear Lux;     The results of your Chest X-ray are attached. I have reviewed this personally. I recommend you keep your 2018 follow up appointment with me as well as keeping your 2018 pulmonary appointment with Dr. Jacques       Results for orders placed or performed in visit on 18   CT Head w/o Contrast    Narrative    CT HEAD W/O CONTRAST 2018 4:29 PM    Provided History: dizziness; Screening for diabetic peripheral  neuropathy; Dizziness; SOB (shortness of breath); Atypical chest pain  ICD-10: Screening for diabetic peripheral neuropathy; Dizziness; SOB  (shortness of breath); Atypical chest pain    Comparison: Head CT 3/13/2014, brain MRI  3/19/2004.    Technique: Using multidetector thin collimation helical acquisition  technique, axial, coronal and sagittal CT images from the skull base  to the vertex were obtained without intravenous contrast.     Findings:    No intracranial hemorrhage, mass effect, or midline shift. Mild  diffuse cerebral volume loss. The ventricles are proportionate to the  cerebral sulci. Patchy subcortical and periventricular white matter  hypoattenuation is nonspecific and not significantly changed compared  to . Focal hypodensity in the high right frontal white matter is  unchanged compared to 3/13/2014. The gray to white matter  differentiation of the cerebral hemispheres is preserved. The basal  cisterns are patent. Unchanged prominent subarachnoid space in the  right posterior fossa.    Mild mucosal thickening in the maxillary sinuses and ethmoid air  cells. Mastoid air  cells are clear.      Impression    Impression:   1. No acute intracranial pathology.  2. Mild diffuse cerebral volume loss and leukoaraiosis.     I have personally reviewed the examination and initial interpretation  and I agree with the findings.    JEN HANSEN MD   XR Chest 2 Views    Narrative    Exam: XR CHEST 2 VW, 4/25/2018 3:45 PM    Indication: ; Screening for diabetic peripheral neuropathy; Dizziness;  SOB (shortness of breath); Atypical chest pain    Comparison: Chest radiographs dated 1/5/2018.    Findings:   PA and lateral views of the chest. Low lung volumes. Streaky bibasilar  opacities again noted which appear not significantly changed compared  to prior exam. No pneumothorax or pleural effusion. Cardiac silhouette  is within normal limits. Visualized portion of the upper abdomen is  unremarkable. Degenerative changes with a chronic appearing wedge  deformity of the thoracic spine. No acute osseous pathology.      Impression    Impression: Streaky left basilar opacities, which may represent  atelectasis, aspiration or infection.    I have personally reviewed the examination and initial interpretation  and I agree with the findings.    MIKE MOSQUEDA MD   EKG Performed in Clinic w/ Provider Reading Fee   Result Value Ref Range    Interpretation ECG Click View Image link to view waveform and result        Please contact your provider if you have any questions or concerns.  We look forward to serving your needs in the future.    Sincerely,    JIM Duffy MD/JENNIFER

## 2018-04-25 NOTE — MR AVS SNAPSHOT
After Visit Summary   4/25/2018    Lux Velasco    MRN: [id number]           Patient Information     Date Of Birth          1945        Visit Information        Provider Department      4/25/2018 1:35 PM Nam Duffy MD [facility]        Today's Diagnoses     Screening for diabetic peripheral neuropathy    -  1    Dizziness        SOB (shortness of breath)        Atypical chest pain           Follow-ups after your visit        Additional Services     CARDIOLOGY EVAL ADULT REFERRAL       Chest pain and dizziness                  Your next 10 appointments already scheduled     Apr 25, 2018  3:30 PM CDT   (Arrive by 3:15 PM)   XR CHEST 2 VIEWS with [email or web address]   [facility] Xray ([facility])    [address] [place] [id number] [phone]           Please bring a list of your current medicines to your exam. (Include vitamins, minerals and over-thecounter medicines.) Leave your valuables at home.  Tell your doctor if there is a chance you may be pregnant.  You do not need to do anything special for this exam.            Apr 25, 2018  8:00 PM CDT   CT HEAD W/O CONTRAST with UCCT1   [facility] CT ([facility])    [address] [id number] [phone]           Please bring any scans or X-rays taken at other hospitals, if similar tests were done. Also bring a list of your medicines, including vitamins, minerals and over-the-counter drugs. It is safest to leave personal items at home.  Be sure to tell your doctor:   If you have any allergies.   If there s any chance you are pregnant.   If you are breastfeeding.  You do not need to do anything special to prepare for this exam.  Please wear loose clothing, such as a sweat suit or jogging clothes. Avoid snaps, zippers and other metal. We may ask you to undress and put on a hospital gown.            Apr  26, 2018 11:45 AM CDT   (Arrive by 11:30 AM)   Return Visit with ORION Feldman [facility] ([facility])    [address]  [address]  [place] [address] [phone]            Apr 26, 2018  2:00 PM CDT   (Arrive by 1:45 PM)   HOLTER MONITOR VISIT with  Cvc Monitor Tech, [facility] ([facility])    [address]  [address]  [place] [id number] [phone]            Apr 26, 2018  3:00 PM CDT   Ech Complete with [id number]    Health Echo ([facility])    [address]  3rd Floor  [place] [address] [phone]           1. Please bring or wear a comfortable two-piece outfit. 2. You may eat, drink and take your normal medicines. 3. For any questions that cannot be answered, please contact the ordering physician            May 07, 2018  3:00 PM CDT   (Arrive by 2:45 PM)   MR BRAIN W/O CONTRAST with UUMR1   [facility] [place], MRI ([facility])    [address] [address] [phone]           Take your medicines as usual, unless your doctor tells you not to. Bring a list of your current medicines to your exam (including vitamins, minerals and over-the-counter drugs). Also bring the results of similar scans you may have had.  Please remove any body piercings and hair extensions before you arrive.  Follow your doctor s orders. If you do not, we may have to postpone your exam.  You may or may not receive IV contrast for this exam pending the discretion of the Radiologist.  You do not need to do anything special to prepare.  The MRI machine uses a strong magnet. Please wear clothes without metal (snaps, zippers). A sweatsuit works well, or we may give you a hospital gown.   **IMPORTANT** THE INSTRUCTIONS BELOW ARE ONLY FOR THOSE PATIENTS WHO HAVE BEEN PRESCRIBED SEDATION OR GENERAL ANESTHESIA DURING  THEIR MRI PROCEDURE:  IF YOUR DOCTOR PRESCRIBED ORAL SEDATION (take medicine to help you relax during your exam):   You must get the medicine from your doctor (oral medication) before you arrive. Bring the medicine to the exam. Do not take it at home. You ll be told when to take it upon arriving for your exam.   Arrive one hour early. Bring someone who can take you home after the test. Your medicine will make you sleepy. After the exam, you may not drive, take a bus or take a taxi by yourself.  IF YOUR DOCTOR PRESCRIBED IV SEDATION:   Arrive one hour early. Bring someone who can take you home after the test. Your medicine will make you sleepy. After the exam, you may not drive, take a bus or take a taxi by yourself.   No eating 6 hours before your exam. You may have clear liquids up until 4 hours before your exam. (Clear liquids include water, clear tea, black coffee and fruit juice without pulp.)  IF YOUR DOCTOR PRESCRIBED ANESTHESIA (be asleep for your exam):   Arrive 1 1/2 hours early. Bring someone who can take you home after the test. You may not drive, take a bus or take a taxi by yourself.   No eating 8 hours before your exam. You may have clear liquids up until 4 hours before your exam. (Clear liquids include water, clear tea, black coffee and fruit juice without pulp.)   You will spend four to five hours in the recovery room.  Please call the Imaging Department at your exam site with any questions.            May 22, 2018 10:35 AM CDT   (Arrive by 10:20 AM)   Return Visit with Nam Duffy MD   [facility] ([facility])    [address]  [address] [place] [address] [phone]            May 31, 2018 11:00 AM CDT   (Arrive by 10:45 AM)   Return Visit with María Jacques MD   [facility] ([facility])    [address]  [address] [place] [address] [phone]             2018 10:30 AM CDT   RETURN GENERAL with Migdalia Hussein MD   Eye Clinic ([facility])    [address]  [address] Clin [address]  [facility] [address] [phone]            2018 11:00 AM CST   (Arrive by 10:45 AM)   Return Visit with Jorge Hanks MD   [facility] ([facility])    [address]  [address]  [place] [id number] [phone]              Future tests that were ordered for you today     Open Future Orders        Priority Expected Expires Ordered    MRI Brain w/o contrast Routine  2019    Echocardiogram Routine  2019    Zio Patch 48 Hours Routine  2018    D dimer quantitative Routine 2018    Troponin I Routine 2018    CBC with platelets differential Routine 2018    Comprehensive metabolic panel Routine 2018            Who to contact     Please call your clinic at [phone] to:    Ask questions about your health    Make or cancel appointments    Discuss your medicines    Learn about your test results    Speak to your doctor            Additional Information About Your Visit        MyChart Information     [employer] is an electronic gateway that provides easy, online access to your medical records. With [employer], you can request a clinic appointment, read your test results, renew a prescription or communicate with your care team.     To sign up for [employer] visit the website at [email or web address]   You will be asked to enter the access code listed below, as well as some personal information. Please follow the directions to create your username and password.     Your access code is: [email or web address]  Expires: 2018  6:31 AM     Your access code will  in 90 days. If you need help or a new code, please contact your [facility]inic or call [phone] for assistance.        Care EveryWhere ID     This is your Care EveryWhere ID. This could be used by other organizations to access your [facility] medical records  [id number]        Your Vitals Were     Pulse Respirations Pulse Oximetry BMI (Body Mass Index)          69 18 95% 39.75 kg/m2         Blood Pressure from Last 3 Encounters:   04/25/18 115/72   03/19/18 152/81   01/31/18 165/82    Weight from Last 3 Encounters:   04/25/18 129.3 kg (285 lb)   03/19/18 131 kg (288 lb 11.2 oz)   01/20/18 130 kg (286 lb 11.2 oz)              We Performed the Following     CARDIOLOGY EVAL ADULT REFERRAL     CT Head w/o Contrast     EKG Performed in Clinic w/ Provider Reading Fee     XR Chest 2 Views          Today's Medication Changes          These changes are accurate as of 4/25/18  3:28 PM.  If you have any questions, ask your nurse or doctor.               These medicines have changed or have updated prescriptions.        Dose/Directions    losartan 50 MG tablet   Commonly known as:  COZAAR   This may have changed:  when to take this   Used for:  Benign essential hypertension        Dose:  50 mg   Take 1 tablet (50 mg) by mouth daily   Quantity:  30 tablet   Refills:  3                Primary Care Provider Office Phone # Fax #    Nam W MD Tati [phone]       [address] [address]        Equal Access to Services     SANDHYA DYE AH: Christiana Alicia, waaxda rolando, qaybta kaaltaty vick. So [facility] [phone].    ATENCIÓN: Si habla español, tiene a back disposición servicios gratuitos de asistencia lingüística. Haydee al [phone].    We comply with applicable federal civil rights laws and [place] laws. We do not discriminate on the basis of race, color, national origin, age, disability, sex, sexual orientation, or gender identity.            Thank you!     Thank you for choosing [facility]  for your care. Our goal is always to provide you with excellent care. Hearing back from our patients is one way we can continue to improve our services. Please take a few minutes to complete the written survey that you may receive in the mail after your visit with us. Thank you!             Your Updated Medication List - Protect others around you: Learn how to safely use, store and throw away your medicines at www.disposemymeds.org.          This list is accurate as of 4/25/18  3:28 PM.  Always use your most recent med list.                   Brand Name Dispense Instructions for use Diagnosis    acetaminophen 500 MG Caps      Take 2 tablets by mouth daily.        albuterol 108 (90 Base) MCG/ACT Inhaler    PROAIR HFA    1 Inhaler    Inhale 2 puffs into the lungs every 4 hours as needed for shortness of breath / dyspnea, wheezing or other (use prior to exertion/activities)    Shortness of breath       alfuzosin 10 MG 24 hr tablet    UROXATRAL    90 tablet    Take 1 tablet (10 mg) by mouth daily    Lower urinary tract symptoms (LUTS)       ammonium lactate 12 % cream    AMLACTIN     Apply  topically daily.        artificial tears Soln      Use one drop to both eye PRN.        ASPIRIN PO      Take 81 mg by mouth daily        ATORVASTATIN CALCIUM PO      Take 40 mg by mouth daily        doxycycline 100 MG capsule    VIBRAMYCIN    30 capsule    Take 1 capsule (100 mg) by mouth daily    Ulcerative blepharitis of upper and lower eyelids of both eyes       finasteride 5 MG tablet    PROSCAR    90 tablet    Take 1 tablet (5 mg) by mouth daily    Lower urinary tract symptoms (LUTS)       fluticasone 110 MCG/ACT Inhaler    FLOVENT HFA    3 Inhaler    Inhale 1 puff into the lungs 2 times daily    Mild intermittent asthma, unspecified whether complicated, Dyspnea on exertion       guaiFENesin-codeine 100-10 MG/5ML Soln solution    ROBITUSSIN AC    420 mL    Take 5-10 mLs by mouth every 4 hours as needed for  cough    Cough       isosorbide mononitrate 30 MG 24 hr tablet    IMDUR    90 tablet    Take 1 tablet (30 mg) by mouth daily    Angina, class III (H)       ketotifen 0.025 % Soln ophthalmic solution    ZADITOR/REFRESH ANTI-ITCH     Place 1 drop into both eyes 2 times daily        losartan 50 MG tablet    COZAAR    30 tablet    Take 1 tablet (50 mg) by mouth daily    Benign essential hypertension       METFORMIN HCL PO      Take by mouth 2 times daily (with meals)    Benign nodular prostatic hyperplasia without lower urinary tract symptoms, Need for prophylactic vaccination against Streptococcus pneumoniae (pneumococcus), Screening for AAA (abdominal aortic aneurysm), History of smoking       omeprazole 20 MG CR capsule    priLOSEC     Take 20 mg by mouth daily.        oxybutynin 5 MG tablet    DITROPAN    90 tablet    Take 0.5 tablets (2.5 mg) by mouth At Bedtime    Benign prostatic hyperplasia with urinary obstruction       senna-docusate 8.6-50 MG per tablet    SENOKOT-S;PERICOLACE    10 tablet    Take 1-2 tablets by mouth 2 times daily as needed for constipation Take while on oral narcotics to prevent or treat constipation.    Hypercalcemia, Primary hyperparathyroidism (H)       VITAMIN D (CHOLECALCIFEROL) PO      Take 1,000 Units by mouth every morning

## 2018-04-26 ENCOUNTER — OFFICE VISIT (OUTPATIENT)
Dept: CARDIOLOGY | Facility: CLINIC | Age: 73
End: 2018-04-26
Attending: NURSE PRACTITIONER
Payer: MEDICARE

## 2018-04-26 ENCOUNTER — RADIANT APPOINTMENT (OUTPATIENT)
Dept: CARDIOLOGY | Facility: CLINIC | Age: 73
End: 2018-04-26
Payer: MEDICARE

## 2018-04-26 ENCOUNTER — ALLIED HEALTH/NURSE VISIT (OUTPATIENT)
Dept: CARDIOLOGY | Facility: CLINIC | Age: 73
End: 2018-04-26
Attending: INTERNAL MEDICINE
Payer: MEDICARE

## 2018-04-26 VITALS
DIASTOLIC BLOOD PRESSURE: 72 MMHG | BODY MASS INDEX: 39.9 KG/M2 | WEIGHT: 285 LBS | HEART RATE: 73 BPM | SYSTOLIC BLOOD PRESSURE: 116 MMHG | HEIGHT: 71 IN | OXYGEN SATURATION: 94 %

## 2018-04-26 DIAGNOSIS — R06.02 SOB (SHORTNESS OF BREATH): ICD-10-CM

## 2018-04-26 DIAGNOSIS — R42 DIZZINESS: ICD-10-CM

## 2018-04-26 DIAGNOSIS — Z13.89 SCREENING FOR DIABETIC PERIPHERAL NEUROPATHY: ICD-10-CM

## 2018-04-26 DIAGNOSIS — R07.89 ATYPICAL CHEST PAIN: ICD-10-CM

## 2018-04-26 DIAGNOSIS — I20.9 ANGINA, CLASS III (H): Primary | ICD-10-CM

## 2018-04-26 DIAGNOSIS — R73.09 INCREASED GLUCOSE LEVEL: Primary | ICD-10-CM

## 2018-04-26 LAB
HBA1C MFR BLD: 6.4 % (ref 0–6.4)
INTERPRETATION ECG - MUSE: NORMAL

## 2018-04-26 PROCEDURE — G0463 HOSPITAL OUTPT CLINIC VISIT: HCPCS | Mod: ZF

## 2018-04-26 PROCEDURE — 99215 OFFICE O/P EST HI 40 MIN: CPT | Mod: ZP | Performed by: NURSE PRACTITIONER

## 2018-04-26 PROCEDURE — 93225 XTRNL ECG REC<48 HRS REC: CPT | Mod: ZF

## 2018-04-26 ASSESSMENT — PAIN SCALES - GENERAL: PAINLEVEL: MILD PAIN (2)

## 2018-04-26 NOTE — PROGRESS NOTES
"HPI: Mr. Velasco is a 72 year old gentleman w/ a history of HTN, DM II, HLD, and Family History of CAD. Per patient reports he had a heart attack in  and a stroke in  - this is not documented in patient chart. He states he had slow recovery from the stroke and states that the left arm would intermittently freeze up after this event. He has a long standing history of chest discomfort and ZAMARRIPA dating back to . He has had numerous non-invasive tests over the past decade which have been unremarkable. The most recent echocardiogram is from 2017 which showed normal LV function and an LVEF 60-65 % w/ trileaflet aortic sclerosis w/o stenosis. He had a coronary CTA from 2017 which showed minimal non-obstructive CAD as well as a exercise nuclear study 2018. The patient also [facility] and that  he \"almost  from the procedure because a medical student did the procedure and he almost bled to death.\" He also reports that there were no obstructive disease reported but he states he was told that the arteries are not functioning properly. Upon further probing it is difficult whether to ascertain whether the [facility] or how the angiogram came about.     Today the patient presents stating he feels \"terrible\" w/ chest pain. He feels that he is going to die and remains desperate to figure out what is going on with him. He states that the chest pain can occur w/ exertion or rest and that it is located to the left upper chest and is non-radiating w/ some associated dizziness and SOB and typically lasts for about 30 minutes which resolves w/ rest or Nitroglycerin. He has been taking approximately 3 Nitroglycerin pills per day in addition to his prescribed Imdur. He also continues to c/o persistent SOB that has remained unchanged. He denies any orthopnea or PND. He admits that it has been recommended that he get a sleep " "study but he has not done this nor does he use a CPAP or O2. He denies any palpitations. He does have some LE edema L > R. He denies any significant recent weight fluctuations. He does states that exercises one day per week for about \"6 hours\" where he does several different activities such as walking, ellipitcal, and other machines w/ rests in between. He denies any CP while exercising. He has not taken any nitroglycerin while exercising or felt the need to. The patient remains very clearly concerned about his heart despite the multitude of tests that have been run on him. He states that he is compliant w/ medications. He does not check BP at home. He presents to the visit today unaccompanied.     PAST MEDICAL HISTORY:  Past Medical History:   Diagnosis Date     Diabetes mellitus type II 2005     History of agent Orange exposure 4/17/2013     Hypertension      Myocardial infarct 01/01/1999     Primary hyperparathyroidism (H) Dx approx 2003     Stroke (H) 01/01/1998    recovered fully       CURRENT MEDICATIONS:  Current Outpatient Prescriptions   Medication Sig Dispense Refill     acetaminophen 500 MG CAPS Take 2 tablets by mouth daily.       albuterol (PROAIR HFA) 108 (90 BASE) MCG/ACT Inhaler Inhale 2 puffs into the lungs every 4 hours as needed for shortness of breath / dyspnea, wheezing or other (use prior to exertion/activities) 1 Inhaler 3     alfuzosin (UROXATRAL) 10 MG 24 hr tablet Take 1 tablet (10 mg) by mouth daily 90 tablet 3     ammonium lactate (AMLACTIN) 12 % cream Apply  topically daily.       artificial tears SOLN Use one drop to both eye PRN.       ASPIRIN PO Take 81 mg by mouth daily       ATORVASTATIN CALCIUM PO Take 40 mg by mouth daily       doxycycline (VIBRAMYCIN) 100 MG capsule Take 1 capsule (100 mg) by mouth daily 30 capsule 11     finasteride (PROSCAR) 5 MG tablet Take 1 tablet (5 mg) by mouth daily 90 tablet 3     fluticasone (FLOVENT HFA) 110 MCG/ACT Inhaler Inhale 1 puff into the lungs " 2 times daily 3 Inhaler 1     guaiFENesin-codeine (ROBITUSSIN AC) 100-10 MG/5ML SOLN solution Take 5-10 mLs by mouth every 4 hours as needed for cough 420 mL 0     isosorbide mononitrate (IMDUR) 30 MG 24 hr tablet Take 1 tablet (30 mg) by mouth daily 90 tablet 3     ketotifen (ZADITOR/REFRESH ANTI-ITCH) 0.025 % SOLN ophthalmic solution Place 1 drop into both eyes 2 times daily       losartan (COZAAR) 50 MG tablet Take 1 tablet (50 mg) by mouth daily (Patient taking differently: Take 50 mg by mouth every morning ) 30 tablet 3     METFORMIN HCL PO Take by mouth 2 times daily (with meals)       omeprazole (PRILOSEC) 20 MG capsule Take 20 mg by mouth daily.       oxybutynin (DITROPAN) 5 MG tablet Take 0.5 tablets (2.5 mg) by mouth At Bedtime 90 tablet 3     senna-docusate (SENOKOT-S;PERICOLACE) 8.6-50 MG per tablet Take 1-2 tablets by mouth 2 times daily as needed for constipation Take while on oral narcotics to prevent or treat constipation. 10 tablet 0     VITAMIN D, CHOLECALCIFEROL, PO Take 1,000 Units by mouth every morning          PAST SURGICAL HISTORY:  Past Surgical History:   Procedure Laterality Date     BIOPSY OF SKIN LESION       BYPASS GRAFT ARTERY CORONARY       CATARACT IOL, RT/LT Bilateral 2017    [facility]     EXCISE MASS LOWER EXTREMITY Left 11/3/2017    Procedure: EXCISE MASS LOWER EXTREMITY;  Excision Mass Left Flank;  Surgeon: Marybeth Gambino MD;  Location:  OR     MOHS MICROGRAPHIC PROCEDURE       PARATHYROIDECTOMY N/A 3/21/2017    Procedure: PARATHYROIDECTOMY;  Surgeon: Julianna Short MD;  Location: [facility] OR     PARATHYROIDECTOMY         ALLERGIES     Allergies   Allergen Reactions     Clindamycin      Eggs Other (See Comments)     Pt states no longer having reaction, childhood allergy, eats eggs       Penicillins Other (See Comments)     Pt states PCN allergy is due to egg allergy     Propoxyphene Other (See Comments)     Pain       FAMILY HISTORY:  Family History   Problem  "Relation Age of Onset     Melanoma Mother      Melanoma Father      CANCER No family hx of      no skin cancer     Glaucoma No family hx of      Macular Degeneration No family hx of        SOCIAL HISTORY:  Social History     Social History     Marital status:      Spouse name: N/A     Number of children: N/A     Years of education: N/A     Social History Main Topics     Smoking status: Former Smoker     Quit date: 1970     Smokeless tobacco: Former User     Alcohol use Yes      Comment: rarely     Drug use: No     Sexual activity: Not Asked     Other Topics Concern     None     Social History Narrative       ROS:   Constitutional: No fever, chills, or sweats. No weight gain/loss.   ENT: No visual disturbance, ear ache, epistaxis, sore throat. Sore throat starting this am.   Allergies/Immunologic: Negative.   Respiratory: No cough, wheezing, or hemoptysia.  Cardiovascular: As per HPI.  GI: No nausea, vomiting, hematemesis, melena, or hematochezia.  : No urinary frequency, dysuria, or hematuria.   Integument: Negative.  Psychiatric: Negative.  Neuro: Negative.  Endocrinology: Negative.   Musculoskeletal: Negative.    EXAM:  /72 (BP Location: Right arm, Patient Position: Chair, Cuff Size: Adult Large)  Pulse 73  Ht 1.803 m (5' 11\")  Wt 129.3 kg (285 lb)  SpO2 94%  BMI 39.75 kg/m2  In general, the patient is a pleasant male in no apparent distress.    HEENT: NC/AT.  PERRLA.  EOMI.  Sclerae white, not injected.  Nares clear.  Pharynx without erythema or exudate.  Dentition intact.    Neck: No adenopathy.  No thyromegaly. Carotids +4/4 bilaterally without bruits.  No jugular venous distension.   Heart: RRR w/ 2/6 soft systolic murmur noted t/o. Normal S1, S2 splits physiologically. No rub, click, or gallop. The PMI is in the 5th ICS in the midclavicular line. There is no heave.    Lungs: CTA.  No ronchi, wheezes, rales.  No dullness to percussion.   Abdomen: Soft, obese, nontender, nondistended. No " organomegaly.  No bruits.   Extremities: No clubbing, cyanosis, w/ +1-2 LE edema L > R.  The pulses are +4/4 at the radial, brachial, femoral, popliteal, DP, and PT sites bilaterally.  No bruits are noted.  Neurologic: Alert and oriented to person/place/time, normal speech, gait and affect.  Skin: No petechiae, purpura or rash.    Labs:  LIPID RESULTS:  Lab Results   Component Value Date    CHOL 216 (H) 12/26/2016    HDL 49 12/26/2016     (H) 12/26/2016    TRIG 137 12/26/2016    CHOLHDLRATIO 5.2 (H) 08/14/2009    NHDL 167 (H) 12/26/2016       LIVER ENZYME RESULTS:  Lab Results   Component Value Date    AST 24 04/25/2018    ALT 39 04/25/2018       CBC RESULTS:  Lab Results   Component Value Date    WBC 5.6 04/25/2018    RBC 4.40 04/25/2018    HGB 12.2 (L) 04/25/2018    HCT 38.0 (L) 04/25/2018    MCV 86 04/25/2018    MCH 27.7 04/25/2018    MCHC 32.1 04/25/2018    RDW 14.2 04/25/2018     04/25/2018       BMP RESULTS:  Lab Results   Component Value Date     04/25/2018    POTASSIUM 4.4 04/25/2018    CHLORIDE 108 04/25/2018    CO2 26 04/25/2018    ANIONGAP 6 04/25/2018     (H) 04/25/2018    BUN 15 04/25/2018    CR 1.05 04/25/2018    GFRESTIMATED 69 04/25/2018    GFRESTBLACK 84 04/25/2018    TRINA 8.8 04/25/2018        A1C RESULTS:  Lab Results   Component Value Date    A1C 6.3 (H) 11/30/2017       INR RESULTS:  Lab Results   Component Value Date    INR 1.07 03/30/2011    INR 1.01 02/24/2009       Procedures:  Coronary Angiogram: [facility] - no records available at this time. They have been requested.    Echocardiogram 4/26/2018: Pending. Scheduled for later today.    Ziopatch Monitor 4/26/2018: Pending. Scheduled for placement later today.    Assessment and Plan:   1. Chest discomfort.  Mr. Velasco is a middle aged gentleman with multiple risk factors and a long-standing history of chest discomfort. The patient has had numerous studies including a recent [facility]  [facility] for which I do not have the results of. His LV function is normal on ECHO and stress nuclear study - another Echocardiogram is pending for later today. He had a coronary CTA last year that showed minimal plaque. He was seen in clinic by PCP yesterday w/ normal Troponin and EKG. I agree with Dr. Hanks in that it is unlikely that the patient has significant epicardial disease based on the coronary CTA w/ recent angiogram as well. The patient and I had a lengthy discussion and it is difficult to know or explain the findings from the [facility] when I do not have any access to these results. I did, again, reinforce initiating a beta blocker but the patient is unsure if he wants to start this or if he is already taking this (not on his current medication list per our records). We also discussed the possibility of increasing dose of Imdur to alleviate the need for taking additional Nitroglycerin but the patient reports \"I love Nitro\" and is uninterested in doing this at this time.   - Continue w/ current medications as above.  - Follow-up with Dr. Hanks as previously arranged or pending results of echo/zio.  - Patient will call to discuss initiation of BB or increasing Imdur if interested.      2. Lipid profile.  The patient was previously on a statin. The last LDL in 2017 was 140. The goal LDL should be 70 mg/dl given his diabetic state.   - Continue Statin therapy.  - Recheck fasting cholesterol at time of next visit.     3. Hypertension.  Well controlled in clinic today. BP elevated in PCP office yesterday w/ increased dose of Losartan. Would recommend addition of BB. He may follow up with his primary care provider for ongoing evaluation of his BP. Given the diabetes, I would like to see BPs in the range of 110-120/70-80.      4. Type II Diabetes.  Continue Metformin.     FOLLOW UP: Dr. Hanks as previously arranged or as needed.    60 minutes spent in direct care, >50% in counseling.      ORION Mayers, " CNP  [facility]  Interventional/Structural Cardiology-CSI Service          CC  Patient Care Team:  Tez Hernandez MD as PCP - General (Internal Medicine)  Colton Pulido MD as MD (Family Medicine - Sports Medicine)  Mario Alberto Baptiste MD as MD (Orthopedics)  Estephania Rivas, RN as Registered Nurse  Sofiya Contreras PA-C as Physician Assistant (Physician Assistant)  Marc Chapa MD as MD (Urology)  Jorge Hanks MD as MD (Internal Medicine - Interventional Cardiology)  Amita Brown APRN CNP as Nurse Practitioner (Nurse Practitioner - Adult Health)  TEZ HERNANDEZ

## 2018-04-26 NOTE — LETTER
"2018      RE: Lux Velasco  [address]   [facility] [id number]       Dear Colleague,    Thank you for the opportunity to participate in the care of your patient, Lux Velasco, at the [facility] at [facility]. Please see a copy of my visit note below.    HPI: Mr. Velasco is a 72 year old gentleman w/ a history of HTN, DM II, HLD, and Family History of CAD. Per patient reports he had a heart attack in  and a stroke in  - this is not documented in patient chart. He states he had slow recovery from the stroke and states that the left arm would intermittently freeze up after this event. He has a long standing history of chest discomfort and ZAMARRIPA dating back to . He has had numerous non-invasive tests over the past decade which have been unremarkable. The most recent echocardiogram is from 2017 which showed normal LV function and an LVEF 60-65 % w/ trileaflet aortic sclerosis w/o stenosis. He had a coronary CTA from 2017 which showed minimal non-obstructive CAD as well as a exercise nuclear study 2018. The [facility] also [facility] and that  he \"almost  from the procedure because a medical student did the procedure and he almost bled to death.\" He also reports that there were no obstructive disease reported but he states he was told that the arteries are not functioning properly. Upon further probing it is difficult whether to ascertain whether the [facility] or how the angiogram came about.     Today the patient presents stating he feels \"terrible\" w/ chest pain. He feels that he is going to die and remains desperate to figure out what is going on with him. He states that the chest pain can occur w/ exertion or rest and that it is located to the left upper chest and is non-radiating w/ some associated dizziness and SOB and typically " "lasts for about 30 minutes which resolves w/ rest or Nitroglycerin. He has been taking approximately 3 Nitroglycerin pills per day in addition to his prescribed Imdur. He also continues to c/o persistent SOB that has remained unchanged. He denies any orthopnea or PND. He admits that it has been recommended that he get a sleep study but he has not done this nor does he use a CPAP or O2. He denies any palpitations. He does have some LE edema L > R. He denies any significant recent weight fluctuations. He does states that exercises one day per week for about \"6 hours\" where he does several different activities such as walking, ellipitcal, and other machines w/ rests in between. He denies any CP while exercising. He has not taken any nitroglycerin while exercising or felt the need to. The patient remains very clearly concerned about his heart despite the multitude of tests that have been run on him. He states that he is compliant w/ medications. He does not check BP at home. He presents to the visit today unaccompanied.     PAST MEDICAL HISTORY:  Past Medical History:   Diagnosis Date     Diabetes mellitus type II 2005     History of agent Orange exposure 4/17/2013     Hypertension      Myocardial infarct 01/01/1999     Primary hyperparathyroidism (H) Dx approx 2003     Stroke (H) 01/01/1998    recovered fully       CURRENT MEDICATIONS:  Current Outpatient Prescriptions   Medication Sig Dispense Refill     acetaminophen 500 MG CAPS Take 2 tablets by mouth daily.       albuterol (PROAIR HFA) 108 (90 BASE) MCG/ACT Inhaler Inhale 2 puffs into the lungs every 4 hours as needed for shortness of breath / dyspnea, wheezing or other (use prior to exertion/activities) 1 Inhaler 3     alfuzosin (UROXATRAL) 10 MG 24 hr tablet Take 1 tablet (10 mg) by mouth daily 90 tablet 3     ammonium lactate (AMLACTIN) 12 % cream Apply  topically daily.       artificial tears SOLN Use one drop to both eye PRN.       ASPIRIN PO Take 81 mg by " mouth daily       ATORVASTATIN CALCIUM PO Take 40 mg by mouth daily       doxycycline (VIBRAMYCIN) 100 MG capsule Take 1 capsule (100 mg) by mouth daily 30 capsule 11     finasteride (PROSCAR) 5 MG tablet Take 1 tablet (5 mg) by mouth daily 90 tablet 3     fluticasone (FLOVENT HFA) 110 MCG/ACT Inhaler Inhale 1 puff into the lungs 2 times daily 3 Inhaler 1     guaiFENesin-codeine (ROBITUSSIN AC) 100-10 MG/5ML SOLN solution Take 5-10 mLs by mouth every 4 hours as needed for cough 420 mL 0     isosorbide mononitrate (IMDUR) 30 MG 24 hr tablet Take 1 tablet (30 mg) by mouth daily 90 tablet 3     ketotifen (ZADITOR/REFRESH ANTI-ITCH) 0.025 % SOLN ophthalmic solution Place 1 drop into both eyes 2 times daily       losartan (COZAAR) 50 MG tablet Take 1 tablet (50 mg) by mouth daily (Patient taking differently: Take 50 mg by mouth every morning ) 30 tablet 3     METFORMIN HCL PO Take by mouth 2 times daily (with meals)       omeprazole (PRILOSEC) 20 MG capsule Take 20 mg by mouth daily.       oxybutynin (DITROPAN) 5 MG tablet Take 0.5 tablets (2.5 mg) by mouth At Bedtime 90 tablet 3     senna-docusate (SENOKOT-S;PERICOLACE) 8.6-50 MG per tablet Take 1-2 tablets by mouth 2 times daily as needed for constipation Take while on oral narcotics to prevent or treat constipation. 10 tablet 0     VITAMIN D, CHOLECALCIFEROL, PO Take 1,000 Units by mouth every morning          PAST SURGICAL HISTORY:  Past Surgical History:   Procedure Laterality Date     BIOPSY OF SKIN LESION       BYPASS GRAFT ARTERY CORONARY       CATARACT IOL, RT/LT Bilateral 2017    [facility]     EXCISE MASS LOWER EXTREMITY Left 11/3/2017    Procedure: EXCISE MASS LOWER EXTREMITY;  Excision Mass Left Flank;  Surgeon: Marybeth Gambino MD;  Location:  OR     MOHS MICROGRAPHIC PROCEDURE       PARATHYROIDECTOMY N/A 3/21/2017    Procedure: PARATHYROIDECTOMY;  Surgeon: Julianna Short MD;  Location: [facility] OR     PARATHYROIDECTOMY         ALLERGIES    "  Allergies   Allergen Reactions     Clindamycin      Eggs Other (See Comments)     Pt states no longer having reaction, childhood allergy, eats eggs       Penicillins Other (See Comments)     Pt states PCN allergy is due to egg allergy     Propoxyphene Other (See Comments)     Pain       FAMILY HISTORY:  Family History   Problem Relation Age of Onset     Melanoma Mother      Melanoma Father      CANCER No family hx of      no skin cancer     Glaucoma No family hx of      Macular Degeneration No family hx of        SOCIAL HISTORY:  Social History     Social History     Marital status:      Spouse name: N/A     Number of children: N/A     Years of education: N/A     Social History Main Topics     Smoking status: Former Smoker     Quit date: 1970     Smokeless tobacco: Former User     Alcohol use Yes      Comment: rarely     Drug use: No     Sexual activity: Not Asked     Other Topics Concern     None     Social History Narrative       ROS:   Constitutional: No fever, chills, or sweats. No weight gain/loss.   ENT: No visual disturbance, ear ache, epistaxis, sore throat. Sore throat starting this am.   Allergies/Immunologic: Negative.   Respiratory: No cough, wheezing, or hemoptysia.  Cardiovascular: As per HPI.  GI: No nausea, vomiting, hematemesis, melena, or hematochezia.  : No urinary frequency, dysuria, or hematuria.   Integument: Negative.  Psychiatric: Negative.  Neuro: Negative.  Endocrinology: Negative.   Musculoskeletal: Negative.    EXAM:  /72 (BP Location: Right arm, Patient Position: Chair, Cuff Size: Adult Large)  Pulse 73  Ht 1.803 m (5' 11\")  Wt 129.3 kg (285 lb)  SpO2 94%  BMI 39.75 kg/m2  In general, the patient is a pleasant male in no apparent distress.    HEENT: NC/AT.  PERRLA.  EOMI.  Sclerae white, not injected.  Nares clear.  Pharynx without erythema or exudate.  Dentition intact.    Neck: No adenopathy.  No thyromegaly. Carotids +4/4 bilaterally without bruits.  No jugular " venous distension.   Heart: RRR w/ 2/6 soft systolic murmur noted t/o. Normal S1, S2 splits physiologically. No rub, click, or gallop. The PMI is in the 5th ICS in the midclavicular line. There is no heave.    Lungs: CTA.  No ronchi, wheezes, rales.  No dullness to percussion.   Abdomen: Soft, obese, nontender, nondistended. No organomegaly.  No bruits.   Extremities: No clubbing, cyanosis, w/ +1-2 LE edema L > R.  The pulses are +4/4 at the radial, brachial, femoral, popliteal, DP, and PT sites bilaterally.  No bruits are noted.  Neurologic: Alert and oriented to person/place/time, normal speech, gait and affect.  Skin: No petechiae, purpura or rash.    Labs:  LIPID RESULTS:  Lab Results   Component Value Date    CHOL 216 (H) 12/26/2016    HDL 49 12/26/2016     (H) 12/26/2016    TRIG 137 12/26/2016    CHOLHDLRATIO 5.2 (H) 08/14/2009    NHDL 167 (H) 12/26/2016       LIVER ENZYME RESULTS:  Lab Results   Component Value Date    AST 24 04/25/2018    ALT 39 04/25/2018       CBC RESULTS:  Lab Results   Component Value Date    WBC 5.6 04/25/2018    RBC 4.40 04/25/2018    HGB 12.2 (L) 04/25/2018    HCT 38.0 (L) 04/25/2018    MCV 86 04/25/2018    MCH 27.7 04/25/2018    MCHC 32.1 04/25/2018    RDW 14.2 04/25/2018     04/25/2018       BMP RESULTS:  Lab Results   Component Value Date     04/25/2018    POTASSIUM 4.4 04/25/2018    CHLORIDE 108 04/25/2018    CO2 26 04/25/2018    ANIONGAP 6 04/25/2018     (H) 04/25/2018    BUN 15 04/25/2018    CR 1.05 04/25/2018    GFRESTIMATED 69 04/25/2018    GFRESTBLACK 84 04/25/2018    TRINA 8.8 04/25/2018        A1C RESULTS:  Lab Results   Component Value Date    A1C 6.3 (H) 11/30/2017       INR RESULTS:  Lab Results   Component Value Date    INR 1.07 03/30/2011    INR 1.01 02/24/2009       Procedures:  Coronary Angiogram: [facility] - no records available at this time. They have been requested.    Echocardiogram 4/26/2018: Pending.  "Scheduled for later today.    Ziopatch Monitor 4/26/2018: Pending. Scheduled for placement later today.    Assessment and Plan:   1. Chest discomfort.  Mr. Velasco is a middle aged gentleman with multiple risk factors and a long-standing history of chest discomfort. The patient has had numerous studies including a [facility] for which I do not have the results of. His LV function is normal on ECHO and stress nuclear study - another Echocardiogram is pending for later today. He had a coronary CTA last year that showed minimal plaque. He was seen in clinic by PCP yesterday w/ normal Troponin and EKG. I agree with Dr. Hanks in that it is unlikely that the patient has significant epicardial disease based on the coronary CTA w/ recent angiogram as well. The patient and I had a lengthy discussion and it is difficult to know or explain the findings from the [facility] when I do not have any access to these results. I did, again, reinforce initiating a beta blocker but the patient is unsure if he wants to start this or if he is already taking this (not on his current medication list per our records). We also discussed the possibility of increasing dose of Imdur to alleviate the need for taking additional Nitroglycerin but the patient reports \"I love Nitro\" and is uninterested in doing this at this time.   - Continue w/ current medications as above.  - Follow-up with Dr. Hanks as previously arranged or pending results of echo/zio.  - Patient will call to discuss initiation of BB or increasing Imdur if interested.      2. Lipid profile.  The patient was previously on a statin. The last LDL in 2017 was 140. The goal LDL should be 70 mg/dl given his diabetic state.   - Continue Statin therapy.  - Recheck fasting cholesterol at time of next visit.     3. Hypertension.   Well controlled in clinic today. BP elevated in PCP office yesterday w/ increased dose of Losartan. Would recommend addition of BB. He may follow up " with his primary care provider for ongoing evaluation of his BP. Given the diabetes, I would like to see BPs in the range of 110-120/70-80.      4. Type II Diabetes.  Continue Metformin.     FOLLOW UP: Dr. Hanks as previously arranged or as needed.    60 minutes spent in direct care, >50% in counseling.      ORION Mayers, CNP  [facility]  Interventional/Structural Cardiology-CSI Service          CC  Patient Care Team:  Nam Duffy MD as PCP - General (Internal Medicine)  Colton Pulido MD as MD (Family Medicine - Sports Medicine)  Mario Alberto Baptiste MD as MD (Orthopedics)  Estephania Rivas RN as Registered Nurse  Sofiya Contreras PA-C as Physician Assistant (Physician Assistant)  Marc Chapa MD as MD (Urology)  Jorge Hanks MD as MD (Internal Medicine - Interventional Cardiology)

## 2018-04-26 NOTE — PATIENT INSTRUCTIONS
Patient Instructions:    It was a pleasure to see you in the cardiology clinic today.    If you have any questions you can reach our nurse triage line at [phone].  Press Option #1 for the [facility], then press Option #3 for nursing or Option #1 for scheduling. We also encourage the use of [employer] to communicate with your HealthCare Provider    Note new medications: I would consider starting Metoprolol 12.5 mg twice daily. Please call if you will be willing to start this medication. We can also discuss increasing your Imdur dose to 60 mg once daily to see if this helps to relieve any chest pain.    Stop the following medications: None.    The results from today include: Your recent Troponin level was normal. Your EKG from Dr. Duffy yesterday was also within normal limits. You are scheduled for an echocardiogram and Ziopatch monitor to be placed later today. Results per Dr. Duffy.    Please follow up with [facility] pending results of the Echocardiogram and Ziopatch monitor as well as results from recent coronary angiogram done at the [facility].    Control your risk of coronary artery disease with these four lifestyle changes:  - Eating a heart healthy diet by following the American Heart Association Recommendations: Reduce saturated fat and trans fat to 5-6 percent of daily calories and minimizing the amount of trans fat you eat by limiting your intake of red meat and dairy products made with whole milk. It also means choosing skim milk, low-fat or fat-free dairy products, limiting fried food, and cooking with healthy oils such as vegetable oil. A healthy diet should include emphasis on fruits, vegetables, whole grains, poultry, fish and nuts, and limiting sugary foods and beverages. We recommend following the DASH (Dietary Approaches to Stop Hypertension) or Mediterranean Diet.  - Regular Exercise: Just 40 minutes of aerobic exercise of moderate to vigorous intensity done  3-4 times per week is enough to lower both cholesterol and high blood pressure. Brisk walking, swimming, bicycling or a dance class are examples.  - Avoiding Tobacco Smoking: Smoking compounds the risk from other risk factors for heart disease including high cholesterol, high blood pressure, and diabetes. Smokers can lower cholesterol, blood pressure, and protect their arteries by quitting. Ask to learn more about quitting smoking.  - Losing Weight: Being overweight or obese raises your risk of high cholesterol, high blood pressure, and diabetes which are all risk factors for heart disease. Losing excess weight can improve cholesterol levels, blood pressure, and reduce incidence of diabetes and potentially reverse these disease processes.     Get help if you experience any of these heart attack warning signs: Although some heart attacks are sudden and intense, most start slowly, with mild pain or discomfort. Pay attention to your body -- and call 911 if you feel:  - Chest discomfort: Most heart attacks involve discomfort in the center of the chest that lasts more than a few minutes, or that goes away and comes back. It can feel like uncomfortable pressure, squeezing, fullness or pain.  - Discomfort in other areas of the upper body: Symptoms can include pain or discomfort in one or both arms, the back, neck, jaw or stomach.   - Shortness of breath with or without chest discomfort   - Other signs may include breaking out in a cold sweat, nausea or lightheadedness      Sincerely,    GREGORY Bolden-BC

## 2018-04-26 NOTE — MR AVS SNAPSHOT
After Visit Summary   4/26/2018    Lux Velasco    MRN: [id number]           Patient Information     Date Of Birth          1945        Visit Information        Provider Department      4/26/2018 11:45 AM Amita Brown, APRN CNP [facility]        Today's Diagnoses     Atypical chest pain        Screening for diabetic peripheral neuropathy        Dizziness        SOB (shortness of breath)          Care Instructions    Patient Instructions:    It was a pleasure to see you in the cardiology clinic today.    If you have any questions you can reach our nurse triage line at [phone].  Press Option #1 for the [facility], then press Option #3 for nursing or Option #1 for scheduling. We also encourage the use of [employer] to communicate with your HealthCare Provider    Note new medications: I would consider starting Metoprolol 12.5 mg twice daily. Please call if you will be willing to start this medication. We can also discuss increasing your Imdur dose to 60 mg once daily to see if this helps to relieve any chest pain.    Stop the following medications: None.    The results from today include: Your recent Troponin level was normal. Your EKG from Dr. Duffy yesterday was also within normal limits. You are scheduled for an echocardiogram and Ziopatch monitor to be placed later today. Results per Dr. Duffy.    Please follow up with [facility] pending results of the Echocardiogram and Ziopatch monitor as well as results from recent coronary angiogram done at the [facility].    Control your risk of coronary artery disease with these four lifestyle changes:  - Eating a heart healthy diet by following the American Heart Association Recommendations: Reduce saturated fat and trans fat to 5-6 percent of daily calories and minimizing the amount of trans fat you eat by limiting your intake of red meat and dairy products made with whole milk. It also means choosing skim  milk, low-fat or fat-free dairy products, limiting fried food, and cooking with healthy oils such as vegetable oil. A healthy diet should include emphasis on fruits, vegetables, whole grains, poultry, fish and nuts, and limiting sugary foods and beverages. We recommend following the DASH (Dietary Approaches to Stop Hypertension) or Mediterranean Diet.  - Regular Exercise: Just 40 minutes of aerobic exercise of moderate to vigorous intensity done 3-4 times per week is enough to lower both cholesterol and high blood pressure. Brisk walking, swimming, bicycling or a dance class are examples.  - Avoiding Tobacco Smoking: Smoking compounds the risk from other risk factors for heart disease including high cholesterol, high blood pressure, and diabetes. Smokers can lower cholesterol, blood pressure, and protect their arteries by quitting. Ask to learn more about quitting smoking.  - Losing Weight: Being overweight or obese raises your risk of high cholesterol, high blood pressure, and diabetes which are all risk factors for heart disease. Losing excess weight can improve cholesterol levels, blood pressure, and reduce incidence of diabetes and potentially reverse these disease processes.     Get help if you experience any of these heart attack warning signs: Although some heart attacks are sudden and intense, most start slowly, with mild pain or discomfort. Pay attention to your body -- and call 911 if you feel:  - Chest discomfort: Most heart attacks involve discomfort in the center of the chest that lasts more than a few minutes, or that goes away and comes back. It can feel like uncomfortable pressure, squeezing, fullness or pain.  - Discomfort in other areas of the upper body: Symptoms can include pain or discomfort in one or both arms, the back, neck, jaw or stomach.   - Shortness of breath with or without chest discomfort   - Other signs may include breaking out in a cold sweat, nausea or  lightheadedness      Sincerely,    Amita SEARS, [facility]                  Follow-ups after your visit        Your next 10 appointments already scheduled     Apr 26, 2018  2:00 PM CDT   (Arrive by 1:45 PM)   HOLTER MONITOR VISIT with  Cvc Monitor Tech, TH    [facility])    [address]  [address]  [facility] [id number] [phone]            Apr 26, 2018  3:00 PM CDT   Ech Complete with ECHCR2    [facility] ([facility])    [address]  3rd Floor  [facility] [id number] [phone]           1. Please bring or wear a comfortable two-piece outfit. 2. You may eat, drink and take your normal medicines. 3. For any questions that cannot be answered, please contact the ordering physician            May 07, 2018  3:00 PM CDT   (Arrive by 2:45 PM)   MR BRAIN W/O CONTRAST with UUMR1   [facility] [place] ([facility], [facility])    [address] [id number] [phone]           Take your medicines as usual, unless your doctor tells you not to. Bring a list of your current medicines to your exam (including vitamins, minerals and over-the-counter drugs). Also bring the results of similar scans you may have had.  Please remove any body piercings and hair extensions before you arrive.  Follow your doctor s orders. If you do not, we may have to postpone your exam.  You may or may not receive IV contrast for this exam pending the discretion of the Radiologist.  You do not need to do anything special to prepare.  The MRI machine uses a strong magnet. Please wear clothes without metal (snaps, zippers). A sweatsuit works well, or we may give you a hospital gown.   **IMPORTANT** THE INSTRUCTIONS BELOW ARE ONLY FOR THOSE PATIENTS WHO HAVE BEEN PRESCRIBED SEDATION OR GENERAL ANESTHESIA DURING THEIR MRI PROCEDURE:  IF YOUR DOCTOR PRESCRIBED ORAL SEDATION (take medicine  to help you relax during your exam):   You must get the medicine from your doctor (oral medication) before you arrive. Bring the medicine to the exam. Do not take it at home. You ll be told when to take it upon arriving for your exam.   Arrive one hour early. Bring someone who can take you home after the test. Your medicine will make you sleepy. After the exam, you may not drive, take a bus or take a taxi by yourself.  IF YOUR DOCTOR PRESCRIBED IV SEDATION:   Arrive one hour early. Bring someone who can take you home after the test. Your medicine will make you sleepy. After the exam, you may not drive, take a bus or take a taxi by yourself.   No eating 6 hours before your exam. You may have clear liquids up until 4 hours before your exam. (Clear liquids include water, clear tea, black coffee and fruit juice without pulp.)  IF YOUR DOCTOR PRESCRIBED ANESTHESIA (be asleep for your exam):   Arrive 1 1/2 hours early. Bring someone who can take you home after the test. You may not drive, take a bus or take a taxi by yourself.   No eating 8 hours before your exam. You may have clear liquids up until 4 hours before your exam. (Clear liquids include water, clear tea, black coffee and fruit juice without pulp.)   You will spend four to five hours in the recovery room.  Please call the Imaging Department at your exam site with any questions.            May 22, 2018 10:35 AM CDT   (Arrive by 10:20 AM)   Return Visit with Nam Duffy MD   [facility] ([facility])    [address] [place] [id number] [phone]            May 31, 2018 11:00 AM CDT   (Arrive by 10:45 AM)   Return Visit with María Jacques MD   [facility] ([facility])    [address] [place] [id number]   [phone]            Jul 16, 2018 10:30 AM CDT   RETURN GENERAL with Migdalia Hussein MD   [facility]  "Clinic ([facility])    [address]  [address]  [facility] [address]   [phone]            Nov 27, 2018 11:00 AM CST   (Arrive by 10:45 AM)   Return Visit with Jorge Hanks MD   [facility] ([facility])    [address]  [address]  [facility] [address] [phone]              Future tests that were ordered for you today     Open Future Orders        Priority Expected Expires Ordered    MRI Brain w/o contrast Routine  4/25/2019 4/25/2018    Echocardiogram Routine  4/25/2019 4/25/2018            Who to contact     If you have questions or need follow up information about today's clinic visit or your schedule please contact [facility] directly at [phone].  Normal or non-critical lab and imaging results will be communicated to you by [employer], letter or phone within 4 business days after the clinic has received the results. If you do not hear from us within 7 days, please contact the clinic through [employer] or phone. If you have a critical or abnormal lab result, we will notify you by phone as soon as possible.  Submit refill requests through mention or call your pharmacy and they will forward the refill request to us. Please allow 3 business days for your refill to be completed.          Additional Information About Your Visit        mention Information     mention lets you send messages to your doctor, view your test results, renew your prescriptions, schedule appointments and more. To sign up, go to [email or web address] . Click on \"Log in\" on the left side of the screen, which will take you to the Welcome page. Then click on \"Sign up Now\" on the right side of the page.     You will be asked to enter the access code listed below, as well as some personal information. Please follow the directions to create your username and password.     Your access code is: [email or web address]  Expires: 6/26/2018  " "6:31 AM     Your access code will  in 90 days. If you need help or a new code, please call your [facility] clinic or [phone].        Care EveryWhere ID     This is your Care EveryWhere ID. This could be used by other organizations to access your [facility] medical records  [id number]        Your Vitals Were     Pulse Height Pulse Oximetry BMI (Body Mass Index)          73 1.803 m (5' 11\") 94% 39.75 kg/m2         Blood Pressure from Last 3 Encounters:   18 116/72   18 115/72   18 152/81    Weight from Last 3 Encounters:   18 129.3 kg (285 lb)   18 129.3 kg (285 lb)   18 131 kg (288 lb 11.2 oz)              Today, you had the following     No orders found for display         Today's Medication Changes          These changes are accurate as of 18 12:42 PM.  If you have any questions, ask your nurse or doctor.               These medicines have changed or have updated prescriptions.        Dose/Directions    losartan 50 MG tablet   Commonly known as:  COZAAR   This may have changed:  when to take this   Used for:  Benign essential hypertension        Dose:  50 mg   Take 1 tablet (50 mg) by mouth daily   Quantity:  30 tablet   Refills:  3                Primary Care Provider Office Phone # Fax #    Nam Duffy [phone] [address] [address]        Equal Access to Services     [facility]A [facility] AH: Hadii suresh navao Sokailee, waaxda luqadaha, qaybta kaalmada adeegyada, waxay lorraine gambino. So [facility] [phone].    ATENCIÓN: Si habla español, tiene a back disposición servicios gratuitos de asistencia lingüística. Llame al [phone].    We comply with applicable federal civil rights laws and [place] laws. We do not discriminate on the basis of race, color, national origin, age, disability, sex, sexual orientation, or gender identity.            Thank you!     Thank you for choosing  [facility]  for your care. Our goal is always to provide you with excellent care. Hearing back from our patients is one way we can continue to improve our services. Please take a few minutes to complete the written survey that you may receive in the mail after your visit with us. Thank you!             Your Updated Medication List - Protect others around you: Learn how to safely use, store and throw away your medicines at www.disposemymeds.org.          This list is accurate as of 4/26/18 12:42 PM.  Always use your most recent med list.                   Brand Name Dispense Instructions for use Diagnosis    acetaminophen 500 MG Caps      Take 2 tablets by mouth daily.        albuterol 108 (90 Base) MCG/ACT Inhaler    PROAIR HFA    1 Inhaler    Inhale 2 puffs into the lungs every 4 hours as needed for shortness of breath / dyspnea, wheezing or other (use prior to exertion/activities)    Shortness of breath       alfuzosin 10 MG 24 hr tablet    UROXATRAL    90 tablet    Take 1 tablet (10 mg) by mouth daily    Lower urinary tract symptoms (LUTS)       ammonium lactate 12 % cream    AMLACTIN     Apply  topically daily.        artificial tears Soln      Use one drop to both eye PRN.        ASPIRIN PO      Take 81 mg by mouth daily        ATORVASTATIN CALCIUM PO      Take 40 mg by mouth daily        doxycycline 100 MG capsule    VIBRAMYCIN    30 capsule    Take 1 capsule (100 mg) by mouth daily    Ulcerative blepharitis of upper and lower eyelids of both eyes       finasteride 5 MG tablet    PROSCAR    90 tablet    Take 1 tablet (5 mg) by mouth daily    Lower urinary tract symptoms (LUTS)       fluticasone 110 MCG/ACT Inhaler    FLOVENT HFA    3 Inhaler    Inhale 1 puff into the lungs 2 times daily    Mild intermittent asthma, unspecified whether complicated, Dyspnea on exertion       guaiFENesin-codeine 100-10 MG/5ML Soln solution    ROBITUSSIN AC    420 mL    Take 5-10 mLs by mouth every 4 hours as needed for cough    Cough        isosorbide mononitrate 30 MG 24 hr tablet    IMDUR    90 tablet    Take 1 tablet (30 mg) by mouth daily    Angina, class III (H)       ketotifen 0.025 % Soln ophthalmic solution    ZADITOR/REFRESH ANTI-ITCH     Place 1 drop into both eyes 2 times daily        losartan 50 MG tablet    COZAAR    30 tablet    Take 1 tablet (50 mg) by mouth daily    Benign essential hypertension       METFORMIN HCL PO      Take by mouth 2 times daily (with meals)    Benign nodular prostatic hyperplasia without lower urinary tract symptoms, Need for prophylactic vaccination against Streptococcus pneumoniae (pneumococcus), Screening for AAA (abdominal aortic aneurysm), History of smoking       omeprazole 20 MG CR capsule    priLOSEC     Take 20 mg by mouth daily.        oxybutynin 5 MG tablet    DITROPAN    90 tablet    Take 0.5 tablets (2.5 mg) by mouth At Bedtime    Benign prostatic hyperplasia with urinary obstruction       senna-docusate 8.6-50 MG per tablet    SENOKOT-S;PERICOLACE    10 tablet    Take 1-2 tablets by mouth 2 times daily as needed for constipation Take while on oral narcotics to prevent or treat constipation.    Hypercalcemia, Primary hyperparathyroidism (H)       VITAMIN D (CHOLECALCIFEROL) PO      Take 1,000 Units by mouth every morning

## 2018-04-26 NOTE — MR AVS SNAPSHOT
After Visit Summary   4/26/2018    Lux Velasco    MRN: [id number]           Patient Information     Date Of Birth          1945        Visit Information        Provider Department      4/26/2018 2:00 PM Tech, Uc Cv Monitor, [facility]        Today's Diagnoses     Dizziness        Screening for diabetic peripheral neuropathy        SOB (shortness of breath)        Atypical chest pain           Follow-ups after your visit        Your next 10 appointments already scheduled     May 07, 2018  3:00 PM CDT   MR BRAIN W/O CONTRAST with UUMR1   [facility], [place], MRI ([facility], [facility])    [address] [facility] [id number] [phone]           Take your medicines as usual, unless your doctor tells you not to. Bring a list of your current medicines to your exam (including vitamins, minerals and over-the-counter drugs). Also bring the results of similar scans you may have had.  Please remove any body piercings and hair extensions before you arrive.  Follow your doctor s orders. If you do not, we may have to postpone your exam.  You may or may not receive IV contrast for this exam pending the discretion of the Radiologist.  You do not need to do anything special to prepare.  The MRI machine uses a strong magnet. Please wear clothes without metal (snaps, zippers). A sweatsuit works well, or we may give you a hospital gown.   **IMPORTANT** THE INSTRUCTIONS BELOW ARE ONLY FOR THOSE PATIENTS WHO HAVE BEEN PRESCRIBED SEDATION OR GENERAL ANESTHESIA DURING THEIR MRI PROCEDURE:  IF YOUR DOCTOR PRESCRIBED ORAL SEDATION (take medicine to help you relax during your exam):   You must get the medicine from your doctor (oral medication) before you arrive. Bring the medicine to the exam. Do not take it at home. You ll be told when to take it upon arriving for your exam.   Arrive one hour early. Bring someone who can take you home after the test. Your  medicine will make you sleepy. After the exam, you may not drive, take a bus or take a taxi by yourself.  IF YOUR DOCTOR PRESCRIBED IV SEDATION:   Arrive one hour early. Bring someone who can take you home after the test. Your medicine will make you sleepy. After the exam, you may not drive, take a bus or take a taxi by yourself.   No eating 6 hours before your exam. You may have clear liquids up until 4 hours before your exam. (Clear liquids include water, clear tea, black coffee and fruit juice without pulp.)  IF YOUR DOCTOR PRESCRIBED ANESTHESIA (be asleep for your exam):   Arrive 1 1/2 hours early. Bring someone who can take you home after the test. You may not drive, take a bus or take a taxi by yourself.   No eating 8 hours before your exam. You may have clear liquids up until 4 hours before your exam. (Clear liquids include water, clear tea, black coffee and fruit juice without pulp.)   You will spend four to five hours in the recovery room.  Please call the Imaging Department at your exam site with any questions.            May 22, 2018 10:35 AM CDT   (Arrive by 10:20 AM)   Return Visit with Nam Duffy MD   [facility] ([facility])    [address] [place] [address]  4th Floor  [facility] [address]   [phone]            May 31, 2018 11:00 AM CDT   (Arrive by 10:45 AM)   Return Visit with María Jacques MD   [facility] ([facility])    [address] [place] [address]  [address] [place] [address]   [phone]            Jul 16, 2018 10:30 AM CDT   RETURN GENERAL with Migdalia Hussein MD   [facility] ([facility])    [address]  [address] [address]  [facility] [address]   [phone]            Nov 27, 2018 11:00 AM CST   (Arrive by 10:45 AM)   Return Visit with Jorge Hanks MD   [facility] ([facility])    [address] [place] [address]  "[facility]  [address] [place] [address] [phone]              Who to contact     If you have questions or need follow up information about today's clinic visit or your schedule please contact [facility] directly at [phone].  Normal or non-critical lab and imaging results will be communicated to you by [employer], letter or phone within 4 business days after the clinic has received the results. If you do not hear from us within 7 days, please contact the clinic through [employer] or phone. If you have a critical or abnormal lab result, we will notify you by phone as soon as possible.  Submit refill requests through [employer] or call your pharmacy and they will forward the refill request to us. Please allow 3 business days for your refill to be completed.          Additional Information About Your Visit        [employer] Information     [employer] lets you send messages to your doctor, view your test results, renew your prescriptions, schedule appointments and more. To sign up, go to [email or web address] . Click on \"Log in\" on the left side of the screen, which will take you to the Welcome page. Then click on \"Sign up Now\" on the right side of the page.     You will be asked to enter the access code listed below, as well as some personal information. Please follow the directions to create your username and password.     Your access code is: [email or web address]  Expires: 2018  6:31 AM     Your access code will  in 90 days. If you need help or a new code, please call your [facility] clinic or [phone].        Care EveryWhere ID     This is your Care EveryWhere ID. This could be used by other organizations to access your [facility] medical records  [id number]         Blood Pressure from Last 3 Encounters:   No data found for BP    Weight from Last 3 Encounters:   No data found for Wt              Today, you had the following     No orders found for display         Today's Medication Changes        "   These changes are accurate as of 4/26/18 11:59 PM.  If you have any questions, ask your nurse or doctor.               These medicines have changed or have updated prescriptions.        Dose/Directions    losartan 50 MG tablet   Commonly known as:  COZAAR   This may have changed:  when to take this   Used for:  Benign essential hypertension        Dose:  50 mg   Take 1 tablet (50 mg) by mouth daily   Quantity:  30 tablet   Refills:  3                Primary Care Provider Office Phone # Fax #    Nam Duffy [phone]       [address] [address]        Equal Access to Services     [facility]: Hadii suresh traore hadasho Soomaali, waaxda luqadaha, qaybta kaalmada bella, taty chase . So [facility] [phone].    ATENCIÓN: Si habla español, tiene a back disposición servicios gratuitos de asistencia lingüística. [facility] [phone].    We comply with applicable federal civil rights laws and [place] laws. We do not discriminate on the basis of race, color, national origin, age, disability, sex, sexual orientation, or gender identity.            Thank you!     Thank you for choosing [facility]  for your care. Our goal is always to provide you with excellent care. Hearing back from our patients is one way we can continue to improve our services. Please take a few minutes to complete the written survey that you may receive in the mail after your visit with us. Thank you!             Your Updated Medication List - Protect others around you: Learn how to safely use, store and throw away your medicines at www.disposemymeds.org.          This list is accurate as of 4/26/18 11:59 PM.  Always use your most recent med list.                   Brand Name Dispense Instructions for use Diagnosis    acetaminophen 500 MG Caps      Take 2 tablets by mouth daily.        albuterol 108 (90 Base) MCG/ACT Inhaler    PROAIR HFA    1 Inhaler    Inhale 2 puffs  into the lungs every 4 hours as needed for shortness of breath / dyspnea, wheezing or other (use prior to exertion/activities)    Shortness of breath       alfuzosin 10 MG 24 hr tablet    UROXATRAL    90 tablet    Take 1 tablet (10 mg) by mouth daily    Lower urinary tract symptoms (LUTS)       ammonium lactate 12 % cream    AMLACTIN     Apply  topically daily.        artificial tears Soln      Use one drop to both eye PRN.        ASPIRIN PO      Take 81 mg by mouth daily        ATORVASTATIN CALCIUM PO      Take 40 mg by mouth daily        doxycycline 100 MG capsule    VIBRAMYCIN    30 capsule    Take 1 capsule (100 mg) by mouth daily    Ulcerative blepharitis of upper and lower eyelids of both eyes       finasteride 5 MG tablet    PROSCAR    90 tablet    Take 1 tablet (5 mg) by mouth daily    Lower urinary tract symptoms (LUTS)       fluticasone 110 MCG/ACT Inhaler    FLOVENT HFA    3 Inhaler    Inhale 1 puff into the lungs 2 times daily    Mild intermittent asthma, unspecified whether complicated, Dyspnea on exertion       guaiFENesin-codeine 100-10 MG/5ML Soln solution    ROBITUSSIN AC    420 mL    Take 5-10 mLs by mouth every 4 hours as needed for cough    Cough       isosorbide mononitrate 30 MG 24 hr tablet    IMDUR    90 tablet    Take 1 tablet (30 mg) by mouth daily    Angina, class III (H)       ketotifen 0.025 % Soln ophthalmic solution    ZADITOR/REFRESH ANTI-ITCH     Place 1 drop into both eyes 2 times daily        losartan 50 MG tablet    COZAAR    30 tablet    Take 1 tablet (50 mg) by mouth daily    Benign essential hypertension       METFORMIN HCL PO      Take by mouth 2 times daily (with meals)    Benign nodular prostatic hyperplasia without lower urinary tract symptoms, Need for prophylactic vaccination against Streptococcus pneumoniae (pneumococcus), Screening for AAA (abdominal aortic aneurysm), History of smoking       omeprazole 20 MG CR capsule    priLOSEC     Take 20 mg by mouth daily.         oxybutynin 5 MG tablet    DITROPAN    90 tablet    Take 0.5 tablets (2.5 mg) by mouth At Bedtime    Benign prostatic hyperplasia with urinary obstruction       senna-docusate 8.6-50 MG per tablet    SENOKOT-S;PERICOLACE    10 tablet    Take 1-2 tablets by mouth 2 times daily as needed for constipation Take while on oral narcotics to prevent or treat constipation.    Hypercalcemia, Primary hyperparathyroidism (H)       VITAMIN D (CHOLECALCIFEROL) PO      Take 1,000 Units by mouth every morning

## 2018-04-26 NOTE — NURSING NOTE
Per Dr. Duffy, patient to have ziopatch monitor placed.  Diagnosis: dizziness  Monitor placed: No patient place monitor himself  Patient Instructed: Yes  Patient verbalized understanding: Yes  Holter #  [id number]

## 2018-04-26 NOTE — NURSING NOTE
"Patient brought concern to stephen that he would be late to drop his daughter off at school, patient asked if he could just bring the monitor home instead of us putting it on. Spoke with patient regarding monitor instructions on hookup, removal and patient diary. Patient stated \"this is too complicated.\" I explained to the patient that everything that I am telling him today is written in the booklet and instructions on hookup is also included in the box. I explained to the patient that if the symptom recording gets too complicated that he can just wear the monitor for the 2 days and to just mail it back. I also showed him where the customer service number on the pamphlet was located and instructed he could call them any time, any day, or whenever he has questions.  Patient stated he understood. Patient confirmed he had no other questions. Patient was instructed to leave after ECHO since monitor was being sent home with patient.      Lisset Weeks CMA      Per Dr. Duffy, patient to have 2 days ziopatch monitor placed.  Diagnosis: Dizziness  Monitor placed: No  Patient Instructed: Yes  Patient verbalized understanding: Yes  Holter # [id number]        "

## 2018-04-26 NOTE — NURSING NOTE
Spoke with patient today because he was scheduled for a 3 pm echocardiogram and he was scheduled for a ziopatch. I explained to the patient that we cannot put on the ziopatch until they do his Echocardiogram. He was worried that he has to take his daughter to school at 5 pm and did not want t leave late from here and get caught in traffic. I offered to send the Ziopatch home with him but when I went over ALL the instructions. Patient still looked a little confused. I was hesitant to send it home with him and convinced him , that after the ECHO to let us know he is done and we can get it on him quickly and have him out of here with enough time to get his daughter to school. I also tried speaking with an echo tech to see if they can see him early but ALL the echo techs were in room with patient's. I also let the patient know they were busy and that I could not speak to them. Let him know it might be a possibility that they might not be able to get him in earlier than 3 pm. Patient understood everything we spoke about.     BEBE Bey

## 2018-04-26 NOTE — NURSING NOTE
Chief Complaint   Patient presents with     Follow Up For     atypical chest pain, dizziness, SOB     Vitals were taken and medications were reconciled.     Lisset Weeks MA    11:52 AM

## 2018-04-27 ENCOUNTER — TELEPHONE (OUTPATIENT)
Dept: CARDIOLOGY | Facility: CLINIC | Age: 73
End: 2018-04-27

## 2018-04-27 NOTE — TELEPHONE ENCOUNTER
Pt called per Amita Brown request confirming that he is taking Metoprolol Succinate 100 mg tabs. He takes 0.5 tabs (50 mg) qd.    Roxana Wright LPN

## 2018-04-28 RX ORDER — METOPROLOL SUCCINATE 50 MG/1
1 TABLET, EXTENDED RELEASE ORAL DAILY
COMMUNITY
Start: 2018-04-28

## 2018-04-30 ENCOUNTER — TELEPHONE (OUTPATIENT)
Dept: CARDIOLOGY | Facility: CLINIC | Age: 73
End: 2018-04-30

## 2018-04-30 NOTE — TELEPHONE ENCOUNTER
"Received a call from Pt on cardiology triage line stating that he had had a Zio Patch placed, but he \"lost it\".  He stated that since it was put on he had issues with it staying in place.  Pt stated that every time he perspired, the device fell off.  He stated that sometime on Saturday, the device fell off and he no longer has it.  Pt stated he attempted to call his PCP, but was unable to get through to anyone.  Informed Pt the above information would be sent to his PCP for review.  Best Pt call back number is [phone].    Roxana Wright LPN    "

## 2018-05-01 ENCOUNTER — TELEPHONE (OUTPATIENT)
Dept: INTERNAL MEDICINE | Facility: CLINIC | Age: 73
End: 2018-05-01

## 2018-05-01 NOTE — TELEPHONE ENCOUNTER
"Just have him keep his 5/22/2018 follow up with me     JIM Duffy                  Documentation                          Roxana Wright LPN Macomber, David W, MD Yesterday (8:54 AM)                  Ingrid Duffy,     We received a call from your Pt on the Cardiology triage line regarding his Zio Patch.     Thank you,   Roxana Wright LPN   (Routing comment)                               Received a call from Pt on cardiology triage line stating that he had had a Zio Patch placed, but he \"lost it\".  He stated that since it was put on he had issues with it staying in place.  Pt stated that every time he perspired, the device fell off.  He stated that sometime on Saturday, the device fell off and he no longer has it.  Pt stated he attempted to call his PCP, but was unable to get through to anyone.  Informed Pt the above information would be sent to his PCP for review.  Best Pt call back number is [phone].     Roxana Wright LPN                     Documentation                                 No answer.  Mi Pratt RN 10:48 AM on 5/1/2018..p            Pt states he had chest pain last night B/p 87/59 HR 56 after 97/63 and then 98/61 HR 58. Chest pain lasted for a few minutes. Felt weak and states had a problem getting air-pt took 2 nitro and medication took care of the chest pain. No nausea or vomiting. Was coughing -non productive. No radiation of pain.  Mi Pratt RN 10:24 AM on 5/2/2018.    Pt called and if chest pain continues he needs to be seen. Instructed pt to wait 5 minutes between taking a Nitro pill. Pt will follow up with the clinic for concerns or questions.  Mi Pratt RN 11:35 AM on 5/2/2018.          "

## 2018-05-13 ENCOUNTER — APPOINTMENT (OUTPATIENT)
Dept: GENERAL RADIOLOGY | Facility: CLINIC | Age: 73
End: 2018-05-13
Attending: EMERGENCY MEDICINE
Payer: MEDICARE

## 2018-05-13 ENCOUNTER — HOSPITAL ENCOUNTER (EMERGENCY)
Facility: CLINIC | Age: 73
Discharge: HOME | End: 2018-05-13
Attending: EMERGENCY MEDICINE | Admitting: EMERGENCY MEDICINE
Payer: MEDICARE

## 2018-05-13 VITALS
OXYGEN SATURATION: 95 % | TEMPERATURE: 97.5 F | RESPIRATION RATE: 16 BRPM | BODY MASS INDEX: 38.35 KG/M2 | WEIGHT: 275 LBS | HEART RATE: 62 BPM | SYSTOLIC BLOOD PRESSURE: 154 MMHG | DIASTOLIC BLOOD PRESSURE: 80 MMHG

## 2018-05-13 DIAGNOSIS — M79.645 FINGER PAIN, LEFT: ICD-10-CM

## 2018-05-13 PROCEDURE — 99283 EMERGENCY DEPT VISIT LOW MDM: CPT | Performed by: EMERGENCY MEDICINE

## 2018-05-13 PROCEDURE — 99282 EMERGENCY DEPT VISIT SF MDM: CPT | Mod: Z6 | Performed by: EMERGENCY MEDICINE

## 2018-05-13 PROCEDURE — 73140 X-RAY EXAM OF FINGER(S): CPT | Mod: LT

## 2018-05-13 NOTE — ED AVS SNAPSHOT
[facility], [place], Emergency Department    [address] [place] [address]    Phone:  [phone]                                       Lux Velasco   MRN: [id number]    Department:  [facility], Emergency Department   Date of Visit:  5/13/2018           After Visit Summary Signature Page     I have received my discharge instructions, and my questions have been answered. I have discussed any challenges I see with this plan with the nurse or doctor.    ..........................................................................................................................................  Patient/Patient Representative Signature      ..........................................................................................................................................  Patient Representative Print Name and Relationship to Patient    ..................................................               ................................................  Date                                            Time    ..........................................................................................................................................  Reviewed by Signature/Title    ...................................................              ..............................................  Date                                                            Time

## 2018-05-13 NOTE — DISCHARGE INSTRUCTIONS
Please make an appointment to follow up with Orthopedics (phone: [phone]), you should hear from them early this week to set up an appointment, if you do not hear from them by Wednesday call them.    Return to the emergency department for any problems.

## 2018-05-13 NOTE — ED TRIAGE NOTES
Patient initially hurt his hand during the snowstorm clearing snow however left first finger is still in pain. States it is painful when he tries to pick anything up.

## 2018-05-13 NOTE — ED PROVIDER NOTES
History     Chief Complaint   Patient presents with     Hand Pain     HPI  Lux Velasco is a 72-year-old male right-hand-dominant male who presents to the Emergency Department complaining of left index finger pain. He reports during the last snowstorm he had been cleaning and clearing snow and reached out with his left hand to grab some snow and ice. He reports that he had abraded fingers on that hand. He reports that this is all healed up but he complains of continued pain at the proximal interphalangeal joint of the index finger and reports that this is accentuated when trying to grasp anything. Denies decreased sensation distally and states that range of motion is limited at that joint secondary to pain but that he is able to flex and extend.    No current facility-administered medications for this encounter.      Current Outpatient Prescriptions   Medication     acetaminophen 500 MG CAPS     albuterol (PROAIR HFA) 108 (90 BASE) MCG/ACT Inhaler     alfuzosin (UROXATRAL) 10 MG 24 hr tablet     ammonium lactate (AMLACTIN) 12 % cream     artificial tears SOLN     ASPIRIN PO     ATORVASTATIN CALCIUM PO     doxycycline (VIBRAMYCIN) 100 MG capsule     finasteride (PROSCAR) 5 MG tablet     fluticasone (FLOVENT HFA) 110 MCG/ACT Inhaler     guaiFENesin-codeine (ROBITUSSIN AC) 100-10 MG/5ML SOLN solution     isosorbide mononitrate (IMDUR) 30 MG 24 hr tablet     ketotifen (ZADITOR/REFRESH ANTI-ITCH) 0.025 % SOLN ophthalmic solution     losartan (COZAAR) 50 MG tablet     METFORMIN HCL PO     metoprolol succinate (TOPROL-XL) 100 MG 24 hr tablet     omeprazole (PRILOSEC) 20 MG capsule     oxybutynin (DITROPAN) 5 MG tablet     senna-docusate (SENOKOT-S;PERICOLACE) 8.6-50 MG per tablet     VITAMIN D, CHOLECALCIFEROL, PO     Past Medical History:   Diagnosis Date     Diabetes mellitus type II 2005     History of agent Orange exposure 4/17/2013     Hypertension      Myocardial infarct 01/01/1999     Primary hyperparathyroidism  (H) Dx approx 2003     Stroke (H) 01/01/1998    recovered fully       Past Surgical History:   Procedure Laterality Date     BIOPSY OF SKIN LESION       BYPASS GRAFT ARTERY CORONARY       CATARACT IOL, RT/LT Bilateral 2017    [facility]     EXCISE MASS LOWER EXTREMITY Left 11/3/2017    Procedure: EXCISE MASS LOWER EXTREMITY;  Excision Mass Left Flank;  Surgeon: Marybeth Gambino MD;  Location:  OR     MOHS MICROGRAPHIC PROCEDURE       PARATHYROIDECTOMY N/A 3/21/2017    Procedure: PARATHYROIDECTOMY;  Surgeon: Julianna Short MD;  Location: [facility] OR     PARATHYROIDECTOMY         Family History   Problem Relation Age of Onset     Melanoma Mother      Melanoma Father      CANCER No family hx of      no skin cancer     Glaucoma No family hx of      Macular Degeneration No family hx of        Social History   Substance Use Topics     Smoking status: Former Smoker     Quit date: 1970     Smokeless tobacco: Former User      Comment: Doesn't remember how much he used to smoke - during service only.      Alcohol use Yes      Comment: 1 beer/week     Allergies   Allergen Reactions     Clindamycin      Eggs Other (See Comments)     Pt states no longer having reaction, childhood allergy, eats eggs       Penicillins Other (See Comments)     Pt states PCN allergy is due to egg allergy     Propoxyphene Other (See Comments)     Pain     I have reviewed the Medications, Allergies, Past Medical and Surgical History, and Social History in the Epic system.    Review of Systems   Musculoskeletal:        Positive for left index finger pain   Neurological:        Negative for decreased sensation   All other systems reviewed and are negative.      Physical Exam   BP: 154/80  Pulse: 62  Heart Rate: 62  Temp: 97.5  F (36.4  C)  Resp: 14  Weight: 124.7 kg (275 lb)  SpO2: 95 %      Physical Exam   Constitutional: He is oriented to person, place, and time. He appears well-developed and well-nourished.  Non-toxic appearance. He  does not appear ill. No distress.   HENT:   Head: Normocephalic and atraumatic.   Eyes: EOM are normal. Pupils are equal, round, and reactive to light. No scleral icterus.   Neck: Normal range of motion. Neck supple.   Cardiovascular: Normal rate.    Pulmonary/Chest: Effort normal. No respiratory distress.   Musculoskeletal:        Left hand: He exhibits decreased range of motion and tenderness. He exhibits normal capillary refill, no deformity, no laceration and no swelling. Normal sensation noted. Normal strength noted.        Hands:  Neurological: He is alert and oriented to person, place, and time. He has normal strength. No sensory deficit.   Skin: Skin is warm and dry. No rash noted. No pallor.   Psychiatric: He has a normal mood and affect. His behavior is normal.   Nursing note and vitals reviewed.      ED Course     ED Course     Procedures        Results for orders placed or performed during the hospital encounter of 05/13/18   Fingers XR, 2-3 views, left    Narrative    XR FINGER LT G/E 2 VW  5/13/2018 4:15 PM      HISTORY: Trauma, pain at proximal interphalangeal joint;     COMPARISON: None    FINDINGS: No fracture or dislocation identified. Mild anterior soft  tissue swelling in the anterior right finger.      Impression    IMPRESSION: No fracture or dislocation.    I have personally reviewed the examination and initial interpretation  and I agree with the findings.    SHAKIRA CRYSTAL MD            Assessments & Plan (with Medical Decision Making)   This patient presented to the emergency department complaining of pain in the proximal interphalangeal joint of the left index finger since injuring it while cleaning snow a few weeks ago.  Joint appears stable clinically and he is neurovascularly intact distally.  He has flexion and extension present at both the proximal interphalangeal joints but flexion at the PIP joint is limited somewhat by pain.  No evidence on x-ray to suggest fracture or  avulsion fracture.  At this point in time it is unclear exactly what is causing the patient's pain and I have recommended that he follow-up in the hand clinic if this continues to bother him as he does report becoming increasingly difficult to grasp objects with that hand.  He was discharged in good condition with instructions for care and follow-up after order was placed for Orthopedic clinic follow-up.    I have reviewed the nursing notes.    I have reviewed the findings, diagnosis, plan and need for follow up with the patient.  This part of the document was transcribed by Jake Novoa Scribkendell.   Discharge Medication List as of 5/13/2018  5:02 PM          Final diagnoses:   Finger pain, left       5/13/2018   [facility], [place], EMERGENCY DEPARTMENT     Niels Edwards MD  05/13/18 1498

## 2018-05-13 NOTE — ED AVS SNAPSHOT
[facility], Emergency Department    [address] [address]    Phone:  [phone]                                       Lux Velasco   MRN: [id number]    Department:  [facility], Emergency Department   Date of Visit:  5/13/2018           Patient Information     Date Of Birth          1945        Your diagnoses for this visit were:     Finger pain, left        You were seen by Niels Edwards MD.        Discharge Instructions       Please make an appointment to follow up with Orthopedics (phone: [phone]), you should hear from them early this week to set up an appointment, if you do not hear from them by Wednesday call them.    Return to the emergency department for any problems.      Your next 10 appointments already scheduled     May 22, 2018 10:35 AM CDT   (Arrive by 10:20 AM)   Return Visit with Nam Duffy MD   [facility] ([facility] [place])    [address]  4th Floor  [facility] [id number] [phone]            May 31, 2018 11:00 AM CDT   (Arrive by 10:45 AM)   Return Visit with María Jacques MD   [facility] ([facility])    [address] [place] [address]  [address] [place] [id number] [phone]            Jul 16, 2018 10:30 AM CDT   RETURN GENERAL with Migdalia Hussein MD   [facility] ([facility])    [address]  [address]  [facility] [address] [phone]            Nov 27, 2018 11:00 AM CST   (Arrive by 10:45 AM)   Return Visit with Jorge Hanks MD   [facility] ([facility])    [address] [place] [address]  [address]  [facility] [id number] [phone]              24 Hour Appointment Hotline       To make an appointment at any [facility], call [phone] ([phone]). If you don't have a family doctor or clinic, we will help you find one.  [facility] are conveniently located to serve the needs of you and your family.          ED Discharge Orders     Follow up with Orthopaedics [facility]       You should receive a call from [facility] to schedule your follow up appointment. If you do not hear from them within 24 business hours, call [phone], option 3 for help in scheduling your follow up.  If you are seen in the Emergency Department over the weekend, you will receive a phone call on the next business day.                     Review of your medicines      Our records show that you are taking the medicines listed below. If these are incorrect, please call your family doctor or clinic.        Dose / Directions Last dose taken    acetaminophen 500 MG Caps   Dose:  2 tablet        Take 2 tablets by mouth daily.   Refills:  0        albuterol 108 (90 Base) MCG/ACT Inhaler   Commonly known as:  PROAIR HFA   Dose:  2 puff   Quantity:  1 Inhaler        Inhale 2 puffs into the lungs every 4 hours as needed for shortness of breath / dyspnea, wheezing or other (use prior to exertion/activities)   Refills:  3        alfuzosin 10 MG 24 hr tablet   Commonly known as:  UROXATRAL   Dose:  10 mg   Quantity:  90 tablet        Take 1 tablet (10 mg) by mouth daily   Refills:  3        ammonium lactate 12 % cream   Commonly known as:  AMLACTIN        Apply  topically daily.   Refills:  0        artificial tears Soln        Use one drop to both eye PRN.   Refills:  0        ASPIRIN PO   Dose:  81 mg        Take 81 mg by mouth daily   Refills:  0        ATORVASTATIN CALCIUM PO   Dose:  40 mg        Take 40 mg by mouth daily   Refills:  0        doxycycline 100 MG capsule   Commonly known as:  VIBRAMYCIN   Dose:  100 mg   Quantity:  30 capsule        Take 1 capsule (100 mg) by mouth daily   Refills:  11        finasteride 5 MG tablet   Commonly known as:  PROSCAR   Dose:  5 mg   Quantity:  90 tablet        Take 1 tablet (5 mg) by mouth daily   Refills:   3        fluticasone 110 MCG/ACT Inhaler   Commonly known as:  FLOVENT HFA   Dose:  1 puff   Quantity:  3 Inhaler        Inhale 1 puff into the lungs 2 times daily   Refills:  1        guaiFENesin-codeine 100-10 MG/5ML Soln solution   Commonly known as:  ROBITUSSIN AC   Dose:  1-2 tsp.   Quantity:  420 mL        Take 5-10 mLs by mouth every 4 hours as needed for cough   Refills:  0        isosorbide mononitrate 30 MG 24 hr tablet   Commonly known as:  IMDUR   Dose:  30 mg   Quantity:  90 tablet        Take 1 tablet (30 mg) by mouth daily   Refills:  3        ketotifen 0.025 % Soln ophthalmic solution   Commonly known as:  ZADITOR/REFRESH ANTI-ITCH   Dose:  1 drop        Place 1 drop into both eyes 2 times daily   Refills:  0        losartan 50 MG tablet   Commonly known as:  COZAAR   Dose:  50 mg   Quantity:  30 tablet        Take 1 tablet (50 mg) by mouth daily   Refills:  3        METFORMIN HCL PO        Take by mouth 2 times daily (with meals)   Refills:  0        omeprazole 20 MG CR capsule   Commonly known as:  priLOSEC   Dose:  20 mg        Take 20 mg by mouth daily.   Refills:  0        oxybutynin 5 MG tablet   Commonly known as:  DITROPAN   Dose:  2.5 mg   Quantity:  90 tablet        Take 0.5 tablets (2.5 mg) by mouth At Bedtime   Refills:  3        senna-docusate 8.6-50 MG per tablet   Commonly known as:  SENOKOT-S;PERICOLACE   Dose:  1-2 tablet   Quantity:  10 tablet        Take 1-2 tablets by mouth 2 times daily as needed for constipation Take while on oral narcotics to prevent or treat constipation.   Refills:  0        TOPROL  MG 24 hr tablet   Dose:  50 mg   Generic drug:  metoprolol succinate        Take 0.5 tablets (50 mg) by mouth daily   Refills:  0        VITAMIN D (CHOLECALCIFEROL) PO   Dose:  1000 Units        Take 1,000 Units by mouth every morning   Refills:  0                Procedures and tests performed during your visit     Fingers XR, 2-3 views, left      Orders Needing Specimen  "Collection     None      Pending Results     Date and Time Order Name Status Description    2018 1518 Fingers XR, 2-3 views, left Preliminary             Pending Culture Results     No orders found from 2018 to 2018.            Pending Results Instructions     If you had any lab results that were not finalized at the time of your Discharge, you can call the ED Lab Result RN at [phone]. You will be contacted by this team for any positive Lab results or changes in treatment. The nurses are available 7 days a week from 10A to 6:30P.  You can leave a message 24 hours per day and they will return your call.        Thank you for choosing [facility]       Thank you for choosing [facility] for your care. Our goal is always to provide you with excellent care. Hearing back from our patients is one way we can continue to improve our services. Please take a few minutes to complete the written survey that you may receive in the mail after you visit with us. Thank you!        [employer] Information     [employer] lets you send messages to your doctor, view your test results, renew your prescriptions, schedule appointments and more. To sign up, go to [email or web address] . Click on \"Log in\" on the left side of the screen, which will take you to the Welcome page. Then click on \"Sign up Now\" on the right side of the page.     You will be asked to enter the access code listed below, as well as some personal information. Please follow the directions to create your username and password.     Your access code is: [email or web address]  Expires: 2018  6:31 AM     Your access code will  in 90 days. If you need help or a new code, please call your [facility] clinic or [phone].        Care EveryWhere ID     This is your Care EveryWhere ID. This could be used by other organizations to access your [facility] medical records  [id number]        Equal Access to Services     SANDHYA DYE AH: janel Navarro " cirilo marshall waxay idiin hayaan adeeg kharash la'aan ah. So [facility] [phone].    ATENCIÓN: Si habla damon, tiene a back disposición servicios gratuitos de asistencia lingüística. Llame al [phone].    We comply with applicable federal civil rights laws and [place] laws. We do not discriminate on the basis of race, color, national origin, age, disability, sex, sexual orientation, or gender identity.            After Visit Summary       This is your record. Keep this with you and show to your community pharmacist(s) and doctor(s) at your next visit.

## 2018-05-14 ENCOUNTER — TELEPHONE (OUTPATIENT)
Dept: ORTHOPEDICS | Facility: CLINIC | Age: 73
End: 2018-05-14

## 2018-05-14 ENCOUNTER — PRE VISIT (OUTPATIENT)
Dept: ORTHOPEDICS | Facility: CLINIC | Age: 73
End: 2018-05-14

## 2018-05-14 NOTE — TELEPHONE ENCOUNTER
Patient was seen in the ED on 5/13/2018 for left index finger pain.  Patient imaging was reviewed and per the imaging report shows no fracture or dislocation.  Patient was offered an appointment with Dr. Paiz on 5/21/2018, however patient requested a sooner appointment.  Patient case was discussed with SHARONDA White who offered appointments with Dr. Balderrama on 5/15/2018 at 1300.  Patient was provided with clinic address and check-in time.    Luz Wilder LPN

## 2018-05-14 NOTE — TELEPHONE ENCOUNTER
FUTURE VISIT INFORMATION      FUTURE VISIT INFORMATION:    Date: 5/15/18    Time:     Location: [facility]   REFERRAL INFORMATION:    Referring provider:  Niels Edwards    Referring providers clinic:  [facility] ED    Reason for visit/diagnosis  Left index finger pain    RECORDS REQUESTED FROM:       Clinic name Comments Records Status Imaging Status   [facility] ED 5/13/18 Internal  Internal      RECORDS STATUS

## 2018-05-14 NOTE — TELEPHONE ENCOUNTER
Patient is being referred by ED for Left index finger pain.    Patient is new to this provider.  Patient has Finger x rays from 5/13/18 in PACS and Records in epic    Patient is coming to clinic for initial consultation.      No additional orders are needed this visit.     Patient visit type and questionnaires have been reviewed and are correct for this appointment? Yes    Cammy Ybarra LPN

## 2018-05-15 ENCOUNTER — PRE VISIT (OUTPATIENT)
Dept: ORTHOPEDICS | Facility: CLINIC | Age: 73
End: 2018-05-15

## 2018-05-15 ENCOUNTER — OFFICE VISIT (OUTPATIENT)
Dept: ORTHOPEDICS | Facility: CLINIC | Age: 73
End: 2018-05-15
Payer: MEDICARE

## 2018-05-15 VITALS — WEIGHT: 275 LBS | BODY MASS INDEX: 38.5 KG/M2 | HEIGHT: 71 IN

## 2018-05-15 DIAGNOSIS — M19.042 OSTEOARTHRITIS OF FINGER OF LEFT HAND: ICD-10-CM

## 2018-05-15 DIAGNOSIS — M25.642 STIFFNESS OF FINGER JOINT OF LEFT HAND: Primary | ICD-10-CM

## 2018-05-15 NOTE — MR AVS SNAPSHOT
After Visit Summary   5/15/2018    Lux Velasco    MRN: [id number]           Patient Information     Date Of Birth          1945        Visit Information        Provider Department      5/15/2018 1:00 PM Amita Balderrama MD [facility]        Today's Diagnoses     Stiffness of finger joint of left hand    -  1    Osteoarthritis of finger of left hand           Follow-ups after your visit        Additional Services     HAND THERAPY       Hand Therapy Referral                  Follow-up notes from your care team     Return if symptoms worsen or fail to improve.      Your next 10 appointments already scheduled     Jun 06, 2018  9:00 AM CDT   FULL PULMONARY FUNCTION with  PFL C   [facility] ([facility])    [address]  3rd Floor  [facility] [id number] [phone]            Jul 11, 2018  1:05 PM CDT   (Arrive by 12:50 PM)   Return Visit with Nam Duffy MD   [facility] ([facility])    [address]  4th [facility] [id number] [phone]            Jul 16, 2018 10:30 AM CDT   RETURN GENERAL with Migdalia Hussein MD   [facility] ([facility])    [address]  [address] [address]  [facility] [address] [phone]            Aug 03, 2018  3:00 PM CDT   (Arrive by 2:45 PM)   Return Visit with Marc Chapa MD   [facility] ([facility])    [address]  4th Floor  [facility] [address] [phone]            Nov 27, 2018 11:00 AM CST   (Arrive by 10:45 AM)   Return Visit with Jorge Hanks MD   [facility] ([facility])    [address] [place] [address]  [address]  [facility] [id number] [phone]              Who to contact     Please call your clinic at [phone] to:    Ask  "questions about your health    Make or cancel appointments    Discuss your medicines    Learn about your test results    Speak to your doctor            Additional Information About Your Visit        Modriahart Information     ADINCON is an electronic gateway that provides easy, online access to your medical records. With ADINCON, you can request a clinic appointment, read your test results, renew a prescription or communicate with your care team.     To sign up for ADINCON visit the website at [email or web address]   You will be asked to enter the access code listed below, as well as some personal information. Please follow the directions to create your username and password.     Your access code is: [email or web address]  Expires: 2018  6:31 AM     Your access code will  in 90 days. If you need help or a new code, please contact your [facility] or call [phone] for assistance.        Care EveryWhere ID     This is your Care EveryWhere ID. This could be used by other organizations to access your [place] medical records  [id number]        Your Vitals Were     Height BMI (Body Mass Index)                1.803 m (5' 11\") 38.35 kg/m2           Blood Pressure from Last 3 Encounters:   18 153/83   18 157/81   18 154/80    Weight from Last 3 Encounters:   18 132.5 kg (292 lb)   18 132.5 kg (292 lb)   05/15/18 124.7 kg (275 lb)              We Performed the Following     HAND THERAPY          Today's Medication Changes          These changes are accurate as of 5/15/18 11:59 PM.  If you have any questions, ask your nurse or doctor.               These medicines have changed or have updated prescriptions.        Dose/Directions    losartan 50 MG tablet   Commonly known as:  COZAAR   This may have changed:  when to take this   Used for:  Benign essential hypertension        Dose:  50 mg   Take 1 tablet (50 mg) by mouth daily   Quantity:  30 tablet "   Refills:  3                Primary Care Provider Office Phone # Fax #    Nam Duffy [phone] [address] [address]        Equal Access to Services     SANDHYA DYE : Hadpedro suresh traore elijaho Somaríaali, waaxda luqadaha, qaybta kaalmada adeegyada, taty waldron laFranciscogenoveva gambino. So [facility] [phone].    ATENCIÓN: Si habla español, tiene a back disposición servicios gratuitos de asistencia lingüística. Llame al [phone].    We comply with applicable federal civil rights laws and [place] laws. We do not discriminate on the basis of race, color, national origin, age, disability, sex, sexual orientation, or gender identity.            Thank you!     Thank you for choosing [facility]  for your care. Our goal is always to provide you with excellent care. Hearing back from our patients is one way we can continue to improve our services. Please take a few minutes to complete the written survey that you may receive in the mail after your visit with us. Thank you!             Your Updated Medication List - Protect others around you: Learn how to safely use, store and throw away your medicines at www.disposemymeds.org.          This list is accurate as of 5/15/18 11:59 PM.  Always use your most recent med list.                   Brand Name Dispense Instructions for use Diagnosis    acetaminophen 500 MG Caps      Take 2 tablets by mouth daily.        albuterol 108 (90 Base) MCG/ACT Inhaler    PROAIR HFA    1 Inhaler    Inhale 2 puffs into the lungs every 4 hours as needed for shortness of breath / dyspnea, wheezing or other (use prior to exertion/activities)    Shortness of breath       alfuzosin 10 MG 24 hr tablet    UROXATRAL    90 tablet    Take 1 tablet (10 mg) by mouth daily    Lower urinary tract symptoms (LUTS)       ammonium lactate 12 % cream    AMLACTIN     Apply  topically daily.        artificial tears Soln      Use one drop to both  eye PRN.        ASPIRIN PO      Take 81 mg by mouth daily        ATORVASTATIN CALCIUM PO      Take 40 mg by mouth daily        doxycycline 100 MG capsule    VIBRAMYCIN    30 capsule    Take 1 capsule (100 mg) by mouth daily    Ulcerative blepharitis of upper and lower eyelids of both eyes       finasteride 5 MG tablet    PROSCAR    90 tablet    Take 1 tablet (5 mg) by mouth daily    Lower urinary tract symptoms (LUTS)       fluticasone 110 MCG/ACT Inhaler    FLOVENT HFA    3 Inhaler    Inhale 1 puff into the lungs 2 times daily    Mild intermittent asthma, unspecified whether complicated, Dyspnea on exertion       guaiFENesin-codeine 100-10 MG/5ML Soln solution    ROBITUSSIN AC    420 mL    Take 5-10 mLs by mouth every 4 hours as needed for cough    Cough       isosorbide mononitrate 30 MG 24 hr tablet    IMDUR    90 tablet    Take 1 tablet (30 mg) by mouth daily    Angina, class III (H)       ketotifen 0.025 % Soln ophthalmic solution    ZADITOR/REFRESH ANTI-ITCH     Place 1 drop into both eyes 2 times daily        losartan 50 MG tablet    COZAAR    30 tablet    Take 1 tablet (50 mg) by mouth daily    Benign essential hypertension       METFORMIN HCL PO      Take by mouth 2 times daily (with meals)    Benign nodular prostatic hyperplasia without lower urinary tract symptoms, Need for prophylactic vaccination against Streptococcus pneumoniae (pneumococcus), Screening for AAA (abdominal aortic aneurysm), History of smoking       omeprazole 20 MG CR capsule    priLOSEC     Take 20 mg by mouth daily.        oxybutynin 5 MG tablet    DITROPAN    90 tablet    Take 0.5 tablets (2.5 mg) by mouth At Bedtime    Benign prostatic hyperplasia with urinary obstruction       senna-docusate 8.6-50 MG per tablet    SENOKOT-S;PERICOLACE    10 tablet    Take 1-2 tablets by mouth 2 times daily as needed for constipation Take while on oral narcotics to prevent or treat constipation.    Hypercalcemia, Primary hyperparathyroidism (H)        TOPROL  MG 24 hr tablet   Generic drug:  metoprolol succinate      Take 0.5 tablets (50 mg) by mouth daily        VITAMIN D (CHOLECALCIFEROL) PO      Take 1,000 Units by mouth every morning

## 2018-05-15 NOTE — Clinical Note
5/15/2018       RE: Lux Velasco  [address]  [facility] [address]     Dear Colleague,    Thank you for referring your patient, Lux Velasco, to the [facility] at [facility]. Please see a copy of my visit note below.    Orthopedic Surgery Consultation    REFERRING PHYSICIAN: Resident Physician Ritu*   PRIMARY CARE PHYSICIAN: Nam Duffy           Chief Complaint:   Consult (Left hand index finger and thumb pain. Was seen in ED on 5/13/18)    History of Present Illness:  Symptom Profile Including: location of symptoms, onset, severity, exacerbating/alleviating factors, previous treatments:        Lux Velasco is a 72 year old right-hand-dominant retired male who presents for evaluation of left index finger pain. He reports the pain isn't present for 1 month. He feels it began when he was cleaning ice off of his car and injured the finger. He applied finger splint at home and was working on range of motion, however was limited due to ongoing pain. He presented to them or into department 2 days ago for evaluation, who referred him onto our clinic today.    He describes the pain as a involving the soft tissue and PIP joint of the left index finger. It is relieved by rest and worse with activities.         Past Medical History:     Past Medical History:   Diagnosis Date     Diabetes mellitus type II 2005     History of agent Orange exposure 4/17/2013     Hypertension      Myocardial infarct 01/01/1999     Primary hyperparathyroidism (H) Dx approx 2003     Stroke (H) 01/01/1998    recovered fully            Past Surgical History:     Past Surgical History:   Procedure Laterality Date     BIOPSY OF SKIN LESION       BYPASS GRAFT ARTERY CORONARY       CATARACT IOL, RT/LT Bilateral 2017    done at [facility]     EXCISE MASS LOWER EXTREMITY Left 11/3/2017    Procedure: EXCISE MASS LOWER EXTREMITY;  Excision Mass Left Flank;  Surgeon: Marybeth Gambino  MD Fahad;  Location:  OR     MOHS MICROGRAPHIC PROCEDURE       PARATHYROIDECTOMY N/A 3/21/2017    Procedure: PARATHYROIDECTOMY;  Surgeon: Julianna Short MD;  Location:  OR     PARATHYROIDECTOMY              Social History:     Social History   Substance Use Topics     Smoking status: Former Smoker     Quit date: 1970     Smokeless tobacco: Former User      Comment: Doesn't remember how much he used to smoke - during service only.      Alcohol use Yes      Comment: 1 beer/week            Family History:   Works part-time in [place]  Family History   Problem Relation Age of Onset     Melanoma Mother      Melanoma Father      CANCER No family hx of      no skin cancer     Glaucoma No family hx of      Macular Degeneration No family hx of             Allergies:     Allergies   Allergen Reactions     Clindamycin      Eggs Other (See Comments)     Pt states no longer having reaction, childhood allergy, eats eggs       Penicillins Other (See Comments)     Pt states PCN allergy is due to egg allergy     Propoxyphene Other (See Comments)     Pain            Medications:     Current Outpatient Prescriptions   Medication     acetaminophen 500 MG CAPS     albuterol (PROAIR HFA) 108 (90 BASE) MCG/ACT Inhaler     alfuzosin (UROXATRAL) 10 MG 24 hr tablet     ammonium lactate (AMLACTIN) 12 % cream     artificial tears SOLN     ASPIRIN PO     ATORVASTATIN CALCIUM PO     doxycycline (VIBRAMYCIN) 100 MG capsule     finasteride (PROSCAR) 5 MG tablet     fluticasone (FLOVENT HFA) 110 MCG/ACT Inhaler     guaiFENesin-codeine (ROBITUSSIN AC) 100-10 MG/5ML SOLN solution     isosorbide mononitrate (IMDUR) 30 MG 24 hr tablet     ketotifen (ZADITOR/REFRESH ANTI-ITCH) 0.025 % SOLN ophthalmic solution     losartan (COZAAR) 50 MG tablet     METFORMIN HCL PO     metoprolol succinate (TOPROL-XL) 100 MG 24 hr tablet     omeprazole (PRILOSEC) 20 MG capsule     oxybutynin (DITROPAN) 5 MG tablet     senna-docusate  "(SENOKOT-S;PERICOLACE) 8.6-50 MG per tablet     VITAMIN D, CHOLECALCIFEROL, PO     No current facility-administered medications for this visit.              Review of Systems:     A 10 point ROS was performed and reviewed. Specific responses to these questions are noted at the end of the document.         Physical Exam:   Vitals: Ht 1.803 m (5' 11\")  Wt 124.7 kg (275 lb)  BMI 38.35 kg/m2  Constitutional: awake, alert, cooperative, no apparent distress, appears stated age.    Eyes: The sclera are white.  Ears, Nose, Throat: The trachea is midline.  Psychiatric: The patient has a normal affect.  Respiratory: breathing non-labored  Cardiovascular: The extremities are warm and perfused.  Skin: no obvious rashes or lesions.  Musculoskeletal, Neurologic, and Spine:   Left index finger demonstrates skin is intact throughout, no swelling evident. Tenderness to palpation over the PIP joint, nontender over the MCP or DIP joints. Exam somewhat limited by patient, he is unwilling to perform PIP joint flexion due to stiffness and pain. He is able to fully extend and flex at the DIP and MCP joints. Sensation is intact to light touch set the finger.         Imaging:   We ordered and independently reviewed new radiographs at this clinic visit. The results were discussed with the patient.  Findings include:    X-ray of the left index finger was reviewed, demonstrates narrowing and subchondral sclerosis involving the PIP and DIP joints.             Assessment and Plan:   Assessment:  72 year old male with left index finger pain and stiffness related to mild PIP joint arthritis     Plan:  1. Occupational therapy referral  2. Follow-up as needed      Shreya Cullen MD  Orthopaedic Surgery Resident  Patient was seen an examined with Dr. Balderrama, who agrees with the above assessment and plan.    Answers for HPI/ROS submitted by the patient on 5/15/2018   General Symptoms: No  Skin Symptoms: No  HENT Symptoms: No  EYE SYMPTOMS: " No  HEART SYMPTOMS: No  LUNG SYMPTOMS: No  INTESTINAL SYMPTOMS: No  URINARY SYMPTOMS: No  REPRODUCTIVE SYMPTOMS: No  SKELETAL SYMPTOMS: No  BLOOD SYMPTOMS: No  NERVOUS SYSTEM SYMPTOMS: No  MENTAL HEALTH SYMPTOMS: No      Again, thank you for allowing me to participate in the care of your patient.      Sincerely,    Amita Balderrama MD

## 2018-05-15 NOTE — NURSING NOTE
"Reason For Visit:   Chief Complaint   Patient presents with     Consult     Left hand index finger and thumb pain. Was seen in ED on 5/13/18       Primary MD: Nam Duffy    Age: 72 year old        Ht 1.803 m (5' 11\")  Wt 124.7 kg (275 lb)  BMI 38.35 kg/m2      Pain Assessment  Patient Currently in Pain: Denies (Pain only when in use)               QuickDASH Assessment  QuickDA Main 5/15/2018   1.Open a tight or new jar. Unable   2. Do heavy household chores (e.g., wash walls, floors) Unable   3. Carry a shopping bag or briefcase. Severe difficulty   4. Wash your back. Unable   5. Use a knife to cut food. Mild difficulty   6. Recreational activities in which you take some force or impact through your arm, shoulder or hand (e.g., golf, hammering, tennis, etc.). Unable   7. During the past week, to what extent has your arm, shoulder or hand problem interfered with your normal social activities with family, friends, neighbours or groups? Unable to answer   8. During the past week, were you limited in your work or other regular daily activities as a result of your arm, shoulder or hand problem? Unable   9. Arm, shoulder or hand pain. Moderate   10.Tingling (pins and needles) in your arm,shoulder or hand. None   11. During the past week, how much difficulty have you had sleeping because of the pain in your arm, shoulder or hand? ([place] number) Unable to answer   Quickdash Ability Score 54.54          Current Outpatient Prescriptions   Medication Sig Dispense Refill     acetaminophen 500 MG CAPS Take 2 tablets by mouth daily.       albuterol (PROAIR HFA) 108 (90 BASE) MCG/ACT Inhaler Inhale 2 puffs into the lungs every 4 hours as needed for shortness of breath / dyspnea, wheezing or other (use prior to exertion/activities) 1 Inhaler 3     alfuzosin (UROXATRAL) 10 MG 24 hr tablet Take 1 tablet (10 mg) by mouth daily 90 tablet 3     ammonium lactate (AMLACTIN) 12 % cream Apply  topically daily.       artificial " tears SOLN Use one drop to both eye PRN.       ASPIRIN PO Take 81 mg by mouth daily       ATORVASTATIN CALCIUM PO Take 40 mg by mouth daily       doxycycline (VIBRAMYCIN) 100 MG capsule Take 1 capsule (100 mg) by mouth daily 30 capsule 11     finasteride (PROSCAR) 5 MG tablet Take 1 tablet (5 mg) by mouth daily 90 tablet 3     fluticasone (FLOVENT HFA) 110 MCG/ACT Inhaler Inhale 1 puff into the lungs 2 times daily 3 Inhaler 1     guaiFENesin-codeine (ROBITUSSIN AC) 100-10 MG/5ML SOLN solution Take 5-10 mLs by mouth every 4 hours as needed for cough 420 mL 0     isosorbide mononitrate (IMDUR) 30 MG 24 hr tablet Take 1 tablet (30 mg) by mouth daily 90 tablet 3     ketotifen (ZADITOR/REFRESH ANTI-ITCH) 0.025 % SOLN ophthalmic solution Place 1 drop into both eyes 2 times daily       losartan (COZAAR) 50 MG tablet Take 1 tablet (50 mg) by mouth daily (Patient taking differently: Take 50 mg by mouth every morning ) 30 tablet 3     METFORMIN HCL PO Take by mouth 2 times daily (with meals)       metoprolol succinate (TOPROL-XL) 100 MG 24 hr tablet Take 0.5 tablets (50 mg) by mouth daily       omeprazole (PRILOSEC) 20 MG capsule Take 20 mg by mouth daily.       oxybutynin (DITROPAN) 5 MG tablet Take 0.5 tablets (2.5 mg) by mouth At Bedtime 90 tablet 3     senna-docusate (SENOKOT-S;PERICOLACE) 8.6-50 MG per tablet Take 1-2 tablets by mouth 2 times daily as needed for constipation Take while on oral narcotics to prevent or treat constipation. 10 tablet 0     VITAMIN D, CHOLECALCIFEROL, PO Take 1,000 Units by mouth every morning          Allergies   Allergen Reactions     Clindamycin      Eggs Other (See Comments)     Pt states no longer having reaction, childhood allergy, eats eggs       Penicillins Other (See Comments)     Pt states PCN allergy is due to egg allergy     Propoxyphene Other (See Comments)     Nanette Ybarra, KEVYNN

## 2018-05-15 NOTE — PROGRESS NOTES
Orthopedic Surgery Consultation    REFERRING PHYSICIAN: Resident Physician Ritu*   PRIMARY CARE PHYSICIAN: Nam Duffy           Chief Complaint:   Consult (Left hand index finger and thumb pain. Was seen in ED on 5/13/18)    History of Present Illness:  Symptom Profile Including: location of symptoms, onset, severity, exacerbating/alleviating factors, previous treatments:        Lux Velasco is a 72 year old right-hand-dominant retired male who presents for evaluation of left index finger pain. He reports the pain isn't present for 1 month. He feels it began when he was cleaning ice off of his car and injured the finger. He applied finger splint at home and was working on range of motion, however was limited due to ongoing pain. He presented to the Emergency department 2 days ago for evaluation, who referred him onto our clinic today.    He describes the pain as a involving the soft tissue and PIP joint of the left index finger. It is relieved by rest and worse with activities.         Past Medical History:     Past Medical History:   Diagnosis Date     Diabetes mellitus type II 2005     History of agent Orange exposure 4/17/2013     Hypertension      Myocardial infarct 01/01/1999     Primary hyperparathyroidism (H) Dx approx 2003     Stroke (H) 01/01/1998    recovered fully            Past Surgical History:     Past Surgical History:   Procedure Laterality Date     BIOPSY OF SKIN LESION       BYPASS GRAFT ARTERY CORONARY       CATARACT IOL, RT/LT Bilateral 2017    [facility]     EXCISE MASS LOWER EXTREMITY Left 11/3/2017    Procedure: EXCISE MASS LOWER EXTREMITY;  Excision Mass Left Flank;  Surgeon: Marybeth Gambino MD;  Location:  OR     MOHS MICROGRAPHIC PROCEDURE       PARATHYROIDECTOMY N/A 3/21/2017    Procedure: PARATHYROIDECTOMY;  Surgeon: Julianna Short MD;  Location:  OR     PARATHYROIDECTOMY              Social History:     Social History   Substance Use Topics     Smoking  status: Former Smoker     Quit date: 1970     Smokeless tobacco: Former User      Comment: Doesn't remember how much he used to smoke - during service only.      Alcohol use Yes      Comment: 1 beer/week            Family History:   Works part-time in [employer]  Family History   Problem Relation Age of Onset     Melanoma Mother      Melanoma Father      CANCER No family hx of      no skin cancer     Glaucoma No family hx of      Macular Degeneration No family hx of             Allergies:     Allergies   Allergen Reactions     Clindamycin      Eggs Other (See Comments)     Pt states no longer having reaction, childhood allergy, eats eggs       Penicillins Other (See Comments)     Pt states PCN allergy is due to egg allergy     Propoxyphene Other (See Comments)     Pain            Medications:     Current Outpatient Prescriptions   Medication     acetaminophen 500 MG CAPS     albuterol (PROAIR HFA) 108 (90 BASE) MCG/ACT Inhaler     alfuzosin (UROXATRAL) 10 MG 24 hr tablet     ammonium lactate (AMLACTIN) 12 % cream     artificial tears SOLN     ASPIRIN PO     ATORVASTATIN CALCIUM PO     doxycycline (VIBRAMYCIN) 100 MG capsule     finasteride (PROSCAR) 5 MG tablet     fluticasone (FLOVENT HFA) 110 MCG/ACT Inhaler     guaiFENesin-codeine (ROBITUSSIN AC) 100-10 MG/5ML SOLN solution     isosorbide mononitrate (IMDUR) 30 MG 24 hr tablet     ketotifen (ZADITOR/REFRESH ANTI-ITCH) 0.025 % SOLN ophthalmic solution     losartan (COZAAR) 50 MG tablet     METFORMIN HCL PO     metoprolol succinate (TOPROL-XL) 100 MG 24 hr tablet     omeprazole (PRILOSEC) 20 MG capsule     oxybutynin (DITROPAN) 5 MG tablet     senna-docusate (SENOKOT-S;PERICOLACE) 8.6-50 MG per tablet     VITAMIN D, CHOLECALCIFEROL, PO     No current facility-administered medications for this visit.              Review of Systems:     A 10 point ROS was performed and reviewed. Specific responses to these questions are noted at the end of the document.          "Physical Exam:   Vitals: Ht 1.803 m (5' 11\")  Wt 124.7 kg (275 lb)  BMI 38.35 kg/m2  Constitutional: awake, alert, cooperative, no apparent distress, appears stated age.    Eyes: The sclera are white.  Ears, Nose, Throat: The trachea is midline.  Psychiatric: The patient has a normal affect.  Respiratory: breathing non-labored  Cardiovascular: The extremities are warm and perfused.  Skin: no obvious rashes or lesions.  Musculoskeletal, Neurologic, and Spine:   Left index finger demonstrates skin is intact throughout, no swelling evident. Tenderness to palpation over the PIP joint, nontender over the MCP or DIP joints. Exam somewhat limited by patient, he is unwilling to perform PIP joint flexion due to stiffness and pain. He is able to fully extend and flex at the DIP and MCP joints. Sensation is intact to light touch set the finger.         Imaging:   We ordered and independently reviewed new radiographs at this clinic visit. The results were discussed with the patient.  Findings include:    X-ray of the left index finger was reviewed, demonstrates narrowing and subchondral sclerosis involving the PIP and DIP joints.             Assessment and Plan:   Assessment:  72 year old male with left index finger pain and stiffness related to mild PIP joint arthritis     Plan:  1. Occupational therapy referral  2. Follow-up as needed      Shreya Cullen MD  Orthopaedic Surgery Resident  Patient was seen an examined with Dr. Balderrama, who agrees with the above assessment and plan.    I have independently seen and evaluated the patient and agree with the findings and plan of care as documented by the resident and edited by me.    Answers for HPI/ROS submitted by the patient on 5/15/2018   General Symptoms: No  Skin Symptoms: No  HENT Symptoms: No  EYE SYMPTOMS: No  HEART SYMPTOMS: No  LUNG SYMPTOMS: No  INTESTINAL SYMPTOMS: No  URINARY SYMPTOMS: No  REPRODUCTIVE SYMPTOMS: No  SKELETAL SYMPTOMS: No  BLOOD SYMPTOMS: " No  NERVOUS SYSTEM SYMPTOMS: No  MENTAL HEALTH SYMPTOMS: No

## 2018-05-21 ENCOUNTER — MEDICAL CORRESPONDENCE (OUTPATIENT)
Dept: HEALTH INFORMATION MANAGEMENT | Facility: CLINIC | Age: 73
End: 2018-05-21

## 2018-05-22 ENCOUNTER — OFFICE VISIT (OUTPATIENT)
Dept: INTERNAL MEDICINE | Facility: CLINIC | Age: 73
End: 2018-05-22
Payer: MEDICARE

## 2018-05-22 VITALS
WEIGHT: 292 LBS | DIASTOLIC BLOOD PRESSURE: 81 MMHG | BODY MASS INDEX: 40.73 KG/M2 | RESPIRATION RATE: 20 BRPM | SYSTOLIC BLOOD PRESSURE: 157 MMHG | HEART RATE: 66 BPM | OXYGEN SATURATION: 94 %

## 2018-05-22 DIAGNOSIS — N40.0 BENIGN PROSTATIC HYPERPLASIA WITHOUT LOWER URINARY TRACT SYMPTOMS: Primary | ICD-10-CM

## 2018-05-22 ASSESSMENT — PAIN SCALES - GENERAL: PAINLEVEL: NO PAIN (0)

## 2018-05-22 NOTE — NURSING NOTE
States not sure if can take pneumonia shot because it might have egg product in it.Anamaria Butler LPN 10:59 AM on 5/22/2018    Rooming Note  Blood Pressure   BP Readings from Last 1 Encounters:   05/22/18 156/90    Single BP recheck started, 11:00 AM (4 minutes)  Anamaria Butler LPN 11:00 AM on 5/22/2018

## 2018-05-22 NOTE — PATIENT INSTRUCTIONS
[facility]: [phone]     [facility] Medication Refill Request Information:  * Please contact your pharmacy regarding ANY request for medication refills.  ** [facility] Prescription Fax = [phone]  * Please allow 3 business days for routine medication refills.  * Please allow 5 business days for controlled substance medication refills.     [facility] Test Result notification information:  *You will be notified with in 7-10 days of your appointment day regarding the results of your test.  If you are on MyChart you will be notified as soon as the provider has reviewed the results and signed off on them.

## 2018-05-22 NOTE — PROGRESS NOTES
HPI:    Pt. Comes in for follow up today. He had parathyroid surgery with Dr. Short 3/21/17 for hypercalciemia. He was seen in Dermatology 11/28/16.  He has followed up with Dr. Sullivan Cardiology at [facility] as well as Dr. Hanks, Cardiology here 1/20/18. He states chronic dizziness and chest pain and fatigue. He states he had cor angiogram at [facility] about one month ago that did not have obstructive CAD.       PE:    Vitals noted my recheck manual large cuff R arm several times 141/79; gen nad cooperative alert,  HEENT, ears normal oropharynx clear no exudate, neck supple nl rom no adenopathy,  LCTA, B, RRR, S1, S2, systolic murmur present. Neurological exam B UE/LE/proximal/distal is normal and symmetric for sensation, reflexes, and strength. Gait is normal.        CXR 8/30/17; no acute pulmonary disease    PFT's 8/30/17 normal    Resting echo 1/17/17 normal EF 60-65%    CTA 5/23/17; minimal non-obstructive CAD      A/P:    1. Follow up parathyroid surgery. He was seen by Dr. Chou, Endo 4/5/17 and by Dr. Short 4/21/17; labs stable  2. He was seen in  Pulmonary 8/30/17 and again  by Dr. Jacques 11/30/17. He was given a Rx. For daily flovent. He has follow up with Dr. Jacques 5/31/2018.   3. PSA checked 11/30/17  4. Colonoscopy at [facility] 8/15 repeat in 10 years.   5. He was seen  7/17 in Urology for BPH he was seen by Dr. Chapa  1/12/2018 and he commented also on renal cyst. He would like to see Urology here a [facility] and placed referral today.   6. BP my check manual as above; no change for now  7. He had minimal non-obstructive coronary CTA 5/23/17. He was seen 1/20/18 by Dr. Hanks Cardiology. As reported about regarding cor angiogram at [facility]. Today for dizziness, fatigue, and CP.  Future MRI/MRA brain, resting echo (stable from 4/26/2018), Holter Monitor (results pending?) and cardiology follow up. Recommended he reduce his PRN (somewhat excessive) SL NTG use. Seen in Cardiology Ms. Brown  4/26/2018  8. Check with insurance for  New Shingles vaccine  9.  A1C for elevated glucose for 11/30/17 6.3%  10. Ordered labs 10/12/17 for mild anemia and lower platelets seen by Dr. Marina, Hematology  1/5/2018  11. Seen by Dr. Sanderson GI appt. For hepatic steatosis (liver U/S done 11/30/17) in 3/19/2018  12. Seen in ortho hand 5/15/2018 for L index finger issues         Total time spent 25 minutes.  More than 50% of the time spent with Mr. Velasco on counseling / coordinating his care

## 2018-05-22 NOTE — MR AVS SNAPSHOT
After Visit Summary   5/22/2018    Lux Velasco    MRN: [id number]           Patient Information     Date Of Birth          1945        Visit Information        Provider Department      5/22/2018 10:35 AM Nam Duffy MD [facility]        Today's Diagnoses     Benign prostatic hyperplasia without lower urinary tract symptoms    -  1      Care Instructions    Primary Care Center: [phone]     Primary Care Center Medication Refill Request Information:  * Please contact your pharmacy regarding ANY request for medication refills.  ** [facility] Prescription Fax = [phone]  * Please allow 3 business days for routine medication refills.  * Please allow 5 business days for controlled substance medication refills.     Primary Care Center Test Result notification information:  *You will be notified with in 7-10 days of your appointment day regarding the results of your test.  If you are on MyChart you will be notified as soon as the provider has reviewed the results and signed off on them.          Follow-ups after your visit        Additional Services     UROLOGY ADULT REFERRAL       Urinary issues                  Your next 10 appointments already scheduled     May 31, 2018 11:00 AM CDT   (Arrive by 10:45 AM)   Return Visit with María Jacques MD   [facility]    [address]  [address]  [place] [id number] [phone]            Jul 11, 2018  1:05 PM CDT   (Arrive by 12:50 PM)   Return Visit with Nam Duffy MD   [facility] ([facility])    [address]  4th [address]  [place] [id number] [phone]            Jul 16, 2018 10:30 AM CDT   RETURN GENERAL with Migdalia Hussein MD   [facility] Clinic ([facility])    [address]  [address]  [place] [address] [phone]             Aug 03, 2018  3:00 PM CDT   (Arrive by 2:45 PM)   Return Visit with Marc Chapa MD   [facility] ([facility])    [address]  4th [address]  [place] [id number] [phone]            2018 11:00 AM CST   (Arrive by 10:45 AM)   Return Visit with Jorge Hanks MD   [facility] ([facility])    [address]  [address]  [place] [id number] [phone]              Who to contact     Please call your clinic at [phone] to:    Ask questions about your health    Make or cancel appointments    Discuss your medicines    Learn about your test results    Speak to your doctor            Additional Information About Your Visit        [employer] Information     erento is an electronic gateway that provides easy, online access to your medical records. With erento, you can request a clinic appointment, read your test results, renew a prescription or communicate with your care team.     To sign up for erento visit the website at [email or web address]   You will be asked to enter the access code listed below, as well as some personal information. Please follow the directions to create your username and password.     Your access code is: [email or web address]  Expires: 2018  6:31 AM     Your access code will  in 90 days. If you need help or a new code, please contact your [facility] or call [phone] for assistance.        Care EveryWhere ID     This is your Care EveryWhere ID. This could be used by other organizations to access your [facility] medical records  [id number]        Your Vitals Were     Pulse Respirations Pulse Oximetry BMI (Body Mass Index)          66 20 94% 40.73 kg/m2         Blood Pressure from Last 3 Encounters:   18 157/81   18 154/80   18 116/72    Weight from Last 3 Encounters:   18 132.5  kg (292 lb)   05/15/18 124.7 kg (275 lb)   05/13/18 124.7 kg (275 lb)              We Performed the Following     UROLOGY ADULT REFERRAL          Today's Medication Changes          These changes are accurate as of 5/22/18 12:02 PM.  If you have any questions, ask your nurse or doctor.               These medicines have changed or have updated prescriptions.        Dose/Directions    losartan 50 MG tablet   Commonly known as:  COZAAR   This may have changed:  when to take this   Used for:  Benign essential hypertension        Dose:  50 mg   Take 1 tablet (50 mg) by mouth daily   Quantity:  30 tablet   Refills:  3                Primary Care Provider Office Phone # Fax #    Nam Duffy [phone]        [address] [address]        Equal Access to Services     SANDHYA DYE : Christiana Alicia, waelijah luqadaha, qaybta kaalmada adetanya, taty chase . So [facility] [phone].    ATENCIÓN: Si habla español, tiene a back disposición servicios gratuitos de asistencia lingüística. Llame al [phone].    We comply with applicable federal civil rights laws and [place] laws. We do not discriminate on the basis of race, color, national origin, age, disability, sex, sexual orientation, or gender identity.            Thank you!     Thank you for choosing [facility]  for your care. Our goal is always to provide you with excellent care. Hearing back from our patients is one way we can continue to improve our services. Please take a few minutes to complete the written survey that you may receive in the mail after your visit with us. Thank you!             Your Updated Medication List - Protect others around you: Learn how to safely use, store and throw away your medicines at www.disposemymeds.org.          This list is accurate as of 5/22/18 12:02 PM.  Always use your most recent med list.                   Brand Name Dispense  Instructions for use Diagnosis    acetaminophen 500 MG Caps      Take 2 tablets by mouth daily.        albuterol 108 (90 Base) MCG/ACT Inhaler    PROAIR HFA    1 Inhaler    Inhale 2 puffs into the lungs every 4 hours as needed for shortness of breath / dyspnea, wheezing or other (use prior to exertion/activities)    Shortness of breath       alfuzosin 10 MG 24 hr tablet    UROXATRAL    90 tablet    Take 1 tablet (10 mg) by mouth daily    Lower urinary tract symptoms (LUTS)       ammonium lactate 12 % cream    AMLACTIN     Apply  topically daily.        artificial tears Soln      Use one drop to both eye PRN.        ASPIRIN PO      Take 81 mg by mouth daily        ATORVASTATIN CALCIUM PO      Take 40 mg by mouth daily        doxycycline 100 MG capsule    VIBRAMYCIN    30 capsule    Take 1 capsule (100 mg) by mouth daily    Ulcerative blepharitis of upper and lower eyelids of both eyes       finasteride 5 MG tablet    PROSCAR    90 tablet    Take 1 tablet (5 mg) by mouth daily    Lower urinary tract symptoms (LUTS)       fluticasone 110 MCG/ACT Inhaler    FLOVENT HFA    3 Inhaler    Inhale 1 puff into the lungs 2 times daily    Mild intermittent asthma, unspecified whether complicated, Dyspnea on exertion       guaiFENesin-codeine 100-10 MG/5ML Soln solution    ROBITUSSIN AC    420 mL    Take 5-10 mLs by mouth every 4 hours as needed for cough    Cough       isosorbide mononitrate 30 MG 24 hr tablet    IMDUR    90 tablet    Take 1 tablet (30 mg) by mouth daily    Angina, class III (H)       ketotifen 0.025 % Soln ophthalmic solution    ZADITOR/REFRESH ANTI-ITCH     Place 1 drop into both eyes 2 times daily        losartan 50 MG tablet    COZAAR    30 tablet    Take 1 tablet (50 mg) by mouth daily    Benign essential hypertension       METFORMIN HCL PO      Take by mouth 2 times daily (with meals)    Benign nodular prostatic hyperplasia without lower urinary tract symptoms, Need for prophylactic vaccination against  Streptococcus pneumoniae (pneumococcus), Screening for AAA (abdominal aortic aneurysm), History of smoking       omeprazole 20 MG CR capsule    priLOSEC     Take 20 mg by mouth daily.        oxybutynin 5 MG tablet    DITROPAN    90 tablet    Take 0.5 tablets (2.5 mg) by mouth At Bedtime    Benign prostatic hyperplasia with urinary obstruction       senna-docusate 8.6-50 MG per tablet    SENOKOT-S;PERICOLACE    10 tablet    Take 1-2 tablets by mouth 2 times daily as needed for constipation Take while on oral narcotics to prevent or treat constipation.    Hypercalcemia, Primary hyperparathyroidism (H)       TOPROL  MG 24 hr tablet   Generic drug:  metoprolol succinate      Take 0.5 tablets (50 mg) by mouth daily        VITAMIN D (CHOLECALCIFEROL) PO      Take 1,000 Units by mouth every morning

## 2018-05-31 ENCOUNTER — OFFICE VISIT (OUTPATIENT)
Dept: PULMONOLOGY | Facility: CLINIC | Age: 73
End: 2018-05-31
Attending: INTERNAL MEDICINE
Payer: MEDICARE

## 2018-05-31 VITALS
WEIGHT: 292 LBS | HEIGHT: 71 IN | BODY MASS INDEX: 40.88 KG/M2 | HEART RATE: 62 BPM | DIASTOLIC BLOOD PRESSURE: 83 MMHG | RESPIRATION RATE: 18 BRPM | SYSTOLIC BLOOD PRESSURE: 153 MMHG | OXYGEN SATURATION: 96 %

## 2018-05-31 DIAGNOSIS — R06.09 DYSPNEA ON EXERTION: ICD-10-CM

## 2018-05-31 DIAGNOSIS — E66.01 MORBID OBESITY (H): Primary | ICD-10-CM

## 2018-05-31 DIAGNOSIS — Z77.098 HISTORY OF AGENT ORANGE EXPOSURE: ICD-10-CM

## 2018-05-31 PROCEDURE — G0463 HOSPITAL OUTPT CLINIC VISIT: HCPCS | Mod: ZF

## 2018-05-31 ASSESSMENT — PAIN SCALES - GENERAL: PAINLEVEL: NO PAIN (0)

## 2018-05-31 NOTE — Clinical Note
Reviewed his PFTs, interpretation was limited by inability to meet ATS critieria for testing but they have changed compared with previous so I would like him to have a CT chest to make sure there is nothing else going on.  Can you please call the patient and let him know and assist with scheduling? Thanks, Nya

## 2018-05-31 NOTE — NURSING NOTE
Chief Complaint   Patient presents with     RECHECK     6 month follow up    Maria Elena Lebron CMA

## 2018-05-31 NOTE — LETTER
5/31/2018       RE: Lux Velasco  [address]  [facility] [id number]     Dear Colleague,    Thank you for referring your patient, Lux Velasco, to the [facility] at [facility]. Please see a copy of my visit note below.    Pulmonary Clinic Return Visit  History of Present Illness    Mr. Lux Velasco is a 72-year old male with history of CAD/MI, CVA in 2003 (no sequela), obesity (BMI 40), who presents to pulmonary clinic today for follow up of SOB.  To briefly review, he was seen in fellows clinic in August 2017 for initial consultation.  At that time a CT chest from 5/2017 was reviewed and notable for slightly thickened bronchi at lung bases and mosaic attenuation.  Dr. Carranza felt that his shortness of breath was multi-factorial in etiology and due in parts to asthma, obesity, and deconditioning.  She recommended PPI, albuterol as needed and regular aerobic exercise.  He returned to clinic with me about 6 months ago symptomatically unchanged.  I recommended addition of low dose ICS for possible asthma/chronic bronchitis picture as well as a strong recommendation for weight loss for obesity/deconditioning.    Mr. Velasco returns to clinic today, again feeling symptomatically unchanged in regards to his dyspnea. He continues to have trouble breathing including dyspnea on exertion as well as some shortness of breath at rest.  His dyspnea has not worsened since his last visit though and he cannot identify any specific triggers for his shortness of breath except for noting it is worse at night when he lays down.  He endorses a mild occasional cough productive of clear phlegm.  He reports daily use of his low dose ICS and daily use of albuterol with some improvement in symptoms.  He denies hemoptysis, fevers, chills, or recurrent pneumonias.    He tells me he has recently undergone a coronary angiogram which sounds like he was found  to have mild coronary disease which is being treated medically.  Recent echo showed normal LVEF 65-70% with normal RV size and function.  He complains of frequent daytime tiredness and tells me he recently fell asleep in the waiting room at the eye doctor.  He remains adamant that breathing issues are related to agent orange exposure.      Review of Systems:  10 of 14 systems reviewed and are negative unless otherwise stated in HPI.    Past Medical History:   Diagnosis Date     Diabetes mellitus type II 2005     History of agent Orange exposure 4/17/2013     Hypertension      Myocardial infarct 01/01/1999     Primary hyperparathyroidism (H) Dx approx 2003     Stroke (H) 01/01/1998    recovered fully       Past Surgical History:   Procedure Laterality Date     BIOPSY OF SKIN LESION       BYPASS GRAFT ARTERY CORONARY       CATARACT IOL, RT/LT Bilateral 2017    [facility]     EXCISE MASS LOWER EXTREMITY Left 11/3/2017    Procedure: EXCISE MASS LOWER EXTREMITY;  Excision Mass Left Flank;  Surgeon: Marybeth Gambino MD;  Location:  OR     MOHS MICROGRAPHIC PROCEDURE       PARATHYROIDECTOMY N/A 3/21/2017    Procedure: PARATHYROIDECTOMY;  Surgeon: Julianna Short MD;  Location: [facility] OR     PARATHYROIDECTOMY         Family History   Problem Relation Age of Onset     Melanoma Mother      Melanoma Father      CANCER No family hx of      no skin cancer     Glaucoma No family hx of      Macular Degeneration No family hx of        Social History     Social History     Marital status:      Spouse name: N/A     Number of children: N/A     Years of education: N/A     Social History Main Topics     Smoking status: Former Smoker     Quit date: 1970     Smokeless tobacco: Former User     Alcohol use Yes      Comment: rarely     Drug use: No     Sexual activity: Not Asked     Other Topics Concern     None     Social History Narrative         Allergies   Allergen Reactions     Clindamycin      Eggs Other (See  Comments)     Pt states no longer having reaction, childhood allergy, eats eggs       Penicillins Other (See Comments)     Pt states PCN allergy is due to egg allergy     Propoxyphene Other (See Comments)     Pain         Current Outpatient Prescriptions:      acetaminophen 500 MG CAPS, Take 2 tablets by mouth daily., Disp: , Rfl:      albuterol (PROAIR HFA) 108 (90 BASE) MCG/ACT Inhaler, Inhale 2 puffs into the lungs every 4 hours as needed for shortness of breath / dyspnea, wheezing or other (use prior to exertion/activities), Disp: 1 Inhaler, Rfl: 3     alfuzosin (UROXATRAL) 10 MG 24 hr tablet, Take 1 tablet (10 mg) by mouth daily, Disp: 90 tablet, Rfl: 3     ammonium lactate (AMLACTIN) 12 % cream, Apply  topically daily., Disp: , Rfl:      artificial tears SOLN, Use one drop to both eye PRN., Disp: , Rfl:      ASPIRIN PO, Take 81 mg by mouth daily, Disp: , Rfl:      ATORVASTATIN CALCIUM PO, Take 40 mg by mouth daily, Disp: , Rfl:      doxycycline (VIBRAMYCIN) 100 MG capsule, Take 1 capsule (100 mg) by mouth daily, Disp: 30 capsule, Rfl: 11     finasteride (PROSCAR) 5 MG tablet, Take 1 tablet (5 mg) by mouth daily, Disp: 90 tablet, Rfl: 3     fluticasone (FLOVENT HFA) 110 MCG/ACT Inhaler, Inhale 1 puff into the lungs 2 times daily, Disp: 3 Inhaler, Rfl: 1     guaiFENesin-codeine (ROBITUSSIN AC) 100-10 MG/5ML SOLN solution, Take 5-10 mLs by mouth every 4 hours as needed for cough, Disp: 420 mL, Rfl: 0     isosorbide mononitrate (IMDUR) 30 MG 24 hr tablet, Take 1 tablet (30 mg) by mouth daily, Disp: 90 tablet, Rfl: 3     ketotifen (ZADITOR/REFRESH ANTI-ITCH) 0.025 % SOLN ophthalmic solution, Place 1 drop into both eyes 2 times daily, Disp: , Rfl:      losartan (COZAAR) 50 MG tablet, Take 1 tablet (50 mg) by mouth daily (Patient taking differently: Take 50 mg by mouth every morning ), Disp: 30 tablet, Rfl: 3     METFORMIN HCL PO, Take by mouth 2 times daily (with meals), Disp: , Rfl:      metoprolol succinate  "(TOPROL-XL) 100 MG 24 hr tablet, Take 0.5 tablets (50 mg) by mouth daily, Disp: , Rfl:      omeprazole (PRILOSEC) 20 MG capsule, Take 20 mg by mouth daily., Disp: , Rfl:      oxybutynin (DITROPAN) 5 MG tablet, Take 0.5 tablets (2.5 mg) by mouth At Bedtime, Disp: 90 tablet, Rfl: 3     senna-docusate (SENOKOT-S;PERICOLACE) 8.6-50 MG per tablet, Take 1-2 tablets by mouth 2 times daily as needed for constipation Take while on oral narcotics to prevent or treat constipation., Disp: 10 tablet, Rfl: 0     VITAMIN D, CHOLECALCIFEROL, PO, Take 1,000 Units by mouth every morning , Disp: , Rfl:       Physical Exam:  /83  Pulse 62  Resp 18  Ht 1.803 m (5' 11\")  Wt 132.5 kg (292 lb)  SpO2 96%  BMI 40.73 kg/m2  GENERAL: Well developed, well nourished, alert, and in no apparent distress.  HEENT: Normocephalic, atraumatic. PERRL, EOMI. Oral mucosa is moist. No perioral cyanosis.  NECK: supple, no masses, no thyromegaly.  RESP:  Normal respiratory effort.  CTAB.  No rales, wheezes, rhonchi.  Normal to percussion.  No cyanosis or clubbing.  CV: Normal S1, S2, regular rhythm, normal rate. No murmur.  No LE edema.   ABDOMEN:  Soft, non-tender, non-distended.   SKIN: warm and dry. No rash.  NEURO: AAOx3.  Normal gait.  No focal neuro deficits.  PSYCH: mentation appears normal. and affect normal/bright    Results:  No new results.    Assessment and Plan:   Lux Velasco is a 72 year old male with a history of  CAD/MI, CVA in 2003 (no sequela), obesity (BMI 40), who presents to pulmonary clinic today for follow up of SOB.  Since he was last seen in clinic he is symptomatically unchanged -- not better or worse.  We discussed a few things:  1) Since he feels he has mild symptomatic benefit on low-dose inhaled corticosteroid and albuterol as needed I think it is reasonable to continue this.  This is further supported by CT scan from May which showed very mild bronchial wall thickening and mosaic attenuation which may be seen in " "bronchitis.  Beyond this, he does not endorse problems with recurrent pneumonias, episodes of acute bronchitis or anything that sounds like an asthma exacerbation and so I see no reason to escalate his therapy beyond this.    2) Undiagnosed SEYMOUR --  I strongly suspect he has underlying sleep apnea.  This is suggested by STOP BANG score of 5-6/8 and the fact that he has daytime fatigue and a recent history of falling asleep in a public place during the day.  I explained to him that underlying untreated sleep apnea can cause undue stress and strain on his cardiopulmonary system and can be a cause of daytime dyspnea.  I strongly recommended that we placed a referral for sleep clinic for further evaluation of this.  Mr. Velasco maintained that he had previously been seen in sleep clinic and was told that he was \"not a candidate for sleep study because he moves around too much at night.\"  I encouraged him to revisit this and proposed starting with an appointment in clinic to discuss his concerns with one of our sleep providers before agreeing to a study.  He was not interested in pursuing this.  3) Obesity-deconditioning.  I remain concerned that the largest  of his dyspnea is related to obesity and deconditioning.  I have again encouraged him to lose weight and participate in regular a erobic activity.  I have provided him with a referral for nutrition services to make recommendations regarding diet and lifestyle changes which may help him lose weight.   4) Agent orange exposure as a cause of his respiratory problems -- I strongly recommended that he [facility] and discussion around this.  He is agreeable to this.  PFTs from last May were entirely within normal limits.  It would be reasonable to repeat PFTs today to ensure that nothing has changed significantly. As symptoms have not worsened, I doubt there will be significant change.  I cannot " otherwise identify any major pulmonary etiology of his dyspnea and have strongly encouraged lifestyle modifications and sleep evaluation as described above.    Questions and concerns were answered to the patient's satisfaction.  He was provided with my contact information should new questions or concerns arise in the interim.  He may return to clinic on an as needed basis, but plans to [facility] to further investigate the role of agent orange in his health.  He is up to date on a seasonal influenza vaccine, pneumovax (2005), and Prevnar (2013).     Nya Jacques MD  Pulmonary and Critical Care Medicine    The above note was dictated using voice recognition software and may include typographical errors. Please contact the author for any clarifications.  I spent a total of 30 minutes face to face with Lux Velasco during today's office visit. Over 50% of this time was spent counseling the patient and/or coordinating care regarding their pulmonary disease.      Again, thank you for allowing me to participate in the care of your patient.      Sincerely,    María Jacques MD

## 2018-05-31 NOTE — PROGRESS NOTES
Pulmonary Clinic Return Visit  History of Present Illness    Mr. Lux Velasco is a 72-year old male with history of CAD/MI, CVA in 2003 (no sequela), obesity (BMI 40), who presents to pulmonary clinic today for follow up of SOB.  To briefly review, he was seen in fellows clinic in August 2017 for initial consultation.  At that time a CT chest from 5/2017 was reviewed and notable for slightly thickened bronchi at lung bases and mosaic attenuation.  Dr. Carranza felt that his shortness of breath was multi-factorial in etiology and due in parts to asthma, obesity, and deconditioning.  She recommended PPI, albuterol as needed and regular aerobic exercise.  He returned to clinic with me about 6 months ago symptomatically unchanged.  I recommended addition of low dose ICS for possible asthma/chronic bronchitis picture as well as a strong recommendation for weight loss for obesity/deconditioning.    Mr. Velasco returns to clinic today, again feeling symptomatically unchanged in regards to his dyspnea. He continues to have trouble breathing including dyspnea on exertion as well as some shortness of breath at rest.  His dyspnea has not worsened since his last visit though and he cannot identify any specific triggers for his shortness of breath except for noting it is worse at night when he lays down.  He endorses a mild occasional cough productive of clear phlegm.  He reports daily use of his low dose ICS and daily use of albuterol with some improvement in symptoms.  He denies hemoptysis, fevers, chills, or recurrent pneumonias.    He tells me he has recently undergone a coronary angiogram which sounds like he was found to have mild coronary disease which is being treated medically.  Recent echo showed normal LVEF 65-70% with normal RV size and function.  He complains of frequent daytime tiredness and tells me he recently fell asleep in the waiting room at the eye doctor.  He remains adamant that breathing issues are related  to agent orange exposure.      Review of Systems:  10 of 14 systems reviewed and are negative unless otherwise stated in HPI.    Past Medical History:   Diagnosis Date     Diabetes mellitus type II 2005     History of agent Orange exposure 4/17/2013     Hypertension      Myocardial infarct 01/01/1999     Primary hyperparathyroidism (H) Dx approx 2003     Stroke (H) 01/01/1998    recovered fully       Past Surgical History:   Procedure Laterality Date     BIOPSY OF SKIN LESION       BYPASS GRAFT ARTERY CORONARY       CATARACT IOL, RT/LT Bilateral 2017    [facility]     EXCISE MASS LOWER EXTREMITY Left 11/3/2017    Procedure: EXCISE MASS LOWER EXTREMITY;  Excision Mass Left Flank;  Surgeon: Marybeth Gambino MD;  Location:  OR     MOHS MICROGRAPHIC PROCEDURE       PARATHYROIDECTOMY N/A 3/21/2017    Procedure: PARATHYROIDECTOMY;  Surgeon: Julianna Short MD;  Location:  OR     PARATHYROIDECTOMY         Family History   Problem Relation Age of Onset     Melanoma Mother      Melanoma Father      CANCER No family hx of      no skin cancer     Glaucoma No family hx of      Macular Degeneration No family hx of        Social History     Social History     Marital status:      Spouse name: N/A     Number of children: N/A     Years of education: N/A     Social History Main Topics     Smoking status: Former Smoker     Quit date: 1970     Smokeless tobacco: Former User     Alcohol use Yes      Comment: rarely     Drug use: No     Sexual activity: Not Asked     Other Topics Concern     None     Social History Narrative         Allergies   Allergen Reactions     Clindamycin      Eggs Other (See Comments)     Pt states no longer having reaction, childhood allergy, eats eggs       Penicillins Other (See Comments)     Pt states PCN allergy is due to egg allergy     Propoxyphene Other (See Comments)     Pain         Current Outpatient Prescriptions:      acetaminophen 500 MG CAPS, Take 2 tablets by mouth  daily., Disp: , Rfl:      albuterol (PROAIR HFA) 108 (90 BASE) MCG/ACT Inhaler, Inhale 2 puffs into the lungs every 4 hours as needed for shortness of breath / dyspnea, wheezing or other (use prior to exertion/activities), Disp: 1 Inhaler, Rfl: 3     alfuzosin (UROXATRAL) 10 MG 24 hr tablet, Take 1 tablet (10 mg) by mouth daily, Disp: 90 tablet, Rfl: 3     ammonium lactate (AMLACTIN) 12 % cream, Apply  topically daily., Disp: , Rfl:      artificial tears SOLN, Use one drop to both eye PRN., Disp: , Rfl:      ASPIRIN PO, Take 81 mg by mouth daily, Disp: , Rfl:      ATORVASTATIN CALCIUM PO, Take 40 mg by mouth daily, Disp: , Rfl:      doxycycline (VIBRAMYCIN) 100 MG capsule, Take 1 capsule (100 mg) by mouth daily, Disp: 30 capsule, Rfl: 11     finasteride (PROSCAR) 5 MG tablet, Take 1 tablet (5 mg) by mouth daily, Disp: 90 tablet, Rfl: 3     fluticasone (FLOVENT HFA) 110 MCG/ACT Inhaler, Inhale 1 puff into the lungs 2 times daily, Disp: 3 Inhaler, Rfl: 1     guaiFENesin-codeine (ROBITUSSIN AC) 100-10 MG/5ML SOLN solution, Take 5-10 mLs by mouth every 4 hours as needed for cough, Disp: 420 mL, Rfl: 0     isosorbide mononitrate (IMDUR) 30 MG 24 hr tablet, Take 1 tablet (30 mg) by mouth daily, Disp: 90 tablet, Rfl: 3     ketotifen (ZADITOR/REFRESH ANTI-ITCH) 0.025 % SOLN ophthalmic solution, Place 1 drop into both eyes 2 times daily, Disp: , Rfl:      losartan (COZAAR) 50 MG tablet, Take 1 tablet (50 mg) by mouth daily (Patient taking differently: Take 50 mg by mouth every morning ), Disp: 30 tablet, Rfl: 3     METFORMIN HCL PO, Take by mouth 2 times daily (with meals), Disp: , Rfl:      metoprolol succinate (TOPROL-XL) 100 MG 24 hr tablet, Take 0.5 tablets (50 mg) by mouth daily, Disp: , Rfl:      omeprazole (PRILOSEC) 20 MG capsule, Take 20 mg by mouth daily., Disp: , Rfl:      oxybutynin (DITROPAN) 5 MG tablet, Take 0.5 tablets (2.5 mg) by mouth At Bedtime, Disp: 90 tablet, Rfl: 3     senna-docusate  "(SENOKOT-S;PERICOLACE) 8.6-50 MG per tablet, Take 1-2 tablets by mouth 2 times daily as needed for constipation Take while on oral narcotics to prevent or treat constipation., Disp: 10 tablet, Rfl: 0     VITAMIN D, CHOLECALCIFEROL, PO, Take 1,000 Units by mouth every morning , Disp: , Rfl:       Physical Exam:  /83  Pulse 62  Resp 18  Ht 1.803 m (5' 11\")  Wt 132.5 kg (292 lb)  SpO2 96%  BMI 40.73 kg/m2  GENERAL: Well developed, well nourished, alert, and in no apparent distress.  HEENT: Normocephalic, atraumatic. PERRL, EOMI. Oral mucosa is moist. No perioral cyanosis.  NECK: supple, no masses, no thyromegaly.  RESP:  Normal respiratory effort.  CTAB.  No rales, wheezes, rhonchi.  Normal to percussion.  No cyanosis or clubbing.  CV: Normal S1, S2, regular rhythm, normal rate. No murmur.  No LE edema.   ABDOMEN:  Soft, non-tender, non-distended.   SKIN: warm and dry. No rash.  NEURO: AAOx3.  Normal gait.  No focal neuro deficits.  PSYCH: mentation appears normal. and affect normal/bright    Results:  No new results.    Assessment and Plan:   Lux Velasco is a 72 year old male with a history of  CAD/MI, CVA in 2003 (no sequela), obesity (BMI 40), who presents to pulmonary clinic today for follow up of SOB.  Since he was last seen in clinic he is symptomatically unchanged -- not better or worse.  We discussed a few things:  1) Since he feels he has mild symptomatic benefit on low-dose inhaled corticosteroid and albuterol as needed I think it is reasonable to continue this.  This is further supported by CT scan from May which showed very mild bronchial wall thickening and mosaic attenuation which may be seen in bronchitis.  Beyond this, he does not endorse problems with recurrent pneumonias, episodes of acute bronchitis or anything that sounds like an asthma exacerbation and so I see no reason to escalate his therapy beyond this.    2) Undiagnosed SEYMOUR --  I strongly suspect he has underlying sleep apnea.  " "This is suggested by STOP BANG score of 5-6/8 and the fact that he has daytime fatigue and a recent history of falling asleep in a public place during the day.  I explained to him that underlying untreated sleep apnea can cause undue stress and strain on his cardiopulmonary system and can be a cause of daytime dyspnea.  I strongly recommended that we placed a referral for sleep clinic for further evaluation of this.  Mr. Velasco maintained that he had previously been seen in sleep clinic and was told that he was \"not a candidate for sleep study because he moves around too much at night.\"  I encouraged him to revisit this and proposed starting with an appointment in clinic to discuss his concerns with one of our sleep providers before agreeing to a study.  He was not interested in pursuing this.  3) Obesity-deconditioning.  I remain concerned that the largest  of his dyspnea is related to obesity and deconditioning.  I have again encouraged him to lose weight and participate in regular a erobic activity.  I have provided him with a referral for nutrition services to make recommendations regarding diet and lifestyle changes which may help him lose weight.   4) Agent orange exposure as a cause of his respiratory problems -- I strongly recommended that he [facility] for [facility] and discussion around this.  He is agreeable to this.  PFTs from last May were entirely within normal limits.  It would be reasonable to repeat PFTs today to ensure that nothing has changed significantly. As symptoms have not worsened, I doubt there will be significant change.  I cannot otherwise identify any major pulmonary etiology of his dyspnea and have strongly encouraged lifestyle modifications and sleep evaluation as described above.    Questions and concerns were answered to the patient's satisfaction.  He was provided with my contact information should new questions or concerns " arise in the interim.  He may return to clinic on an as needed basis, but plans to [facility] to further investigate the role of agent orange in his health.  He is up to date on a seasonal influenza vaccine, pneumovax (2005), and Prevnar (2013).     Nya Jacques MD  Pulmonary and Critical Care Medicine    The above note was dictated using voice recognition software and may include typographical errors. Please contact the author for any clarifications.  I spent a total of 30 minutes face to face with Lux Velasco during today's office visit. Over 50% of this time was spent counseling the patient and/or coordinating care regarding their pulmonary disease.

## 2018-05-31 NOTE — MR AVS SNAPSHOT
After Visit Summary   5/31/2018    Lux Velasco    MRN: [id number]           Patient Information     Date Of Birth          1945        Visit Information        Provider Department      5/31/2018 11:00 AM María Jacques MD [facility]        Today's Diagnoses     Morbid obesity (H)    -  1    Dyspnea on exertion           Follow-ups after your visit        Additional Services     Nutrition Referral       Specify pt needs dietary counseling for weight loss                  Follow-up notes from your care team     Return if symptoms worsen or fail to improve.      Your next 10 appointments already scheduled     Jul 11, 2018  1:05 PM CDT   (Arrive by 12:50 PM)   Return Visit with Nam Duffy MD   [facility] ([facility])    [address]  [address] [facility] [address] [phone]            Jul 16, 2018 10:30 AM CDT   RETURN GENERAL with Migdalia Hussein MD   [facility] ([facility])    [address]  [facility] [address]   [phone]            Aug 03, 2018  3:00 PM CDT   (Arrive by 2:45 PM)   Return Visit with Marc Chapa MD   [facility] ([facility])    [address]  [address] [facility] [address] [phone]            Nov 27, 2018 11:00 AM CST   (Arrive by 10:45 AM)   Return Visit with Jorge Hanks MD   [facility] ([facility])    [address] [place] [address]  [address] [place] [address] [phone]              Future tests that were ordered for you today     Open Future Orders        Priority Expected Expires Ordered    General PFT Lab (Please always keep checked) Routine  5/31/2019 5/31/2018    Pulmonary Function Test Routine  5/31/2019 5/31/2018            Who to contact     If you have  "questions or need follow up information about today's clinic visit or your schedule please contact [facility] directly at [phone].  Normal or non-critical lab and imaging results will be communicated to you by Movelinehart, letter or phone within 4 business days after the clinic has received the results. If you do not hear from us within 7 days, please contact the clinic through Movelinehart or phone. If you have a critical or abnormal lab result, we will notify you by phone as soon as possible.  Submit refill requests through Vivartes or call your pharmacy and they will forward the refill request to us. Please allow 3 business days for your refill to be completed.          Additional Information About Your Visit        [employer] Information     Vivartes lets you send messages to your doctor, view your test results, renew your prescriptions, schedule appointments and more. To sign up, go to [email or web address] . Click on \"Log in\" on the left side of the screen, which will take you to the Welcome page. Then click on \"Sign up Now\" on the right side of the page.     You will be asked to enter the access code listed below, as well as some personal information. Please follow the directions to create your username and password.     Your access code is: [email or web address]  Expires: 2018  6:31 AM     Your access code will  in 90 days. If you need help or a new code, please call your [place] clinic or [phone].        Care EveryWhere ID     This is your Care EveryWhere ID. This could be used by other organizations to access your [place] medical records  [id number]        Your Vitals Were     Pulse Respirations Height Pulse Oximetry BMI (Body Mass Index)       62 18 1.803 m (5' 11\") 96% 40.73 kg/m2        Blood Pressure from Last 3 Encounters:   18 153/83   18 157/81   18 154/80    Weight from Last 3 Encounters:   18 132.5 kg (292 lb)   18 132.5 kg (292 " lb)   05/15/18 124.7 kg (275 lb)              We Performed the Following     Nutrition Referral          Today's Medication Changes          These changes are accurate as of 5/31/18 11:09 AM.  If you have any questions, ask your nurse or doctor.               These medicines have changed or have updated prescriptions.        Dose/Directions    losartan 50 MG tablet   Commonly known as:  COZAAR   This may have changed:  when to take this   Used for:  Benign essential hypertension        Dose:  50 mg   Take 1 tablet (50 mg) by mouth daily   Quantity:  30 tablet   Refills:  3                Primary Care Provider Office Phone # Fax #    Nam Duffy [phone]       [address] [address]        Equal Access to Services     SANDHYA DYE : Christiana Alicia, janel marshall, qahusam gimenezalmajer blanco, taty gambino. So [facility] [phone].    ATENCIÓN: Si habla español, tiene a back disposición servicios gratuitos de asistencia lingüística. Llame al [phone].    We comply with applicable federal civil rights laws and [place] laws. We do not discriminate on the basis of race, color, national origin, age, disability, sex, sexual orientation, or gender identity.            Thank you!     Thank you for choosing [facility]  for your care. Our goal is always to provide you with excellent care. Hearing back from our patients is one way we can continue to improve our services. Please take a few minutes to complete the written survey that you may receive in the mail after your visit with us. Thank you!             Your Updated Medication List - Protect others around you: Learn how to safely use, store and throw away your medicines at www.disposemymeds.org.          This list is accurate as of 5/31/18 11:09 AM.  Always use your most recent med list.                   Brand Name Dispense Instructions for use  Diagnosis    acetaminophen 500 MG Caps      Take 2 tablets by mouth daily.        albuterol 108 (90 Base) MCG/ACT Inhaler    PROAIR HFA    1 Inhaler    Inhale 2 puffs into the lungs every 4 hours as needed for shortness of breath / dyspnea, wheezing or other (use prior to exertion/activities)    Shortness of breath       alfuzosin 10 MG 24 hr tablet    UROXATRAL    90 tablet    Take 1 tablet (10 mg) by mouth daily    Lower urinary tract symptoms (LUTS)       ammonium lactate 12 % cream    AMLACTIN     Apply  topically daily.        artificial tears Soln      Use one drop to both eye PRN.        ASPIRIN PO      Take 81 mg by mouth daily        ATORVASTATIN CALCIUM PO      Take 40 mg by mouth daily        doxycycline 100 MG capsule    VIBRAMYCIN    30 capsule    Take 1 capsule (100 mg) by mouth daily    Ulcerative blepharitis of upper and lower eyelids of both eyes       finasteride 5 MG tablet    PROSCAR    90 tablet    Take 1 tablet (5 mg) by mouth daily    Lower urinary tract symptoms (LUTS)       fluticasone 110 MCG/ACT Inhaler    FLOVENT HFA    3 Inhaler    Inhale 1 puff into the lungs 2 times daily    Mild intermittent asthma, unspecified whether complicated, Dyspnea on exertion       guaiFENesin-codeine 100-10 MG/5ML Soln solution    ROBITUSSIN AC    420 mL    Take 5-10 mLs by mouth every 4 hours as needed for cough    Cough       isosorbide mononitrate 30 MG 24 hr tablet    IMDUR    90 tablet    Take 1 tablet (30 mg) by mouth daily    Angina, class III (H)       ketotifen 0.025 % Soln ophthalmic solution    ZADITOR/REFRESH ANTI-ITCH     Place 1 drop into both eyes 2 times daily        losartan 50 MG tablet    COZAAR    30 tablet    Take 1 tablet (50 mg) by mouth daily    Benign essential hypertension       METFORMIN HCL PO      Take by mouth 2 times daily (with meals)    Benign nodular prostatic hyperplasia without lower urinary tract symptoms, Need for prophylactic vaccination against Streptococcus pneumoniae  (pneumococcus), Screening for AAA (abdominal aortic aneurysm), History of smoking       omeprazole 20 MG CR capsule    priLOSEC     Take 20 mg by mouth daily.        oxybutynin 5 MG tablet    DITROPAN    90 tablet    Take 0.5 tablets (2.5 mg) by mouth At Bedtime    Benign prostatic hyperplasia with urinary obstruction       senna-docusate 8.6-50 MG per tablet    SENOKOT-S;PERICOLACE    10 tablet    Take 1-2 tablets by mouth 2 times daily as needed for constipation Take while on oral narcotics to prevent or treat constipation.    Hypercalcemia, Primary hyperparathyroidism (H)       TOPROL  MG 24 hr tablet   Generic drug:  metoprolol succinate      Take 0.5 tablets (50 mg) by mouth daily        VITAMIN D (CHOLECALCIFEROL) PO      Take 1,000 Units by mouth every morning

## 2018-06-06 DIAGNOSIS — R06.09 DYSPNEA ON EXERTION: ICD-10-CM

## 2018-06-06 DIAGNOSIS — E66.01 MORBID OBESITY (H): ICD-10-CM

## 2018-06-07 ENCOUNTER — TELEPHONE (OUTPATIENT)
Dept: PULMONOLOGY | Facility: CLINIC | Age: 73
End: 2018-06-07

## 2018-06-07 NOTE — TELEPHONE ENCOUNTER
Left message for pt to return call to clinic to advise that Dr. Jacques has reviewed his PFTs, interpretation was limited by inability to meet ATS critieria for testing but they have changed compared with previous so she would like him to have a CT chest to make sure there is nothing else going on.

## 2018-06-19 NOTE — TELEPHONE ENCOUNTER
Attempted to contact pt and left 4th voice message.  Letter mailed to pt's home address with results and follow up recommendations.

## 2018-07-11 ENCOUNTER — RADIANT APPOINTMENT (OUTPATIENT)
Dept: CT IMAGING | Facility: CLINIC | Age: 73
End: 2018-07-11
Attending: INTERNAL MEDICINE
Payer: MEDICARE

## 2018-07-11 ENCOUNTER — TELEPHONE (OUTPATIENT)
Dept: PULMONOLOGY | Facility: CLINIC | Age: 73
End: 2018-07-11

## 2018-07-11 ENCOUNTER — OFFICE VISIT (OUTPATIENT)
Dept: INTERNAL MEDICINE | Facility: CLINIC | Age: 73
End: 2018-07-11
Payer: MEDICARE

## 2018-07-11 VITALS
HEART RATE: 72 BPM | BODY MASS INDEX: 40.01 KG/M2 | WEIGHT: 286.9 LBS | TEMPERATURE: 97.9 F | DIASTOLIC BLOOD PRESSURE: 84 MMHG | OXYGEN SATURATION: 96 % | SYSTOLIC BLOOD PRESSURE: 135 MMHG

## 2018-07-11 DIAGNOSIS — R06.02 SOB (SHORTNESS OF BREATH): Primary | ICD-10-CM

## 2018-07-11 DIAGNOSIS — Z77.098 HISTORY OF AGENT ORANGE EXPOSURE: ICD-10-CM

## 2018-07-11 DIAGNOSIS — R06.02 SOB (SHORTNESS OF BREATH): ICD-10-CM

## 2018-07-11 DIAGNOSIS — R06.09 DYSPNEA ON EXERTION: ICD-10-CM

## 2018-07-11 DIAGNOSIS — E66.01 MORBID OBESITY (H): ICD-10-CM

## 2018-07-11 LAB
ALBUMIN SERPL-MCNC: 3.5 G/DL (ref 3.4–5)
ALP SERPL-CCNC: 94 U/L (ref 40–150)
ALT SERPL W P-5'-P-CCNC: 42 U/L (ref 0–70)
ANION GAP SERPL CALCULATED.3IONS-SCNC: 7 MMOL/L (ref 3–14)
AST SERPL W P-5'-P-CCNC: 24 U/L (ref 0–45)
BASOPHILS # BLD AUTO: 0 10E9/L (ref 0–0.2)
BASOPHILS NFR BLD AUTO: 0.7 %
BILIRUB SERPL-MCNC: 0.4 MG/DL (ref 0.2–1.3)
BUN SERPL-MCNC: 12 MG/DL (ref 7–30)
CALCIUM SERPL-MCNC: 8.6 MG/DL (ref 8.5–10.1)
CHLORIDE SERPL-SCNC: 107 MMOL/L (ref 94–109)
CO2 SERPL-SCNC: 26 MMOL/L (ref 20–32)
CREAT SERPL-MCNC: 0.81 MG/DL (ref 0.66–1.25)
D DIMER PPP FEU-MCNC: 0.3 UG/ML FEU (ref 0–0.5)
DIFFERENTIAL METHOD BLD: ABNORMAL
EOSINOPHIL # BLD AUTO: 0.3 10E9/L (ref 0–0.7)
EOSINOPHIL NFR BLD AUTO: 4.5 %
ERYTHROCYTE [DISTWIDTH] IN BLOOD BY AUTOMATED COUNT: 14.2 % (ref 10–15)
GFR SERPL CREATININE-BSD FRML MDRD: >90 ML/MIN/1.7M2
GLUCOSE SERPL-MCNC: 161 MG/DL (ref 70–99)
HCT VFR BLD AUTO: 38.7 % (ref 40–53)
HGB BLD-MCNC: 12.7 G/DL (ref 13.3–17.7)
IMM GRANULOCYTES # BLD: 0 10E9/L (ref 0–0.4)
IMM GRANULOCYTES NFR BLD: 0.3 %
INTERPRETATION ECG - MUSE: NORMAL
LYMPHOCYTES # BLD AUTO: 1.8 10E9/L (ref 0.8–5.3)
LYMPHOCYTES NFR BLD AUTO: 31 %
MCH RBC QN AUTO: 27.9 PG (ref 26.5–33)
MCHC RBC AUTO-ENTMCNC: 32.8 G/DL (ref 31.5–36.5)
MCV RBC AUTO: 85 FL (ref 78–100)
MONOCYTES # BLD AUTO: 0.4 10E9/L (ref 0–1.3)
MONOCYTES NFR BLD AUTO: 5.9 %
NEUTROPHILS # BLD AUTO: 3.4 10E9/L (ref 1.6–8.3)
NEUTROPHILS NFR BLD AUTO: 57.6 %
NRBC # BLD AUTO: 0 10*3/UL
NRBC BLD AUTO-RTO: 0 /100
PLATELET # BLD AUTO: 169 10E9/L (ref 150–450)
POTASSIUM SERPL-SCNC: 4.3 MMOL/L (ref 3.4–5.3)
PROT SERPL-MCNC: 7.1 G/DL (ref 6.8–8.8)
RBC # BLD AUTO: 4.56 10E12/L (ref 4.4–5.9)
SODIUM SERPL-SCNC: 141 MMOL/L (ref 133–144)
TROPONIN I SERPL-MCNC: <0.015 UG/L (ref 0–0.04)
WBC # BLD AUTO: 5.9 10E9/L (ref 4–11)

## 2018-07-11 PROCEDURE — 85379 FIBRIN DEGRADATION QUANT: CPT | Performed by: INTERNAL MEDICINE

## 2018-07-11 ASSESSMENT — PAIN SCALES - GENERAL: PAINLEVEL: MILD PAIN (3)

## 2018-07-11 NOTE — PROGRESS NOTES
HPI:    Pt. Comes in for follow up today. He had parathyroid surgery with Dr. Short 3/21/17 for hypercalciemia. He was seen in Dermatology 11/28/16.  He has followed up with Dr. Sullivan Cardiology at [facility] as well as Dr. Hanks, Cardiology here 1/20/18. He states chronic  SOB and chest pain.  He states he had cor angiogram at [facility] about one month ago that did not have obstructive CAD.       PE:    Vitals noted my recheck manual large cuff R arm several times 141/79; gen nad cooperative alert,  HEENT, ears normal oropharynx clear no exudate, neck supple nl rom no adenopathy,  LCTA, B, RRR, S1, S2, systolic murmur present. Neurological exam B UE/LE/proximal/distal is normal and symmetric for sensation, reflexes, and strength. Gait is normal.        CXR 8/30/17; no acute pulmonary disease    PFT's 8/30/17:  FVC 88%, FEV1 98%; 6/6/2018:  FVC 43%, FEV1 48%    Resting echo 1/17/17 normal EF 60-65% and repeat for murmur 4/26/2018 no valvular disease    CTA 5/23/17; minimal non-obstructive CAD    EKG; SR at 64; PAC's no change from 4/25/2018    A/P:    1. Follow up parathyroid surgery. He was seen by Dr. Chou, Endo 4/5/17 and by Dr. Short 4/21/17; labs stable  2. He was seen in  Pulmonary 8/30/17 and again  by Dr. Jacques 11/30/17. He was given a Rx. For daily flovent. He was seen again Dr. Jacques 5/31/2018. Suprising significant? Decrease in PFT's.  Will discuss with Dr. Means. I recommended using contrast for his CT chest today to evaluate for PE. He adamantly refused contrast today. Will check D-dimer and if elevated discussed with Lux still recommend contrast or could consider sending to ED for VQ scan. He states he may get PFT's rechecked at [facility].  3. PSA checked 11/30/17  4. Colonoscopy at [facility] 8/15 repeat in 10 years.   5. He was seen  7/17 in Urology for BPH he was seen by Dr. Chapa  1/12/2018 and he commented also on renal cyst. He would like to see Urology here a [facility] and placed  referral today.   6. BP; he remains on same medications and a little on the lower side  7. He had minimal non-obstructive coronary CTA 5/23/17. He was seen 1/20/18 by Dr. Hanks Cardiology. As reported about regarding cor angiogram at [facility].   Future MRI/MRA brain ordered but he has not done this,  echo (stable from 4/26/2018), Holter Monitor (results pending?) and cardiology follow up.  Seen in Cardiology Ms. Brown 4/26/2018. He still complains of chest pain and refusing to go to ED today and he is aware of missing fatal CAD  8. Check with insurance for  New Shingles vaccine  9.  A1C for elevated glucose for 11/30/17 6.3%  10. Ordered labs 10/12/17 for mild anemia and lower platelets seen by Dr. Marina, Hematology  1/5/2018  11. Seen by Dr. Sanderson GI appt. For hepatic steatosis (liver U/S done 11/30/17) in 3/19/2018  12. Seen in ortho hand 5/15/2018 for L index finger issues         Total time spent 40 minutes.  More than 50% of the time spent with Mr. Velasco on counseling / coordinating his care

## 2018-07-11 NOTE — TELEPHONE ENCOUNTER
"Pulmonary Result Note:    Results from CT chest reviewed today:    1) New finding of numerous scattered sub-4mm pulmonary nodules throughout lung when compared to previous study from 5/2017.  Per current Fleischner criteria, optional CT could be obtained at 1 year for follow up.  2) Re-demonstration of mild bronchial wall thickening in keeping with patients known/suspected history of small airway disease.    No evidence of pulmonary fibrosis or other significant changes on CT to account for pts decline in PFTs.  Notably, PFTs did not meet ATS criteria for testing significantly limiting interpretation.  Per post-test comments provided by PFT tech,  \"appears patient was not giving best peak flow\".  I suspect this decline is reflective of sub-optimal effort as I cannot find any other new pulmonary process to explain this.    No further pulmonary testing recommended at this point. If he desires, CT could be repeated in 1 year for surveillance of nodules.    As his primary concerns remain that symptoms are related to agent orange exposure, I believe [facility] as I discussed with him at our last clinic appointment.    Case additionally d/w Dr. Curry, PCP.    Nya Jacques MD  Pulmonary and Critical Care Medicine    "

## 2018-07-11 NOTE — PATIENT INSTRUCTIONS
Primary Care Center Phone Number [phone]  Primary Care Center Medication Refill Request Information:  * Please contact your pharmacy regarding ANY request for medication refills.  ** [facility] Prescription Fax = [phone]  * Please allow 3 business days for routine medication refills.  * Please allow 5 business days for controlled substance medication refills.     Primary Care Center Test Result notification information:  *You will be notified with in 7-10 days of your appointment day regarding the results of your test.  If you are on MyChart you will be notified as soon as the provider has reviewed the results and signed off on them.

## 2018-07-11 NOTE — MR AVS SNAPSHOT
After Visit Summary   7/11/2018    Lux Velasco    MRN: [id number]           Patient Information     Date Of Birth          1945        Visit Information        Provider Department      7/11/2018 1:05 PM Nam Duffy MD [facility]        Today's Diagnoses     SOB (shortness of breath)    -  1      Care Instructions    Primary Care Center Phone Number [phone]  Primary Care Center Medication Refill Request Information:  * Please contact your pharmacy regarding ANY request for medication refills.  ** [facility] Prescription Fax = [phone]  * Please allow 3 business days for routine medication refills.  * Please allow 5 business days for controlled substance medication refills.     Primary Care Center Test Result notification information:  *You will be notified with in 7-10 days of your appointment day regarding the results of your test.  If you are on MyChart you will be notified as soon as the provider has reviewed the results and signed off on them.                    Follow-ups after your visit        Your next 10 appointments already scheduled     Jul 16, 2018 10:30 AM CDT   RETURN GENERAL with Migdalia Hussein MD   Eye Clinic ([facility])    [address]  [facility] [address]   [phone]            Aug 03, 2018  3:00 PM CDT   (Arrive by 2:45 PM)   Return Visit with Marc Chapa MD   [facility]rolog[facility] ([facility] [place])    [address]  4th Floor  [facility] [address] [phone]            Nov 27, 2018 11:00 AM CST   (Arrive by 10:45 AM)   Return Visit with Jorge Hanks MD   [facility] ([facility] [place])    [address]  [address]  [facility] [address] [phone]              Who to contact     Please call your clinic at [phone] to:    Ask questions about your  health    Make or cancel appointments    Discuss your medicines    Learn about your test results    Speak to your doctor            Additional Information About Your Visit        Care EveryWhere ID     This is your Care EveryWhere ID. This could be used by other organizations to access your [place] medical records  [id number]        Your Vitals Were     Pulse Temperature Pulse Oximetry BMI (Body Mass Index)          72 97.9  F (36.6  C) (Oral) 96% 40.01 kg/m2         Blood Pressure from Last 3 Encounters:   07/11/18 135/84   05/31/18 153/83   05/22/18 157/81    Weight from Last 3 Encounters:   07/11/18 130.1 kg (286 lb 14.4 oz)   05/31/18 132.5 kg (292 lb)   05/22/18 132.5 kg (292 lb)              We Performed the Following     EKG Performed in Clinic w/ Provider Reading Fee          Today's Medication Changes          These changes are accurate as of 7/11/18  5:54 PM.  If you have any questions, ask your nurse or doctor.               These medicines have changed or have updated prescriptions.        Dose/Directions    losartan 50 MG tablet   Commonly known as:  COZAAR   This may have changed:  when to take this   Used for:  Benign essential hypertension        Dose:  50 mg   Take 1 tablet (50 mg) by mouth daily   Quantity:  30 tablet   Refills:  3                Primary Care Provider Office Phone # Fax #    Nam W MD Tati [phone] [address] [address]        Equal Access to Services     [facility]AN [facility] AH: Christiana Alicia, janel marshall, qazeenatta kaalmataty still . So [facility] [phone].    ATENCIÓN: Si habla español, tiene a back disposición servicios gratuitos de asistencia lingüística. Llame al [phone].    We comply with applicable federal civil rights laws and [place] laws. We do not discriminate on the basis of race, color, national origin, age, disability, sex, sexual orientation, or gender  identity.            Thank you!     Thank you for choosing [facility]  for your care. Our goal is always to provide you with excellent care. Hearing back from our patients is one way we can continue to improve our services. Please take a few minutes to complete the written survey that you may receive in the mail after your visit with us. Thank you!             Your Updated Medication List - Protect others around you: Learn how to safely use, store and throw away your medicines at www.disposemymeds.org.          This list is accurate as of 7/11/18  5:54 PM.  Always use your most recent med list.                   Brand Name Dispense Instructions for use Diagnosis    acetaminophen 500 MG Caps      Take 2 tablets by mouth daily.        albuterol 108 (90 Base) MCG/ACT Inhaler    PROAIR HFA    1 Inhaler    Inhale 2 puffs into the lungs every 4 hours as needed for shortness of breath / dyspnea, wheezing or other (use prior to exertion/activities)    Shortness of breath       alfuzosin 10 MG 24 hr tablet    UROXATRAL    90 tablet    Take 1 tablet (10 mg) by mouth daily    Lower urinary tract symptoms (LUTS)       ammonium lactate 12 % cream    AMLACTIN     Apply  topically daily.        artificial tears Soln      Use one drop to both eye PRN.        ASPIRIN PO      Take 81 mg by mouth daily        ATORVASTATIN CALCIUM PO      Take 40 mg by mouth daily        doxycycline 100 MG capsule    VIBRAMYCIN    30 capsule    Take 1 capsule (100 mg) by mouth daily    Ulcerative blepharitis of upper and lower eyelids of both eyes       finasteride 5 MG tablet    PROSCAR    90 tablet    Take 1 tablet (5 mg) by mouth daily    Lower urinary tract symptoms (LUTS)       fluticasone 110 MCG/ACT Inhaler    FLOVENT HFA    3 Inhaler    Inhale 1 puff into the lungs 2 times daily    Mild intermittent asthma, unspecified whether complicated, Dyspnea on exertion       guaiFENesin-codeine 100-10 MG/5ML Soln solution    ROBITUSSIN  AC    420 mL    Take 5-10 mLs by mouth every 4 hours as needed for cough    Cough       isosorbide mononitrate 30 MG 24 hr tablet    IMDUR    90 tablet    Take 1 tablet (30 mg) by mouth daily    Angina, class III (H)       ketotifen 0.025 % Soln ophthalmic solution    ZADITOR/REFRESH ANTI-ITCH     Place 1 drop into both eyes 2 times daily        losartan 50 MG tablet    COZAAR    30 tablet    Take 1 tablet (50 mg) by mouth daily    Benign essential hypertension       METFORMIN HCL PO      Take by mouth 2 times daily (with meals)    Benign nodular prostatic hyperplasia without lower urinary tract symptoms, Need for prophylactic vaccination against Streptococcus pneumoniae (pneumococcus), Screening for AAA (abdominal aortic aneurysm), History of smoking       omeprazole 20 MG CR capsule    priLOSEC     Take 20 mg by mouth daily.        oxybutynin 5 MG tablet    DITROPAN    90 tablet    Take 0.5 tablets (2.5 mg) by mouth At Bedtime    Benign prostatic hyperplasia with urinary obstruction       senna-docusate 8.6-50 MG per tablet    SENOKOT-S;PERICOLACE    10 tablet    Take 1-2 tablets by mouth 2 times daily as needed for constipation Take while on oral narcotics to prevent or treat constipation.    Hypercalcemia, Primary hyperparathyroidism (H)       TOPROL  MG 24 hr tablet   Generic drug:  metoprolol succinate      Take 0.5 tablets (50 mg) by mouth daily        VITAMIN D (CHOLECALCIFEROL) PO      Take 1,000 Units by mouth every morning

## 2018-07-13 LAB
DLCOUNC-%PRED-PRE: 87 %
DLCOUNC-PRE: 22.46 ML/MIN/MMHG
DLCOUNC-PRED: 25.77 ML/MIN/MMHG
ERV-%PRED-PRE: 22 %
ERV-PRE: 0.1 L
ERV-PRED: 0.45 L
EXPTIME-PRE: 3.22 SEC
FEF2575-%PRED-PRE: 72 %
FEF2575-PRE: 1.71 L/SEC
FEF2575-PRED: 2.35 L/SEC
FEFMAX-%PRED-PRE: 32 %
FEFMAX-PRE: 2.68 L/SEC
FEFMAX-PRED: 8.23 L/SEC
FEV1-%PRED-PRE: 48 %
FEV1-PRE: 1.55 L
FEV1FEV6-PRE: 83 %
FEV1FEV6-PRED: 77 %
FEV1FVC-PRE: 83 %
FEV1FVC-PRED: 73 %
FEV1SVC-PRE: 85 %
FEV1SVC-PRED: 66 %
FIFMAX-PRE: 1.54 L/SEC
FRCPLETH-%PRED-PRE: 91 %
FRCPLETH-PRE: 3.46 L
FRCPLETH-PRED: 3.79 L
FVC-%PRED-PRE: 43 %
FVC-PRE: 1.86 L
FVC-PRED: 4.26 L
IC-%PRED-PRE: 38 %
IC-PRE: 1.71 L
IC-PRED: 4.41 L
RVPLETH-%PRED-PRE: 122 %
RVPLETH-PRE: 3.36 L
RVPLETH-PRED: 2.75 L
TLCPLETH-%PRED-PRE: 70 %
TLCPLETH-PRE: 5.18 L
TLCPLETH-PRED: 7.33 L
VA-%PRED-PRE: 57 %
VA-PRE: 4 L
VC-%PRED-PRE: 37 %
VC-PRE: 1.82 L
VC-PRED: 4.86 L

## 2018-07-16 ENCOUNTER — OFFICE VISIT (OUTPATIENT)
Dept: OPHTHALMOLOGY | Facility: CLINIC | Age: 73
End: 2018-07-16
Attending: OPHTHALMOLOGY
Payer: MEDICARE

## 2018-07-16 DIAGNOSIS — H52.203 MYOPIC ASTIGMATISM OF BOTH EYES: ICD-10-CM

## 2018-07-16 DIAGNOSIS — H52.13 MYOPIC ASTIGMATISM OF BOTH EYES: ICD-10-CM

## 2018-07-16 DIAGNOSIS — Z96.1 PSEUDOPHAKIA, BOTH EYES: ICD-10-CM

## 2018-07-16 DIAGNOSIS — H52.4 PRESBYOPIA: ICD-10-CM

## 2018-07-16 DIAGNOSIS — H04.123 DRY EYES, BILATERAL: Primary | ICD-10-CM

## 2018-07-16 PROCEDURE — G0463 HOSPITAL OUTPT CLINIC VISIT: HCPCS | Mod: ZF

## 2018-07-16 PROCEDURE — 68761 CLOSE TEAR DUCT OPENING: CPT | Mod: ZF,50 | Performed by: OPHTHALMOLOGY

## 2018-07-16 ASSESSMENT — REFRACTION_WEARINGRX
OS_ADD: +2.00
OD_AXIS: 007
SPECS_TYPE: BIFOCAL
OD_ADD: +2.00
OD_CYLINDER: +1.50
OS_AXIS: 180
OS_CYLINDER: +0.50
OS_SPHERE: -0.50
OD_SPHERE: -0.50

## 2018-07-16 ASSESSMENT — TONOMETRY
OD_IOP_MMHG: 18
OS_IOP_MMHG: 17
IOP_METHOD: TONOPEN

## 2018-07-16 ASSESSMENT — CUP TO DISC RATIO
OD_RATIO: 0.3
OS_RATIO: 0.3

## 2018-07-16 ASSESSMENT — VISUAL ACUITY
METHOD: SNELLEN - LINEAR
OS_CC: 20/20
CORRECTION_TYPE: GLASSES
OD_CC: 20/20
OS_CC+: -1

## 2018-07-16 ASSESSMENT — CONF VISUAL FIELD
METHOD: COUNTING FINGERS
OD_NORMAL: 1
OS_NORMAL: 1

## 2018-07-16 ASSESSMENT — EXTERNAL EXAM - RIGHT EYE: OD_EXAM: NORMAL

## 2018-07-16 ASSESSMENT — EXTERNAL EXAM - LEFT EYE: OS_EXAM: NORMAL

## 2018-07-16 NOTE — MR AVS SNAPSHOT
After Visit Summary   7/16/2018    Lux Velasco    MRN: [id number]           Patient Information     Date Of Birth          1945        Visit Information        Provider Department      7/16/2018 10:30 AM Migdalia Hussein MD [facility]         Follow-ups after your visit        Your next 10 appointments already scheduled     Aug 03, 2018  3:00 PM CDT   (Arrive by 2:45 PM)   Return Visit with Marc Chapa MD   [facility] Urology and [facility] for Prostate and Urologic Cancers ([facility] [place])    [address]  4th Floor  [place] [address] [phone]            Sep 24, 2018 10:30 AM CDT   RETURN GENERAL with Migdalia Hussein MD   Eye Clinic ([facility])    [address]  [facility] [address] [phone]            Nov 27, 2018 11:00 AM CST   (Arrive by 10:45 AM)   Return Visit with Jorge Hanks MD   [facility] ([facility])    [address]  [address]  [place] [address] [phone]              Who to contact     Please call your clinic at [phone] to:    Ask questions about your health    Make or cancel appointments    Discuss your medicines    Learn about your test results    Speak to your doctor            Additional Information About Your Visit        Care EveryWhere ID     This is your Care EveryWhere ID. This could be used by other organizations to access your [place] medical records  [id number]         Blood Pressure from Last 3 Encounters:   07/11/18 135/84   05/31/18 153/83   05/22/18 157/81    Weight from Last 3 Encounters:   07/11/18 130.1 kg (286 lb 14.4 oz)   05/31/18 132.5 kg (292 lb)   05/22/18 132.5 kg (292 lb)              Today, you had the following     No orders found for display         Today's Medication Changes          These changes are accurate as of 7/16/18 12:06 PM.  If you have any questions, ask your  nurse or doctor.               These medicines have changed or have updated prescriptions.        Dose/Directions    losartan 50 MG tablet   Commonly known as:  COZAAR   This may have changed:  when to take this   Used for:  Benign essential hypertension        Dose:  50 mg   Take 1 tablet (50 mg) by mouth daily   Quantity:  30 tablet   Refills:  3                Primary Care Provider Office Phone # Fax #    Nam Duffy [phone]       [address] [address]        Equal Access to Services     SANDHYA DYE : Hadii aad ku hadasho Soomaali, waaxda luqadaha, qaybta kaalmada adeegyada, waxay idiin hayaan adeeg kharadoris laparul gambino. So [facility] [phone].    ATENCIÓN: Si habla español, tiene a back disposición servicios gratuitos de asistencia lingüística. [facility] [phone].    We comply with applicable federal civil rights laws and [place] laws. We do not discriminate on the basis of race, color, national origin, age, disability, sex, sexual orientation, or gender identity.            Thank you!     Thank you for choosing EYE CLINIC  for your care. Our goal is always to provide you with excellent care. Hearing back from our patients is one way we can continue to improve our services. Please take a few minutes to complete the written survey that you may receive in the mail after your visit with us. Thank you!             Your Updated Medication List - Protect others around you: Learn how to safely use, store and throw away your medicines at www.disposemymeds.org.          This list is accurate as of 7/16/18 12:06 PM.  Always use your most recent med list.                   Brand Name Dispense Instructions for use Diagnosis    acetaminophen 500 MG Caps      Take 2 tablets by mouth daily.        albuterol 108 (90 Base) MCG/ACT Inhaler    PROAIR HFA    1 Inhaler    Inhale 2 puffs into the lungs every 4 hours as needed for shortness of breath / dyspnea, wheezing or other (use  prior to exertion/activities)    Shortness of breath       alfuzosin 10 MG 24 hr tablet    UROXATRAL    90 tablet    Take 1 tablet (10 mg) by mouth daily    Lower urinary tract symptoms (LUTS)       ammonium lactate 12 % cream    AMLACTIN     Apply  topically daily.        artificial tears Soln      Use one drop to both eye PRN.        ASPIRIN PO      Take 81 mg by mouth daily        ATORVASTATIN CALCIUM PO      Take 40 mg by mouth daily        doxycycline 100 MG capsule    VIBRAMYCIN    30 capsule    Take 1 capsule (100 mg) by mouth daily    Ulcerative blepharitis of upper and lower eyelids of both eyes       finasteride 5 MG tablet    PROSCAR    90 tablet    Take 1 tablet (5 mg) by mouth daily    Lower urinary tract symptoms (LUTS)       fluticasone 110 MCG/ACT Inhaler    FLOVENT HFA    3 Inhaler    Inhale 1 puff into the lungs 2 times daily    Mild intermittent asthma, unspecified whether complicated, Dyspnea on exertion       guaiFENesin-codeine 100-10 MG/5ML Soln solution    ROBITUSSIN AC    420 mL    Take 5-10 mLs by mouth every 4 hours as needed for cough    Cough       isosorbide mononitrate 30 MG 24 hr tablet    IMDUR    90 tablet    Take 1 tablet (30 mg) by mouth daily    Angina, class III (H)       ketotifen 0.025 % Soln ophthalmic solution    ZADITOR/REFRESH ANTI-ITCH     Place 1 drop into both eyes 2 times daily        losartan 50 MG tablet    COZAAR    30 tablet    Take 1 tablet (50 mg) by mouth daily    Benign essential hypertension       METFORMIN HCL PO      Take by mouth 2 times daily (with meals)    Benign nodular prostatic hyperplasia without lower urinary tract symptoms, Need for prophylactic vaccination against Streptococcus pneumoniae (pneumococcus), Screening for AAA (abdominal aortic aneurysm), History of smoking       omeprazole 20 MG CR capsule    priLOSEC     Take 20 mg by mouth daily.        oxybutynin 5 MG tablet    DITROPAN    90 tablet    Take 0.5 tablets (2.5 mg) by mouth At Bedtime     Benign prostatic hyperplasia with urinary obstruction       senna-docusate 8.6-50 MG per tablet    SENOKOT-S;PERICOLACE    10 tablet    Take 1-2 tablets by mouth 2 times daily as needed for constipation Take while on oral narcotics to prevent or treat constipation.    Hypercalcemia, Primary hyperparathyroidism (H)       TOPROL  MG 24 hr tablet   Generic drug:  metoprolol succinate      Take 0.5 tablets (50 mg) by mouth daily        VITAMIN D (CHOLECALCIFEROL) PO      Take 1,000 Units by mouth every morning

## 2018-07-16 NOTE — PROGRESS NOTES
YING Velasco is a 72 year old male who presents for surface check and diabetic annual exam. He feels his vision good as long as he uses the ATs constantly and the ointment QHS. If he stops either, the vision gets blurry and his eyes are irritated.     POH: Dry eye/blepharitis  PMH: Diabetes type 2 (HbA1c 5.7 4/5/2017), HTN (med controlled)  FH: No FH of AMD or glc  SH: Former smoker    Assessment & Plan      (H04.123) Dry eye syndrome, bilateral  Comment: Using warm compresses and eyelid scrubs at least daily, ATs during day and ointment QHS, sometimes BID.  Did not tolerate azithromycin (constipation)  Plan:   Warm compresses and eyelid scrubs BID  ATs during day  Ointment QHS  Doxycycline 100mg daily  Lower lid plugs in place (1.0 OU), discussed r/b/a of upper lid punctal plugs and he would like to proceed (0.7 mm placed RUL, 0.8 mm placed BRUCE)    -Consider starting Xiidra/Restasis as next step if needed.    (E11.9,  Z01.00) Diabetic eye exam (HCC)  Comment: No signs of diabetic retinopathy last exam.     (Z96.1) Pseudophakia of both eyes  Comment: Vision improved with ocular surface lubrication  Plan: Observe    (H52.13,  H52.203) Myopia with astigmatism and presbyopia, bilateral  Comment: [facility]  -----------------------------------------------------------------------------------    Patient disposition:   Return in 3-4 months for surface check    Teaching statement:  Complete documentation of historical and exam elements from today's encounter can be found in the full encounter summary report (not reduplicated in this progress note). I personally obtained the chief complaint(s) and history of present illness.  I confirmed and edited as necessary the review of systems, past medical/surgical history, family history, social history, and examination findings as documented by others; and I examined the patient myself. I personally reviewed the relevant tests, images, and reports as  documented above.     I formulated and edited as necessary the assessment and plan and discussed the findings and management plan with the patient and family.    Migdalia Hussein MD  Comprehensive Ophthalmology & Ocular Pathology  Department of Ophthalmology and Visual Neurosciences  [email or web address]  Pager [phone]

## 2018-07-16 NOTE — NURSING NOTE
Chief Complaints and History of Present Illnesses   Patient presents with     Follow Up For     3 month for Dry eye syndrome, bilateral     HPI    Affected eye(s):  Both   Symptoms:           Do you have eye pain now?:  No      Comments:  Pt notes he has to keep using the drops and ointment at night to help with vision and pain during the day. Overall vision is good, if using drops consistently.      [email or web address] [email or web address]@ [facility] 11:01 AM July 16, 2018

## 2018-07-30 ENCOUNTER — PRE VISIT (OUTPATIENT)
Dept: UROLOGY | Facility: CLINIC | Age: 73
End: 2018-07-30

## 2018-08-03 ENCOUNTER — OFFICE VISIT (OUTPATIENT)
Dept: UROLOGY | Facility: CLINIC | Age: 73
End: 2018-08-03
Payer: MEDICARE

## 2018-08-03 VITALS
BODY MASS INDEX: 39.2 KG/M2 | HEART RATE: 63 BPM | HEIGHT: 71 IN | DIASTOLIC BLOOD PRESSURE: 71 MMHG | WEIGHT: 280 LBS | SYSTOLIC BLOOD PRESSURE: 128 MMHG

## 2018-08-03 DIAGNOSIS — Z53.9 NO SHOW: Primary | ICD-10-CM

## 2018-08-03 ASSESSMENT — PAIN SCALES - GENERAL: PAINLEVEL: NO PAIN (0)

## 2018-08-03 NOTE — MR AVS SNAPSHOT
"              After Visit Summary   8/3/2018    Lux Velasco    MRN: [id number]           Patient Information     Date Of Birth          1945        Visit Information        Provider Department      8/3/2018 3:00 PM Marc Chapa MD [facility] Urology and [facility] for Prostate and Urologic Cancers        Today's Diagnoses     NO SHOW    -  1       Follow-ups after your visit        Your next 10 appointments already scheduled     Aug 15, 2018 10:05 AM CDT   (Arrive by 9:50 AM)   Return Visit with Nam Duffy MD   [facility] Primary Care Clinic ([facility] [place])    [address]  4th Floor  [place] [facility] [address] [phone]            Sep 24, 2018 10:30 AM CDT   RETURN GENERAL with Migdalia Hussein MD   Eye Clinic ([facility])    [address]  [address]  [place] [facility] [address] [phone]            Nov 27, 2018 11:00 AM CST   (Arrive by 10:45 AM)   Return Visit with Jorge Hanks MD   [facility] ([facility] [place])    [address]  [address]  [place] [id number] [phone]              Who to contact     Please call your clinic at [phone] to:    Ask questions about your health    Make or cancel appointments    Discuss your medicines    Learn about your test results    Speak to your doctor            Additional Information About Your Visit        Care EveryWhere ID     This is your Care EveryWhere ID. This could be used by other organizations to access your [place] medical records  [id number]        Your Vitals Were     Pulse Height BMI (Body Mass Index)             63 1.803 m (5' 11\") 39.05 kg/m2          Blood Pressure from Last 3 Encounters:   08/03/18 128/71   07/11/18 135/84   05/31/18 153/83    Weight from Last 3 Encounters:   08/03/18 127 kg (280 lb)   07/11/18 130.1 kg (286 lb 14.4 oz)   05/31/18 132.5 kg (292 lb)              Today, you had the following  "    No orders found for display         Today's Medication Changes          These changes are accurate as of 8/3/18  9:58 PM.  If you have any questions, ask your nurse or doctor.               These medicines have changed or have updated prescriptions.        Dose/Directions    losartan 50 MG tablet   Commonly known as:  COZAAR   This may have changed:  when to take this   Used for:  Benign essential hypertension        Dose:  50 mg   Take 1 tablet (50 mg) by mouth daily   Quantity:  30 tablet   Refills:  3                Primary Care Provider Office Phone # Fax #    Nam Duffy [phone]       [address] [address]        Equal Access to Services     [facility]: Hadii suresh traore hadasho [place]ilee, waaxda luqadaha, qaybta kaalmada adejerardoyada, taty chase . So [facility] [phone].    ATENCIÓN: Si habla español, tiene a back disposición servicios gratuitos de asistencia lingüística. Llame al [phone].    We comply with applicable federal civil rights laws and [place] laws. We do not discriminate on the basis of race, color, national origin, age, disability, sex, sexual orientation, or gender identity.            Thank you!     Thank you for choosing [facility] UROLOGY AND [facility] FOR PROSTATE AND UROLOGIC CANCERS  for your care. Our goal is always to provide you with excellent care. Hearing back from our patients is one way we can continue to improve our services. Please take a few minutes to complete the written survey that you may receive in the mail after your visit with us. Thank you!             Your Updated Medication List - Protect others around you: Learn how to safely use, store and throw away your medicines at www.disposemymeds.org.          This list is accurate as of 8/3/18  9:58 PM.  Always use your most recent med list.                   Brand Name Dispense Instructions for use Diagnosis    acetaminophen 500 MG Caps      Take 2  tablets by mouth daily.        albuterol 108 (90 Base) MCG/ACT Inhaler    PROAIR HFA    1 Inhaler    Inhale 2 puffs into the lungs every 4 hours as needed for shortness of breath / dyspnea, wheezing or other (use prior to exertion/activities)    Shortness of breath       alfuzosin 10 MG 24 hr tablet    UROXATRAL    90 tablet    Take 1 tablet (10 mg) by mouth daily    Lower urinary tract symptoms (LUTS)       ammonium lactate 12 % cream    AMLACTIN     Apply  topically daily.        artificial tears Soln      Use one drop to both eye PRN.        ASPIRIN PO      Take 81 mg by mouth daily        ATORVASTATIN CALCIUM PO      Take 40 mg by mouth daily        doxycycline 100 MG capsule    VIBRAMYCIN    30 capsule    Take 1 capsule (100 mg) by mouth daily    Ulcerative blepharitis of upper and lower eyelids of both eyes       finasteride 5 MG tablet    PROSCAR    90 tablet    Take 1 tablet (5 mg) by mouth daily    Lower urinary tract symptoms (LUTS)       fluticasone 110 MCG/ACT Inhaler    FLOVENT HFA    3 Inhaler    Inhale 1 puff into the lungs 2 times daily    Mild intermittent asthma, unspecified whether complicated, Dyspnea on exertion       guaiFENesin-codeine 100-10 MG/5ML Soln solution    ROBITUSSIN AC    420 mL    Take 5-10 mLs by mouth every 4 hours as needed for cough    Cough       isosorbide mononitrate 30 MG 24 hr tablet    IMDUR    90 tablet    Take 1 tablet (30 mg) by mouth daily    Angina, class III (H)       ketotifen 0.025 % Soln ophthalmic solution    ZADITOR/REFRESH ANTI-ITCH     Place 1 drop into both eyes 2 times daily        losartan 50 MG tablet    COZAAR    30 tablet    Take 1 tablet (50 mg) by mouth daily    Benign essential hypertension       METFORMIN HCL PO      Take by mouth 2 times daily (with meals)    Benign nodular prostatic hyperplasia without lower urinary tract symptoms, Need for prophylactic vaccination against Streptococcus pneumoniae (pneumococcus), Screening for AAA (abdominal  aortic aneurysm), History of smoking       omeprazole 20 MG CR capsule    priLOSEC     Take 20 mg by mouth daily.        oxybutynin 5 MG tablet    DITROPAN    90 tablet    Take 0.5 tablets (2.5 mg) by mouth At Bedtime    Benign prostatic hyperplasia with urinary obstruction       senna-docusate 8.6-50 MG per tablet    SENOKOT-S;PERICOLACE    10 tablet    Take 1-2 tablets by mouth 2 times daily as needed for constipation Take while on oral narcotics to prevent or treat constipation.    Hypercalcemia, Primary hyperparathyroidism (H)       TOPROL  MG 24 hr tablet   Generic drug:  metoprolol succinate      Take 0.5 tablets (50 mg) by mouth daily        VITAMIN D (CHOLECALCIFEROL) PO      Take 1,000 Units by mouth every morning

## 2018-08-03 NOTE — LETTER
8/3/2018       RE: Lux Velasco  [address]  [place] [address]     Dear Colleague,    Thank you for referring your patient, Lux Velasco, to the [facility] UROLOGY AND INST FOR PROSTATE AND UROLOGIC CANCERS at [facility]. Please see a copy of my visit note below.        This patient left prior to being seen for this scheduled appointment.  I attempted to call him but I was not able to contact him.    Again, thank you for allowing me to participate in the care of your patient.      Sincerely,    Marc Chapa MD

## 2018-08-03 NOTE — NURSING NOTE
"Chief Complaint   Patient presents with     RECHECK     BPH follow up       Blood pressure 128/71, pulse 63, height 1.803 m (5' 11\"), weight 127 kg (280 lb). Body mass index is 39.05 kg/(m^2).    Patient Active Problem List   Diagnosis     Plantar fascial fibromatosis     Ear pain     Left arm weakness     History of agent Orange exposure     Cervicalgia     Degenerative arthritis of cervical spine     Stroke (H)     Myocardial infarct     Shoulder pain     Hypercalcemia     HPTH (hyperparathyroidism) (H)     Skin exam, screening for cancer     Primary hyperparathyroidism (H)       Allergies   Allergen Reactions     Clindamycin      Eggs Other (See Comments)     Pt states no longer having reaction, childhood allergy, eats eggs       Penicillins Other (See Comments)     Pt states PCN allergy is due to egg allergy     Propoxyphene Other (See Comments)     Pain       Current Outpatient Prescriptions   Medication Sig Dispense Refill     acetaminophen 500 MG CAPS Take 2 tablets by mouth daily.       albuterol (PROAIR HFA) 108 (90 BASE) MCG/ACT Inhaler Inhale 2 puffs into the lungs every 4 hours as needed for shortness of breath / dyspnea, wheezing or other (use prior to exertion/activities) 1 Inhaler 3     alfuzosin (UROXATRAL) 10 MG 24 hr tablet Take 1 tablet (10 mg) by mouth daily 90 tablet 3     ammonium lactate (AMLACTIN) 12 % cream Apply  topically daily.       artificial tears SOLN Use one drop to both eye PRN.       ASPIRIN PO Take 81 mg by mouth daily       ATORVASTATIN CALCIUM PO Take 40 mg by mouth daily       doxycycline (VIBRAMYCIN) 100 MG capsule Take 1 capsule (100 mg) by mouth daily 30 capsule 11     finasteride (PROSCAR) 5 MG tablet Take 1 tablet (5 mg) by mouth daily 90 tablet 3     fluticasone (FLOVENT HFA) 110 MCG/ACT Inhaler Inhale 1 puff into the lungs 2 times daily 3 Inhaler 1     guaiFENesin-codeine (ROBITUSSIN AC) 100-10 MG/5ML SOLN solution Take 5-10 mLs by mouth every 4 hours as needed for cough " 420 mL 0     isosorbide mononitrate (IMDUR) 30 MG 24 hr tablet Take 1 tablet (30 mg) by mouth daily 90 tablet 3     ketotifen (ZADITOR/REFRESH ANTI-ITCH) 0.025 % SOLN ophthalmic solution Place 1 drop into both eyes 2 times daily       losartan (COZAAR) 50 MG tablet Take 1 tablet (50 mg) by mouth daily (Patient taking differently: Take 50 mg by mouth every morning ) 30 tablet 3     METFORMIN HCL PO Take by mouth 2 times daily (with meals)       metoprolol succinate (TOPROL-XL) 100 MG 24 hr tablet Take 0.5 tablets (50 mg) by mouth daily       omeprazole (PRILOSEC) 20 MG capsule Take 20 mg by mouth daily.       oxybutynin (DITROPAN) 5 MG tablet Take 0.5 tablets (2.5 mg) by mouth At Bedtime 90 tablet 3     senna-docusate (SENOKOT-S;PERICOLACE) 8.6-50 MG per tablet Take 1-2 tablets by mouth 2 times daily as needed for constipation Take while on oral narcotics to prevent or treat constipation. 10 tablet 0     VITAMIN D, CHOLECALCIFEROL, PO Take 1,000 Units by mouth every morning          Social History   Substance Use Topics     Smoking status: Former Smoker     Quit date: 1970     Smokeless tobacco: Former User      Comment: Doesn't remember how much he used to smoke - during service only.      Alcohol use Yes      Comment: 1 beer/week       BEBE Cavanaugh  8/3/2018  3:03 PM

## 2018-08-03 NOTE — PROGRESS NOTES
This patient left prior to being seen for this scheduled appointment.  I attempted to call him but I was not able to contact him.

## 2018-08-15 ENCOUNTER — OFFICE VISIT (OUTPATIENT)
Dept: INTERNAL MEDICINE | Facility: CLINIC | Age: 73
End: 2018-08-15
Payer: MEDICARE

## 2018-08-15 VITALS
RESPIRATION RATE: 18 BRPM | DIASTOLIC BLOOD PRESSURE: 97 MMHG | SYSTOLIC BLOOD PRESSURE: 156 MMHG | WEIGHT: 289 LBS | BODY MASS INDEX: 40.31 KG/M2 | HEART RATE: 71 BPM

## 2018-08-15 DIAGNOSIS — R63.5 WEIGHT GAIN: Primary | ICD-10-CM

## 2018-08-15 RX ORDER — BUDESONIDE AND FORMOTEROL FUMARATE DIHYDRATE 160; 4.5 UG/1; UG/1
2 AEROSOL RESPIRATORY (INHALATION) 2 TIMES DAILY
Status: ON HOLD | COMMUNITY
End: 2024-02-19

## 2018-08-15 ASSESSMENT — PAIN SCALES - GENERAL: PAINLEVEL: MILD PAIN (3)

## 2018-08-15 NOTE — NURSING NOTE
Chief Complaint   Patient presents with     Diabetes     Patient is here to discuss Diabetes     Reba Duran CMA 10:06 AM on 8/15/2018.

## 2018-08-15 NOTE — PROGRESS NOTES
HPI:    Pt. Comes in for follow up today. He had parathyroid surgery with Dr. Short 3/21/17 for hypercalciemia. He was seen in Dermatology 11/28/16.  He has followed up with Dr. Sullivan Cardiology at [facility] as well as Dr. Hanks, Cardiology here 1/20/18. He states chronic  SOB no worse than previous.   He states he had cor angiogram at [facility] several  months ago that did not have obstructive CAD.       PE:    Vitals noted my recheck manual large cuff R arm several times 141/79; gen nad cooperative alert,  HEENT, ears normal oropharynx clear no exudate, neck supple nl rom no adenopathy,  LCTA, B, RRR, S1, S2, systolic murmur present. Neurological exam B UE/LE/proximal/distal is normal and symmetric for sensation, reflexes, and strength. Gait is normal.  Dorsum of L hand with healing bx. Site.       CXR 8/30/17; no acute pulmonary disease    PFT's 8/30/17:  FVC 88%, FEV1 98%; 6/6/2018:  FVC 43%, FEV1 48%    Resting echo 1/17/17 normal EF 60-65% and repeat for murmur 4/26/2018 no valvular disease    CTA 5/23/17; minimal non-obstructive CAD        A/P:    1. Follow up parathyroid surgery. He was seen by Dr. Chou, Endo 4/5/17 and by Dr. Short 4/21/17; labs stable  2. He was seen in  Pulmonary 8/30/17 and again  by Dr. Jacques 11/30/17. He was given a Rx. For daily flovent. He was seen again Dr. Jacques 5/31/2018. Suprising significant? Decrease in PFT's.  See detailed pulmonary results note from Dr. Means 7/11/2018; no further testing/follow up recommended except for possible repeat Chest CT in one year.   3. PSA checked 11/30/17  4. Colonoscopy at [facility] 8/15 repeat in 10 years.   5. He was seen  7/17 in Urology for BPH he was seen by Dr. Chapa  1/12/2018 and he commented also on renal cyst. He would like to see Urology here a [facility] and he had appt. With Dr. Chapa, Urology 8/3/2018 and did not stay for appt.  He also did not wait to see Urology at   6. BP; he remains on same medications and a  little on the lower side  7. He had minimal non-obstructive coronary CTA 5/23/17. He was seen 1/20/18 by Dr. Hanks Cardiology. As reported about regarding cor angiogram at [facility].   Future MRI/MRA brain ordered but he has not done this,  echo (stable from 4/26/2018) and cardiology follow up.  Seen in Cardiology Ms. Brown 4/26/2018.   8. Check with insurance for  New Shingles vaccine  9.  A1C for elevated glucose for 4/25/2018 6.4%  10. Ordered labs 10/12/17 for mild anemia and lower platelets seen by Dr. Marina, Hematology  1/5/2018  11. Seen by Dr. Sanderson GI appt. For hepatic steatosis (liver U/S done 11/30/17) in 3/19/2018  12. Seen in ortho hand 5/15/2018 for L index finger issues   13. Sent to Nutrition  for wt. Loss  14. [facility] and he will follow there in 9/2018        Total time spent 25 minutes.  More than 50% of the time spent with Mr. Velasco on counseling / coordinating his care

## 2018-08-15 NOTE — MR AVS SNAPSHOT
After Visit Summary   8/15/2018    Lux Velasco    MRN: [id number]           Patient Information     Date Of Birth          1945        Visit Information        Provider Department      8/15/2018 10:05 AM Nam Duffy MD [facility]        Today's Diagnoses     Weight gain    -  1       Follow-ups after your visit        Additional Services     NUTRITION REFERRAL       Wt. gain                  Your next 10 appointments already scheduled     Sep 18, 2018  9:35 AM CDT   (Arrive by 9:20 AM)   Return Visit with Nam Duffy MD   [facility] ([facility])    [address]  4th Floor  [facility] [id number] [phone]            Sep 24, 2018 10:30 AM CDT   RETURN GENERAL with Migdalia Hussein MD   Eye Clinic ([facility])    [address]  [address]  [facility] [address] [phone]            Oct 05, 2018 11:00 AM CDT   (Arrive by 10:45 AM)   New Patient Visit with Yulisa Paiz RD   [facility] ([facility])    [address]  [address]  [facility] [id number] [phone]            Nov 27, 2018 11:00 AM CST   (Arrive by 10:45 AM)   Return Visit with Jorge Hanks MD   [facility] ([facility])    [address] [place] [address]  [address]  [facility] [id number] [phone]              Who to contact     Please call your clinic at [phone] to:    Ask questions about your health    Make or cancel appointments    Discuss your medicines    Learn about your test results    Speak to your doctor            Additional Information About Your Visit        Care EveryWhere ID     This is your Care EveryWhere ID. This could be used by other organizations to access your [facility] medical records  [id number]        Your Vitals Were     Pulse Respirations BMI (Body Mass Index)              71 18 40.31 kg/m2          Blood Pressure from Last 3 Encounters:   08/15/18 (!) 156/97   08/03/18 128/71   07/11/18 135/84    Weight from Last 3 Encounters:   08/15/18 131.1 kg (289 lb)   08/03/18 127 kg (280 lb)   07/11/18 130.1 kg (286 lb 14.4 oz)              We Performed the Following     NUTRITION REFERRAL          Today's Medication Changes          These changes are accurate as of 8/15/18 11:30 AM.  If you have any questions, ask your nurse or doctor.               These medicines have changed or have updated prescriptions.        Dose/Directions    losartan 50 MG tablet   Commonly known as:  COZAAR   This may have changed:  when to take this   Used for:  Benign essential hypertension        Dose:  50 mg   Take 1 tablet (50 mg) by mouth daily   Quantity:  30 tablet   Refills:  3                Primary Care Provider Office Phone # Fax #    Nam Duffy [phone]       [address] [address]        Equal Access to Services     [facility] AH: Hadii suresh traore hadasho Soomaali, waaxda luqadaha, qaybta kaalmada adeegyada, waxay lorraine chase . So [facility] [phone].    ATENCIÓN: Si habla español, tiene a back disposición servicios gratuitos de asistencia lingüística. Llame al [phone].    We comply with applicable federal civil rights laws and [place] laws. We do not discriminate on the basis of race, color, national origin, age, disability, sex, sexual orientation, or gender identity.            Thank you!     Thank you for choosing [facility]  for your care. Our goal is always to provide you with excellent care. Hearing back from our patients is one way we can continue to improve our services. Please take a few minutes to complete the written survey that you may receive in the mail after your visit with us. Thank you!             Your Updated Medication List - Protect others around you: Learn how to safely use,  store and throw away your medicines at www.disposemymeds.org.          This list is accurate as of 8/15/18 11:30 AM.  Always use your most recent med list.                   Brand Name Dispense Instructions for use Diagnosis    acetaminophen 500 MG Caps      Take 2 tablets by mouth daily.        albuterol 108 (90 Base) MCG/ACT inhaler    PROAIR HFA    1 Inhaler    Inhale 2 puffs into the lungs every 4 hours as needed for shortness of breath / dyspnea, wheezing or other (use prior to exertion/activities)    Shortness of breath       alfuzosin 10 MG 24 hr tablet    UROXATRAL    90 tablet    Take 1 tablet (10 mg) by mouth daily    Lower urinary tract symptoms (LUTS)       ammonium lactate 12 % cream    AMLACTIN     Apply  topically daily.        artificial tears Soln      Use one drop to both eye PRN.        ASPIRIN PO      Take 81 mg by mouth daily        ATORVASTATIN CALCIUM PO      Take 40 mg by mouth daily        budesonide-formoterol 160-4.5 MCG/ACT Inhaler    SYMBICORT     Inhale 2 puffs into the lungs 2 times daily        doxycycline 100 MG capsule    VIBRAMYCIN    30 capsule    Take 1 capsule (100 mg) by mouth daily    Ulcerative blepharitis of upper and lower eyelids of both eyes       finasteride 5 MG tablet    PROSCAR    90 tablet    Take 1 tablet (5 mg) by mouth daily    Lower urinary tract symptoms (LUTS)       fluticasone 110 MCG/ACT Inhaler    FLOVENT HFA    3 Inhaler    Inhale 1 puff into the lungs 2 times daily    Mild intermittent asthma, unspecified whether complicated, Dyspnea on exertion       guaiFENesin-codeine 100-10 MG/5ML Soln solution    ROBITUSSIN AC    420 mL    Take 5-10 mLs by mouth every 4 hours as needed for cough    Cough       isosorbide mononitrate 30 MG 24 hr tablet    IMDUR    90 tablet    Take 1 tablet (30 mg) by mouth daily    Angina, class III (H)       ketotifen 0.025 % Soln ophthalmic solution    ZADITOR/REFRESH ANTI-ITCH     Place 1 drop into both eyes 2 times daily         losartan 50 MG tablet    COZAAR    30 tablet    Take 1 tablet (50 mg) by mouth daily    Benign essential hypertension       METFORMIN HCL PO      Take by mouth 2 times daily (with meals)    Benign nodular prostatic hyperplasia without lower urinary tract symptoms, Need for prophylactic vaccination against Streptococcus pneumoniae (pneumococcus), Screening for AAA (abdominal aortic aneurysm), History of smoking       omeprazole 20 MG CR capsule    priLOSEC     Take 20 mg by mouth daily.        oxybutynin 5 MG tablet    DITROPAN    90 tablet    Take 0.5 tablets (2.5 mg) by mouth At Bedtime    Benign prostatic hyperplasia with urinary obstruction       senna-docusate 8.6-50 MG per tablet    SENOKOT-S;PERICOLACE    10 tablet    Take 1-2 tablets by mouth 2 times daily as needed for constipation Take while on oral narcotics to prevent or treat constipation.    Hypercalcemia, Primary hyperparathyroidism (H)       TOPROL  MG 24 hr tablet   Generic drug:  metoprolol succinate      Take 0.5 tablets (50 mg) by mouth daily        VITAMIN D (CHOLECALCIFEROL) PO      Take 1,000 Units by mouth every morning

## 2018-09-18 ENCOUNTER — OFFICE VISIT (OUTPATIENT)
Dept: INTERNAL MEDICINE | Facility: CLINIC | Age: 73
End: 2018-09-18
Payer: MEDICARE

## 2018-09-18 ENCOUNTER — RADIANT APPOINTMENT (OUTPATIENT)
Dept: GENERAL RADIOLOGY | Facility: CLINIC | Age: 73
End: 2018-09-18
Attending: INTERNAL MEDICINE
Payer: MEDICARE

## 2018-09-18 ENCOUNTER — TELEPHONE (OUTPATIENT)
Dept: INTERNAL MEDICINE | Facility: CLINIC | Age: 73
End: 2018-09-18

## 2018-09-18 VITALS
BODY MASS INDEX: 41.27 KG/M2 | SYSTOLIC BLOOD PRESSURE: 160 MMHG | DIASTOLIC BLOOD PRESSURE: 76 MMHG | HEART RATE: 67 BPM | WEIGHT: 295.9 LBS

## 2018-09-18 DIAGNOSIS — E78.00 HIGH BLOOD CHOLESTEROL: ICD-10-CM

## 2018-09-18 DIAGNOSIS — R73.09 INCREASED GLUCOSE LEVEL: ICD-10-CM

## 2018-09-18 DIAGNOSIS — N28.1 KIDNEY CYST, ACQUIRED: Primary | ICD-10-CM

## 2018-09-18 DIAGNOSIS — R07.89 ATYPICAL CHEST PAIN: ICD-10-CM

## 2018-09-18 DIAGNOSIS — K76.0 NAFLD (NONALCOHOLIC FATTY LIVER DISEASE): ICD-10-CM

## 2018-09-18 DIAGNOSIS — R73.09 INCREASED GLUCOSE LEVEL: Primary | ICD-10-CM

## 2018-09-18 LAB
ALBUMIN SERPL-MCNC: 3.6 G/DL (ref 3.4–5)
ALP SERPL-CCNC: 96 U/L (ref 40–150)
ALT SERPL W P-5'-P-CCNC: 40 U/L (ref 0–70)
ANION GAP SERPL CALCULATED.3IONS-SCNC: 6 MMOL/L (ref 3–14)
AST SERPL W P-5'-P-CCNC: 21 U/L (ref 0–45)
BASOPHILS # BLD AUTO: 0 10E9/L (ref 0–0.2)
BASOPHILS NFR BLD AUTO: 0.7 %
BILIRUB DIRECT SERPL-MCNC: 0.2 MG/DL (ref 0–0.2)
BILIRUB SERPL-MCNC: 0.6 MG/DL (ref 0.2–1.3)
BUN SERPL-MCNC: 14 MG/DL (ref 7–30)
CALCIUM SERPL-MCNC: 9 MG/DL (ref 8.5–10.1)
CHLORIDE SERPL-SCNC: 105 MMOL/L (ref 94–109)
CHOLEST SERPL-MCNC: 122 MG/DL
CO2 SERPL-SCNC: 28 MMOL/L (ref 20–32)
CREAT SERPL-MCNC: 0.81 MG/DL (ref 0.66–1.25)
CREAT UR-MCNC: 72 MG/DL
D DIMER PPP FEU-MCNC: 0.5 UG/ML FEU (ref 0–0.5)
DIFFERENTIAL METHOD BLD: ABNORMAL
EOSINOPHIL # BLD AUTO: 0.2 10E9/L (ref 0–0.7)
EOSINOPHIL NFR BLD AUTO: 4 %
ERYTHROCYTE [DISTWIDTH] IN BLOOD BY AUTOMATED COUNT: 14.2 % (ref 10–15)
GFR SERPL CREATININE-BSD FRML MDRD: >90 ML/MIN/1.7M2
GLUCOSE SERPL-MCNC: 124 MG/DL (ref 70–99)
HCT VFR BLD AUTO: 40 % (ref 40–53)
HDLC SERPL-MCNC: 45 MG/DL
HGB BLD-MCNC: 12.6 G/DL (ref 13.3–17.7)
IMM GRANULOCYTES # BLD: 0 10E9/L (ref 0–0.4)
IMM GRANULOCYTES NFR BLD: 0.2 %
INR PPP: 1.04 (ref 0.86–1.14)
INTERPRETATION ECG - MUSE: NORMAL
LDLC SERPL CALC-MCNC: 50 MG/DL
LYMPHOCYTES # BLD AUTO: 1.5 10E9/L (ref 0.8–5.3)
LYMPHOCYTES NFR BLD AUTO: 26.9 %
MCH RBC QN AUTO: 27.1 PG (ref 26.5–33)
MCHC RBC AUTO-ENTMCNC: 31.5 G/DL (ref 31.5–36.5)
MCV RBC AUTO: 86 FL (ref 78–100)
MICROALBUMIN UR-MCNC: <5 MG/L
MICROALBUMIN/CREAT UR: NORMAL MG/G CR (ref 0–17)
MONOCYTES # BLD AUTO: 0.4 10E9/L (ref 0–1.3)
MONOCYTES NFR BLD AUTO: 7.7 %
NEUTROPHILS # BLD AUTO: 3.5 10E9/L (ref 1.6–8.3)
NEUTROPHILS NFR BLD AUTO: 60.5 %
NONHDLC SERPL-MCNC: 77 MG/DL
NRBC # BLD AUTO: 0 10*3/UL
NRBC BLD AUTO-RTO: 0 /100
PLATELET # BLD AUTO: 151 10E9/L (ref 150–450)
POTASSIUM SERPL-SCNC: 4.6 MMOL/L (ref 3.4–5.3)
PROT SERPL-MCNC: 7.6 G/DL (ref 6.8–8.8)
RBC # BLD AUTO: 4.65 10E12/L (ref 4.4–5.9)
SODIUM SERPL-SCNC: 138 MMOL/L (ref 133–144)
TRIGL SERPL-MCNC: 135 MG/DL
TROPONIN I SERPL-MCNC: <0.015 UG/L (ref 0–0.04)
WBC # BLD AUTO: 5.7 10E9/L (ref 4–11)

## 2018-09-18 PROCEDURE — 85379 FIBRIN DEGRADATION QUANT: CPT | Performed by: INTERNAL MEDICINE

## 2018-09-18 NOTE — TELEPHONE ENCOUNTER
Placed Urology referral this encounter        Dear Lux;    Your labs and chest X-ray are stable    I still recommend you see Dr. Chapa, Urology. I placed a referral again today and you can call [phone] to schedule this appointment    JIM Duffy MD

## 2018-09-18 NOTE — PATIENT INSTRUCTIONS
[facility] Medication Refill Request Information:  * Please contact your pharmacy regarding ANY request for medication refills.  ** [facility] Prescription Fax = [phone]  * Please allow 3 business days for routine medication refills.  * Please allow 5 business days for controlled substance medication refills.     [facility] Test Result notification information:  *You will be notified with in 7-10 days of your appointment day regarding the results of your test.  If you are on MyChart you will be notified as soon as the provider has reviewed the results and signed off on them.    [facility]: [phone]

## 2018-09-18 NOTE — MR AVS SNAPSHOT
After Visit Summary   9/18/2018    Lux Velasco    MRN: [id number]           Patient Information     Date Of Birth          1945        Visit Information        Provider Department      9/18/2018 9:35 AM Nam Duffy MD [facility]        Today's Diagnoses     Increased glucose level    -  1    High blood cholesterol        Atypical chest pain          Care Instructions    Primary Care Center Medication Refill Request Information:  * Please contact your pharmacy regarding ANY request for medication refills.  ** [facility] Prescription Fax = [phone]  * Please allow 3 business days for routine medication refills.  * Please allow 5 business days for controlled substance medication refills.     Primary Care Center Test Result notification information:  *You will be notified with in 7-10 days of your appointment day regarding the results of your test.  If you are on MyChart you will be notified as soon as the provider has reviewed the results and signed off on them.    [facility] Center: [phone]             Follow-ups after your visit        Your next 10 appointments already scheduled     Sep 24, 2018 10:30 AM CDT   RETURN GENERAL with Migdalia Hussein MD   Eye Clinic ([facility])    [address]  [facility] [address]   [phone]            Oct 05, 2018 11:00 AM CDT   (Arrive by 10:45 AM)   New Patient Visit with Yulisa Piaz RD   [facility] ([facility])    [address]  [address]  [facility] [address] [phone]            Oct 22, 2018  8:40 AM CDT   (Arrive by 8:25 AM)   NEW FOOT/ANKLE with Colton Prieto DPM   [facility] ([facility])    [address]  4th Floor  [facility] [address]   [phone]            Nov 07, 2018 10:15 AM CST   (Arrive by 10:00 AM)   Return Visit with  Shirin Shore MD   [facility] ([facility])    [address]  3rd Floor  [facility] [address] [phone]            Nov 27, 2018 11:00 AM CST   (Arrive by 10:45 AM)   Return Visit with Jorge Hanks MD   [facility] ([facility])    [address]  [address]  [facility] [address] [phone]              Who to contact     Please call your clinic at [phone] to:    Ask questions about your health    Make or cancel appointments    Discuss your medicines    Learn about your test results    Speak to your doctor            Additional Information About Your Visit        Care EveryWhere ID     This is your Care EveryWhere ID. This could be used by other organizations to access your [facility] medical records  [id number]        Your Vitals Were     Pulse BMI (Body Mass Index)                67 41.27 kg/m2           Blood Pressure from Last 3 Encounters:   09/18/18 160/76   08/15/18 (!) 156/97   08/03/18 128/71    Weight from Last 3 Encounters:   09/18/18 134.2 kg (295 lb 14.4 oz)   08/15/18 131.1 kg (289 lb)   08/03/18 127 kg (280 lb)              We Performed the Following     EKG Performed in Clinic w/ Provider Reading Fee     XR Chest 2 Views          Today's Medication Changes          These changes are accurate as of 9/18/18  1:10 PM.  If you have any questions, ask your nurse or doctor.               These medicines have changed or have updated prescriptions.        Dose/Directions    losartan 50 MG tablet   Commonly known as:  COZAAR   This may have changed:  when to take this   Used for:  Benign essential hypertension        Dose:  50 mg   Take 1 tablet (50 mg) by mouth daily   Quantity:  30 tablet   Refills:  3                Primary Care Provider Office Phone # Fax #    Nam Duffy [phone]       [address] [place] [address]        Equal Access to Services      SANDHYA DYE : Hadii aad ku marv Alicia, waaxda luqadaha, qaybta kaalmada adetanya, taty lorraine dominicrafa escalera chivo salvatore . So [facility] [phone].    ATENCIÓN: Si habla español, tiene a back disposición servicios gratuitos de asistencia lingüística. Famame al [phone].    We comply with applicable federal civil rights laws and [place] laws. We do not discriminate on the basis of race, color, national origin, age, disability, sex, sexual orientation, or gender identity.            Thank you!     Thank you for choosing [facility]  for your care. Our goal is always to provide you with excellent care. Hearing back from our patients is one way we can continue to improve our services. Please take a few minutes to complete the written survey that you may receive in the mail after your visit with us. Thank you!             Your Updated Medication List - Protect others around you: Learn how to safely use, store and throw away your medicines at www.disposemymeds.org.          This list is accurate as of 9/18/18  1:10 PM.  Always use your most recent med list.                   Brand Name Dispense Instructions for use Diagnosis    acetaminophen 500 MG Caps      Take 2 tablets by mouth daily.        albuterol 108 (90 Base) MCG/ACT inhaler    PROAIR HFA    1 Inhaler    Inhale 2 puffs into the lungs every 4 hours as needed for shortness of breath / dyspnea, wheezing or other (use prior to exertion/activities)    Shortness of breath       alfuzosin 10 MG 24 hr tablet    UROXATRAL    90 tablet    Take 1 tablet (10 mg) by mouth daily    Lower urinary tract symptoms (LUTS)       ammonium lactate 12 % cream    AMLACTIN     Apply  topically daily.        artificial tears Soln      Use one drop to both eye PRN.        ASPIRIN PO      Take 81 mg by mouth daily        ATORVASTATIN CALCIUM PO      Take 40 mg by mouth daily        budesonide-formoterol 160-4.5 MCG/ACT Inhaler    SYMBICORT     Inhale 2 puffs  into the lungs 2 times daily        doxycycline 100 MG capsule    VIBRAMYCIN    30 capsule    Take 1 capsule (100 mg) by mouth daily    Ulcerative blepharitis of upper and lower eyelids of both eyes       finasteride 5 MG tablet    PROSCAR    90 tablet    Take 1 tablet (5 mg) by mouth daily    Lower urinary tract symptoms (LUTS)       fluticasone 110 MCG/ACT Inhaler    FLOVENT HFA    3 Inhaler    Inhale 1 puff into the lungs 2 times daily    Mild intermittent asthma, unspecified whether complicated, Dyspnea on exertion       guaiFENesin-codeine 100-10 MG/5ML Soln solution    ROBITUSSIN AC    420 mL    Take 5-10 mLs by mouth every 4 hours as needed for cough    Cough       isosorbide mononitrate 30 MG 24 hr tablet    IMDUR    90 tablet    Take 1 tablet (30 mg) by mouth daily    Angina, class III (H)       ketotifen 0.025 % Soln ophthalmic solution    ZADITOR/REFRESH ANTI-ITCH     Place 1 drop into both eyes 2 times daily        losartan 50 MG tablet    COZAAR    30 tablet    Take 1 tablet (50 mg) by mouth daily    Benign essential hypertension       METFORMIN HCL PO      Take by mouth 2 times daily (with meals)    Benign nodular prostatic hyperplasia without lower urinary tract symptoms, Need for prophylactic vaccination against Streptococcus pneumoniae (pneumococcus), Screening for AAA (abdominal aortic aneurysm), History of smoking       omeprazole 20 MG CR capsule    priLOSEC     Take 20 mg by mouth daily.        oxybutynin 5 MG tablet    DITROPAN    90 tablet    Take 0.5 tablets (2.5 mg) by mouth At Bedtime    Benign prostatic hyperplasia with urinary obstruction       senna-docusate 8.6-50 MG per tablet    SENOKOT-S;PERICOLACE    10 tablet    Take 1-2 tablets by mouth 2 times daily as needed for constipation Take while on oral narcotics to prevent or treat constipation.    Hypercalcemia, Primary hyperparathyroidism (H)       TOPROL  MG 24 hr tablet   Generic drug:  metoprolol succinate      Take 0.5  tablets (50 mg) by mouth daily        VITAMIN D (CHOLECALCIFEROL) PO      Take 1,000 Units by mouth every morning

## 2018-09-18 NOTE — NURSING NOTE
Chief Complaint   Patient presents with     Breathing Problem     pt here for respitory problem trouble breathing     Wound Check     pt would like to discuss wound       Bella Turner CMA at 9:32 AM on 9/18/2018.

## 2018-09-18 NOTE — PROGRESS NOTES
HPI:    Pt. Comes in for follow up today. He had parathyroid surgery with Dr. Short 3/21/17 for hypercalciemia. He was seen in Dermatology 11/28/16.  He has followed up with Dr. Sullivan Cardiology at [facility] as well as Dr. Hanks, Cardiology here 1/20/18. He states chronic  SOB no worse than previous.   He states he had cor angiogram at [facility] several  months ago that did not have obstructive CAD.       PE:    Vitals noted my recheck manual large cuff R arm several times 141/79; gen nad cooperative alert,  HEENT, ears normal oropharynx clear no exudate, neck supple nl rom no adenopathy,  LCTA, B, RRR, S1, S2, systolic murmur present. Neurological exam B UE/LE/proximal/distal is normal and symmetric for sensation, reflexes, and strength. Gait is normal.  Dorsum of L hand with healing bx. Site.       CXR 8/30/17; no acute pulmonary disease    PFT's 8/30/17:  FVC 88%, FEV1 98%; 6/6/2018:  FVC 43%, FEV1 48%    Resting echo 1/17/17 normal EF 60-65% and repeat for murmur 4/26/2018 no valvular disease    CTA 5/23/17; minimal non-obstructive CAD    EKG today SR at 59; with PAC's o/w no significant change from 7/11/2018    A/P:    1. Follow up parathyroid surgery. He was seen by Dr. Chou, Endo 4/5/17 and by Dr. Short 4/21/17; labs stable  2. He was seen in  Pulmonary 8/30/17 and again  by Dr. Jacques 11/30/17. He was given a Rx. For daily flovent. He was seen again Dr. Jacques 5/31/2018. Suprising significant? Decrease in PFT's.  See detailed pulmonary results note from Dr. Means 7/11/2018; no further testing/follow up recommended except for possible repeat Chest CT in one year.   3. PSA checked 11/30/17  4. Colonoscopy at [facility] 8/15 repeat in 10 years.   5. He was seen  7/17 in Urology for BPH he was seen by Dr. Chapa  1/12/2018 and he commented also on renal cyst. He would like to see Urology here a [facility] and he had appt. With Dr. Chapa, Urology 8/3/2018 and did not stay for appt.  He also did  not wait to see Urology at [facility]  6. BP; he remains on same medications and he should increase Losartan from 50 mg to 100 mg  7. He had minimal non-obstructive coronary CTA 5/23/17. He was seen 1/20/18 by Dr. Hanks Cardiology. As reported about regarding cor angiogram at [facility].   Future MRI/MRA brain ordered but he has not done this,  echo (stable from 4/26/2018) and cardiology follow up.  Seen in Cardiology Ms. Brown 4/26/2018. He has 11/27/2018 follow up with Dr. Hanks  8. Check with insurance for  New Shingles vaccine  9.  A1C for elevated glucose for 4/25/2018 6.4%  10. Ordered labs 10/12/17 for mild anemia and lower platelets seen by Dr. Marina, Hematology  1/5/2018  11. Seen by Dr. Sanderson GI appt. For hepatic steatosis (liver U/S done 11/30/17) in 3/19/2018  12. Seen in ortho hand 5/15/2018 for L index finger issues   13. He has 10/5/2018 Nutrition appt.   14. [facility] and he will follow there in [facility]/2018. He has 11/7/2018 Dermatology appt. Here with Dr. Beyer        Total time spent 40 minutes.  More than 50% of the time spent with Mr. Velasco on counseling / coordinating his care

## 2018-09-20 ENCOUNTER — TELEPHONE (OUTPATIENT)
Dept: INTERNAL MEDICINE | Facility: CLINIC | Age: 73
End: 2018-09-20

## 2018-09-20 ENCOUNTER — TELEPHONE (OUTPATIENT)
Dept: DERMATOLOGY | Facility: CLINIC | Age: 73
End: 2018-09-20

## 2018-09-20 DIAGNOSIS — M25.561 RIGHT KNEE PAIN, UNSPECIFIED CHRONICITY: Primary | ICD-10-CM

## 2018-09-20 NOTE — TELEPHONE ENCOUNTER
Dear Mi;    I placed ortho referral for knee pain this encounter    Please help coordinate    Thanks, JIM Duffy    Sent to Clinic coordinators to help schedule.  Mi Pratt RN 3:42 PM on 9/20/2018.

## 2018-09-20 NOTE — TELEPHONE ENCOUNTER
"Patient came to the clinic today (no appt scheduled today) and wanted to talk to Dr. Shore's nurse.  He wanted me to tell her that [facility] \"[facility]\" [facility] ([facility] Gaines)  took skin cancer out of his hand in August, and then she wasn't able to remove the stiches that she had put in the following month when he went back.  He was wondering why Dr. Shore's student couldn't take out the stiches, and that he stilll has them.  Told patient that since [facility], i'm unable to look into the office note on the reasoning on why they kept the stiches in.  Told him that he should follow up with the [facility] about his concerns.  He wanted Dr. Shore to get the message, and was \" leaving it in her hands\" on what to do.  Again, told him that since Dr. Shore didn't see him for this concern, he should follow up with Dr. Gaines and her team at the [facility].  ( Dr. Menendez is a , not a student.)  Patient still kept reffing to Dr. Gaines as \" Dr. Shore's student \" during the conversation.      Spoke to Dr. Shore, and she also says that he needs to [facility] with the [facility] for his concerns [facility].  I called the patient on his cell and home phone to give him the message, but was not able to get though.    "

## 2018-09-24 ENCOUNTER — OFFICE VISIT (OUTPATIENT)
Dept: OPHTHALMOLOGY | Facility: CLINIC | Age: 73
End: 2018-09-24
Attending: OPHTHALMOLOGY
Payer: MEDICARE

## 2018-09-24 DIAGNOSIS — H52.13 MYOPIC ASTIGMATISM OF BOTH EYES: ICD-10-CM

## 2018-09-24 DIAGNOSIS — H52.4 PRESBYOPIA: ICD-10-CM

## 2018-09-24 DIAGNOSIS — H52.203 MYOPIC ASTIGMATISM OF BOTH EYES: ICD-10-CM

## 2018-09-24 DIAGNOSIS — E11.9 DIABETES MELLITUS TYPE 2 WITHOUT RETINOPATHY (H): ICD-10-CM

## 2018-09-24 DIAGNOSIS — Z96.1 PSEUDOPHAKIA, BOTH EYES: ICD-10-CM

## 2018-09-24 DIAGNOSIS — H04.123 DRY EYES, BILATERAL: Primary | ICD-10-CM

## 2018-09-24 PROCEDURE — G0463 HOSPITAL OUTPT CLINIC VISIT: HCPCS | Mod: ZF

## 2018-09-24 ASSESSMENT — TONOMETRY
OD_IOP_MMHG: 16
OS_IOP_MMHG: 14
IOP_METHOD: TONOPEN

## 2018-09-24 ASSESSMENT — CONF VISUAL FIELD
OD_NORMAL: 1
OS_NORMAL: 1
METHOD: COUNTING FINGERS

## 2018-09-24 ASSESSMENT — EXTERNAL EXAM - RIGHT EYE: OD_EXAM: NORMAL

## 2018-09-24 ASSESSMENT — REFRACTION_WEARINGRX
SPECS_TYPE: BIFOCAL
OS_CYLINDER: +0.50
OD_ADD: +2.00
OD_SPHERE: -0.50
OD_AXIS: 007
OD_CYLINDER: +1.50
OS_ADD: +2.00
OS_SPHERE: -0.50
OS_AXIS: 180

## 2018-09-24 ASSESSMENT — VISUAL ACUITY
OS_CC: 20/20
CORRECTION_TYPE: GLASSES
OD_CC: 20/20
OD_CC+: -2
OS_CC+: -2
METHOD: SNELLEN - LINEAR

## 2018-09-24 ASSESSMENT — EXTERNAL EXAM - LEFT EYE: OS_EXAM: NORMAL

## 2018-09-24 ASSESSMENT — CUP TO DISC RATIO
OD_RATIO: 0.3
OS_RATIO: 0.3

## 2018-09-24 NOTE — MR AVS SNAPSHOT
After Visit Summary   9/24/2018    Lux Velasco    MRN: [id number]           Patient Information     Date Of Birth          1945        Visit Information        Provider Department      9/24/2018 10:30 AM Migdalia Hussein MD [facility]         Follow-ups after your visit        Follow-up notes from your care team     Return in about 6 months (around 3/24/2019) for ocular surface check and dilation.      Your next 10 appointments already scheduled     Oct 05, 2018 11:00 AM CDT   (Arrive by 10:45 AM)   New Patient Visit with Yulisa Paiz RD   [facility] ([facility])    [address]  [address]  [place] [facility] [address] [phone]            Oct 22, 2018  8:40 AM CDT   (Arrive by 8:25 AM)   [facility]    [address]  4th [address]  [place] [address] [phone]            Nov 07, 2018 10:15 AM CST   (Arrive by 10:00 AM)   Return Visit with Shirin Shore MD   [facility] ([facility])    [address]  3rd Floor  [place] [facility] [address] [phone]            Nov 27, 2018 11:00 AM CST   (Arrive by 10:45 AM)   Return Visit with Jorge Hanks MD   [facility] ([facility])    [address]  [address]  [place] [facility] [address] [phone]            Dec 19, 2018  1:15 PM CST   RETURN GENERAL with Migdalia Hussein MD   [facility] ([facility])    [address]  [facility] [address] [phone]              Who to contact     Please call your clinic at [phone] to:    Ask questions about your health    Make or cancel appointments    Discuss your medicines    Learn about your test results    Speak to your doctor            Additional Information About Your  Visit        Care EveryWhere ID     This is your Care EveryWhere ID. This could be used by other organizations to access your [facility] medical records  [id number]         Blood Pressure from Last 3 Encounters:   09/18/18 160/76   08/15/18 (!) 156/97   08/03/18 128/71    Weight from Last 3 Encounters:   09/18/18 134.2 kg (295 lb 14.4 oz)   08/15/18 131.1 kg (289 lb)   08/03/18 127 kg (280 lb)              Today, you had the following     No orders found for display         Today's Medication Changes          These changes are accurate as of 9/24/18 11:55 AM.  If you have any questions, ask your nurse or doctor.               These medicines have changed or have updated prescriptions.        Dose/Directions    losartan 50 MG tablet   Commonly known as:  COZAAR   This may have changed:  when to take this   Used for:  Benign essential hypertension        Dose:  50 mg   Take 1 tablet (50 mg) by mouth daily   Quantity:  30 tablet   Refills:  3                Primary Care Provider Office Phone # Fax #    Nam Duffy [phone] [address] [address]        Equal Access to Services     [facility]: Hadii suresh Alicia, waesteeda rolando, qaybta kaalmada adetanya, taty chase . So [facility] [phone].    ATENCIÓN: Si habla español, tiene a back disposición servicios gratuitos de asistencia lingüística. Llame al [phone].    We comply with applicable federal civil rights laws and [place] laws. We do not discriminate on the basis of race, color, national origin, age, disability, sex, sexual orientation, or gender identity.            Thank you!     Thank you for choosing [facility]  for your care. Our goal is always to provide you with excellent care. Hearing back from our patients is one way we can continue to improve our services. Please take a few minutes to complete the written survey that you may receive in the mail after your  visit with us. Thank you!             Your Updated Medication List - Protect others around you: Learn how to safely use, store and throw away your medicines at www.disposemymeds.org.          This list is accurate as of 9/24/18 11:55 AM.  Always use your most recent med list.                   Brand Name Dispense Instructions for use Diagnosis    acetaminophen 500 MG Caps      Take 2 tablets by mouth daily.        albuterol 108 (90 Base) MCG/ACT inhaler    PROAIR HFA    1 Inhaler    Inhale 2 puffs into the lungs every 4 hours as needed for shortness of breath / dyspnea, wheezing or other (use prior to exertion/activities)    Shortness of breath       alfuzosin 10 MG 24 hr tablet    UROXATRAL    90 tablet    Take 1 tablet (10 mg) by mouth daily    Lower urinary tract symptoms (LUTS)       ammonium lactate 12 % cream    AMLACTIN     Apply  topically daily.        artificial tears Soln      Use one drop to both eye PRN.        ASPIRIN PO      Take 81 mg by mouth daily        ATORVASTATIN CALCIUM PO      Take 40 mg by mouth daily        budesonide-formoterol 160-4.5 MCG/ACT Inhaler    SYMBICORT     Inhale 2 puffs into the lungs 2 times daily        doxycycline 100 MG capsule    VIBRAMYCIN    30 capsule    Take 1 capsule (100 mg) by mouth daily    Ulcerative blepharitis of upper and lower eyelids of both eyes       finasteride 5 MG tablet    PROSCAR    90 tablet    Take 1 tablet (5 mg) by mouth daily    Lower urinary tract symptoms (LUTS)       fluticasone 110 MCG/ACT Inhaler    FLOVENT HFA    3 Inhaler    Inhale 1 puff into the lungs 2 times daily    Mild intermittent asthma, unspecified whether complicated, Dyspnea on exertion       guaiFENesin-codeine 100-10 MG/5ML Soln solution    ROBITUSSIN AC    420 mL    Take 5-10 mLs by mouth every 4 hours as needed for cough    Cough       isosorbide mononitrate 30 MG 24 hr tablet    IMDUR    90 tablet    Take 1 tablet (30 mg) by mouth daily    Angina, class III (H)        ketotifen 0.025 % Soln ophthalmic solution    ZADITOR/REFRESH ANTI-ITCH     Place 1 drop into both eyes 2 times daily        losartan 50 MG tablet    COZAAR    30 tablet    Take 1 tablet (50 mg) by mouth daily    Benign essential hypertension       METFORMIN HCL PO      Take by mouth 2 times daily (with meals)    Benign nodular prostatic hyperplasia without lower urinary tract symptoms, Need for prophylactic vaccination against Streptococcus pneumoniae (pneumococcus), Screening for AAA (abdominal aortic aneurysm), History of smoking       omeprazole 20 MG CR capsule    priLOSEC     Take 20 mg by mouth daily.        oxybutynin 5 MG tablet    DITROPAN    90 tablet    Take 0.5 tablets (2.5 mg) by mouth At Bedtime    Benign prostatic hyperplasia with urinary obstruction       senna-docusate 8.6-50 MG per tablet    SENOKOT-S;PERICOLACE    10 tablet    Take 1-2 tablets by mouth 2 times daily as needed for constipation Take while on oral narcotics to prevent or treat constipation.    Hypercalcemia, Primary hyperparathyroidism (H)       TOPROL  MG 24 hr tablet   Generic drug:  metoprolol succinate      Take 0.5 tablets (50 mg) by mouth daily        VITAMIN D (CHOLECALCIFEROL) PO      Take 1,000 Units by mouth every morning

## 2018-09-24 NOTE — NURSING NOTE
Chief Complaints and History of Present Illnesses   Patient presents with     Follow Up For     2 month f/u for Dry eye syndrome, bilateral     HPI    Affected eye(s):  Both   Symptoms:     No floaters   No flashes   Redness   Tearing (Comment: Pt states with plugs in each eye tears are more x 2 months)   No itching   No burning         Do you have eye pain now?:  No      Comments:  Pt here for a 2 month f/u for Dry eye syndrome, bilateral. Pt states vision seems about the same since last visit x 2 months.     Ayesha Ortiz@ [facility] 10:40 AM September 24, 2018

## 2018-09-24 NOTE — PROGRESS NOTES
HPI  Lux Velasco is a 72 year old male who presents for surface check. He feels his vision continues to be good as long as he uses the ATs constantly and the ointment QHS. If he stops either, the vision gets blurry and his eyes are irritated.     POH: Dry eye/blepharitis  PMH: Diabetes type 2 (HbA1c 5.7 4/5/2017), HTN (med controlled)  FH: No FH of AMD or glc  SH: Former smoker    Assessment & Plan      (H04.123) Dry eye syndrome, bilateral  Comment: Using warm compresses and eyelid scrubs at least daily, ATs during day and ointment QHS, sometimes BID.  Did not tolerate azithromycin (constipation)  Plan:   Warm compresses and eyelid scrubs, only doing daily - increase to BID  ATs during day  Ointment QHS  Doxycycline 100mg daily  Upper plugs and right lower plug have fallen out. He is not interested in replacing them at this time.    -Consider starting Xiidra/Restasis as next step if needed.    (E11.9,  Z01.00) Diabetic eye exam (HCC)  Comment: No signs of diabetic retinopathy last dilated exam 4/2018.     (Z96.1) Pseudophakia of both eyes  Comment: Vision improved with ocular surface lubrication  Plan: Observe    (H52.13,  H52.203) Myopia with astigmatism and presbyopia, bilateral  Comment: [facility]  -----------------------------------------------------------------------------------    Patient disposition:   Return in 6 months with surface check and dilation    Teaching statement:  Complete documentation of historical and exam elements from today's encounter can be found in the full encounter summary report (not reduplicated in this progress note). I personally obtained the chief complaint(s) and history of present illness.  I confirmed and edited as necessary the review of systems, past medical/surgical history, family history, social history, and examination findings as documented by others; and I examined the patient myself. I personally reviewed the relevant tests, images, and reports  as documented above.     I formulated and edited as necessary the assessment and plan and discussed the findings and management plan with the patient and family.    Migdalia Hussein MD  Comprehensive Ophthalmology & Ocular Pathology  Department of Ophthalmology and Visual Neurosciences  [email or web address]  Pager [phone]

## 2018-10-03 ENCOUNTER — RADIANT APPOINTMENT (OUTPATIENT)
Dept: GENERAL RADIOLOGY | Facility: CLINIC | Age: 73
End: 2018-10-03
Attending: ORTHOPAEDIC SURGERY
Payer: MEDICARE

## 2018-10-03 ENCOUNTER — OFFICE VISIT (OUTPATIENT)
Dept: ORTHOPEDICS | Facility: CLINIC | Age: 73
End: 2018-10-03
Payer: MEDICARE

## 2018-10-03 VITALS — BODY MASS INDEX: 41.3 KG/M2 | WEIGHT: 295 LBS | HEIGHT: 71 IN

## 2018-10-03 DIAGNOSIS — M25.561 BILATERAL KNEE PAIN: Primary | ICD-10-CM

## 2018-10-03 DIAGNOSIS — M25.562 BILATERAL KNEE PAIN: ICD-10-CM

## 2018-10-03 DIAGNOSIS — M25.561 BILATERAL KNEE PAIN: ICD-10-CM

## 2018-10-03 DIAGNOSIS — M17.31 POST-TRAUMATIC OSTEOARTHRITIS OF RIGHT KNEE: Primary | ICD-10-CM

## 2018-10-03 DIAGNOSIS — M25.562 BILATERAL KNEE PAIN: Primary | ICD-10-CM

## 2018-10-03 NOTE — LETTER
10/3/2018       RE: Lux Velacso  [address]  [place] [address]     Dear Colleague,    Thank you for referring your patient, Lux Velasco, to the [facility]. Please see a copy of my visit note below.           Interval History:   Lux is a very pleasant 72-year-old man with bilateral knee arthritis.  He has been followed conservatively with repeat Visco supplementation injections.  He feels like the injections have had some diminishing returns more recently.  He remains able to do more or less all of his normal activities of daily living.  He no longer uses any assistive devices.  He does not walk with an appreciable limp and is not taking any pain medication.  For the most part he tolerates his bilateral knee symptoms very well with more or less no disability.           Physical Exam:     GEN: AOx3, appears stated age, cooperative, no distress  HEENT: normocephalic, atraumatic  RESP: non labored breathing    ABD: benign    SKIN: No rashes or open lesions    CV: Non-tachycardic   NEURO: CN2-12 grossly intact    VASCULAR: 2+ DP pulse   MUSCULOSKELETAL:    Gait: patient walks with an antalgic gait.    They do achieve full extension at the knee.  right painful  Knee  Overall limb alignment is: Varus  Effusion or swelling of the knee: mild  Tenderness to palpation: yes, pain localizes to the medial joint line  ROM: 0 to 120  Pain with knee ROM: mild  Extensor lag: none  MCL stability: Stable  Lateral Stability: Stable  Lachman: stable  Posterior stability: stable  Pain with passive full hip range of motion: none  Prior surgical incision: none         Imaging:      Plain x-rays demonstrate mild to moderate degenerative arthritis of his right knee.       Assessment and Plan:      Lux is a very pleasant 72-year-old man with right knee osteoarthritis.  We discussed ongoing management options.  At this point he deals with his symptoms  quite well.  He also has ongoing benefit from prior injections.  We discussed that he could certainly continue with these.  If his symptoms were worsening or no longer relieved by the injections we discussed that it may be worthwhile to consider total joint replacement.  However I would recommend he hold off as long as possible.  Certainly I think the condition of his knees will tolerate this for quite some time at least based on the x-rays.  He will return to clinic if his symptoms are worsening or if he is interested in further considering total.  He will contact us if he has any questions or concerns in the meantime.      Mario Alberto Baptiste M.D.     Arthritis and Joint Replacement  Department of Orthopaedic Surgery, [facility]  [email or web address]  [phone] (pager)

## 2018-10-03 NOTE — NURSING NOTE
"Reason For Visit:   Chief Complaint   Patient presents with     RECHECK     Bilateral knee TKA discussion       Primary MD: Nam Duffy      Ht 1.803 m (5' 11\")  Wt 133.8 kg (295 lb)  BMI 41.14 kg/m2    Pain Assessment  Patient Currently in Pain: Denies (pain comes and goes. )    Current Outpatient Prescriptions   Medication     acetaminophen 500 MG CAPS     alfuzosin (UROXATRAL) 10 MG 24 hr tablet     ammonium lactate (AMLACTIN) 12 % cream     artificial tears SOLN     ASPIRIN PO     ATORVASTATIN CALCIUM PO     budesonide-formoterol (SYMBICORT) 160-4.5 MCG/ACT Inhaler     doxycycline (VIBRAMYCIN) 100 MG capsule     finasteride (PROSCAR) 5 MG tablet     fluticasone (FLOVENT HFA) 110 MCG/ACT Inhaler     guaiFENesin-codeine (ROBITUSSIN AC) 100-10 MG/5ML SOLN solution     isosorbide mononitrate (IMDUR) 30 MG 24 hr tablet     ketotifen (ZADITOR/REFRESH ANTI-ITCH) 0.025 % SOLN ophthalmic solution     losartan (COZAAR) 50 MG tablet     METFORMIN HCL PO     metoprolol succinate (TOPROL-XL) 100 MG 24 hr tablet     omeprazole (PRILOSEC) 20 MG capsule     oxybutynin (DITROPAN) 5 MG tablet     senna-docusate (SENOKOT-S;PERICOLACE) 8.6-50 MG per tablet     VITAMIN D, CHOLECALCIFEROL, PO     albuterol (PROAIR HFA) 108 (90 BASE) MCG/ACT Inhaler     No current facility-administered medications for this visit.           Allergies   Allergen Reactions     Clindamycin      Eggs Other (See Comments)     Pt states no longer having reaction, childhood allergy, eats eggs       Penicillins Other (See Comments)     Pt states PCN allergy is due to egg allergy     Propoxyphene Other (See Comments)     Pain           Cammy Ybarra, KEVYNN    "

## 2018-10-03 NOTE — PROGRESS NOTES
Interval History:   Lux is a very pleasant 72-year-old man with bilateral knee arthritis.  He has been followed conservatively with repeat Visco supplementation injections.  He feels like the injections have had some diminishing returns more recently.  He remains able to do more or less all of his normal activities of daily living.  He no longer uses any assistive devices.  He does not walk with an appreciable limp and is not taking any pain medication.  For the most part he tolerates his bilateral knee symptoms very well with more or less no disability.           Physical Exam:     GEN: AOx3, appears stated age, cooperative, no distress  HEENT: normocephalic, atraumatic  RESP: non labored breathing    ABD: benign    SKIN: No rashes or open lesions    CV: Non-tachycardic   NEURO: CN2-12 grossly intact    VASCULAR: 2+ DP pulse   MUSCULOSKELETAL:    Gait: patient walks with an antalgic gait.    They do achieve full extension at the knee.  right painful  Knee  Overall limb alignment is: Varus  Effusion or swelling of the knee: mild  Tenderness to palpation: yes, pain localizes to the medial joint line  ROM: 0 to 120  Pain with knee ROM: mild  Extensor lag: none  MCL stability: Stable  Lateral Stability: Stable  Lachman: stable  Posterior stability: stable  Pain with passive full hip range of motion: none  Prior surgical incision: none         Imaging:      Plain x-rays demonstrate mild to moderate degenerative arthritis of his right knee.       Assessment and Plan:      Lux is a very pleasant 72-year-old man with right knee osteoarthritis.  We discussed ongoing management options.  At this point he deals with his symptoms quite well.  He also has ongoing benefit from prior injections.  We discussed that he could certainly continue with these.  If his symptoms were worsening or no longer relieved by the injections we discussed that it may be worthwhile to consider total joint replacement.  However I would  recommend he hold off as long as possible.  Certainly I think the condition of his knees will tolerate this for quite some time at least based on the x-rays.  He will return to clinic if his symptoms are worsening or if he is interested in further considering total.  He will contact us if he has any questions or concerns in the meantime.      Mario Alberto Baptiste M.D.     Arthritis and Joint Replacement  Department of Orthopaedic Surgery, [facility]  [email or web address]  [phone] (pager)

## 2018-10-03 NOTE — MR AVS SNAPSHOT
After Visit Summary   10/3/2018    Lux Velasco    MRN: [id number]           Patient Information     Date Of Birth          1945        Visit Information        Provider Department      10/3/2018 5:30 PM Mario Alberto Baptiste MD [facility]        Today's Diagnoses     Post-traumatic osteoarthritis of right knee    -  1       Follow-ups after your visit        Your next 10 appointments already scheduled     Oct 22, 2018  8:40 AM CDT   (Arrive by 8:25 AM)   NEW FOOT/ANKLE with Colton Prieto DPM   [facility] ([facility])    [address] [place] [address]  [address] [facility] [address]   [phone]            Oct 26, 2018  9:00 AM CDT   (Arrive by 8:45 AM)   Return Visit with Marc Chapa MD   [facility] ([facility])    [address] [place] [address]  4th [facility] [address]   [phone]            Oct 29, 2018  9:30 AM CDT   Lab with  LAB   [facility] ([facility])    [address] [place] [address]  1st [facility] [address]   [phone]            Oct 29, 2018 10:30 AM CDT   (Arrive by 10:15 AM)   Return General Liver with Nathanael Rogel MD   [facility] ([facility])    [address] [place] [address]  [address]  [facility] [address]   [phone]            Nov 07, 2018 10:15 AM CST   (Arrive by 10:00 AM)   Return Visit with Shirin Shore MD   [facility] ([facility])    [address] [place] [address]  [address] [facility] [address]   [phone]            Nov 27, 2018 11:00 AM CST   (Arrive by 10:45 AM)   Return Visit with Jorge Hanks MD   [facility] ([facility])    [address] [place] [address]  [address]  [facility] [address]   [phone]            Dec 19, 2018  1:15 PM CST   RETURN GENERAL with Migdalia PINA  "MD [facility]   [facility] ([facility])    [facility]  [address] [facility]  [address]  [facility] [address] [phone]              Who to contact     Please call your clinic at [phone] to:    Ask questions about your health    Make or cancel appointments    Discuss your medicines    Learn about your test results    Speak to your doctor            Additional Information About Your Visit        Care EveryWhere ID     This is your Care EveryWhere ID. This could be used by other organizations to access your [place] medical records  [id number]        Your Vitals Were     Height BMI (Body Mass Index)                1.803 m (5' 11\") 41.14 kg/m2           Blood Pressure from Last 3 Encounters:   09/18/18 160/76   08/15/18 (!) 156/97   08/03/18 128/71    Weight from Last 3 Encounters:   10/03/18 133.8 kg (295 lb)   09/18/18 134.2 kg (295 lb 14.4 oz)   08/15/18 131.1 kg (289 lb)              Today, you had the following     No orders found for display         Today's Medication Changes          These changes are accurate as of 10/3/18 11:59 PM.  If you have any questions, ask your nurse or doctor.               These medicines have changed or have updated prescriptions.        Dose/Directions    losartan 50 MG tablet   Commonly known as:  COZAAR   This may have changed:  when to take this   Used for:  Benign essential hypertension        Dose:  50 mg   Take 1 tablet (50 mg) by mouth daily   Quantity:  30 tablet   Refills:  3                Primary Care Provider Office Phone # Fax #    Nam Duffy [phone]       [address] [facility] [address] [facility] [address]        Equal Access to Services     JUAN DYE AH: Hadpedro Alicia, waaxda luqadaha, qaybta kaalmada taty blanco. So [facility] [phone].    ATENCIÓN: Si habla español, tiene a back disposición servicios gratuitos de asistencia lingüística. Llame al " [phone].    We comply with applicable federal civil rights laws and [place] laws. We do not discriminate on the basis of race, color, national origin, age, disability, sex, sexual orientation, or gender identity.            Thank you!     Thank you for choosing [facility]  for your care. Our goal is always to provide you with excellent care. Hearing back from our patients is one way we can continue to improve our services. Please take a few minutes to complete the written survey that you may receive in the mail after your visit with us. Thank you!             Your Updated Medication List - Protect others around you: Learn how to safely use, store and throw away your medicines at www.disposemymeds.org.          This list is accurate as of 10/3/18 11:59 PM.  Always use your most recent med list.                   Brand Name Dispense Instructions for use Diagnosis    acetaminophen 500 MG Caps      Take 2 tablets by mouth daily.        albuterol 108 (90 Base) MCG/ACT inhaler    PROAIR HFA    1 Inhaler    Inhale 2 puffs into the lungs every 4 hours as needed for shortness of breath / dyspnea, wheezing or other (use prior to exertion/activities)    Shortness of breath       alfuzosin 10 MG 24 hr tablet    UROXATRAL    90 tablet    Take 1 tablet (10 mg) by mouth daily    Lower urinary tract symptoms (LUTS)       ammonium lactate 12 % cream    AMLACTIN     Apply  topically daily.        artificial tears Soln      Use one drop to both eye PRN.        ASPIRIN PO      Take 81 mg by mouth daily        ATORVASTATIN CALCIUM PO      Take 40 mg by mouth daily        budesonide-formoterol 160-4.5 MCG/ACT Inhaler    SYMBICORT     Inhale 2 puffs into the lungs 2 times daily        doxycycline 100 MG capsule    VIBRAMYCIN    30 capsule    Take 1 capsule (100 mg) by mouth daily    Ulcerative blepharitis of upper and lower eyelids of both eyes       finasteride 5 MG tablet    PROSCAR    90 tablet    Take 1 tablet (5  mg) by mouth daily    Lower urinary tract symptoms (LUTS)       fluticasone 110 MCG/ACT Inhaler    FLOVENT HFA    3 Inhaler    Inhale 1 puff into the lungs 2 times daily    Mild intermittent asthma, unspecified whether complicated, Dyspnea on exertion       guaiFENesin-codeine 100-10 MG/5ML Soln solution    ROBITUSSIN AC    420 mL    Take 5-10 mLs by mouth every 4 hours as needed for cough    Cough       isosorbide mononitrate 30 MG 24 hr tablet    IMDUR    90 tablet    Take 1 tablet (30 mg) by mouth daily    Angina, class III (H)       ketotifen 0.025 % Soln ophthalmic solution    ZADITOR/REFRESH ANTI-ITCH     Place 1 drop into both eyes 2 times daily        losartan 50 MG tablet    COZAAR    30 tablet    Take 1 tablet (50 mg) by mouth daily    Benign essential hypertension       METFORMIN HCL PO      Take by mouth 2 times daily (with meals)    Benign nodular prostatic hyperplasia without lower urinary tract symptoms, Need for prophylactic vaccination against Streptococcus pneumoniae (pneumococcus), Screening for AAA (abdominal aortic aneurysm), History of smoking       omeprazole 20 MG CR capsule    priLOSEC     Take 20 mg by mouth daily.        oxybutynin 5 MG tablet    DITROPAN    90 tablet    Take 0.5 tablets (2.5 mg) by mouth At Bedtime    Benign prostatic hyperplasia with urinary obstruction       senna-docusate 8.6-50 MG per tablet    SENOKOT-S;PERICOLACE    10 tablet    Take 1-2 tablets by mouth 2 times daily as needed for constipation Take while on oral narcotics to prevent or treat constipation.    Hypercalcemia, Primary hyperparathyroidism (H)       TOPROL  MG 24 hr tablet   Generic drug:  metoprolol succinate      Take 0.5 tablets (50 mg) by mouth daily        VITAMIN D (CHOLECALCIFEROL) PO      Take 1,000 Units by mouth every morning

## 2018-10-16 NOTE — TELEPHONE ENCOUNTER
FUTURE VISIT INFORMATION      FUTURE VISIT INFORMATION:    Date: 10/22/18    Time: 0840    Location: ORTHO  REFERRAL INFORMATION:    Referring provider:  N/A    Referring providers clinic:  N/A    Reason for visit/diagnosis  FOOT PAIN    RECORDS REQUESTED FROM:       Clinic name Comments Records Status Imaging Status                                         RECORDS STATUS    RECORDS IN CHART FROM 2012, NO RECENT RECORDS

## 2018-10-22 ENCOUNTER — PRE VISIT (OUTPATIENT)
Dept: ORTHOPEDICS | Facility: CLINIC | Age: 73
End: 2018-10-22

## 2018-10-22 ENCOUNTER — OFFICE VISIT (OUTPATIENT)
Dept: ORTHOPEDICS | Facility: CLINIC | Age: 73
End: 2018-10-22
Payer: MEDICARE

## 2018-10-22 DIAGNOSIS — M25.579 PAIN IN JOINT, ANKLE AND FOOT, UNSPECIFIED LATERALITY: ICD-10-CM

## 2018-10-22 DIAGNOSIS — B35.3 TINEA PEDIS OF BOTH FEET: ICD-10-CM

## 2018-10-22 DIAGNOSIS — E11.49 TYPE II OR UNSPECIFIED TYPE DIABETES MELLITUS WITH NEUROLOGICAL MANIFESTATIONS, NOT STATED AS UNCONTROLLED(250.60) (H): ICD-10-CM

## 2018-10-22 DIAGNOSIS — E11.51 DIABETES MELLITUS WITH PERIPHERAL VASCULAR DISEASE (H): ICD-10-CM

## 2018-10-22 DIAGNOSIS — I87.8 VENOUS STASIS: Primary | ICD-10-CM

## 2018-10-22 DIAGNOSIS — B35.1 ONYCHOMYCOSIS: ICD-10-CM

## 2018-10-22 DIAGNOSIS — R09.89 OTHER SPECIFIED SYMPTOMS AND SIGNS INVOLVING THE CIRCULATORY AND RESPIRATORY SYSTEMS: ICD-10-CM

## 2018-10-22 RX ORDER — ECONAZOLE NITRATE 10 MG/G
CREAM TOPICAL DAILY
Qty: 85 G | Refills: 5 | Status: ON HOLD | OUTPATIENT
Start: 2018-10-22 | End: 2023-07-07

## 2018-10-22 NOTE — LETTER
10/22/2018       RE: Lux Velasco  [address]  [facility] [address]     Dear Colleague,    Thank you for referring your patient, Lux Velasco, to the [facility]. Please see a copy of my visit note below.    Past Medical History:   Diagnosis Date     Diabetes mellitus type II 2005     History of agent Orange exposure 4/17/2013     Hypertension      Myocardial infarct 01/01/1999     Primary hyperparathyroidism (H) Dx approx 2003     Stroke (H) 01/01/1998    recovered fully     Patient Active Problem List   Diagnosis     Plantar fascial fibromatosis     Ear pain     Left arm weakness     History of agent Orange exposure     Cervicalgia     Degenerative arthritis of cervical spine     Stroke (H)     Myocardial infarct (H)     Shoulder pain     Hypercalcemia     HPTH (hyperparathyroidism) (H)     Skin exam, screening for cancer     Primary hyperparathyroidism (H)     Past Surgical History:   Procedure Laterality Date     BIOPSY OF SKIN LESION       BYPASS GRAFT ARTERY CORONARY       CATARACT IOL, RT/LT Bilateral 2017    [facility]     EXCISE MASS LOWER EXTREMITY Left 11/3/2017    Procedure: EXCISE MASS LOWER EXTREMITY;  Excision Mass Left Flank;  Surgeon: Marybeth Gambino MD;  Location:  OR     MOHS MICROGRAPHIC PROCEDURE       PARATHYROIDECTOMY N/A 3/21/2017    Procedure: PARATHYROIDECTOMY;  Surgeon: Julianna Short MD;  Location: [facility]     PARATHYROIDECTOMY       Social History     Social History     Marital status:      Spouse name: N/A     Number of children: N/A     Years of education: N/A     Occupational History     Not on file.     Social History Main Topics     Smoking status: Former Smoker     Quit date: 1970     Smokeless tobacco: Former User      Comment: Doesn't remember how much he used to smoke - during service only.      Alcohol use Yes      Comment: 1 beer/week     Drug use: No     Sexual  activity: Not on file     Other Topics Concern     Not on file     Social History Narrative     Family History   Problem Relation Age of Onset     Melanoma Mother      Melanoma Father      Cancer No family hx of      no skin cancer     Glaucoma No family hx of      Macular Degeneration No family hx of      Inr                                1.04                             9/18/2018  Lab Results   Component Value Date    WBC 5.7 09/18/2018     Lab Results   Component Value Date    RBC 4.65 09/18/2018     Lab Results   Component Value Date    HGB 12.6 09/18/2018     Lab Results   Component Value Date    HCT 40.0 09/18/2018     No components found for: MCT  Lab Results   Component Value Date    MCV 86 09/18/2018     Lab Results   Component Value Date    MCH 27.1 09/18/2018     Lab Results   Component Value Date    MCHC 31.5 09/18/2018     Lab Results   Component Value Date    RDW 14.2 09/18/2018     Lab Results   Component Value Date     09/18/2018     Last Comprehensive Metabolic Panel:  Sodium   Date Value Ref Range Status   09/18/2018 138 133 - 144 mmol/L Final     Potassium   Date Value Ref Range Status   09/18/2018 4.6 3.4 - 5.3 mmol/L Final     Chloride   Date Value Ref Range Status   09/18/2018 105 94 - 109 mmol/L Final     Carbon Dioxide   Date Value Ref Range Status   09/18/2018 28 20 - 32 mmol/L Final     Anion Gap   Date Value Ref Range Status   09/18/2018 6 3 - 14 mmol/L Final     Glucose   Date Value Ref Range Status   09/18/2018 124 (H) 70 - 99 mg/dL Final     Urea Nitrogen   Date Value Ref Range Status   09/18/2018 14 7 - 30 mg/dL Final     Creatinine   Date Value Ref Range Status   09/18/2018 0.81 0.66 - 1.25 mg/dL Final     GFR Estimate   Date Value Ref Range Status   09/18/2018 >90 >60 mL/min/1.7m2 Final     Comment:     Non  GFR Calc     Calcium   Date Value Ref Range Status   09/18/2018 9.0 8.5 - 10.1 mg/dL Final     Lab Results   Component Value Date    A1C 6.4 04/25/2018       SUBJECTIVE FINDINGS:  72-year-old male presents to clinic for ankle swelling.  He relates he gets intermittent pain in the ankles but it is really swelling.  He has compression socks, but he does not like to wear them because they constrict.  Relates he has tried the [facility].  He does not like those wraps.  He has tried diuretics but he has to go to the bathroom too often for that.  Relates no ulcers or sores since we have seen him last.  No injuries.  Relates [facility]  Relates he has some numbness, tingling and neuropathy in his feet.  Also relates his toenails are discolored.  He is concerned about that.      OBJECTIVE FINDINGS:  DP and PT are 2/4 bilaterally.  He has peripheral edema and venous stasis bilaterally.   He has decreased hair growth bilaterally.  He has decreased ankle joint dorsiflexion bilaterally.  Deep tendon reflexes are intact bilaterally.  He has dry, scaly, skin in a moccasin pattern that is mild bilaterally.  His hallux nails have some thickening, subungual debris, discoloration, dystrophy to differing degrees bilaterally.  He has hyperkeratotic tissue buildup plantar medial hallux bilaterally.  Sharp/dull decreased with 5.07 [place] monofilament bilaterally.  No gross tendon voids bilaterally.        ASSESSMENT/PLAN:  Peripheral edema with venous stasis bilaterally.  He is getting some intermittent ankle pain.  He is diabetic with peripheral neuropathy and venous vascular disease.  Diagnosis and treatment options discussed with patient.  He has tinea pedis changes and onychomycosis changes as well.  Prescription for econazole cream given and use discussed with him.  He has diabetic shoes.  He will continue those.  Prescription for physical therapy given and use discussed with him.  I advised him to wear his compression socks.  He opted for no further referral to Lymphedema Clinic.  He will return to clinic and see me in 2 months.  Again, I gave  him a referral to physical therapy.  ABIs ordered to help rule out an arterial component to the venous stasis disease.       Again, thank you for allowing me to participate in the care of your patient.      Sincerely,    Colton Prieto DPM

## 2018-10-22 NOTE — MR AVS SNAPSHOT
After Visit Summary   10/22/2018    Lux Velasco    MRN: [id number]           Patient Information     Date Of Birth          1945        Visit Information        Provider Department      10/22/2018 8:40 AM [facility]        Today's Diagnoses     Venous stasis    -  1    Type II or unspecified type diabetes mellitus with neurological manifestations, not stated as uncontrolled(250.60) (H)        Diabetes mellitus with peripheral vascular disease (H)        Tinea pedis of both feet        Onychomycosis        Pain in joint, ankle and foot, unspecified laterality        Other specified symptoms and signs involving the circulatory and respiratory systems            Follow-ups after your visit        Additional Services     PHYSICAL THERAPY REFERRAL (Internal)       Physical Therapy Referral                  Your next 10 appointments already scheduled     Oct 26, 2018  9:00 AM CDT   (Arrive by 8:45 AM)   Return Visit with Marc Chapa MD   [facility] ([facility] [place])    [address]  4th Floor  [facility] [id number] [phone]            Oct 26, 2018 11:00 AM CDT   US GERMÁN DOPPLER NO EXERCISE 1-2 LVLS BILAT with [id number]   [facility] US ([facility] [place])    [address]  1st Floor  [facility] [id number] [phone]           How do I prepare for my exam? (Food and drink instructions) No caffeine or tobacco for 1 hour prior to exam.  What should I wear: Wear comfortable clothes.  How long does the exam take: Most ultrasounds take 30 to 60 minutes.  What should I bring: Bring a list of your medicines, including vitamins, minerals and over-the-counter drugs. It is safest to leave personal items at home.  Do I need a :  No  is needed.  What do I need to tell my doctor: Tell your doctor about any allergies you may have.   What should I do after the exam: No restrictions, You may resume normal activities.  What is this test: An ultrasound uses sound waves to make pictures of the body. Sound waves do not cause pain. The only discomfort may be the pressure of the wand against your skin or full bladder.  Who should I call with questions: If you have any questions, please call the Imaging Department where you will have your exam. Directions, parking instructions, and other information is available on our website, [email or web address].            Oct 29, 2018  9:30 AM CDT   Lab with  LAB   [facility] ([facility])    [address]  1st Floor  [facility] [address]   [phone]            Oct 29, 2018 10:30 AM CDT   (Arrive by 10:15 AM)   Return General Liver with Nathanael Rogel MD   [facility] Hepatology ([facility])    [address]  [address]  [facility] [address]   [phone]            Nov 07, 2018 10:15 AM CST   (Arrive by 10:00 AM)   Return Visit with Shirin Shore MD   [facility] Dermatology ([facility])    [address]  3rd Floor  [facility] [address]   [phone]            Nov 27, 2018 11:00 AM CST   (Arrive by 10:45 AM)   Return Visit with Jorge Hanks MD   [facility] Heart Care ([facility])    [address]  [address]  [facility] [address]   [phone]            Dec 10, 2018 10:40 AM CST   (Arrive by 10:25 AM)   [facility]    [address]  4th Floor  [facility] [address]   [phone]            Dec 19, 2018  1:15 PM CST   RETURN GENERAL with Migdalia Hussein MD   [facility] Clinic ([facility])    [address]  [address] [facility] [address]  [facility] [address]   [phone]              Future tests that were  ordered for you today     Open Future Orders        Priority Expected Expires Ordered    US GERMÁN Doppler No Exercise Routine  10/22/2019 10/22/2018    PHYSICAL THERAPY REFERRAL (Internal) Routine  10/22/2019 10/22/2018            Who to contact     Please call your clinic at [phone] to:    Ask questions about your health    Make or cancel appointments    Discuss your medicines    Learn about your test results    Speak to your doctor            Additional Information About Your Visit        Care EveryWhere ID     This is your Care EveryWhere ID. This could be used by other organizations to access your [facility] medical records  [id number]         Blood Pressure from Last 3 Encounters:   09/18/18 160/76   08/15/18 (!) 156/97   08/03/18 128/71    Weight from Last 3 Encounters:   10/03/18 133.8 kg (295 lb)   09/18/18 134.2 kg (295 lb 14.4 oz)   08/15/18 131.1 kg (289 lb)                 Today's Medication Changes          These changes are accurate as of 10/22/18  9:13 AM.  If you have any questions, ask your nurse or doctor.               Start taking these medicines.        Dose/Directions    econazole nitrate 1 % cream   Used for:  Tinea pedis of both feet, Onychomycosis   Started by:  Colton Prieto DPM        Apply topically daily To feet and toenails.   Quantity:  85 g   Refills:  5         These medicines have changed or have updated prescriptions.        Dose/Directions    losartan 50 MG tablet   Commonly known as:  COZAAR   This may have changed:  when to take this   Used for:  Benign essential hypertension        Dose:  50 mg   Take 1 tablet (50 mg) by mouth daily   Quantity:  30 tablet   Refills:  3            Where to get your medicines      These medications were sent to [facility] [place], [place] - [address]  [address] [address]    Hours:  TRANSPLANT PHONE NUMBER [phone] Phone:  [phone]     econazole nitrate 1 %  cream                Primary Care Provider Office Phone # Fax #    Nam Duffy [phone] [address] [address]        Equal Access to Services     SANDHYA DYE : Christiana suresh ku elijaho Somaríaali, waaxda luqadaha, qaybta kaalmada adetanya, taty wigginsgenoveva gambino. So [facility] [phone].    ATENCIÓN: Si habla español, tiene a back disposición servicios gratuitos de asistencia lingüística. Llame al [phone].    We comply with applicable federal civil rights laws and [place] laws. We do not discriminate on the basis of race, color, national origin, age, disability, sex, sexual orientation, or gender identity.            Thank you!     Thank you for choosing [facility]  for your care. Our goal is always to provide you with excellent care. Hearing back from our patients is one way we can continue to improve our services. Please take a few minutes to complete the written survey that you may receive in the mail after your visit with us. Thank you!             Your Updated Medication List - Protect others around you: Learn how to safely use, store and throw away your medicines at www.disposemymeds.org.          This list is accurate as of 10/22/18  9:13 AM.  Always use your most recent med list.                   Brand Name Dispense Instructions for use Diagnosis    acetaminophen 500 MG Caps      Take 2 tablets by mouth daily.        albuterol 108 (90 Base) MCG/ACT inhaler    PROAIR HFA    1 Inhaler    Inhale 2 puffs into the lungs every 4 hours as needed for shortness of breath / dyspnea, wheezing or other (use prior to exertion/activities)    Shortness of breath       alfuzosin 10 MG 24 hr tablet    UROXATRAL    90 tablet    Take 1 tablet (10 mg) by mouth daily    Lower urinary tract symptoms (LUTS)       ammonium lactate 12 % cream    AMLACTIN     Apply  topically daily.        artificial tears Soln      Use one drop to both eye PRN.         ASPIRIN PO      Take 81 mg by mouth daily        ATORVASTATIN CALCIUM PO      Take 40 mg by mouth daily        budesonide-formoterol 160-4.5 MCG/ACT Inhaler    SYMBICORT     Inhale 2 puffs into the lungs 2 times daily        doxycycline 100 MG capsule    VIBRAMYCIN    30 capsule    Take 1 capsule (100 mg) by mouth daily    Ulcerative blepharitis of upper and lower eyelids of both eyes       econazole nitrate 1 % cream     85 g    Apply topically daily To feet and toenails.    Tinea pedis of both feet, Onychomycosis       finasteride 5 MG tablet    PROSCAR    90 tablet    Take 1 tablet (5 mg) by mouth daily    Lower urinary tract symptoms (LUTS)       fluticasone 110 MCG/ACT Inhaler    FLOVENT HFA    3 Inhaler    Inhale 1 puff into the lungs 2 times daily    Mild intermittent asthma, unspecified whether complicated, Dyspnea on exertion       guaiFENesin-codeine 100-10 MG/5ML Soln solution    ROBITUSSIN AC    420 mL    Take 5-10 mLs by mouth every 4 hours as needed for cough    Cough       isosorbide mononitrate 30 MG 24 hr tablet    IMDUR    90 tablet    Take 1 tablet (30 mg) by mouth daily    Angina, class III (H)       ketotifen 0.025 % Soln ophthalmic solution    ZADITOR/REFRESH ANTI-ITCH     Place 1 drop into both eyes 2 times daily        losartan 50 MG tablet    COZAAR    30 tablet    Take 1 tablet (50 mg) by mouth daily    Benign essential hypertension       METFORMIN HCL PO      Take by mouth 2 times daily (with meals)    Benign nodular prostatic hyperplasia without lower urinary tract symptoms, Need for prophylactic vaccination against Streptococcus pneumoniae (pneumococcus), Screening for AAA (abdominal aortic aneurysm), History of smoking       omeprazole 20 MG CR capsule    priLOSEC     Take 20 mg by mouth daily.        oxybutynin 5 MG tablet    DITROPAN    90 tablet    Take 0.5 tablets (2.5 mg) by mouth At Bedtime    Benign prostatic hyperplasia with urinary obstruction       senna-docusate 8.6-50  MG per tablet    SENOKOT-S;PERICOLACE    10 tablet    Take 1-2 tablets by mouth 2 times daily as needed for constipation Take while on oral narcotics to prevent or treat constipation.    Hypercalcemia, Primary hyperparathyroidism (H)       TOPROL  MG 24 hr tablet   Generic drug:  metoprolol succinate      Take 0.5 tablets (50 mg) by mouth daily        VITAMIN D (CHOLECALCIFEROL) PO      Take 1,000 Units by mouth every morning

## 2018-10-22 NOTE — NURSING NOTE
Reason For Visit:   Chief Complaint   Patient presents with     Consult     Swelling, bilateral ankles. Discoloration of bilateral halluxes.        Pain Assessment  Patient Currently in Pain: Other (Comment) (Patient stated that he has intermittent pain in his ankles. )              Current Outpatient Prescriptions   Medication Sig Dispense Refill     acetaminophen 500 MG CAPS Take 2 tablets by mouth daily.       albuterol (PROAIR HFA) 108 (90 BASE) MCG/ACT Inhaler Inhale 2 puffs into the lungs every 4 hours as needed for shortness of breath / dyspnea, wheezing or other (use prior to exertion/activities) (Patient not taking: Reported on 9/18/2018) 1 Inhaler 3     alfuzosin (UROXATRAL) 10 MG 24 hr tablet Take 1 tablet (10 mg) by mouth daily 90 tablet 3     ammonium lactate (AMLACTIN) 12 % cream Apply  topically daily.       artificial tears SOLN Use one drop to both eye PRN.       ASPIRIN PO Take 81 mg by mouth daily       ATORVASTATIN CALCIUM PO Take 40 mg by mouth daily       budesonide-formoterol (SYMBICORT) 160-4.5 MCG/ACT Inhaler Inhale 2 puffs into the lungs 2 times daily       doxycycline (VIBRAMYCIN) 100 MG capsule Take 1 capsule (100 mg) by mouth daily 30 capsule 11     finasteride (PROSCAR) 5 MG tablet Take 1 tablet (5 mg) by mouth daily 90 tablet 3     fluticasone (FLOVENT HFA) 110 MCG/ACT Inhaler Inhale 1 puff into the lungs 2 times daily 3 Inhaler 1     guaiFENesin-codeine (ROBITUSSIN AC) 100-10 MG/5ML SOLN solution Take 5-10 mLs by mouth every 4 hours as needed for cough 420 mL 0     isosorbide mononitrate (IMDUR) 30 MG 24 hr tablet Take 1 tablet (30 mg) by mouth daily 90 tablet 3     ketotifen (ZADITOR/REFRESH ANTI-ITCH) 0.025 % SOLN ophthalmic solution Place 1 drop into both eyes 2 times daily       losartan (COZAAR) 50 MG tablet Take 1 tablet (50 mg) by mouth daily (Patient taking differently: Take 50 mg by mouth every morning ) 30 tablet 3     METFORMIN HCL PO Take by mouth 2 times daily (with  meals)       metoprolol succinate (TOPROL-XL) 100 MG 24 hr tablet Take 0.5 tablets (50 mg) by mouth daily       omeprazole (PRILOSEC) 20 MG capsule Take 20 mg by mouth daily.       oxybutynin (DITROPAN) 5 MG tablet Take 0.5 tablets (2.5 mg) by mouth At Bedtime 90 tablet 3     senna-docusate (SENOKOT-S;PERICOLACE) 8.6-50 MG per tablet Take 1-2 tablets by mouth 2 times daily as needed for constipation Take while on oral narcotics to prevent or treat constipation. 10 tablet 0     VITAMIN D, CHOLECALCIFEROL, PO Take 1,000 Units by mouth every morning             Allergies   Allergen Reactions     Clindamycin      Eggs Other (See Comments)     Pt states no longer having reaction, childhood allergy, eats eggs       Penicillins Other (See Comments)     Pt states PCN allergy is due to egg allergy     Propoxyphene Other (See Comments)     Pain

## 2018-10-22 NOTE — PROGRESS NOTES
Past Medical History:   Diagnosis Date     Diabetes mellitus type II 2005     History of agent Orange exposure 4/17/2013     Hypertension      Myocardial infarct 01/01/1999     Primary hyperparathyroidism (H) Dx approx 2003     Stroke (H) 01/01/1998    recovered fully     Patient Active Problem List   Diagnosis     Plantar fascial fibromatosis     Ear pain     Left arm weakness     History of agent Orange exposure     Cervicalgia     Degenerative arthritis of cervical spine     Stroke (H)     Myocardial infarct (H)     Shoulder pain     Hypercalcemia     HPTH (hyperparathyroidism) (H)     Skin exam, screening for cancer     Primary hyperparathyroidism (H)     Past Surgical History:   Procedure Laterality Date     BIOPSY OF SKIN LESION       BYPASS GRAFT ARTERY CORONARY       CATARACT IOL, RT/LT Bilateral 2017    [facility]     EXCISE MASS LOWER EXTREMITY Left 11/3/2017    Procedure: EXCISE MASS LOWER EXTREMITY;  Excision Mass Left Flank;  Surgeon: Marybeth Gambino MD;  Location: [facility] OR     MOHS MICROGRAPHIC PROCEDURE       PARATHYROIDECTOMY N/A 3/21/2017    Procedure: PARATHYROIDECTOMY;  Surgeon: Julianna Short MD;  Location: [facility] OR     PARATHYROIDECTOMY       Social History     Social History     Marital status:      Spouse name: N/A     Number of children: N/A     Years of education: N/A     Occupational History     Not on file.     Social History Main Topics     Smoking status: Former Smoker     Quit date: 1970     Smokeless tobacco: Former User      Comment: Doesn't remember how much he used to smoke - during service only.      Alcohol use Yes      Comment: 1 beer/week     Drug use: No     Sexual activity: Not on file     Other Topics Concern     Not on file     Social History Narrative     Family History   Problem Relation Age of Onset     Melanoma Mother      Melanoma Father      Cancer No family hx of      no skin cancer     Glaucoma No family hx of      Macular Degeneration No family  hx of      Inr                                1.04                             9/18/2018  Lab Results   Component Value Date    WBC 5.7 09/18/2018     Lab Results   Component Value Date    RBC 4.65 09/18/2018     Lab Results   Component Value Date    HGB 12.6 09/18/2018     Lab Results   Component Value Date    HCT 40.0 09/18/2018     No components found for: MCT  Lab Results   Component Value Date    MCV 86 09/18/2018     Lab Results   Component Value Date    MCH 27.1 09/18/2018     Lab Results   Component Value Date    MCHC 31.5 09/18/2018     Lab Results   Component Value Date    RDW 14.2 09/18/2018     Lab Results   Component Value Date     09/18/2018     Last Comprehensive Metabolic Panel:  Sodium   Date Value Ref Range Status   09/18/2018 138 133 - 144 mmol/L Final     Potassium   Date Value Ref Range Status   09/18/2018 4.6 3.4 - 5.3 mmol/L Final     Chloride   Date Value Ref Range Status   09/18/2018 105 94 - 109 mmol/L Final     Carbon Dioxide   Date Value Ref Range Status   09/18/2018 28 20 - 32 mmol/L Final     Anion Gap   Date Value Ref Range Status   09/18/2018 6 3 - 14 mmol/L Final     Glucose   Date Value Ref Range Status   09/18/2018 124 (H) 70 - 99 mg/dL Final     Urea Nitrogen   Date Value Ref Range Status   09/18/2018 14 7 - 30 mg/dL Final     Creatinine   Date Value Ref Range Status   09/18/2018 0.81 0.66 - 1.25 mg/dL Final     GFR Estimate   Date Value Ref Range Status   09/18/2018 >90 >60 mL/min/1.7m2 Final     Comment:     Non  GFR Calc     Calcium   Date Value Ref Range Status   09/18/2018 9.0 8.5 - 10.1 mg/dL Final     Lab Results   Component Value Date    A1C 6.4 04/25/2018      SUBJECTIVE FINDINGS:  72-year-old male presents to clinic for ankle swelling.  He relates he gets intermittent pain in the ankles but it is really swelling.  He has compression socks, but he does not like to wear them because they constrict.  Relates he has tried the [facility].  He  does not like those wraps.  He has tried diuretics but he has to go to the bathroom too often for that.  Relates no ulcers or sores since we have seen him last.  No injuries.  Relates [facility]  Relates he has some numbness, tingling and neuropathy in his feet.  Also relates his toenails are discolored.  He is concerned about that.      OBJECTIVE FINDINGS:  DP and PT are 2/4 bilaterally.  He has peripheral edema and venous stasis bilaterally.   He has decreased hair growth bilaterally.  He has decreased ankle joint dorsiflexion bilaterally.  Deep tendon reflexes are intact bilaterally.  He has dry, scaly, skin in a moccasin pattern that is mild bilaterally.  His hallux nails have some thickening, subungual debris, discoloration, dystrophy to differing degrees bilaterally.  He has hyperkeratotic tissue buildup plantar medial hallux bilaterally.  Sharp/dull decreased with 5.07 [place] monofilament bilaterally.  No gross tendon voids bilaterally.        ASSESSMENT/PLAN:  Peripheral edema with venous stasis bilaterally.  He is getting some intermittent ankle pain.  He is diabetic with peripheral neuropathy and venous vascular disease.  Diagnosis and treatment options discussed with patient.  He has tinea pedis changes and onychomycosis changes as well.  Prescription for econazole cream given and use discussed with him.  He has diabetic shoes.  He will continue those.  Prescription for physical therapy given and use discussed with him.  I advised him to wear his compression socks.  He opted for no further referral to [facility]ymphedema Clinic.  He will return to clinic and see me in 2 months.  Again, I gave him a referral to physical therapy.  ABIs ordered to help rule out an arterial component to the venous stasis disease.

## 2018-10-23 ENCOUNTER — PRE VISIT (OUTPATIENT)
Dept: UROLOGY | Facility: CLINIC | Age: 73
End: 2018-10-23

## 2018-10-26 ENCOUNTER — OFFICE VISIT (OUTPATIENT)
Dept: UROLOGY | Facility: CLINIC | Age: 73
End: 2018-10-26
Payer: MEDICARE

## 2018-10-26 ENCOUNTER — RADIANT APPOINTMENT (OUTPATIENT)
Dept: ULTRASOUND IMAGING | Facility: CLINIC | Age: 73
End: 2018-10-26
Attending: PODIATRIST
Payer: MEDICARE

## 2018-10-26 VITALS
HEIGHT: 71 IN | SYSTOLIC BLOOD PRESSURE: 143 MMHG | BODY MASS INDEX: 39.06 KG/M2 | HEART RATE: 64 BPM | DIASTOLIC BLOOD PRESSURE: 83 MMHG | WEIGHT: 279 LBS

## 2018-10-26 DIAGNOSIS — I87.8 VENOUS STASIS: ICD-10-CM

## 2018-10-26 DIAGNOSIS — K75.81 NASH (NONALCOHOLIC STEATOHEPATITIS): Primary | ICD-10-CM

## 2018-10-26 DIAGNOSIS — E11.51 DIABETES MELLITUS WITH PERIPHERAL VASCULAR DISEASE (H): ICD-10-CM

## 2018-10-26 DIAGNOSIS — E11.49 TYPE II OR UNSPECIFIED TYPE DIABETES MELLITUS WITH NEUROLOGICAL MANIFESTATIONS, NOT STATED AS UNCONTROLLED(250.60) (H): ICD-10-CM

## 2018-10-26 DIAGNOSIS — R39.12 BENIGN PROSTATIC HYPERPLASIA WITH WEAK URINARY STREAM: ICD-10-CM

## 2018-10-26 DIAGNOSIS — R09.89 OTHER SPECIFIED SYMPTOMS AND SIGNS INVOLVING THE CIRCULATORY AND RESPIRATORY SYSTEMS: ICD-10-CM

## 2018-10-26 DIAGNOSIS — N28.1 ACQUIRED CYST OF KIDNEY: Primary | ICD-10-CM

## 2018-10-26 DIAGNOSIS — N40.1 BENIGN PROSTATIC HYPERPLASIA WITH WEAK URINARY STREAM: ICD-10-CM

## 2018-10-26 ASSESSMENT — PAIN SCALES - GENERAL: PAINLEVEL: NO PAIN (0)

## 2018-10-26 NOTE — MR AVS SNAPSHOT
After Visit Summary   10/26/2018    Lux Velasco    MRN: [id number]           Patient Information     Date Of Birth          1945        Visit Information        Provider Department      10/26/2018 9:00 AM Marc Chapa MD [facility] and [facility]        Today's Diagnoses     Urinary incontinence    -  1      Care Instructions    Schedule appointment for Urodynamics Testing.  Follow up with Dr. Chapa to discuss results and treatment options.    It was a pleasure meeting with you today.  Thank you for allowing me and my team the privilege of caring for you today.  YOU are the reason we are here, and I truly hope we provided you with the excellent service you deserve.  Please let us know if there is anything else we can do for you so that we can be sure you are leaving completely satisfied with your care experience.        Neeru Shepherd ARYA          Follow-ups after your visit        Your next 10 appointments already scheduled     Oct 26, 2018 11:00 AM CDT   US GERMÁN DOPPLER NO EXERCISE 1-2 LVLS BILAT with [id number]   [facility] US ([facility])    [address] [place] [address] [phone]           How do I prepare for my exam? (Food and drink instructions) No caffeine or tobacco for 1 hour prior to exam.  What should I wear: Wear comfortable clothes.  How long does the exam take: Most ultrasounds take 30 to 60 minutes.  What should I bring: Bring a list of your medicines, including vitamins, minerals and over-the-counter drugs. It is safest to leave personal items at home.  Do I need a :  No  is needed.  What do I need to tell my doctor: Tell your doctor about any allergies you may have.  What should I do after the exam: No restrictions, You may resume normal activities.  What is this test: An ultrasound uses sound waves to make pictures of the body. Sound waves do not  cause pain. The only discomfort may be the pressure of the wand against your skin or full bladder.  Who should I call with questions: If you have any questions, please call the Imaging Department where you will have your exam. Directions, parking instructions, and other information is available on our website, [email or web address].            Oct 29, 2018  9:30 AM CDT   Lab with  LAB   [facility] ([facility])    [address]  1st Floor  [facility] [address]   [phone]            Oct 29, 2018 10:30 AM CDT   (Arrive by 10:15 AM)   Return General Liver with Nathanael Rogel MD   [facility] Hepatology ([facility])    [address]  [address]  [facility] [address]   [phone]            Nov 07, 2018 10:15 AM CST   (Arrive by 10:00 AM)   Return Visit with Shirin Shore MD   [facility] Dermatology ([facility])    [address]  3rd Floor  [facility] [address]   [phone]            Nov 15, 2018 10:30 AM CST   Lymphedema Evaluation with Nicki Cheek, PT   [facility] ([facility])    [address]  1st [address]  [address]  [facility] [address] [phone]            Nov 27, 2018 11:00 AM CST   (Arrive by 10:45 AM)   Return Visit with Jorge Hanks MD   [facility] ([facility] [place])    [address]  [address]  [facility] [address] [phone]            Dec 10, 2018 10:40 AM CST   (Arrive by 10:25 AM)   [facility])    [address] [place] [address]  4th Floor  [facility] [address] [phone]            Dec 19, 2018  1:15 PM CST   RETURN GENERAL with Migdalia Hussein MD   [facility] ([facility])    [address]  [address]  [facility] [address]   [phone]              Who to contact     Please call your clinic at [phone] to:    Ask questions about your health    Make or cancel appointments    Discuss your medicines    Learn about your test results    Speak to your doctor            Additional Information About Your Visit        Care EveryWhere ID     This is your Care EveryWhere ID. This could be used by other organizations to access your [place] medical records  [id number]        Your Vitals Were     Pulse Height BMI (Body Mass Index)             64 1.803 m (5' 11\") 38.91 kg/m2          Blood Pressure from Last 3 Encounters:   10/26/18 143/83   09/18/18 160/76   08/15/18 (!) 156/97    Weight from Last 3 Encounters:   10/26/18 126.6 kg (279 lb)   10/03/18 133.8 kg (295 lb)   09/18/18 134.2 kg (295 lb 14.4 oz)              We Performed the Following     POST-VOID RESIDUAL BLADDER SCAN          Today's Medication Changes          These changes are accurate as of 10/26/18 10:04 AM.  If you have any questions, ask your nurse or doctor.               These medicines have changed or have updated prescriptions.        Dose/Directions    losartan 50 MG tablet   Commonly known as:  COZAAR   This may have changed:  when to take this   Used for:  Benign essential hypertension        Dose:  50 mg   Take 1 tablet (50 mg) by mouth daily   Quantity:  30 tablet   Refills:  3         Stop taking these medicines if you haven't already. Please contact your care team if you have questions.     alfuzosin 10 MG 24 hr tablet   Commonly known as:  UROXATRAL   Stopped by:  Marc Chapa MD           finasteride 5 MG tablet   Commonly known as:  PROSCAR   Stopped by:  Marc Chapa MD           guaiFENesin-codeine 100-10 MG/5ML Soln solution   Commonly known as:  ROBITUSSIN AC   Stopped by:  Marc Chapa MD           oxybutynin 5 MG tablet   Commonly known as:  DITROPAN   Stopped by:  Marc Chapa MD        "             Primary Care Provider Office Phone # Fax #    Nam Duffy [phone] [address] [address]        Equal Access to Services     SANDHYA DYE : Hadii suresh ku elijaho Somaríaali, waaxda luqadaha, qaybta kaalmada ademoiseda, taty wigginsgenoveva gambino. So [facility] [phone].    ATENCIÓN: Si habla español, tiene a back disposición servicios gratuitos de asistencia lingüística. Llame al [phone].    We comply with applicable federal civil rights laws and [place] laws. We do not discriminate on the basis of race, color, national origin, age, disability, sex, sexual orientation, or gender identity.            Thank you!     Thank you for choosing [facility] AND [facility] FOR PROSTATE AND UROLOGIC CANCERS  for your care. Our goal is always to provide you with excellent care. Hearing back from our patients is one way we can continue to improve our services. Please take a few minutes to complete the written survey that you may receive in the mail after your visit with us. Thank you!             Your Updated Medication List - Protect others around you: Learn how to safely use, store and throw away your medicines at www.disposemymeds.org.          This list is accurate as of 10/26/18 10:04 AM.  Always use your most recent med list.                   Brand Name Dispense Instructions for use Diagnosis    acetaminophen 500 MG Caps      Take 2 tablets by mouth daily.        albuterol 108 (90 Base) MCG/ACT inhaler    PROAIR HFA    1 Inhaler    Inhale 2 puffs into the lungs every 4 hours as needed for shortness of breath / dyspnea, wheezing or other (use prior to exertion/activities)    Shortness of breath       ammonium lactate 12 % cream    AMLACTIN     Apply  topically daily.        artificial tears Soln      Use one drop to both eye PRN.        ASPIRIN PO      Take 81 mg by mouth daily        ATORVASTATIN CALCIUM PO      Take 40 mg by mouth daily         budesonide-formoterol 160-4.5 MCG/ACT Inhaler    SYMBICORT     Inhale 2 puffs into the lungs 2 times daily        doxycycline 100 MG capsule    VIBRAMYCIN    30 capsule    Take 1 capsule (100 mg) by mouth daily    Ulcerative blepharitis of upper and lower eyelids of both eyes       econazole nitrate 1 % cream     85 g    Apply topically daily To feet and toenails.    Tinea pedis of both feet, Onychomycosis       fluticasone 110 MCG/ACT Inhaler    FLOVENT HFA    3 Inhaler    Inhale 1 puff into the lungs 2 times daily    Mild intermittent asthma, unspecified whether complicated, Dyspnea on exertion       isosorbide mononitrate 30 MG 24 hr tablet    IMDUR    90 tablet    Take 1 tablet (30 mg) by mouth daily    Angina, class III (H)       ketotifen 0.025 % Soln ophthalmic solution    ZADITOR/REFRESH ANTI-ITCH     Place 1 drop into both eyes 2 times daily        losartan 50 MG tablet    COZAAR    30 tablet    Take 1 tablet (50 mg) by mouth daily    Benign essential hypertension       METFORMIN HCL PO      Take by mouth 2 times daily (with meals)    Benign nodular prostatic hyperplasia without lower urinary tract symptoms, Need for prophylactic vaccination against Streptococcus pneumoniae (pneumococcus), Screening for AAA (abdominal aortic aneurysm), History of smoking       omeprazole 20 MG CR capsule    priLOSEC     Take 20 mg by mouth daily.        senna-docusate 8.6-50 MG per tablet    SENOKOT-S;PERICOLACE    10 tablet    Take 1-2 tablets by mouth 2 times daily as needed for constipation Take while on oral narcotics to prevent or treat constipation.    Hypercalcemia, Primary hyperparathyroidism (H)       TOPROL  MG 24 hr tablet   Generic drug:  metoprolol succinate      Take 0.5 tablets (50 mg) by mouth daily        VITAMIN D (CHOLECALCIFEROL) PO      Take 1,000 Units by mouth every morning

## 2018-10-26 NOTE — PROGRESS NOTES
ASSESSMENT AND PLAN  Left Renal Cysts  CT from today shows Bosniak 1 renal cysts.  These do not need further follow-up unless he develops flank pain.      BPH/LUTS  He has bothersome urinary frequency while taking trospium 20mg every day, alfuzosin 10mg every day, and finasteride.  He is very much against any procedure that might require a saldana catheter.  -AUA score today  -Plan for urodynamics.  Plan for clinic visit after this is done.  -POST VOID RESIDUAL today. (51cc)    _______________________________________________________________________    CHIEF COMPLAINT  It was my pleasure to see Lux Velasco who is a 72 year old male for follow-up of renal cysts and BENIGN PROSTATIC HYPERPLASIA.     HPI   He is doing well today but continues to have problems with his voiding.      RADIOLOGIC IMAGING  CT abdomen pelvis with contrast 1/12/2018   Findings:   Borderline enlarged spleen. Enhancing lesion in the spleen measuring  10 mm. This is unchanged since 8/17/2010, likely representing  hemangioma. Small splenule which is slightly hyperenhancing relative  to spleen also unchanged. Mild atrophy of the pancreas. Tiny hypodense  lesions of both kidneys are too small to characterize. At the upper  pole of the left kidney there is an exophytic lesion consistent with  benign cyst cyst abutting the skin in the posterior back as seen on  series 3 image 127 measuring up to 2.2 x 1.7 cm, new since 2010 but  similar since 5/22/2007, likely suspicious cyst. Nonspecific  perinephric stranding. The liver, gallbladder and adrenal glands are  unremarkable.      No abnormally dilated loops of small bowel or colon. No free air or  free fluid. No abdominal or pelvic lymphadenopathy. Large broad-based  ventral abdominal hernia containing large and small bowel. Tiny  adjacent fat-containing ventral hernia on the left. Bilateral  fat-containing inguinal hernias. Aortoiliac calcification.     Bibasilar atelectasis. T12 vertebral hemangioma.  Degenerative findings  of the spine.         Impression:   1. Borderline enlargement of the spleen.  2. Stable presumed splenic hemangioma.  3. No lymphadenopathy in the abdomen or pelvis.    I reviewed the recent radiologic imaging and reports described above.  Patient Active Problem List    Diagnosis Date Noted     Primary hyperparathyroidism (H)      Priority: Medium     Skin exam, screening for cancer 08/20/2013     Priority: Medium     Hypercalcemia 07/17/2013     Priority: Medium     HPTH (hyperparathyroidism) (H) 07/17/2013     Priority: Medium     Shoulder pain 05/21/2013     Priority: Medium     Left arm weakness 04/17/2013     Priority: Medium     History of agent Orange exposure 04/17/2013     Priority: Medium     Cervicalgia 04/17/2013     Priority: Medium     Degenerative arthritis of cervical spine 04/17/2013     Priority: Medium     Stroke (H) 04/17/2013     Priority: Medium     Myocardial infarct (H) 04/17/2013     Priority: Medium     Ear pain 07/16/2012     Priority: Medium     Plantar fascial fibromatosis 07/10/2012     Priority: Medium     Past Medical History:   Diagnosis Date     Diabetes mellitus type II 2005     History of agent Orange exposure 4/17/2013     Hypertension      Myocardial infarct (H) 01/01/1999     Primary hyperparathyroidism (H) Dx approx 2003     Stroke (H) 01/01/1998    recovered fully     Past Surgical History:   Procedure Laterality Date     BIOPSY OF SKIN LESION       BYPASS GRAFT ARTERY CORONARY       CATARACT IOL, RT/LT Bilateral 2017    [facility]     EXCISE MASS LOWER EXTREMITY Left 11/3/2017    Procedure: EXCISE MASS LOWER EXTREMITY;  Excision Mass Left Flank;  Surgeon: Marybeth Gambino MD;  Location:  OR     MOHS MICROGRAPHIC PROCEDURE       PARATHYROIDECTOMY N/A 3/21/2017    Procedure: PARATHYROIDECTOMY;  Surgeon: Julianna Short MD;  Location:  OR     PARATHYROIDECTOMY       Current Outpatient Prescriptions   Medication Sig Dispense Refill      acetaminophen 500 MG CAPS Take 2 tablets by mouth daily.       albuterol (PROAIR HFA) 108 (90 BASE) MCG/ACT Inhaler Inhale 2 puffs into the lungs every 4 hours as needed for shortness of breath / dyspnea, wheezing or other (use prior to exertion/activities) 1 Inhaler 3     ammonium lactate (AMLACTIN) 12 % cream Apply  topically daily.       artificial tears SOLN Use one drop to both eye PRN.       ASPIRIN PO Take 81 mg by mouth daily       ATORVASTATIN CALCIUM PO Take 40 mg by mouth daily       budesonide-formoterol (SYMBICORT) 160-4.5 MCG/ACT Inhaler Inhale 2 puffs into the lungs 2 times daily       doxycycline (VIBRAMYCIN) 100 MG capsule Take 1 capsule (100 mg) by mouth daily 30 capsule 11     econazole nitrate 1 % cream Apply topically daily To feet and toenails. 85 g 5     fluticasone (FLOVENT HFA) 110 MCG/ACT Inhaler Inhale 1 puff into the lungs 2 times daily 3 Inhaler 1     isosorbide mononitrate (IMDUR) 30 MG 24 hr tablet Take 1 tablet (30 mg) by mouth daily 90 tablet 3     ketotifen (ZADITOR/REFRESH ANTI-ITCH) 0.025 % SOLN ophthalmic solution Place 1 drop into both eyes 2 times daily       losartan (COZAAR) 50 MG tablet Take 1 tablet (50 mg) by mouth daily (Patient taking differently: Take 50 mg by mouth every morning ) 30 tablet 3     METFORMIN HCL PO Take by mouth 2 times daily (with meals)       metoprolol succinate (TOPROL-XL) 100 MG 24 hr tablet Take 0.5 tablets (50 mg) by mouth daily       omeprazole (PRILOSEC) 20 MG capsule Take 20 mg by mouth daily.       senna-docusate (SENOKOT-S;PERICOLACE) 8.6-50 MG per tablet Take 1-2 tablets by mouth 2 times daily as needed for constipation Take while on oral narcotics to prevent or treat constipation. 10 tablet 0     VITAMIN D, CHOLECALCIFEROL, PO Take 1,000 Units by mouth every morning         Allergies   Allergen Reactions     Clindamycin      Eggs Other (See Comments)     Pt states no longer having reaction, childhood allergy, eats eggs       Penicillins  "Other (See Comments)     Pt states PCN allergy is due to egg allergy     Propoxyphene Other (See Comments)     Pain     Family History   Problem Relation Age of Onset     Melanoma Mother      Melanoma Father      Cancer No family hx of      no skin cancer     Glaucoma No family hx of      Macular Degeneration No family hx of       Social History     Occupational History     Not on file.     Social History Main Topics     Smoking status: Former Smoker     Quit date: 1970     Smokeless tobacco: Former User      Comment: Doesn't remember how much he used to smoke - during service only.      Alcohol use Yes      Comment: 1 beer/week     Drug use: No     Sexual activity: Not on file       PHYSICAL EXAM  Vitals:    10/26/18 0851 10/26/18 0857   BP: 155/79 143/83   BP Location: Right arm    Patient Position: Sitting    Cuff Size: Adult Large    Pulse: 65 64   Weight: 126.6 kg (279 lb)    Height: 1.803 m (5' 11\")      Wt Readings from Last 3 Encounters:   10/26/18 126.6 kg (279 lb)   10/03/18 133.8 kg (295 lb)   09/18/18 134.2 kg (295 lb 14.4 oz)     Constitutional: Alert, no acute distress  Psychiatric: Normal mood and affect  Gastrointestinal: Abdomen soft, non-tender. No masses, organomegaly. No hernia  : Deferred     Recent Labs   Lab Test  01/05/18   1559  11/30/17   1215  10/10/17   0735  05/22/17   1633   WBC  5.7  4.9  5.1  5.9   HGB  13.0*  11.9*  12.3*  12.9*   HCT  40.7  37.9*  37.9*  39.8*   PLT  160  141*  140*  184     Recent Labs   Lab Test  01/05/18   1559  11/30/17   1215  10/10/17   0735  05/22/17   1633   NA  139  140  137  142   POTASSIUM  4.0  4.4  4.0  4.3   CHLORIDE  105  107  105  106   CO2  30  26  27  27   ANIONGAP  4  8  5  9   GLC  114*  119*  147*  137*   BUN  15  16  16  14   CR  0.91  0.82  0.81  0.83   GFRESTIMATED  82  >90  >90  >90  Non African American GFR Calc     GFRESTBLACK  >90  >90  >90  >90  African American GFR Calc     TRINA  8.8  9.1  8.8  8.9     Recent Labs   Lab Test  " 12/26/16   1638   COLOR  Yellow   APPEARANCE  Clear   URINEGLC  Negative   URINEBILI  Negative   URINEKETONE  Negative   SG  1.020   UBLD  Negative   URINEPH  6.0   PROTEIN  Negative   NITRITE  Negative   LEUKEST  Negative   RBCU  2   WBCU  1         CC  Patient Care Team:  Nam Duffy MD as PCP - General (Internal Medicine)  Colton Pulido MD as MD (Family Medicine - Sports Medicine)  Mario Alberto Baptiste MD as MD (Orthopedics)  Sofiya Contreras PA-C as Physician Assistant (Physician Assistant)  Marc Chapa MD as MD (Urology)  Jorge Hanks MD as MD (Internal Medicine - Interventional Cardiology)  Amita Brown, ORION CNP as Nurse Practitioner (Nurse Practitioner - Adult Health)  SELF, REFERRED    Copy to patient  MANDY GARCIA  [address] [address]

## 2018-10-26 NOTE — LETTER
10/26/2018       RE: Lux Velasco  [address]  [facility] [address]     Dear Colleague,    Thank you for referring your patient, Lux Velasco, to the [facility] UROLOGY AND INST FOR PROSTATE AND UROLOGIC CANCERS at [facility]. Please see a copy of my visit note below.      ASSESSMENT AND PLAN  Left Renal Cysts  CT from today shows Bosniak 1 renal cysts.  These do not need further follow-up unless he develops flank pain.      BPH/LUTS  He has bothersome urinary frequency while taking trospium 20mg every day, alfuzosin 10mg every day, and finasteride.  He is very much against any procedure that might require a saldana catheter.  -AUA score today  -Plan for urodynamics.  Plan for clinic visit after this is done.  -POST VOID RESIDUAL today. (51cc)    _______________________________________________________________________    CHIEF COMPLAINT  It was my pleasure to see Lux Velasco who is a 72 year old male for follow-up of renal cysts and BENIGN PROSTATIC HYPERPLASIA.     HPI   He is doing well today but continues to have problems with his voiding.      RADIOLOGIC IMAGING  CT abdomen pelvis with contrast 1/12/2018   Findings:   Borderline enlarged spleen. Enhancing lesion in the spleen measuring  10 mm. This is unchanged since 8/17/2010, likely representing  hemangioma. Small splenule which is slightly hyperenhancing relative  to spleen also unchanged. Mild atrophy of the pancreas. Tiny hypodense  lesions of both kidneys are too small to characterize. At the upper  pole of the left kidney there is an exophytic lesion consistent with  benign cyst cyst abutting the skin in the posterior back as seen on  series 3 image 127 measuring up to 2.2 x 1.7 cm, new since 2010 but  similar since 5/22/2007, likely suspicious cyst. Nonspecific  perinephric stranding. The liver, gallbladder and adrenal glands are  unremarkable.      No abnormally dilated loops of small bowel or colon.  No free air or  free fluid. No abdominal or pelvic lymphadenopathy. Large broad-based  ventral abdominal hernia containing large and small bowel. Tiny  adjacent fat-containing ventral hernia on the left. Bilateral  fat-containing inguinal hernias. Aortoiliac calcification.     Bibasilar atelectasis. T12 vertebral hemangioma. Degenerative findings  of the spine.         Impression:   1. Borderline enlargement of the spleen.  2. Stable presumed splenic hemangioma.  3. No lymphadenopathy in the abdomen or pelvis.    I reviewed the recent radiologic imaging and reports described above.  Patient Active Problem List    Diagnosis Date Noted     Primary hyperparathyroidism (H)      Priority: Medium     Skin exam, screening for cancer 08/20/2013     Priority: Medium     Hypercalcemia 07/17/2013     Priority: Medium     HPTH (hyperparathyroidism) (H) 07/17/2013     Priority: Medium     Shoulder pain 05/21/2013     Priority: Medium     Left arm weakness 04/17/2013     Priority: Medium     History of agent Orange exposure 04/17/2013     Priority: Medium     Cervicalgia 04/17/2013     Priority: Medium     Degenerative arthritis of cervical spine 04/17/2013     Priority: Medium     Stroke (H) 04/17/2013     Priority: Medium     Myocardial infarct (H) 04/17/2013     Priority: Medium     Ear pain 07/16/2012     Priority: Medium     Plantar fascial fibromatosis 07/10/2012     Priority: Medium     Past Medical History:   Diagnosis Date     Diabetes mellitus type II 2005     History of agent Orange exposure 4/17/2013     Hypertension      Myocardial infarct (H) 01/01/1999     Primary hyperparathyroidism (H) Dx approx 2003     Stroke (H) 01/01/1998    recovered fully     Past Surgical History:   Procedure Laterality Date     BIOPSY OF SKIN LESION       BYPASS GRAFT ARTERY CORONARY       CATARACT IOL, RT/LT [facility] 2017    [facility]     EXCISE MASS LOWER EXTREMITY Left 11/3/2017    Procedure: EXCISE MASS LOWER EXTREMITY;  Excision  Mass Left Flank;  Surgeon: Marybeth Gambino MD;  Location:  OR     MOHS MICROGRAPHIC PROCEDURE       PARATHYROIDECTOMY N/A 3/21/2017    Procedure: PARATHYROIDECTOMY;  Surgeon: Julianna Short MD;  Location:  OR     PARATHYROIDECTOMY       Current Outpatient Prescriptions   Medication Sig Dispense Refill     acetaminophen 500 MG CAPS Take 2 tablets by mouth daily.       albuterol (PROAIR HFA) 108 (90 BASE) MCG/ACT Inhaler Inhale 2 puffs into the lungs every 4 hours as needed for shortness of breath / dyspnea, wheezing or other (use prior to exertion/activities) 1 Inhaler 3     ammonium lactate (AMLACTIN) 12 % cream Apply  topically daily.       artificial tears SOLN Use one drop to both eye PRN.       ASPIRIN PO Take 81 mg by mouth daily       ATORVASTATIN CALCIUM PO Take 40 mg by mouth daily       budesonide-formoterol (SYMBICORT) 160-4.5 MCG/ACT Inhaler Inhale 2 puffs into the lungs 2 times daily       doxycycline (VIBRAMYCIN) 100 MG capsule Take 1 capsule (100 mg) by mouth daily 30 capsule 11     econazole nitrate 1 % cream Apply topically daily To feet and toenails. 85 g 5     fluticasone (FLOVENT HFA) 110 MCG/ACT Inhaler Inhale 1 puff into the lungs 2 times daily 3 Inhaler 1     isosorbide mononitrate (IMDUR) 30 MG 24 hr tablet Take 1 tablet (30 mg) by mouth daily 90 tablet 3     ketotifen (ZADITOR/REFRESH ANTI-ITCH) 0.025 % SOLN ophthalmic solution Place 1 drop into both eyes 2 times daily       losartan (COZAAR) 50 MG tablet Take 1 tablet (50 mg) by mouth daily (Patient taking differently: Take 50 mg by mouth every morning ) 30 tablet 3     METFORMIN HCL PO Take by mouth 2 times daily (with meals)       metoprolol succinate (TOPROL-XL) 100 MG 24 hr tablet Take 0.5 tablets (50 mg) by mouth daily       omeprazole (PRILOSEC) 20 MG capsule Take 20 mg by mouth daily.       senna-docusate (SENOKOT-S;PERICOLACE) 8.6-50 MG per tablet Take 1-2 tablets by mouth 2 times daily as needed for  "constipation Take while on oral narcotics to prevent or treat constipation. 10 tablet 0     VITAMIN D, CHOLECALCIFEROL, PO Take 1,000 Units by mouth every morning         Allergies   Allergen Reactions     Clindamycin      Eggs Other (See Comments)     Pt states no longer having reaction, childhood allergy, eats eggs       Penicillins Other (See Comments)     Pt states PCN allergy is due to egg allergy     Propoxyphene Other (See Comments)     Pain     Family History   Problem Relation Age of Onset     Melanoma Mother      Melanoma Father      Cancer No family hx of      no skin cancer     Glaucoma No family hx of      Macular Degeneration No family hx of       Social History     Occupational History     Not on file.     Social History Main Topics     Smoking status: Former Smoker     Quit date: 1970     Smokeless tobacco: Former User      Comment: Doesn't remember how much he used to smoke - during service only.      Alcohol use Yes      Comment: 1 beer/week     Drug use: No     Sexual activity: Not on file       PHYSICAL EXAM  Vitals:    10/26/18 0851 10/26/18 0857   BP: 155/79 143/83   BP Location: Right arm    Patient Position: Sitting    Cuff Size: Adult Large    Pulse: 65 64   Weight: 126.6 kg (279 lb)    Height: 1.803 m (5' 11\")      Wt Readings from Last 3 Encounters:   10/26/18 126.6 kg (279 lb)   10/03/18 133.8 kg (295 lb)   09/18/18 134.2 kg (295 lb 14.4 oz)     Constitutional: Alert, no acute distress  Psychiatric: Normal mood and affect  Gastrointestinal: Abdomen soft, non-tender. No masses, organomegaly. No hernia  : Deferred     Recent Labs   Lab Test  01/05/18   1559  11/30/17   1215  10/10/17   0735  05/22/17   1633   WBC  5.7  4.9  5.1  5.9   HGB  13.0*  11.9*  12.3*  12.9*   HCT  40.7  37.9*  37.9*  39.8*   PLT  160  141*  140*  184     Recent Labs   Lab Test  01/05/18   1559  11/30/17   1215  10/10/17   0735  05/22/17   1633   NA  139  140  137  142   POTASSIUM  4.0  4.4  4.0  4.3   CHLORIDE  " 105  107  105  106   CO2  30  26  27  27   ANIONGAP  4  8  5  9   GLC  114*  119*  147*  137*   BUN  15  16  16  14   CR  0.91  0.82  0.81  0.83   GFRESTIMATED  82  >90  >90  >90  Non African American GFR Calc     GFRESTBLACK  >90  >90  >90  >90  African American GFR Calc     TRINA  8.8  9.1  8.8  8.9     Recent Labs   Lab Test  12/26/16   1638   COLOR  Yellow   APPEARANCE  Clear   URINEGLC  Negative   URINEBILI  Negative   URINEKETONE  Negative   SG  1.020   UBLD  Negative   URINEPH  6.0   PROTEIN  Negative   NITRITE  Negative   LEUKEST  Negative   RBCU  2   WBCU  1       CC  Patient Care Team:  Nam Duffy MD as PCP - General (Internal Medicine)  Colton Pulido MD as MD (Family Medicine - Sports Medicine)  Mario Alberto Baptiste MD as MD (Orthopedics)  Sofiya Contreras PA-C as Physician Assistant (Physician Assistant)  Marc Chapa MD as MD (Urology)  Jorge Hanks MD as MD (Internal Medicine - Interventional Cardiology)  Amita Brown APRN CNP as Nurse Practitioner (Nurse Practitioner - Adult Health)  SELF, REFERRED    Copy to patient  MANDY GARCIA  [address] [facility] [place] [id number]      Again, thank you for allowing me to participate in the care of your patient.      Sincerely,    Marc Chapa MD

## 2018-10-26 NOTE — PATIENT INSTRUCTIONS
Schedule appointment for Urodynamics Testing.  Follow up with Dr. Chapa to discuss results and treatment options.    It was a pleasure meeting with you today.  Thank you for allowing me and my team the privilege of caring for you today.  YOU are the reason we are here, and I truly hope we provided you with the excellent service you deserve.  Please let us know if there is anything else we can do for you so that we can be sure you are leaving completely satisfied with your care experience.        BEBE Morgan

## 2018-10-29 ENCOUNTER — OFFICE VISIT (OUTPATIENT)
Dept: GASTROENTEROLOGY | Facility: CLINIC | Age: 73
End: 2018-10-29
Attending: INTERNAL MEDICINE
Payer: MEDICARE

## 2018-10-29 VITALS
SYSTOLIC BLOOD PRESSURE: 156 MMHG | HEART RATE: 67 BPM | TEMPERATURE: 97.8 F | WEIGHT: 299.4 LBS | OXYGEN SATURATION: 93 % | DIASTOLIC BLOOD PRESSURE: 89 MMHG | BODY MASS INDEX: 41.76 KG/M2

## 2018-10-29 DIAGNOSIS — K76.0 NAFLD (NONALCOHOLIC FATTY LIVER DISEASE): Primary | ICD-10-CM

## 2018-10-29 DIAGNOSIS — K75.81 NASH (NONALCOHOLIC STEATOHEPATITIS): ICD-10-CM

## 2018-10-29 LAB
ALBUMIN SERPL-MCNC: 3.6 G/DL (ref 3.4–5)
ALP SERPL-CCNC: 100 U/L (ref 40–150)
ALT SERPL W P-5'-P-CCNC: 37 U/L (ref 0–70)
ANION GAP SERPL CALCULATED.3IONS-SCNC: 8 MMOL/L (ref 3–14)
AST SERPL W P-5'-P-CCNC: 21 U/L (ref 0–45)
BILIRUB DIRECT SERPL-MCNC: 0.2 MG/DL (ref 0–0.2)
BILIRUB SERPL-MCNC: 0.6 MG/DL (ref 0.2–1.3)
BUN SERPL-MCNC: 13 MG/DL (ref 7–30)
CALCIUM SERPL-MCNC: 8.9 MG/DL (ref 8.5–10.1)
CHLORIDE SERPL-SCNC: 106 MMOL/L (ref 94–109)
CO2 SERPL-SCNC: 27 MMOL/L (ref 20–32)
CREAT SERPL-MCNC: 0.87 MG/DL (ref 0.66–1.25)
ERYTHROCYTE [DISTWIDTH] IN BLOOD BY AUTOMATED COUNT: 14.3 % (ref 10–15)
GFR SERPL CREATININE-BSD FRML MDRD: 86 ML/MIN/1.7M2
GLUCOSE SERPL-MCNC: 130 MG/DL (ref 70–99)
HCT VFR BLD AUTO: 40.2 % (ref 40–53)
HGB BLD-MCNC: 12.9 G/DL (ref 13.3–17.7)
INR PPP: 1.03 (ref 0.86–1.14)
MCH RBC QN AUTO: 27.3 PG (ref 26.5–33)
MCHC RBC AUTO-ENTMCNC: 32.1 G/DL (ref 31.5–36.5)
MCV RBC AUTO: 85 FL (ref 78–100)
PLATELET # BLD AUTO: 145 10E9/L (ref 150–450)
POTASSIUM SERPL-SCNC: 4.2 MMOL/L (ref 3.4–5.3)
PROT SERPL-MCNC: 7.5 G/DL (ref 6.8–8.8)
RBC # BLD AUTO: 4.72 10E12/L (ref 4.4–5.9)
SODIUM SERPL-SCNC: 140 MMOL/L (ref 133–144)
WBC # BLD AUTO: 5.1 10E9/L (ref 4–11)

## 2018-10-29 PROCEDURE — 85027 COMPLETE CBC AUTOMATED: CPT | Performed by: INTERNAL MEDICINE

## 2018-10-29 PROCEDURE — 85610 PROTHROMBIN TIME: CPT | Performed by: INTERNAL MEDICINE

## 2018-10-29 PROCEDURE — 80076 HEPATIC FUNCTION PANEL: CPT | Performed by: INTERNAL MEDICINE

## 2018-10-29 PROCEDURE — 36415 COLL VENOUS BLD VENIPUNCTURE: CPT | Performed by: INTERNAL MEDICINE

## 2018-10-29 PROCEDURE — G0463 HOSPITAL OUTPT CLINIC VISIT: HCPCS | Mod: ZF

## 2018-10-29 PROCEDURE — 80048 BASIC METABOLIC PNL TOTAL CA: CPT | Performed by: INTERNAL MEDICINE

## 2018-10-29 ASSESSMENT — PAIN SCALES - GENERAL: PAINLEVEL: MODERATE PAIN (4)

## 2018-10-29 NOTE — LETTER
10/29/2018      RE: Lux Velasco  [address]  [facility] [id number]       [facility]    Hepatology follow-up    Follow-up visit for NAFLD    Subjective:  72 year old male    NAFLD  - dx 2018  - hx CVA, CAD, DM, HTN, obesity  - Fibrosis Scan March 2018- F3 fibrosis    The patient comes to clinic this morning for follow-up of nonalcoholic fatty liver disease.  Last clinic visit with Dr. Sanderson 03/2018.  Fibrosis scan was checked in 03/2018 which was consistent with F3 fibrosis.  The patient denies new medications, ER visits or hospital admissions since last clinic visit.      Patient is feeling well today.  He denies any signs or symptoms specific to liver disease.      The patient denies jaundice, itch, abdominal distention, lethargy or confusion.      The patient reports a chronic 2-year history of lower extremity edema which is stable.      The patient denies melena, hematemesis or hematochezia.      The patient denies any fevers, sweats or chills.  Weight has increased 5 kg since last clinic visit.      The patient does not drink any alcohol.       Medical hx Surgical hx   Past Medical History:   Diagnosis Date     Diabetes mellitus type II 2005     History of agent Orange exposure 4/17/2013     Hypertension      Myocardial infarct (H) 01/01/1999     Primary hyperparathyroidism (H) Dx approx 2003     Stroke (H) 01/01/1998      Past Surgical History:   Procedure Laterality Date     BIOPSY OF SKIN LESION       BYPASS GRAFT ARTERY CORONARY       CATARACT IOL, RT/LT Bilateral 2017    [facility]     EXCISE MASS LOWER EXTREMITY Left 11/3/2017    Procedure: EXCISE MASS LOWER EXTREMITY;  Excision Mass Left Flank;  Surgeon: Marybeth Gambino MD;  Location:  OR     MOHS MICROGRAPHIC PROCEDURE       PARATHYROIDECTOMY N/A 3/21/2017    Procedure: PARATHYROIDECTOMY;  Surgeon: Julianna Short MD;  Location:  OR     PARATHYROIDECTOMY             Medications  Prior to Admission medications    Medication Sig Start Date End Date Taking? Authorizing Provider   acetaminophen 500 MG CAPS Take 2 tablets by mouth daily.    Reported, Patient   albuterol (PROAIR HFA) 108 (90 BASE) MCG/ACT Inhaler Inhale 2 puffs into the lungs every 4 hours as needed for shortness of breath / dyspnea, wheezing or other (use prior to exertion/activities) 8/30/17   Kenia Carranza MD   ammonium lactate (AMLACTIN) 12 % cream Apply  topically daily.    Reported, Patient   artificial tears SOLN Use one drop to both eye PRN.       ASPIRIN PO Take 81 mg by mouth daily    Reported, Patient   ATORVASTATIN CALCIUM PO Take 40 mg by mouth daily    Reported, Patient   budesonide-formoterol (SYMBICORT) 160-4.5 MCG/ACT Inhaler Inhale 2 puffs into the lungs 2 times daily    Reported, Patient   doxycycline (VIBRAMYCIN) 100 MG capsule Take 1 capsule (100 mg) by mouth daily 10/11/17   Migdalia Hussein MD   econazole nitrate 1 % cream Apply topically daily To feet and toenails. 10/22/18   Colton Prieto DPM   isosorbide mononitrate (IMDUR) 30 MG 24 hr tablet Take 1 tablet (30 mg) by mouth daily 1/20/18   Jorge Hanks MD   ketotifen (ZADITOR/REFRESH ANTI-ITCH) 0.025 % SOLN ophthalmic solution Place 1 drop into both eyes 2 times daily    Reported, Patient   losartan (COZAAR) 50 MG tablet Take 1 tablet (50 mg) by mouth daily  Patient taking differently: Take 50 mg by mouth every morning  2/13/17   Nam Duffy MD   METFORMIN HCL PO Take by mouth 2 times daily (with meals)    Reported, Patient   metoprolol succinate (TOPROL-XL) 100 MG 24 hr tablet Take 0.5 tablets (50 mg) by mouth daily 4/28/18   Amita Brown APRN CNP   omeprazole (PRILOSEC) 20 MG capsule Take 20 mg by mouth daily.    Reported, Patient   senna-docusate (SENOKOT-S;PERICOLACE) 8.6-50 MG per tablet Take 1-2 tablets by mouth 2 times daily as needed for constipation Take while on oral narcotics to prevent or  treat constipation. 3/21/17   Julianna Short MD   VITAMIN D, CHOLECALCIFEROL, PO Take 1,000 Units by mouth every morning     Reported, Patient       Allergies  Allergies   Allergen Reactions     Clindamycin      Eggs Other (See Comments)     Pt states no longer having reaction, childhood allergy, eats eggs       Penicillins Other (See Comments)     Pt states PCN allergy is due to egg allergy     Propoxyphene Other (See Comments)     Pain       Review of systems  A 10-point review of systems was negative    Examination  /89 (BP Location: Right arm)  Pulse 67  Temp 97.8  F (36.6  C) (Oral)  Wt 135.8 kg (299 lb 6.4 oz)  SpO2 93%  BMI 41.76 kg/m2  Body mass index is 41.76 kg/(m^2).    Gen- well, NAD, A+Ox3, normal color  CVS- RRR  RS- CTA  Abd- obese, SNT, no ascites or organomegaly on palpation or percussion, BS+  Extr- hands normal, no EDMUND  Skin- no rash or jaundice  Neuro- no asterixis  Psych- normal mood    Laboratory  Lab Results   Component Value Date     10/29/2018    POTASSIUM 4.2 10/29/2018    CHLORIDE 106 10/29/2018    CO2 27 10/29/2018    BUN 13 10/29/2018    CR 0.87 10/29/2018       Lab Results   Component Value Date    BILITOTAL 0.6 10/29/2018    ALT 37 10/29/2018    AST 21 10/29/2018    ALKPHOS 100 10/29/2018       Lab Results   Component Value Date    ALBUMIN 3.6 10/29/2018    PROTTOTAL 7.5 10/29/2018        Lab Results   Component Value Date    WBC 5.1 10/29/2018    HGB 12.9 10/29/2018    MCV 85 10/29/2018     10/29/2018       Lab Results   Component Value Date    INR 1.03 10/29/2018       Radiology  Fibrosis Scan March 2018 reviewed    Assessment  72 year old male with history of metabolic syndrome who presents for routine follow-up of NAFLD with F3 fibrosis on recent Fibrosis Scan.  Liver function tests normal.  His liver disease is less important in the context of his other medical problems.  We discussed the importance of weight loss with diet and exercise with regard  to improvement of NAFLD in addition to the larger benefits related to DM and hypertension.    Plan  1.  Weight loss with diet and exercise  2.  Follow-up in 12 months    Nathanael Valdovinos MD  Hepatology  [facility]    Nathanael Valdovinos MD

## 2018-10-29 NOTE — PROGRESS NOTES
[facility]    Hepatology follow-up    Follow-up visit for NAFLD    Subjective:  72 year old male    NAFLD  - dx 2018  - hx CVA, CAD, DM, HTN, obesity  - Fibrosis Scan March 2018- F3 fibrosis    The patient comes to clinic this morning for follow-up of nonalcoholic fatty liver disease.  Last clinic visit with Dr. Sanderson 03/2018.  Fibrosis scan was checked in 03/2018 which was consistent with F3 fibrosis.  The patient denies new medications, ER visits or hospital admissions since last clinic visit.      Patient is feeling well today.  He denies any signs or symptoms specific to liver disease.      The patient denies jaundice, itch, abdominal distention, lethargy or confusion.      The patient reports a chronic 2-year history of lower extremity edema which is stable.      The patient denies melena, hematemesis or hematochezia.      The patient denies any fevers, sweats or chills.  Weight has increased 5 kg since last clinic visit.      The patient does not drink any alcohol.       Medical hx Surgical hx   Past Medical History:   Diagnosis Date     Diabetes mellitus type II 2005     History of agent Orange exposure 4/17/2013     Hypertension      Myocardial infarct (H) 01/01/1999     Primary hyperparathyroidism (H) Dx approx 2003     Stroke (H) 01/01/1998      Past Surgical History:   Procedure Laterality Date     BIOPSY OF SKIN LESION       BYPASS GRAFT ARTERY CORONARY       CATARACT IOL, RT/LT Bilateral 2017    [facility]     EXCISE MASS LOWER EXTREMITY Left 11/3/2017    Procedure: EXCISE MASS LOWER EXTREMITY;  Excision Mass Left Flank;  Surgeon: Marybeth Gambino MD;  Location:  OR     MOHS MICROGRAPHIC PROCEDURE       PARATHYROIDECTOMY N/A 3/21/2017    Procedure: PARATHYROIDECTOMY;  Surgeon: Julianna Short MD;  Location:  OR     PARATHYROIDECTOMY            Medications  Prior to Admission medications    Medication Sig Start Date End Date Taking? Authorizing Provider    acetaminophen 500 MG CAPS Take 2 tablets by mouth daily.    Reported, Patient   albuterol (PROAIR HFA) 108 (90 BASE) MCG/ACT Inhaler Inhale 2 puffs into the lungs every 4 hours as needed for shortness of breath / dyspnea, wheezing or other (use prior to exertion/activities) 8/30/17   Kenia Carranza MD   ammonium lactate (AMLACTIN) 12 % cream Apply  topically daily.    Reported, Patient   artificial tears SOLN Use one drop to both eye PRN.       ASPIRIN PO Take 81 mg by mouth daily    Reported, Patient   ATORVASTATIN CALCIUM PO Take 40 mg by mouth daily    Reported, Patient   budesonide-formoterol (SYMBICORT) 160-4.5 MCG/ACT Inhaler Inhale 2 puffs into the lungs 2 times daily    Reported, Patient   doxycycline (VIBRAMYCIN) 100 MG capsule Take 1 capsule (100 mg) by mouth daily 10/11/17   Migdalia Hussein MD   econazole nitrate 1 % cream Apply topically daily To feet and toenails. 10/22/18   Colton Prieto DPM   isosorbide mononitrate (IMDUR) 30 MG 24 hr tablet Take 1 tablet (30 mg) by mouth daily 1/20/18   Jorge Hanks MD   ketotifen (ZADITOR/REFRESH ANTI-ITCH) 0.025 % SOLN ophthalmic solution Place 1 drop into both eyes 2 times daily    Reported, Patient   losartan (COZAAR) 50 MG tablet Take 1 tablet (50 mg) by mouth daily  Patient taking differently: Take 50 mg by mouth every morning  2/13/17   Nam Duffy MD   METFORMIN HCL PO Take by mouth 2 times daily (with meals)    Reported, Patient   metoprolol succinate (TOPROL-XL) 100 MG 24 hr tablet Take 0.5 tablets (50 mg) by mouth daily 4/28/18   Amita Brown APRN CNP   omeprazole (PRILOSEC) 20 MG capsule Take 20 mg by mouth daily.    Reported, Patient   senna-docusate (SENOKOT-S;PERICOLACE) 8.6-50 MG per tablet Take 1-2 tablets by mouth 2 times daily as needed for constipation Take while on oral narcotics to prevent or treat constipation. 3/21/17   Julianna Short MD   VITAMIN D, CHOLECALCIFEROL, PO Take 1,000 Units by mouth  every morning     Reported, Patient       Allergies  Allergies   Allergen Reactions     Clindamycin      Eggs Other (See Comments)     Pt states no longer having reaction, childhood allergy, eats eggs       Penicillins Other (See Comments)     Pt states PCN allergy is due to egg allergy     Propoxyphene Other (See Comments)     Pain       Review of systems  A 10-point review of systems was negative    Examination  /89 (BP Location: Right arm)  Pulse 67  Temp 97.8  F (36.6  C) (Oral)  Wt 135.8 kg (299 lb 6.4 oz)  SpO2 93%  BMI 41.76 kg/m2  Body mass index is 41.76 kg/(m^2).    Gen- well, NAD, A+Ox3, normal color  CVS- RRR  RS- CTA  Abd- obese, SNT, no ascites or organomegaly on palpation or percussion, BS+  Extr- hands normal, no EDMUND  Skin- no rash or jaundice  Neuro- no asterixis  Psych- normal mood    Laboratory  Lab Results   Component Value Date     10/29/2018    POTASSIUM 4.2 10/29/2018    CHLORIDE 106 10/29/2018    CO2 27 10/29/2018    BUN 13 10/29/2018    CR 0.87 10/29/2018       Lab Results   Component Value Date    BILITOTAL 0.6 10/29/2018    ALT 37 10/29/2018    AST 21 10/29/2018    ALKPHOS 100 10/29/2018       Lab Results   Component Value Date    ALBUMIN 3.6 10/29/2018    PROTTOTAL 7.5 10/29/2018        Lab Results   Component Value Date    WBC 5.1 10/29/2018    HGB 12.9 10/29/2018    MCV 85 10/29/2018     10/29/2018       Lab Results   Component Value Date    INR 1.03 10/29/2018       Radiology  Fibrosis Scan March 2018 reviewed    Assessment  72 year old male with history of metabolic syndrome who presents for routine follow-up of NAFLD with F3 fibrosis on recent Fibrosis Scan.  Liver function tests normal.  His liver disease is less important in the context of his other medical problems.  We discussed the importance of weight loss with diet and exercise with regard to improvement of NAFLD in addition to the larger benefits related to DM and hypertension.    Plan  1.  Weight loss  with diet and exercise  2.  Follow-up in 12 months    Nathanael Valdovinos MD  Hepatology  [facility]

## 2018-10-29 NOTE — NURSING NOTE
Chief Complaint   Patient presents with     RECHECK     Follow Up      /89 (BP Location: Right arm)  Pulse 67  Temp 97.8  F (36.6  C) (Oral)  Wt 135.8 kg (299 lb 6.4 oz)  SpO2 93%  BMI 41.76 kg/m2   Yulisa Dillon

## 2018-10-29 NOTE — MR AVS SNAPSHOT
After Visit Summary   10/29/2018    Lux Velasco    MRN: [id number]           Patient Information     Date Of Birth          1945        Visit Information        Provider Department      10/29/2018 10:30 AM Nathanael Gomez MD [facility] Hepatology        Today's Diagnoses     NAFLD (nonalcoholic fatty liver disease)    -  1       Follow-ups after your visit        Follow-up notes from your care team     Return in about 1 year (around 10/29/2019).      Your next 10 appointments already scheduled     Nov 07, 2018 10:15 AM CST   (Arrive by 10:00 AM)   Return Visit with Shirin Shore MD   [facility] Dermatology ([facility] [place])    [address]  3rd Floor  [facility] [address]   [phone]            Nov 15, 2018 10:30 AM CST   Lymphedema Evaluation with Nicki Cheek, PT   [facility] [place] Lymphedema ([facility])    [facility] [address] [facility]  1st [facility]  [address]  [facility] [address] [phone]            Nov 27, 2018 11:00 AM CST   (Arrive by 10:45 AM)   Return Visit with Jorge Hanks MD   [facility] Heart Care ([facility] [place])    [address]  [address]  [facility] [address]   [phone]            Dec 10, 2018 10:40 AM CST   (Arrive by 10:25 AM)   [facility] ([facility] [place])    [address]  4th Floor  [facility] [address] [phone]            Dec 19, 2018  1:15 PM CST   RETURN GENERAL with Migdalia Hussein MD   Eye Clinic ([facility])    [address]  9th Fl Clin 9a  [facility] [address]   [phone]            Feb 07, 2019  1:00 PM CST   (Arrive by 12:45 PM)   Urodynamics with Sofiya Contreras PA-C   [facility] Urology and Inst for Prostate and Urologic Cancers ([facility] [place])     [address] [id number] [phone]            Feb 08, 2019 11:40 AM CST   (Arrive by 11:25 AM)   Return Visit with Marc Chapa MD   [facility] for Prostate and Urologic Cancers ([facility])    [address] [id number] [phone]              Who to contact     If you have questions or need follow up information about today's clinic visit or your schedule please contact [facility] HEPATOLOGY directly at [phone].  Normal or non-critical lab and imaging results will be communicated to you by MyChart, letter or phone within 4 business days after the clinic has received the results. If you do not hear from us within 7 days, please contact the clinic through MyChart or phone. If you have a critical or abnormal lab result, we will notify you by phone as soon as possible.  Submit refill requests through [employer] or call your pharmacy and they will forward the refill request to us. Please allow 3 business days for your refill to be completed.          Additional Information About Your Visit        Care EveryWhere ID     This is your Care EveryWhere ID. This could be used by other organizations to access your [place] medical records  [id number]        Your Vitals Were     Pulse Temperature Pulse Oximetry BMI (Body Mass Index)          67 97.8  F (36.6  C) (Oral) 93% 41.76 kg/m2         Blood Pressure from Last 3 Encounters:   10/29/18 156/89   10/26/18 143/83   09/18/18 160/76    Weight from Last 3 Encounters:   10/29/18 135.8 kg (299 lb 6.4 oz)   10/26/18 126.6 kg (279 lb)   10/03/18 133.8 kg (295 lb)              Today, you had the following     No orders found for display         Today's Medication Changes          These changes are accurate as of 10/29/18 11:59 PM.  If you have any questions, ask your nurse or doctor.               These medicines have changed or have updated prescriptions.         Dose/Directions    losartan 50 MG tablet   Commonly known as:  COZAAR   This may have changed:  when to take this   Used for:  Benign essential hypertension        Dose:  50 mg   Take 1 tablet (50 mg) by mouth daily   Quantity:  30 tablet   Refills:  3         Stop taking these medicines if you haven't already. Please contact your care team if you have questions.     fluticasone 110 MCG/ACT Inhaler   Commonly known as:  FLOVENT HFA   Stopped by:  Nathanael Gomez MD                    Primary Care Provider Office Phone # Fax #    Nam ZAFAR MD Tati [phone] [address] [address]        Equal Access to Services     [facility]: Hadii suresh traore hadasho Sokailee, waaxda luqadaha, qaybta kaalmada adejerardoyada, taty chase . So [facility] [phone].    ATENCIÓN: Si habla español, tiene a back disposición servicios gratuitos de asistencia lingüística. [facility] [phone].    We comply with applicable federal civil rights laws and [place] laws. We do not discriminate on the basis of race, color, national origin, age, disability, sex, sexual orientation, or gender identity.            Thank you!     Thank you for choosing [facility] HEPATOLOGY  for your care. Our goal is always to provide you with excellent care. Hearing back from our patients is one way we can continue to improve our services. Please take a few minutes to complete the written survey that you may receive in the mail after your visit with us. Thank you!             Your Updated Medication List - Protect others around you: Learn how to safely use, store and throw away your medicines at www.disposemymeds.org.          This list is accurate as of 10/29/18 11:59 PM.  Always use your most recent med list.                   Brand Name Dispense Instructions for use Diagnosis    acetaminophen 500 MG Caps      Take 2 tablets by mouth daily.        albuterol 108 (90 Base) MCG/ACT  inhaler    PROAIR HFA    1 Inhaler    Inhale 2 puffs into the lungs every 4 hours as needed for shortness of breath / dyspnea, wheezing or other (use prior to exertion/activities)    Shortness of breath       ammonium lactate 12 % cream    AMLACTIN     Apply  topically daily.        artificial tears Soln      Use one drop to both eye PRN.        ASPIRIN PO      Take 81 mg by mouth daily        ATORVASTATIN CALCIUM PO      Take 40 mg by mouth daily        budesonide-formoterol 160-4.5 MCG/ACT Inhaler    SYMBICORT     Inhale 2 puffs into the lungs 2 times daily        doxycycline 100 MG capsule    VIBRAMYCIN    30 capsule    Take 1 capsule (100 mg) by mouth daily    Ulcerative blepharitis of upper and lower eyelids of both eyes       econazole nitrate 1 % cream     85 g    Apply topically daily To feet and toenails.    Tinea pedis of both feet, Onychomycosis       isosorbide mononitrate 30 MG 24 hr tablet    IMDUR    90 tablet    Take 1 tablet (30 mg) by mouth daily    Angina, class III (H)       ketotifen 0.025 % Soln ophthalmic solution    ZADITOR/REFRESH ANTI-ITCH     Place 1 drop into both eyes 2 times daily        losartan 50 MG tablet    COZAAR    30 tablet    Take 1 tablet (50 mg) by mouth daily    Benign essential hypertension       METFORMIN HCL PO      Take by mouth 2 times daily (with meals)    Benign nodular prostatic hyperplasia without lower urinary tract symptoms, Need for prophylactic vaccination against Streptococcus pneumoniae (pneumococcus), Screening for AAA (abdominal aortic aneurysm), History of smoking       omeprazole 20 MG CR capsule    priLOSEC     Take 20 mg by mouth daily.        senna-docusate 8.6-50 MG per tablet    SENOKOT-S;PERICOLACE    10 tablet    Take 1-2 tablets by mouth 2 times daily as needed for constipation Take while on oral narcotics to prevent or treat constipation.    Hypercalcemia, Primary hyperparathyroidism (H)       TOPROL  MG 24 hr tablet   Generic drug:   metoprolol succinate      Take 0.5 tablets (50 mg) by mouth daily        VITAMIN D (CHOLECALCIFEROL) PO      Take 1,000 Units by mouth every morning

## 2018-11-07 ENCOUNTER — OFFICE VISIT (OUTPATIENT)
Dept: DERMATOLOGY | Facility: CLINIC | Age: 73
End: 2018-11-07
Payer: MEDICARE

## 2018-11-07 DIAGNOSIS — Z85.828 HISTORY OF SKIN CANCER: ICD-10-CM

## 2018-11-07 DIAGNOSIS — L57.0 AK (ACTINIC KERATOSIS): Primary | ICD-10-CM

## 2018-11-07 ASSESSMENT — PAIN SCALES - GENERAL
PAINLEVEL: NO PAIN (1)
PAINLEVEL: NO PAIN (0)

## 2018-11-07 NOTE — PROGRESS NOTES
[facility] Dermatology Note      Dermatology Problem List:  1. History of NMSC- most of his care is at the [facility] and we don't have a complete record of his skin cancers  - BCC, right upper back s/p ED&C  - Unknown Type, right cheek  -Unknown Type, left hand s/p Mohs   2. Actinic keratoses s/p cryo  - Hypertrophic AK, right forearm s/p biopsy 11/28/16  - AK, left lower inferior orbital rim s/p biopsy 11/28/16  3. Verruca vulgaris, right second finger s/p cryo  4. ISKs s/p cryo  5. Skin cancer screening 11/7/2018       CC:   Chief Complaint   Patient presents with     Derm Problem     Lux is here for a skin check, states concerning areas on his left hand.          Encounter Date: Nov 7, 2018    History of Present Illness:  Mr. Lux Velasco is a 72 year old male who with no history of skin cancer presents for a skin check.  [facility]).  He is concerned that some stitches may be left.  He notes some rough areas of the scalp, arms, and chest.  He does have stasis dermatitis but has trouble with wearing the compression stockings.  He did recently have GERMÁN's with Dr. Prieto.  He is working with Dr Duffy on breathing issues.    Past Medical History:   Patient Active Problem List   Diagnosis     Plantar fascial fibromatosis     History of agent Orange exposure     Degenerative arthritis of cervical spine     Stroke (H)     Skin exam, screening for cancer     Primary hyperparathyroidism (H)     Past Medical History:   Diagnosis Date     Diabetes mellitus type II 2005     History of agent Orange exposure 4/17/2013     Hypertension      Myocardial infarct (H) 01/01/1999     Primary hyperparathyroidism (H) Dx approx 2003     Stroke (H) 01/01/1998    recovered fully     Past Surgical History:   Procedure Laterality Date     BIOPSY OF SKIN LESION       BYPASS GRAFT ARTERY CORONARY       CATARACT IOL, RT/LT Bilateral 2017    [facility]     EXCISE MASS LOWER EXTREMITY Left 11/3/2017    Procedure: EXCISE MASS LOWER EXTREMITY;  Excision Mass Left Flank;  Surgeon: Marybeth Gambino MD;  Location:  OR     MOHS MICROGRAPHIC PROCEDURE       PARATHYROIDECTOMY N/A 3/21/2017    Procedure: PARATHYROIDECTOMY;  Surgeon: Julianna Short MD;  Location: [facility] OR     PARATHYROIDECTOMY         Social History:  Patient  reports that he quit smoking about 48 years ago. He has quit using smokeless tobacco. He reports that he drinks alcohol. He reports that he does not use illicit drugs.    Family History:  Family History   Problem Relation Age of Onset     Melanoma Mother      Melanoma Father      Cancer No family hx of      no skin cancer     Glaucoma No family hx of      Macular Degeneration No family hx of        Medications:  Current Outpatient Prescriptions   Medication Sig Dispense Refill     acetaminophen 500 MG CAPS Take 2 tablets by mouth daily.       albuterol (PROAIR HFA) 108 (90 BASE) MCG/ACT Inhaler Inhale 2 puffs into the lungs every 4 hours as needed for shortness of breath / dyspnea, wheezing or other (use prior to exertion/activities) 1 Inhaler 3     ammonium lactate (AMLACTIN) 12 % cream Apply  topically daily.       artificial tears SOLN Use one drop to both eye PRN.       ASPIRIN PO Take 81 mg by mouth daily       ATORVASTATIN CALCIUM PO Take 40 mg by mouth daily       budesonide-formoterol (SYMBICORT) 160-4.5 MCG/ACT Inhaler Inhale 2 puffs into the lungs 2 times daily       doxycycline (VIBRAMYCIN) 100 MG capsule Take 1 capsule (100 mg) by mouth daily 30 capsule 11     econazole nitrate 1 % cream Apply topically daily To feet and toenails. 85 g 5     isosorbide mononitrate (IMDUR) 30 MG 24 hr tablet Take 1 tablet (30 mg) by mouth daily 90 tablet 3     ketotifen (ZADITOR/REFRESH ANTI-ITCH) 0.025 % SOLN ophthalmic solution Place 1 drop into both eyes 2 times daily       losartan (COZAAR) 50 MG tablet Take 1 tablet (50 mg) by  mouth daily (Patient taking differently: Take 50 mg by mouth every morning ) 30 tablet 3     METFORMIN HCL PO Take by mouth 2 times daily (with meals)       metoprolol succinate (TOPROL-XL) 100 MG 24 hr tablet Take 0.5 tablets (50 mg) by mouth daily       omeprazole (PRILOSEC) 20 MG capsule Take 20 mg by mouth daily.       senna-docusate (SENOKOT-S;PERICOLACE) 8.6-50 MG per tablet Take 1-2 tablets by mouth 2 times daily as needed for constipation Take while on oral narcotics to prevent or treat constipation. 10 tablet 0     VITAMIN D, CHOLECALCIFEROL, PO Take 1,000 Units by mouth every morning        Allergies   Allergen Reactions     Clindamycin      Eggs Other (See Comments)     Pt states no longer having reaction, childhood allergy, eats eggs       Penicillins Other (See Comments)     Pt states PCN allergy is due to egg allergy     Propoxyphene Other (See Comments)     Pain         .    Physical exam:  Vitals: There were no vitals taken for this visit.  GEN: This is a well developed, well-nourished male in no acute distress, in a pleasant mood.    SKIN: Total skin excluding the undergarment areas was performed. The exam included the head/face, neck, both arms, chest, back, abdomen, both legs, digits and/or nails.   -There is no erythema, telangectasias, nodularity, or pigmentation on the back, face, or left hand.  Multiple scars but NERD.  -There are erythematous macules with overyling adherent scale on the scalp, left lateral canthus, left hand, bilateral arms and chest.   -Xerosis of the BLE with edema and brown patches  -No other lesions of concern on areas examined.     Impression/Plan:  1. Actinic keratosis    Cryotherapy procedure note: After verbal consent and discussion of risks and benefits including but no limited to dyspigmentation/scar, blister, and pain, 6 was(were) treated with 1-2mm freeze border for 2 cycles with liquid nitrogen. Post cryotherapy instructions were provided.     2. History of  nonmelanoma skin cancer, no clincial evidence of recurrence    Reviewed that the left hand NMSC site looks good.  No evidence of sutures at this time.      3. Stasis dermatitis    Amlactin lotion daily- [facility]        CC Dr. Duffy on close of this encounter.  Follow-up in 6 months, earlier for new or changing lesions.       Staff Involved:  Staff Only

## 2018-11-07 NOTE — MR AVS SNAPSHOT
After Visit Summary   11/7/2018    Lux Velasco    MRN: [id number]           Patient Information     Date Of Birth          1945        Visit Information        Provider Department      11/7/2018 10:15 AM Shirin Shore MD [facility] Dermatology        Today's Diagnoses     AK (actinic keratosis)    -  1      Care Instructions    Ammonium lactate cream or lotion daily for the legs and arms         Cryotherapy    What is it?    Use of a very cold liquid, such as liquid nitrogen, to freeze and destroy abnormal skin cells that need to be removed    What should I expect?    Tenderness and redness    A small blister that might grow and fill with dark purple blood. There may be crusting.    More than one treatment may be needed if the lesions do not go away.    How do I care for the treated area?    Gently wash the area with your hands when bathing.    Use a thin layer of Vaseline to help with healing. You may use a Band-Aid.     The area should heal within 7-10 days and may leave behind a pink or lighter color.     Do not use an antibiotic or Neosporin ointment.     You may take acetaminophen (Tylenol) for pain.     Call your Doctor if you have:    Severe pain    Signs of infection (warmth, redness, cloudy yellow drainage, and or a bad smell)    Questions or concerns    Who should I call with questions?       [facility]: [phone]       [facility]: [phone]       For urgent needs outside of business hours call the [facility] at [phone]        and ask for the dermatology resident on call            Follow-ups after your visit        Your next 10 appointments already scheduled     Nov 15, 2018 10:30 AM CST   Lymphedema Evaluation with Nicki Cheek PT   [facility] [place] Lymphedema ([facility])    [address]  1st Floor  [address]  [facility]  [address]   [phone]            Nov 27, 2018 11:00 AM CST   (Arrive by 10:45 AM)   Return Visit with Jorge Hanks MD   [facility] Heart Care ([facility])    [address]  [address]  [facility] [address]   [phone]            Dec 10, 2018 10:40 AM CST   (Arrive by 10:25 AM)   [facility]    [address]  4th [facility] [address]   [phone]            Dec 19, 2018  1:15 PM CST   RETURN GENERAL with Migdalia Hussein MD   Eye Clinic ([facility])    [address]  [facility] [address]   [phone]            Feb 07, 2019  1:00 PM CST   (Arrive by 12:45 PM)   Urodynamics with Sofiya Contreras PA-C   [facility] Urology and Inst for Prostate and Urologic Cancers ([facility])    [address] [address]   [phone]            Feb 08, 2019 11:40 AM CST   (Arrive by 11:25 AM)   Return Visit with Marc Chapa MD   [facility] Urology and Inst for Prostate and Urologic Cancers ([facility])    [address] [place] [address] [phone]              Who to contact     Please call your clinic at [phone] to:    Ask questions about your health    Make or cancel appointments    Discuss your medicines    Learn about your test results    Speak to your doctor            Additional Information About Your Visit        Care EveryWhere ID     This is your Care EveryWhere ID. This could be used by other organizations to access your [place] medical records  [id number]         Blood Pressure from Last 3 Encounters:   10/29/18 156/89   10/26/18 143/83   09/18/18 160/76    Weight from Last 3 Encounters:   10/29/18 135.8 kg (299 lb 6.4 oz)   10/26/18 126.6 kg (279 lb)   10/03/18 133.8  kg (295 lb)              We Performed the Following     DESTRUCT PREMALIGNANT LESION, 2-14     DESTRUCT PREMALIGNANT LESION, FIRST          Today's Medication Changes          These changes are accurate as of 11/7/18 11:07 AM.  If you have any questions, ask your nurse or doctor.               These medicines have changed or have updated prescriptions.        Dose/Directions    losartan 50 MG tablet   Commonly known as:  COZAAR   This may have changed:  when to take this   Used for:  Benign essential hypertension        Dose:  50 mg   Take 1 tablet (50 mg) by mouth daily   Quantity:  30 tablet   Refills:  3                Primary Care Provider Office Phone # Fax #    Nam Duffy [phone]       [address] [address]        Equal Access to Services     SANDHYA DYE : Christiana anvao Maral, waaxda luqadaha, qaybta kaalmada adeegyada, taty chase . So [facility] [phone].    ATENCIÓN: Si habla español, tiene a back disposición servicios gratuitos de asistencia lingüística. Llame al [phone].    We comply with applicable federal civil rights laws and [place] laws. We do not discriminate on the basis of race, color, national origin, age, disability, sex, sexual orientation, or gender identity.            Thank you!     Thank you for choosing [facility] DERMATOLOGY  for your care. Our goal is always to provide you with excellent care. Hearing back from our patients is one way we can continue to improve our services. Please take a few minutes to complete the written survey that you may receive in the mail after your visit with us. Thank you!             Your Updated Medication List - Protect others around you: Learn how to safely use, store and throw away your medicines at www.disposemymeds.org.          This list is accurate as of 11/7/18 11:07 AM.  Always use your most recent med list.                   Brand Name Dispense Instructions  for use Diagnosis    acetaminophen 500 MG Caps      Take 2 tablets by mouth daily.        albuterol 108 (90 Base) MCG/ACT inhaler    PROAIR HFA    1 Inhaler    Inhale 2 puffs into the lungs every 4 hours as needed for shortness of breath / dyspnea, wheezing or other (use prior to exertion/activities)    Shortness of breath       ammonium lactate 12 % cream    AMLACTIN     Apply  topically daily.        artificial tears Soln      Use one drop to both eye PRN.        ASPIRIN PO      Take 81 mg by mouth daily        ATORVASTATIN CALCIUM PO      Take 40 mg by mouth daily        budesonide-formoterol 160-4.5 MCG/ACT Inhaler    SYMBICORT     Inhale 2 puffs into the lungs 2 times daily        doxycycline 100 MG capsule    VIBRAMYCIN    30 capsule    Take 1 capsule (100 mg) by mouth daily    Ulcerative blepharitis of upper and lower eyelids of both eyes       econazole nitrate 1 % cream     85 g    Apply topically daily To feet and toenails.    Tinea pedis of both feet, Onychomycosis       isosorbide mononitrate 30 MG 24 hr tablet    IMDUR    90 tablet    Take 1 tablet (30 mg) by mouth daily    Angina, class III (H)       ketotifen 0.025 % Soln ophthalmic solution    ZADITOR/REFRESH ANTI-ITCH     Place 1 drop into both eyes 2 times daily        losartan 50 MG tablet    COZAAR    30 tablet    Take 1 tablet (50 mg) by mouth daily    Benign essential hypertension       METFORMIN HCL PO      Take by mouth 2 times daily (with meals)    Benign nodular prostatic hyperplasia without lower urinary tract symptoms, Need for prophylactic vaccination against Streptococcus pneumoniae (pneumococcus), Screening for AAA (abdominal aortic aneurysm), History of smoking       omeprazole 20 MG CR capsule    priLOSEC     Take 20 mg by mouth daily.        senna-docusate 8.6-50 MG per tablet    SENOKOT-S;PERICOLACE    10 tablet    Take 1-2 tablets by mouth 2 times daily as needed for constipation Take while on oral narcotics to prevent or treat  constipation.    Hypercalcemia, Primary hyperparathyroidism (H)       TOPROL  MG 24 hr tablet   Generic drug:  metoprolol succinate      Take 0.5 tablets (50 mg) by mouth daily        VITAMIN D (CHOLECALCIFEROL) PO      Take 1,000 Units by mouth every morning

## 2018-11-07 NOTE — LETTER
11/7/2018       RE: Lux Velasco  [address]  [place] [address]     Dear Colleague,    Thank you for referring your patient, Lux Velasco, to the [facility] DERMATOLOGY at [facility]. Please see a copy of my visit note below.    [facility] Dermatology Note      Dermatology Problem List:  1. History of NMSC- most of his care is at the [facility] and we don't have a complete record of his skin cancers  - BCC, right upper back s/p ED&C  - Unknown Type, right cheek  -Unknown Type, left hand s/p Mohs   2. Actinic keratoses s/p cryo  - Hypertrophic AK, right forearm s/p biopsy 11/28/16  - AK, left lower inferior orbital rim s/p biopsy 11/28/16  3. Verruca vulgaris, right second finger s/p cryo  4. ISKs s/p cryo  5. Skin cancer screening 11/7/2018       CC:   Chief Complaint   Patient presents with     Derm Problem     Lux is here for a skin check, states concerning areas on his left hand.          Encounter Date: Nov 7, 2018    History of Present Illness:  Mr. Lux Velasco is a 72 year old male who with no history of skin cancer presents for a skin check.  He [facility]Dr Gaines).  He is concerned that some stitches may be left.  He notes some rough areas of the scalp, arms, and chest.  He does have stasis dermatitis but has trouble with wearing the compression stockings.  He did recently have GERMÁN's with Dr. Prieto.  He is working with Dr Duffy on breathing issues.    Past Medical History:   Patient Active Problem List   Diagnosis     Plantar fascial fibromatosis     History of agent Orange exposure     Degenerative arthritis of cervical spine     Stroke (H)     Skin exam, screening for cancer     Primary hyperparathyroidism (H)     Past Medical History:   Diagnosis Date     Diabetes mellitus type II 2005     History of agent Orange exposure 4/17/2013     Hypertension       Myocardial infarct (H) 01/01/1999     Primary hyperparathyroidism (H) Dx approx 2003     Stroke (H) 01/01/1998    recovered fully     Past Surgical History:   Procedure Laterality Date     BIOPSY OF SKIN LESION       BYPASS GRAFT ARTERY CORONARY       CATARACT IOL, RT/LT Bilateral 2017    [facility]     EXCISE MASS LOWER EXTREMITY Left 11/3/2017    Procedure: EXCISE MASS LOWER EXTREMITY;  Excision Mass Left Flank;  Surgeon: Marybeth Gambino MD;  Location:  OR     MOHS MICROGRAPHIC PROCEDURE       PARATHYROIDECTOMY N/A 3/21/2017    Procedure: PARATHYROIDECTOMY;  Surgeon: Julianna Short MD;  Location:  OR     PARATHYROIDECTOMY         Social History:  Patient  reports that he quit smoking about 48 years ago. He has quit using smokeless tobacco. He reports that he drinks alcohol. He reports that he does not use illicit drugs.    Family History:  Family History   Problem Relation Age of Onset     Melanoma Mother      Melanoma Father      Cancer No family hx of      no skin cancer     Glaucoma No family hx of      Macular Degeneration No family hx of        Medications:  Current Outpatient Prescriptions   Medication Sig Dispense Refill     acetaminophen 500 MG CAPS Take 2 tablets by mouth daily.       albuterol (PROAIR HFA) 108 (90 BASE) MCG/ACT Inhaler Inhale 2 puffs into the lungs every 4 hours as needed for shortness of breath / dyspnea, wheezing or other (use prior to exertion/activities) 1 Inhaler 3     ammonium lactate (AMLACTIN) 12 % cream Apply  topically daily.       artificial tears SOLN Use one drop to both eye PRN.       ASPIRIN PO Take 81 mg by mouth daily       ATORVASTATIN CALCIUM PO Take 40 mg by mouth daily       budesonide-formoterol (SYMBICORT) 160-4.5 MCG/ACT Inhaler Inhale 2 puffs into the lungs 2 times daily       doxycycline (VIBRAMYCIN) 100 MG capsule Take 1 capsule (100 mg) by mouth daily 30 capsule 11     econazole nitrate 1 % cream Apply topically daily To feet and  toenails. 85 g 5     isosorbide mononitrate (IMDUR) 30 MG 24 hr tablet Take 1 tablet (30 mg) by mouth daily 90 tablet 3     ketotifen (ZADITOR/REFRESH ANTI-ITCH) 0.025 % SOLN ophthalmic solution Place 1 drop into both eyes 2 times daily       losartan (COZAAR) 50 MG tablet Take 1 tablet (50 mg) by mouth daily (Patient taking differently: Take 50 mg by mouth every morning ) 30 tablet 3     METFORMIN HCL PO Take by mouth 2 times daily (with meals)       metoprolol succinate (TOPROL-XL) 100 MG 24 hr tablet Take 0.5 tablets (50 mg) by mouth daily       omeprazole (PRILOSEC) 20 MG capsule Take 20 mg by mouth daily.       senna-docusate (SENOKOT-S;PERICOLACE) 8.6-50 MG per tablet Take 1-2 tablets by mouth 2 times daily as needed for constipation Take while on oral narcotics to prevent or treat constipation. 10 tablet 0     VITAMIN D, CHOLECALCIFEROL, PO Take 1,000 Units by mouth every morning        Allergies   Allergen Reactions     Clindamycin      Eggs Other (See Comments)     Pt states no longer having reaction, childhood allergy, eats eggs       Penicillins Other (See Comments)     Pt states PCN allergy is due to egg allergy     Propoxyphene Other (See Comments)     Pain         .    Physical exam:  Vitals: There were no vitals taken for this visit.  GEN: This is a well developed, well-nourished male in no acute distress, in a pleasant mood.    SKIN: Total skin excluding the undergarment areas was performed. The exam included the head/face, neck, both arms, chest, back, abdomen, both legs, digits and/or nails.   -There is no erythema, telangectasias, nodularity, or pigmentation on the back, face, or left hand.  Multiple scars but NERD.  -There are erythematous macules with overyling adherent scale on the scalp, left lateral canthus, left hand, bilateral arms and chest.   -Xerosis of the BLE with edema and brown patches  -No other lesions of concern on areas examined.     Impression/Plan:  1. Actinic  keratosis    Cryotherapy procedure note: After verbal consent and discussion of risks and benefits including but no limited to dyspigmentation/scar, blister, and pain, 6 was(were) treated with 1-2mm freeze border for 2 cycles with liquid nitrogen. Post cryotherapy instructions were provided.     2. History of nonmelanoma skin cancer, no clincial evidence of recurrence    Reviewed that the left hand NMSC site looks good.  No evidence of sutures at this time.      3. Stasis dermatitis    Amlactin lotion daily- [facility]        CC Dr. Duffy on close of this encounter.  Follow-up in 6 months, earlier for new or changing lesions.       Staff Involved:  Staff Only

## 2018-11-07 NOTE — PATIENT INSTRUCTIONS
Ammonium lactate cream or lotion daily for the legs and arms         Cryotherapy    What is it?    Use of a very cold liquid, such as liquid nitrogen, to freeze and destroy abnormal skin cells that need to be removed    What should I expect?    Tenderness and redness    A small blister that might grow and fill with dark purple blood. There may be crusting.    More than one treatment may be needed if the lesions do not go away.    How do I care for the treated area?    Gently wash the area with your hands when bathing.    Use a thin layer of Vaseline to help with healing. You may use a Band-Aid.     The area should heal within 7-10 days and may leave behind a pink or lighter color.     Do not use an antibiotic or Neosporin ointment.     You may take acetaminophen (Tylenol) for pain.     Call your Doctor if you have:    Severe pain    Signs of infection (warmth, redness, cloudy yellow drainage, and or a bad smell)    Questions or concerns    Who should I call with questions?       [facility]: [phone]       [facility]: [phone]       For urgent needs outside of business hours call the [facility] at [phone]        and ask for the dermatology resident on call

## 2018-11-15 ENCOUNTER — HOSPITAL ENCOUNTER (OUTPATIENT)
Dept: PHYSICAL THERAPY | Facility: CLINIC | Age: 73
Setting detail: THERAPIES SERIES
End: 2018-11-15
Attending: PODIATRIST
Payer: MEDICARE

## 2018-11-15 DIAGNOSIS — I87.8 VENOUS STASIS: ICD-10-CM

## 2018-11-15 DIAGNOSIS — I89.0 LYMPHEDEMA OF BOTH LOWER EXTREMITIES: Primary | ICD-10-CM

## 2018-11-15 DIAGNOSIS — E11.51 DIABETES MELLITUS WITH PERIPHERAL VASCULAR DISEASE (H): ICD-10-CM

## 2018-11-15 DIAGNOSIS — E11.49 TYPE II OR UNSPECIFIED TYPE DIABETES MELLITUS WITH NEUROLOGICAL MANIFESTATIONS, NOT STATED AS UNCONTROLLED(250.60) (H): ICD-10-CM

## 2018-11-15 DIAGNOSIS — M25.579 PAIN IN JOINT, ANKLE AND FOOT, UNSPECIFIED LATERALITY: ICD-10-CM

## 2018-11-15 PROCEDURE — 97161 PT EVAL LOW COMPLEX 20 MIN: CPT | Mod: GP | Performed by: PHYSICAL THERAPIST

## 2018-11-15 PROCEDURE — 97140 MANUAL THERAPY 1/> REGIONS: CPT | Mod: GP | Performed by: PHYSICAL THERAPIST

## 2018-11-15 PROCEDURE — G8987 SELF CARE CURRENT STATUS: HCPCS | Mod: GP,CK | Performed by: PHYSICAL THERAPIST

## 2018-11-15 PROCEDURE — 40000449 ZZHC STATISTIC PT VISIT, LYMPHEDEMA: Performed by: PHYSICAL THERAPIST

## 2018-11-15 PROCEDURE — G8988 SELF CARE GOAL STATUS: HCPCS | Mod: GP,CK | Performed by: PHYSICAL THERAPIST

## 2018-11-15 PROCEDURE — 97535 SELF CARE MNGMENT TRAINING: CPT | Mod: GP | Performed by: PHYSICAL THERAPIST

## 2018-11-15 PROCEDURE — 97110 THERAPEUTIC EXERCISES: CPT | Mod: GP | Performed by: PHYSICAL THERAPIST

## 2018-11-15 NOTE — PROGRESS NOTES
[facility]        OUTPATIENT PHYSICAL THERAPY EDEMA EVALUATION  PLAN OF TREATMENT FOR OUTPATIENT REHABILITATION  (COMPLETE FOR INITIAL CLAIMS ONLY)  Patient's Last Name, First Name, Lux Hamilton                              Provider s Name:   [facility] Medical Record No.  [id number]     Start of Care Date:  11/15/18   Onset Date:  01/01/15   Type:  PT   Medical Diagnosis:  B LE lymphedema and venous stasis   Therapy Diagnosis:  B LE venous stasis and lymphedema Visits from SOC:  1                                     __________________________________________________________________________________   Plan of Treatment/Functional Goals:    Fit for compression garment, Exercises, Precautions to prevent infection / exacerbation, Education, Wound care / dressing changes, Skin care / precautions, Home management program development        GOALS  1. Goal description: In order to improve tolerance for functional mobility, ADLs, and recreational activities outside of physical therapy, patient will demonstrate independence with home program of exercise and lifestyle modification and possible compression.        Target date: 02/12/19  2. Goal description: Pt will have 5% or more decrease in volume of LEs in order to decrease risk of infection and fit in shoes more easily.       Target date: 02/12/19           Treatment frequency: Other   Treatment duration: 4 visits over 3 months    Nicki Cheek PT                                    I CERTIFY THE NEED FOR THESE SERVICES FURNISHED UNDER        THIS PLAN OF TREATMENT AND WHILE UNDER MY CARE     (Physician co-signature of this document indicates review and certification of the therapy plan).                   Certification date from: 11/15/18       Certification date to: 02/13/19           Referring physician: Dr. MIRANDA  Ida   Initial Assessment  See Epic Evaluation- Start of care: 11/15/18

## 2018-11-15 NOTE — PROGRESS NOTES
11/15/18 1000   Quick Adds   Quick Adds Certification   Rehab Discipline   Discipline PT   Type of Visit   Type of visit Initial Edema Evaluation   General Information   Start of care 11/15/18   Referring physician Dr. RUTH Prieto   Orders Evaluate and treat as indicated   Order date 10/22/18   Medical diagnosis B LE lymphedema, venous stasis   Onset of illness / date of surgery 10/22/18   Edema onset 01/01/15   Affected body parts LLE;RLE   Edema etiology Chronic Venous Insufficiency   Edema etiology comments he has had edema for many years, recently worse   Pertinent history of current problem (PT: include personal factors and/or comorbidities that impact the POC; OT: include additional occupational profile info) Pt has had LE swelling for several years and notes it is getting worse. Walking helps to decrease it. [facility] but doesn't wear them because they are tight and uncomfortable, especially at the top of the stocking. He does not like compression bandaging, as his daughter went through this and he didn't think it helped. He has DM II and venous stasis. He doesn't like diuretics as he has to make too many trips to bathroom.   Surgical / medical history reviewed Yes   Prior level of functional mobility IND   Prior treatment Compression garments;Diuretics   Patient role / employment history Retired   Psychosocial concerns Financial concerns   Living environment Apartment / condo   Fall Risk Screen   Fall screen completed by PT   Have you fallen 2 or more times in the past year? No   Have you fallen and had an injury in the past year? Yes   Timed Up and Go score (seconds) 13   Is patient a fall risk? Yes   Fall screen comments Pt declines education and training on falls and home safety   Abuse Risk Screen   QUESTION TO PATIENT:  Has a member of your family or a partner(now or in the past) intimidated, hurt, manipulated, or controlled you in any way? no   QUESTION TO PATIENT: Do you  feel safe going back to the place where you are living? yes   OBSERVATION: Is there reason to believe there has been maltreatment of a vulnerable adult (ie. Physical/Sexual/Emotional abuse, self neglect, lack of adequate food, shelter, medical care, or financial exploitation)? no   Patient / Family Goals   Patient / family goals statement to find something other than compression stockings to decrease the leg swelling   Pain   Patient currently in pain No   Vitals Signs   Vital Signs Comments WT: 136 KG   Cognitive Status   Orientation Orientation to person, place and time   Level of consciousness Alert   Follows commands and answers questions 100% of the time   Edema Exam / Assessment   Skin condition Pitting;Dryness;Hemosiderin deposits;Intact   Skin condition comments He has dry, scaly, skin in a moccasin pattern that is mild bilaterally, nails have some thickening,, discoloration, dystrophy  bilaterally.  He has hyperkeratotic tissue buildup . Puffy edema on feet, hemosiderin staining B lower legs   Pitting 1+   Pitting location lower legs and feet   Capillary refill Symmetrical   Stemmer sign Positive   Stemmer sign comments B 2nd toes   Girth Measurements   Girth Measurements Refer to separate girth measurement flowsheet   Volume LE   Right LE (mL) 3499 to 40 cm   Left LE (mL) 4473 to 40 cm    LE volume comparison LLE volume greater than RLE volume   % difference 27   Range of Motion   ROM comments decreased df B   Transfers   Transfers IND   Gait / Locomotion   Gait / Locomotion slow, steady   Sensory   Sensory perception comments pt notes decreased sensation but is able to feel light touch   Planned Edema Interventions   Planned edema interventions Fit for compression garment;Exercises;Precautions to prevent infection / exacerbation;Education;Wound care / dressing changes;Skin care / precautions;Home management program development   Clinical Impression   Criteria for skilled therapeutic intervention met Yes    Therapy diagnosis B LE venous stasis and lymphedema   Influenced by the following impairments / conditions Stage 2;Phlebolymphedema   Functional limitations due to impairments / conditions difficult to find shoes to fit, unable to walk distances   Clinical Presentation Stable/Uncomplicated   Clinical Presentation Rationale Pt has untreated edema and is likely to respond well to above interventions   Clinical Decision Making (Complexity) Low complexity   Treatment frequency Other   Treatment duration 4 visits over 3 months   Patient / family and/or staff in agreement with plan of care Yes   Risks and benefits of therapy have been explained Yes   Clinical impression comments Pt presents with untreated lymphedema and does not want to do bandaging. He is open to velcro compression, exercising and information about lifestyle modifications.   Education Assessment   Preferred learning style Listening;Reading;Demonstration;Pictures / video   Barriers to learning Emotional   Goals   Edema Eval Goals 1;2   Goal 1   Goal identifier home program   Goal description In order to improve tolerance for functional mobility, ADLs, and recreational activities outside of physical therapy, patient will demonstrate independence with home program of exercise and lifestyle modification and possible compression.    Target date 02/12/19   Goal 2   Goal identifier volume   Goal description Pt will have 5% or more decrease in volume of LEs in order to decrease risk of infection and fit in shoes more easily.   Target date 02/12/19   Total Evaluation Time   Total evaluation time 15   Certification   Certification date from 11/15/18   Certification date to 02/13/19   Medical Diagnosis B LE lymphedema and venous stasis   Certification I certify the need for these services furnished under this plan of treatment and while under my care.  (Physician co-signature of this document indicates review and certification of the therapy plan).

## 2018-11-18 PROBLEM — N28.1 ACQUIRED CYST OF KIDNEY: Status: ACTIVE | Noted: 2018-11-18

## 2018-11-18 PROBLEM — N40.1 BENIGN PROSTATIC HYPERPLASIA WITH WEAK URINARY STREAM: Status: ACTIVE | Noted: 2018-11-18

## 2018-11-18 PROBLEM — R39.12 BENIGN PROSTATIC HYPERPLASIA WITH WEAK URINARY STREAM: Status: ACTIVE | Noted: 2018-11-18

## 2018-11-21 NOTE — ADDENDUM NOTE
Encounter addended by: Nicki Cheek PT on: 11/21/2018  3:32 PM<BR>     Actions taken: Flowsheet accepted

## 2018-11-26 NOTE — PROGRESS NOTES
CARDIOLOGY CLINIC FOLLOW UP VISIT    HPI: Lux Velasco is a 73 yo gentleman, risk factor profile (+) HTN, (+) Type II DM, (+) hyperlipidemia, (+) family Hx CAD, (-) tobacco use, presents to the cardiology clinic for evaluation of chest discomfort by referral from Dr. Duffy.     Mr. Roque has a long standing history of chest discomfort and ZAMARRIPA, going back to 1988.  He has had numerous noninvasive tests over the past decade.  He had a bicycle ECHO in May 2006 that showed normal LV function with a normal exercise response and no stress induced regional wall motion abnormalities. When I saw him for the first time in 2009, I obtained a stress nuclear study and that was normal.  He had an ECHO in Jan 2017 showing normal LV function with an LVEF 60-65% and trileaflet aortic sclerosis without stenosis.  He had a coronary CTA in May 2017 showing minimal nonobstructive CAD. He had an exercise nuclear study on Edmund 10, 2018, the results of which are pending at this time.  He recalls experiencing chest discomfort, SOB, and fatigue.  The patient's has no documented history of valvular heart disease, cardiac arrhythmia, and congestive heart failure.      He was seen by us last time and was found to [facility] and we started treating him medically. He was also running high in his blood pressures and so we started him on imdur 30 mg per day. Patient says that he hasn't been taking it as this [facility] not [facility]. Also, he was asked to cut down his losartan to half but he still is taking the full dose. Does have some chest discomfort and his pressures are running very high in the 180s today. Denies any dizziness, but does have significant weight gain, thinks he gained almost 20 lbs over the past 2 months.       PAST MEDICAL HISTORY:  Past Medical History:   Diagnosis Date     Diabetes mellitus type II 2005     History of agent Orange exposure 4/17/2013     Hypertension      Myocardial infarct (H)  01/01/1999     Primary hyperparathyroidism (H) Dx approx 2003     Stroke (H) 01/01/1998    recovered fully       CURRENT MEDICATIONS:  Current Outpatient Prescriptions   Medication Sig Dispense Refill     acetaminophen 500 MG CAPS Take 2 tablets by mouth daily.       albuterol (PROAIR HFA) 108 (90 BASE) MCG/ACT Inhaler Inhale 2 puffs into the lungs every 4 hours as needed for shortness of breath / dyspnea, wheezing or other (use prior to exertion/activities) 1 Inhaler 3     ammonium lactate (AMLACTIN) 12 % cream Apply  topically daily.       artificial tears SOLN Use one drop to both eye PRN.       ASPIRIN PO Take 81 mg by mouth daily       ATORVASTATIN CALCIUM PO Take 40 mg by mouth daily       budesonide-formoterol (SYMBICORT) 160-4.5 MCG/ACT Inhaler Inhale 2 puffs into the lungs 2 times daily       doxycycline (VIBRAMYCIN) 100 MG capsule Take 1 capsule (100 mg) by mouth daily 30 capsule 11     econazole nitrate 1 % cream Apply topically daily To feet and toenails. 85 g 5     isosorbide mononitrate (IMDUR) 30 MG 24 hr tablet Take 1 tablet (30 mg) by mouth daily 90 tablet 3     ketotifen (ZADITOR/REFRESH ANTI-ITCH) 0.025 % SOLN ophthalmic solution Place 1 drop into both eyes 2 times daily       losartan (COZAAR) 50 MG tablet Take 1 tablet (50 mg) by mouth daily (Patient taking differently: Take 50 mg by mouth every morning ) 30 tablet 3     METFORMIN HCL PO Take by mouth 2 times daily (with meals)       metoprolol succinate (TOPROL-XL) 100 MG 24 hr tablet Take 0.5 tablets (50 mg) by mouth daily       omeprazole (PRILOSEC) 20 MG capsule Take 20 mg by mouth daily.       order for DME Equipment being ordered: velcro compression for lower legs and feet: ready wrap or juxta fit or farrow wrap 4 each 4     senna-docusate (SENOKOT-S;PERICOLACE) 8.6-50 MG per tablet Take 1-2 tablets by mouth 2 times daily as needed for constipation Take while on oral narcotics to prevent or treat constipation. 10 tablet 0     VITAMIN D,  "CHOLECALCIFEROL, PO Take 1,000 Units by mouth every morning          PAST SURGICAL HISTORY:  Past Surgical History:   Procedure Laterality Date     BIOPSY OF SKIN LESION       BYPASS GRAFT ARTERY CORONARY       CATARACT IOL, RT/LT Bilateral 2017    [facility]     EXCISE MASS LOWER EXTREMITY Left 11/3/2017    Procedure: EXCISE MASS LOWER EXTREMITY;  Excision Mass Left Flank;  Surgeon: Marybeth Gambino MD;  Location: [facility] OR     MOHS MICROGRAPHIC PROCEDURE       PARATHYROIDECTOMY N/A 3/21/2017    Procedure: PARATHYROIDECTOMY;  Surgeon: Julianna Short MD;  Location: [facility] OR     PARATHYROIDECTOMY         ALLERGIES  Clindamycin; Eggs; Penicillins; and Propoxyphene    FAMILY HX:  Family History   Problem Relation Age of Onset     Melanoma Mother      Melanoma Father      Cancer No family hx of      no skin cancer     Glaucoma No family hx of      Macular Degeneration No family hx of        SOCIAL HX:  Social History     Social History     Marital status:      Spouse name: N/A     Number of children: N/A     Years of education: N/A     Social History Main Topics     Smoking status: Former Smoker     Quit date: 1970     Smokeless tobacco: Former User     Alcohol use Yes      Comment: rarely     Drug use: No     Sexual activity: Not Asked     Other Topics Concern     None     Social History Narrative       ROS:  Negative except mentioned in the HPI    VITAL SIGNS:  BP (!) 186/101 (BP Location: Left arm, Patient Position: Chair, Cuff Size: Adult Large)  Pulse 54  Ht 1.803 m (5' 11\")  Wt 137.4 kg (303 lb)  SpO2 96%  BMI 42.26 kg/m2  Body mass index is 42.26 kg/(m^2).  Wt Readings from Last 2 Encounters:   10/29/18 135.8 kg (299 lb 6.4 oz)   10/26/18 126.6 kg (279 lb)       PHYSICAL EXAM  Lux Velasco is a 72 year old male.in no acute distress.  HEENT: Eyes Nonicteric.  Neck: elevated JVP 11 mm Hg.  Carotids +3/3 bilaterally without bruits.  Lungs: reduced BS at the bases  Cor: RRR. Normal S1 and S2, " ESM in the aortic area.  No murmur, rub, or gallop. Abd: Soft, nontender, nondistended.  NABS.  No pulsatile mass.  Extremities: bilateral 3+ edema.  Neuro: Grossly intact.  Psych: A&O x 3.  Skin: No rash.    LABS  Recent Labs   Lab Test  18   1559  17   1215   WBC  5.7  4.9   HGB  13.0*  11.9*   HCT  40.7  37.9*   PLT  160  141*     Recent Labs   Lab Test  18   1559  17   1215   NA  139  140   POTASSIUM  4.0  4.4   CHLORIDE  105  107   CO2  30  26   GLC  114*  119*   BUN  15  16   CR  0.91  0.82   TRINA  8.8  9.1     Recent Labs   Lab Test  16   1640   CHOL  216*   HDL  49   LDL  140*   TRIG  137   NHDL  167*        EK18  NSR with PAC and incomplete RBBB.    Procedures:     EXERCISE BIKE ECHO: 2006  Normal exercise echocardiogram without evidence of coronary artery disease or stress induced ischemia at 72% predicted heart rate.  Normal resting LV function with EF of approximately 60-65%: normal response to exercise with increase to approximately 70-75%.  No stress induced regional wall motion abnormalities.    No ECG evidence of ischemia  No subjective evidence of ischemia.  Mild mitral regurgitation.  Mild aortic root dilation of 3.4 cm at the proximal ascending aorta.  Normal functional capacity.    ECHO: 17  Global and regional left ventricular function is normal with an EF of 60-65%.  Global right ventricular function is normal.  Trileaflet aortic sclerosis without stenosis.  No pericardial effusion is present.    EXERCISE STRESS NUCLEAR TEST:  09  1. Normal study.  2. There is no evidence of stress-induced ischemia and the target heart rate was achieved.    3. Left ventricular size is normal at rest.  Transient ischemic dilation of the left ventricle did not occur.    4. The left ventricular ejection fraction is 60 % and there are no regional wall motion abnormalities.    EXERCISE STRESS NUCLEAR TEST:  18  Stage I Modified Guicho Protocol  LVEF 56%  Small  apical reversible defect.    CORONARY CTA:  IMPRESSION:  1.  Minimal non-obstructive coronary artery disease.   2.  Total Agatston score 102 placing the patient in the 39th  percentile when compared to age and gender matched control group.  3.  Please review Radiology report for incidental noncardiac findings that will follow separately.     FINDINGS:     CORONARY CALCIUM SCORE     Total Agatston calcium score: 102   Left main: 72  Left anterior descendin  Left circumflex: 1  Right coronary artery: 27   This places the patient in the 39th  percentile when compared to age  and gender matched control group.     CORONARY ANGIOGRAPHY     DOMINANCE: Right dominant system.   Normal coronary origins and course.     LEFT MAIN:   The left main arises normally from the left coronary cusp and is widely patent without any detectable stenosis.   There is focal calcification at the left main ostium without causing any detectable stenosis.     LEFT ANTERIOR DESCENDING:   The left anterior descending and its 2 diagonal branches are widely patent with minimal luminal irregularities.      LEFT CIRCUMFLEX:   The left circumflex and its major marginal branches (OM1, OM2) are patent with minimal luminal irregularities.      RIGHT CORONARY ARTERY:   Distal RCA: Minimal (<25%) stenosis composed of noncalcified plaque.  The remainder of the right coronary and the posterior descending  artery are patent with minimal luminal irregularities.     ADDITIONAL FINDINGS:   The proximal ascending aorta is normal in size. Mild calcification of the aortic valve.  Normal pulmonary venous anatomy with all four pulmonary veins draining  into the left atrium.    There is no left ventricular mass or thrombus.   Normal pericardial thickness. There is no pericardial effusion.    CARDIAC CATH: None    ASSESSMENT AND PLAN:   1. Chest discomfort.  [facility]'t have reports). Think this could be related to hypertensive  cardiomyopathy. His albumin, liver and renal functions are normal. TSH normal last year. Diastolic dysfunction wasn't reported in the last echo from Apr 2018 but HFpEF is a possibility causing symptoms of chest pain and fluid accumulation. We will start patient on hydrochlorothiazide and imdur (reviewed renal function from the past). [facility] and [facility] B[facility].      2. Lipid profile.  The patient was previously on a statin.  Cholesterol and LDL from Sep 2018 looked really good (LDL 50).      3. Hypertension.  The patient is currently on Cozaar 50 mg qd.  Adding hydrochlorothiazide as mentioned above.     4. Type II Diabetes.  Continue Metformin.    FOLLOW UP:  Dr. Duffy and Dr Connie Schaefer at [facility].      ATTENDING NOTE:  Patient has been seen and evaluated by me on 11/27/2018. I have reviewed the documentation above.  I have reviewed today's vital signs, medications, labs, and imaging results.  I have reviewed and edited, as necessary, the history, review of systems, physical examination, and assessment and plan.  I have discussed my assessment and plan with the cardiology fellow.  Lux Velasco is a 72 year old male with (+) HTN, (+) Type II DM, (+) hyperlipidemia, (+) family Hx CAD, (-) tobacco use, Hx nonobstructive CAD by coronary CTA (<25% distal RCA) in May 2017, returns for ongoing evaluation.  The patient had a stress nuclear study showing apical ischemia and was treated with Imdur. An ECHO in 2017 showed normal LV function.  He returned to [facility] in 2017 with chest pain and had a coronary angiogram and was told there was no plaque but the coronary flow was abnormal.  The patient's antihypertensive Rx was reduced but he continued to take the prior established dose (ARB).  He continues to experience chest pain.   He is hypertensive in clinic today.  We will add hydrochlorothiazide 12.5 mg by substituting Hyzaar 50/12.5 for his Cozaar 50 mg tablet.  We will continue the Toprol  XL 50 mg every day.  We will continue Imdur 30 mg at bedtime.    Jorge Hanks MD     Cardiovascular Division    CC  Patient Care Team:  Tez Hernandez MD as PCP - General (Internal Medicine)  Colton Pulido MD as MD (Family Medicine - Sports Medicine)  Mario Alberto Baptiste MD as MD (Orthopedics)  Sofiya Contreras PA-C as Physician Assistant (Physician Assistant)  Marc Chapa MD as MD (Urology)  Jorge Hanks MD as MD (Internal Medicine - Interventional Cardiology)  Amita Brown APRN CNP as Nurse Practitioner (Nurse Practitioner - Adult Health)  TEZ HERNANDEZ

## 2018-11-27 ENCOUNTER — OFFICE VISIT (OUTPATIENT)
Dept: CARDIOLOGY | Facility: CLINIC | Age: 73
End: 2018-11-27
Attending: INTERNAL MEDICINE
Payer: MEDICARE

## 2018-11-27 VITALS
OXYGEN SATURATION: 96 % | HEIGHT: 71 IN | HEART RATE: 54 BPM | BODY MASS INDEX: 42.42 KG/M2 | DIASTOLIC BLOOD PRESSURE: 101 MMHG | SYSTOLIC BLOOD PRESSURE: 186 MMHG | WEIGHT: 303 LBS

## 2018-11-27 DIAGNOSIS — I20.89 STABLE ANGINA PECTORIS (H): Primary | ICD-10-CM

## 2018-11-27 PROCEDURE — 99214 OFFICE O/P EST MOD 30 MIN: CPT | Mod: GC | Performed by: INTERNAL MEDICINE

## 2018-11-27 PROCEDURE — 93010 ELECTROCARDIOGRAM REPORT: CPT | Mod: ZP | Performed by: INTERNAL MEDICINE

## 2018-11-27 RX ORDER — LOSARTAN POTASSIUM AND HYDROCHLOROTHIAZIDE 12.5; 5 MG/1; MG/1
1 TABLET ORAL DAILY
Qty: 90 TABLET | Refills: 3 | Status: SHIPPED | OUTPATIENT
Start: 2018-11-27 | End: 2019-08-27 | Stop reason: SINTOL

## 2018-11-27 RX ORDER — ISOSORBIDE MONONITRATE 30 MG/1
30 TABLET, EXTENDED RELEASE ORAL DAILY
Qty: 90 TABLET | Refills: 3 | Status: SHIPPED | OUTPATIENT
Start: 2018-11-27 | End: 2018-12-10 | Stop reason: SINTOL

## 2018-11-27 ASSESSMENT — PAIN SCALES - GENERAL: PAINLEVEL: MILD PAIN (2)

## 2018-11-27 NOTE — LETTER
11/27/2018      RE: Lux Velasco  [address]  [facility] [address]       Dear Colleague,    Thank you for the opportunity to participate in the care of your patient, Lux Velasco, at the [facility]. Please see a copy of my visit note below.    CARDIOLOGY CLINIC FOLLOW UP VISIT    HPI: Lux Velasco is a 71 yo gentleman, risk factor profile (+) HTN, (+) Type II DM, (+) hyperlipidemia, (+) family Hx CAD, (-) tobacco use, presents to the cardiology clinic for evaluation of chest discomfort by referral from Dr. Duffy.     Mr. Roque has a long standing history of chest discomfort and ZAMARRIPA, going back to 1988.  He has had numerous noninvasive tests over the past decade.  He had a bicycle ECHO in May 2006 that showed normal LV function with a normal exercise response and no stress induced regional wall motion abnormalities. When I saw him for the first time in 2009, I obtained a stress nuclear study and that was normal.  He had an ECHO in Jan 2017 showing normal LV function with an LVEF 60-65% and trileaflet aortic sclerosis without stenosis.  He had a coronary CTA in May 2017 showing minimal nonobstructive CAD. He had an exercise nuclear study on Edmund 10, 2018, the results of which are pending at this time.  He recalls experiencing chest discomfort, SOB, and fatigue.  The patient's has no documented history of valvular heart disease, cardiac arrhythmia, and congestive heart failure.      He was seen by us last time and was found to [facility] and [facility] him medically. He was also running high in his blood pressures and so we started him on imdur 30 mg per day. Patient says that he hasn't been taking it as this [facility] not [facility]. Also, he was asked to cut down his losartan to half but he still is taking the full dose. Does have some chest discomfort and his pressures are running very high in  the 180s today. Denies any dizziness, but does have significant weight gain, thinks he gained almost 20 lbs over the past 2 months.       PAST MEDICAL HISTORY:  Past Medical History:   Diagnosis Date     Diabetes mellitus type II 2005     History of agent Orange exposure 4/17/2013     Hypertension      Myocardial infarct (H) 01/01/1999     Primary hyperparathyroidism (H) Dx approx 2003     Stroke (H) 01/01/1998    recovered fully       CURRENT MEDICATIONS:  Current Outpatient Prescriptions   Medication Sig Dispense Refill     acetaminophen 500 MG CAPS Take 2 tablets by mouth daily.       albuterol (PROAIR HFA) 108 (90 BASE) MCG/ACT Inhaler Inhale 2 puffs into the lungs every 4 hours as needed for shortness of breath / dyspnea, wheezing or other (use prior to exertion/activities) 1 Inhaler 3     ammonium lactate (AMLACTIN) 12 % cream Apply  topically daily.       artificial tears SOLN Use one drop to both eye PRN.       ASPIRIN PO Take 81 mg by mouth daily       ATORVASTATIN CALCIUM PO Take 40 mg by mouth daily       budesonide-formoterol (SYMBICORT) 160-4.5 MCG/ACT Inhaler Inhale 2 puffs into the lungs 2 times daily       doxycycline (VIBRAMYCIN) 100 MG capsule Take 1 capsule (100 mg) by mouth daily 30 capsule 11     econazole nitrate 1 % cream Apply topically daily To feet and toenails. 85 g 5     isosorbide mononitrate (IMDUR) 30 MG 24 hr tablet Take 1 tablet (30 mg) by mouth daily 90 tablet 3     ketotifen (ZADITOR/REFRESH ANTI-ITCH) 0.025 % SOLN ophthalmic solution Place 1 drop into both eyes 2 times daily       losartan (COZAAR) 50 MG tablet Take 1 tablet (50 mg) by mouth daily (Patient taking differently: Take 50 mg by mouth every morning ) 30 tablet 3     METFORMIN HCL PO Take by mouth 2 times daily (with meals)       metoprolol succinate (TOPROL-XL) 100 MG 24 hr tablet Take 0.5 tablets (50 mg) by mouth daily       omeprazole (PRILOSEC) 20 MG capsule Take 20 mg by mouth daily.       order for DME Equipment  "being ordered: velcro compression for lower legs and feet: ready wrap or juxta fit or farrow wrap 4 each 4     senna-docusate (SENOKOT-S;PERICOLACE) 8.6-50 MG per tablet Take 1-2 tablets by mouth 2 times daily as needed for constipation Take while on oral narcotics to prevent or treat constipation. 10 tablet 0     VITAMIN D, CHOLECALCIFEROL, PO Take 1,000 Units by mouth every morning          PAST SURGICAL HISTORY:  Past Surgical History:   Procedure Laterality Date     BIOPSY OF SKIN LESION       BYPASS GRAFT ARTERY CORONARY       CATARACT IOL, RT/LT B[facility]    [facility]     EXCISE MASS LOWER EXTREMITY Left 11/3/2017    Procedure: EXCISE MASS LOWER EXTREMITY;  Excision Mass Left Flank;  Surgeon: Marybeth Gambino MD;  Location: [facility] OR     MOHS MICROGRAPHIC PROCEDURE       PARATHYROIDECTOMY N/A 3/21/2017    Procedure: PARATHYROIDECTOMY;  Surgeon: Julianna Short MD;  Location: [facility] OR     PARATHYROIDECTOMY         ALLERGIES  Clindamycin; Eggs; Penicillins; and Propoxyphene    FAMILY HX:  Family History   Problem Relation Age of Onset     Melanoma Mother      Melanoma Father      Cancer No family hx of      no skin cancer     Glaucoma No family hx of      Macular Degeneration No family hx of        SOCIAL HX:  Social History     Social History     Marital status:      Spouse name: N/A     Number of children: N/A     Years of education: N/A     Social History Main Topics     Smoking status: Former Smoker     Quit date: 1970     Smokeless tobacco: Former User     Alcohol use Yes      Comment: rarely     Drug use: No     Sexual activity: Not Asked     Other Topics Concern     None     Social History Narrative       ROS:  Negative except mentioned in the HPI    VITAL SIGNS:  BP (!) 186/101 (BP Location: Left arm, Patient Position: Chair, Cuff Size: Adult Large)  Pulse 54  Ht 1.803 m (5' 11\")  Wt 137.4 kg (303 lb)  SpO2 96%  BMI 42.26 kg/m2  Body mass index is 42.26 kg/(m^2).  Wt " Readings from Last 2 Encounters:   10/29/18 135.8 kg (299 lb 6.4 oz)   10/26/18 126.6 kg (279 lb)       PHYSICAL EXAM  Lux Velasco is a 72 year old male.in no acute distress.  HEENT: Eyes Nonicteric.  Neck: elevated JVP 11 mm Hg.  Carotids +3/3 bilaterally without bruits.  Lungs: reduced BS at the bases  Cor: RRR. Normal S1 and S2, ESM in the aortic area.  No murmur, rub, or gallop. Abd: Soft, nontender, nondistended.  NABS.  No pulsatile mass.  Extremities: bilateral 3+ edema.  Neuro: Grossly intact.  Psych: A&O x 3.  Skin: No rash.    LABS  Recent Labs   Lab Test  18   1559  17   1215   WBC  5.7  4.9   HGB  13.0*  11.9*   HCT  40.7  37.9*   PLT  160  141*     Recent Labs   Lab Test  18   1559  17   1215   NA  139  140   POTASSIUM  4.0  4.4   CHLORIDE  105  107   CO2  30  26   GLC  114*  119*   BUN  15  16   CR  0.91  0.82   TRINA  8.8  9.1     Recent Labs   Lab Test  16   1640   CHOL  216*   HDL  49   LDL  140*   TRIG  137   NHDL  167*        EK18  NSR with PAC and incomplete RBBB.    Procedures:     EXERCISE BIKE ECHO: 2006  Normal exercise echocardiogram without evidence of coronary artery disease or stress induced ischemia at 72% predicted heart rate.  Normal resting LV function with EF of approximately 60-65%: normal response to exercise with increase to approximately 70-75%.  No stress induced regional wall motion abnormalities.    No ECG evidence of ischemia  No subjective evidence of ischemia.  Mild mitral regurgitation.  Mild aortic root dilation of 3.4 cm at the proximal ascending aorta.  Normal functional capacity.    ECHO: 17  Global and regional left ventricular function is normal with an EF of 60-65%.  Global right ventricular function is normal.  Trileaflet aortic sclerosis without stenosis.  No pericardial effusion is present.    EXERCISE STRESS NUCLEAR TEST:  09  1. Normal study.  2. There is no evidence of stress-induced ischemia and the target  heart rate was achieved.    3. Left ventricular size is normal at rest.  Transient ischemic dilation of the left ventricle did not occur.    4. The left ventricular ejection fraction is 60 % and there are no regional wall motion abnormalities.    EXERCISE STRESS NUCLEAR TEST:  18  Stage I Modified Guicho Protocol  LVEF 56%  Small apical reversible defect.    CORONARY CTA:  IMPRESSION:  1.  Minimal non-obstructive coronary artery disease.   2.  Total Agatston score 102 placing the patient in the 39th  percentile when compared to age and gender matched control group.  3.  Please review Radiology report for incidental noncardiac findings that will follow separately.     FINDINGS:     CORONARY CALCIUM SCORE     Total Agatston calcium score: 102   Left main: 72  Left anterior descendin  Left circumflex: 1  Right coronary artery: 27   This places the patient in the 39th  percentile when compared to age  and gender matched control group.     CORONARY ANGIOGRAPHY     DOMINANCE: Right dominant system.   Normal coronary origins and course.     LEFT MAIN:   The left main arises normally from the left coronary cusp and is widely patent without any detectable stenosis.   There is focal calcification at the left main ostium without causing any detectable stenosis.     LEFT ANTERIOR DESCENDING:   The left anterior descending and its 2 diagonal branches are widely patent with minimal luminal irregularities.      LEFT CIRCUMFLEX:   The left circumflex and its major marginal branches (OM1, OM2) are patent with minimal luminal irregularities.      RIGHT CORONARY ARTERY:   Distal RCA: Minimal (<25%) stenosis composed of noncalcified plaque.  The remainder of the right coronary and the posterior descending  artery are patent with minimal luminal irregularities.     ADDITIONAL FINDINGS:   The proximal ascending aorta is normal in size. Mild calcification of the aortic valve.  Normal pulmonary venous anatomy with all four  pulmonary veins draining  into the left atrium.    There is no left ventricular mass or thrombus.   Normal pericardial thickness. There is no pericardial effusion.    CARDIAC CATH: None    ASSESSMENT AND PLAN:   1. Chest discomfort.  [facility]'t [facility] reports). Think this could be related to hypertensive cardiomyopathy. His albumin, liver and renal functions are normal. TSH normal last year. Diastolic dysfunction wasn't reported in the last echo from Apr 2018 but HFpEF is a possibility causing symptoms of chest pain and fluid accumulation. We will start patient on hydrochlorothiazide and imdur (reviewed renal function from the past). [facility] and [facility] B[facility].      2. Lipid profile.  The patient was previously on a statin.  Cholesterol and LDL from Sep 2018 looked really good (LDL 50).      3. Hypertension.  The patient is currently on Cozaar 50 mg qd.  Adding hydrochlorothiazide as mentioned above.     4. Type II Diabetes.  Continue Metformin.    FOLLOW UP:  Dr. Duffy and Dr Connie Schaefer at [facility].      ATTENDING NOTE:  Patient has been seen and evaluated by me on 11/27/2018. I have reviewed the documentation above.  I have reviewed today's vital signs, medications, labs, and imaging results.  I have reviewed and edited, as necessary, the history, review of systems, physical examination, and assessment and plan.  I have discussed my assessment and plan with the cardiology fellow.  Lux Velasco is a 72 year old male with (+) HTN, (+) Type II DM, (+) hyperlipidemia, (+) family Hx CAD, (-) tobacco use, Hx nonobstructive CAD by coronary CTA (<25% distal RCA) in May 2017, returns for ongoing evaluation.  The patient had a stress nuclear study showing apical ischemia and was treated with Imdur. An ECHO in 2017 showed normal LV function.  He returned to [facility] in 2017 with chest pain and had a coronary angiogram and was told there was no plaque but the  coronary flow was abnormal.  The patient's antihypertensive Rx was reduced but he continued to take the prior established dose (ARB).  He continues to experience chest pain.   He is hypertensive in clinic today.  We will add hydrochlorothiazide 12.5 mg by substituting Hyzaar 50/12.5 for his Cozaar 50 mg tablet.  We will continue the Toprol XL 50 mg every day.  We will continue Imdur 30 mg at bedtime.    Jorge Hanks MD     Cardiovascular Division    CC  Patient Care Team:  Nam Duffy MD as PCP - General (Internal Medicine)  Colton Pulido MD as MD (Family Medicine - Sports Medicine)  Mario Alberto Baptiste MD as MD (Orthopedics)  Sofiya Contreras PA-C as Physician Assistant (Physician Assistant)  Marc Chapa MD as MD (Urology)  Amita Brown APRN CNP as Nurse Practitioner (Nurse Practitioner - Adult Health)

## 2018-11-27 NOTE — NURSING NOTE
Med Reconcile: Reviewed and verified all current medications with the patient. The updated medication list was printed and given to the patient.  New Medication: Patient was educated regarding newly prescribed medication, including discussion of  the indication, administration, side effects, and when to report to MD or RN. Patient demonstrated understanding of this information and agreed to call with further questions or concerns.  Medication Change: Patient was educated regarding prescribed medication change, including discussion of the indication, administration, side effects, and when to report to MD or RN. Patient demonstrated understanding of this information and agreed to call with further questions or concerns.  Patient stated he understood all health information given and agreed to call with further questions or concerns.

## 2018-11-27 NOTE — PATIENT INSTRUCTIONS
Patient Instructions:  It was a pleasure to see you in the cardiology clinic today.      If you have any questions, call  Sakina Moura RN, at [phone].  Press Option #1 for the [facility], and then press Option #3 for nursing.  We are encouraging the use of [employer] to communicate with your HealthCare Provider    Note the new medications: Start taking Imdur 30 mg by mouth everyday  Start taking Hyzaar 50/12.5 mg by mouth daily  Stop the following medications: none    The results from today include: none  Please follow up with Dr. Hanks as needed      If you have an urgent need after hours (8:00 am to 4:30 pm) please call [phone] and ask for the cardiology fellow on call.

## 2018-11-27 NOTE — MR AVS SNAPSHOT
After Visit Summary   11/27/2018    Lux Velasco    MRN: [id number]           Patient Information     Date Of Birth          1945        Visit Information        Provider Department      11/27/2018 11:00 AM Jorge Hanks MD [facility]        Today's Diagnoses     Stable angina pectoris (H)    -  1      Care Instructions    Patient Instructions:  It was a pleasure to see you in the cardiology clinic today.      If you have any questions, call  Sakina Moura RN, at [phone].  Press Option #1 for the [facility], and then press Option #3 for nursing.  We are encouraging the use of MyChart to communicate with your HealthCare Provider    Note the new medications: Start taking Imdur 30 mg by mouth everyday  Start taking Hyzaar 50/12.5 mg by mouth daily  Stop the following medications: none    The results from today include: none  Please follow up with Dr. Hanks as needed      If you have an urgent need after hours (8:00 am to 4:30 pm) please call [phone] and ask for the cardiology fellow on call.            Follow-ups after your visit        Your next 10 appointments already scheduled     Dec 10, 2018 10:40 AM CST   (Arrive by 10:25 AM)   [facility]    [address] [place] [id number] [phone]            Dec 19, 2018  1:15 PM CST   RETURN GENERAL with Migdalia Hussein MD   Eye Clinic ([facility])    [address]  [address] [address]  [facility] [address] [phone]            Feb 07, 2019  1:00 PM CST   (Arrive by 12:45 PM)   Urodynamics with Sofiya Contreras PA-C   [facility] Urology and [facility] ([facility])    [address] [place] [id number] [phone]            Feb 08, 2019 11:40 AM CST  "  (Arrive by 11:25 AM)   Return Visit with Marc Chapa MD   [facility] and [facility] ([facility])    [address] [facility]  [address] [facility] [address] [phone]              Who to contact     If you have questions or need follow up information about today's clinic visit or your schedule please contact [facility] directly at [phone].  Normal or non-critical lab and imaging results will be communicated to you by MyChart, letter or phone within 4 business days after the clinic has received the results. If you do not hear from us within 7 days, please contact the clinic through MyChart or phone. If you have a critical or abnormal lab result, we will notify you by phone as soon as possible.  Submit refill requests through [employer] or call your pharmacy and they will forward the refill request to us. Please allow 3 business days for your refill to be completed.          Additional Information About Your Visit        Care EveryWhere ID     This is your Care EveryWhere ID. This could be used by other organizations to access your [place] medical records  [id number]        Your Vitals Were     Pulse Height Pulse Oximetry BMI (Body Mass Index)          54 1.803 m (5' 11\") 96% 42.26 kg/m2         Blood Pressure from Last 3 Encounters:   11/27/18 (!) 186/101   10/29/18 156/89   10/26/18 143/83    Weight from Last 3 Encounters:   11/27/18 137.4 kg (303 lb)   10/29/18 135.8 kg (299 lb 6.4 oz)   10/26/18 126.6 kg (279 lb)              We Performed the Following     EKG 12-lead, tracing only (Same Day)          Today's Medication Changes          These changes are accurate as of 11/27/18  6:57 PM.  If you have any questions, ask your nurse or doctor.               Start taking these medicines.        Dose/Directions    isosorbide mononitrate 30 MG 24 hr tablet   Commonly known as:  IMDUR   Used for:  Stable angina pectoris (H) "   Started by:  Jorge Hanks MD        Dose:  30 mg   Take 1 tablet (30 mg) by mouth daily   Quantity:  90 tablet   Refills:  3       losartan-hydrochlorothiazide 50-12.5 MG per tablet   Commonly known as:  HYZAAR   Used for:  Stable angina pectoris (H)   Started by:  Jorge Hanks MD        Dose:  1 tablet   Take 1 tablet by mouth daily   Quantity:  90 tablet   Refills:  3         Stop taking these medicines if you haven't already. Please contact your care team if you have questions.     losartan 50 MG tablet   Commonly known as:  COZAAR   Stopped by:  Jorge Hanks MD                Where to get your medicines      Some of these will need a paper prescription and others can be bought over the counter.  Ask your nurse if you have questions.     Bring a paper prescription for each of these medications     isosorbide mononitrate 30 MG 24 hr tablet    losartan-hydrochlorothiazide 50-12.5 MG per tablet                Primary Care Provider Office Phone # Fax #    Nam Duffy [phone]       [address] [address]        Equal Access to Services     : Hadii suresh ku hadasho Soomaali, waaxda luqadaha, qaybta kaalmada adetanya, taty chase . So [facility] [phone].    ATENCIÓN: Si habla español, tiene a back disposición servicios gratuitos de asistencia lingüística. [facility] [phone].    We comply with applicable federal civil rights laws and [place] laws. We do not discriminate on the basis of race, color, national origin, age, disability, sex, sexual orientation, or gender identity.            Thank you!     Thank you for choosing [facility]  for your care. Our goal is always to provide you with excellent care. Hearing back from our patients is one way we can continue to improve our services. Please take a few minutes to complete the written survey that you may receive in the mail after your visit with  us. Thank you!             Your Updated Medication List - Protect others around you: Learn how to safely use, store and throw away your medicines at www.disposemymeds.org.          This list is accurate as of 11/27/18  6:57 PM.  Always use your most recent med list.                   Brand Name Dispense Instructions for use Diagnosis    acetaminophen 500 MG Caps      Take 2 tablets by mouth daily.        albuterol 108 (90 Base) MCG/ACT inhaler    PROAIR HFA    1 Inhaler    Inhale 2 puffs into the lungs every 4 hours as needed for shortness of breath / dyspnea, wheezing or other (use prior to exertion/activities)    Shortness of breath       ammonium lactate 12 % external cream    AMLACTIN     Apply  topically daily.        artificial tears Soln      Use one drop to both eye PRN.        ASPIRIN PO      Take 81 mg by mouth daily        ATORVASTATIN CALCIUM PO      Take 40 mg by mouth daily        budesonide-formoterol 160-4.5 MCG/ACT Inhaler    SYMBICORT     Inhale 2 puffs into the lungs 2 times daily        doxycycline hyclate 100 MG capsule    VIBRAMYCIN    30 capsule    Take 1 capsule (100 mg) by mouth daily    Ulcerative blepharitis of upper and lower eyelids of both eyes       econazole nitrate 1 % external cream     85 g    Apply topically daily To feet and toenails.    Tinea pedis of both feet, Onychomycosis       isosorbide mononitrate 30 MG 24 hr tablet    IMDUR    90 tablet    Take 1 tablet (30 mg) by mouth daily    Stable angina pectoris (H)       ketotifen 0.025 % ophthalmic solution    ZADITOR/REFRESH ANTI-ITCH     Place 1 drop into both eyes 2 times daily        losartan-hydrochlorothiazide 50-12.5 MG per tablet    HYZAAR    90 tablet    Take 1 tablet by mouth daily    Stable angina pectoris (H)       METFORMIN HCL PO      Take by mouth 2 times daily (with meals)    Benign nodular prostatic hyperplasia without lower urinary tract symptoms, Need for prophylactic vaccination against Streptococcus  pneumoniae (pneumococcus), Screening for AAA (abdominal aortic aneurysm), History of smoking       omeprazole 20 MG DR capsule    priLOSEC     Take 20 mg by mouth daily.        order for DME     4 each    Equipment being ordered: velcro compression for lower legs and feet: ready wrap or juxta fit or farrow wrap    Venous stasis, Lymphedema of both lower extremities       senna-docusate 8.6-50 MG tablet    SENOKOT-S/PERICOLACE    10 tablet    Take 1-2 tablets by mouth 2 times daily as needed for constipation Take while on oral narcotics to prevent or treat constipation.    Hypercalcemia, Primary hyperparathyroidism (H)       TOPROL  MG 24 hr tablet   Generic drug:  metoprolol succinate      Take 0.5 tablets (50 mg) by mouth daily        VITAMIN D (CHOLECALCIFEROL) PO      Take 1,000 Units by mouth every morning

## 2018-11-27 NOTE — NURSING NOTE
Chief Complaint   Patient presents with     Follow Up For     angina     Vitals were taken and medications were reconciled. EKG was performed    Britney SPENCE  11:29 AM

## 2018-11-28 LAB — INTERPRETATION ECG - MUSE: NORMAL

## 2018-12-01 ENCOUNTER — DOCUMENTATION ONLY (OUTPATIENT)
Dept: OTHER | Facility: CLINIC | Age: 73
End: 2018-12-01

## 2018-12-01 NOTE — PROGRESS NOTES
I received an after hours page related to this patient.  I called the patient back.  He stated that he was concerned about chest pain.  Per chart review, the patient has an extensive history of chest pain which has been evaluated at multiple centers [facility]  He has also undergone extensive tests including CTA and reportedly a coronary angiogram.  He has been medically managed for anginal chest pain and recently saw Dr. Hanks who made some slight titrations to his medications including asking the patient to continue his home Imdur.  The patient called today stating that he was having his ongoing chest pain which is similar in quality to his known chest pain.  However, the pain has been persistent throughout the day.  The persistence of the pain is a new symptom for him and he reports that this has never occurred.  It is also associated with breathlessness and while speaking to me he does sound dyspneic on the phone.    Given that the patient has a change in the quality of his chest pain and he has dyspnea, I recommended that he present to the emergency department for further evaluation.

## 2018-12-10 ENCOUNTER — OFFICE VISIT (OUTPATIENT)
Dept: ORTHOPEDICS | Facility: CLINIC | Age: 73
End: 2018-12-10
Payer: MEDICARE

## 2018-12-10 DIAGNOSIS — E11.51 DIABETES MELLITUS WITH PERIPHERAL VASCULAR DISEASE (H): ICD-10-CM

## 2018-12-10 DIAGNOSIS — I87.8 VENOUS STASIS: ICD-10-CM

## 2018-12-10 DIAGNOSIS — E11.49 TYPE II OR UNSPECIFIED TYPE DIABETES MELLITUS WITH NEUROLOGICAL MANIFESTATIONS, NOT STATED AS UNCONTROLLED(250.60) (H): Primary | ICD-10-CM

## 2018-12-10 NOTE — PROGRESS NOTES
Past Medical History:   Diagnosis Date     Diabetes mellitus type II      History of agent Orange exposure 2013     Hypertension      Myocardial infarct (H) 1999     Primary hyperparathyroidism (H) Dx approx 2003     Stroke (H) 1998    recovered fully     Patient Active Problem List   Diagnosis     Plantar fascial fibromatosis     History of agent Orange exposure     Degenerative arthritis of cervical spine     Stroke (H)     Skin exam, screening for cancer     Primary hyperparathyroidism (H)     Benign prostatic hyperplasia with weak urinary stream     Acquired cyst of kidney     Past Surgical History:   Procedure Laterality Date     BIOPSY OF SKIN LESION       BYPASS GRAFT ARTERY CORONARY       CATARACT IOL, RT/LT [facility]    [facility]     EXCISE MASS LOWER EXTREMITY Left 11/3/2017    Procedure: EXCISE MASS LOWER EXTREMITY;  Excision Mass Left Flank;  Surgeon: Marybeth Gambino MD;  [facility]     MOHS MICROGRAPHIC PROCEDURE       PARATHYROIDECTOMY N/A 3/21/2017    Procedure: PARATHYROIDECTOMY;  Surgeon: Julianna Short MD;  [facility]     PARATHYROIDECTOMY       Social History     Socioeconomic History     Marital status:      Spouse name: Not on file     Number of children: Not on file     Years of education: Not on file     Highest education level: Not on file   Social Needs     Financial resource strain: Not on file     Food insecurity - worry: Not on file     Food insecurity - inability: Not on file     Transportation needs - medical: Not on file     Transportation needs - non-medical: Not on file   Occupational History     Not on file   Tobacco Use     Smoking status: Former Smoker     Last attempt to quit: 1970     Years since quittin.9     Smokeless tobacco: Former User     Tobacco comment: Doesn't remember how much he used to smoke - during service only.    Substance and Sexual Activity     Alcohol use: Yes     Comment: 1 beer/week      Drug use: No     Sexual activity: Not on file   Other Topics Concern     Parent/sibling w/ CABG, MI or angioplasty before 65F 55M? Not Asked   Social History Narrative     Not on file     Family History   Problem Relation Age of Onset     Melanoma Mother      Melanoma Father      Cancer No family hx of         no skin cancer     Glaucoma No family hx of      Macular Degeneration No family hx of      Lab Results   Component Value Date    A1C 6.4 04/25/2018      Subjective findings- 72-year-old male returns clinic for peripheral edema, venous stasis, Diabetes with peripheral neuropathy and vascular disease, relates he has done the physical therapy, he got some circaid compression wraps and jobst stockings, relates he has been using Lamisil cream and some fungi-nail, he otherwise is doing well, no ulcers or sores, he does relate he does have some peripheral neuropathy.     Objective findings- DP and PT are 2 out of 4 bilaterally, there is no erythema, no drainage, no odor, no calor bilaterally, there is some peripheral edema bilaterally, he has mild dry scaly skin in moccasin pattern bilaterally.     Assessment and plan- Peripheral edema bilaterally with venous stasis.  He is Diabetic with peripheral neuropathy and vascular disease, diagnosis and treatment discussed with him, continue the Lamisil cream and the compression garments, return to clinic and see me in about 3 months for diabetic foot check, ABIs as noted in the EMR, 1.09 right, 1.25 left.

## 2018-12-10 NOTE — NURSING NOTE
Reason For Visit:   Chief Complaint   Patient presents with     RECHECK     2 month follow up. Diabetic. Foot care. Patient brought his athletes foot cream that does not work very good. Patient brought his ruth socks with him today and he said that they are no good.  Patient stated that he has fungus in big toe and he uses a product called fungi cure and he still has the fungus.        Pain Assessment  Patient Currently in Pain: Yes(Patient stated that he has some pain)  Primary Pain Location: Ankle  Pain Orientation: Right, Left             Current Outpatient Medications   Medication Sig Dispense Refill     acetaminophen 500 MG CAPS Take 2 tablets by mouth daily.       albuterol (PROAIR HFA) 108 (90 BASE) MCG/ACT Inhaler Inhale 2 puffs into the lungs every 4 hours as needed for shortness of breath / dyspnea, wheezing or other (use prior to exertion/activities) 1 Inhaler 3     ammonium lactate (AMLACTIN) 12 % cream Apply  topically daily.       artificial tears SOLN Use one drop to both eye PRN.       ASPIRIN PO Take 81 mg by mouth daily       ATORVASTATIN CALCIUM PO Take 40 mg by mouth daily       budesonide-formoterol (SYMBICORT) 160-4.5 MCG/ACT Inhaler Inhale 2 puffs into the lungs 2 times daily       doxycycline (VIBRAMYCIN) 100 MG capsule Take 1 capsule (100 mg) by mouth daily 30 capsule 11     econazole nitrate 1 % cream Apply topically daily To feet and toenails. 85 g 5     ketotifen (ZADITOR/REFRESH ANTI-ITCH) 0.025 % SOLN ophthalmic solution Place 1 drop into both eyes 2 times daily       losartan-hydrochlorothiazide (HYZAAR) 50-12.5 MG per tablet Take 1 tablet by mouth daily 90 tablet 3     METFORMIN HCL PO Take by mouth 2 times daily (with meals)       metoprolol succinate (TOPROL-XL) 100 MG 24 hr tablet Take 0.5 tablets (50 mg) by mouth daily       omeprazole (PRILOSEC) 20 MG capsule Take 20 mg by mouth daily.       order for DME Equipment being ordered: velcro compression for lower legs and feet: ready  wrap or juxta fit or farrow wrap 4 each 4     senna-docusate (SENOKOT-S;PERICOLACE) 8.6-50 MG per tablet Take 1-2 tablets by mouth 2 times daily as needed for constipation Take while on oral narcotics to prevent or treat constipation. 10 tablet 0     VITAMIN D, CHOLECALCIFEROL, PO Take 1,000 Units by mouth every morning             Allergies   Allergen Reactions     Isosorbide Mononitrate [Isosorbide] Shortness Of Breath     Chest pain. Patient stated that the medication made his breathing symptoms worse.      Clindamycin      Eggs Other (See Comments)     Pt states no longer having reaction, childhood allergy, eats eggs       Penicillins Other (See Comments)     Pt states PCN allergy is due to egg allergy     Propoxyphene Other (See Comments)     Pain

## 2018-12-10 NOTE — LETTER
12/10/2018       RE: Lux Velasco  [address]  [facility] [address]     Dear Colleague,    Thank you for referring your patient, Lux Velasco, [facility]. Please see a copy of my visit note below.    Past Medical History:   Diagnosis Date     Diabetes mellitus type II 2005     History of agent Orange exposure 4/17/2013     Hypertension      Myocardial infarct (H) 01/01/1999     Primary hyperparathyroidism (H) Dx approx 2003     Stroke (H) 01/01/1998    recovered fully     Patient Active Problem List   Diagnosis     Plantar fascial fibromatosis     History of agent Orange exposure     Degenerative arthritis of cervical spine     Stroke (H)     Skin exam, screening for cancer     Primary hyperparathyroidism (H)     Benign prostatic hyperplasia with weak urinary stream     Acquired cyst of kidney     Past Surgical History:   Procedure Laterality Date     BIOPSY OF SKIN LESION       BYPASS GRAFT ARTERY CORONARY       CATARACT IOL, RT/LT Bilateral 2017    [facility]     EXCISE MASS LOWER EXTREMITY Left 11/3/2017    Procedure: EXCISE MASS LOWER EXTREMITY;  Excision Mass Left Flank;  Surgeon: Marybeth Gambino MD;  [facility]     MOHS MICROGRAPHIC PROCEDURE       PARATHYROIDECTOMY N/A 3/21/2017    Procedure: PARATHYROIDECTOMY;  Surgeon: Julianna Short MD;  [facility]     PARATHYROIDECTOMY       Social History     Socioeconomic History     Marital status:      Spouse name: Not on file     Number of children: Not on file     Years of education: Not on file     Highest education level: Not on file   Social Needs     Financial resource strain: Not on file     Food insecurity - worry: Not on file     Food insecurity - inability: Not on file     Transportation needs - medical: Not on file     Transportation needs - non-medical: Not on file   Occupational History     Not on file   Tobacco Use      Smoking status: Former Smoker     Last attempt to quit: 1970     Years since quittin.9     Smokeless tobacco: Former User     Tobacco comment: Doesn't remember how much he used to smoke - during service only.    Substance and Sexual Activity     Alcohol use: Yes     Comment: 1 beer/week     Drug use: No     Sexual activity: Not on file   Other Topics Concern     Parent/sibling w/ CABG, MI or angioplasty before 65F 55M? Not Asked   Social History Narrative     Not on file     Family History   Problem Relation Age of Onset     Melanoma Mother      Melanoma Father      Cancer No family hx of         no skin cancer     Glaucoma No family hx of      Macular Degeneration No family hx of      Lab Results   Component Value Date    A1C 6.4 2018      Subjective findings- 72-year-old male returns clinic for peripheral edema, venous stasis, Diabetes with peripheral neuropathy and vascular disease, relates he has done the physical therapy, he got some circaid compression wraps and jobst stockings, relates he has been using Lamisil cream and some fungi-nail, he otherwise is doing well, no ulcers or sores, he does relate he does have some peripheral neuropathy.     Objective findings- DP and PT are 2 out of 4 bilaterally, there is no erythema, no drainage, no odor, no calor bilaterally, there is some peripheral edema bilaterally, he has mild dry scaly skin in moccasin pattern bilaterally.     Assessment and plan- Peripheral edema bilaterally with venous stasis.  He is Diabetic with peripheral neuropathy and vascular disease, diagnosis and treatment discussed with him, continue the Lamisil cream and the compression garments, return to clinic and see me in about 3 months for diabetic foot check, ABIs as noted in the EMR, 1.09 right, 1.25 left.      Again, thank you for allowing me to participate in the care of your patient.      Sincerely,    Colton Prieto DPM

## 2018-12-14 ENCOUNTER — TELEPHONE (OUTPATIENT)
Dept: INTERNAL MEDICINE | Facility: CLINIC | Age: 73
End: 2018-12-14

## 2018-12-14 NOTE — TELEPHONE ENCOUNTER
[facility] Call Center    Phone Message    May a detailed message be left on voicemail: no    Reason for Call: Other: need authorization for pre-medication    ABANDONED CALL PLEASE [phone] TYLOR RUBIO   Action Taken: Message routed to:  [facility] ([facility] [email or web address]

## 2018-12-19 ENCOUNTER — OFFICE VISIT (OUTPATIENT)
Dept: OPHTHALMOLOGY | Facility: CLINIC | Age: 73
End: 2018-12-19
Attending: OPHTHALMOLOGY
Payer: MEDICARE

## 2018-12-19 DIAGNOSIS — H16.212 EXPOSURE KERATOPATHY, LEFT: ICD-10-CM

## 2018-12-19 DIAGNOSIS — E11.9 DIABETES MELLITUS TYPE 2 WITHOUT RETINOPATHY (H): ICD-10-CM

## 2018-12-19 DIAGNOSIS — H04.123 DRY EYE SYNDROME, BILATERAL: Primary | ICD-10-CM

## 2018-12-19 DIAGNOSIS — Z96.1 PSEUDOPHAKIA, BOTH EYES: ICD-10-CM

## 2018-12-19 DIAGNOSIS — H02.224 MECHANICAL LAGOPHTHALMOS OF LEFT UPPER EYELID: ICD-10-CM

## 2018-12-19 PROCEDURE — G0463 HOSPITAL OUTPT CLINIC VISIT: HCPCS | Mod: ZF

## 2018-12-19 ASSESSMENT — REFRACTION_WEARINGRX
OS_CYLINDER: +0.50
OS_SPHERE: -0.50
OD_AXIS: 007
SPECS_TYPE: BIFOCAL
OD_ADD: +2.00
OS_AXIS: 180
OS_ADD: +2.00
OD_SPHERE: -0.50
OD_CYLINDER: +1.50

## 2018-12-19 ASSESSMENT — TONOMETRY
IOP_METHOD: TONOPEN
OD_IOP_MMHG: 14
OS_IOP_MMHG: 16

## 2018-12-19 ASSESSMENT — CONF VISUAL FIELD
OD_NORMAL: 1
METHOD: COUNTING FINGERS
OS_NORMAL: 1

## 2018-12-19 ASSESSMENT — EXTERNAL EXAM - LEFT EYE: OS_EXAM: NORMAL

## 2018-12-19 ASSESSMENT — VISUAL ACUITY
OS_CC: 20/20
OD_CC+: -1
METHOD: SNELLEN - LINEAR
OS_CC+: -2
OD_CC: 20/20
CORRECTION_TYPE: GLASSES

## 2018-12-19 ASSESSMENT — EXTERNAL EXAM - RIGHT EYE: OD_EXAM: NORMAL

## 2018-12-19 ASSESSMENT — CUP TO DISC RATIO
OD_RATIO: 0.3
OS_RATIO: 0.3

## 2018-12-19 NOTE — PROGRESS NOTES
HPI  Lux Velasco is a 72 year old male who presents for surface check and dilation. He feels his vision continues to be good as long as he uses the ATs constantly and the ointment QHS. If he stops either, the vision gets blurry and his eyes are irritated. He has not been able to find any Refresh ointment, so he hasn't been using any.    Vision stable.   Eyes are very irritated and dry. Describes it as mattering on his eyes, requiring hot compresses. Grittiness, blurry vision. Mostly surface pain. Occasional deep ache.     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  Did not tolerate azithromycin (constipation); on doxycycline  Plan:   Warm compresses and eyelid scrubs, only doing daily - increase to BID  ATs during day  Re-start ointment at bedtime (try Systane if can't find Refresh)  Doxycycline 100mg daily  Plug LLL remains, others fallen out;   Consider starting tranquileyes (given info)    -Consider starting Xiidra/Restasis as next step if needed.    (E11.9) Diabetes mellitus type 2 without retinopathy (H)  Comment: On metformin. Last A1c 6.4 4/24/18. No Diabetic retinopathy.  Plan: Discussed the importance of tight blood glucose control in the prevention of diabetic retinopathy. Recommend yearly dilated eye exam.    (Z96.1) Pseudophakia of both eyes  Comment: Clear visual axis.  Plan: Observe    (H52.13,  H52.203) Myopia with astigmatism and presbyopia, bilateral  Comment: [facility]   -----------------------------------------------------------------------------------    Patient disposition:   Return in 3-4 months with surface check      Brenden Lin MD  Ophthalmology Resident  PGY-2     Teaching statement:  Complete documentation of historical and exam elements from today's encounter can be found in the full encounter summary report (not reduplicated in this progress note). I personally obtained the chief complaint(s) and history of present illness.  I confirmed and edited as necessary the review of systems, past medical/surgical history, family history, social history, and examination findings as documented by others; and I examined the patient myself. I personally reviewed the relevant tests, images, and reports as documented above.     I formulated and edited as necessary the assessment and plan and discussed the findings and management plan with the patient and family.    Migdalia Hussein MD  Comprehensive Ophthalmology & Ocular Pathology  Department of Ophthalmology and Visual Neurosciences  [email or web address]  Pager [phone]

## 2019-02-06 ENCOUNTER — TELEPHONE (OUTPATIENT)
Dept: UROLOGY | Facility: CLINIC | Age: 74
End: 2019-02-06

## 2019-02-06 NOTE — TELEPHONE ENCOUNTER
[facility]    Phone Message    May a detailed message be left on voicemail: yes    Reason for Call: Other: Per call from PT someone from the clinic changed his APPT with Dr Chapa to 2/8, Friday from Thursday, 2/7. Per PT he wants to know if he can see Dr Chapa on Thursday due to the cold weather on Friday.  PT can be reached at the above #     Action Taken: Message routed to:  [facility] ([facility] Urology

## 2019-02-07 ENCOUNTER — OFFICE VISIT (OUTPATIENT)
Dept: UROLOGY | Facility: CLINIC | Age: 74
End: 2019-02-07
Payer: MEDICARE

## 2019-02-07 VITALS
HEART RATE: 63 BPM | SYSTOLIC BLOOD PRESSURE: 148 MMHG | DIASTOLIC BLOOD PRESSURE: 88 MMHG | HEIGHT: 71 IN | BODY MASS INDEX: 42 KG/M2 | WEIGHT: 300 LBS

## 2019-02-07 DIAGNOSIS — R39.9 LOWER URINARY TRACT SYMPTOMS (LUTS): ICD-10-CM

## 2019-02-07 DIAGNOSIS — N40.1 BENIGN PROSTATIC HYPERPLASIA WITH WEAK URINARY STREAM: Primary | ICD-10-CM

## 2019-02-07 DIAGNOSIS — R39.12 BENIGN PROSTATIC HYPERPLASIA WITH WEAK URINARY STREAM: Primary | ICD-10-CM

## 2019-02-07 ASSESSMENT — PAIN SCALES - GENERAL: PAINLEVEL: NO PAIN (0)

## 2019-02-07 ASSESSMENT — MIFFLIN-ST. JEOR: SCORE: 2122.92

## 2019-02-07 NOTE — PROGRESS NOTES
"Urology Brief Note    Patient presented to clinic today for urodynamics testing as ordered by Dr. Chapa on 10/26/18 for BPH with LUTS not well controlled with medications.    After describing the procedure to the patient, he adamantly refused to have it done and states that he will not partake in anything that involves a catheter.     He verbalized frustration with \"you medical people not ever explaining things.\" I informed him that he was sent with the standard urodynamics patient information sheet on the day of his last office visit with Dr. Chapa that clearly walks through the procedure, including the need for 2 small catheters to be used (one in the bladder and one in the rectum). He states that he never received it. Per LPN who does pre-visits and prep for UDS, her appointment notes state that he was sent a letter on 1/22/19 containing the urodynamics patient information sheet and a bladder diary. He states that he never received it.     Patient's urodynamics appointment was therefore cancelled today as he did not consent.   He requests to follow up with Dr. Chapa to discuss alternative treatment options that do NOT involve a catheter.    Sofiya Contreras PA-C  Urology Physician Assistant      "

## 2019-02-07 NOTE — PATIENT INSTRUCTIONS
Follow up with Dr. Chapa to discuss further treatment options.    It was a pleasure meeting with you today.  Thank you for allowing me and my team the privilege of caring for you today.  YOU are the reason we are here, and I truly hope we provided you with the excellent service you deserve.  Please let us know if there is anything else we can do for you so that we can be sure you are leaving completely satisfied with your care experience.

## 2019-02-07 NOTE — LETTER
"2/7/2019       RE: Lux Velasco  [address]  [facility] [address]     Dear Colleague,    Thank you for referring your patient, Lux Velasco, to the [facility] UROLOGY AND [facility] FOR PROSTATE AND UROLOGIC CANCERS at [facility]. Please see a copy of my visit note below.    Urology Brief Note    Patient presented to clinic today for urodynamics testing as ordered by Dr. Chapa on 10/26/18 for BPH with LUTS not well controlled with medications.    After describing the procedure to the patient, he adamantly refused to have it done and states that he will not partake in anything that involves a catheter.     He verbalized frustration with \"you medical people not ever explaining things.\" I informed him that he was sent with the standard urodynamics patient information sheet on the day of his last office visit with Dr. Chapa that clearly walks through the procedure, including the need for 2 small catheters to be used (one in the bladder and one in the rectum). He states that he never received it. Per LPN who does pre-visits and prep for UDS, her appointment notes state that he was sent a letter on 1/22/19 containing the urodynamics patient information sheet and a bladder diary. He states that he never received it.     Patient's urodynamics appointment was therefore cancelled today as he did not consent.   He requests to follow up with Dr. Chapa to discuss alternative treatment options that do NOT involve a catheter.    Sofiya Contreras PA-C  Urology Physician Assistant          "

## 2019-02-07 NOTE — TELEPHONE ENCOUNTER
Patient has paid for his ride today but does not want to come in tomorrow Sheryl Plata LPN Staff Nurse

## 2019-02-15 ENCOUNTER — TELEPHONE (OUTPATIENT)
Dept: UROLOGY | Facility: CLINIC | Age: 74
End: 2019-02-15
Payer: MEDICARE

## 2019-02-15 NOTE — TELEPHONE ENCOUNTER
[facility] Call Center    Phone Message    May a detailed message be left on voicemail: yes    Reason for Call: patient wanting to reschedule with Dr. Chapa but the first available is going out to April and he does want to wait that long but does not want to have catheters placed either just wants to talk to the doctor.     Action Taken: Message routed to:  [facility] jose uro

## 2019-02-18 NOTE — TELEPHONE ENCOUNTER
I called and spoke with patient. He did not want early AM appointment. So I rescheduled him to next available 5/24/19. Patient was ok with this, requested to be added to waitlist.

## 2019-03-01 ENCOUNTER — TELEPHONE (OUTPATIENT)
Dept: INTERNAL MEDICINE | Facility: CLINIC | Age: 74
End: 2019-03-01

## 2019-03-11 ENCOUNTER — OFFICE VISIT (OUTPATIENT)
Dept: ORTHOPEDICS | Facility: CLINIC | Age: 74
End: 2019-03-11
Payer: MEDICARE

## 2019-03-11 DIAGNOSIS — E11.51 DIABETES MELLITUS WITH PERIPHERAL VASCULAR DISEASE (H): ICD-10-CM

## 2019-03-11 DIAGNOSIS — E11.49 TYPE II OR UNSPECIFIED TYPE DIABETES MELLITUS WITH NEUROLOGICAL MANIFESTATIONS, NOT STATED AS UNCONTROLLED(250.60) (H): Primary | ICD-10-CM

## 2019-03-11 DIAGNOSIS — I87.8 VENOUS STASIS: ICD-10-CM

## 2019-03-11 RX ORDER — FINASTERIDE 5 MG/1
5 TABLET, FILM COATED ORAL DAILY
Status: ON HOLD | COMMUNITY
End: 2023-07-07

## 2019-03-11 NOTE — LETTER
3/11/2019     RE: Lux Velasco  [address]  [facility] [address]     Dear Colleague,    Thank you for referring your patient, Lux Velasco, to [facility]. Please see a copy of my visit note below.    Past Medical History:   Diagnosis Date     Diabetes mellitus type II 2005     History of agent Orange exposure 4/17/2013     Hypertension      Myocardial infarct (H) 01/01/1999     Primary hyperparathyroidism (H) Dx approx 2003     Stroke (H) 01/01/1998    recovered fully     Patient Active Problem List   Diagnosis     Plantar fascial fibromatosis     History of agent Orange exposure     Degenerative arthritis of cervical spine     Stroke (H)     Skin exam, screening for cancer     Primary hyperparathyroidism (H)     Benign prostatic hyperplasia with weak urinary stream     Acquired cyst of kidney     Past Surgical History:   Procedure Laterality Date     BIOPSY OF SKIN LESION       BYPASS GRAFT ARTERY CORONARY       CATARACT IOL, RT/LT [facility]ilateral [facility]    [facility]     EXCISE MASS LOWER EXTREMITY Left 11/3/2017    Procedure: EXCISE MASS LOWER EXTREMITY;  Excision Mass Left Flank;  Surgeon: Marybeth Gambino MD;  [facility]     MOHS MICROGRAPHIC PROCEDURE       PARATHYROIDECTOMY N/A 3/21/2017    Procedure: PARATHYROIDECTOMY;  Surgeon: Julianna Short MD;  [facility]     PARATHYROIDECTOMY       Social History     Socioeconomic History     Marital status:      Spouse name: Not on file     Number of children: Not on file     Years of education: Not on file     Highest education level: Not on file   Occupational History     Not on file   Social Needs     Financial resource strain: Not on file     Food insecurity:     Worry: Not on file     Inability: Not on file     Transportation needs:     Medical: Not on file     Non-medical: Not on file   Tobacco Use     Smoking status: Former Smoker     Last  attempt to quit: 1970     Years since quittin.2     Smokeless tobacco: Former User     Tobacco comment: Doesn't remember how much he used to smoke - during service only.    Substance and Sexual Activity     Alcohol use: Yes     Comment: 1 beer/week     Drug use: No     Sexual activity: Not on file   Lifestyle     Physical activity:     Days per week: Not on file     Minutes per session: Not on file     Stress: Not on file   Relationships     Social connections:     Talks on phone: Not on file     Gets together: Not on file     Attends Mandaeism service: Not on file     Active member of club or organization: Not on file     Attends meetings of clubs or organizations: Not on file     Relationship status: Not on file     Intimate partner violence:     Fear of current or ex partner: Not on file     Emotionally abused: Not on file     Physically abused: Not on file     Forced sexual activity: Not on file   Other Topics Concern     Parent/sibling w/ CABG, MI or angioplasty before 65F 55M? Not Asked   Social History Narrative     Not on file     Family History   Problem Relation Age of Onset     Melanoma Mother      Melanoma Father      Cancer No family hx of         no skin cancer     Glaucoma No family hx of      Macular Degeneration No family hx of      Subjective findings- [age]-year-old male returns clinic for Diabetes with peripheral neuropathy and vascular disease, relates he has done the physical therapy, he got some circaid compression wraps and jobst stockings, relates he has been using Lamisil cream and some fungi-nail, he otherwise is doing well, no ulcers or sores,  wears Diabetic shoes, ahe does relate he does have some peripheral neuropathy.      Objective findings- DP and PT are 2 out of 4 bilaterally, there is no erythema, no drainage, no odor, no calor bilaterally, there is some peripheral edema bilaterally, he has mild dry scaly skin in moccasin pattern bilaterally.  Sharp/dull is decreased with 5.07  [place] wienstien monofilament bilaterally, deep tendon reflexes are intact bilaterally, no gross tendon voids bilaterally and he has hyperkeratotic tissue build up plantar medial Hallux bilaterally.     Assessment and plan-  He is Diabetic with peripheral neuropathy and vascular disease, diagnosis and treatment discussed with him, continue the Lamisil cream and the compression garments, return to clinic and see me in about 3 months for diabetic foot       Again, thank you for allowing me to participate in the care of your patient.      Sincerely,    Colton Prieto DPM

## 2019-03-11 NOTE — NURSING NOTE
Reason For Visit:   Chief Complaint   Patient presents with     RECHECK     3 month follow up. Diabetic foot care.        Pain Assessment  Patient Currently in Pain: Yes  Primary Pain Location: Foot  Other Pain Locations: Legs, ankles  Aggravating Factors: (swelling)          Current Outpatient Medications   Medication Sig Dispense Refill     AMLODIPINE BESYLATE PO Take 10 mg by mouth daily Take one half tab daily.       finasteride (PROSCAR) 5 MG tablet Take 5 mg by mouth daily       acetaminophen 500 MG CAPS Take 2 tablets by mouth daily.       albuterol (PROAIR HFA) 108 (90 BASE) MCG/ACT Inhaler Inhale 2 puffs into the lungs every 4 hours as needed for shortness of breath / dyspnea, wheezing or other (use prior to exertion/activities) 1 Inhaler 3     ammonium lactate (AMLACTIN) 12 % cream Apply  topically daily.       artificial tears SOLN Use one drop to both eye PRN.       ASPIRIN PO Take 81 mg by mouth daily       ATORVASTATIN CALCIUM PO Take 40 mg by mouth daily       budesonide-formoterol (SYMBICORT) 160-4.5 MCG/ACT Inhaler Inhale 2 puffs into the lungs 2 times daily       carboxymethylcellul-glycerin (OPTIVE/REFRESH OPTIVE) 0.5-0.9 % SOLN ophthalmic solution Place 1 drop into both eyes 4 times daily 3 Bottle 4     Carboxymethylcellul-Glycerin 1-0.9 % GEL Apply 1 Application to eye 4 times daily 1 Bottle 11     doxycycline (VIBRAMYCIN) 100 MG capsule Take 1 capsule (100 mg) by mouth daily 30 capsule 11     econazole nitrate 1 % cream Apply topically daily To feet and toenails. 85 g 5     ketotifen (ZADITOR/REFRESH ANTI-ITCH) 0.025 % SOLN ophthalmic solution Place 1 drop into both eyes 2 times daily       losartan-hydrochlorothiazide (HYZAAR) 50-12.5 MG per tablet Take 1 tablet by mouth daily 90 tablet 3     METFORMIN HCL PO Take by mouth 2 times daily (with meals)       metoprolol succinate (TOPROL-XL) 100 MG 24 hr tablet Take 0.5 tablets (50 mg) by mouth daily       omeprazole (PRILOSEC) 20 MG capsule Take  20 mg by mouth daily.       order for DME Equipment being ordered: velcro compression for lower legs and feet: ready wrap or juxta fit or farrow wrap 4 each 4     senna-docusate (SENOKOT-S;PERICOLACE) 8.6-50 MG per tablet Take 1-2 tablets by mouth 2 times daily as needed for constipation Take while on oral narcotics to prevent or treat constipation. 10 tablet 0     VITAMIN D, CHOLECALCIFEROL, PO Take 1,000 Units by mouth every morning             Allergies   Allergen Reactions     Isosorbide Mononitrate [Isosorbide] Shortness Of Breath     Chest pain. Patient stated that the medication made his breathing symptoms worse.      Clindamycin      Eggs Other (See Comments)     Pt states no longer having reaction, childhood allergy, eats eggs       Penicillins Other (See Comments)     Pt states PCN allergy is due to egg allergy     Propoxyphene Other (See Comments)     Pain

## 2019-03-11 NOTE — PROGRESS NOTES
Past Medical History:   Diagnosis Date     Diabetes mellitus type II      History of agent Orange exposure 2013     Hypertension      Myocardial infarct (H) 1999     Primary hyperparathyroidism (H) Dx approx 2003     Stroke (H) 1998    recovered fully     Patient Active Problem List   Diagnosis     Plantar fascial fibromatosis     History of agent Orange exposure     Degenerative arthritis of cervical spine     Stroke (H)     Skin exam, screening for cancer     Primary hyperparathyroidism (H)     Benign prostatic hyperplasia with weak urinary stream     Acquired cyst of kidney     Past Surgical History:   Procedure Laterality Date     BIOPSY OF SKIN LESION       BYPASS GRAFT ARTERY CORONARY       CATARACT IOL, RT/LT Bilateral 2017    [facility]     EXCISE MASS LOWER EXTREMITY Left 11/3/2017    Procedure: EXCISE MASS LOWER EXTREMITY;  Excision Mass Left Flank;  Surgeon: Marybeth Gambino MD;  Location: [facility] OR     MOHS MICROGRAPHIC PROCEDURE       PARATHYROIDECTOMY N/A 3/21/2017    Procedure: PARATHYROIDECTOMY;  Surgeon: Julianna Short MD;  Location: [facility] OR     PARATHYROIDECTOMY       Social History     Socioeconomic History     Marital status:      Spouse name: Not on file     Number of children: Not on file     Years of education: Not on file     Highest education level: Not on file   Occupational History     Not on file   Social Needs     Financial resource strain: Not on file     Food insecurity:     Worry: Not on file     Inability: Not on file     Transportation needs:     Medical: Not on file     Non-medical: Not on file   Tobacco Use     Smoking status: Former Smoker     Last attempt to quit: 1970     Years since quittin.2     Smokeless tobacco: Former User     Tobacco comment: Doesn't remember how much he used to smoke - during service only.    Substance and Sexual Activity     Alcohol use: Yes     Comment: 1 beer/week     Drug use: No     Sexual activity: Not  on file   Lifestyle     Physical activity:     Days per week: Not on file     Minutes per session: Not on file     Stress: Not on file   Relationships     Social connections:     Talks on phone: Not on file     Gets together: Not on file     Attends Sikhism service: Not on file     Active member of club or organization: Not on file     Attends meetings of clubs or organizations: Not on file     Relationship status: Not on file     Intimate partner violence:     Fear of current or ex partner: Not on file     Emotionally abused: Not on file     Physically abused: Not on file     Forced sexual activity: Not on file   Other Topics Concern     Parent/sibling w/ CABG, MI or angioplasty before 65F 55M? Not Asked   Social History Narrative     Not on file     Family History   Problem Relation Age of Onset     Melanoma Mother      Melanoma Father      Cancer No family hx of         no skin cancer     Glaucoma No family hx of      Macular Degeneration No family hx of      Subjective findings- 73-year-old male returns clinic for Diabetes with peripheral neuropathy and vascular disease, relates he has done the physical therapy, he got some circaid compression wraps and jobst stockings, relates he has been using Lamisil cream and some fungi-nail, he otherwise is doing well, no ulcers or sores, wears Diabetic shoes, ahe does relate he does have some peripheral neuropathy.      Objective findings- DP and PT are 2 out of 4 bilaterally, there is no erythema, no drainage, no odor, no calor bilaterally, there is some peripheral edema bilaterally, he has mild dry scaly skin in moccasin pattern bilaterally.  Sharp/dull is decreased with 5.07 [place] wienstien monofilament bilaterally, deep tendon reflexes are intact bilaterally, no gross tendon voids bilaterally and he has hyperkeratotic tissue build up plantar medial Hallux bilaterally.     Assessment and plan-  He is Diabetic with peripheral neuropathy and vascular disease, diagnosis  and treatment discussed with him, continue the Lamisil cream and the compression garments, return to clinic and see me in about 3 months for diabetic foot

## 2019-04-15 ENCOUNTER — OFFICE VISIT (OUTPATIENT)
Dept: OPHTHALMOLOGY | Facility: CLINIC | Age: 74
End: 2019-04-15
Attending: OPHTHALMOLOGY
Payer: MEDICARE

## 2019-04-15 DIAGNOSIS — H04.123 DRY EYES, BILATERAL: Primary | ICD-10-CM

## 2019-04-15 PROCEDURE — G0463 HOSPITAL OUTPT CLINIC VISIT: HCPCS | Mod: ZF

## 2019-04-15 RX ORDER — ERYTHROMYCIN 5 MG/G
0.5 OINTMENT OPHTHALMIC AT BEDTIME
COMMUNITY
End: 2020-04-02

## 2019-04-15 ASSESSMENT — CUP TO DISC RATIO
OS_RATIO: 0.3
OD_RATIO: 0.3

## 2019-04-15 ASSESSMENT — REFRACTION_WEARINGRX
OS_ADD: +2.00
OD_AXIS: 007
OD_ADD: +2.00
OS_AXIS: 180
OD_CYLINDER: +1.50
SPECS_TYPE: BIFOCAL
OD_SPHERE: -0.50
OS_SPHERE: -0.50
OS_CYLINDER: +0.50

## 2019-04-15 ASSESSMENT — EXTERNAL EXAM - LEFT EYE: OS_EXAM: NORMAL

## 2019-04-15 ASSESSMENT — VISUAL ACUITY
METHOD: SNELLEN - LINEAR
OS_CC: 20/25
CORRECTION_TYPE: GLASSES
OS_CC+: +2
OD_CC: 20/20
OD_CC+: -1

## 2019-04-15 ASSESSMENT — CONF VISUAL FIELD
OD_NORMAL: 1
METHOD: COUNTING FINGERS
OS_NORMAL: 1

## 2019-04-15 ASSESSMENT — EXTERNAL EXAM - RIGHT EYE: OD_EXAM: NORMAL

## 2019-04-15 ASSESSMENT — TONOMETRY
OD_IOP_MMHG: 10
OS_IOP_MMHG: 11
IOP_METHOD: ICARE

## 2019-04-15 NOTE — NURSING NOTE
Chief Complaints and History of Present Illnesses   Patient presents with     Follow Up     4 month follow up Dry eye syndrome, bilateral     Chief Complaint(s) and History of Present Illness(es)     Follow Up     Comments: 4 month follow up Dry eye syndrome, bilateral              Comments     Pt states vision fluctuates with dryness. Pt having more tearing in both eyes since last visit. Pt currently taking artificial tears with limited relief.  DM2 BS: pt doesn't check blood sugars.  Lab Results       Component                Value               Date                       A1C                      6.4                 04/25/2018                 A1C                      6.3                 11/30/2017                 A1C                      6.8                 10/10/2017                 A1C                      6.6                 12/08/2008                 A1C                      5.8                 06/26/2007              [facility] April 15, 2019 9:34 AM

## 2019-04-15 NOTE — PROGRESS NOTES
HPI  Lux Velasco is a 73 year old male who presents for surface check and dilation. He notes his eye dryness is controlled, but only as long as he uses the ATs constantly and the ointment QHS. If he stops either, the vision gets blurry and his eyes are irritated. He was also given ketorolac to use BID and [facility].      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  Did not tolerate azithromycin (constipation); on doxycycline  Plan:   Warm compresses and eyelid scrubs BID  ATs during day  Ointment at bedtime (ok to use lubricating ointment or erythromycin)  Doxycycline 100mg daily  Plug LLL remains, others fallen out; discussed replacing but he'd like to defer at this time  Discussed trying Restasis, but he's not interested at this time.    (E11.9) Diabetes mellitus type 2 without retinopathy (H)  Comment: On metformin. Last A1c 6.4 4/24/18. Undilated today.  Plan: Discussed the importance of tight blood glucose control in the prevention of diabetic retinopathy. Recommend yearly dilated eye exam.    (Z96.1) Pseudophakia of both eyes  Comment: Clear visual axis.  Plan: Observe    (H52.13,  H52.203) Myopia with astigmatism and presbyopia, bilateral  Comment: [facility]   -----------------------------------------------------------------------------------    Patient disposition:   Return in 4-6 months with surface  check      Teaching statement:  Complete documentation of historical and exam elements from today's encounter can be found in the full encounter summary report (not reduplicated in this progress note). I personally obtained the chief complaint(s) and history of present illness.  I confirmed and edited as necessary the review of systems, past medical/surgical history, family history, social history, and examination findings as documented by others; and I examined the patient myself. I personally reviewed the relevant tests, images, and reports as documented above.     I formulated and edited as necessary the assessment and plan and discussed the findings and management plan with the patient and family.    Migdalia Hussein MD  Comprehensive Ophthalmology & Ocular Pathology  Department of Ophthalmology and Visual Neurosciences  [email or web address]  Pager [phone]

## 2019-05-21 ENCOUNTER — TELEPHONE (OUTPATIENT)
Dept: UROLOGY | Facility: CLINIC | Age: 74
End: 2019-05-21

## 2019-05-24 ENCOUNTER — OFFICE VISIT (OUTPATIENT)
Dept: UROLOGY | Facility: CLINIC | Age: 74
End: 2019-05-24
Payer: MEDICARE

## 2019-05-24 VITALS
HEART RATE: 75 BPM | WEIGHT: 295.6 LBS | DIASTOLIC BLOOD PRESSURE: 73 MMHG | HEIGHT: 71 IN | BODY MASS INDEX: 41.38 KG/M2 | SYSTOLIC BLOOD PRESSURE: 118 MMHG

## 2019-05-24 DIAGNOSIS — N40.1 BENIGN PROSTATIC HYPERPLASIA WITH WEAK URINARY STREAM: Primary | ICD-10-CM

## 2019-05-24 DIAGNOSIS — R39.12 BENIGN PROSTATIC HYPERPLASIA WITH WEAK URINARY STREAM: Primary | ICD-10-CM

## 2019-05-24 ASSESSMENT — PAIN SCALES - GENERAL: PAINLEVEL: NO PAIN (0)

## 2019-05-24 ASSESSMENT — MIFFLIN-ST. JEOR: SCORE: 2102.96

## 2019-05-24 NOTE — PROGRESS NOTES
Urology Clinic    Marc Chapa MD  Date of Service: 2019     Name: Lux Velasco  MRN: [id number]  Age: 73 year old  : 1945  Referring provider: Nam Duffy     Assessment and Plan:  1. CT from 10/26/2018 shows Bosniak 1 renal cysts.      These do not need further follow-up unless he develops flank pain.     2. BPH/LUTS    He has bothersome urinary frequency while taking trospium 20mg every day, alfuzosin 10mg every day, and finasteride.  He is very much against any procedure that might require a saldana catheter. The patient saw Sofiya for a UDS, but refused because of the need for a catheter.     I explained that everything else beyond medication might involve a catheter.     We discussed limiting fluid intake in the evening. We discussed limiting caffeine and alcohol.     --F/U in 6 months with Lynsey LEARY  --Not interested in catheter procedure  --The patient will try to manage his diabetes with his primary  ______________________________________________________________________    HPI  Lux Velasco is a 73 year old male here for follow up after [facility] testing in the setting of BPH and LUTS also with a Boskiak 1 renal cysts. The patient was scheduled for UDS testing done by Sofiya Contreras PA-C on 2019 for BPH and LUTS not well controlled on medication. The patient is on finasteride. The patient has diabetes.     Per Sofiya's note: after describing the (UDS) procedure to the patient, he adamantly refused to have it done and states that he will not partake in anything that involves a catheter.     The patient states he still has the same problems including urinary frequency (every 1.5 hours). The patient is still struggling with weight loss.     The patient is drinking water at bedtime to prevent muscle cramps.     Review of Systems:   Pertinent items are noted in HPI or as below, remainder of complete ROS is negative.      Physical Exam:   PHYSICAL EXAM  /73   Pulse 75   Ht 1.803  "m (5' 11\")   Wt 134.1 kg (295 lb 9.6 oz)   BMI 41.23 kg/m    Constitutional: Alert, no acute distress  Psychiatric: Normal mood and affect  Gastrointestinal: Abdomen soft, non-tender.  : Deferred    Laboratory:   I reviewed all applicable laboratory and pathology data and went over findings with patient  Significant for     Lab Results   Component Value Date    CR 0.87 10/29/2018    CR 0.81 09/18/2018    CR 0.81 07/11/2018    CR 1.05 04/25/2018    CR 0.91 01/05/2018    CR 0.82 11/30/2017    CR 0.81 10/10/2017    CR 0.83 05/22/2017    CR 0.71 05/02/2017    CR 0.77 03/30/2017     PSA   Date Value Ref Range Status   11/30/2017 0.06 0 - 4 ug/L Final     Comment:     Assay Method:  Chemiluminescence using Siemens Vista analyzer   12/26/2016 0.22 0 - 4 ug/L Final     Comment:     Assay Method:  Chemiluminescence using Siemens Vista analyzer   01/19/2011 0.31 0 - 4 ug/L Final   08/14/2009 0.30 0 - 4 ug/L Final   08/27/2008 0.29 0 - 4 ug/L Final   02/07/2007 0.37 0 - 4 ug/L Final   04/26/2006 0.29 0 - 4 ug/L Final   10/06/2005 0.10 0 - 4 ug/L Final     Results for orders placed or performed during the hospital encounter of 04/27/16   UA with Microscopic   Result Value Ref Range    Color Urine Yellow     Appearance Urine Clear     Glucose Urine Negative NEG mg/dL    Bilirubin Urine Negative NEG    Ketones Urine 10 (A) NEG mg/dL    Specific Gravity Urine 1.018 1.003 - 1.035    Blood Urine Moderate (A) NEG    pH Urine 6.0 5.0 - 7.0 pH    Protein Albumin Urine 10 (A) NEG mg/dL    Urobilinogen mg/dL Normal 0.0 - 2.0 mg/dL    Nitrite Urine Negative NEG    Leukocyte Esterase Urine Small (A) NEG    Source Midstream Urine     WBC Urine 9 (H) 0 - 2 /HPF    RBC Urine 16 (H) 0 - 2 /HPF    Transitional Epi <1 0 - 1 /HPF    Mucous Urine Present (A) NEG /LPF    Hyaline Casts 1 0 - 2 /LPF     Results for orders placed or performed in visit on 12/30/08   Lactate dehydrogenase total   Result Value Ref Range     325 - 750 U/L   ] "     Imaging:   I personally reviewed all applicable imaging and went over the below findings with patient.    Results for orders placed or performed in visit on 10/26/18   US GERMÁN Doppler No Exercise    Narrative    Exam: Bilateral lower extremity resting ankle brachial indices dated  10/26/2018 11:35 AM    Comparison study: None    Clinical history: ; Other specified symptoms and signs involving the  circulatory and respiratory systems; Venous stasis; Type II or  unspecified type diabetes mellitus with neurological manifestations,  not stated as uncontrolled(250.60) (H); Diabetes mellitus with  peripheral vascular disease (H)    Ordering provider: CAYLA SUTHERLAND    Technique: Bilateral lower extremity resting ankle brachial indices  obtained.    Findings:    Right:      Arm: 154 mmHg   PT at ankle: 165 mmHg   DP at foot: 169 mmHg   Toe pressure 155 mmHg     GERMÁN: 1.09   TBI: 1.00    Right pulse volume recordings or VPR:      High thigh: Normal   Lower thigh: Normal   Proximal calf: Normal   Ankle: Normal   1st Digit PPG: Normal    Left:     Arm: 155 mmHg   PT at ankle: 193 mmHg   DP at foot: 207 mmHg   Toe pressure 154 mmHg     GERMÁN: 1.25   TBI: 0.99    Left pulse volume recordings or VPR:     High thigh: Normal   Lower thigh: Normal   Proximal calf: Normal   Ankle: Mildly abnormal     1st Digit PPG: Normal      Impression    Impression:     Right leg: Resting EGRMÁN is 1.09 Normal.    Left leg: Resting GERMÁN is 1.34 Normal.    Guidelines:    GERMÁN Diagnostic Criteria (Based on criteria published in Circulation  2011; 124: 3907-2353):    > 1.4: Non compressible    1.00 - 1.40: Normal    0.91 - 0.99: Borderline    At or below 0.90: Abnormal    GERMÁN Diagnostic Criteria (Based on ACC/AHA guideline 2008):    >/=1.3 - non compressible vessels    1.00  -1.29 - Normal    0.91 - 0.99 - Borderline    0.41 - 0.90 - Mild to moderate PAD    0.00 - 0.40 - Severe PAD    I have personally reviewed the examination and initial  interpretation  and I agree with the findings.    CHIDI CURRIE MD     Scribe Disclosure:  I, Mikey Melony, am serving as a scribe to document services personally performed by Marc Chapa MD at this visit, based upon the provider's statements to me. All documentation has been reviewed by the aforementioned provider prior to being entered into the official medical record.     Mikey Melony served as the scribe for this patient's visit and documented my history and physical exam.  I performed the history and physical exam.  I have edited and agree with the note.  RUTH Chapa MD        Patient Care Team:  Tez Hernandez MD as PCP - General (Internal Medicine)  Colton Pulido MD as MD (Family Medicine - Sports Medicine)  Mario Alberto Baptiste MD as MD (Orthopedics)  Sofiya Contreras PA-C as Physician Assistant (Physician Assistant)  Marc Chapa MD as MD (Urology)  Jorge Hanks MD as MD (Internal Medicine - Interventional Cardiology)  Amita Brown, ORION CNP as Nurse Practitioner (Nurse Practitioner - Adult Health)  TEZ HERNANDEZ    Copy to patient  MANDY GARCIA  [address] [facility] [address] [place] [phone]

## 2019-05-24 NOTE — LETTER
2019       RE: Lux Velasco  [address]  [facility] [address]     Dear Colleague,    Thank you for referring your patient, Lux Velasco, to the [facility] UROLOGY AND INST FOR PROSTATE AND UROLOGIC CANCERS at [facility]. Please see a copy of my visit note below.      Urology Clinic    Marc Chapa MD  Date of Service: 2019     Name: Lux Velasco  MRN: [id number]  Age: 73 year old  : 1945  Referring provider: Nam Duffy     Assessment and Plan:  1. CT from 10/26/2018 shows Bosniak 1 renal cysts.      These do not need further follow-up unless he develops flank pain.     2. BPH/LUTS    He has bothersome urinary frequency while taking trospium 20mg every day, alfuzosin 10mg every day, and finasteride.  He is very much against any procedure that might require a saldana catheter. The patient saw Sofiya for a UDS, but refused because of the need for a catheter.     I explained that everything else beyond medication might involve a catheter.     We discussed limiting fluid intake in the evening. We discussed limiting caffeine and alcohol.     --F/U in 6 months with Lynsey LEARY  --Not interested in catheter procedure  --The patient will try to manage his diabetes with his primary  ______________________________________________________________________    HPI  Lux Velasco is a 73 year old male here for follow up after [facility] testing in the setting of BPH and LUTS also with a Boskiak 1 renal cysts. The patient was scheduled for UDS testing done by Sofiya Contreras PA-C on 2019 for BPH and LUTS not well controlled on medication. The patient is on finasteride. The patient has diabetes.     Per Sofiya's note: after describing the (UDS) procedure to the patient, he adamantly refused to have it done and states that he will not partake in anything that involves a catheter.     The patient states he still has the same problems including urinary  "frequency (every 1.5 hours). The patient is still struggling with weight loss.     The patient is drinking water at bedtime to prevent muscle cramps.     Review of Systems:   Pertinent items are noted in HPI or as below, remainder of complete ROS is negative.      Physical Exam:   PHYSICAL EXAM  /73   Pulse 75   Ht 1.803 m (5' 11\")   Wt 134.1 kg (295 lb 9.6 oz)   BMI 41.23 kg/m     Constitutional: Alert, no acute distress  Psychiatric: Normal mood and affect  Gastrointestinal: Abdomen soft, non-tender.  : Deferred    Laboratory:   I reviewed all applicable laboratory and pathology data and went over findings with patient  Significant for     Lab Results   Component Value Date    CR 0.87 10/29/2018    CR 0.81 09/18/2018    CR 0.81 07/11/2018    CR 1.05 04/25/2018    CR 0.91 01/05/2018    CR 0.82 11/30/2017    CR 0.81 10/10/2017    CR 0.83 05/22/2017    CR 0.71 05/02/2017    CR 0.77 03/30/2017     PSA   Date Value Ref Range Status   11/30/2017 0.06 0 - 4 ug/L Final     Comment:     Assay Method:  Chemiluminescence using Siemens Vista analyzer   12/26/2016 0.22 0 - 4 ug/L Final     Comment:     Assay Method:  Chemiluminescence using Siemens Vista analyzer   01/19/2011 0.31 0 - 4 ug/L Final   08/14/2009 0.30 0 - 4 ug/L Final   08/27/2008 0.29 0 - 4 ug/L Final   02/07/2007 0.37 0 - 4 ug/L Final   04/26/2006 0.29 0 - 4 ug/L Final   10/06/2005 0.10 0 - 4 ug/L Final     Results for orders placed or performed during the hospital encounter of 04/27/16   UA with Microscopic   Result Value Ref Range    Color Urine Yellow     Appearance Urine Clear     Glucose Urine Negative NEG mg/dL    Bilirubin Urine Negative NEG    Ketones Urine 10 (A) NEG mg/dL    Specific Gravity Urine 1.018 1.003 - 1.035    Blood Urine Moderate (A) NEG    pH Urine 6.0 5.0 - 7.0 pH    Protein Albumin Urine 10 (A) NEG mg/dL    Urobilinogen mg/dL Normal 0.0 - 2.0 mg/dL    Nitrite Urine Negative NEG    Leukocyte Esterase Urine Small (A) NEG    " Source Midstream Urine     WBC Urine 9 (H) 0 - 2 /HPF    RBC Urine 16 (H) 0 - 2 /HPF    Transitional Epi <1 0 - 1 /HPF    Mucous Urine Present (A) NEG /LPF    Hyaline Casts 1 0 - 2 /LPF     Results for orders placed or performed in visit on 12/30/08   Lactate dehydrogenase total   Result Value Ref Range     325 - 750 U/L   ]     Imaging:   I personally reviewed all applicable imaging and went over the below findings with patient.    Results for orders placed or performed in visit on 10/26/18   US GERMÁN Doppler No Exercise    Narrative    Exam: Bilateral lower extremity resting ankle brachial indices dated  10/26/2018 11:35 AM    Comparison study: None    Clinical history: ; Other specified symptoms and signs involving the  circulatory and respiratory systems; Venous stasis; Type II or  unspecified type diabetes mellitus with neurological manifestations,  not stated as uncontrolled(250.60) (H); Diabetes mellitus with  peripheral vascular disease (H)    Ordering provider: CAYLA SUTHERLAND    Technique: Bilateral lower extremity resting ankle brachial indices  obtained.    Findings:    Right:      Arm: 154 mmHg   PT at ankle: 165 mmHg   DP at foot: 169 mmHg   Toe pressure 155 mmHg     GERMÁN: 1.09   TBI: 1.00    Right pulse volume recordings or VPR:      High thigh: Normal   Lower thigh: Normal   Proximal calf: Normal   Ankle: Normal   1st Digit PPG: Normal    Left:     Arm: 155 mmHg   PT at ankle: 193 mmHg   DP at foot: 207 mmHg   Toe pressure 154 mmHg     GERMÁN: 1.25   TBI: 0.99    Left pulse volume recordings or VPR:     High thigh: Normal   Lower thigh: Normal   Proximal calf: Normal   Ankle: Mildly abnormal     1st Digit PPG: Normal      Impression    Impression:     Right leg: Resting GERMÁN is 1.09 Normal.    Left leg: Resting GERMÁN is 1.34 Normal.    Guidelines:    GERMÁN Diagnostic Criteria (Based on criteria published in Circulation  2011; 124: 8248-6613):    > 1.4: Non compressible    1.00 - 1.40: Normal    0.91 -  0.99: Borderline    At or below 0.90: Abnormal    GERMÁN Diagnostic Criteria (Based on ACC/AHA guideline 2008):    >/=1.3 - non compressible vessels    1.00  -1.29 - Normal    0.91 - 0.99 - Borderline    0.41 - 0.90 - Mild to moderate PAD    0.00 - 0.40 - Severe PAD    I have personally reviewed the examination and initial interpretation  and I agree with the findings.    CHIDI CURRIE MD     Scribe Disclosure:  I, Mikey Ty, am serving as a scribe to document services personally performed by Marc Chapa MD at this visit, based upon the provider's statements to me. All documentation has been reviewed by the aforementioned provider prior to being entered into the official medical record.     Mikey Ty served as the scribe for this patient's visit and documented my history and physical exam.  I performed the history and physical exam.  I have edited and agree with the note.  RUTH Chapa MD        Again, thank you for allowing me to participate in the care of your patient.      Sincerely,    Marc Chapa MD      Patient Care Team:  Nam Duffy MD as PCP - General (Internal Medicine)  Colton Pulido MD as MD (Family Medicine - Sports Medicine)  Mario Alberto Baptiste MD as MD (Orthopedics)  Sofiya Contreras PA-C as Physician Assistant (Physician Assistant)  Jorge Hanks MD as MD (Internal Medicine - Interventional Cardiology)  Amita Brown APRN CNP as Nurse Practitioner (Nurse Practitioner - Adult Health)    Copy to patient  MANDY GARCIA  [address] [facility] [address] [place] [id number]

## 2019-05-24 NOTE — NURSING NOTE
"Chief Complaint   Patient presents with     RECHECK     Review UDS       Blood pressure 118/73, pulse 75, height 1.803 m (5' 11\"), weight 134.1 kg (295 lb 9.6 oz). Body mass index is 41.23 kg/m .    Patient Active Problem List   Diagnosis     Plantar fascial fibromatosis     History of agent Orange exposure     Degenerative arthritis of cervical spine     Stroke (H)     Skin exam, screening for cancer     Primary hyperparathyroidism (H)     Benign prostatic hyperplasia with weak urinary stream     Acquired cyst of kidney       Allergies   Allergen Reactions     Isosorbide Mononitrate [Isosorbide] Shortness Of Breath     Chest pain. Patient stated that the medication made his breathing symptoms worse.      Clindamycin      Eggs Other (See Comments)     Pt states no longer having reaction, childhood allergy, eats eggs       Penicillins Other (See Comments)     Pt states PCN allergy is due to egg allergy     Propoxyphene Other (See Comments)     Pain       Current Outpatient Medications   Medication Sig Dispense Refill     acetaminophen 500 MG CAPS Take 2 tablets by mouth daily.       albuterol (PROAIR HFA) 108 (90 BASE) MCG/ACT Inhaler Inhale 2 puffs into the lungs every 4 hours as needed for shortness of breath / dyspnea, wheezing or other (use prior to exertion/activities) 1 Inhaler 3     AMLODIPINE BESYLATE PO Take 10 mg by mouth daily Take one half tab daily.       ammonium lactate (AMLACTIN) 12 % cream Apply  topically daily.       Artificial Tear Ointment (EQ RESTORE PM OP) Apply 1 strip to eye At Bedtime       artificial tears SOLN Use one drop to both eye PRN.       ASPIRIN PO Take 81 mg by mouth daily       ATORVASTATIN CALCIUM PO Take 40 mg by mouth daily       budesonide-formoterol (SYMBICORT) 160-4.5 MCG/ACT Inhaler Inhale 2 puffs into the lungs 2 times daily       carboxymethylcellul-glycerin (OPTIVE/REFRESH OPTIVE) 0.5-0.9 % SOLN ophthalmic solution Place 1 drop into both eyes 4 times daily 3 Bottle 4 "     Carboxymethylcellul-Glycerin 1-0.9 % GEL Apply 1 Application to eye 4 times daily 1 Bottle 11     doxycycline (VIBRAMYCIN) 100 MG capsule Take 1 capsule (100 mg) by mouth daily 30 capsule 11     econazole nitrate 1 % cream Apply topically daily To feet and toenails. 85 g 5     erythromycin (ROMYCIN) 5 MG/GM ophthalmic ointment 0.5 inches At Bedtime       finasteride (PROSCAR) 5 MG tablet Take 5 mg by mouth daily       ketotifen (ZADITOR/REFRESH ANTI-ITCH) 0.025 % SOLN ophthalmic solution Place 1 drop into both eyes 2 times daily       losartan-hydrochlorothiazide (HYZAAR) 50-12.5 MG per tablet Take 1 tablet by mouth daily 90 tablet 3     METFORMIN HCL PO Take by mouth 2 times daily (with meals)       metoprolol succinate (TOPROL-XL) 100 MG 24 hr tablet Take 0.5 tablets (50 mg) by mouth daily       omeprazole (PRILOSEC) 20 MG capsule Take 20 mg by mouth daily.       order for DME Equipment being ordered: velcro compression for lower legs and feet: ready wrap or juxta fit or farrow wrap 4 each 4     senna-docusate (SENOKOT-S;PERICOLACE) 8.6-50 MG per tablet Take 1-2 tablets by mouth 2 times daily as needed for constipation Take while on oral narcotics to prevent or treat constipation. 10 tablet 0     VITAMIN D, CHOLECALCIFEROL, PO Take 1,000 Units by mouth every morning          Social History     Tobacco Use     Smoking status: Former Smoker     Last attempt to quit: 1970     Years since quittin.4     Smokeless tobacco: Former User     Tobacco comment: Doesn't remember how much he used to smoke - during service only.    Substance Use Topics     Alcohol use: Yes     Comment: 1 beer/week     Drug use: No       BEBE Cavanaugh  2019  9:06 AM

## 2019-05-27 ENCOUNTER — TELEPHONE (OUTPATIENT)
Dept: FAMILY MEDICINE | Facility: CLINIC | Age: 74
End: 2019-05-27

## 2019-05-27 ENCOUNTER — NURSE TRIAGE (OUTPATIENT)
Dept: NURSING | Facility: CLINIC | Age: 74
End: 2019-05-27

## 2019-05-28 NOTE — TELEPHONE ENCOUNTER
"Clinic Action Needed: Yes. Please call patient.     Reason for Call: Patient calling reporting \"I have influenza, pneumonia, I'm dying\". States he has moderate difficulty of breathing. Reports having a temperature of 101 F Oral. States has been having cough as well. States \"I need antibiotic and they won't give it to me\". Requesting message to be sent to primary care provider.     Requesting to inform primary care that he has tried \"all the over the counter medication including Robitussin and it hasn't helped\".     Patient recommendation/teaching: Advised patient to be seen at the emergency department. Patient refused disposition and states \"they won't do anything\".     Informed patient primary care provider will not see the message until tomorrow and his symptoms required immediate evaluation and advised to be seen at the emergency department.     Patient refused disposition and states \"they wont do anything\" referring to the emergency providers.       Routed to: [facility] Nurse Calderon Pereira RN  [facility]  "

## 2019-05-28 NOTE — TELEPHONE ENCOUNTER
"Patient wants to speak with MD's nurse. Routed to her \"Maybe she can get me in\"  He wont see anyone else. Ailyn Flanagan on 5/28/2019 at 9:01 AM      Pt called and is refusing all appt in clinic today with exception of Dr Duffy. Pt informed that DR Duffy schedule was full. Offered appt with other PCP staff and pt refused x5. Offered ER, UC or another day and pt refused all appts.  Mi Pratt RN 10:01 AM on 5/28/2019.    "

## 2019-05-28 NOTE — TELEPHONE ENCOUNTER
"Patient calling reporting \"I have influenza, pneumonia, I'm dying\". States he has moderate difficulty of breathing. Reports having a temperature of 101 F Oral. States has been having cough as well. Advised patient to be seen at the emergency department. Patient refused disposition and states \"they won't do anything\". States \"I need antibiotic and they won't give it to me\". Requesting message to be sent to primary care provider. Informed patient primary care provider will not see the message until tomorrow and his symptoms required immediate evaluation and advised to be seen at the emergency department.     Patient refused disposition and states \"they wont do anything\" referring to the emergency providers.     Requesting to inform primary care that he has tried \"all the over the counter medication including Robitussin and it hasn't helped\".     Josh Pereira RN  [facility]      Reason for Disposition    [1] MODERATE difficulty breathing (e.g., speaks in phrases, SOB even at rest, pulse 100-120) AND [2] NEW-onset or WORSE than normal    Additional Information    Difficulty breathing, severe    Negative: [1] Breathing stopped AND [2] hasn't returned    Negative: Choking on something    Negative: Severe difficulty breathing (e.g., struggling for each breath, speaks in single words)    Negative: Bluish (or gray) lips or face now    Negative: Difficult to awaken or acting confused (e.g., disoriented, slurred speech)    Negative: Passed out (i.e., lost consciousness, collapsed and was not responding)    Negative: Wheezing started suddenly after medicine, an allergic food or bee sting    Negative: Stridor    Negative: Slow, shallow and weak breathing    Negative: Sounds like a life-threatening emergency to the triager    Protocols used: BREATHING DIFFICULTY-A-AH, RESPIRATORY MULTIPLE SYMPTOMS - GUIDELINE [id number]      "

## 2019-06-05 ENCOUNTER — OFFICE VISIT (OUTPATIENT)
Dept: DERMATOLOGY | Facility: CLINIC | Age: 74
End: 2019-06-05
Payer: MEDICARE

## 2019-06-05 VITALS — HEART RATE: 63 BPM | SYSTOLIC BLOOD PRESSURE: 157 MMHG | DIASTOLIC BLOOD PRESSURE: 83 MMHG

## 2019-06-05 DIAGNOSIS — L85.9 HYPERKERATOSIS OF SKIN: ICD-10-CM

## 2019-06-05 DIAGNOSIS — L82.0 INFLAMED SEBORRHEIC KERATOSIS: Primary | ICD-10-CM

## 2019-06-05 DIAGNOSIS — L82.0 BENIGN LICHENOID KERATOSIS: ICD-10-CM

## 2019-06-05 ASSESSMENT — PAIN SCALES - GENERAL: PAINLEVEL: NO PAIN (0)

## 2019-06-05 NOTE — PATIENT INSTRUCTIONS
Use Amlactin (ammonium lactate) cream or lotion daily after the bath or shower to the tops of the feet up to the ankles  Use either econazole cream or terbinafine cream (Lamisil) to the bottoms of the feet and between the toes (these are the antifungal creams to ask the [facility] about)        Cryotherapy    What is it?    Use of a very cold liquid, such as liquid nitrogen, to freeze and destroy abnormal skin cells that need to be removed    What should I expect?    Tenderness and redness    A small blister that might grow and fill with dark purple blood. There may be crusting.    More than one treatment may be needed if the lesions do not go away.    How do I care for the treated area?    Gently wash the area with your hands when bathing.    Use a thin layer of Vaseline to help with healing. You may use a Band-Aid.     The area should heal within 7-10 days and may leave behind a pink or lighter color.     Do not use an antibiotic or Neosporin ointment.     You may take acetaminophen (Tylenol) for pain.     Call your Doctor if you have:    Severe pain    Signs of infection (warmth, redness, cloudy yellow drainage, and or a bad smell)    Questions or concerns    Who should I call with questions?       [facility]: [phone]       [facility]: [phone]       For urgent needs outside of business hours call the [facility] at [phone]        and ask for the dermatology resident on call

## 2019-06-05 NOTE — NURSING NOTE
Dermatology Rooming Note    Lux Velasco's goals for this visit include:   Chief Complaint   Patient presents with     Derm Problem     Lux is here today for fungus on his bilateral feet and sores on the head.     Citlaly Flood, CMA

## 2019-06-05 NOTE — PROGRESS NOTES
[facility] Dermatology Note      Dermatology Problem List:  1. History of NMSC- most of his care is at the [facility] and we don't have a complete record of his skin cancers  - BCC, right upper back s/p ED&C  - Unknown Type, right cheek  -Unknown Type, left hand s/p Mohs   2. Actinic keratoses s/p cryo  - Hypertrophic AK, right forearm s/p biopsy 11/28/16  - AK, left lower inferior orbital rim s/p biopsy 11/28/16  3. Verruca vulgaris, right second finger s/p cryo  4. ISKs s/p cryo  5. Skin cancer screening 11/7/2018      CC:   Chief Complaint   Patient presents with     Derm Problem     Lux is here today for fungus on his bilateral feet and sores on the head.         Encounter Date: Jun 5, 2019    History of Present Illness:  Mr. Lux Velasco is a 73 year old male who with a history of skin cancer presents for some specific concerns.  He notes rough skin on the top of his feet.  He has been seeing podiatry due to his diabetes.  He has been prescribed antifungal creams (I see econazole and terbinafine creams in the EMR) but feels [facility].  He also notes a crusty papule which is irritating in the anterior vertex scalp.  He currently has an URI and just [facility] Zpack [facility] the [facility].    Past Medical History:   Patient Active Problem List   Diagnosis     Plantar fascial fibromatosis     History of agent Orange exposure     Degenerative arthritis of cervical spine     Stroke (H)     Skin exam, screening for cancer     Primary hyperparathyroidism (H)     Benign prostatic hyperplasia with weak urinary stream     Acquired cyst of kidney     Past Medical History:   Diagnosis Date     Diabetes mellitus type II 2005     History of agent Orange exposure 4/17/2013     Hypertension      Myocardial infarct (H) 01/01/1999     Primary hyperparathyroidism (H) Dx approx 2003     Stroke (H) 01/01/1998    recovered fully     Past Surgical History:    Procedure Laterality Date     BIOPSY OF SKIN LESION       BYPASS GRAFT ARTERY CORONARY       CATARACT IOL, RT/LT Bilateral 2017    [facility]     EXCISE MASS LOWER EXTREMITY Left 11/3/2017    Procedure: EXCISE MASS LOWER EXTREMITY;  Excision Mass Left Flank;  Surgeon: Marybeth Gambino MD;  Location:  OR     MOHS MICROGRAPHIC PROCEDURE       PARATHYROIDECTOMY N/A 3/21/2017    Procedure: PARATHYROIDECTOMY;  Surgeon: Julianna Short MD;  [facility]     PARATHYROIDECTOMY         Social History:  Patient reports that he quit smoking about 49 years ago. He has quit using smokeless tobacco. He reports that he drinks alcohol. He reports that he does not use drugs.    Family History:  Family History   Problem Relation Age of Onset     Melanoma Mother      Melanoma Father      Cancer No family hx of         no skin cancer     Glaucoma No family hx of      Macular Degeneration No family hx of        Medications:  Current Outpatient Medications   Medication Sig Dispense Refill     acetaminophen 500 MG CAPS Take 2 tablets by mouth daily.       albuterol (PROAIR HFA) 108 (90 BASE) MCG/ACT Inhaler Inhale 2 puffs into the lungs every 4 hours as needed for shortness of breath / dyspnea, wheezing or other (use prior to exertion/activities) 1 Inhaler 3     AMLODIPINE BESYLATE PO Take 10 mg by mouth daily Take one half tab daily.       ammonium lactate (AMLACTIN) 12 % cream Apply  topically daily.       Artificial Tear Ointment (EQ RESTORE PM OP) Apply 1 strip to eye At Bedtime       artificial tears SOLN Use one drop to both eye PRN.       ASPIRIN PO Take 81 mg by mouth daily       ATORVASTATIN CALCIUM PO Take 40 mg by mouth daily       budesonide-formoterol (SYMBICORT) 160-4.5 MCG/ACT Inhaler Inhale 2 puffs into the lungs 2 times daily       carboxymethylcellul-glycerin (OPTIVE/REFRESH OPTIVE) 0.5-0.9 % SOLN ophthalmic solution Place 1 drop into both eyes 4 times daily 3 Bottle 4      Carboxymethylcellul-Glycerin 1-0.9 % GEL Apply 1 Application to eye 4 times daily 1 Bottle 11     doxycycline (VIBRAMYCIN) 100 MG capsule Take 1 capsule (100 mg) by mouth daily 30 capsule 11     econazole nitrate 1 % cream Apply topically daily To feet and toenails. 85 g 5     erythromycin (ROMYCIN) 5 MG/GM ophthalmic ointment 0.5 inches At Bedtime       finasteride (PROSCAR) 5 MG tablet Take 5 mg by mouth daily       ketotifen (ZADITOR/REFRESH ANTI-ITCH) 0.025 % SOLN ophthalmic solution Place 1 drop into both eyes 2 times daily       losartan-hydrochlorothiazide (HYZAAR) 50-12.5 MG per tablet Take 1 tablet by mouth daily 90 tablet 3     METFORMIN HCL PO Take by mouth 2 times daily (with meals)       metoprolol succinate (TOPROL-XL) 100 MG 24 hr tablet Take 0.5 tablets (50 mg) by mouth daily       omeprazole (PRILOSEC) 20 MG capsule Take 20 mg by mouth daily.       order for DME Equipment being ordered: velcro compression for lower legs and feet: ready wrap or juxta fit or farrow wrap 4 each 4     senna-docusate (SENOKOT-S;PERICOLACE) 8.6-50 MG per tablet Take 1-2 tablets by mouth 2 times daily as needed for constipation Take while on oral narcotics to prevent or treat constipation. 10 tablet 0     VITAMIN D, CHOLECALCIFEROL, PO Take 1,000 Units by mouth every morning        Allergies   Allergen Reactions     Isosorbide Mononitrate [Isosorbide] Shortness Of Breath     Chest pain. Patient stated that the medication made his breathing symptoms worse.      Clindamycin      Eggs Other (See Comments)     Pt states no longer having reaction, childhood allergy, eats eggs       Penicillins Other (See Comments)     Pt states PCN allergy is due to egg allergy     Propoxyphene Other (See Comments)     Pain             Physical exam:  Vitals: /83 (BP Location: Right arm, Patient Position: Sitting, Cuff Size: Adult Regular)   Pulse 63   GEN: This is a well developed, well-nourished male in no acute distress but wearing a  mask, in a pleasant mood.    SKIN: Focused examination of the scalp, chest, back,  was performed.  -waxy stuck on papule of the anterior vertex scalp.  -Pink stuck on papule of the upper chest  -retention hyperkeratosis of the dorsal feet and toes.  -No other lesions of concern on areas examined.     Impression/Plan:  1. Retention hyperkeratosis of the feet    Amlactin (which the patient has at home already) daily after the bath or shower to the top of the feet    I wrote down the econazole cream and terbinafine cream names for the patient so he can contact the [facility] about [facility] and I encouraged him to use these daily to the bottom of the feet and toe webs    Continue compression stockings    2. Seborrheic keratosis, inflamed of scalp and BLK of chest    Cryotherapy procedure note: After verbal consent and discussion of risks and benefits including but no limited to dyspigmentation/scar, blister, and pain, 2 was(were) treated with 1-2mm freeze border for 2 cycles with liquid nitrogen. Post cryotherapy instructions were provided.        CC Nam Duffy MD  [address] [place] [phone] on close of this encounter.  Follow-up in 6 months, earlier for new or changing lesions.       Staff Involved:  Staff Only

## 2019-06-05 NOTE — LETTER
6/5/2019       RE: Lux Velasco  [address]  [facility] [id number]     Dear Colleague,    Thank you for referring your patient, Lux Velasco, to the [facility] DERMATOLOGY at [facility]. Please see a copy of my visit note below.    [facility] Dermatology Note      Dermatology Problem List:  1. History of NMSC- most of his care is at the [facility] and we don't have a complete record of his skin cancers  - BCC, right upper back s/p ED&C  - Unknown Type, right cheek  -Unknown Type, left hand s/p Mohs   2. Actinic keratoses s/p cryo  - Hypertrophic AK, right forearm s/p biopsy 11/28/16  - AK, left lower inferior orbital rim s/p biopsy 11/28/16  3. Verruca vulgaris, right second finger s/p cryo  4. ISKs s/p cryo  5. Skin cancer screening 11/7/2018      CC:   Chief Complaint   Patient presents with     Derm Problem     Lux is here today for fungus on his bilateral feet and sores on the head.         Encounter Date: Jun 5, 2019    History of Present Illness:  Mr. Lux Velasco is a 73 year old male who with a history of skin cancer presents for some specific concerns.  He notes rough skin on the top of his feet.  He has been seeing podiatry due to his diabetes.  He has been prescribed antifungal creams (I see econazole and terbinafine creams in the EMR) but [facility]  He also notes a crusty papule which is irritating in the anterior vertex scalp.  He currently has an URI and just [facility].    Past Medical History:   Patient Active Problem List   Diagnosis     Plantar fascial fibromatosis     History of agent Orange exposure     Degenerative arthritis of cervical spine     Stroke (H)     Skin exam, screening for cancer     Primary hyperparathyroidism (H)     Benign prostatic hyperplasia with weak urinary stream     Acquired cyst of kidney     Past Medical  History:   Diagnosis Date     Diabetes mellitus type II 2005     History of agent Orange exposure 4/17/2013     Hypertension      Myocardial infarct (H) 01/01/1999     Primary hyperparathyroidism (H) Dx approx 2003     Stroke (H) 01/01/1998    recovered fully     Past Surgical History:   Procedure Laterality Date     BIOPSY OF SKIN LESION       BYPASS GRAFT ARTERY CORONARY       CATARACT IOL, RT/LT Bilateral 2017    [facility]     EXCISE MASS LOWER EXTREMITY Left 11/3/2017    Procedure: EXCISE MASS LOWER EXTREMITY;  Excision Mass Left Flank;  Surgeon: Marybeth Gambino MD;  Location:  OR     MOHS MICROGRAPHIC PROCEDURE       PARATHYROIDECTOMY N/A 3/21/2017    Procedure: PARATHYROIDECTOMY;  Surgeon: Julianna Short MD;  Location:  OR     PARATHYROIDECTOMY         Social History:  Patient reports that he quit smoking about 49 years ago. He has quit using smokeless tobacco. He reports that he drinks alcohol. He reports that he does not use drugs.    Family History:  Family History   Problem Relation Age of Onset     Melanoma Mother      Melanoma Father      Cancer No family hx of         no skin cancer     Glaucoma No family hx of      Macular Degeneration No family hx of        Medications:  Current Outpatient Medications   Medication Sig Dispense Refill     acetaminophen 500 MG CAPS Take 2 tablets by mouth daily.       albuterol (PROAIR HFA) 108 (90 BASE) MCG/ACT Inhaler Inhale 2 puffs into the lungs every 4 hours as needed for shortness of breath / dyspnea, wheezing or other (use prior to exertion/activities) 1 Inhaler 3     AMLODIPINE BESYLATE PO Take 10 mg by mouth daily Take one half tab daily.       ammonium lactate (AMLACTIN) 12 % cream Apply  topically daily.       Artificial Tear Ointment (EQ RESTORE PM OP) Apply 1 strip to eye At Bedtime       artificial tears SOLN Use one drop to both eye PRN.       ASPIRIN PO Take 81 mg by mouth daily       ATORVASTATIN CALCIUM PO Take 40 mg by mouth  daily       budesonide-formoterol (SYMBICORT) 160-4.5 MCG/ACT Inhaler Inhale 2 puffs into the lungs 2 times daily       carboxymethylcellul-glycerin (OPTIVE/REFRESH OPTIVE) 0.5-0.9 % SOLN ophthalmic solution Place 1 drop into both eyes 4 times daily 3 Bottle 4     Carboxymethylcellul-Glycerin 1-0.9 % GEL Apply 1 Application to eye 4 times daily 1 Bottle 11     doxycycline (VIBRAMYCIN) 100 MG capsule Take 1 capsule (100 mg) by mouth daily 30 capsule 11     econazole nitrate 1 % cream Apply topically daily To feet and toenails. 85 g 5     erythromycin (ROMYCIN) 5 MG/GM ophthalmic ointment 0.5 inches At Bedtime       finasteride (PROSCAR) 5 MG tablet Take 5 mg by mouth daily       ketotifen (ZADITOR/REFRESH ANTI-ITCH) 0.025 % SOLN ophthalmic solution Place 1 drop into both eyes 2 times daily       losartan-hydrochlorothiazide (HYZAAR) 50-12.5 MG per tablet Take 1 tablet by mouth daily 90 tablet 3     METFORMIN HCL PO Take by mouth 2 times daily (with meals)       metoprolol succinate (TOPROL-XL) 100 MG 24 hr tablet Take 0.5 tablets (50 mg) by mouth daily       omeprazole (PRILOSEC) 20 MG capsule Take 20 mg by mouth daily.       order for DME Equipment being ordered: velcro compression for lower legs and feet: ready wrap or juxta fit or farrow wrap 4 each 4     senna-docusate (SENOKOT-S;PERICOLACE) 8.6-50 MG per tablet Take 1-2 tablets by mouth 2 times daily as needed for constipation Take while on oral narcotics to prevent or treat constipation. 10 tablet 0     VITAMIN D, CHOLECALCIFEROL, PO Take 1,000 Units by mouth every morning        Allergies   Allergen Reactions     Isosorbide Mononitrate [Isosorbide] Shortness Of Breath     Chest pain. Patient stated that the medication made his breathing symptoms worse.      Clindamycin      Eggs Other (See Comments)     Pt states no longer having reaction, childhood allergy, eats eggs       Penicillins Other (See Comments)     Pt states PCN allergy is due to egg allergy      Propoxyphene Other (See Comments)     Pain             Physical exam:  Vitals: /83 (BP Location: Right arm, Patient Position: Sitting, Cuff Size: Adult Regular)   Pulse 63   GEN: This is a well developed, well-nourished male in no acute distress but wearing a mask, in a pleasant mood.    SKIN: Focused examination of the scalp, chest, back,  was performed.  -waxy stuck on papule of the anterior vertex scalp.  -Pink stuck on papule of the upper chest  -retention hyperkeratosis of the dorsal feet and toes.  -No other lesions of concern on areas examined.     Impression/Plan:  1. Retention hyperkeratosis of the feet    Amlactin (which the patient has at home already) daily after the bath or shower to the top of the feet    I wrote down the econazole cream and terbinafine cream names for the patient so [facility] can [facility] the [facility] about [facility] and I encouraged him to use these daily to the bottom of the feet and toe webs    Continue compression stockings    2. Seborrheic keratosis, inflamed of scalp and BLK of chest    Cryotherapy procedure note: After verbal consent and discussion of risks and benefits including but no limited to dyspigmentation/scar, blister, and pain, 2 was(were) treated with 1-2mm freeze border for 2 cycles with liquid nitrogen. Post cryotherapy instructions were provided.        CC Nam Duffy MD  [address] [place] [id number] on close of this encounter.  Follow-up in 6 months, earlier for new or changing lesions.       Staff Involved:  Staff Only    Again, thank you for allowing me to participate in the care of your patient.      Sincerely,    Shirin Shore MD

## 2019-06-10 ENCOUNTER — OFFICE VISIT (OUTPATIENT)
Dept: ORTHOPEDICS | Facility: CLINIC | Age: 74
End: 2019-06-10
Payer: MEDICARE

## 2019-06-10 DIAGNOSIS — E11.49 TYPE II OR UNSPECIFIED TYPE DIABETES MELLITUS WITH NEUROLOGICAL MANIFESTATIONS, NOT STATED AS UNCONTROLLED(250.60) (H): Primary | ICD-10-CM

## 2019-06-10 DIAGNOSIS — I87.8 VENOUS STASIS: ICD-10-CM

## 2019-06-10 DIAGNOSIS — B35.1 ONYCHOMYCOSIS: ICD-10-CM

## 2019-06-10 NOTE — LETTER
6/10/2019       RE: Lux Velasco  [address]  [facility] [address]     Dear Colleague,    Thank you for referring your patient, Lux Velasco, [facility]. Please see a copy of my visit note below.    Past Medical History:   Diagnosis Date     Diabetes mellitus type II 2005     History of agent Orange exposure 4/17/2013     Hypertension      Myocardial infarct (H) 01/01/1999     Primary hyperparathyroidism (H) Dx approx 2003     Stroke (H) 01/01/1998    recovered fully     Patient Active Problem List   Diagnosis     Plantar fascial fibromatosis     History of agent Orange exposure     Degenerative arthritis of cervical spine     Stroke (H)     Skin exam, screening for cancer     Primary hyperparathyroidism (H)     Benign prostatic hyperplasia with weak urinary stream     Acquired cyst of kidney     Past Surgical History:   Procedure Laterality Date     BIOPSY OF SKIN LESION       BYPASS GRAFT ARTERY CORONARY       CATARACT IOL, RT/LT Bilateral 2017    [facility]     EXCISE MASS LOWER EXTREMITY Left 11/3/2017    Procedure: EXCISE MASS LOWER EXTREMITY;  Excision Mass Left Flank;  Surgeon: Marybeth Gambino MD;  Location: [facility] OR     MOHS MICROGRAPHIC PROCEDURE       PARATHYROIDECTOMY N/A 3/21/2017    Procedure: PARATHYROIDECTOMY;  Surgeon: Julianna Short MD;  Location: [facility] OR     PARATHYROIDECTOMY       Social History     Socioeconomic History     Marital status:      Spouse name: Not on file     Number of children: Not on file     Years of education: Not on file     Highest education level: Not on file   Occupational History     Not on file   Social Needs     Financial resource strain: Not on file     Food insecurity:     Worry: Not on file     Inability: Not on file     Transportation needs:     Medical: Not on file     Non-medical: Not on file   Tobacco Use     Smoking status: Former Smoker     Last  attempt to quit: 1970     Years since quittin.4     Smokeless tobacco: Former User     Tobacco comment: Doesn't remember how much he used to smoke - during service only.    Substance and Sexual Activity     Alcohol use: Yes     Comment: 1 beer/week     Drug use: No     Sexual activity: Not on file   Lifestyle     Physical activity:     Days per week: Not on file     Minutes per session: Not on file     Stress: Not on file   Relationships     Social connections:     Talks on phone: Not on file     Gets together: Not on file     Attends [employer]ism service: Not on file     Active member of club or organization: Not on file     Attends meetings of clubs or organizations: Not on file     Relationship status: Not on file     Intimate partner violence:     Fear of current or ex partner: Not on file     Emotionally abused: Not on file     Physically abused: Not on file     Forced sexual activity: Not on file   Other Topics Concern     Parent/sibling w/ CABG, MI or angioplasty before 65F 55M? Not Asked   Social History Narrative     Not on file     Family History   Problem Relation Age of Onset     Melanoma Mother      Melanoma Father      Cancer No family hx of         no skin cancer     Glaucoma No family hx of      Macular Degeneration No family hx of      Lab Results   Component Value Date    A1C 6.4 2018    A1C 6.3 2017    A1C 6.8 10/10/2017    A1C 6.6 2008    A1C 5.8 2007     SUBJECTIVE FINDINGS:  A 73-year-old male returns to clinic for diabetic foot check.  He is diabetic with peripheral neuropathy and vascular disease.  He relates he is doing well.  His right big toenail is bothering him.  He relates no ulcers or sores since we have seen him last.  He has diabetic shoes and insoles.  He does get some neuropathy, he relates.  If he eats the wrong foods, that is worse.  He relates he still gets some swelling throughout the foot.  He is wearing his compression socks.      OBJECTIVE FINDINGS:   DP and PT are 2/4 bilaterally.  He has some peripheral edema bilaterally.  There is no erythema, no drainage, no odor, no calor bilaterally.  He has thickened, dystrophic, incurvated right hallux nail with some subungual debris and pressure changes and the nail borders with pain on palpation.      ASSESSMENT AND PLAN:  Ingrown toenail, right hallux.  He has got onychomycosis.  He is diabetic with peripheral neuropathy and vascular disease.  Diagnosis and treatment options discussed with the patient. Continue to use compression socks.  Continue the Lamisil cream.  Right hallux nail was debrided upon consent, Hallux nail border bled a bit upon debridement.  Local wound care with bacitracin and Band-Aid done and use discussed with him.  He will return to clinic and see me in about 3 months.       Again, thank you for allowing me to participate in the care of your patient.      Sincerely,    Colton Prieto DPM

## 2019-06-10 NOTE — NURSING NOTE
Reason For Visit:   Chief Complaint   Patient presents with     RECHECK     3 month follow up. Possible ingrown,  right hallux.        Pain Assessment  Patient Currently in Pain: Yes  Primary Pain Location: (Right hallux)        Allergies   Allergen Reactions     Isosorbide Mononitrate [Isosorbide] Shortness Of Breath     Chest pain. Patient stated that the medication made his breathing symptoms worse.      Clindamycin      Eggs Other (See Comments)     Pt states no longer having reaction, childhood allergy, eats eggs       Penicillins Other (See Comments)     Pt states PCN allergy is due to egg allergy     Propoxyphene Other (See Comments)     Pain           Ena Bose LPN

## 2019-06-26 ENCOUNTER — OFFICE VISIT (OUTPATIENT)
Dept: INTERNAL MEDICINE | Facility: CLINIC | Age: 74
End: 2019-06-26
Payer: MEDICARE

## 2019-06-26 VITALS
BODY MASS INDEX: 41.38 KG/M2 | SYSTOLIC BLOOD PRESSURE: 125 MMHG | HEART RATE: 79 BPM | WEIGHT: 296.7 LBS | OXYGEN SATURATION: 93 % | DIASTOLIC BLOOD PRESSURE: 82 MMHG | TEMPERATURE: 97.9 F

## 2019-06-26 DIAGNOSIS — E78.00 HIGH BLOOD CHOLESTEROL: ICD-10-CM

## 2019-06-26 DIAGNOSIS — I10 BENIGN ESSENTIAL HYPERTENSION: ICD-10-CM

## 2019-06-26 DIAGNOSIS — R73.09 INCREASED GLUCOSE LEVEL: ICD-10-CM

## 2019-06-26 DIAGNOSIS — N40.0 BENIGN PROSTATIC HYPERPLASIA WITHOUT LOWER URINARY TRACT SYMPTOMS: ICD-10-CM

## 2019-06-26 DIAGNOSIS — N40.0 BENIGN PROSTATIC HYPERPLASIA WITHOUT LOWER URINARY TRACT SYMPTOMS: Primary | ICD-10-CM

## 2019-06-26 LAB
ALBUMIN SERPL-MCNC: 3.5 G/DL (ref 3.4–5)
ALP SERPL-CCNC: 94 U/L (ref 40–150)
ALT SERPL W P-5'-P-CCNC: 35 U/L (ref 0–70)
ANION GAP SERPL CALCULATED.3IONS-SCNC: 7 MMOL/L (ref 3–14)
AST SERPL W P-5'-P-CCNC: 18 U/L (ref 0–45)
BASOPHILS # BLD AUTO: 0 10E9/L (ref 0–0.2)
BASOPHILS NFR BLD AUTO: 0.5 %
BILIRUB SERPL-MCNC: 0.7 MG/DL (ref 0.2–1.3)
BUN SERPL-MCNC: 13 MG/DL (ref 7–30)
CALCIUM SERPL-MCNC: 9.1 MG/DL (ref 8.5–10.1)
CHLORIDE SERPL-SCNC: 106 MMOL/L (ref 94–109)
CHOLEST SERPL-MCNC: 116 MG/DL
CO2 SERPL-SCNC: 27 MMOL/L (ref 20–32)
CREAT SERPL-MCNC: 0.86 MG/DL (ref 0.66–1.25)
DIFFERENTIAL METHOD BLD: ABNORMAL
EOSINOPHIL # BLD AUTO: 0.2 10E9/L (ref 0–0.7)
EOSINOPHIL NFR BLD AUTO: 4.2 %
ERYTHROCYTE [DISTWIDTH] IN BLOOD BY AUTOMATED COUNT: 14.4 % (ref 10–15)
GFR SERPL CREATININE-BSD FRML MDRD: 85 ML/MIN/{1.73_M2}
GLUCOSE SERPL-MCNC: 138 MG/DL (ref 70–99)
HBA1C MFR BLD: 8.3 % (ref 0–5.6)
HCT VFR BLD AUTO: 38.3 % (ref 40–53)
HDLC SERPL-MCNC: 50 MG/DL
HGB BLD-MCNC: 12.1 G/DL (ref 13.3–17.7)
IMM GRANULOCYTES # BLD: 0 10E9/L (ref 0–0.4)
IMM GRANULOCYTES NFR BLD: 0.2 %
INTERPRETATION ECG - MUSE: NORMAL
LDLC SERPL CALC-MCNC: 46 MG/DL
LYMPHOCYTES # BLD AUTO: 1.6 10E9/L (ref 0.8–5.3)
LYMPHOCYTES NFR BLD AUTO: 28.6 %
MCH RBC QN AUTO: 27.3 PG (ref 26.5–33)
MCHC RBC AUTO-ENTMCNC: 31.6 G/DL (ref 31.5–36.5)
MCV RBC AUTO: 86 FL (ref 78–100)
MONOCYTES # BLD AUTO: 0.4 10E9/L (ref 0–1.3)
MONOCYTES NFR BLD AUTO: 6.3 %
NEUTROPHILS # BLD AUTO: 3.5 10E9/L (ref 1.6–8.3)
NEUTROPHILS NFR BLD AUTO: 60.2 %
NONHDLC SERPL-MCNC: 66 MG/DL
NRBC # BLD AUTO: 0 10*3/UL
NRBC BLD AUTO-RTO: 0 /100
PLATELET # BLD AUTO: 129 10E9/L (ref 150–450)
POTASSIUM SERPL-SCNC: 4.3 MMOL/L (ref 3.4–5.3)
PROT SERPL-MCNC: 7.1 G/DL (ref 6.8–8.8)
PSA SERPL-MCNC: 0.04 UG/L (ref 0–4)
RBC # BLD AUTO: 4.44 10E12/L (ref 4.4–5.9)
SODIUM SERPL-SCNC: 140 MMOL/L (ref 133–144)
TRIGL SERPL-MCNC: 102 MG/DL
WBC # BLD AUTO: 5.7 10E9/L (ref 4–11)

## 2019-06-26 ASSESSMENT — PAIN SCALES - GENERAL: PAINLEVEL: NO PAIN (0)

## 2019-06-26 NOTE — PROGRESS NOTES
HPI:    Pt. Comes in for follow up today. He had parathyroid surgery with Dr. Short 3/21/17 for hypercalciemia. He was seen in Dermatology 11/28/16.  He has followed up with Dr. Sullivan Cardiology at [facility] as well as Dr. Hanks, Cardiology here 1/20/18. He states chronic  SOB no worse than previous.   He states he had cor angiogram at [facility] in the past  that did not have obstructive CAD.       PE:    Vitals noted my recheck manual large cuff R arm several times 141/79; gen nad cooperative alert,  HEENT, ears normal oropharynx clear no exudate, neck supple nl rom no adenopathy,  LCTA, B, RRR, S1, S2, systolic murmur present. Neurological exam B UE/LE/proximal/distal is normal and symmetric for sensation, reflexes, and strength. Gait is normal.         CXR 8/30/17; no acute pulmonary disease    PFT's 8/30/17:  FVC 88%, FEV1 98%; 6/6/2018:  FVC 43%, FEV1 48%    Resting echo 1/17/17 normal EF 60-65% and repeat for murmur 4/26/2018 no valvular disease    CTA 5/23/17; minimal non-obstructive CAD    EKG today 6/26/2019 SR at 60; with PAC's; I personally reviewed with Cardiology today    A/P:    1. Follow up parathyroid surgery. He was seen by Dr. Chou, Endo 4/5/17 and by Dr. Short 4/21/17; labs stable  2. He was seen in  Pulmonary 8/30/17 and again  by Dr. Jacques 11/30/17. He was given a Rx. For daily flovent. He was seen again Dr. Jacques 5/31/2018. Suprising significant? Decrease in PFT's.  See detailed pulmonary results note from Dr. Means 7/11/2018; no further testing/follow up recommended except for possible repeat Chest CT in one year.   3. PSA checked 11/30/17  4. Colonoscopy at [facility]15 repeat in 10 years.   5. He was seen  7/17 in Urology for BPH he was seen by Dr. Chapa  1/12/2018 and he commented also on renal cyst. He would like to see Urology here a [facility] and he had appt. With Dr. Chapa, Urology 8/3/2018 and did not stay for appt.  He saw Dr. Chapa, Urology again  5/24/2019  6. BP; he remains on same medications and he should increase Losartan from 50 mg to 100 mg  7. He had minimal non-obstructive coronary CTA 5/23/17. He was seen 1/20/18 by Dr. Hanks Cardiology. As reported about regarding cor angiogram at [facility].   Future MRI/MRA brain ordered but he has not done this,  echo (stable from 4/26/2018) and cardiology follow up.  Seen in Cardiology Ms. Brown 4/26/2018. He saw Dr. Hanks 11/27/2018 cardiology  8. Check with insurance for  New Shingles vaccine  9.  A1C for elevated glucose for 4/25/2018 6.4%  10. Ordered labs 10/12/17 for mild anemia and lower platelets seen by Dr. Marina, Hematology  1/5/2018  11. Seen by Dr. Sanderson GI appt. For hepatic steatosis (liver U/S done 11/30/17) in 3/19/2018. Seen by Dr. Valdovinos, GI 10/29/2018  12. Seen in ortho hand 5/15/2018 for L index finger issues   13. Seen in Dermatology 6/5/2019          Total time spent 25 minutes.  More than 50% of the time spent with Mr. Velasco on counseling / coordinating his care

## 2019-06-26 NOTE — NURSING NOTE
Chief Complaint   Patient presents with     Diabetes     pt here for diabetes       Bella Turner CMA at 7:40 AM on 6/26/2019.

## 2019-06-26 NOTE — PATIENT INSTRUCTIONS
[facility] Medication Refill Request Information:  * Please contact your pharmacy regarding ANY request for medication refills.  ** [facility] Prescription Fax = [phone]  * Please allow 3 business days for routine medication refills.  * Please allow 5 business days for controlled substance medication refills.     [facility] Test Result notification information:  *You will be notified with in 7-10 days of your appointment day regarding the results of your test.  If you are on MyChart you will be notified as soon as the provider has reviewed the results and signed off on them.    [facility]: [phone]

## 2019-06-28 ENCOUNTER — TELEPHONE (OUTPATIENT)
Dept: INTERNAL MEDICINE | Facility: CLINIC | Age: 74
End: 2019-06-28

## 2019-06-28 DIAGNOSIS — R73.09 INCREASED GLUCOSE LEVEL: Primary | ICD-10-CM

## 2019-06-28 NOTE — TELEPHONE ENCOUNTER
Placed future endocrine referral this encounter    Dear Lux;    Your a1c for diabetes is elevated and suggests worse control of diabetes. I recommend you see one of our endocrinology providers. I placed a referral today and you can call [phone] to schedule this appointment.    JIM Duffy MD

## 2019-07-08 NOTE — TELEPHONE ENCOUNTER
Previsit nursing summary    HPI: 72 year old male referred by Dr. Duffy for increased shortness of breath at rest and with activity.    Procedures:    [facility]10/2018) [facility]  9 minutes patient reported left sided chest pain. No correlating EKG changes    CTA with FFR (5/23/2017)  LEFT MAIN:   The left main arises normally from the left coronary cusp and is  widely patent without any detectable stenosis.   There is focal calcification at the left main ostium without causing  any detectable stenosis.     LEFT ANTERIOR DESCENDING:   The left anterior descending and its 2 diagonal branches are widely  patent with minimal luminal irregularities.      LEFT CIRCUMFLEX:   The left circumflex and its major marginal branches (OM1, OM2) are  patent with minimal luminal irregularities.      RIGHT CORONARY ARTERY:   Distal RCA: Minimal (<25%) stenosis composed of noncalcified plaque.  The remainder of the right coronary and the posterior descending  artery are patent with minimal luminal irregularities.     ADDITIONAL FINDINGS:      The proximal ascending aorta is normal in size. Mild calcification of  the aortic valve.  Normal pulmonary venous anatomy with all four pulmonary veins draining  into the left atrium.    There is no left ventricular mass or thrombus.   Normal pericardial thickness. There is no pericardial effusion.    ECHO (1/17/2017)  Interpretation Summary  Global and regional left ventricular function is normal with an EF of 60-65%.  Global right ventricular function is normal.  Trileaflet aortic sclerosis without stenosis.  No pericardial effusion is present.    US of aorta (12/28/2016)  IMPRESSION:  Ectasia of the distal infrarenal abdominal aorta.    [facility]3/2/2011) [facility]  Normal LV systolic function with no diagnostic wall motion abnormality  Left ventricular ejection fraction, by visual extimation, is 65 to 70%  Mild to moderate concentric left ventricular hypertrophy  Mild to moderate aortic valve  Requested Prescriptions   Pending Prescriptions Disp Refills     simvastatin (ZOCOR) 40 MG tablet  1     Sig: Take 1 tablet (40 mg) by mouth       There is no refill protocol information for this order        Last Written Prescription Date:  2/7/18  Last Fill Quantity: na,  # refills: 1   Last office visit: 9/11/2018 with prescribing provider:  Adriana Wade     Future Office Visit:       sclerosis/calcification with predominant involvement of the right coronary cusp, no evidence of aortic stenosis.    Myocardial spect (11/5/2009)  Impressions:     1. Normal study.  2. There is no evidence of stress-induced ischemia and the target  heart rate was achieved.    3. Left ventricular size is normal at rest.  Transient ischemic  dilation of the left ventricle did not occur.    4. The left ventricular ejection fraction is 60 % and there are no  regional wall motion abnormalities.    Dobutamine ECHO (10/6/2008)  Interpretation Summary  There were no wall motion abnormalities at rest and at peak dobutamine   infusion. The LV systolic function was normal at rest with a normal   hyperdynamic response at peak dobutamine infusion. There was no ECG evidence   for ischemia. There was no subjective evidence of ischemia. No significant   valvular abnormalities were noted.    ECHO (9/4/2008)  Interpretation Summary  Left ventricular size is normal. Mild concentric wall thickening consistent   with left ventricular hypertrophy is present. Global and regional left   ventricular function is hyperkinetic with an EF of 65-70%. No regional wall   motion abnormalities are seen. Mild right ventricular dilation is   present.Global right ventricular function is normal. The inferior vena cava   was dilated at 2.7 cm without respiratory variability, consistent with   increased right atrial pressure. No pericardial effusion is present.

## 2019-08-12 ENCOUNTER — OFFICE VISIT (OUTPATIENT)
Dept: OPHTHALMOLOGY | Facility: CLINIC | Age: 74
End: 2019-08-12
Attending: OPHTHALMOLOGY
Payer: MEDICARE

## 2019-08-12 DIAGNOSIS — Z96.1 PSEUDOPHAKIA, BOTH EYES: ICD-10-CM

## 2019-08-12 DIAGNOSIS — H04.123 DRY EYES, BILATERAL: Primary | ICD-10-CM

## 2019-08-12 DIAGNOSIS — H52.13 MYOPIC ASTIGMATISM OF BOTH EYES: ICD-10-CM

## 2019-08-12 DIAGNOSIS — H02.224 MECHANICAL LAGOPHTHALMOS OF LEFT UPPER EYELID: ICD-10-CM

## 2019-08-12 DIAGNOSIS — H01.01A ULCERATIVE BLEPHARITIS OF UPPER AND LOWER EYELIDS OF BOTH EYES: ICD-10-CM

## 2019-08-12 DIAGNOSIS — E11.9 DIABETES MELLITUS TYPE 2 WITHOUT RETINOPATHY (H): ICD-10-CM

## 2019-08-12 DIAGNOSIS — H01.01B ULCERATIVE BLEPHARITIS OF UPPER AND LOWER EYELIDS OF BOTH EYES: ICD-10-CM

## 2019-08-12 DIAGNOSIS — H52.203 MYOPIC ASTIGMATISM OF BOTH EYES: ICD-10-CM

## 2019-08-12 PROCEDURE — G0463 HOSPITAL OUTPT CLINIC VISIT: HCPCS | Mod: ZF

## 2019-08-12 RX ORDER — DOXYCYCLINE 100 MG/1
100 CAPSULE ORAL DAILY
Qty: 30 CAPSULE | Refills: 11 | Status: SHIPPED | OUTPATIENT
Start: 2019-08-12 | End: 2020-04-02

## 2019-08-12 ASSESSMENT — CONF VISUAL FIELD
OD_NORMAL: 1
OS_NORMAL: 1
METHOD: COUNTING FINGERS

## 2019-08-12 ASSESSMENT — TONOMETRY
OD_IOP_MMHG: 11
OS_IOP_MMHG: 13
IOP_METHOD: ICARE

## 2019-08-12 ASSESSMENT — REFRACTION_WEARINGRX
OS_SPHERE: -0.50
OD_ADD: +2.00
OS_ADD: +2.00
SPECS_TYPE: BIFOCAL
OD_AXIS: 007
OD_CYLINDER: +1.50
OS_AXIS: 180
OS_CYLINDER: +0.50
OD_SPHERE: -0.50

## 2019-08-12 ASSESSMENT — VISUAL ACUITY
OS_CC: 20/30
CORRECTION_TYPE: GLASSES
OS_PH_CC: 20/25
OS_CC+: -2
OS_PH_CC+: -2
OD_CC: 20/20
METHOD: SNELLEN - LINEAR

## 2019-08-12 ASSESSMENT — CUP TO DISC RATIO
OD_RATIO: 0.3
OS_RATIO: 0.3

## 2019-08-12 ASSESSMENT — EXTERNAL EXAM - RIGHT EYE: OD_EXAM: NORMAL

## 2019-08-12 ASSESSMENT — EXTERNAL EXAM - LEFT EYE: OS_EXAM: NORMAL

## 2019-08-12 NOTE — PROGRESS NOTES
HPI  Lux Velasco is a 73 year old male who presents for surface check and dilation. He feels his symptoms stay controlled if he does his regimen, but he has had more gunk left eye > right eye since decreasing his warm compresses to intermittent.    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  Did not tolerate azithromycin (constipation); on doxycycline  Plan:   Increase warm compresses and eyelid scrubs back to BID  ATs during day  Ointment at bedtime (ok to use lubricating ointment or erythromycin)  Doxycycline 100mg daily  Plug LLL remains, others fallen out; discussed replacing but he'd like to defer at this time  Discussed trying Restasis, but he's not interested at this time.    (E11.9) Diabetes mellitus type 2 without retinopathy (H)  Comment: On metformin. Last A1c 8.3 6/2019. Undilated today.  Plan: Discussed the importance of tight blood glucose control in the prevention of diabetic retinopathy. Recommend yearly dilated eye exam.    (Z96.1) Pseudophakia of both eyes  Comment: Clear visual axis.  Plan: Observe    (H52.13,  H52.203) Myopia with astigmatism and presbyopia, bilateral  Comment: [facility]   -----------------------------------------------------------------------------------    Patient disposition:   Return in 6 months with diabetic eye exam and dilation or sooner as needed.    Teaching statement:  Complete  documentation of historical and exam elements from today's encounter can be found in the full encounter summary report (not reduplicated in this progress note). I personally obtained the chief complaint(s) and history of present illness.  I confirmed and edited as necessary the review of systems, past medical/surgical history, family history, social history, and examination findings as documented by others; and I examined the patient myself. I personally reviewed the relevant tests, images, and reports as documented above.     I formulated and edited as necessary the assessment and plan and discussed the findings and management plan with the patient and family.    Migdalia Hussein MD  Comprehensive Ophthalmology & Ocular Pathology  Department of Ophthalmology and Visual Neurosciences  [email or web address]  Pager [phone]

## 2019-08-12 NOTE — NURSING NOTE
"Chief Complaints and History of Present Illnesses   Patient presents with     Follow Up     4 month follow-up dry eye, bilateral     Chief Complaint(s) and History of Present Illness(es)     Follow Up     Comments: 4 month follow-up dry eye, bilateral              Comments     Pt complains of \"junk\" in his eyes frequently LE>RE.  Notes if there is \"junk\" in his eye that his vision is blurry.  Complains of pain in his eyes if he doesn't use his drops.  Currently using  Doxycycline 100mg daily, ATs and AT gel 3-4 times per day, Ointment at night and Hot compresses and lid scrubs ~ BID     Debby Sebastian OT 10:06 AM August 12, 2019                 "

## 2019-08-27 ENCOUNTER — OFFICE VISIT (OUTPATIENT)
Dept: CARDIOLOGY | Facility: CLINIC | Age: 74
End: 2019-08-27
Attending: INTERNAL MEDICINE
Payer: MEDICARE

## 2019-08-27 VITALS
SYSTOLIC BLOOD PRESSURE: 118 MMHG | BODY MASS INDEX: 41.4 KG/M2 | HEART RATE: 61 BPM | DIASTOLIC BLOOD PRESSURE: 75 MMHG | WEIGHT: 295.7 LBS | HEIGHT: 71 IN | OXYGEN SATURATION: 94 %

## 2019-08-27 DIAGNOSIS — I25.810 CORONARY ARTERY DISEASE INVOLVING AUTOLOGOUS ARTERY CORONARY BYPASS GRAFT WITHOUT ANGINA PECTORIS: Primary | ICD-10-CM

## 2019-08-27 PROCEDURE — G0463 HOSPITAL OUTPT CLINIC VISIT: HCPCS | Mod: 25,ZF

## 2019-08-27 PROCEDURE — 99214 OFFICE O/P EST MOD 30 MIN: CPT | Mod: ZP | Performed by: INTERNAL MEDICINE

## 2019-08-27 PROCEDURE — 93010 ELECTROCARDIOGRAM REPORT: CPT | Mod: ZP | Performed by: INTERNAL MEDICINE

## 2019-08-27 PROCEDURE — 93005 ELECTROCARDIOGRAM TRACING: CPT | Mod: ZF

## 2019-08-27 ASSESSMENT — PAIN SCALES - GENERAL: PAINLEVEL: NO PAIN (0)

## 2019-08-27 ASSESSMENT — MIFFLIN-ST. JEOR: SCORE: 2103.42

## 2019-08-27 NOTE — NURSING NOTE
Chief Complaint   Patient presents with     Follow Up      stable angina pectoris     Vitals were taken and medications reconciled.     Jose Alfredo Julian CMA  9:38 AM

## 2019-08-27 NOTE — PATIENT INSTRUCTIONS
It was a pleasure to see you in the cardiology clinic today.    If you have any questions, you can reach my nurse, Sakina Moura, at [phone].  Press Option #1 for the [facility], and then press Option #3 for nursing.    Note the new medications: None    Stop the following medications: None    The results from today include: None    Tests ordered today: None    Record BP twice a day (before morning medication, and at dinner hour) for next 2 weeks.    Check pulse oximeter overnight for sleep apnea    I would like you to follow up with one of our nurse practitioners in 6 months.    Sincerely,      Jorge Hanks MD     [facility]

## 2019-08-27 NOTE — PROGRESS NOTES
CARDIOLOGY CLINIC FOLLOW UP VISIT    HPI: Lux Velasco is a 72 yo gentleman, risk factor profile (+) HTN, (+) Type II DM, (+) hyperlipidemia, (+) family Hx CAD, (-) tobacco use, returns to the cardiology clinic for ongoing evaluation of CAD.     Mr. Roque has a long standing history of chest discomfort and ZAMARRIPA, going back to 1988.  He has had numerous noninvasive tests over the past decade.  He had a bicycle ECHO in May 2006 that showed normal LV function with a normal exercise response and no stress induced regional wall motion abnormalities. When I saw him for the first time in 2009, I obtained a stress nuclear study and that was normal.  He had an ECHO in Jan 2017 showing normal LV function with an LVEF 60-65% and trileaflet aortic sclerosis without stenosis.  He had a coronary CTA in May 2017 showing minimal nonobstructive CAD. He had an exercise nuclear study on Edmund 10, 2018, during which he had mild chest discomfort.  He had a small reversible apical defect and normal LVEF.  He believes he had a coronary angiogram at the [facility] several years ago but he does not know the results.      The patient's has no documented history of valvular heart disease, cardiac arrhythmia, and congestive heart failure.      The patient reports having ongoing chest discomfort on a daily basis.  It typically occurs in the afternoon and evenings.  It occurs at rest and it prevents him from sleeping.  It is not related to activity.  He goes for occasional walks but does not engage in physical exercise program.  He does not experience the chest discomfort with exercise.  He notes ZAMARRIPA with mild activity and attributes this to a history of emphysema (no prior tobacco use) related to his  exposure.  He denies PND, orthopnea, and pedal edema.  He notes palpitations and lightheadedness in addition to unsteady gait but he denies syncope.    In regard to HTN, he developed side effects with Hyzaar.  He taking Toprol XL and Cozaar but I  am not clear regarding the doses.  He states he is not on Amlodipine although the EMR has him on that medication.  He did not bring his medication list with him.  He does not record his BP but he claims it is labile.    PAST MEDICAL HISTORY:  Past Medical History:   Diagnosis Date     Diabetes mellitus type II 2005     History of agent Orange exposure 4/17/2013     Hypertension      Myocardial infarct (H) 01/01/1999     Primary hyperparathyroidism (H) Dx approx 2003     Stroke (H) 01/01/1998    recovered fully       CURRENT MEDICATIONS:  Current Outpatient Medications   Medication Sig Dispense Refill     acetaminophen 500 MG CAPS Take 2 tablets by mouth daily.       albuterol (PROAIR HFA) 108 (90 BASE) MCG/ACT Inhaler Inhale 2 puffs into the lungs every 4 hours as needed for shortness of breath / dyspnea, wheezing or other (use prior to exertion/activities) 1 Inhaler 3     ammonium lactate (AMLACTIN) 12 % cream Apply  topically daily.       Artificial Tear Ointment (EQ RESTORE PM OP) Apply 1 strip to eye At Bedtime       artificial tears SOLN Use one drop to both eye PRN.       ASPIRIN PO Take 81 mg by mouth daily       budesonide-formoterol (SYMBICORT) 160-4.5 MCG/ACT Inhaler Inhale 2 puffs into the lungs 2 times daily       carboxymethylcellul-glycerin (OPTIVE/REFRESH OPTIVE) 0.5-0.9 % SOLN ophthalmic solution Place 1 drop into both eyes 4 times daily 3 Bottle 4     Carboxymethylcellul-Glycerin 1-0.9 % GEL Apply 1 Application to eye 4 times daily 1 Bottle 11     doxycycline hyclate (VIBRAMYCIN) 100 MG capsule Take 1 capsule (100 mg) by mouth daily 30 capsule 11     econazole nitrate 1 % cream Apply topically daily To feet and toenails. 85 g 5     erythromycin (ROMYCIN) 5 MG/GM ophthalmic ointment 0.5 inches At Bedtime       finasteride (PROSCAR) 5 MG tablet Take 5 mg by mouth daily       ketotifen (ZADITOR/REFRESH ANTI-ITCH) 0.025 % SOLN ophthalmic solution Place 1 drop into both eyes 2 times daily       METFORMIN  HCL PO Take by mouth 2 times daily (with meals)       metoprolol succinate (TOPROL-XL) 100 MG 24 hr tablet Take 0.5 tablets (50 mg) by mouth daily       omeprazole (PRILOSEC) 20 MG capsule Take 20 mg by mouth daily.       order for DME Equipment being ordered: velcro compression for lower legs and feet: ready wrap or juxta fit or farrow wrap 4 each 4     senna-docusate (SENOKOT-S;PERICOLACE) 8.6-50 MG per tablet Take 1-2 tablets by mouth 2 times daily as needed for constipation Take while on oral narcotics to prevent or treat constipation. 10 tablet 0     VITAMIN D, CHOLECALCIFEROL, PO Take 1,000 Units by mouth every morning        ATORVASTATIN CALCIUM PO Take 40 mg by mouth daily         PAST SURGICAL HISTORY:  Past Surgical History:   Procedure Laterality Date     BIOPSY OF SKIN LESION       BYPASS GRAFT ARTERY CORONARY       CATARACT IOL, RT/LT Bilateral 2017    done [facility]     EXCISE MASS LOWER EXTREMITY Left 11/3/2017    Procedure: EXCISE MASS LOWER EXTREMITY;  Excision Mass Left Flank;  Surgeon: Marybeth Gambino MD;  Location: [facility] OR     MOHS MICROGRAPHIC PROCEDURE       PARATHYROIDECTOMY N/A 3/21/2017    Procedure: PARATHYROIDECTOMY;  Surgeon: Julianna Short MD;  Location: [facility] OR     PARATHYROIDECTOMY         ALLERGIES  Isosorbide mononitrate [isosorbide]; Clindamycin; Eggs; Penicillins; and Propoxyphene    FAMILY HX:  Family History   Problem Relation Age of Onset     Melanoma Mother      Melanoma Father      Cancer No family hx of         no skin cancer     Glaucoma No family hx of      Macular Degeneration No family hx of        SOCIAL HX:  Social History     Social History     Marital status:      Spouse name: N/A     Number of children: N/A     Years of education: N/A     Social History Main Topics     Smoking status: Former Smoker     Quit date: 1970     Smokeless tobacco: Former User     Alcohol use Yes      Comment: rarely     Drug use: No     Sexual activity: Not Asked  "    Other Topics Concern     None     Social History Narrative       ROS:  Negative except mentioned in the HPI    VITAL SIGNS:  /75 (BP Location: Left arm, Patient Position: Chair, Cuff Size: Adult Large)   Pulse 61   Ht 1.803 m (5' 11\")   Wt 134.1 kg (295 lb 11.2 oz)   SpO2 94%   BMI 41.24 kg/m    Body mass index is 41.24 kg/m .  Wt Readings from Last 2 Encounters:   19 134.6 kg (296 lb 11.2 oz)   19 134.1 kg (295 lb 9.6 oz)       PHYSICAL EXAM  Lux Velasco is a 73 year old male.in no acute distress.  HEENT: Eyes Nonicteric.  Neck: elevated JVP 11 mm Hg.  Carotids +3/3 bilaterally without bruits.  Lungs: reduced BS at the bases  Cor: RRR. Normal S1 and S2, ESM in the aortic area.  No murmur, rub, or gallop. Abd: Soft, nontender, nondistended.  NABS.  No pulsatile mass.  Extremities: bilateral  edema.  Neuro: Grossly intact.  Psych: A&O x 3.  Skin: No rash.+    LABS  Recent Labs   Lab Test 19  0831 18  1040   CHOL 116 122   HDL 50 45   LDL 46 50   TRIG 102 135         EK18  NSR with PAC and incomplete RBBB.    Procedures:     EXERCISE BIKE ECHO: 2006  Normal exercise echocardiogram without evidence of coronary artery disease or stress induced ischemia at 72% predicted heart rate.  Normal resting LV function with EF of approximately 60-65%: normal response to exercise with increase to approximately 70-75%.  No stress induced regional wall motion abnormalities.    No ECG evidence of ischemia  No subjective evidence of ischemia.  Mild mitral regurgitation.  Mild aortic root dilation of 3.4 cm at the proximal ascending aorta.  Normal functional capacity.    ECHO: 17  Global and regional left ventricular function is normal with an EF of 60-65%.  Global right ventricular function is normal.  Trileaflet aortic sclerosis without stenosis.  No pericardial effusion is present.    EXERCISE STRESS NUCLEAR TEST:  09  1. Normal study.  2. There is no evidence of " stress-induced ischemia and the target heart rate was achieved.    3. Left ventricular size is normal at rest.  Transient ischemic dilation of the left ventricle did not occur.    4. The left ventricular ejection fraction is 60 % and there are no regional wall motion abnormalities.    EXERCISE STRESS NUCLEAR TEST:  18  Modified Guicho Protocol  LVEF 56%  Small apical reversible defect.    CORONARY CTA:  IMPRESSION:  1.  Minimal non-obstructive coronary artery disease.   2.  Total Agatston score 102 placing the patient in the 39th percentile when compared to age and gender matched control group.  3.  Please review Radiology report for incidental noncardiac findings that will follow separately.     FINDINGS:     CORONARY CALCIUM SCORE     Total Agatston calcium score: 102   Left main: 72  Left anterior descendin  Left circumflex: 1  Right coronary artery: 27   This places the patient in the 39th  percentile when compared to age  and gender matched control group.     CORONARY ANGIOGRAPHY     DOMINANCE: Right dominant system.   Normal coronary origins and course.     LEFT MAIN:   The left main arises normally from the left coronary cusp and is widely patent without any detectable stenosis.   There is focal calcification at the left main ostium without causing any detectable stenosis.     LEFT ANTERIOR DESCENDING:   The left anterior descending and its 2 diagonal branches are widely patent with minimal luminal irregularities.      LEFT CIRCUMFLEX:   The left circumflex and its major marginal branches (OM1, OM2) are patent with minimal luminal irregularities.      RIGHT CORONARY ARTERY:   Distal RCA: Minimal (<25%) stenosis composed of noncalcified plaque.  The remainder of the right coronary and the posterior descending  artery are patent with minimal luminal irregularities.     ADDITIONAL FINDINGS:   The proximal ascending aorta is normal in size. Mild calcification of the aortic valve.  Normal pulmonary venous  anatomy with all four pulmonary veins draining  into the left atrium.    There is no left ventricular mass or thrombus.   Normal pericardial thickness. There is no pericardial effusion.    CARDIAC CATH: None    ASSESSMENT AND PLAN:   1. Chest discomfort.  The patient's chest discomfort is not typical of angina.  He has a prior stress nuclear study that showed apical ischemia with normal LV function.  He evidently had a coronary angiogram and did not have intervention at that time. We will need to obtain the coronary angiogram to ascertain whether there was CAD / borderline stenoses.In the meanwhile, continue ASA, BB, and statin.  I suspect poorly controlled HTN may also be a contributing factor.     2. Lipid profile.  LDL 46 mg/dl.  Patient subsequently stopped the statin due to constipation.  Requested he resume Lipitor 40 mg every day.     3. Hypertension.  The patient is currently on Cozaar 50 mg every day and Toprol XL 50 mg every day.  We will need him to monitor his BP and have him verify his cardiac medications.     4. Type II Diabetes.  Continue Metformin.    5. STOP-BANG score 7/8, recommend sleep study    FOLLOW UP:  Dr. Duffy and Dr Connie Schaefer at [facility].    Current Outpatient Medications   Medication Sig     acetaminophen 500 MG CAPS Take 2 tablets by mouth daily.     albuterol (PROAIR HFA) 108 (90 BASE) MCG/ACT Inhaler Inhale 2 puffs into the lungs every 4 hours as needed for shortness of breath / dyspnea, wheezing or other (use prior to exertion/activities)     ammonium lactate (AMLACTIN) 12 % cream Apply  topically daily.     Artificial Tear Ointment (EQ RESTORE PM OP) Apply 1 strip to eye At Bedtime     artificial tears SOLN Use one drop to both eye PRN.     ASPIRIN PO Take 81 mg by mouth daily     budesonide-formoterol (SYMBICORT) 160-4.5 MCG/ACT Inhaler Inhale 2 puffs into the lungs 2 times daily     carboxymethylcellul-glycerin (OPTIVE/REFRESH OPTIVE) 0.5-0.9 % SOLN ophthalmic solution Place 1  drop into both eyes 4 times daily     Carboxymethylcellul-Glycerin 1-0.9 % GEL Apply 1 Application to eye 4 times daily     doxycycline hyclate (VIBRAMYCIN) 100 MG capsule Take 1 capsule (100 mg) by mouth daily     econazole nitrate 1 % cream Apply topically daily To feet and toenails.     erythromycin (ROMYCIN) 5 MG/GM ophthalmic ointment 0.5 inches At Bedtime     finasteride (PROSCAR) 5 MG tablet Take 5 mg by mouth daily     ketotifen (ZADITOR/REFRESH ANTI-ITCH) 0.025 % SOLN ophthalmic solution Place 1 drop into both eyes 2 times daily     losartan (COZAAR) 50 MG tablet Take 1 tablet (50 mg) by mouth daily     METFORMIN HCL PO Take by mouth 2 times daily (with meals)     metoprolol succinate (TOPROL-XL) 100 MG 24 hr tablet Take 0.5 tablets (50 mg) by mouth daily     omeprazole (PRILOSEC) 20 MG capsule Take 20 mg by mouth daily.     order for DME Equipment being ordered: velcro compression for lower legs and feet: ready wrap or juxta fit or farrow wrap     senna-docusate (SENOKOT-S;PERICOLACE) 8.6-50 MG per tablet Take 1-2 tablets by mouth 2 times daily as needed for constipation Take while on oral narcotics to prevent or treat constipation.     VITAMIN D, CHOLECALCIFEROL, PO Take 1,000 Units by mouth every morning      ATORVASTATIN CALCIUM PO Take 40 mg by mouth daily     liraglutide (VICTOZA PEN) 18 MG/3ML solution Inject 0.6 mg subcutaneous daily for 2 weeks then increase to 1.2 mg daily subcutaneous next 2 weeks then 1.8 mg daily subcutaneous after that.     No current facility-administered medications for this visit.        Jorge Hanks MD     Cardiovascular Division    CC  Patient Care Team:  Nam Duffy MD as PCP - General (Internal Medicine)  Colton Pulido MD as MD (Family Medicine - Sports Medicine)  Mario Alberto Baptiste MD as MD (Orthopedics)  Sofiya Contreras PA-C as Physician Assistant (Physician Assistant)  Marc Chapa MD as MD (Urology)  Jorge Hanks  MD Desmond as MD (Internal Medicine - Interventional Cardiology)  Amita Brown APRN CNP as Nurse Practitioner (Nurse Practitioner - Adult Health)  TEZ HERNANDEZ

## 2019-08-27 NOTE — LETTER
8/27/2019      RE: Lux Velasco  [address]  [facility] [address]       Dear Colleague,    Thank you for the opportunity to participate in the care of your patient, Lux Velasco, at the [facility]. Please see a copy of my visit note below.    CARDIOLOGY CLINIC FOLLOW UP VISIT    HPI: Lux Velasco is a 72 yo gentleman, risk factor profile (+) HTN, (+) Type II DM, (+) hyperlipidemia, (+) family Hx CAD, (-) tobacco use, returns to the cardiology clinic for ongoing evaluation of CAD.     Mr. Roque has a long standing history of chest discomfort and ZAMARRIPA, going back to 1988.  He has had numerous noninvasive tests over the past decade.  He had a bicycle ECHO in May 2006 that showed normal LV function with a normal exercise response and no stress induced regional wall motion abnormalities. When I saw him for the first time in 2009, I obtained a stress nuclear study and that was normal.  He had an ECHO in Jan 2017 showing normal LV function with an LVEF 60-65% and trileaflet aortic sclerosis without stenosis.  He had a coronary CTA in May 2017 showing minimal nonobstructive CAD. He had an exercise nuclear study on Edmund 10, 2018, during which he had mild chest discomfort.  He had a small reversible apical defect and normal LVEF.  He believes he had a coronary angiogram at the [facility] several years ago but he does not know the results.      The patient's has no documented history of valvular heart disease, cardiac arrhythmia, and congestive heart failure.      The patient reports having ongoing chest discomfort on a daily basis.  It typically occurs in the afternoon and evenings.  It occurs at rest and it prevents him from sleeping.  It is not related to activity.  He goes for occasional walks but does not engage in physical exercise program.  He does not experience the chest discomfort with exercise.  He notes ZAMARRIPA with mild activity and attributes  this to a history of emphysema (no prior tobacco use) related to his  exposure.  He denies PND, orthopnea, and pedal edema.  He notes palpitations and lightheadedness in addition to unsteady gait but he denies syncope.    In regard to HTN, he developed side effects with Hyzaar.  He taking Toprol XL and Cozaar but I am not clear regarding the doses.  He states he is not on Amlodipine although the EMR has him on that medication.  He did not bring his medication list with him.  He does not record his BP but he claims it is labile.    PAST MEDICAL HISTORY:  Past Medical History:   Diagnosis Date     Diabetes mellitus type II 2005     History of agent Orange exposure 4/17/2013     Hypertension      Myocardial infarct (H) 01/01/1999     Primary hyperparathyroidism (H) Dx approx 2003     Stroke (H) 01/01/1998    recovered fully       CURRENT MEDICATIONS:  Current Outpatient Medications   Medication Sig Dispense Refill     acetaminophen 500 MG CAPS Take 2 tablets by mouth daily.       albuterol (PROAIR HFA) 108 (90 BASE) MCG/ACT Inhaler Inhale 2 puffs into the lungs every 4 hours as needed for shortness of breath / dyspnea, wheezing or other (use prior to exertion/activities) 1 Inhaler 3     ammonium lactate (AMLACTIN) 12 % cream Apply  topically daily.       Artificial Tear Ointment (EQ RESTORE PM OP) Apply 1 strip to eye At Bedtime       artificial tears SOLN Use one drop to both eye PRN.       ASPIRIN PO Take 81 mg by mouth daily       budesonide-formoterol (SYMBICORT) 160-4.5 MCG/ACT Inhaler Inhale 2 puffs into the lungs 2 times daily       carboxymethylcellul-glycerin (OPTIVE/REFRESH OPTIVE) 0.5-0.9 % SOLN ophthalmic solution Place 1 drop into both eyes 4 times daily 3 Bottle 4     Carboxymethylcellul-Glycerin 1-0.9 % GEL Apply 1 Application to eye 4 times daily 1 Bottle 11     doxycycline hyclate (VIBRAMYCIN) 100 MG capsule Take 1 capsule (100 mg) by mouth daily 30 capsule 11     econazole nitrate 1 % cream  Apply topically daily To feet and toenails. 85 g 5     erythromycin (ROMYCIN) 5 MG/GM ophthalmic ointment 0.5 inches At Bedtime       finasteride (PROSCAR) 5 MG tablet Take 5 mg by mouth daily       ketotifen (ZADITOR/REFRESH ANTI-ITCH) 0.025 % SOLN ophthalmic solution Place 1 drop into both eyes 2 times daily       METFORMIN HCL PO Take by mouth 2 times daily (with meals)       metoprolol succinate (TOPROL-XL) 100 MG 24 hr tablet Take 0.5 tablets (50 mg) by mouth daily       omeprazole (PRILOSEC) 20 MG capsule Take 20 mg by mouth daily.       order for DME Equipment being ordered: velcro compression for lower legs and feet: ready wrap or juxta fit or farrow wrap 4 each 4     senna-docusate (SENOKOT-S;PERICOLACE) 8.6-50 MG per tablet Take 1-2 tablets by mouth 2 times daily as needed for constipation Take while on oral narcotics to prevent or treat constipation. 10 tablet 0     VITAMIN D, CHOLECALCIFEROL, PO Take 1,000 Units by mouth every morning        ATORVASTATIN CALCIUM PO Take 40 mg by mouth daily         PAST SURGICAL HISTORY:  Past Surgical History:   Procedure Laterality Date     BIOPSY OF SKIN LESION       BYPASS GRAFT ARTERY CORONARY       CATARACT IOL, RT/LT Bilateral 2017    [facility]     EXCISE MASS LOWER EXTREMITY Left 11/3/2017    Procedure: EXCISE MASS LOWER EXTREMITY;  Excision Mass Left Flank;  Surgeon: Marybeth Gambino MD;  Location:  OR     MOHS MICROGRAPHIC PROCEDURE       PARATHYROIDECTOMY N/A 3/21/2017    Procedure: PARATHYROIDECTOMY;  Surgeon: Julianna Short MD;  Location:  OR     PARATHYROIDECTOMY         ALLERGIES  Isosorbide mononitrate [isosorbide]; Clindamycin; Eggs; Penicillins; and Propoxyphene    FAMILY HX:  Family History   Problem Relation Age of Onset     Melanoma Mother      Melanoma Father      Cancer No family hx of         no skin cancer     Glaucoma No family hx of      Macular Degeneration No family hx of        SOCIAL HX:  Social History     Social  "History     Marital status:      Spouse name: N/A     Number of children: N/A     Years of education: N/A     Social History Main Topics     Smoking status: Former Smoker     Quit date:      Smokeless tobacco: Former User     Alcohol use Yes      Comment: rarely     Drug use: No     Sexual activity: Not Asked     Other Topics Concern     None     Social History Narrative       ROS:  Negative except mentioned in the HPI    VITAL SIGNS:  /75 (BP Location: Left arm, Patient Position: Chair, Cuff Size: Adult Large)   Pulse 61   Ht 1.803 m (5' 11\")   Wt 134.1 kg (295 lb 11.2 oz)   SpO2 94%   BMI 41.24 kg/m     Body mass index is 41.24 kg/m .  Wt Readings from Last 2 Encounters:   19 134.6 kg (296 lb 11.2 oz)   19 134.1 kg (295 lb 9.6 oz)       PHYSICAL EXAM  Lux Velasco is a 73 year old male.in no acute distress.  HEENT: Eyes Nonicteric.  Neck: elevated JVP 11 mm Hg.  Carotids +3/3 bilaterally without bruits.  Lungs: reduced BS at the bases  Cor: RRR. Normal S1 and S2, ESM in the aortic area.  No murmur, rub, or gallop. Abd: Soft, nontender, nondistended.  NABS.  No pulsatile mass.  Extremities: bilateral  edema.  Neuro: Grossly intact.  Psych: A&O x 3.  Skin: No rash.+    LABS  Recent Labs   Lab Test 19  0831 18  1040   CHOL 116 122   HDL 50 45   LDL 46 50   TRIG 102 135         EK18  NSR with PAC and incomplete RBBB.    Procedures:     EXERCISE BIKE ECHO: 2006  Normal exercise echocardiogram without evidence of coronary artery disease or stress induced ischemia at 72% predicted heart rate.  Normal resting LV function with EF of approximately 60-65%: normal response to exercise with increase to approximately 70-75%.  No stress induced regional wall motion abnormalities.    No ECG evidence of ischemia  No subjective evidence of ischemia.  Mild mitral regurgitation.  Mild aortic root dilation of 3.4 cm at the proximal ascending aorta.  Normal functional " capacity.    ECHO: 17  Global and regional left ventricular function is normal with an EF of 60-65%.  Global right ventricular function is normal.  Trileaflet aortic sclerosis without stenosis.  No pericardial effusion is present.    EXERCISE STRESS NUCLEAR TEST:  09  1. Normal study.  2. There is no evidence of stress-induced ischemia and the target heart rate was achieved.    3. Left ventricular size is normal at rest.  Transient ischemic dilation of the left ventricle did not occur.    4. The left ventricular ejection fraction is 60 % and there are no regional wall motion abnormalities.    EXERCISE STRESS NUCLEAR TEST:  18  Modified Guicho Protocol  LVEF 56%  Small apical reversible defect.    CORONARY CTA:  IMPRESSION:  1.  Minimal non-obstructive coronary artery disease.   2.  Total Agatston score 102 placing the patient in the 39th percentile when compared to age and gender matched control group.  3.  Please review Radiology report for incidental noncardiac findings that will follow separately.     FINDINGS:     CORONARY CALCIUM SCORE     Total Agatston calcium score: 102   Left main: 72  Left anterior descendin  Left circumflex: 1  Right coronary artery: 27   This places the patient in the 39th  percentile when compared to age  and gender matched control group.     CORONARY ANGIOGRAPHY     DOMINANCE: Right dominant system.   Normal coronary origins and course.     LEFT MAIN:   The left main arises normally from the left coronary cusp and is widely patent without any detectable stenosis.   There is focal calcification at the left main ostium without causing any detectable stenosis.     LEFT ANTERIOR DESCENDING:   The left anterior descending and its 2 diagonal branches are widely patent with minimal luminal irregularities.      LEFT CIRCUMFLEX:   The left circumflex and its major marginal branches (OM1, OM2) are patent with minimal luminal irregularities.      RIGHT CORONARY ARTERY:   Distal  RCA: Minimal (<25%) stenosis composed of noncalcified plaque.  The remainder of the right coronary and the posterior descending  artery are patent with minimal luminal irregularities.     ADDITIONAL FINDINGS:   The proximal ascending aorta is normal in size. Mild calcification of the aortic valve.  Normal pulmonary venous anatomy with all four pulmonary veins draining  into the left atrium.    There is no left ventricular mass or thrombus.   Normal pericardial thickness. There is no pericardial effusion.    CARDIAC CATH: None    ASSESSMENT AND PLAN:   1. Chest discomfort.  The patient's chest discomfort is not typical of angina.  He has a prior stress nuclear study that showed apical ischemia with normal LV function.  He evidently had a coronary angiogram and did not have intervention at that time. We will need to obtain the coronary angiogram to ascertain whether there was CAD / borderline stenoses.In the meanwhile, continue ASA, BB, and statin.  I suspect poorly controlled HTN may also be a contributing factor.     2. Lipid profile.  LDL 46 mg/dl.  Patient subsequently stopped the statin due to constipation.  Requested he resume Lipitor 40 mg every day.     3. Hypertension.  The patient is currently on Cozaar 50 mg every day (? Dose) and Toprol XL 50 mg every day.  We will need him to monitor his BP and have him verify his cardiac medications.     4. Type II Diabetes.  Continue Metformin.    5. STOP-BANG score 7/8, recommend sleep study    FOLLOW UP:  Dr. Duffy and Dr Connie Schaefer at [facility].    Jorge Hanks MD     Cardiovascular Division    CC  Patient Care Team:  Nam Duffy MD as PCP - General (Internal Medicine)  Colton Pulido MD as MD (Family Medicine - Sports Medicine)  Mario Alberto Baptiste MD as MD (Orthopedics)  Sofiya Contreras PA-C as Physician Assistant (Physician Assistant)  Marc Chapa MD as MD (Urology)  Jorge Hanks MD as MD (Internal Medicine -  Interventional Cardiology)  Amita Brown, ORION CNP as Nurse Practitioner (Nurse Practitioner - Adult Health)  TEZ HERNANDEZ

## 2019-08-28 LAB — INTERPRETATION ECG - MUSE: NORMAL

## 2019-08-28 RX ORDER — LOSARTAN POTASSIUM 50 MG/1
50 TABLET ORAL DAILY
COMMUNITY
Start: 2019-08-28

## 2019-08-28 NOTE — NURSING NOTE
Patient came to clinic today 8/28 to review his medications and he is on Losartan 50 mg by mouth daily. Medications reconcilled

## 2019-09-04 ENCOUNTER — OFFICE VISIT (OUTPATIENT)
Dept: ENDOCRINOLOGY | Facility: CLINIC | Age: 74
End: 2019-09-04
Payer: MEDICARE

## 2019-09-04 VITALS
BODY MASS INDEX: 41.9 KG/M2 | DIASTOLIC BLOOD PRESSURE: 73 MMHG | HEART RATE: 55 BPM | WEIGHT: 299.3 LBS | SYSTOLIC BLOOD PRESSURE: 142 MMHG | HEIGHT: 71 IN

## 2019-09-04 DIAGNOSIS — Z79.4 TYPE 2 DIABETES MELLITUS WITH HYPERGLYCEMIA, WITH LONG-TERM CURRENT USE OF INSULIN (H): Primary | ICD-10-CM

## 2019-09-04 DIAGNOSIS — E11.65 TYPE 2 DIABETES MELLITUS WITH HYPERGLYCEMIA, WITH LONG-TERM CURRENT USE OF INSULIN (H): Primary | ICD-10-CM

## 2019-09-04 DIAGNOSIS — E66.01 MORBID OBESITY (H): ICD-10-CM

## 2019-09-04 DIAGNOSIS — F32.A DEPRESSION, UNSPECIFIED DEPRESSION TYPE: ICD-10-CM

## 2019-09-04 RX ORDER — LIRAGLUTIDE 6 MG/ML
INJECTION SUBCUTANEOUS
Qty: 15 ML | Refills: 5 | Status: ON HOLD | OUTPATIENT
Start: 2019-09-04 | End: 2023-07-07

## 2019-09-04 ASSESSMENT — MIFFLIN-ST. JEOR: SCORE: 2119.75

## 2019-09-04 ASSESSMENT — PATIENT HEALTH QUESTIONNAIRE - PHQ9: SUM OF ALL RESPONSES TO PHQ QUESTIONS 1-9: 21

## 2019-09-04 ASSESSMENT — PAIN SCALES - GENERAL: PAINLEVEL: NO PAIN (0)

## 2019-09-04 NOTE — PROGRESS NOTES
Depression Response    Patient completed the PHQ-9 assessment for depression and scored >9? Yes  Question 9 on the PHQ-9 was positive for suicidality? Yes  Is the patient already receiving treatment for depression? No  Patient would like to speak with behavioral health team ([facility] only)? Yes    I personally notified the following: visit provider    Behavioral Health/Social Work Contact Information     [facility]  Ronnell Jimenez MA, LMFT  Lead Behavioral Health Clinician  Phone: [phone]  [facility] Emergency Department Pager: [phone]    Non-[facility]  [facility] On-Call   Pager: [phone]

## 2019-09-04 NOTE — PROGRESS NOTES
"[facility]:  PHQ-9 Screening Note    SITUATION/BACKGROUND                                                    Lux Velasco is a 73 year old male who completed the PHQ-9 assessment for depression and Score is >9.    Onset of symptoms: waxing and waning  Trigger: people, diabetes, picture on the wall  Recent related events: diabetes  Prior history of suicide attempt or self harm: NA  Risk Factors: anxiety and comorbid medical condition of diabetes  History of depression or mental illness: Yes  Medications reviewed: Yes     ASSESSMENT      A. Are any of the following present?      Suicidal thoughts with a plan and means to carry out the plan?    Intent to harm others    Altered mental status: confusion, delusional, psychotic YES  - Patient should be seen in the ED.  If patient is willing to go to the ED, call [facility] Non Emergent Transportation at [phone].  If patient is unwilling to go to the ED, call 911.   Clinic staff to fill out the  Transportation Hold  form.    Place order for referral to behavioral health team for  regular  follow-up.    NO - go to B   B. Are any of the following present?      Suicidal thoughts without a plan or means to carry out the plan    New onset of delusional ideas    Past inpatient admission for depression    New onset and recent change or addition of new medication YES  - Patient should receive crises care within 2-4 hours. Offer emergency room care or connect with any of the *crisis resources.     Place referral to behavioral health team for \"regular\" follow-up.    NO - go to C   C. Are any of the following present?      Previous suicide attempts    Depression interfering with ability to work or function    Loss of appetite and eating poorly    Abrupt cessation of drugs (OTC or RX), alcohol or caffeine    Drug or alcohol abuse YES -  [facility] behavior health team. If no response, patient should receive crisis care within 24 hours.     Place referral to " "behavioral health team for \"regular\" follow-up.     NO - go to D   D. Are several of the following present?      Difficulty concentrating    Difficulty sleeping    Reduced interest in sexual activity or impotency    Irregular or absent menstruation    No interest in activity    Change in interpersonal relationships    Increased use/abuse of alcohol or drugs    Pregnant or recent child birth    Recent major life change    History of depression YES -  Follow-up with PCP for appointment and follow home care instructions.    Place referral to behavioral health team for \"regular\" follow-up.    NO - provide home care instructions.        PLAN      Home Care Instructions:   Call local crisis interventions  Contact friends or family for support  Increase exercise and enjoyable activities  East a well-balanced diet, drink plenty of fluids and rest as needed  Alcohol and other recreational drugs can worsen depression.  If heavy use of drugs or alcohol, contact counselor or PCP to help reduce consumption.    Report the following to your PCP:   Depression interferes with daily activities    Seek emergency care immediately if any of the following occur:   Suicidal thoughts and plan and means to carry out the plan  Injury to self or others  Altered mental status:  Confusion, Delusional, Pyschotic    BEHAVIORAL HEALTH TEAMS      [place] - Behavioral Health Team    [facility] Pager: [phone]    [place]  - Behavioral Health Team    Pager number: [phone]    Referral to Behavioral Health    BEHAVIORAL / SPIRITUAL HEALTH SOWQ [01989}    RESOURCES      - 24/7 Crisis Hotlines: National Suicide Prevention Hotline  158-178-EMZB (8712)  - Non Emergent Transportation:  [facility] (east location): [phone]  - [facility] Pagers:  [facility]: pager #: [phone]  - Crisis Hotlines by County:  [facility] Crisis Line: 137.103.5535, [place] Crisis Line: 141.984.8156, [place] Crisis Line: 450.423.9405, [place] for Adults: [phone]  - " [facility] Crisis Response Team:  [phone] ([phone])  [facility] Crisis is available 24 hours a day. The team provides callers with a needs assessment and referrals to appropriate resources. If medically necessary, the Crisis Response Team assists individuals by traveling to their location to provide face-to-face interventions and assessments. Crisis services are available for adults and children in [facility] and [facility]. Adult Crisis beds are also available if appropriate.  - Urgent Care for Adult Mental Health:  [phone]  (24 hours a day)  [address], [place] [address]  DROP IN:  Monday - Friday: 8:00 am - 7:00 pm  Saturday: 11:00 am - 3:00 pm   Sunday and Holidays: Closed  - Walk-In Centers and Mobile Teams:  [facility] Walk-In Crisis Intervention: [phone]  [facility] (18+) Crisis Mobile Team: [phone]  [facility] Walk-In & Mobile Teams: [phone]  - Walk-In Counseling Center:  [phone]  [address] [place] [address]  Hours:  M, W, F:  1:00-3:00PM      M-Th:  6:30-8:30PM  - [facility]:  [phone]  [address] [place] [address]  Hours:  M, W:      5:00-7:00PM   - [facility] House:  [phone]  [address], [place]  Hours:  T, Th:      6:00-8:00PM  - Crisis Housing (operated by [employer]):  Anna Bhandari Residence: [address] [facility]  PH: [phone], Rachele Tabares Residence: [address] [place] [address]  PH: [phone]  - Additional Crisis Hotlines:  Crisis Connection: 969.245.4415, EMACS ([facility] Crisis Services): [phone], [facility] Social Service (LSS): [phone], Men's Crisis Line: 612-379-MENS (619-786-2662), National Suicide Prevention Hotline: 660-970-MQED (3871), [facility] Crisis Line: [phone]    Carolina Powell RN        Copyright 2016 [facility]

## 2019-09-04 NOTE — PROGRESS NOTES
- Endocrinology Follow up -    Reason for visit/consult:  Post parathyroidectomy, DM2    Primary care provider: Nam Duffy      Assessment and Plan  73 year old male with DM2, history of parathyroidectomy due to primary hyperparathyroidism    1. DM2  A1c worsened for over 1 year, but most recent one 8.3.  The patient also mentioned he stopped exercise at all due to knee pain also noise at the [place].  Patient difficulty to losing weight and today is depression screening score was higher side, patient talked with the nurse.  I think at this point, adding Victoza will be most beneficial for the patient.     -Continue current metformin 1000 mg twice daily    -Start Victoza start from 0.60 mg then 1.2 mg then 1.8 mg    -Teaching of Victoza was done    -If Victoza not approved by his insurance is then instructed the patient to call me back and then I will consider to prescribe glimepiride 1 mg or 2 mg daily.    2. history of parathyroidectomy due to primary hyperparathyroidism    -Currently normocalcemia asymptomatic    3. Emphysema  This is also the factor to limit his activity.     3. Depression score high  Our team dicussed with patient and offered mental health follow up.   Depression may also be exacerbated by overweight/knee pain/emphysema/worsening DM/frustration.     Return to clinic with me in 3 months        I spent 40 minutes with this patient face to face and explained the conditions and plans (more than 50% of time was counseling/coordination of care) . The patient understood and is satisfied with today's visit. Return to clinic with me in 3 month.         Helena Mancilla MD  Staff Physician  Endocrinology and Metabolism  License: [id number]    Current Diabetic Regimens:  Metformin 1000 mg BID    Life Style:  Wake up  Breakfast: toast, 2 boiled eggs, banana  Lunch: salmon, vegi, potato  Dinner: turkey sandwich, wheat bread  Lots of  fruits: berries, peach, banana, grapes  Drinks with tea, honey.   Bedtime:     Exercise:  Not at all due to knee pain    DM complications:  Retinopathy: 8/2019, doctor said OK.   Nephropathy:   Neuropathy:   Most recent LDL:   LDL Cholesterol Calculated   Date Value Ref Range Status   06/26/2019 46 <100 mg/dL Final     Comment:     Desirable:       <100 mg/dl       Glucose Log: We downloaded glucometer and analyzed and summarize here.  Do not have device.     Interval History as of 9/4/2019 : Patient has been doing well. Last seen . Medication compliance   . New event includes  . Fair twice and got cold. Knee pain cannot do exercise. Emphysema, wheezing often to do exercise.   DM for 20 years, taken care by doctor in [facility] and Dr. Duffy. Not taking calcium supplement. Used to go [employer], but quit due to too many children.   HPI : Mr. Velasco has mild type 2 diabetes and then subsequently for primary hyperparathyroidism.  It has been about 2-1/2 years.  He was getting in his care and followup for his endocrine issues with the [facility].    The patient had parathyroidectomy of a single parathyroid adenoma I(right inferior) done by Dr. Short on 03/21/2017.      Subsequent to that, the patient has been having a variety of complaints including cramping in the left hand.  He is also complaining of constipation and says that he cannot sleep.  He is hoping we may have some assistance or answers for him.       He had known hyperparathyroidism which had been followed for a number of years.  It appears that it was initially diagnosed around 2002.  The patient has always had a number of ongoing non-specific complaints and it was not entirely clear whether which, if any of his complaints, may have been related to his hypercalcemia and hyperparathyroidism.      During the time we followed him, we felt that his calcium levels, although elevated, were not in a range that would necessitate surgery.  He had no evidence of  renal damage.  He had a DEXA in the past that had been normal.      It appears in the interim that he has had a progressive increase in his calcium levels with levels rising about 11.  Finally, he did come here to the [place] saw Dr. Short and underwent surgical treatment on 03/21/2017 as noted above.     Regarding DM, he is on Metformin 1000 mg twice a day for type 2 diabetes.  This is [facility] the [facility].  He said recently he changed to 1000 mg once a day because he felt that might be playing a role in his constipation and hand cramping.  He does not recall a recent hemoglobin A1c, so we did one in clinic today and it was quite good at 5.7%.       Past Medical/Surgical History:  Past Medical History:   Diagnosis Date     Diabetes mellitus type II 2005     History of agent Orange exposure 4/17/2013     Hypertension      Myocardial infarct (H) 01/01/1999     Primary hyperparathyroidism (H) Dx approx 2003     Stroke (H) 01/01/1998    recovered fully     Past Surgical History:   Procedure Laterality Date     BIOPSY OF SKIN LESION       BYPASS GRAFT ARTERY CORONARY       CATARACT IOL, RT/LT Bilateral 2017    done at [facility]     EXCISE MASS LOWER EXTREMITY Left 11/3/2017    Procedure: EXCISE MASS LOWER EXTREMITY;  Excision Mass Left Flank;  Surgeon: Marybeth Gambino MD;  Location:  OR     MOHS MICROGRAPHIC PROCEDURE       PARATHYROIDECTOMY N/A 3/21/2017    Procedure: PARATHYROIDECTOMY;  Surgeon: Julianna Short MD;  Location:  OR     PARATHYROIDECTOMY         Allergies:  Allergies   Allergen Reactions     Isosorbide Mononitrate [Isosorbide] Shortness Of Breath     Chest pain. Patient stated that the medication made his breathing symptoms worse.      Clindamycin      Eggs Other (See Comments)     Pt states no longer having reaction, childhood allergy, eats eggs       Penicillins Other (See Comments)     Pt states PCN allergy is due to egg allergy     Propoxyphene Other (See Comments)     Pain  "      Current Medications   Current Outpatient Medications   Medication     acetaminophen 500 MG CAPS     albuterol (PROAIR HFA) 108 (90 BASE) MCG/ACT Inhaler     ammonium lactate (AMLACTIN) 12 % cream     Artificial Tear Ointment (EQ RESTORE PM OP)     artificial tears SOLN     ASPIRIN PO     ATORVASTATIN CALCIUM PO     budesonide-formoterol (SYMBICORT) 160-4.5 MCG/ACT Inhaler     carboxymethylcellul-glycerin (OPTIVE/REFRESH OPTIVE) 0.5-0.9 % SOLN ophthalmic solution     Carboxymethylcellul-Glycerin 1-0.9 % GEL     doxycycline hyclate (VIBRAMYCIN) 100 MG capsule     econazole nitrate 1 % cream     erythromycin (ROMYCIN) 5 MG/GM ophthalmic ointment     finasteride (PROSCAR) 5 MG tablet     ketotifen (ZADITOR/REFRESH ANTI-ITCH) 0.025 % SOLN ophthalmic solution     losartan (COZAAR) 50 MG tablet     METFORMIN HCL PO     metoprolol succinate (TOPROL-XL) 100 MG 24 hr tablet     omeprazole (PRILOSEC) 20 MG capsule     order for DME     senna-docusate (SENOKOT-S;PERICOLACE) 8.6-50 MG per tablet     VITAMIN D, CHOLECALCIFEROL, PO     No current facility-administered medications for this visit.        Family History:  Family History   Problem Relation Age of Onset     Melanoma Mother      Melanoma Father      Cancer No family hx of         no skin cancer     Glaucoma No family hx of      Macular Degeneration No family hx of        Social History:  Social History     Tobacco Use     Smoking status: Former Smoker     Last attempt to quit: 1970     Years since quittin.7     Smokeless tobacco: Former User     Tobacco comment: Doesn't remember how much he used to smoke - during service only.    Substance Use Topics     Alcohol use: Yes     Comment: 1 beer/week       ROS:  Full review of systems taken with the help of the intake sheet. Otherwise a complete 14 point review of systems was taken and is negative unless stated in the history above.      Physical Exam:   BP (!) 142/73   Pulse 55   Ht 1.803 m (5' 11\")   Wt 135.8 " kg (299 lb 4.8 oz)   BMI 41.74 kg/m       General: well appearing, no acute distress, pleasant and conversant,   Mental Status/neuro: alert and oriented  Face: symmetrical, normal facial color  Eyes: anicteric, PERRL, no proptosis or lid lag  Neck: suppler, no lymphadenopahty  Chvostek sign: negative  Thyroid: normal size and texture, no nodule palpable, no bruits  Heart: regular rhythm, S1S2, no murmur appreciated  Lung: clear to auscultation bilaterally  Abdomen: soft, NT/ND, no hepatomegaly  Legs: no swelling or edema  Feet: no deformities or ulcers, 2+ DP pulses, normal monofilament sensation on the right foot, reduced sensation on the left.       Labs : I reviewed data from epic and extract and summarize the pertinent data here.    9/18/2018 10:40 10/29/2018 09:29 6/26/2019 08:31   Calcium 9.0 8.9 9.1   Creatinine 0.81 0.87 0.86   GFR Estimate >90 86 85        4/17/2017 15:00 5/2/2017 09:11   Calcium 9.0 8.4 (L)   Creatinine  0.71   GFR Estimate  >90...   Parathyroid Hormone Intact 34         Ref. Range 10/10/2017 07:35 11/30/2017 12:15 4/25/2018 16:03 6/26/2019 08:31   Hemoglobin A1C Latest Ref Range: 0 - 5.6 % 6.8 (H) 6.3 (H) 6.4 8.3 (H)

## 2019-09-04 NOTE — LETTER
9/4/2019       RE: Lux Velasco  [address]  formerly [facility] [id number]     Dear Colleague,    Thank you for referring your patient, Lux Velasco, to the [facility] ENDOCRINOLOGY at [facility]. Please see a copy of my visit note below.                                                                               - Endocrinology Follow up -    Reason for visit/consult:  Post parathyroidectomy, DM2    Primary care provider: Nam Duffy      Assessment and Plan  73 year old male with DM2, history of parathyroidectomy due to primary hyperparathyroidism    1. DM2  A1c worsened for over 1 year, but most recent one 8.3.  The patient also mentioned he stopped exercise at all due to knee pain also noise at the [facility].  Patient difficulty to losing weight and today is depression screening score was higher side, patient talked with the nurse.  I think at this point, adding Victoza will be most beneficial for the patient.     -Continue current metformin 1000 mg twice daily    -Start Victoza start from 0.60 mg then 1.2 mg then 1.8 mg    -Teaching of Victoza was done    -If Victoza not approved by his insurance is then instructed the patient to call me back and then I will consider to prescribe glimepiride 1 mg or 2 mg daily.    2. history of parathyroidectomy due to primary hyperparathyroidism    -Currently normocalcemia asymptomatic    3. Emphysema  This is also the factor to limit his activity.     3. Depression score high  Our team dicussed with patient and offered mental health follow up.   Depression may also be exacerbated by overweight/knee pain/emphysema/worsening DM/frustration.     Return to clinic with me in 3 months        I spent 40 minutes with this patient face to face and explained the conditions and plans (more than 50% of time was counseling/coordination of care) . The patient understood and is satisfied with today's visit. Return to clinic with me in 3  month.         Helena Mancilla MD  Staff Physician  Endocrinology and Metabolism  License: [id number]    Current Diabetic Regimens:  Metformin 1000 mg BID    Life Style:  Wake up  Breakfast: toast, 2 boiled eggs, banana  Lunch: salmon, vegi, potato  Dinner: turkey sandwich, wheat bread  Lots of fruits: berries, peach, banana, grapes  Drinks with tea, honey.   Bedtime:     Exercise:  Not at all due to knee pain    DM complications:  Retinopathy: 8/2019, doctor said OK.   Nephropathy:   Neuropathy:   Most recent LDL:   LDL Cholesterol Calculated   Date Value Ref Range Status   06/26/2019 46 <100 mg/dL Final     Comment:     Desirable:       <100 mg/dl       Glucose Log: We downloaded glucometer and analyzed and summarize here.  Do not have device.     Interval History as of 9/4/2019 : Patient has been doing well. Last seen . Medication compliance   . New event includes  . Fair twice and got cold. Knee pain cannot do exercise. Emphysema, wheezing often to do exercise.   DM for 20 years, [facility] [facility]. Not taking calcium supplement. Used to go [employer], but quit due to too many children.   HPI : Mr. Velasco has mild type 2 diabetes and then subsequently for primary hyperparathyroidism.  It has been about 2-1/2 years.  He was getting in his care and followup for his endocrine issues with the [facility].    The patient had parathyroidectomy of a single parathyroid adenoma I(right inferior) done by Dr. Short on 03/21/2017.      Subsequent to that, the patient has been having a variety of complaints including cramping in the left hand.  He is also complaining of constipation and says that he cannot sleep.  He is hoping we may have some assistance or answers for him.       He had known hyperparathyroidism which had been followed for a number of years.  It appears that it was initially diagnosed around 2002.  The patient has always had a number of ongoing non-specific complaints and it was not  entirely clear whether which, if any of his complaints, may have been related to his hypercalcemia and hyperparathyroidism.      During the time we followed him, we felt that his calcium levels, although elevated, were not in a range that would necessitate surgery.  He had no evidence of renal damage.  He had a DEXA in the past that had been normal.      It appears in the interim that he has had a progressive increase in his calcium levels with levels rising about 11.  [facility] and underwent surgical treatment on 03/21/2017 as noted above.     Regarding DM, he is on Metformin 1000 mg twice a day for type 2 diabetes.  [facility]  He said recently he changed to 1000 mg once a day because he felt that might be playing a role in his constipation and hand cramping.  He does not recall a recent hemoglobin A1c, so we did one in clinic today and it was quite good at 5.7%.       Past Medical/Surgical History:  Past Medical History:   Diagnosis Date     Diabetes mellitus type II 2005     History of agent Orange exposure 4/17/2013     Hypertension      Myocardial infarct (H) 01/01/1999     Primary hyperparathyroidism (H) Dx approx 2003     Stroke (H) 01/01/1998    recovered fully     Past Surgical History:   Procedure Laterality Date     BIOPSY OF SKIN LESION       BYPASS GRAFT ARTERY CORONARY       CATARACT IOL, RT/LT Bilateral 2017    [facility]     EXCISE MASS LOWER EXTREMITY Left 11/3/2017    Procedure: EXCISE MASS LOWER EXTREMITY;  Excision Mass Left Flank;  Surgeon: Marybeth Gambino MD;  Location:  OR     MOHS MICROGRAPHIC PROCEDURE       PARATHYROIDECTOMY N/A 3/21/2017    Procedure: PARATHYROIDECTOMY;  Surgeon: Julianna Short MD;  Location:  OR     PARATHYROIDECTOMY         Allergies:  Allergies   Allergen Reactions     Isosorbide Mononitrate [Isosorbide] Shortness Of Breath     Chest pain. Patient stated that the  medication made his breathing symptoms worse.      Clindamycin      Eggs Other (See Comments)     Pt states no longer having reaction, childhood allergy, eats eggs       Penicillins Other (See Comments)     Pt states PCN allergy is due to egg allergy     Propoxyphene Other (See Comments)     Pain       Current Medications   Current Outpatient Medications   Medication     acetaminophen 500 MG CAPS     albuterol (PROAIR HFA) 108 (90 BASE) MCG/ACT Inhaler     ammonium lactate (AMLACTIN) 12 % cream     Artificial Tear Ointment (EQ RESTORE PM OP)     artificial tears SOLN     ASPIRIN PO     ATORVASTATIN CALCIUM PO     budesonide-formoterol (SYMBICORT) 160-4.5 MCG/ACT Inhaler     carboxymethylcellul-glycerin (OPTIVE/REFRESH OPTIVE) 0.5-0.9 % SOLN ophthalmic solution     Carboxymethylcellul-Glycerin 1-0.9 % GEL     doxycycline hyclate (VIBRAMYCIN) 100 MG capsule     econazole nitrate 1 % cream     erythromycin (ROMYCIN) 5 MG/GM ophthalmic ointment     finasteride (PROSCAR) 5 MG tablet     ketotifen (ZADITOR/REFRESH ANTI-ITCH) 0.025 % SOLN ophthalmic solution     losartan (COZAAR) 50 MG tablet     METFORMIN HCL PO     metoprolol succinate (TOPROL-XL) 100 MG 24 hr tablet     omeprazole (PRILOSEC) 20 MG capsule     order for DME     senna-docusate (SENOKOT-S;PERICOLACE) 8.6-50 MG per tablet     VITAMIN D, CHOLECALCIFEROL, PO     No current facility-administered medications for this visit.        Family History:  Family History   Problem Relation Age of Onset     Melanoma Mother      Melanoma Father      Cancer No family hx of         no skin cancer     Glaucoma No family hx of      Macular Degeneration No family hx of        Social History:  Social History     Tobacco Use     Smoking status: Former Smoker     Last attempt to quit: 1970     Years since quittin.7     Smokeless tobacco: Former User     Tobacco comment: Doesn't remember how much he used to smoke - during service only.    Substance Use Topics     Alcohol use:  "Yes     Comment: 1 beer/week       ROS:  Full review of systems taken with the help of the intake sheet. Otherwise a complete 14 point review of systems was taken and is negative unless stated in the history above.      Physical Exam:   BP (!) 142/73   Pulse 55   Ht 1.803 m (5' 11\")   Wt 135.8 kg (299 lb 4.8 oz)   BMI 41.74 kg/m        General: well appearing, no acute distress, pleasant and conversant,   Mental Status/neuro: alert and oriented  Face: symmetrical, normal facial color  Eyes: anicteric, PERRL, no proptosis or lid lag  Neck: suppler, no lymphadenopahty  Chvostek sign: negative  Thyroid: normal size and texture, no nodule palpable, no bruits  Heart: regular rhythm, S1S2, no murmur appreciated  Lung: clear to auscultation bilaterally  Abdomen: soft, NT/ND, no hepatomegaly  Legs: no swelling or edema  Feet: no deformities or ulcers, 2+ DP pulses, normal monofilament sensation on the right foot, reduced sensation on the left.       Labs : I reviewed data from epic and extract and summarize the pertinent data here.    9/18/2018 10:40 10/29/2018 09:29 6/26/2019 08:31   Calcium 9.0 8.9 9.1   Creatinine 0.81 0.87 0.86   GFR Estimate >90 86 85        4/17/2017 15:00 5/2/2017 09:11   Calcium 9.0 8.4 (L)   Creatinine  0.71   GFR Estimate  >90...   Parathyroid Hormone Intact 34         Ref. Range 10/10/2017 07:35 11/30/2017 12:15 4/25/2018 16:03 6/26/2019 08:31   Hemoglobin A1C Latest Ref Range: 0 - 5.6 % 6.8 (H) 6.3 (H) 6.4 8.3 (H)             Depression Response    Patient completed the PHQ-9 assessment for depression and scored >9? Yes  Question 9 on the PHQ-9 was positive for suicidality? Yes  Is the patient already receiving treatment for depression? No  Patient would like to speak with behavioral health team ([facility] clinics only)? Yes    I personally notified the following: visit provider    Behavioral Health/Social Work Contact Information     [facility]  Ronnell Jimenez MA, LMFT  Lead Behavioral " "Health Clinician  Phone: [phone]  [facility] Pager: [phone]    Non[facility] Clinics  [facility] On-Call   Pager: [phone]         [facility] Health:  PHQ-9 Screening Note    SITUATION/BACKGROUND                                                    Lux Velasco is a 73 year old male who completed the PHQ-9 assessment for depression and Score is >9.    Onset of symptoms: waxing and waning  Trigger: people, diabetes, picture on the wall  Recent related events: diabetes  Prior history of suicide attempt or self harm: NA  Risk Factors: anxiety and comorbid medical condition of diabetes  History of depression or mental illness: Yes  Medications reviewed: Yes     ASSESSMENT      A. Are any of the following present?      Suicidal thoughts with a plan and means to carry out the plan?    Intent to harm others    Altered mental status: confusion, delusional, psychotic YES  - Patient should be seen in the ED.  If patient is willing to go to the ED, call [employer] Non Emergent Transportation at [phone].  If patient is unwilling to go to the ED, call 911.   Clinic staff to fill out the  Transportation Hold  form.    Place order for referral to behavioral health team for  regular  follow-up.    NO - go to B   B. Are any of the following present?      Suicidal thoughts without a plan or means to carry out the plan    New onset of delusional ideas    Past inpatient admission for depression    New onset and recent change or addition of new medication YES  - Patient should receive crises care within 2-4 hours. Offer emergency room care or connect with any of the *crisis resources.     Place referral to behavioral health team for \"regular\" follow-up.    NO - go to C   C. Are any of the following present?      Previous suicide attempts    Depression interfering with ability to work or function    Loss of appetite and eating poorly    Abrupt cessation of drugs (OTC or RX), alcohol or caffeine    Drug or alcohol abuse " "YES -  Page behavior health team. If no response, patient should receive crisis care within 24 hours.     Place referral to behavioral health team for \"regular\" follow-up.     NO - go to D   D. Are several of the following present?      Difficulty concentrating    Difficulty sleeping    Reduced interest in sexual activity or impotency    Irregular or absent menstruation    No interest in activity    Change in interpersonal relationships    Increased use/abuse of alcohol or drugs    Pregnant or recent child birth    Recent major life change    History of depression YES -  Follow-up with PCP for appointment and follow home care instructions.    Place referral to behavioral health team for \"regular\" follow-up.    NO - provide home care instructions.        PLAN      Home Care Instructions:   Call local crisis interventions  Contact friends or family for support  Increase exercise and enjoyable activities  East a well-balanced diet, drink plenty of fluids and rest as needed  Alcohol and other recreational drugs can worsen depression.  If heavy use of drugs or alcohol, contact counselor or PCP to help reduce consumption.    Report the following to your PCP:   Depression interferes with daily activities    Seek emergency care immediately if any of the following occur:   Suicidal thoughts and plan and means to carry out the plan  Injury to self or others  Altered mental status:  Confusion, Delusional, Pyschotic    BEHAVIORAL HEALTH TEAMS      [place] - Behavioral Health Team    [facility] Pager: [phone]    [place]  - Behavioral Health Team    Pager number: [phone]    Referral to Behavioral Health    BEHAVIORAL / SPIRITUAL HEALTH SOW [25655}    RESOURCES      - 24/7 Crisis Hotlines: National Suicide Prevention Hotline  456-339-ONOI (2407)  - Non Emergent Transportation:  [facility] (east location): [phone]  - [facility] Pagers:  [facility]: pager #: [phone]  - Crisis Hotlines by [facility]:  [facility] Crisis Line: " [phone], [place] Crisis Line: 674.851.3132, [place] Crisis Line: 211.130.1264, [facility] for Adults: [phone]  - [facility] Crisis Response Team:  [phone] ([phone])  [facility] Crisis is available 24 hours a day. The team provides callers with a needs assessment and referrals to appropriate resources. If medically necessary, the Crisis Response Team assists individuals by traveling to their location to provide face-to-face interventions and assessments. Crisis services are available for adults and children in [facility] and [facility]. Adult Crisis beds are also available if appropriate.  - Urgent Care for Adult Mental Health:  [phone]  (24 hours a day)  [address], [place] [address]  DROP IN:  Monday - Friday: 8:00 am - 7:00 pm  Saturday: 11:00 am - 3:00 pm   Sunday and Holidays: Closed  - Walk-In Centers and Mobile Teams:  [facility] Walk-In Crisis Intervention: [phone]  [facility] (18+) Crisis Mobile Team: [phone]  [facility] Walk-In & Mobile Teams: [phone]  - Walk-In Counseling Center:  [phone]  [address] [place] [address]  Hours:  M, W, F:  1:00-3:00PM      M-Th:  6:30-8:30PM  - [facility]:  [phone]  [address] [place] [address]  Hours:  M, W:      5:00-7:00PM   - Neighborhood House:  [phone]  [address], [place]  Hours:  T, Th:      6:00-8:00PM  - Crisis Housing (operated by [employer]):  Anna Bhandari Residence: [address] [place] [address]  PH: [phone], Rachele Tabares Residence: [address] [place] [address]  PH: [phone]  - Additional Crisis Hotlines:  Crisis Connection: 112.747.8229, [facility] ([facility][1] Crisis Services): [phone], [facility] (S): [phone], Men's Crisis Line: 612-379-MENS (230-480-9291), National Suicide Prevention Hotline: 635-846-SUNG (9090), [facility] Crisis Line: [phone]    [place]  FARZANA [place]      Again, thank you for allowing me to participate in the care of your patient.      Sincerely,    Helena Mancilla MD

## 2019-09-05 ENCOUNTER — TELEPHONE (OUTPATIENT)
Dept: CARDIOLOGY | Facility: CLINIC | Age: 74
End: 2019-09-05

## 2019-09-05 NOTE — TELEPHONE ENCOUNTER
[facility] Call Center    Phone Message    May a detailed message be left on voicemail: no    Reason for Call: Other: Pt called and stated pt has been waiting for a call from Dr. Hanks or a nurse to give him recommendations on what he should do with his medications. Please call back pt asap. Thanks.      Action Taken: Message routed to:  [facility]): CARDIO

## 2019-09-05 NOTE — TELEPHONE ENCOUNTER
I called the pt [phone] regarding his medication questions.  He stated that he was calling in to see if any changes need to be made now that his med list is up to date.  I sent a message to Dr. Hanks with this questions. Lauren Valentine RN on 9/5/2019 at 1:34 PM

## 2019-09-06 ENCOUNTER — OFFICE VISIT (OUTPATIENT)
Dept: INTERNAL MEDICINE | Facility: CLINIC | Age: 74
End: 2019-09-06
Payer: MEDICARE

## 2019-09-06 VITALS
DIASTOLIC BLOOD PRESSURE: 77 MMHG | SYSTOLIC BLOOD PRESSURE: 134 MMHG | BODY MASS INDEX: 41.26 KG/M2 | WEIGHT: 295.8 LBS | HEART RATE: 48 BPM | OXYGEN SATURATION: 97 % | RESPIRATION RATE: 16 BRPM

## 2019-09-06 DIAGNOSIS — R06.02 SOB (SHORTNESS OF BREATH): Primary | ICD-10-CM

## 2019-09-06 ASSESSMENT — PAIN SCALES - GENERAL: PAINLEVEL: EXTREME PAIN (9)

## 2019-09-06 NOTE — NURSING NOTE
Chief Complaint   Patient presents with     Recheck Medication     pt. wants changes to his medication and pt. states that he is having issues with his weight. pt. states that he also has troubling breathing        Karla Frost, EMT

## 2019-09-06 NOTE — PATIENT INSTRUCTIONS
Primary Care Center Medication Refill Request Information:  * Please contact your pharmacy regarding ANY request for medication refills.  ** [facility] Prescription Fax = [phone]  * Please allow 3 business days for routine medication refills.  * Please allow 5 business days for controlled substance medication refills.     Primary Care Center Test Result notification information:  *You will be notified with in 7-10 days of your appointment day regarding the results of your test.  If you are on MyChart you will be notified as soon as the provider has reviewed the results and signed off on them.    Your health care Provider has recommended that you receive the new Shingle vaccine called Shingrix to prevent shingles for ages 50 and above. Many private insurance and Medicare Part D plans cover Shingrix. However, not all insurance carriers cover the entire cost of the Shingrix vaccine if the vaccine is administered at your primary care clinic. The clinic cannot determine your insurance benefits.  Please call your insurance carrier prior. The vaccine comes in two doses. Your second dose should be 2-6 months from your first dose.     There is a nationwide shortage of the Shingle vaccine. Because the second dose must be given 2-6 months from the first dose, the CDC DOES NOT recommend your 1st Shingle vaccine until the vaccine is back in stock next year 2020.  Currently, our clinic shortage is reserved for 2nd dose only (those who already had their first Shingrix vaccine).  You may check with your Pharmacy if you want the vaccine for this year.       Prior to receiving the vaccine, we recommend that you call your insurance carrier and ask them the following questions:            1. Is there a cost difference if I receive the vaccine at my doctor's office or a pharmacy?          2. Does my insurance cover the Shingrix Vaccine and administration of the vaccine?          3. What is my co-pay or deductible for the vaccine?         Nurse Visit hours are available Monday, Wednesday, and Friday from 9:00 AM-11:00 AM and 1:00 PM-3:00 PM.

## 2019-09-06 NOTE — PROGRESS NOTES
HPI:    Pt. Comes in for follow up today. He had parathyroid surgery with Dr. Short 3/21/17 for hypercalciemia. He was seen in Dermatology 11/28/16.  He has followed up with Dr. Sullivan Cardiology at [facility] as well as Dr. Hanks, Cardiology here 1/20/18 and again recently 8/27/2019. . He states chronic  SOB no worse than previous.         PE:    Vitals noted my recheck manual large cuff R arm several times 141/79; gen nad cooperative alert,  HEENT, ears normal oropharynx clear no exudate, neck supple nl rom no adenopathy,  LCTA, B, RRR, S1, S2, systolic murmur present. Neurological exam B UE/LE/proximal/distal is normal and symmetric for sensation, reflexes, and strength. Gait is normal.         CXR 8/30/17; no acute pulmonary disease    PFT's 8/30/17:  FVC 88%, FEV1 98%; 6/6/2018:  FVC 43%, FEV1 48%    Resting echo 1/17/17 normal EF 60-65% and repeat for murmur 4/26/2018 no valvular disease    CTA 5/23/17; minimal non-obstructive CAD      A/P:    1. Follow up parathyroid surgery. He was seen by Dr. Chou, Endo 4/5/17 and by Dr. Short 4/21/17; labs stable. He was seen by Dr. Mancilla 9/4/2019 endocrinology   2. He was seen in  Pulmonary 8/30/17 and again  by Dr. Jacques 11/30/17. He was given a Rx. For daily flovent. He was seen again Dr. Jacques 5/31/2018.   See detailed pulmonary results note from Dr. Means 7/11/2018; no further testing/follow up recommended except for possible repeat Chest CT in one year. He had PFT's 6/6/2018 with severe restriction. He wants to see Dr. Jacques again.   3. PSA checked 6/26/2019.   4. Colonoscopy at [facility]15 repeat in 10 years.   5. He was seen  7/17 in Urology for BPH he was seen by Dr. Chapa  1/12/2018 and he commented also on renal cyst. He would like to see Urology here a [facility] and he had appt. With Dr. Chapa, Urology 8/3/2018 and did not stay for appt.  He saw Dr. Chapa Urology again 5/24/2019  6. BP; stable today and he should remain on the  same medications.   7. He had minimal non-obstructive coronary CTA 5/23/17. He was seen 1/20/18 by Dr. Hanks Cardiology. As reported about regarding cor angiogram at [facility].   Future MRI/MRA brain ordered but he has not done this,  echo (stable from 4/26/2018) and cardiology follow up.  Seen in Cardiology Ms. Brown 4/26/2018. He saw Dr. Hanks 11/27/2018 and again 8/27/2019cardiology  8. Check with insurance for  New Shingles vaccine  9.  A1C for elevated glucose for 6/26/2019 at 8.3% and seen by Dr. Mancilla 9/4/2019  10. Mild anemia and lower platelets seen by Dr. Marina, Hematology  1/5/2018  11. Seen by Dr. Sanderson GI appt. For hepatic steatosis (liver U/S done 11/30/17) in 3/19/2018. Seen by Dr. Valdovinos GI 10/29/2018   12. Seen in Dermatology 6/5/2019          Total time spent 25 minutes.  More than 50% of the time spent with Mr. Velasco on counseling / coordinating his care

## 2019-09-11 ENCOUNTER — TELEPHONE (OUTPATIENT)
Dept: INTERNAL MEDICINE | Facility: CLINIC | Age: 74
End: 2019-09-11

## 2019-10-24 ENCOUNTER — TELEPHONE (OUTPATIENT)
Dept: INTERNAL MEDICINE | Facility: CLINIC | Age: 74
End: 2019-10-24

## 2019-10-28 ENCOUNTER — DOCUMENTATION ONLY (OUTPATIENT)
Dept: CARE COORDINATION | Facility: CLINIC | Age: 74
End: 2019-10-28

## 2019-11-11 ENCOUNTER — OFFICE VISIT (OUTPATIENT)
Dept: ORTHOPEDICS | Facility: CLINIC | Age: 74
End: 2019-11-11
Payer: MEDICARE

## 2019-11-11 ENCOUNTER — OFFICE VISIT (OUTPATIENT)
Dept: PULMONOLOGY | Facility: CLINIC | Age: 74
End: 2019-11-11
Attending: INTERNAL MEDICINE
Payer: MEDICARE

## 2019-11-11 VITALS
SYSTOLIC BLOOD PRESSURE: 119 MMHG | WEIGHT: 295 LBS | HEIGHT: 71 IN | RESPIRATION RATE: 18 BRPM | BODY MASS INDEX: 41.3 KG/M2 | OXYGEN SATURATION: 93 % | HEART RATE: 51 BPM | DIASTOLIC BLOOD PRESSURE: 72 MMHG

## 2019-11-11 DIAGNOSIS — J45.20 MILD INTERMITTENT ASTHMA WITHOUT COMPLICATION: ICD-10-CM

## 2019-11-11 DIAGNOSIS — R06.09 DYSPNEA ON EXERTION: Primary | ICD-10-CM

## 2019-11-11 DIAGNOSIS — B35.1 ONYCHOMYCOSIS: ICD-10-CM

## 2019-11-11 DIAGNOSIS — E11.51 DIABETES MELLITUS WITH PERIPHERAL VASCULAR DISEASE (H): ICD-10-CM

## 2019-11-11 DIAGNOSIS — E11.49 TYPE II OR UNSPECIFIED TYPE DIABETES MELLITUS WITH NEUROLOGICAL MANIFESTATIONS, NOT STATED AS UNCONTROLLED(250.60) (H): Primary | ICD-10-CM

## 2019-11-11 DIAGNOSIS — E66.813 CLASS 3 SEVERE OBESITY DUE TO EXCESS CALORIES WITH BODY MASS INDEX (BMI) OF 40.0 TO 44.9 IN ADULT, UNSPECIFIED WHETHER SERIOUS COMORBIDITY PRESENT (H): ICD-10-CM

## 2019-11-11 DIAGNOSIS — E66.01 CLASS 3 SEVERE OBESITY DUE TO EXCESS CALORIES WITH BODY MASS INDEX (BMI) OF 40.0 TO 44.9 IN ADULT, UNSPECIFIED WHETHER SERIOUS COMORBIDITY PRESENT (H): ICD-10-CM

## 2019-11-11 DIAGNOSIS — R06.02 SOB (SHORTNESS OF BREATH): Primary | ICD-10-CM

## 2019-11-11 DIAGNOSIS — B35.3 TINEA PEDIS OF BOTH FEET: ICD-10-CM

## 2019-11-11 PROCEDURE — G0463 HOSPITAL OUTPT CLINIC VISIT: HCPCS | Mod: ZF

## 2019-11-11 RX ORDER — CICLOPIROX OLAMINE 7.7 MG/G
CREAM TOPICAL 2 TIMES DAILY
Qty: 90 G | Refills: 5 | Status: SHIPPED | OUTPATIENT
Start: 2019-11-11 | End: 2019-11-12

## 2019-11-11 ASSESSMENT — PAIN SCALES - GENERAL: PAINLEVEL: NO PAIN (0)

## 2019-11-11 ASSESSMENT — MIFFLIN-ST. JEOR: SCORE: 2099.98

## 2019-11-11 NOTE — NURSING NOTE
Reason For Visit:   Chief Complaint   Patient presents with     Follow Up     Diabetic foot care. Possible ingrown, right great toe. Swelling, ankles bilaterally. Patient stated that he has compression stockings. Patient stated that he hhas pain and swelling if he doesnt have compression alll the time.        Pain Assessment  Patient Currently in Pain: Denies        Allergies   Allergen Reactions     Isosorbide Mononitrate [Isosorbide] Shortness Of Breath     Chest pain. Patient stated that the medication made his breathing symptoms worse.      Clindamycin      Eggs Other (See Comments)     Pt states no longer having reaction, childhood allergy, eats eggs       Penicillins Other (See Comments)     Pt states PCN allergy is due to egg allergy     Propoxyphene Other (See Comments)     Pain           Ena Bose LPN

## 2019-11-11 NOTE — PROGRESS NOTES
Past Medical History:   Diagnosis Date     Diabetes mellitus type II      History of agent Orange exposure 2013     Hypertension      Myocardial infarct (H) 1999     Primary hyperparathyroidism (H) Dx approx 2003     Stroke (H) 1998    recovered fully     Patient Active Problem List   Diagnosis     Plantar fascial fibromatosis     History of agent Orange exposure     Degenerative arthritis of cervical spine     Stroke (H)     Skin exam, screening for cancer     Primary hyperparathyroidism (H)     Benign prostatic hyperplasia with weak urinary stream     Acquired cyst of kidney     Past Surgical History:   Procedure Laterality Date     BIOPSY OF SKIN LESION       BYPASS GRAFT ARTERY CORONARY       CATARACT IOL, RT/LT Bilateral 2017    [facility]     EXCISE MASS LOWER EXTREMITY Left 11/3/2017    Procedure: EXCISE MASS LOWER EXTREMITY;  Excision Mass Left Flank;  Surgeon: Marybeth Gambino MD;  Location: [facility] OR     MOHS MICROGRAPHIC PROCEDURE       PARATHYROIDECTOMY N/A 3/21/2017    Procedure: PARATHYROIDECTOMY;  Surgeon: Julianna Short MD;  Location: [facility] OR     PARATHYROIDECTOMY       Social History     Socioeconomic History     Marital status:      Spouse name: Not on file     Number of children: Not on file     Years of education: Not on file     Highest education level: Not on file   Occupational History     Not on file   Social Needs     Financial resource strain: Not on file     Food insecurity:     Worry: Not on file     Inability: Not on file     Transportation needs:     Medical: Not on file     Non-medical: Not on file   Tobacco Use     Smoking status: Former Smoker     Last attempt to quit: 1970     Years since quittin.8     Smokeless tobacco: Former User     Tobacco comment: Doesn't remember how much he used to smoke - during service only.    Substance and Sexual Activity     Alcohol use: Yes     Comment: 1 beer/week     Drug use: No     Sexual activity: Not  on file   Lifestyle     Physical activity:     Days per week: Not on file     Minutes per session: Not on file     Stress: Not on file   Relationships     Social connections:     Talks on phone: Not on file     Gets together: Not on file     Attends Mu-ism service: Not on file     Active member of club or organization: Not on file     Attends meetings of clubs or organizations: Not on file     Relationship status: Not on file     Intimate partner violence:     Fear of current or ex partner: Not on file     Emotionally abused: Not on file     Physically abused: Not on file     Forced sexual activity: Not on file   Other Topics Concern     Parent/sibling w/ CABG, MI or angioplasty before 65F 55M? Not Asked   Social History Narrative     Not on file     Family History   Problem Relation Age of Onset     Melanoma Mother      Melanoma Father      Cancer No family hx of         no skin cancer     Glaucoma No family hx of      Macular Degeneration No family hx of      SUBJECTIVE FINDINGS:  A 73-year-old male returns to clinic for diabetic foot care, ingrown toenail, onychomycosis.  He relates he is doing okay.  His right medial nail border is hurting.  Relates no injury, no specific relieving or aggravating factors.  He is wearing diabetic shoes.  He relates he does have neuropathy in his feet.  No ulcers or sores since I have seen him last.      OBJECTIVE FINDINGS:  DP and PT are 2/4 bilaterally.  He has some peripheral edema bilaterally.  There is some mild dry scaly skin bilaterally.  He has thickened, dystrophic, incurvated hallux nail with brittleness and subungual debris and pressure changes in the medial nail border with some pain on palpation.  There is no erythema, no drainage, no odor, no calor bilaterally.      ASSESSMENT AND PLAN:  Onychomycosis and ingrown toenail, right hallux.  Tinea pedis bilaterally.  He has got onychomycosis.  He is diabetic with peripheral neuropathy and vascular disease.  Diagnosis  and treatment options discussed with him.  Right hallux nail is debrided upon consent.  He relates he has been using the econazole cream, not Lamisil.  Previous notes reviewed.  Prescription for Loprox cream given and use discussed with him.  He will d/c the econazole.  Return to clinic and see me in 2-3 months.

## 2019-11-11 NOTE — LETTER
"11/11/2019       RE: Lux Velasco  [address]  [place] [address]     Dear Colleague,    Thank you for referring your patient, Lux Velasco, to the [facility] at [facility]. Please see a copy of my visit note below.    Pulmonary Clinic Return Visit  History of Present Illness    Mr. Lux Velasco is a 73-year old male with history of CAD/MI, CVA in 2003 (no sequela), obesity (BMI 40), who presents to pulmonary clinic today for follow up of SOB.  To briefly review, he has been previously evaluated in pulmonary clinic for chronic dyspnea and undergone relatively extensive evaluation including CT chest 2017 and 2018 notable for scattered sub-4mm nodules and mild bronchial wall thickening and mosaic attenuation suggestive small airways disease.  He has continued to struggle to meet ATS criteria for PFTs but they were normal in 2017 and then significantly decreased in 2018 thought effort related.  His dyspnea has been thought to be multi-factorial and due in large part to obesity and deconditioning and smaller parts to small airways disease (treated with Flovent or symbicort) and underlying untreated sleep apnea (sleep referral repeatedly declined due to \"moving around too much at night\").  He has also been adamant that increased shortness of breath is related to agent orange exposure in the  and was encouraged to pursue formal evaluation [facility] proper [facility].    Mr. Velasco returns to clinic today stating that he feels terrible because he cannot breathe.  He tells me that his breathing has not worsened compared to our last visit in May 2018, but has not improved any.  It sounds like he would like to have knee replacements done but has been told that he needs to lose weight first.  He is frustrated that he is unable to lose weight due to not being able to breathe.  He tells me that he can ambulate around his house ok " "and is able to walk at least 2 blocks, if not more before needing to stop and rest.   He denies cough or any recent hospitalizations for respiratory issues.  He has not had any pneumonias or flares of lung disease requiring treatment with steroids since our last visit. He is on Symbicort and albuterol as needed.  He uses his albuterol daily.  He tells me he is sleeping poorly at night as well.    Recently seen by Dr. Hanks in cardiology for chest pain and dyspnea. Notes suggest they were going to obtain records of recent angiogram and [facility].  They suspected poorly controlled hypertension could be contributing to symptoms and additionally also recommended a sleep study.  Mr. Velasco spent a good deal of time perseverating about his cardiac meds, the fact that he cannot see who he [facility] for [facility],  and that he was told \"my heart is fine the problem is my arteries\".    Review of Systems:  10 of 14 systems reviewed and are negative unless otherwise stated in HPI.    Past Medical History:   Diagnosis Date     Diabetes mellitus type II 2005     History of agent Orange exposure 4/17/2013     Hypertension      Myocardial infarct (H) 01/01/1999     Primary hyperparathyroidism (H) Dx approx 2003     Stroke (H) 01/01/1998    recovered fully       Past Surgical History:   Procedure Laterality Date     BIOPSY OF SKIN LESION       BYPASS GRAFT ARTERY CORONARY       CATARACT IOL, RT/LT Bilateral 2017    [facility]     EXCISE MASS LOWER EXTREMITY Left 11/3/2017    Procedure: EXCISE MASS LOWER EXTREMITY;  Excision Mass Left Flank;  Surgeon: Marybeth Gambino MD;  Location:  OR     MOHS MICROGRAPHIC PROCEDURE       PARATHYROIDECTOMY N/A 3/21/2017    Procedure: PARATHYROIDECTOMY;  Surgeon: Julianna Short MD;  Location:  OR     PARATHYROIDECTOMY         Family History   Problem Relation Age of Onset     Melanoma Mother      Melanoma Father      Cancer No " family hx of         no skin cancer     Glaucoma No family hx of      Macular Degeneration No family hx of        Social History     Social History     Marital status:      Spouse name: N/A     Number of children: N/A     Years of education: N/A     Social History Main Topics     Smoking status: Former Smoker     Quit date: 1970     Smokeless tobacco: Former User     Alcohol use Yes      Comment: rarely     Drug use: No     Sexual activity: Not Asked     Other Topics Concern     None     Social History Narrative         Allergies   Allergen Reactions     Isosorbide Mononitrate [Isosorbide] Shortness Of Breath     Chest pain. Patient stated that the medication made his breathing symptoms worse.      Clindamycin      Eggs Other (See Comments)     Pt states no longer having reaction, childhood allergy, eats eggs       Penicillins Other (See Comments)     Pt states PCN allergy is due to egg allergy     Propoxyphene Other (See Comments)     Pain         Current Outpatient Medications:      acetaminophen 500 MG CAPS, Take 2 tablets by mouth daily., Disp: , Rfl:      albuterol (PROAIR HFA) 108 (90 BASE) MCG/ACT Inhaler, Inhale 2 puffs into the lungs every 4 hours as needed for shortness of breath / dyspnea, wheezing or other (use prior to exertion/activities), Disp: 1 Inhaler, Rfl: 3     ammonium lactate (AMLACTIN) 12 % cream, Apply  topically daily., Disp: , Rfl:      Artificial Tear Ointment (EQ RESTORE PM OP), Apply 1 strip to eye At Bedtime, Disp: , Rfl:      artificial tears SOLN, Use one drop to both eye PRN., Disp: , Rfl:      ASPIRIN PO, Take 81 mg by mouth daily, Disp: , Rfl:      ATORVASTATIN CALCIUM PO, Take 40 mg by mouth daily, Disp: , Rfl:      budesonide-formoterol (SYMBICORT) 160-4.5 MCG/ACT Inhaler, Inhale 2 puffs into the lungs 2 times daily, Disp: , Rfl:      carboxymethylcellul-glycerin (OPTIVE/REFRESH OPTIVE) 0.5-0.9 % SOLN ophthalmic solution, Place 1 drop into both eyes 4 times daily, Disp: 3  Bottle, Rfl: 4     Carboxymethylcellul-Glycerin 1-0.9 % GEL, Apply 1 Application to eye 4 times daily, Disp: 1 Bottle, Rfl: 11     ciclopirox (LOPROX) 0.77 % cream, Apply topically 2 times daily To feet and toenails., Disp: 90 g, Rfl: 5     doxycycline hyclate (VIBRAMYCIN) 100 MG capsule, Take 1 capsule (100 mg) by mouth daily, Disp: 30 capsule, Rfl: 11     econazole nitrate 1 % cream, Apply topically daily To feet and toenails., Disp: 85 g, Rfl: 5     erythromycin (ROMYCIN) 5 MG/GM ophthalmic ointment, 0.5 inches At Bedtime, Disp: , Rfl:      finasteride (PROSCAR) 5 MG tablet, Take 5 mg by mouth daily, Disp: , Rfl:      ketotifen (ZADITOR/REFRESH ANTI-ITCH) 0.025 % SOLN ophthalmic solution, Place 1 drop into both eyes 2 times daily, Disp: , Rfl:      liraglutide (VICTOZA PEN) 18 MG/3ML solution, Inject 0.6 mg subcutaneous daily for 2 weeks then increase to 1.2 mg daily subcutaneous next 2 weeks then 1.8 mg daily subcutaneous after that., Disp: 15 mL, Rfl: 5     losartan (COZAAR) 50 MG tablet, Take 1 tablet (50 mg) by mouth daily, Disp: , Rfl:      METFORMIN HCL PO, Take by mouth 2 times daily (with meals), Disp: , Rfl:      metoprolol succinate (TOPROL-XL) 100 MG 24 hr tablet, Take 0.5 tablets (50 mg) by mouth daily, Disp: , Rfl:      omeprazole (PRILOSEC) 20 MG capsule, Take 20 mg by mouth daily., Disp: , Rfl:      order for DME, Equipment being ordered: velcro compression for lower legs and feet: ready wrap or juxta fit or farrow wrap, Disp: 4 each, Rfl: 4     senna-docusate (SENOKOT-S;PERICOLACE) 8.6-50 MG per tablet, Take 1-2 tablets by mouth 2 times daily as needed for constipation Take while on oral narcotics to prevent or treat constipation., Disp: 10 tablet, Rfl: 0     VITAMIN D, CHOLECALCIFEROL, PO, Take 1,000 Units by mouth every morning , Disp: , Rfl:       Physical Exam:  Patient walked out of clinic before a full exam could be performed.  /72 (BP Location: Right arm, Patient Position: Chair, Cuff  "Size: Adult Large)   Pulse 51   Resp 18   Ht 1.803 m (5' 10.98\")   Wt 133.8 kg (295 lb)   SpO2 93%   BMI 41.16 kg/m     GENERAL: Well developed, well nourished, alert, and in no apparent distress.  HEENT: Normocephalic, atraumatic. PERRL, EOMI.   RESP:  Normal respiratory effort.    SKIN: No rash.  NEURO: AAOx3.  Normal gait.  No focal neuro deficits.  PSYCH: mentation appears normal.    Results:  PFT: failed to meet ATS criteria for testing despite repeated attempts limiting interpretation.  Despite this, Ratio 77%, FVC 82%, FEV1 84%, %, TLC 90%, DLCO 109%.  Study suggestive of normal pulmonary function and normal gas exchange.    Assessment and Plan:   Lux Velasco is a 73 year old male with a history of  CAD/MI, CVA in 2003 (no sequela), obesity (BMI 41), who presents to pulmonary clinic today for follow up of SOB.  Since he was last seen in clinic in May 2018 he is symptomatically unchanged.     1) Dyspnea chronic; multi-factorial and thought related to asthma/reactive airways disease, underlying SEYMOUR, obesity and deconditioning +/- cardiac contributions.  He is symptomatically unchanged compared with previous visits.  PFTs today are totally normal which is reassuring and argues against any significant new pulmonary pathology.  I did attempt to provide him with reassurance that his lung function was normal (dramatically improved from last PFT) and appeared overall stable on the whole.  We discussed a CPET would be the definitive test for dyspnea evaluation.  Mr. Velasco believes he had one of these some years back and was unable to complete it.  He wonders why we can't just use those results to determine the origin of his dyspnea.  He does not think there is any way he'd be able to do a CPET again because he is too short of breath.  We talked about how this would be the most definitive test, but that if he cannot complete the testing there is a possibility of it being non-diagnostic. He does not want to " "do a CPET.  2) Asthma/reactive airways disease.  Supported by CT chest imaging showing persistent bronchial wall thickening and mosaic attenuation.  Has felt symptomatically improved with Symbicort and albuterol as needed -- reasonable to continue.  No recent acute exacerbations.    3) Likely undiagnosed/untreated SEYMOUR --  I strongly suspect he has underlying sleep apnea.  This was also suspected at recent cardiology visit.  This is suggested by high STOP BANG score, daytime fatigue and poor ability to sleep at night. Referral to sleep clinic for formal evaluation has been repeatedly discussed and declined by Mr. Velasco.  He believes that he has been told that he is \"not a candidate for sleep study because he moves around too much at night.\"   4) Obesity-deconditioning. Likely the largest  of his dyspnea.  BMI is marginally increased compared to our last visit.  Decreased but adequate resting oximetry is possibly suggestive of obesity hypoventilation (CO2 26-28 on recent labs).  Although if present and he is unwilling to wear pap therapy, there is not much else that can be done about this.  Exercise and weight loss again strongly encouraged.  Unfortunately he does not have a qualifying diagnosis for pulmonary rehab so that is not an option.  We discussed referral to a dietician to help with weight management but he said he has seen them in the past and their recommendations are \"vague\" and \"too general\" to be useful to him.  He was not interested in visiting with them again.  In the midst of discussion of the above Mr. Velasco promptly stood up and said he was done with me and was leaving. Stated he was very upset that he had wasted his entire day waiting for me. (pt showed up at 11:35a for 3:30p appointment and demanded multiple times to be seen sooner).  Return to clinic as needed.  He is up to date on pneumovax (2005), and Prevnar (2013).     Nya Jacques MD  Pulmonary and Critical Care Medicine    The " above note was dictated using voice recognition software and may include typographical errors. Please contact the author for any clarifications.  I spent a total of 30 minutes face to face with Lux Velasco during today's office visit. Over 50% of this time was spent counseling the patient and/or coordinating care regarding their pulmonary disease.

## 2019-11-11 NOTE — PROGRESS NOTES
"Pulmonary Clinic Return Visit  History of Present Illness    Mr. Lux Velasco is a 73-year old male with history of CAD/MI, CVA in 2003 (no sequela), obesity (BMI 40), who presents to pulmonary clinic today for follow up of SOB.  To briefly review, he has been previously evaluated in pulmonary clinic for chronic dyspnea and undergone relatively extensive evaluation including CT chest 2017 and 2018 notable for scattered sub-4mm nodules and mild bronchial wall thickening and mosaic attenuation suggestive small airways disease.  He has continued to struggle to meet ATS criteria for PFTs but they were normal in 2017 and then significantly decreased in 2018 thought effort related.  His dyspnea has been thought to be multi-factorial and due in large part to obesity and deconditioning and smaller parts to small airways disease (treated with Flovent or symbicort) and underlying untreated sleep apnea (sleep referral repeatedly declined due to \"moving around too much at night\").  He has also been adamant that increased shortness of breath is related to agent orange exposure in the  and was encouraged to pursue [facility] evaluation [facility] proper [facility].    Mr. Velasco returns to clinic today stating that he feels terrible because he cannot breathe.  He tells me that his breathing has not worsened compared to our last visit in May 2018, but has not improved any.  It sounds like he would like to have knee replacements done but has been told that he needs to lose weight first.  He is frustrated that he is unable to lose weight due to not being able to breathe.  He tells me that he can ambulate around his house ok and is able to walk at least 2 blocks, if not more before needing to stop and rest.   He denies cough or any recent hospitalizations for respiratory issues.  He has not had any pneumonias or flares of lung disease requiring treatment with steroids since our last visit. He is on Symbicort and albuterol as needed. " " He uses his albuterol daily.  He tells me he is sleeping poorly at night as well.    Recently seen by Dr. Hanks in cardiology for chest pain and dyspnea. Notes suggest they were going to obtain records of recent angiogram and [facility].  They suspected poorly controlled hypertension could be contributing to symptoms and additionally also recommended a sleep study.  Mr. Velasco spent a good deal of time perseverating about his cardiac meds, the fact that he cannot see who he [facility],  and that he was told \"my heart is fine the problem is my arteries\".    Review of Systems:  10 of 14 systems reviewed and are negative unless otherwise stated in HPI.    Past Medical History:   Diagnosis Date     Diabetes mellitus type II 2005     History of agent Orange exposure 4/17/2013     Hypertension      Myocardial infarct (H) 01/01/1999     Primary hyperparathyroidism (H) Dx approx 2003     Stroke (H) 01/01/1998    recovered fully       Past Surgical History:   Procedure Laterality Date     BIOPSY OF SKIN LESION       BYPASS GRAFT ARTERY CORONARY       CATARACT IOL, RT/LT Bilateral 2017    [facility] at [facility]     EXCISE MASS LOWER EXTREMITY Left 11/3/2017    Procedure: EXCISE MASS LOWER EXTREMITY;  Excision Mass Left Flank;  Surgeon: Marybeth Gambino MD;  Location:  OR     MOHS MICROGRAPHIC PROCEDURE       PARATHYROIDECTOMY N/A 3/21/2017    Procedure: PARATHYROIDECTOMY;  Surgeon: Julianna Short MD;  Location:  OR     PARATHYROIDECTOMY         Family History   Problem Relation Age of Onset     Melanoma Mother      Melanoma Father      Cancer No family hx of         no skin cancer     Glaucoma No family hx of      Macular Degeneration No family hx of        Social History     Social History     Marital status:      Spouse name: N/A     Number of children: N/A     Years of education: N/A     Social History Main Topics     Smoking status: Former Smoker "     Quit date: 1970     Smokeless tobacco: Former User     Alcohol use Yes      Comment: rarely     Drug use: No     Sexual activity: Not Asked     Other Topics Concern     None     Social History Narrative         Allergies   Allergen Reactions     Isosorbide Mononitrate [Isosorbide] Shortness Of Breath     Chest pain. Patient stated that the medication made his breathing symptoms worse.      Clindamycin      Eggs Other (See Comments)     Pt states no longer having reaction, childhood allergy, eats eggs       Penicillins Other (See Comments)     Pt states PCN allergy is due to egg allergy     Propoxyphene Other (See Comments)     Pain         Current Outpatient Medications:      acetaminophen 500 MG CAPS, Take 2 tablets by mouth daily., Disp: , Rfl:      albuterol (PROAIR HFA) 108 (90 BASE) MCG/ACT Inhaler, Inhale 2 puffs into the lungs every 4 hours as needed for shortness of breath / dyspnea, wheezing or other (use prior to exertion/activities), Disp: 1 Inhaler, Rfl: 3     ammonium lactate (AMLACTIN) 12 % cream, Apply  topically daily., Disp: , Rfl:      Artificial Tear Ointment (EQ RESTORE PM OP), Apply 1 strip to eye At Bedtime, Disp: , Rfl:      artificial tears SOLN, Use one drop to both eye PRN., Disp: , Rfl:      ASPIRIN PO, Take 81 mg by mouth daily, Disp: , Rfl:      ATORVASTATIN CALCIUM PO, Take 40 mg by mouth daily, Disp: , Rfl:      budesonide-formoterol (SYMBICORT) 160-4.5 MCG/ACT Inhaler, Inhale 2 puffs into the lungs 2 times daily, Disp: , Rfl:      carboxymethylcellul-glycerin (OPTIVE/REFRESH OPTIVE) 0.5-0.9 % SOLN ophthalmic solution, Place 1 drop into both eyes 4 times daily, Disp: 3 Bottle, Rfl: 4     Carboxymethylcellul-Glycerin 1-0.9 % GEL, Apply 1 Application to eye 4 times daily, Disp: 1 Bottle, Rfl: 11     ciclopirox (LOPROX) 0.77 % cream, Apply topically 2 times daily To feet and toenails., Disp: 90 g, Rfl: 5     doxycycline hyclate (VIBRAMYCIN) 100 MG capsule, Take 1 capsule (100 mg) by  "mouth daily, Disp: 30 capsule, Rfl: 11     econazole nitrate 1 % cream, Apply topically daily To feet and toenails., Disp: 85 g, Rfl: 5     erythromycin (ROMYCIN) 5 MG/GM ophthalmic ointment, 0.5 inches At Bedtime, Disp: , Rfl:      finasteride (PROSCAR) 5 MG tablet, Take 5 mg by mouth daily, Disp: , Rfl:      ketotifen (ZADITOR/REFRESH ANTI-ITCH) 0.025 % SOLN ophthalmic solution, Place 1 drop into both eyes 2 times daily, Disp: , Rfl:      liraglutide (VICTOZA PEN) 18 MG/3ML solution, Inject 0.6 mg subcutaneous daily for 2 weeks then increase to 1.2 mg daily subcutaneous next 2 weeks then 1.8 mg daily subcutaneous after that., Disp: 15 mL, Rfl: 5     losartan (COZAAR) 50 MG tablet, Take 1 tablet (50 mg) by mouth daily, Disp: , Rfl:      METFORMIN HCL PO, Take by mouth 2 times daily (with meals), Disp: , Rfl:      metoprolol succinate (TOPROL-XL) 100 MG 24 hr tablet, Take 0.5 tablets (50 mg) by mouth daily, Disp: , Rfl:      omeprazole (PRILOSEC) 20 MG capsule, Take 20 mg by mouth daily., Disp: , Rfl:      order for DME, Equipment being ordered: velcro compression for lower legs and feet: ready wrap or juxta fit or farrow wrap, Disp: 4 each, Rfl: 4     senna-docusate (SENOKOT-S;PERICOLACE) 8.6-50 MG per tablet, Take 1-2 tablets by mouth 2 times daily as needed for constipation Take while on oral narcotics to prevent or treat constipation., Disp: 10 tablet, Rfl: 0     VITAMIN D, CHOLECALCIFEROL, PO, Take 1,000 Units by mouth every morning , Disp: , Rfl:       Physical Exam:  Patient walked out of clinic before a full exam could be performed.  /72 (BP Location: Right arm, Patient Position: Chair, Cuff Size: Adult Large)   Pulse 51   Resp 18   Ht 1.803 m (5' 10.98\")   Wt 133.8 kg (295 lb)   SpO2 93%   BMI 41.16 kg/m    GENERAL: Well developed, well nourished, alert, and in no apparent distress.  HEENT: Normocephalic, atraumatic. PERRL, EOMI.   RESP:  Normal respiratory effort.    SKIN: No rash.  NEURO: " AAOx3.  Normal gait.  No focal neuro deficits.  PSYCH: mentation appears normal.    Results:  PFT: failed to meet ATS criteria for testing despite repeated attempts limiting interpretation.  Despite this, Ratio 77%, FVC 82%, FEV1 84%, %, TLC 90%, DLCO 109%.  Study suggestive of normal pulmonary function and normal gas exchange.    Assessment and Plan:   Lux Velasco is a 73 year old male with a history of  CAD/MI, CVA in 2003 (no sequela), obesity (BMI 41), who presents to pulmonary clinic today for follow up of SOB.  Since he was last seen in clinic in May 2018 he is symptomatically unchanged.     1) Dyspnea chronic; multi-factorial and thought related to asthma/reactive airways disease, underlying SEYMOUR, obesity and deconditioning +/- cardiac contributions.  He is symptomatically unchanged compared with previous visits.  PFTs today are totally normal which is reassuring and argues against any significant new pulmonary pathology.  I did attempt to provide him with reassurance that his lung function was normal (dramatically improved from last PFT) and appeared overall stable on the whole.  We discussed a CPET would be the definitive test for dyspnea evaluation.  Mr. Velasco believes he had one of these some years back and was unable to complete it.  He wonders why we can't just use those results to determine the origin of his dyspnea.  He does not think there is any way he'd be able to do a CPET again because he is too short of breath.  We talked about how this would be the most definitive test, but that if he cannot complete the testing there is a possibility of it being non-diagnostic. He does not want to do a CPET.  2) Asthma/reactive airways disease.  Supported by CT chest imaging showing persistent bronchial wall thickening and mosaic attenuation.  Has felt symptomatically improved with Symbicort and albuterol as needed -- reasonable to continue.  No recent acute exacerbations.    3) Likely  "undiagnosed/untreated SEYMOUR --  I strongly suspect he has underlying sleep apnea.  This was also suspected at recent cardiology visit.  This is suggested by high STOP BANG score, daytime fatigue and poor ability to sleep at night. Referral to sleep clinic for formal evaluation has been repeatedly discussed and declined by Mr. Velasco.  He believes that he has been told that he is \"not a candidate for sleep study because he moves around too much at night.\"   4) Obesity-deconditioning. Likely the largest  of his dyspnea.  BMI is marginally increased compared to our last visit.  Decreased but adequate resting oximetry is possibly suggestive of obesity hypoventilation (CO2 26-28 on recent labs).  Although if present and he is unwilling to wear pap therapy, there is not much else that can be done about this.  Exercise and weight loss again strongly encouraged.  Unfortunately he does not have a qualifying diagnosis for pulmonary rehab so that is not an option.  We discussed referral to a dietician to help with weight management but he said he has seen them in the past and their recommendations are \"vague\" and \"too general\" to be useful to him.  He was not interested in visiting with them again.  In the midst of discussion of the above Mr. Velasco promptly stood up and said he was done with me and was leaving. Stated he was very upset that he had wasted his entire day waiting for me. (pt showed up at 11:35a for 3:30p appointment and demanded multiple times to be seen sooner).  Return to clinic as needed.  He is up to date on pneumovax (2005), and Prevnar (2013).     Nya Jacques MD  Pulmonary and Critical Care Medicine    The above note was dictated using voice recognition software and may include typographical errors. Please contact the author for any clarifications.  I spent a total of 30 minutes face to face with Lux Velasco during today's office visit. Over 50% of this time was spent counseling the patient and/or " coordinating care regarding their pulmonary disease.

## 2019-11-11 NOTE — NURSING NOTE
Chief Complaint   Patient presents with     RECHECK     Shortness of Breath      Medications reviewed and updated.  Vitals taken  Judit Daily CMA

## 2019-11-12 PROBLEM — J45.20 MILD INTERMITTENT ASTHMA WITHOUT COMPLICATION: Status: ACTIVE | Noted: 2019-11-12

## 2019-11-12 PROBLEM — R06.09 DYSPNEA ON EXERTION: Status: ACTIVE | Noted: 2019-11-12

## 2019-11-12 RX ORDER — CICLOPIROX OLAMINE 7.7 MG/G
CREAM TOPICAL 2 TIMES DAILY
Qty: 90 G | Refills: 5 | Status: ON HOLD | OUTPATIENT
Start: 2019-11-12 | End: 2024-02-19

## 2019-11-12 RX ORDER — CICLOPIROX OLAMINE 7.7 MG/G
CREAM TOPICAL 2 TIMES DAILY
Qty: 90 G | Refills: 5 | Status: SHIPPED | OUTPATIENT
Start: 2019-11-12 | End: 2019-11-12

## 2019-11-13 LAB
DLCOUNC-%PRED-PRE: 109 %
DLCOUNC-PRE: 28.49 ML/MIN/MMHG
DLCOUNC-PRED: 26.07 ML/MIN/MMHG
ERV-%PRED-PRE: 76 %
ERV-PRE: 0.33 L
ERV-PRED: 0.43 L
EXPTIME-PRE: 3.95 SEC
FEF2575-%PRED-PRE: 96 %
FEF2575-PRE: 2.24 L/SEC
FEF2575-PRED: 2.32 L/SEC
FEFMAX-%PRED-PRE: 56 %
FEFMAX-PRE: 4.65 L/SEC
FEFMAX-PRED: 8.17 L/SEC
FEV1-%PRED-PRE: 84 %
FEV1-PRE: 2.68 L
FEV1FEV6-PRE: 74 %
FEV1FEV6-PRED: 77 %
FEV1FVC-PRE: 77 %
FEV1FVC-PRED: 75 %
FEV1SVC-PRE: 77 %
FEV1SVC-PRED: 65 %
FIFMAX-PRE: 3.91 L/SEC
FRCPLETH-%PRED-PRE: 91 %
FRCPLETH-PRE: 3.46 L
FRCPLETH-PRED: 3.8 L
FVC-%PRED-PRE: 82 %
FVC-PRE: 3.48 L
FVC-PRED: 4.24 L
IC-%PRED-PRE: 71 %
IC-PRE: 3.16 L
IC-PRED: 4.42 L
RVPLETH-%PRED-PRE: 113 %
RVPLETH-PRE: 3.13 L
RVPLETH-PRED: 2.76 L
TLCPLETH-%PRED-PRE: 90 %
TLCPLETH-PRE: 6.62 L
TLCPLETH-PRED: 7.33 L
VA-%PRED-PRE: 81 %
VA-PRE: 5.38 L
VC-%PRED-PRE: 72 %
VC-PRE: 3.49 L
VC-PRED: 4.84 L

## 2019-11-25 RX ORDER — CLOTRIMAZOLE 1 %
CREAM (GRAM) TOPICAL 2 TIMES DAILY
Qty: 60 G | Refills: 5 | Status: ON HOLD | OUTPATIENT
Start: 2019-11-25 | End: 2024-02-19

## 2019-12-03 ENCOUNTER — OFFICE VISIT (OUTPATIENT)
Dept: OPHTHALMOLOGY | Facility: CLINIC | Age: 74
End: 2019-12-03
Attending: OPHTHALMOLOGY
Payer: MEDICARE

## 2019-12-03 DIAGNOSIS — Z96.1 PSEUDOPHAKIA, BOTH EYES: ICD-10-CM

## 2019-12-03 DIAGNOSIS — H02.224 MECHANICAL LAGOPHTHALMOS OF LEFT UPPER EYELID: ICD-10-CM

## 2019-12-03 DIAGNOSIS — H01.01B ULCERATIVE BLEPHARITIS OF UPPER AND LOWER EYELIDS OF BOTH EYES: Primary | ICD-10-CM

## 2019-12-03 DIAGNOSIS — H02.23B PARALYTIC LAGOPHTHALMOS OF UPPER AND LOWER EYELID OF LEFT EYE: ICD-10-CM

## 2019-12-03 DIAGNOSIS — H52.13 MYOPIC ASTIGMATISM OF BOTH EYES: ICD-10-CM

## 2019-12-03 DIAGNOSIS — E11.9 DIABETES MELLITUS TYPE 2 WITHOUT RETINOPATHY (H): ICD-10-CM

## 2019-12-03 DIAGNOSIS — H04.123 DRY EYES, BILATERAL: ICD-10-CM

## 2019-12-03 DIAGNOSIS — H52.203 MYOPIC ASTIGMATISM OF BOTH EYES: ICD-10-CM

## 2019-12-03 DIAGNOSIS — H01.01A ULCERATIVE BLEPHARITIS OF UPPER AND LOWER EYELIDS OF BOTH EYES: Primary | ICD-10-CM

## 2019-12-03 PROCEDURE — G0463 HOSPITAL OUTPT CLINIC VISIT: HCPCS | Mod: ZF

## 2019-12-03 ASSESSMENT — VISUAL ACUITY
OD_CC: 20/20
OD_CC: J1
OS_CC+: -1
METHOD: SNELLEN - LINEAR
OS_CC: J1
CORRECTION_TYPE: GLASSES
OS_CC: 20/20

## 2019-12-03 ASSESSMENT — CUP TO DISC RATIO
OD_RATIO: 0.3
OS_RATIO: 0.3

## 2019-12-03 ASSESSMENT — EXTERNAL EXAM - LEFT EYE: OS_EXAM: NORMAL

## 2019-12-03 ASSESSMENT — TONOMETRY
OD_IOP_MMHG: 18
OS_IOP_MMHG: 18
IOP_METHOD: TONOPEN

## 2019-12-03 ASSESSMENT — CONF VISUAL FIELD
METHOD: COUNTING FINGERS
OD_NORMAL: 1
OS_NORMAL: 1

## 2019-12-03 ASSESSMENT — EXTERNAL EXAM - RIGHT EYE: OD_EXAM: NORMAL

## 2019-12-03 NOTE — PROGRESS NOTES
HPI:  Lux Velasco is a 73 year old male here for ophthalmic evaluation.     HPI     Annual Eye Exam     In both eyes.  Occurring constantly.  It is worse throughout the day.  Since onset it is stable.  Associated symptoms include Negative for eye pain.  Treatments tried include eye drops, artificial tears, ointment and warm compresses.  Pain was noted as 0/10.              Comments     EES romina BID to BE  Lubricating ointment at HS three times overnight.  Celluvisc at least four times a day to BE  Use of probable Genteal drops / BID to BE / pt not very sure of name of drop.  Doxy daily.  Hot, not lukewarm compresses daily.    BRITTANY Andersen COT 1:22 PM 12/03/2019              Last edited by Lady Crowley COT on 12/3/2019  1:22 PM. (History)        Current Drops/Meds:  - Doxycycline 100mg daily    - ATs and AT gel: 4 times per day  - Ointment at night (erythromycin or lubricating)  - Hot compresses and lid scrubs ~ BID     POH: Dry eye/blepharitis  PMH: Diabetes type 2 (HbA1c 6.4, 5/2018), HTN (med controlled)  FH: No FH of AMD or glc  SH: Former smoker    ASSESSMENT & PLAN:    1. Ulcerative blepharitis of upper and lower eyelids of both eyes    2. Dry eyes, bilateral    3. Mechanical lagophthalmos of left upper eyelid    4. Diabetes mellitus type 2 without retinopathy (H)    5. Pseudophakia, both eyes    6. Myopic astigmatism of both eyes       Continue warm compresses and eyelid scrubs at least daily  Continue ATs during day and ointment at bedtime   Continue Doxycycline 100mg daily   Recommend adding Tranquileyes goggles      On metformin. Last A1c 8.3 6/2019 (reports increase of oral hypoglycemic and weight loss attempts)  Discussed the importance of tight blood glucose control  Recommend yearly dilated eye exam      Patient disposition: Return in about 1 year (around 12/3/2020) for DFE ([facility]).     Joce Ibarra MD  Ophthalmology Resident, PGY-4  [facility]       Attending  Physician Attestation:  Complete documentation of historical and exam elements from today's encounter can be found in the full encounter summary report (not reduplicated in this progress note).  I personally obtained the chief complaint(s) and history of present illness.  I confirmed and edited as necessary the review of systems, past medical/surgical history, family history, social history, and examination findings as documented by others; and I examined the patient myself.  I personally reviewed the relevant tests, images, and reports as documented above.  I formulated and edited as necessary the assessment and plan and discussed the findings and management plan with the patient and family. . - Tarik Campbell MD

## 2019-12-03 NOTE — NURSING NOTE
Chief Complaints and History of Present Illnesses   Patient presents with     Annual Eye Exam     Chief Complaint(s) and History of Present Illness(es)     Annual Eye Exam     Laterality: both eyes    Frequency: constantly    Timing: throughout the day    Course: stable    Associated symptoms: Negative for eye pain    Treatments tried: eye drops, artificial tears, ointment and warm compresses    Pain scale: 0/10              Comments     EES romina BID to BE  Lubricating ointment at HS three times overnight.  Celluvisc at least four times a day to BE  Use of probable Genteal drops / BID to BE / pt not very sure of name of drop.  Doxy daily.  Hot, not lukewarm compresses daily.    Lady Crowley, COT COT 1:22 PM 12/03/2019

## 2019-12-05 ENCOUNTER — TELEPHONE (OUTPATIENT)
Dept: OPHTHALMOLOGY | Facility: CLINIC | Age: 74
End: 2019-12-05

## 2019-12-05 NOTE — TELEPHONE ENCOUNTER
Note to facilitator to assist in review also.  Triage unaware of any call  Phill Shafer RN 12:06 PM 12/05/19        [facility]    Phone Message    May a detailed message be left on voicemail: yes    Reason for Call: Other: Patient states that he recieved an automated call from the eye clinic last night (12/4) that stated that they had urgent information to discuss but the call dropped. there are no notes or encounters indicating an outgoing call. please call pt back to confirm if there is anything he needs to know about.     Action Taken: Message routed to:  [facility]  eye

## 2020-03-16 ENCOUNTER — TELEPHONE (OUTPATIENT)
Dept: OPHTHALMOLOGY | Facility: CLINIC | Age: 75
End: 2020-03-16

## 2020-03-16 NOTE — TELEPHONE ENCOUNTER
Due to the COVID-19 virus, Dr. Hussein has okayed rescheduling the 03/18/2020 appointment. Please schedule a 2-3 month follow-up.  [facility] clinic facilitators number ([phone]).

## 2020-03-23 ENCOUNTER — TELEPHONE (OUTPATIENT)
Dept: OPHTHALMOLOGY | Facility: CLINIC | Age: 75
End: 2020-03-23

## 2020-03-23 NOTE — TELEPHONE ENCOUNTER
[facility] Call Center    Phone Message    May a detailed message be left on voicemail: yes     Reason for Call: Symptoms or Concerns     If patient has red-flag symptoms, warm transfer to triage line    Current symptom or concern: Painful, Watery Eyes    Symptoms have been present for:  1 week(s)    Has patient previously been seen for this? Yes    By : Dr. Hussein    Date: 03/23/2020    Are there any new or worsening symptoms? Yes: Painful, Watery Eyes      Action Taken: Message routed to:  [facility]    Travel Screening: Not Applicable

## 2020-03-24 NOTE — TELEPHONE ENCOUNTER
Spoke to pt who reports burning pain and tearing in left eye since last week. He has been using artificial tears, erythromycin ointment. Told patient that I would call back as I needed to tend to something else. Attempted to all pt back but unable to reach. No VM option available.     Kvng Carballo MD  Ophthalmology Resident, PGY-2

## 2020-04-01 ENCOUNTER — TELEPHONE (OUTPATIENT)
Dept: OPHTHALMOLOGY | Facility: CLINIC | Age: 75
End: 2020-04-01

## 2020-04-01 NOTE — TELEPHONE ENCOUNTER
I called both Mr. Velasco's home and mobile numbers without response. I left a voicemail at the mobile number letting him know that I would plan on calling him again around 10:30am tomorrow, 4/2/20.    Migdalia Hussein MD  Comprehensive Ophthalmology & Ocular Pathology  Department of Ophthalmology and Visual Neurosciences  [email or web address]  Pager [phone]

## 2020-04-02 ENCOUNTER — TELEPHONE (OUTPATIENT)
Dept: OPHTHALMOLOGY | Facility: CLINIC | Age: 75
End: 2020-04-02
Payer: MEDICARE

## 2020-04-02 DIAGNOSIS — H01.01B ULCERATIVE BLEPHARITIS OF UPPER AND LOWER EYELIDS OF BOTH EYES: Primary | ICD-10-CM

## 2020-04-02 DIAGNOSIS — Z96.1 PSEUDOPHAKIA, BOTH EYES: ICD-10-CM

## 2020-04-02 DIAGNOSIS — H04.123 DRY EYES, BILATERAL: ICD-10-CM

## 2020-04-02 DIAGNOSIS — H02.224 MECHANICAL LAGOPHTHALMOS OF LEFT UPPER EYELID: ICD-10-CM

## 2020-04-02 DIAGNOSIS — H01.01A ULCERATIVE BLEPHARITIS OF UPPER AND LOWER EYELIDS OF BOTH EYES: Primary | ICD-10-CM

## 2020-04-02 DIAGNOSIS — E11.9 DIABETES MELLITUS TYPE 2 WITHOUT RETINOPATHY (H): ICD-10-CM

## 2020-04-02 DIAGNOSIS — H52.13 MYOPIC ASTIGMATISM OF BOTH EYES: ICD-10-CM

## 2020-04-02 DIAGNOSIS — H52.203 MYOPIC ASTIGMATISM OF BOTH EYES: ICD-10-CM

## 2020-04-02 RX ORDER — DOXYCYCLINE 100 MG/1
100 CAPSULE ORAL 2 TIMES DAILY
Qty: 60 CAPSULE | Refills: 11 | Status: SHIPPED | OUTPATIENT
Start: 2020-04-02 | End: 2023-04-26

## 2020-04-02 RX ORDER — ERYTHROMYCIN 5 MG/G
0.5 OINTMENT OPHTHALMIC
Qty: 2 TUBE | Refills: 11 | Status: SHIPPED | OUTPATIENT
Start: 2020-04-02 | End: 2022-06-27

## 2020-04-02 NOTE — TELEPHONE ENCOUNTER
"HPI:  Lux Velasco is a 74 year old male. He had a follow-up scheduled in the eye clinic, 4/1/2020. Due to coronavirus pandemic, the patient consents to transition this to a telephone visit. The patient was last seen in our clinic on 12/3/2019. The patient does not have another scheduled visit in the next 24 hours.     This visit is for evaluation of bilateral eye irritation and pain present for about a months. The pain is intermittent, sharp, and 4-5/10 in severity. There is associated foreign body sensation, eyelid crusting, and redness. The symptoms are all worse in the left eye. He was prescribed erythromycin ointment by \"Anan\" who \"saved his life.\" He is using the erythromycin ointment 6 times a day with lubricating ointment and eye drops in between. He states he is also doing warm compresses twice daily and taking his doxycycline once daily. If he uses the ointments, his eyes feel better. If he does not use them, they feel worse. He denies changes in vision. No flashes/floaters.    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   Increase doxycycline 100mg to BID for 1 month, then return to daily ([facility])    Plug LLL in place at last exam. Had declined replacing the others at that time.  Again discussed trying Restasis, but he's not interested at this time.     (E11.9) Diabetes mellitus type 2 without retinopathy (H)  Comment: On metformin. No diabetic retinopathy at last DFE 12/2019.  Plan: Discussed the importance of tight blood glucose control in the prevention of diabetic retinopathy. Recommend yearly dilated eye " exam.     (Z96.1) Pseudophakia of both eyes  Comment: Clear visual axis at last exam.  Plan: Observe     (H52.13,  H52.203) Myopia with astigmatism and presbyopia, bilateral  Comment: [facility].     -----------------------------------------------------------------------------------    Patient disposition:   Return to clinic 3-4 months or when pandemic passes for ocular surface check.    I spent 25 minutes from 10:18AM to 10:43AM, 4/2/20, in conversation with this patient regarding his medical issues above for this visit.     Migdalia Hussein MD  Comprehensive Ophthalmology & Ocular Pathology  Department of Ophthalmology and Visual Neurosciences  [email or web address]  Pager [phone]

## 2020-04-08 ENCOUNTER — TELEPHONE (OUTPATIENT)
Dept: INTERNAL MEDICINE | Facility: CLINIC | Age: 75
End: 2020-04-08

## 2020-04-08 NOTE — TELEPHONE ENCOUNTER
"[facility] Call Center    Phone Message    May a detailed message be left on voicemail: yes     Reason for Call: Other: Pt requesting call back from Mi, and no one else. Pt also stated he is having eye concerns and they would not see him at the eye clinic     Action Taken: Message routed to:  [facility]    Travel Screening: Not Applicable     States has had \"pink eye\" for a couple weeks.  Taking eye ointment everynight. Erythromycin at night. Spoke to Dr Hussein today and she suggested Restasis and doxycycline along with the erythromycin.Denies double or blurry viision. No new drainage. Pt to call 04/09/2020 with waqas Pratt RN 12:31 PM on 4/8/2020.  .                                                                     "

## 2020-04-09 ENCOUNTER — TELEPHONE (OUTPATIENT)
Dept: OPHTHALMOLOGY | Facility: CLINIC | Age: 75
End: 2020-04-09

## 2020-04-09 NOTE — TELEPHONE ENCOUNTER
[facility] Call Center    Phone Message    May a detailed message be left on voicemail: yes     Reason for Call: Other: Pt states he is returning a call to Mi.  Please follow up.      Action Taken: Other: PCC    Travel Screening: Not Applicable     Pt state eye feeling better using medication as directed. No new symptoms. Clinic number to call if concerns or questions.  Mi Pratt RN 4:09 PM on 4/9/2020.

## 2020-05-19 ENCOUNTER — TELEPHONE (OUTPATIENT)
Dept: OPHTHALMOLOGY | Facility: CLINIC | Age: 75
End: 2020-05-19

## 2020-05-19 NOTE — TELEPHONE ENCOUNTER
Spoke to pt at 1219  Pt requesting appt with Dr. Hussein for continued dry eyes-- was unable to see previously secondary to COVID scheduling concerns  Able to schedule with Dr. Carey/Dr. Hussein in June    reviewed with COVID concerns could not state Dr. hussein would also evaluate pt, but would be apart of patients care    Pt wants guarantee Dr. hussein would evaluate and would like evaluation before august.    Pt does not want to scheduled in resident/continuity  until clarified Dr. Hussein would see    Note to Dr. Hussein/facilitator  Phill Shafer RN 12:28 PM 05/19/20             [facility] Call Center    Phone Message    May a detailed message be left on voicemail: yes     Reason for Call: Symptoms or Concerns     If patient has red-flag symptoms, warm transfer to triage line    Current symptom or concern: Per Patient is wanting to get a call back in regards to getting a sooner apt than 08/19/2020. Patient states has dry eyes     Symptoms have been present for:  n/s day(s)    Has patient previously been seen for this? Yes    By Keenan    Date: 5/19/2020    Are there any new or worsening symptoms? no      Action Taken: Message routed to:  Other: EYE    Travel Screening: Not Applicable

## 2020-06-17 ENCOUNTER — TELEPHONE (OUTPATIENT)
Dept: PSYCHIATRY | Facility: CLINIC | Age: 75
End: 2020-06-17

## 2020-06-17 NOTE — TELEPHONE ENCOUNTER
Reason for Call:  Other: very unique request.     Detailed comments: Pt called regarding research revolving around umbilical chord blood?? Pt was wondering if danay farley would be candidate for this form of treatment. He specifically requested to leave a message for Divina Olivas.     Phone Number Patient can be reached at: Cell number on file:    Telephone Information:   Mobile [phone]       Best Time: any    Can we leave a detailed message on this number? YES    Call taken on 6/17/2020 at 12:33 PM by Nam Coppola

## 2020-06-18 NOTE — TELEPHONE ENCOUNTER
This is not a current or past patient of BHARGAV Cortés.     We are unclear why this message on cord blood for a third party would come to our Psych RN triage pool.    Routing to PCP for follow-up if appropriate.

## 2020-06-22 ENCOUNTER — TELEPHONE (OUTPATIENT)
Dept: OPHTHALMOLOGY | Facility: CLINIC | Age: 75
End: 2020-06-22

## 2020-06-22 NOTE — TELEPHONE ENCOUNTER
[facility] already, the second time I hand wrote a note with a specific explanation.     Above per Dr. sandy Shafer RN 3:10 PM 06/26/20          [facility] Call Center    Phone Message    May a detailed message be left on voicemail: yes     Reason for Call: Medication Question or concern regarding medication   Prescription Clarification  Name of Medication: doxycycline hyclate (VIBRAMYCIN) 100 MG capsule   Prescribing Provider: Dr. Hussein   Pharmacy: [facility] - [place], [place] - [address]   What on the order needs clarification? Pt states when he had spoken with Dr. Hussein last, she had instructed him to increase his dosage of this medication. Pt's pharmacy informed him they are unable to authorize this until they get documentation from provider. Please advise; their fax is [phone].    Action Taken: Message routed to:  [facility] Ophthalmology    Travel Screening: Not Applicable

## 2020-06-24 NOTE — LETTER
Patient:  Lux Velasco  :   1945  MRN:     [id number]        Mr. Lux Velasco  [address]   [place] [address]        2018    Dear Mr. Velasco,    Thank you for choosing the [facility] for your healthcare needs.  We appreciate the opportunity to serve you.    The following are your recent test results.     Dear Lux;     I recommend you keep your 2018 pulmonary follow up with Dr. Jacques     Results for orders placed or performed in visit on 18   XR Chest 2 Views    Narrative    XR CHEST 2 VW  2018 4:13 PM      HISTORY: ; SOB (shortness of breath)    COMPARISON: 2017    FINDINGS: PA and lateral views of the chest. Streaky bibasilar  opacities. No pneumothorax or effusion. Unchanged wedging of a lower  thoracic vertebral body. Heart size is normal.      Impression    IMPRESSION: Streaky bibasilar atelectasis or infection.    I have personally reviewed the examination and initial interpretation  and I agree with the findings.    MIKE MOSQUEDA MD   EKG Performed in Clinic w/ Provider Reading Fee   Result Value Ref Range    Interpretation ECG Click View Image link to view waveform and result          Please contact your provider if you have any questions or concerns.  We look forward to serving your needs in the future.      Sincerely,    Dr Duffy/JENNIFER      
OB/GYN

## 2020-07-07 ENCOUNTER — TELEPHONE (OUTPATIENT)
Dept: ORTHOPEDICS | Facility: CLINIC | Age: 75
End: 2020-07-07

## 2020-07-07 ENCOUNTER — OFFICE VISIT (OUTPATIENT)
Dept: INTERNAL MEDICINE | Facility: CLINIC | Age: 75
End: 2020-07-07
Payer: MEDICARE

## 2020-07-07 VITALS
OXYGEN SATURATION: 95 % | WEIGHT: 270 LBS | HEART RATE: 59 BPM | DIASTOLIC BLOOD PRESSURE: 78 MMHG | SYSTOLIC BLOOD PRESSURE: 125 MMHG | BODY MASS INDEX: 37.67 KG/M2

## 2020-07-07 DIAGNOSIS — Z12.5 ENCOUNTER FOR SCREENING FOR MALIGNANT NEOPLASM OF PROSTATE: ICD-10-CM

## 2020-07-07 DIAGNOSIS — R73.09 ELEVATED GLUCOSE: ICD-10-CM

## 2020-07-07 DIAGNOSIS — R53.83 FATIGUE, UNSPECIFIED TYPE: ICD-10-CM

## 2020-07-07 DIAGNOSIS — I10 BENIGN ESSENTIAL HYPERTENSION: Primary | ICD-10-CM

## 2020-07-07 DIAGNOSIS — Z12.83 SKIN CANCER SCREENING: ICD-10-CM

## 2020-07-07 DIAGNOSIS — I10 BENIGN ESSENTIAL HYPERTENSION: ICD-10-CM

## 2020-07-07 LAB
ALBUMIN SERPL-MCNC: 3.5 G/DL (ref 3.4–5)
ALP SERPL-CCNC: 115 U/L (ref 40–150)
ALT SERPL W P-5'-P-CCNC: 38 U/L (ref 0–70)
ANION GAP SERPL CALCULATED.3IONS-SCNC: 5 MMOL/L (ref 3–14)
AST SERPL W P-5'-P-CCNC: 18 U/L (ref 0–45)
BASOPHILS # BLD AUTO: 0 10E9/L (ref 0–0.2)
BASOPHILS NFR BLD AUTO: 0.7 %
BILIRUB SERPL-MCNC: 0.5 MG/DL (ref 0.2–1.3)
BUN SERPL-MCNC: 16 MG/DL (ref 7–30)
CALCIUM SERPL-MCNC: 8.2 MG/DL (ref 8.5–10.1)
CHLORIDE SERPL-SCNC: 107 MMOL/L (ref 94–109)
CHOLEST SERPL-MCNC: 99 MG/DL
CO2 SERPL-SCNC: 26 MMOL/L (ref 20–32)
CREAT SERPL-MCNC: 0.84 MG/DL (ref 0.66–1.25)
DIFFERENTIAL METHOD BLD: ABNORMAL
EOSINOPHIL # BLD AUTO: 0.3 10E9/L (ref 0–0.7)
EOSINOPHIL NFR BLD AUTO: 4.5 %
ERYTHROCYTE [DISTWIDTH] IN BLOOD BY AUTOMATED COUNT: 14.2 % (ref 10–15)
GFR SERPL CREATININE-BSD FRML MDRD: 85 ML/MIN/{1.73_M2}
GLUCOSE SERPL-MCNC: 133 MG/DL (ref 70–99)
HBA1C MFR BLD: 7.6 % (ref 0–5.6)
HCT VFR BLD AUTO: 39.4 % (ref 40–53)
HDLC SERPL-MCNC: 43 MG/DL
HGB BLD-MCNC: 12.6 G/DL (ref 13.3–17.7)
IMM GRANULOCYTES # BLD: 0 10E9/L (ref 0–0.4)
IMM GRANULOCYTES NFR BLD: 0.3 %
LDLC SERPL CALC-MCNC: 35 MG/DL
LYMPHOCYTES # BLD AUTO: 2 10E9/L (ref 0.8–5.3)
LYMPHOCYTES NFR BLD AUTO: 34.4 %
MCH RBC QN AUTO: 27.6 PG (ref 26.5–33)
MCHC RBC AUTO-ENTMCNC: 32 G/DL (ref 31.5–36.5)
MCV RBC AUTO: 86 FL (ref 78–100)
MONOCYTES # BLD AUTO: 0.4 10E9/L (ref 0–1.3)
MONOCYTES NFR BLD AUTO: 6.4 %
NEUTROPHILS # BLD AUTO: 3.1 10E9/L (ref 1.6–8.3)
NEUTROPHILS NFR BLD AUTO: 53.7 %
NONHDLC SERPL-MCNC: 56 MG/DL
NRBC # BLD AUTO: 0 10*3/UL
NRBC BLD AUTO-RTO: 0 /100
PLATELET # BLD AUTO: 144 10E9/L (ref 150–450)
POTASSIUM SERPL-SCNC: 4 MMOL/L (ref 3.4–5.3)
PROT SERPL-MCNC: 7.4 G/DL (ref 6.8–8.8)
PSA SERPL-ACNC: 0.12 UG/L (ref 0–4)
RBC # BLD AUTO: 4.56 10E12/L (ref 4.4–5.9)
SODIUM SERPL-SCNC: 138 MMOL/L (ref 133–144)
TRIGL SERPL-MCNC: 106 MG/DL
TSH SERPL DL<=0.005 MIU/L-ACNC: 2.02 MU/L (ref 0.4–4)
WBC # BLD AUTO: 5.8 10E9/L (ref 4–11)

## 2020-07-07 RX ORDER — CYCLOSPORINE 0.5 MG/ML
1 EMULSION OPHTHALMIC 2 TIMES DAILY
COMMUNITY
End: 2021-03-08

## 2020-07-07 ASSESSMENT — PAIN SCALES - GENERAL: PAINLEVEL: MILD PAIN (3)

## 2020-07-07 NOTE — TELEPHONE ENCOUNTER
[facility] Call Center    Phone Message    May a detailed message be left on voicemail: yes     Reason for Call: Other: Lux calling to request an in clinic appointment with Dr. Gold as recommended by Dr. Duffy.  He states he has a concern about his right big toe that does not allow him to ear comfortable shoes.  Per guidelines, only virtual appointments are allowed for big toe concerns.  He is requesting an in clinic appointment if possible.  Please review and then call him to discuss scheduling options     Action Taken: Message routed to:  [facility]    Travel Screening: Not Applicable

## 2020-07-07 NOTE — PATIENT INSTRUCTIONS
Your health care Provider has recommended that you receive the new Shingle vaccine called Shingrix to prevent shingles for ages 50 and above. Many private insurance and Medicare Part D plans cover Shingrix. However, not all insurance carriers cover the entire cost of the Shingrix vaccine if the vaccine is administered at your primary care clinic. The clinic cannot determine your insurance benefits.  Please call your insurance carrier prior. The vaccine comes in two doses. Your second dose should be 2-6 months from your first dose.       Prior to receiving the vaccine, we recommend that you call your insurance carrier and ask them the following questions:            1. Is there a cost difference if I receive the vaccine at my doctor's office or a pharmacy?          2. Does my insurance cover the Shingrix Vaccine and administration of the vaccine?          3. What is my co-pay or deductible for the vaccine?        Please call to schedule a Nurse-Visit only at [phone].  Nurse Visit hours are available Monday, Wednesday, and Friday from 9:00 AM-11:00 AM and 1:00 PM-3:00 PM.

## 2020-07-07 NOTE — PROGRESS NOTES
HPI:    Lux comes in for a physical today. Overall stable and doing well. He has B knee pain followed at the [facility]. He will get injections in the next few weeks and considering knee surgery (replacement) depending upon his sxs. And weight. He gets some exercise from walking. He states his blood sugars can run high but no data. No other HEENT, cardiopulmonary, abdominal, , neurological, systemic, psychiatric, lymphatic, endocrine vascular complaints. He does not smoke nor abuse EtOH.     Past Medical History:   Diagnosis Date     Diabetes mellitus type II 2005     History of agent Orange exposure 4/17/2013     Hypertension      Myocardial infarct (H) 01/01/1999     Primary hyperparathyroidism (H) Dx approx 2003     Stroke (H) 01/01/1998    recovered fully     Past Surgical History:   Procedure Laterality Date     BIOPSY OF SKIN LESION       BYPASS GRAFT ARTERY CORONARY       CATARACT IOL, RT/LT [facility]ilateral [facility]    [facility]     EXCISE MASS LOWER EXTREMITY Left 11/3/2017    Procedure: EXCISE MASS LOWER EXTREMITY;  Excision Mass Left Flank;  Surgeon: Marybeth Gambino MD;  Location:  OR     MOHS MICROGRAPHIC PROCEDURE       PARATHYROIDECTOMY N/A 3/21/2017    Procedure: PARATHYROIDECTOMY;  Surgeon: Julianna Short MD;  Location:  OR     PARATHYROIDECTOMY         PE:    Vitals noted, gen, nad, cooperative alert, HEENT, he is wearing a mask, neck supple nl rom, lungs with good air movement, RRR, S1, S2, no MRG, abdomen, no acute findings, obese, grossly normal neurological exam.       A/P:    1. Seen in Pulmonary by Dr. Jacques, 11/11/2019 for SOB  2. Seen by Dr. Hanks, Cardiology, 8/27/2020. Ordered fasting lipids  3. Check with insurance regarding Shingrex  4. Placed lab orders to check PSA  5. HNT; stable, ordered labs today 7/7/2020  6. Ordered A1c for elevated glucose in the past  7. Seen in Endocrine by Dr. Mancilla 9/4/2019 for h/o hyperparathyroidism   8. Colonoscopy at [facility] 8/15 repeat  in 10 years  9. He will get B knee injections at the [facility] this month. Considering knee replacement(s)

## 2020-07-07 NOTE — NURSING NOTE
Chief Complaint   Patient presents with     Physical     pt here for annual       Ladan Pace CMA, EMT at 7:58 AM on 7/7/2020.

## 2020-07-08 NOTE — TELEPHONE ENCOUNTER
Called pt and scheduled pt with Dr. Gold for first new available.        -Larry, ATC- Orthopedics

## 2020-07-29 ENCOUNTER — OFFICE VISIT (OUTPATIENT)
Dept: ORTHOPEDICS | Facility: CLINIC | Age: 75
End: 2020-07-29
Payer: MEDICARE

## 2020-07-29 DIAGNOSIS — B35.1 ONYCHOMYCOSIS: Primary | ICD-10-CM

## 2020-07-29 DIAGNOSIS — E11.49 TYPE II OR UNSPECIFIED TYPE DIABETES MELLITUS WITH NEUROLOGICAL MANIFESTATIONS, NOT STATED AS UNCONTROLLED(250.60) (H): ICD-10-CM

## 2020-07-29 NOTE — PROGRESS NOTES
Date of Service: 7/29/2020    Chief Complaint   Patient presents with     Consult     Patient stated that he wears compression stockings and every time he takes them off his ankles swell. Hx of ingrown toe nails. Pain, right great toe. Patient [facility]           HPI: Lux is a 74 year old male who presents today for a diabetic foot exam and management. He relates that he gets edema I the ankle that resolves with compression socks. He uses ketoconazole cream on the nails. If he doesn't use it, the nails get thick. He uses diabetic shoes and these control his foot pain. Relates that the medial-distal corners of BL halluces get ingrown and painful.     Review of Systems: No n/v/d/f/c/ns/sob/cp    PMH:   Past Medical History:   Diagnosis Date     Diabetes mellitus type II 2005     History of agent Orange exposure 4/17/2013     Hypertension      Myocardial infarct (H) 01/01/1999     Primary hyperparathyroidism (H) Dx approx 2003     Stroke (H) 01/01/1998    recovered fully       PSxH:   Past Surgical History:   Procedure Laterality Date     BIOPSY OF SKIN LESION       BYPASS GRAFT ARTERY CORONARY       CATARACT IOL, RT/LT Bilateral 2017    [facility]     EXCISE MASS LOWER EXTREMITY Left 11/3/2017    Procedure: EXCISE MASS LOWER EXTREMITY;  Excision Mass Left Flank;  Surgeon: Marybeth Gambino MD;  Location:  OR     MOHS MICROGRAPHIC PROCEDURE       PARATHYROIDECTOMY N/A 3/21/2017    Procedure: PARATHYROIDECTOMY;  Surgeon: Julianna Short MD;  [facility]     PARATHYROIDECTOMY         Allergies: Isosorbide mononitrate [isosorbide]; Clindamycin; Eggs; Penicillins; and Propoxyphene    SH:   Social History     Socioeconomic History     Marital status:      Spouse name: Not on file     Number of children: Not on file     Years of education: Not on file     Highest education level: Not on file   Occupational History     Not on file   Social Needs     Financial  resource strain: Not on file     Food insecurity     Worry: Not on file     Inability: Not on file     Transportation needs     Medical: Not on file     Non-medical: Not on file   Tobacco Use     Smoking status: Former Smoker     Last attempt to quit: 1970     Years since quittin.6     Smokeless tobacco: Former User     Tobacco comment: Doesn't remember how much he used to smoke - during service only.    Substance and Sexual Activity     Alcohol use: Yes     Comment: 1 beer/week     Drug use: No     Sexual activity: Not on file   Lifestyle     Physical activity     Days per week: Not on file     Minutes per session: Not on file     Stress: Not on file   Relationships     Social connections     Talks on phone: Not on file     Gets together: Not on file     Attends Jewish service: Not on file     Active member of club or organization: Not on file     Attends meetings of clubs or organizations: Not on file     Relationship status: Not on file     Intimate partner violence     Fear of current or ex partner: Not on file     Emotionally abused: Not on file     Physically abused: Not on file     Forced sexual activity: Not on file   Other Topics Concern     Parent/sibling w/ CABG, MI or angioplasty before 65F 55M? Not Asked   Social History Narrative     Not on file       FH:   Family History   Problem Relation Age of Onset     Melanoma Mother      Melanoma Father      Cancer No family hx of         no skin cancer     Glaucoma No family hx of      Macular Degeneration No family hx of        Objective:  Data Unavailable Data Unavailable Data Unavailable Data Unavailable Data Unavailable 0 lbs 0 oz    Hemoglobin A1C   Date Value Ref Range Status   2020 7.6 (H) 0 - 5.6 % Final     Comment:     Normal <5.7% Prediabetes 5.7-6.4%  Diabetes 6.5% or higher - adopted from ADA   consensus guidelines.     2019 8.3 (H) 0 - 5.6 % Final     Comment:     Normal <5.7% Prediabetes 5.7-6.4%  Diabetes 6.5% or higher -  adopted from ADA   consensus guidelines.     04/25/2018 6.4 0 - 6.4 % Final     Comment:     Normal <5.7% Prediabetes 5.7-6.4%  Diabetes 6.5% or higher - adopted from ADA   consensus guidelines.     04/05/2017 5.7 4.3 - 6.0 % Final   09/02/2010 6.6 (H) 4.3 - 6.0 % Final     Comment:     Testing performed in clinic   11/24/2009 6.4 (H) 4.3 - 6.0 % Final     Comment:     Testing performed in clinic         PT and DP pulses are 1/4 bilaterally. CRT is 1 second. Positive pedal hair.   Gross sensation is intact bilaterally. Protective sensation is diminished as demonstrated with a 5.07G monofilament.   Equinus is noted bilaterally. No pain with active or passive ROM of the ankle, MTJ, 1st ray, or halluces bilaterally,. Pes planus.   Nails incurvated on the medial-distal corners of BL halluces without s/s of infection. Nails very minimally thickened. No open lesions are noted.     Assessment: DM2 with polyneuropathy and incurvated BL hallux nails.  Mild onychomycosis.     Plan:  - Pt seen and evaluated  - Offending hallux nail corners were trimmed back.  - Cont compression socks.  - Rx for Jublia for the nails.  - He has rx for diabetic shoes.   - See again PRN if he doesn't [facility].

## 2020-07-29 NOTE — NURSING NOTE
Reason For Visit:   Chief Complaint   Patient presents with     Consult     Patient stated that he wears compression stockings and every time he takes them off his ankles swell. Hx of ingrown toe nails. Pain, right great toe. Patient stated [facility].        Pain Assessment  Patient Currently in Pain: Yes(Right great toe)        Allergies   Allergen Reactions     Isosorbide Mononitrate [Isosorbide] Shortness Of Breath     Chest pain. Patient stated that the medication made his breathing symptoms worse.      Clindamycin      Eggs Other (See Comments)     Pt states no longer having reaction, childhood allergy, eats eggs       Penicillins Other (See Comments)     Pt states PCN allergy is due to egg allergy     Propoxyphene Other (See Comments)     Pain           Ena Bose LPN

## 2020-07-29 NOTE — LETTER
7/29/2020         RE: Lux Velasco  [address]  [facility] [place] [address]        Dear Colleague,    Thank you for referring your patient, Lux Velasco, to the [facility]. Please see a copy of my visit note below.    Date of Service: 7/29/2020    Chief Complaint   Patient presents with     Consult     Patient stated that he wears compression stockings and every time he takes them off his ankles swell. Hx of ingrown toe nails. Pain, right great toe. [facility]           HPI: Lux is a 74 year old male who presents today for a diabetic foot exam and management. He relates that he gets edema I the ankle that resolves with compression socks. He uses ketoconazole cream on the nails. If he doesn't use it, the nails get thick. He uses diabetic shoes and these control his foot pain. Relates that the medial-distal corners of BL halluces get ingrown and painful.     Review of Systems: No n/v/d/f/c/ns/sob/cp    PMH:   Past Medical History:   Diagnosis Date     Diabetes mellitus type II 2005     History of agent Orange exposure 4/17/2013     Hypertension      Myocardial infarct (H) 01/01/1999     Primary hyperparathyroidism (H) Dx approx 2003     Stroke (H) 01/01/1998    recovered fully       PSxH:   Past Surgical History:   Procedure Laterality Date     BIOPSY OF SKIN LESION       BYPASS GRAFT ARTERY CORONARY       CATARACT IOL, RT/LT Bilateral 2017    [facility]     EXCISE MASS LOWER EXTREMITY Left 11/3/2017    Procedure: EXCISE MASS LOWER EXTREMITY;  Excision Mass Left Flank;  Surgeon: Marybeth Gambino MD;  Location:  OR     MOHS MICROGRAPHIC PROCEDURE       PARATHYROIDECTOMY N/A 3/21/2017    Procedure: PARATHYROIDECTOMY;  Surgeon: Julianna Short MD;  Location:  OR     PARATHYROIDECTOMY         Allergies: Isosorbide mononitrate [isosorbide]; Clindamycin; Eggs; Penicillins; and Propoxyphene    SH:   Social History     Socioeconomic  History     Marital status:      Spouse name: Not on file     Number of children: Not on file     Years of education: Not on file     Highest education level: Not on file   Occupational History     Not on file   Social Needs     Financial resource strain: Not on file     Food insecurity     Worry: Not on file     Inability: Not on file     Transportation needs     Medical: Not on file     Non-medical: Not on file   Tobacco Use     Smoking status: Former Smoker     Last attempt to quit: 1970     Years since quittin.6     Smokeless tobacco: Former User     Tobacco comment: Doesn't remember how much he used to smoke - during service only.    Substance and Sexual Activity     Alcohol use: Yes     Comment: 1 beer/week     Drug use: No     Sexual activity: Not on file   Lifestyle     Physical activity     Days per week: Not on file     Minutes per session: Not on file     Stress: Not on file   Relationships     Social connections     Talks on phone: Not on file     Gets together: Not on file     Attends Adventism service: Not on file     Active member of club or organization: Not on file     Attends meetings of clubs or organizations: Not on file     Relationship status: Not on file     Intimate partner violence     Fear of current or ex partner: Not on file     Emotionally abused: Not on file     Physically abused: Not on file     Forced sexual activity: Not on file   Other Topics Concern     Parent/sibling w/ CABG, MI or angioplasty before 65F 55M? Not Asked   Social History Narrative     Not on file       FH:   Family History   Problem Relation Age of Onset     Melanoma Mother      Melanoma Father      Cancer No family hx of         no skin cancer     Glaucoma No family hx of      Macular Degeneration No family hx of        Objective:  Data Unavailable Data Unavailable Data Unavailable Data Unavailable Data Unavailable 0 lbs 0 oz    Hemoglobin A1C   Date Value Ref Range Status   2020 7.6 (H) 0 - 5.6  % Final     Comment:     Normal <5.7% Prediabetes 5.7-6.4%  Diabetes 6.5% or higher - adopted from ADA   consensus guidelines.     06/26/2019 8.3 (H) 0 - 5.6 % Final     Comment:     Normal <5.7% Prediabetes 5.7-6.4%  Diabetes 6.5% or higher - adopted from ADA   consensus guidelines.     04/25/2018 6.4 0 - 6.4 % Final     Comment:     Normal <5.7% Prediabetes 5.7-6.4%  Diabetes 6.5% or higher - adopted from ADA   consensus guidelines.     04/05/2017 5.7 4.3 - 6.0 % Final   09/02/2010 6.6 (H) 4.3 - 6.0 % Final     Comment:     Testing performed in clinic   11/24/2009 6.4 (H) 4.3 - 6.0 % Final     Comment:     Testing performed in clinic         PT and DP pulses are 1/4 bilaterally. CRT is 1 second. Positive pedal hair.   Gross sensation is intact bilaterally. Protective sensation is diminished as demonstrated with a 5.07G monofilament.   Equinus is noted bilaterally. No pain with active or passive ROM of the ankle, MTJ, 1st ray, or halluces bilaterally,. Pes planus.   Nails incurvated on the medial-distal corners of BL halluces without s/s of infection. Nails very minimally thickened. No open lesions are noted.     Assessment: DM2 with polyneuropathy and incurvated BL hallux nails.  Mild onychomycosis.     Plan:  - Pt seen and evaluated  - Offending hallux nail corners were trimmed back.  - Cont compression socks.  - Rx for Jublia for the nails.  - He has rx for diabetic shoes.   - See again PRN if [facility]'t [facility].       Again, thank you for allowing me to participate in the care of your patient.        Sincerely,        Eric Gold DPM

## 2020-08-10 ENCOUNTER — TELEPHONE (OUTPATIENT)
Dept: DERMATOLOGY | Facility: CLINIC | Age: 75
End: 2020-08-10

## 2020-08-10 ENCOUNTER — OFFICE VISIT (OUTPATIENT)
Dept: DERMATOLOGY | Facility: CLINIC | Age: 75
End: 2020-08-10
Payer: MEDICARE

## 2020-08-10 DIAGNOSIS — Z85.828 HISTORY OF SKIN CANCER: ICD-10-CM

## 2020-08-10 DIAGNOSIS — L21.9 DERMATITIS, SEBORRHEIC: Primary | ICD-10-CM

## 2020-08-10 DIAGNOSIS — L57.0 AK (ACTINIC KERATOSIS): ICD-10-CM

## 2020-08-10 RX ORDER — HYDROCORTISONE 25 MG/G
OINTMENT TOPICAL 2 TIMES DAILY
Qty: 30 G | Refills: 6 | Status: ON HOLD | OUTPATIENT
Start: 2020-08-10 | End: 2023-07-07

## 2020-08-10 ASSESSMENT — PAIN SCALES - GENERAL: PAINLEVEL: NO PAIN (0)

## 2020-08-10 NOTE — NURSING NOTE
"Chief Complaint   Patient presents with     Skin Check     Lux is here today for skin check and sores on the head. \" Rash \" on the chin.      MARQUIS RAMIREZ on 8/10/2020 at 1:48 PM    "

## 2020-08-10 NOTE — TELEPHONE ENCOUNTER
[facility] at [phone]    Hydrocortisone 2.55 OINT.   TOPICAL  Apply topically 2 times daily.     Quant: 30 gram   Refill: 6     Dermatitis seborrheic L21.9    Radha SR CMA

## 2020-08-10 NOTE — PROGRESS NOTES
"[facility] Dermatology Note      Dermatology Problem List:  1. History of NMSC- most of his care is at the [facility] and we don't have a complete record of his skin cancers  - BCC, right upper back s/p ED&C  - Unknown Type, right cheek  -Unknown Type, left hand s/p Mohs   2. Actinic keratoses s/p cryo  - Hypertrophic AK, right forearm s/p biopsy 11/28/16  - AK, left lower inferior orbital rim s/p biopsy 11/28/16  3. Verruca vulgaris, right second finger s/p cryo  4. ISKs s/p cryo  5. Skin cancer screening waist up per patient preference 8/10/2020    CC:   Chief Complaint   Patient presents with     Skin Check     Lux is here today for skin check and sores on the head. \" Rash \" on the chin.          Encounter Date: Aug 10, 2020    History of Present Illness:  Mr. Lux Velasco is a 74 year old male who with a history of skin cancer presents for several sores that he is picking at and a rash of his chin.  [facility] and received [facility] treatment but no biopsies.  He feels he has dandruff of his face and scalp for which he is using Sebulex shampoo recommended by his cousin and it helps.    Past Medical History:   Patient Active Problem List   Diagnosis     Plantar fascial fibromatosis     History of agent Orange exposure     Degenerative arthritis of cervical spine     Stroke (H)     Skin exam, screening for cancer     Primary hyperparathyroidism (H)     Benign prostatic hyperplasia with weak urinary stream     Acquired cyst of kidney     Dyspnea on exertion     Mild intermittent asthma without complication     Past Medical History:   Diagnosis Date     Diabetes mellitus type II 2005     History of agent Orange exposure 4/17/2013     Hypertension      Myocardial infarct (H) 01/01/1999     Primary hyperparathyroidism (H) Dx approx 2003     Stroke (H) 01/01/1998    recovered fully     Past Surgical History:   Procedure Laterality Date     BIOPSY " OF SKIN LESION       BYPASS GRAFT ARTERY CORONARY       CATARACT IOL, RT/LT Bilateral 2017    [facility]     EXCISE MASS LOWER EXTREMITY Left 11/3/2017    Procedure: EXCISE MASS LOWER EXTREMITY;  Excision Mass Left Flank;  Surgeon: Marybeth Gambino MD;  Location:  OR     MOHS MICROGRAPHIC PROCEDURE       PARATHYROIDECTOMY N/A 3/21/2017    Procedure: PARATHYROIDECTOMY;  Surgeon: Julianna Short MD;  Location: [facility] OR     PARATHYROIDECTOMY         Social History:  Patient reports that he quit smoking about 50 years ago. He has quit using smokeless tobacco. He reports current alcohol use. He reports that he does not use drugs.    Family History:  Family History   Problem Relation Age of Onset     Melanoma Mother      Melanoma Father      Cancer No family hx of         no skin cancer     Glaucoma No family hx of      Macular Degeneration No family hx of        Medications:  Current Outpatient Medications   Medication Sig Dispense Refill     acetaminophen 500 MG CAPS Take 2 tablets by mouth daily.       albuterol (PROAIR HFA) 108 (90 BASE) MCG/ACT Inhaler Inhale 2 puffs into the lungs every 4 hours as needed for shortness of breath / dyspnea, wheezing or other (use prior to exertion/activities) 1 Inhaler 3     ammonium lactate (AMLACTIN) 12 % cream Apply  topically daily.       Artificial Tear Ointment (EQ RESTORE PM OP) Apply 1 strip to eye At Bedtime       artificial tears SOLN Use one drop to both eye PRN.       ASPIRIN PO Take 81 mg by mouth daily       ATORVASTATIN CALCIUM PO Take 40 mg by mouth daily       budesonide-formoterol (SYMBICORT) 160-4.5 MCG/ACT Inhaler Inhale 2 puffs into the lungs 2 times daily       carboxymethylcellul-glycerin (OPTIVE/REFRESH OPTIVE) 0.5-0.9 % SOLN ophthalmic solution Place 1 drop into both eyes 4 times daily 3 Bottle 4     Carboxymethylcellul-Glycerin 1-0.9 % GEL Apply 1 Application to eye 4 times daily 1 Bottle 11     ciclopirox (LOPROX) 0.77 % cream Apply  topically 2 times daily To feet and toenails 90 g 5     clotrimazole (LOTRIMIN) 1 % external cream Apply topically 2 times daily To feet and toenails. 60 g 5     cycloSPORINE (RESTASIS) 0.05 % ophthalmic emulsion 1 drop 2 times daily       doxycycline hyclate (VIBRAMYCIN) 100 MG capsule Take 1 capsule (100 mg) by mouth 2 times daily For 1 month, then decrease to daily 60 capsule 11     econazole nitrate 1 % cream Apply topically daily To feet and toenails. 85 g 5     Efinaconazole 10 % SOLN Externally apply topically daily 4 mL 11     erythromycin (ROMYCIN) 5 MG/GM ophthalmic ointment Place 0.5 inches into both eyes 6 times daily 2 Tube 11     finasteride (PROSCAR) 5 MG tablet Take 5 mg by mouth daily       ketotifen (ZADITOR/REFRESH ANTI-ITCH) 0.025 % SOLN ophthalmic solution Place 1 drop into both eyes 2 times daily       liraglutide (VICTOZA PEN) 18 MG/3ML solution Inject 0.6 mg subcutaneous daily for 2 weeks then increase to 1.2 mg daily subcutaneous next 2 weeks then 1.8 mg daily subcutaneous after that. 15 mL 5     losartan (COZAAR) 50 MG tablet Take 1 tablet (50 mg) by mouth daily       METFORMIN HCL PO Take by mouth 2 times daily (with meals)       metoprolol succinate (TOPROL-XL) 100 MG 24 hr tablet Take 0.5 tablets (50 mg) by mouth daily       omeprazole (PRILOSEC) 20 MG capsule Take 20 mg by mouth daily.       order for DME Equipment being ordered: velcro compression for lower legs and feet: ready wrap or juxta fit or farrow wrap 4 each 4     senna-docusate (SENOKOT-S;PERICOLACE) 8.6-50 MG per tablet Take 1-2 tablets by mouth 2 times daily as needed for constipation Take while on oral narcotics to prevent or treat constipation. 10 tablet 0     VITAMIN D, CHOLECALCIFEROL, PO Take 1,000 Units by mouth every morning        Allergies   Allergen Reactions     Isosorbide Mononitrate [Isosorbide] Shortness Of Breath     Chest pain. Patient stated that the medication made his breathing symptoms worse.       Clindamycin      Eggs Other (See Comments)     Pt states no longer having reaction, childhood allergy, eats eggs       Penicillins Other (See Comments)     Pt states PCN allergy is due to egg allergy     Propoxyphene Other (See Comments)     Pain         .    Physical exam:  Vitals: There were no vitals taken for this visit.  GEN: This is a well developed, well-nourished male in no acute distress, in a pleasant mood.    SKIN: Waist-up skin, which includes the head/face, neck, both arms, chest, back, abdomen, digits and/or nails was examined. Per patient preference  -[place] Toro skin type: I  -There are erythematous macules with overyling adherent scale on the vertex scalp, right forearm x 2, right dorsal hand, and right temple   -There is no erythema, telangectasias, nodularity, or pigmentation on the previous skin cancer sites.  -red scaly patches of the lower chin  -No other lesions of concern on areas examined.     Impression/Plan:  1. Actinic keratosis right hand, right arm x 2, vertex scalp, right temple  - Cryotherapy procedure note: After verbal consent and discussion of risks and benefits including but no limited to dyspigmentation/scar, blister, and pain, 5 was(were) treated with 1-2mm freeze border for 2 cycles with liquid nitrogen. Post cryotherapy instructions were provided.  -    2. History of nonmelanoma skin cancer, no clincial evidence of recurrence  - Continue sun protection  -    3. Seborrheic dermatitis  - Continue Sebulex shampoo as a scalp and face wash 3 times weekly  - Add 2.5% hydrocortisone ointment for rash on chin ([facility])      CC Nam Duffy MD  [address] [place] [phone] on close of this encounter.  Follow-up in 1 year, earlier for new or changing lesions.       Staff Involved:  Staff Only

## 2020-08-10 NOTE — LETTER
"8/10/2020       RE: Lux Velasco  [address]  [facility] [address]     Dear Colleague,    Thank you for referring your patient, Lux Velasco, to the [facility] DERMATOLOGY at [facility]. Please see a copy of my visit note below.    [facility] Dermatology Note      Dermatology Problem List:  1. History of NMSC- most of his care is at the [facility] and we don't have a complete record of his skin cancers  - BCC, right upper back s/p ED&C  - Unknown Type, right cheek  -Unknown Type, left hand s/p Mohs   2. Actinic keratoses s/p cryo  - Hypertrophic AK, right forearm s/p biopsy 11/28/16  - AK, left lower inferior orbital rim s/p biopsy 11/28/16  3. Verruca vulgaris, right second finger s/p cryo  4. ISKs s/p cryo  5. Skin cancer screening waist up per patient preference 8/10/2020    CC:   Chief Complaint   Patient presents with     Skin Check     Lux is here today for skin check and sores on the head. \" Rash \" on the chin.          Encounter Date: Aug 10, 2020    History of Present Illness:  Mr. Lux Velasco is a 74 year old male who with a history of skin cancer presents for several sores that he is picking at and a rash of his chin.  [facility] and received cryotherapy treatment but no biopsies.  He feels he has dandruff of his face and scalp for which he is using Sebulex shampoo recommended by his cousin and it helps.    Past Medical History:   Patient Active Problem List   Diagnosis     Plantar fascial fibromatosis     History of agent Orange exposure     Degenerative arthritis of cervical spine     Stroke (H)     Skin exam, screening for cancer     Primary hyperparathyroidism (H)     Benign prostatic hyperplasia with weak urinary stream     Acquired cyst of kidney     Dyspnea on exertion     Mild intermittent asthma without complication     Past Medical History:   Diagnosis Date     " Diabetes mellitus type II 2005     History of agent Orange exposure 4/17/2013     Hypertension      Myocardial infarct (H) 01/01/1999     Primary hyperparathyroidism (H) Dx approx 2003     Stroke (H) 01/01/1998    recovered fully     Past Surgical History:   Procedure Laterality Date     BIOPSY OF SKIN LESION       BYPASS GRAFT ARTERY CORONARY       CATARACT IOL, RT/LT Bilateral 2017    [facility]     EXCISE MASS LOWER EXTREMITY Left 11/3/2017    Procedure: EXCISE MASS LOWER EXTREMITY;  Excision Mass Left Flank;  Surgeon: Marybeth Gambino MD;  Location:  OR     MOHS MICROGRAPHIC PROCEDURE       PARATHYROIDECTOMY N/A 3/21/2017    Procedure: PARATHYROIDECTOMY;  Surgeon: Julianna Short MD;  Location:  OR     PARATHYROIDECTOMY         Social History:  Patient reports that he quit smoking about 50 years ago. He has quit using smokeless tobacco. He reports current alcohol use. He reports that he does not use drugs.    Family History:  Family History   Problem Relation Age of Onset     Melanoma Mother      Melanoma Father      Cancer No family hx of         no skin cancer     Glaucoma No family hx of      Macular Degeneration No family hx of        Medications:  Current Outpatient Medications   Medication Sig Dispense Refill     acetaminophen 500 MG CAPS Take 2 tablets by mouth daily.       albuterol (PROAIR HFA) 108 (90 BASE) MCG/ACT Inhaler Inhale 2 puffs into the lungs every 4 hours as needed for shortness of breath / dyspnea, wheezing or other (use prior to exertion/activities) 1 Inhaler 3     ammonium lactate (AMLACTIN) 12 % cream Apply  topically daily.       Artificial Tear Ointment (EQ RESTORE PM OP) Apply 1 strip to eye At Bedtime       artificial tears SOLN Use one drop to both eye PRN.       ASPIRIN PO Take 81 mg by mouth daily       ATORVASTATIN CALCIUM PO Take 40 mg by mouth daily       budesonide-formoterol (SYMBICORT) 160-4.5 MCG/ACT Inhaler Inhale 2 puffs into the lungs 2 times daily        carboxymethylcellul-glycerin (OPTIVE/REFRESH OPTIVE) 0.5-0.9 % SOLN ophthalmic solution Place 1 drop into both eyes 4 times daily 3 Bottle 4     Carboxymethylcellul-Glycerin 1-0.9 % GEL Apply 1 Application to eye 4 times daily 1 Bottle 11     ciclopirox (LOPROX) 0.77 % cream Apply topically 2 times daily To feet and toenails 90 g 5     clotrimazole (LOTRIMIN) 1 % external cream Apply topically 2 times daily To feet and toenails. 60 g 5     cycloSPORINE (RESTASIS) 0.05 % ophthalmic emulsion 1 drop 2 times daily       doxycycline hyclate (VIBRAMYCIN) 100 MG capsule Take 1 capsule (100 mg) by mouth 2 times daily For 1 month, then decrease to daily 60 capsule 11     econazole nitrate 1 % cream Apply topically daily To feet and toenails. 85 g 5     Efinaconazole 10 % SOLN Externally apply topically daily 4 mL 11     erythromycin (ROMYCIN) 5 MG/GM ophthalmic ointment Place 0.5 inches into both eyes 6 times daily 2 Tube 11     finasteride (PROSCAR) 5 MG tablet Take 5 mg by mouth daily       ketotifen (ZADITOR/REFRESH ANTI-ITCH) 0.025 % SOLN ophthalmic solution Place 1 drop into both eyes 2 times daily       liraglutide (VICTOZA PEN) 18 MG/3ML solution Inject 0.6 mg subcutaneous daily for 2 weeks then increase to 1.2 mg daily subcutaneous next 2 weeks then 1.8 mg daily subcutaneous after that. 15 mL 5     losartan (COZAAR) 50 MG tablet Take 1 tablet (50 mg) by mouth daily       METFORMIN HCL PO Take by mouth 2 times daily (with meals)       metoprolol succinate (TOPROL-XL) 100 MG 24 hr tablet Take 0.5 tablets (50 mg) by mouth daily       omeprazole (PRILOSEC) 20 MG capsule Take 20 mg by mouth daily.       order for DME Equipment being ordered: velcro compression for lower legs and feet: ready wrap or juxta fit or farrow wrap 4 each 4     senna-docusate (SENOKOT-S;PERICOLACE) 8.6-50 MG per tablet Take 1-2 tablets by mouth 2 times daily as needed for constipation Take while on oral narcotics to prevent or treat  constipation. 10 tablet 0     VITAMIN D, CHOLECALCIFEROL, PO Take 1,000 Units by mouth every morning        Allergies   Allergen Reactions     Isosorbide Mononitrate [Isosorbide] Shortness Of Breath     Chest pain. Patient stated that the medication made his breathing symptoms worse.      Clindamycin      Eggs Other (See Comments)     Pt states no longer having reaction, childhood allergy, eats eggs       Penicillins Other (See Comments)     Pt states PCN allergy is due to egg allergy     Propoxyphene Other (See Comments)     Pain         .    Physical exam:  Vitals: There were no vitals taken for this visit.  GEN: This is a well developed, well-nourished male in no acute distress, in a pleasant mood.    SKIN: Waist-up skin, which includes the head/face, neck, both arms, chest, back, abdomen, digits and/or nails was examined. Per patient preference  -[place] Toro skin type: I  -There are erythematous macules with overyling adherent scale on the vertex scalp, right forearm x 2, right dorsal hand, and right temple   -There is no erythema, telangectasias, nodularity, or pigmentation on the previous skin cancer sites.  -red scaly patches of the lower chin  -No other lesions of concern on areas examined.     Impression/Plan:  1. Actinic keratosis right hand, right arm x 2, vertex scalp, right temple  - Cryotherapy procedure note: After verbal consent and discussion of risks and benefits including but no limited to dyspigmentation/scar, blister, and pain, 5 was(were) treated with 1-2mm freeze border for 2 cycles with liquid nitrogen. Post cryotherapy instructions were provided.  -    2. History of nonmelanoma skin cancer, no clincial evidence of recurrence  - Continue sun protection  -    3. Seborrheic dermatitis  - Continue Sebulex shampoo as a scalp and face wash 3 times weekly  - Add 2.5% hydrocortisone ointment for rash on chin ([facility])      CC Nam Duffy MD  [address] [place] [address]  [place] [address] on close of this encounter.  Follow-up in 1 year, earlier for new or changing lesions.       Staff Involved:  Staff Only          Again, thank you for allowing me to participate in the care of your patient.      Sincerely,    Shirin Shore MD

## 2020-08-10 NOTE — PATIENT INSTRUCTIONS
Cryotherapy    What is it?    Use of a very cold liquid, such as liquid nitrogen, to freeze and destroy abnormal skin cells that need to be removed    What should I expect?    Tenderness and redness    A small blister that might grow and fill with dark purple blood. There may be crusting.    More than one treatment may be needed if the lesions do not go away.    How do I care for the treated area?    Gently wash the area with your hands when bathing.    Use a thin layer of Vaseline to help with healing. You may use a Band-Aid.     The area should heal within 7-10 days and may leave behind a pink or lighter color.     Do not use an antibiotic or Neosporin ointment.     You may take acetaminophen (Tylenol) for pain.     Call your Doctor if you have:    Severe pain    Signs of infection (warmth, redness, cloudy yellow drainage, and or a bad smell)    Questions or concerns    Who should I call with questions?       [facility]: [phone]       [facility]: [phone]       For urgent needs outside of business hours call the [facility] at [phone]        and ask for the dermatology resident on call

## 2020-12-07 ENCOUNTER — OFFICE VISIT (OUTPATIENT)
Dept: OPHTHALMOLOGY | Facility: CLINIC | Age: 75
End: 2020-12-07
Attending: OPHTHALMOLOGY
Payer: MEDICARE

## 2020-12-07 DIAGNOSIS — H04.123 DRY EYES, BILATERAL: Primary | ICD-10-CM

## 2020-12-07 PROBLEM — E11.9 DIABETES MELLITUS, TYPE 2 (H): Status: ACTIVE | Noted: 2020-12-07

## 2020-12-07 PROCEDURE — 99214 OFFICE O/P EST MOD 30 MIN: CPT | Mod: 25 | Performed by: OPHTHALMOLOGY

## 2020-12-07 PROCEDURE — 68761 CLOSE TEAR DUCT OPENING: CPT | Performed by: OPHTHALMOLOGY

## 2020-12-07 PROCEDURE — G0463 HOSPITAL OUTPT CLINIC VISIT: HCPCS | Mod: 25

## 2020-12-07 ASSESSMENT — EXTERNAL EXAM - LEFT EYE: OS_EXAM: FLOPPY LIDS

## 2020-12-07 ASSESSMENT — REFRACTION_WEARINGRX
OD_ADD: +2.00
OD_AXIS: 007
OD_SPHERE: -0.50
OS_ADD: +2.00
OS_CYLINDER: +0.50
OS_AXIS: 180
OS_SPHERE: -0.50
OD_CYLINDER: +1.50

## 2020-12-07 ASSESSMENT — VISUAL ACUITY
OD_CC: 20/20
OS_CC: 20/20
METHOD: SNELLEN - LINEAR

## 2020-12-07 ASSESSMENT — EXTERNAL EXAM - RIGHT EYE: OD_EXAM: FLOPPY LIDS

## 2020-12-07 ASSESSMENT — TONOMETRY
IOP_METHOD: ICARE
OS_IOP_MMHG: 13
OD_IOP_MMHG: 12

## 2020-12-07 ASSESSMENT — CONF VISUAL FIELD
OD_NORMAL: 1
OS_NORMAL: 1

## 2020-12-07 NOTE — PROGRESS NOTES
CC: Dry eye    Referring provider: Dr. Hussein    HPI:  Lux Velasco is a(n) 74 year old male who presents for dry eye evaluation. Patient very bothered by ocular dryness, irritation, mild redness. 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 He is putting ointment in every 2-3 hours. He is very frustrated. No new flashes, floaters, or diplopia. No pain, redness, or tearing.       POHx:  Pseuodphakia each eye     Glasses: Yes  CTL wearer: No    Family hx of eye disease: N/a  Social Hx: (-) smoking, (-) EtOH, (-) illicit drugs    Meds:  Using PF gel (Celluvisc) or erythromycin ointment every 2-4 hours and at night  Restasis BID - burns but pt thinks it helps (has been on since 2019)  Doxy 100 daily    A/P:  #Dry eyes, both eyes  #Anterior & posterior blepharitis  - pt thinks Restasis and ointment are helping, but he is still symptomatic  - discussed many options including: dissolvable plugs (previously had silicone plugs per patient which fell out), adding Xiidra, bandage CL's, trial of brimonidine (for rosacea). Other future options include serum tears or scleral lens (pt not interested in sclerals)    - Place [place] BLL plugs today (Extended duration)  - Begin Xiidra BID OU  - Cont Restasis - pt thinks this is helping  - Continue warm compresses and eyelid scrubs at least daily  - Continue frequent gel and ointment  - Continue Doxycycline 100mg daily   - Pt wearing eye patch at nighttime but it is moving/coming off -- Switch to Glad Press N Seal at nighttime    #Allergic conjunctivitis  - Continue ketotifen BID - this is helping    #Floppy eyelids  - no h/o SEYMOUR - pt unsure if he had previous sleep study  - does not wear CPAP  - Could consider tarsal strip in future      Follow up: 3 months  --    Ayde Dubon MD  Cornea & External Disease Fellow    Attending Physician Attestation:  Complete documentation of historical and exam elements from today's  encounter can be found in the full encounter summary report (not reduplicated in this progress note).  I personally obtained the chief complaint(s) and history of present illness.  I confirmed and edited as necessary the review of systems, past medical/surgical history, family history, social history, and examination findings as documented by others; and I examined the patient myself.  I personally reviewed the relevant tests, images, and reports as documented above.  I formulated and edited as necessary the assessment and plan and discussed the findings and management plan with the patient and family. I was present for the key portions of the procedure and immediately available for the remainder. - Tarik Lyons MD    I personally spent great than 40min with the patient, of which >50% of the time was spent face to face with the patient, counseling and coordinating care with the patient. This excludes time spent performing the procedure. We discussed the complexity of his diagnosis, the need for further information prior to proceeding with yet another surgery, and the unknown prognosis for the patient at this time.    Tarik Lyons MD

## 2020-12-07 NOTE — PATIENT INSTRUCTIONS
TRY GLAD PRESS N SEAL AROUND THE EYES AT BEDTIME    START XIIDRA 2x/ DAY IN BOTH EYES    CONTINUE RESTASIS 2x/DAY IN BOTH EYES    CONTINUE FREQUENT OINTMENT AND/OR REFRESH CELLUVISC DROPS    CONTINUE DOXYCYCLINE ONCE DAILY    CONTINUE WARM COMPRESSES and LID SCRUBS WITH BABY SHAMPOO, ONCE DAILY

## 2020-12-07 NOTE — NURSING NOTE
Chief Complaints and History of Present Illnesses   Patient presents with     Blurred Vision Follow-Up     Chief Complaint(s) and History of Present Illness(es)     Blurred Vision Follow-Up               Comments     Lux Velasco is a 74 year old male who presents today for    1. Ulcerative blepharitis of upper and lower eyelids of both eyes   2. Dry eyes, bilateral   3. Mechanical lagophthalmos of left upper eyelid   4. Diabetes mellitus type 2 without retinopathy (H)   5. Pseudophakia, both eyes   6. Myopic astigmatism of both eyes     Feels vision is worse today.   Stanley Mosleymadustin CO 1:28 PM December 7, 2020

## 2021-03-08 ENCOUNTER — TELEPHONE (OUTPATIENT)
Dept: OPHTHALMOLOGY | Facility: CLINIC | Age: 76
End: 2021-03-08

## 2021-03-08 ENCOUNTER — OFFICE VISIT (OUTPATIENT)
Dept: OPHTHALMOLOGY | Facility: CLINIC | Age: 76
End: 2021-03-08
Attending: OPHTHALMOLOGY
Payer: COMMERCIAL

## 2021-03-08 DIAGNOSIS — H04.123 DRY EYES, BILATERAL: Primary | ICD-10-CM

## 2021-03-08 DIAGNOSIS — H01.01B ULCERATIVE BLEPHARITIS OF UPPER AND LOWER EYELIDS OF BOTH EYES: ICD-10-CM

## 2021-03-08 DIAGNOSIS — H01.01A ULCERATIVE BLEPHARITIS OF UPPER AND LOWER EYELIDS OF BOTH EYES: ICD-10-CM

## 2021-03-08 DIAGNOSIS — Z96.1 PSEUDOPHAKIA, BOTH EYES: ICD-10-CM

## 2021-03-08 PROCEDURE — G0463 HOSPITAL OUTPT CLINIC VISIT: HCPCS

## 2021-03-08 PROCEDURE — 99215 OFFICE O/P EST HI 40 MIN: CPT | Mod: GC | Performed by: OPHTHALMOLOGY

## 2021-03-08 RX ORDER — CYCLOSPORINE 0.5 MG/ML
1 EMULSION OPHTHALMIC 2 TIMES DAILY
Qty: 60 EACH | Refills: 3 | Status: SHIPPED | OUTPATIENT
Start: 2021-03-08 | End: 2021-09-13

## 2021-03-08 ASSESSMENT — VISUAL ACUITY
OS_CC: 20/25
OD_CC: 20/20
METHOD: SNELLEN - LINEAR
CORRECTION_TYPE: GLASSES

## 2021-03-08 ASSESSMENT — TONOMETRY
OS_IOP_MMHG: 12
IOP_METHOD: TONOPEN
OD_IOP_MMHG: 15

## 2021-03-08 ASSESSMENT — EXTERNAL EXAM - LEFT EYE: OS_EXAM: FLOPPY LIDS

## 2021-03-08 ASSESSMENT — REFRACTION_WEARINGRX
OS_AXIS: 180
OD_SPHERE: -0.50
OS_CYLINDER: +0.50
OS_SPHERE: -0.50
OD_ADD: +2.00
OD_CYLINDER: +1.50
OS_ADD: +2.00
OD_AXIS: 007

## 2021-03-08 ASSESSMENT — CONF VISUAL FIELD
METHOD: COUNTING FINGERS
OD_NORMAL: 1
OS_NORMAL: 1

## 2021-03-08 ASSESSMENT — EXTERNAL EXAM - RIGHT EYE: OD_EXAM: FLOPPY LIDS

## 2021-03-08 ASSESSMENT — SLIT LAMP EXAM - LIDS
COMMENTS: MGD, BLEPHARITIS
COMMENTS: MGD

## 2021-03-08 NOTE — PROGRESS NOTES
CC: Dry eye    Referring provider: Dr. Hussein    HPI:  Lux Velasco is a(n) 74 year old male who presents for dry eye evaluation. Patient very bothered by ocular dryness, irritation, mild redness. 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 He is putting ointment in every 2-3 hours. He is very frustrated. No new flashes, floaters, or diplopia. No pain, redness, or tearing.     Interval:  Dry eye f/u, no significant changes.  Using press n' seal.  Eyes uncomfortable when not using lubrication, but feel okay when using drops or ointment, though vision may decrease with ointment.   Started Xiidra, Press n seal. Patient tapes shields over both eyes.    Pt is going to have surgery next month on his left leg.     POHx:  Pseuodphakia each eye     Glasses: Yes  CTL wearer: No    Family hx of eye disease: N/a  Social Hx: (-) smoking, (-) EtOH, (-) illicit drugs    Meds:  Using PF gel (Celluvisc) or lubricating ointment or erythromycin ointment about every 2 hours   Restasis BID OU - burns but pt thinks it helps (has been on since 2019)  Xiidra BID each eye - ran out   Doxy 100 daily  Ketotifen BID each eye     A/P:  #Dry eyes, both eyes  #Anterior & posterior blepharitis  - pt thinks Restasis and ointment are helping, but he is still symptomatic  - discussed many options including: dissolvable plugs (previously had silicone plugs per patient which fell out), adding Xiidra, bandage CL's, trial of brimonidine (for rosacea). Other future options include serum tears or scleral lens (pt not interested in sclerals)  - Placed [place] BLL plugs 12/2020 (Extended duration)  - continue Xiidra BID OU  - Cont Restasis BID  - pt thinks this is helping  - Continue warm compresses and eyelid scrubs at least daily  - Continue frequent gel and ointment  - Continue Doxycycline 100mg daily   - c/w Glad Press N Seal at nighttime    #Allergic conjunctivitis  - Continue ketotifen BID - this  is helping    #Floppy eyelids  - no h/o SEYMOUR - pt unsure if he had previous sleep study  - does not wear CPAP  - Could consider tarsal strip in future    Follow up: 6 months    --    Jason Goldberg, MD  Cornea & External Disease Fellow  Department of Ophthalmology and Visual Neurosciences    Attending Physician Attestation:  Complete documentation of historical and exam elements from today's encounter can be found in the full encounter summary report (not reduplicated in this progress note).  I personally obtained the chief complaint(s) and history of present illness.  I confirmed and edited as necessary the review of systems, past medical/surgical history, family history, social history, and examination findings as documented by others; and I examined the patient myself.  I personally reviewed the relevant tests, images, and reports as documented above.  I formulated and edited as necessary the assessment and plan and discussed the findings and management plan with the patient and family. - Tarik Lyons MD    I personally spent great than 40min with the patient, of which >50% of the time was spent face to face with the patient, counseling and coordinating care with the patient. We discussed the complexity of his diagnosis, the need for further information prior to proceeding with yet another surgery, and the unknown prognosis for the patient at this time.    Tarik Lyons MD

## 2021-03-08 NOTE — NURSING NOTE
Chief Complaint(s) and History of Present Illness(es)     Dry Eye(s) Follow-Up     In both eyes.  Associated signs and symptoms include irritation.  Negative for eye pain, burning, redness and blurred vision.  Occurring constantly.  It is worse throughout the day.  Duration of years.  Since onset it is stable.  Response to treatment was no improvement.              Comments     3 month f/u for Dry Eyes, BE. Pt c/o having difficulty at night keeping the LE covered. [facility]. Pt states his Xiira is not going to last long because per the instructions use and discard, so he has been doing that.     Pt states BE are about the same as they were 3 months ago. Dry has not gotten any worse it is still the same.    Pt is going to have surgery next month on his left leg.     Ocular meds:  Xiidra BID BE  Resiasis BID BE  ATs romina PRN BE  Celluvisc drops PRN BE  Doxyclcline every day PO    Ayesha Ortiz, COMT 12:49 PM March 8, 2021

## 2021-03-08 NOTE — TELEPHONE ENCOUNTER
Rx's successfully faxed to [facility] pharmacy today.    Migdalia Downing, COA 5:51 PM March 8, 2021

## 2021-04-14 NOTE — NURSING NOTE
Chief Complaint   Patient presents with     Shortness of Breath     Pt. c/o SOB and light headed daily for along time. He would like to review his BP meds as well.       Yina Pritchard LPN at 1:04 PM on 7/11/2018   Patient/Caregiver provided printed discharge information.

## 2021-05-05 ENCOUNTER — TELEPHONE (OUTPATIENT)
Dept: INTERNAL MEDICINE | Facility: CLINIC | Age: 76
End: 2021-05-05

## 2021-05-05 DIAGNOSIS — Z96.652 STATUS POST TOTAL LEFT KNEE REPLACEMENT: Primary | ICD-10-CM

## 2021-05-05 NOTE — TELEPHONE ENCOUNTER
[facility] Call Center    Phone Message    May a detailed message be left on voicemail: yes     Reason for Call:  Other: Patient states he is [facility]. Patient stats he still has limited mobility in left knee. Patient would like to know if he should be seen in orthopedics. Please follow up with patient to advise. Thank you!      Action Taken: Message routed to:  [facility] pcc    Travel Screening: Not Applicable

## 2021-05-06 NOTE — TELEPHONE ENCOUNTER
Doctor agrees he should be seen in ortho, signed order, he will be called to schedule. Left vm with this information.   Esther King, EMT at 12:25 PM on 5/6/2021.

## 2021-05-07 ENCOUNTER — TELEPHONE (OUTPATIENT)
Dept: DERMATOLOGY | Facility: CLINIC | Age: 76
End: 2021-05-07

## 2021-05-07 NOTE — LETTER
"1/20/2018      RE: Lux Velasco  [address]   [place] [place] [facility] [address]       Dear Colleague,    Thank you for the opportunity to participate in the care of your patient, Lux Velasco, at the [facility] HEART [facility]. Please see a copy of my visit note below.    CARDIOLOGY NEW OFFICE VISIT    HPI: Lux Velasco is a 71 yo gentleman, risk factor profile (+) HTN, (+) Type II DM, (+) hyperlipidemia, (+) family Hx CAD, (-) tobacco use, presents to the cardiology clinic for evaluation of chest discomfort by referral from Dr. Duffy.     Mr. Roque has a long standing history of chest discomfort and ZMAARRIPA, going back to 1988.  He has had numerous noninvasive tests over the past decade.  He had a bicycle ECHO in May 2006 that showed normal LV function with a normal exercise response and no stress induced regional wall motion abnormalities. When I saw him for the first time in 2009, I obtained a stress nuclear study and that was normal.  He had an ECHO in Jan 2017 showing normal LV function with an LVEF 60-65% and trileaflet aortic sclerosis without stenosis.  He had a coronary CTA in May 2017 showing minimal nonobstructive CAD. He had an exercise nuclear study on Edmund 10, 2018, the results of which are pending at this time.  He recalls experiencing chest discomfort, SOB, and fatigue.  The patient's has no documented history of valvular heart disease, cardiac arrhythmia, and congestive heart failure.      The patient describes the most recent epsiode of chest discomfort occurring last night while sitting in an easy chair.  The chest discomfort was retromanubrial and \"painful\". It did not radiate.  He had associated SOB but no diaphoresis.  He took one SL NTG with relief in 10 minutes.  He experiences chest discomfort on a daily basis.  It occurs sporadically.  It can occur both at rest and with exertion.  The sharp chest discomfort is located over the upper left " "side of his chest and can feel both a dull ache as well as a ripping discomfort.   he pain can last anywhere from seconds to several minutes.  It is not associated with SOB or diaphoresis; he has had associated light-headedness but denies syncope.  The patient notes ZAMARRIPA with both walking quickly as well as walking up a hill (< 2 blocks). He also notes PND.  He denies PND, orthopnea,, palpitations, light-headedness, or syncope.   He had a stress nuclear study on Edmund 10, 2018 and notes he had a \"blocked\" artery and \"damage to the heart muscle.\"  [facility] contacted [facility] and was told he had LVEF 56%, with small reversible defect in the apex of the LV and no wall motion abnormality.    The patient took ASA in the distant past but he had a single episode of epistaxis and decided to stop the ASA and has not resumed it.        PAST MEDICAL HISTORY:Past Medical History:   Diagnosis Date     Diabetes mellitus type II 2005     History of agent Orange exposure 4/17/2013     Hypertension      Myocardial infarct 01/01/1999     Primary hyperparathyroidism (H) Dx approx 2003     Stroke (H) 01/01/1998    recovered fully       CURRENT MEDICATIONS:  Current Outpatient Prescriptions   Medication Sig Dispense Refill     ketotifen (ZADITOR/REFRESH ANTI-ITCH) 0.025 % SOLN ophthalmic solution Place 1 drop into both eyes 2 times daily       doxycycline (VIBRAMYCIN) 100 MG capsule Take by mouth daily       ketotifen (ZADITOR/REFRESH ANTI-ITCH) 0.025 % SOLN ophthalmic solution Place 1 drop into both eyes 2 times daily       doxycycline (VIBRAMYCIN) 100 MG capsule Take 1 capsule (100 mg) by mouth daily 30 capsule 11     albuterol (PROAIR HFA) 108 (90 BASE) MCG/ACT Inhaler Inhale 2 puffs into the lungs every 4 hours as needed for shortness of breath / dyspnea, wheezing or other (use prior to exertion/activities) 1 Inhaler 3     alfuzosin (UROXATRAL) 10 MG 24 hr tablet Take 1 tablet (10 mg) by mouth daily 90 tablet 3     finasteride (PROSCAR) 5 MG " tablet Take 1 tablet (5 mg) by mouth daily 90 tablet 3     senna-docusate (SENOKOT-S;PERICOLACE) 8.6-50 MG per tablet Take 1-2 tablets by mouth 2 times daily as needed for constipation Take while on oral narcotics to prevent or treat constipation. 10 tablet 0     guaiFENesin-codeine (ROBITUSSIN AC) 100-10 MG/5ML SOLN solution Take 5-10 mLs by mouth every 4 hours as needed for cough 420 mL 0     losartan (COZAAR) 50 MG tablet Take 1 tablet (50 mg) by mouth daily (Patient taking differently: Take 50 mg by mouth every morning ) 30 tablet 3     VITAMIN D, CHOLECALCIFEROL, PO Take 1,000 Units by mouth every morning        METFORMIN HCL PO Take by mouth 2 times daily (with meals)       acetaminophen 500 MG CAPS Take 2 tablets by mouth daily.       acetic acid-hydrocortisone (ACETASOL HC) otic solution Place 4 drops into both ears 2 times daily. 10 mL 10     ammonium lactate (AMLACTIN) 12 % cream Apply  topically daily.       artificial tears SOLN Use one drop to both eye PRN.       omeprazole (PRILOSEC) 20 MG capsule Take 20 mg by mouth daily.       oxybutynin (DITROPAN) 5 MG tablet Take 0.5 tablets (2.5 mg) by mouth At Bedtime (Patient not taking: Reported on 1/20/2018) 42 tablet 3     fluticasone (FLOVENT HFA) 110 MCG/ACT Inhaler Inhale 1 puff into the lungs 2 times daily 3 Inhaler 1     oxyCODONE-acetaminophen (PERCOCET) 5-325 MG per tablet Take 1-2 tablets by mouth every 6 hours as needed for moderate to severe pain or pain (moderate to severe) (Patient not taking: Reported on 1/20/2018) 20 tablet 0     calcium carbonate (OSCAL 500) 1250 (500 CA) MG TABS tablet Take 2 tablets once a day with food. 60 tablet 11     FLUTICASONE PROPIONATE NA Spray 50 mcg in nostril         PAST SURGICAL HISTORY:  Past Surgical History:   Procedure Laterality Date     BIOPSY OF SKIN LESION       BYPASS GRAFT ARTERY CORONARY       CATARACT IOL, RT/LT Bilateral 2017    [facility]     EXCISE MASS LOWER EXTREMITY Left 11/3/2017    Procedure:  "EXCISE MASS LOWER EXTREMITY;  Excision Mass Left Flank;  Surgeon: Marybeth Gambino MD;  Location: [facility] OR     MOHS MICROGRAPHIC PROCEDURE       PARATHYROIDECTOMY N/A 3/21/2017    Procedure: PARATHYROIDECTOMY;  Surgeon: Julianna Short MD;  Location: [facility] OR     PARATHYROIDECTOMY         ALLERGIES  Clindamycin; Eggs; Penicillins; and Propoxyphene    FAMILY HX:  Family History   Problem Relation Age of Onset     Melanoma Mother      Melanoma Father      CANCER No family hx of      no skin cancer     Glaucoma No family hx of      Macular Degeneration No family hx of        SOCIAL HX:  Social History     Social History     Marital status:      Spouse name: N/A     Number of children: N/A     Years of education: N/A     Social History Main Topics     Smoking status: Former Smoker     Quit date: 1970     Smokeless tobacco: Former User     Alcohol use Yes      Comment: rarely     Drug use: No     Sexual activity: Not Asked     Other Topics Concern     None     Social History Narrative       ROS:  Constitutional: No fever, chills, or sweats. No weight gain/loss.   HEENT: No visual disturbance, ear ache, epistaxis, sore throat.   Allergies/Immunologic: Negative.   Respiratory: No cough, hemoptysis.   Cardiovascular: As per HPI.   GI: No nausea, vomiting, hematemesis, melena, or hematochezia.   : No urinary frequency, dysuria, or hematuria.   Integument: No rash.   Psychiatric: No anxiety / depression.   Neuro: No speech disturbance, focal sensory or motor deficit.   Endocrinology: No polyuria / polyphagia.   Musculoskeletal: No myalgia.    VITAL SIGNS:  /79 (BP Location: Left arm, Patient Position: Chair, Cuff Size: Adult Large)  Pulse 69  Ht 1.803 m (5' 11\")  Wt 130 kg (286 lb 11.2 oz)  SpO2 94%  BMI 39.99 kg/m2  Body mass index is 39.99 kg/(m^2).  Wt Readings from Last 2 Encounters:   01/20/18 130 kg (286 lb 11.2 oz)   11/15/17 127.6 kg (281 lb 3.2 oz)       PHYSICAL EXAM  Lux Salper is " a 72 year old male.in no acute distress.  HEENT: Eyes Nonicteric.  Neck: JVP normal.  Carotids +3/3 bilaterally without bruits.  Lungs: CTA.  Cor: RRR. Normal S1 and S2.  No murmur, rub, or gallop.  PMI in Lf 5th ICS.  Abd: Soft, nontender, nondistended.  NABS.  No pulsatile mass.  Extremities: No C/C/E.  Pulses +3/3 symmetric in upper and lower extremities.  Neuro: Grossly intact.  Psych: A&O x 3.  Skin: No rash.    LABS  Recent Labs   Lab Test  18   1559  17   1215   WBC  5.7  4.9   HGB  13.0*  11.9*   HCT  40.7  37.9*   PLT  160  141*     Recent Labs   Lab Test  18   1559  17   1215   NA  139  140   POTASSIUM  4.0  4.4   CHLORIDE  105  107   CO2  30  26   GLC  114*  119*   BUN  15  16   CR  0.91  0.82   TRINA  8.8  9.1     Recent Labs   Lab Test  16   1640   CHOL  216*   HDL  49   LDL  140*   TRIG  137   NHDL  167*        EK18  NSR with PAC and incomplete RBBB.    Procedures:     EXERCISE BIKE ECHO: 2006  Normal exercise echocardiogram without evidence of coronary artery disease or stress induced ischemia at 72% predicted heart rate.  Normal resting LV function with EF of approximately 60-65%: normal response to exercise with increase to approximately 70-75%.  No stress induced regional wall motion abnormalities.    No ECG evidence of ischemia  No subjective evidence of ischemia.  Mild mitral regurgitation.  Mild aortic root dilation of 3.4 cm at the proximal ascending aorta.  Normal functional capacity.    ECHO: 17  Global and regional left ventricular function is normal with an EF of 60-65%.  Global right ventricular function is normal.  Trileaflet aortic sclerosis without stenosis.  No pericardial effusion is present.    EXERCISE STRESS NUCLEAR TEST:  09  1. Normal study.  2. There is no evidence of stress-induced ischemia and the target heart rate was achieved.    3. Left ventricular size is normal at rest.  Transient ischemic dilation of the left ventricle  did not occur.    4. The left ventricular ejection fraction is 60 % and there are no regional wall motion abnormalities.    EXERCISE STRESS NUCLEAR TEST:  18  Stage I Modified Guicho Protocol  LVEF 56%  Small apical reversible defect.    CORONARY CTA:  IMPRESSION:  1.  Minimal non-obstructive coronary artery disease.   2.  Total Agatston score 102 placing the patient in the 39th  percentile when compared to age and gender matched control group.  3.  Please review Radiology report for incidental noncardiac findings that will follow separately.     FINDINGS:     CORONARY CALCIUM SCORE     Total Agatston calcium score: 102   Left main: 72  Left anterior descendin  Left circumflex: 1  Right coronary artery: 27   This places the patient in the 39th  percentile when compared to age  and gender matched control group.     CORONARY ANGIOGRAPHY     DOMINANCE: Right dominant system.   Normal coronary origins and course.     LEFT MAIN:   The left main arises normally from the left coronary cusp and is widely patent without any detectable stenosis.   There is focal calcification at the left main ostium without causing any detectable stenosis.     LEFT ANTERIOR DESCENDING:   The left anterior descending and its 2 diagonal branches are widely patent with minimal luminal irregularities.      LEFT CIRCUMFLEX:   The left circumflex and its major marginal branches (OM1, OM2) are patent with minimal luminal irregularities.      RIGHT CORONARY ARTERY:   Distal RCA: Minimal (<25%) stenosis composed of noncalcified plaque.  The remainder of the right coronary and the posterior descending  artery are patent with minimal luminal irregularities.     ADDITIONAL FINDINGS:   The proximal ascending aorta is normal in size. Mild calcification of the aortic valve.  Normal pulmonary venous anatomy with all four pulmonary veins draining  into the left atrium.    There is no left ventricular mass or thrombus.   Normal pericardial thickness.  There is no pericardial effusion.    CARDIAC CATH: None    ASSESSMENT AND PLAN:   1. Chest discomfort.  Mr. Velasco is a middle aged gentleman with multiple risk factors and a long-standing history of cehst discomfort.  The patient has had numerous studies.  His LV function is normal on ECHO and stress nuclear study.  He had a coronary CTA last year that showed minimal plaque.  Therefore, it is unlikely that the patient has significant epicardial disease based on the coronary CTA.  His stress nuclear study this past week was positive but there was only one segment (apical) that demonstrated ischemia.  If this is true, the CTA could have missed disease in the distal segment of the LAD.  I had a long converasation with the patient describing choices.  As he is not on a nitrate, beta blocker and ASA and a statin, I would start here before proceeding with a coronary angiogram.  It is unclear if the patient is willing on taking ASA and a statin. We will start Imdur 30 mg qd.  If the patient should have worsening chest pain, I would proceed with coronary angiography.       2. Lipid profile.  The patient was previously on a statin.  The last LDL in 2017 was 140.  The goal LDL should be 70 mg/dl given his diabetic state. The patient was recently prescribes a statin but refused to take it.  I highly recommend he initiate a statin but will not prescribe one as he has a bottle at home and I do not want to take a chance that he is prescribed two different statins / doses.     3. Hypertension.  The patient is currently on Cozaar 50 mg qd.  He may follow up with his primar care provider for ongoing evaluation of his BP.  Given the diabetes, I would like to see BPs in the range of 110-120/70-80.     4. Type II Diabetes.  Continue Metformin.    FOLLOW UP:  Dr. Duffy or [facility].      Jorge Hanks MD    Divisions of Cardiology  [facility]  Patient Care Team:  Nam Duffy,  MD as PCP - General (Internal Medicine)  Colton Pulido MD as MD (Family Medicine - Sports Medicine)  Mario Alberto Baptiste MD as MD (Orthopedics)  Pawan Denney DO as MD (Urology)  Estephania Rivas, RN as Registered Nurse  Sofiya Contreras PA-C as Physician Assistant (Physician Assistant)  Marc Chapa MD as MD (Urology)  TEZ HERNANDEZ     Triple Feeding/bottle/breast

## 2021-05-07 NOTE — TELEPHONE ENCOUNTER
[facility] Call [facility]    Phone Message    May a detailed message be left on voicemail: no, Pt has no VM on home number    Reason for Call: Symptoms or Concerns     If patient has red-flag symptoms, warm transfer to triage line    Current symptom or concern:   itchy rash on leg, arms, neck, present since April 22, 2021    Symptoms have been present for:  2 week(s)    Has patient previously been seen for this? No    Pt of Dr Shore    Date: NA    Are there any new or worsening symptoms? Yes: As above      Action Taken: Message routed to:  [facility] Derm    Travel Screening: Not Applicable     Pt states he has itching rash since 4/22/21. Pt declined 5/13/21 appt with Dr Underwood as it was too early in the morning. No sooner appts found.     Pt declined June and August Appts. Pt states he will be at [facility] [place] and would like to come in that day.    Please call Pt to discuss rash/symptoms and schedule him to see Dr Shore if possible.

## 2021-05-10 ENCOUNTER — OFFICE VISIT (OUTPATIENT)
Dept: DERMATOLOGY | Facility: CLINIC | Age: 76
End: 2021-05-10
Payer: COMMERCIAL

## 2021-05-10 ENCOUNTER — TELEPHONE (OUTPATIENT)
Dept: DERMATOLOGY | Facility: CLINIC | Age: 76
End: 2021-05-10

## 2021-05-10 DIAGNOSIS — L27.0 DRUG RASH: Primary | ICD-10-CM

## 2021-05-10 PROCEDURE — 99213 OFFICE O/P EST LOW 20 MIN: CPT | Performed by: DERMATOLOGY

## 2021-05-10 RX ORDER — TRIAMCINOLONE ACETONIDE 1 MG/G
OINTMENT TOPICAL 2 TIMES DAILY
Qty: 454 G | Refills: 6 | Status: ON HOLD | OUTPATIENT
Start: 2021-05-10 | End: 2023-07-07

## 2021-05-10 RX ORDER — FLUOCINONIDE 0.5 MG/G
OINTMENT TOPICAL 2 TIMES DAILY
Qty: 60 G | Refills: 6 | Status: ON HOLD | OUTPATIENT
Start: 2021-05-10 | End: 2023-07-07

## 2021-05-10 NOTE — PROGRESS NOTES
[facility] Dermatology Note  Encounter Date: May 10, 2021  Office Visit     Dermatology Problem List:  1. History of NMSC- most of his care is at the [facility] and we don't have a complete record of his skin cancers  - BCC, right upper back s/p ED&C  - Unknown Type, right cheek  -Unknown Type, left hand s/p Mohs   2. Actinic keratoses s/p cryo  - Hypertrophic AK, right forearm s/p biopsy 11/28/16  - AK, left lower inferior orbital rim s/p biopsy 11/28/16  3. Verruca vulgaris, right second finger s/p cryo  4. ISKs s/p cryo  5. Skin cancer screening waist up per patient preference 8/10/2020  6. Drug rash to tramadol    ____________________________________________    Assessment & Plan:     # Drug rash from Tramadol.   - Triamcinolone 0.1% ointment (1 lb jar) twice daily to majority of rash   - fluocinonide ointment twice daily to the most symptomatic areas of rash  Add tramadol to drug allergy list       Follow-up: as needed.  [facility]  He will message me if rash is worsening    Staff:     Shirin Shore MD  ____________________________________________    CC: Rash (pt states he is here for rash on left lef after surgery )    HPI:  Mr. Lux Velasco is a(n) 75 year old male who presents today as a return patient for a new rash.  He had left knee replacement on April 22.  He received tramadol as a new drug and developed an itchy red rash.  [facility]  An ultrasound was done of the left lower leg due to edema and per patient was negative for a blood clot. No fever or chills.  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  He was given a small tube of triamcinolone and some itching pills which have not helped.  He was [facility] but was not able to make it.     Patient is otherwise feeling well, without additional skin concerns.     Labs Reviewed:  N/A    Physical Exam:  Vitals: There were no vitals taken  for this visit.  SKIN: Total skin excluding the undergarment areas was performed. The exam included the head/face, neck, both arms, chest, back, abdomen, both legs, digits and/or nails.   - edema of left lower leg with clean and dry surgical wound of the left knee.  - morbiliform pink patches of trunk, neck, arms and legs.   - No other lesions of concern on areas examined.     Medications:  Current Outpatient Medications   Medication     acetaminophen 500 MG CAPS     albuterol (PROAIR HFA) 108 (90 BASE) MCG/ACT Inhaler     ammonium lactate (AMLACTIN) 12 % cream     Artificial Tear Ointment (EQ RESTORE PM OP)     artificial tears SOLN     ASPIRIN PO     ATORVASTATIN CALCIUM PO     budesonide-formoterol (SYMBICORT) 160-4.5 MCG/ACT Inhaler     carboxymethylcellul-glycerin (OPTIVE/REFRESH OPTIVE) 0.5-0.9 % SOLN ophthalmic solution     Carboxymethylcellul-Glycerin 1-0.9 % GEL     ciclopirox (LOPROX) 0.77 % cream     clotrimazole (LOTRIMIN) 1 % external cream     cycloSPORINE (RESTASIS) 0.05 % ophthalmic emulsion     doxycycline hyclate (VIBRAMYCIN) 100 MG capsule     econazole nitrate 1 % cream     Efinaconazole 10 % SOLN     erythromycin (ROMYCIN) 5 MG/GM ophthalmic ointment     finasteride (PROSCAR) 5 MG tablet     hydrocortisone 2.5 % ointment     ketotifen (ZADITOR/REFRESH ANTI-ITCH) 0.025 % SOLN ophthalmic solution     lifitegrast (XIIDRA) 5 % opthalmic solution     liraglutide (VICTOZA PEN) 18 MG/3ML solution     losartan (COZAAR) 50 MG tablet     METFORMIN HCL PO     metoprolol succinate (TOPROL-XL) 100 MG 24 hr tablet     omeprazole (PRILOSEC) 20 MG capsule     order for DME     senna-docusate (SENOKOT-S;PERICOLACE) 8.6-50 MG per tablet     VITAMIN D, CHOLECALCIFEROL, PO     No current facility-administered medications for this visit.       Past Medical History:   Patient Active Problem List   Diagnosis     Plantar fascial fibromatosis     History of agent Orange exposure     Degenerative arthritis of cervical  spine     Stroke (H)     Skin exam, screening for cancer     Primary hyperparathyroidism (H)     Benign prostatic hyperplasia with weak urinary stream     Acquired cyst of kidney     Dyspnea on exertion     Mild intermittent asthma without complication     Diabetes mellitus, type 2 (H)     Past Medical History:   Diagnosis Date     Diabetes mellitus type II 2005     History of agent Orange exposure 4/17/2013     Hypertension      Myocardial infarct (H) 01/01/1999     Primary hyperparathyroidism (H) Dx approx 2003     Stroke (H) 01/01/1998    recovered fully       CC No referring provider defined for this encounter. on close of this encounter.

## 2021-05-10 NOTE — LETTER
5/10/2021       RE: Lux Velasco  [address]  [place] [id number]     Dear Colleague,    Thank you for referring your patient, Lux Velasco, to the [facility]MAT[facility] [place] at [facility]. Please see a copy of my visit note below.    [facility] Dermatology Note  Encounter Date: May 10, 2021  Office Visit     Dermatology Problem List:  1. History of NMSC- most of his care is at the [facility] and we don't have a complete record of his skin cancers  - BCC, right upper back s/p ED&C  - Unknown Type, right cheek  -Unknown Type, left hand s/p Mohs   2. Actinic keratoses s/p cryo  - Hypertrophic AK, right forearm s/p biopsy 11/28/16  - AK, left lower inferior orbital rim s/p biopsy 11/28/16  3. Verruca vulgaris, right second finger s/p cryo  4. ISKs s/p cryo  5. Skin cancer screening waist up per patient preference 8/10/2020  6. Drug rash to tramadol    ____________________________________________    Assessment & Plan:     # Drug rash from Tramadol.   - Triamcinolone 0.1% ointment (1 lb jar) twice daily to majority of rash   - fluocinonide ointment twice daily to the most symptomatic areas of rash  Add tramadol to drug allergy list       Follow-up: as needed.  [facility]  He will message me if rash is worsening    Staff:     Shirin Shore MD  ____________________________________________    CC: Rash (pt states he is here for rash on left lef after surgery )    HPI:  Mr. Lux Velasco is a(n) 75 year old male who presents today as a return patient for a new rash.  He had left knee replacement on April 22.  He received tramadol as a new drug and developed an itchy red rash.  [facility]  An ultrasound was done of the left lower leg due to edema and per patient was negative for a blood clot. No fever or chills.  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  He  was given a small tube of triamcinolone and some itching pills which have not helped.  He was [facility] to [facility] appointment [facility] but was not able to make it.     Patient is otherwise feeling well, without additional skin concerns.     Labs Reviewed:  N/A    Physical Exam:  Vitals: There were no vitals taken for this visit.  SKIN: Total skin excluding the undergarment areas was performed. The exam included the head/face, neck, both arms, chest, back, abdomen, both legs, digits and/or nails.   - edema of left lower leg with clean and dry surgical wound of the left knee.  - morbiliform pink patches of trunk, neck, arms and legs.   - No other lesions of concern on areas examined.     Medications:  Current Outpatient Medications   Medication     acetaminophen 500 MG CAPS     albuterol (PROAIR HFA) 108 (90 BASE) MCG/ACT Inhaler     ammonium lactate (AMLACTIN) 12 % cream     Artificial Tear Ointment (EQ RESTORE PM OP)     artificial tears SOLN     ASPIRIN PO     ATORVASTATIN CALCIUM PO     budesonide-formoterol (SYMBICORT) 160-4.5 MCG/ACT Inhaler     carboxymethylcellul-glycerin (OPTIVE/REFRESH OPTIVE) 0.5-0.9 % SOLN ophthalmic solution     Carboxymethylcellul-Glycerin 1-0.9 % GEL     ciclopirox (LOPROX) 0.77 % cream     clotrimazole (LOTRIMIN) 1 % external cream     cycloSPORINE (RESTASIS) 0.05 % ophthalmic emulsion     doxycycline hyclate (VIBRAMYCIN) 100 MG capsule     econazole nitrate 1 % cream     Efinaconazole 10 % SOLN     erythromycin (ROMYCIN) 5 MG/GM ophthalmic ointment     finasteride (PROSCAR) 5 MG tablet     hydrocortisone 2.5 % ointment     ketotifen (ZADITOR/REFRESH ANTI-ITCH) 0.025 % SOLN ophthalmic solution     lifitegrast (XIIDRA) 5 % opthalmic solution     liraglutide (VICTOZA PEN) 18 MG/3ML solution     losartan (COZAAR) 50 MG tablet     METFORMIN HCL PO     metoprolol succinate (TOPROL-XL) 100 MG 24 hr tablet     omeprazole (PRILOSEC) 20 MG capsule     order for DME     senna-docusate  (SENOKOT-S;PERICOLACE) 8.6-50 MG per tablet     VITAMIN D, CHOLECALCIFEROL, PO     No current facility-administered medications for this visit.       Past Medical History:   Patient Active Problem List   Diagnosis     Plantar fascial fibromatosis     History of agent Orange exposure     Degenerative arthritis of cervical spine     Stroke (H)     Skin exam, screening for cancer     Primary hyperparathyroidism (H)     Benign prostatic hyperplasia with weak urinary stream     Acquired cyst of kidney     Dyspnea on exertion     Mild intermittent asthma without complication     Diabetes mellitus, type 2 (H)     Past Medical History:   Diagnosis Date     Diabetes mellitus type II 2005     History of agent Orange exposure 4/17/2013     Hypertension      Myocardial infarct (H) 01/01/1999     Primary hyperparathyroidism (H) Dx approx 2003     Stroke (H) 01/01/1998    recovered fully       CC No referring provider defined for this encounter. on close of this encounter.

## 2021-05-10 NOTE — NURSING NOTE
Chief Complaint   Patient presents with     Rash     pt states he is here for rash on left lef after surgery      Angela Green MA

## 2021-05-10 NOTE — TELEPHONE ENCOUNTER
Called pt and spoke with him and he will come in for the 1:00 appt with Silviano Flowers today.     Angela Green MA

## 2021-05-10 NOTE — TELEPHONE ENCOUNTER
Health Call Center    Phone Message    May a detailed message be left on voicemail: yes     Reason for Call: Medication Question or concern regarding medication   Prescription Clarification  Name of Medication: fluocinonide (LIDEX) 0.05 % external ointment // triamcinolone (KENALOG) 0.1 % external ointment   Prescribing Provider: Dr. Shore   Pharmacy:    What on the order needs clarification? Pt called and would like for Dr. Shore to fax over the two prescription above to the [facility] in [place]. I asked for the fax# but pt said that Dr. Shore should know the fax number.  Pt said that it's too expensive at the [facility]. Please call the pt back if you have any questions. Thanks      Action Taken: Message routed to:  [facility] DERM    Travel Screening: Not Applicable

## 2021-05-11 NOTE — TELEPHONE ENCOUNTER
[facility] Call Center    Phone Message    May a detailed message be left on voicemail: yes     Reason for Call: Other: Pt called and said that that pharm did not recieve anything and to please refax it to the pharm. Thanks    Action Taken: Message routed to:  [facility] DERM    Travel Screening: Not Applicable

## 2021-06-09 ENCOUNTER — TELEPHONE (OUTPATIENT)
Dept: INTERNAL MEDICINE | Facility: CLINIC | Age: 76
End: 2021-06-09

## 2021-06-09 NOTE — CONFIDENTIAL NOTE
Duplicate message, see Jumana Velasco chart. Form in Dr. Duffy's box.    Gail Verma on 6/9/2021 at 3:01 PM

## 2021-07-07 ENCOUNTER — TELEPHONE (OUTPATIENT)
Dept: ORTHOPEDICS | Facility: CLINIC | Age: 76
End: 2021-07-07

## 2021-07-07 NOTE — TELEPHONE ENCOUNTER
RECORDS RECEIVED FROM: Status post total left knee replacement /Dr Duffy/ no new images/ [facility]/ ortho con   DATE RECEIVED: Jul 26, 2021     NOTES STATUS DETAILS   OFFICE NOTE from referring provider Internal  Nam Duffy MD   OFFICE NOTE from other specialist N/A    DISCHARGE SUMMARY from hospital N/A    DISCHARGE REPORT from the ER N/A    OPERATIVE REPORT N/A    MEDICATION LIST Internal    IMPLANT RECORD/STICKER N/A    LABS     CBC/DIFF N/A    CULTURES N/A    INJECTIONS DONE IN RADIOLOGY N/A    MRI N/A    CT SCAN N/A    XRAYS (IMAGES & REPORTS) Internal    TUMOR     PATHOLOGY  Slides & report N/A    07/12/21   1:11 PM   LAST IMAGES WERE IN 2018  CALLED PATIENT AGAIN, PHONE RANG AND RANG UNABLE TO LEAVE VM  VIEWED IMAGES, NO EVIDENCE OF TKA.  Sophia Lee CMA    07/07/21   4:17 PM   CALLED PATIENT TO SEE WHERE SURGERY WAS, LVM  Sophia Lee CMA

## 2021-07-07 NOTE — TELEPHONE ENCOUNTER
"Patient called in to the call center today to ask for a sooner appointment and if there was nothing if he could be added to a waitlist for any appointment sooner. He explained that he had total knee surgery in April and has been having experiencing his \"knees knocking\" (asked what he ment but he was not able to explain). Patient did also have many questions about the surgery he had that I explained I do not have the answers to but will forward his request and questions on to Dr. Casas's team and they should be able to reach out to the patient tomorrow when they are back in clinic   "

## 2021-07-21 ENCOUNTER — TRANSFERRED RECORDS (OUTPATIENT)
Dept: HEALTH INFORMATION MANAGEMENT | Facility: CLINIC | Age: 76
End: 2021-07-21

## 2021-07-22 DIAGNOSIS — M25.562 LEFT KNEE PAIN: Primary | ICD-10-CM

## 2021-07-26 ENCOUNTER — ANCILLARY PROCEDURE (OUTPATIENT)
Dept: GENERAL RADIOLOGY | Facility: CLINIC | Age: 76
End: 2021-07-26
Attending: ORTHOPAEDIC SURGERY
Payer: MEDICARE

## 2021-07-26 ENCOUNTER — PRE VISIT (OUTPATIENT)
Dept: ORTHOPEDICS | Facility: CLINIC | Age: 76
End: 2021-07-26

## 2021-07-26 ENCOUNTER — OFFICE VISIT (OUTPATIENT)
Dept: ORTHOPEDICS | Facility: CLINIC | Age: 76
End: 2021-07-26
Payer: MEDICARE

## 2021-07-26 VITALS — HEIGHT: 72 IN | BODY MASS INDEX: 36.84 KG/M2 | WEIGHT: 272 LBS

## 2021-07-26 DIAGNOSIS — Z96.652 STATUS POST TOTAL LEFT KNEE REPLACEMENT: ICD-10-CM

## 2021-07-26 DIAGNOSIS — M25.562 LEFT KNEE PAIN: ICD-10-CM

## 2021-07-26 PROCEDURE — 99213 OFFICE O/P EST LOW 20 MIN: CPT | Performed by: ORTHOPAEDIC SURGERY

## 2021-07-26 PROCEDURE — 73560 X-RAY EXAM OF KNEE 1 OR 2: CPT | Mod: LT | Performed by: RADIOLOGY

## 2021-07-26 ASSESSMENT — ACTIVITIES OF DAILY LIVING (ADL): FUNCTION,_DAILY_LIVING_SCORE: 48.52

## 2021-07-26 ASSESSMENT — KOOS JR: HOW SEVERE IS YOUR KNEE STIFFNESS AFTER FIRST WAKING IN MORNING: MODERATE

## 2021-07-26 ASSESSMENT — PATIENT HEALTH QUESTIONNAIRE - PHQ9: SUM OF ALL RESPONSES TO PHQ QUESTIONS 1-9: 12

## 2021-07-26 ASSESSMENT — MIFFLIN-ST. JEOR: SCORE: 2001.78

## 2021-07-26 NOTE — NURSING NOTE
Chief Complaint   Patient presents with     Consult     discuss left knee pain // Left TKA done at [facility] 4/22/21        75 year old  1945    Ht 1.829 m (6')   Wt 123.4 kg (272 lb)   BMI 36.89 kg/m          Date/Surgery/Surgeon/Hospital:  1. 4/22/21 Left TKA at [facility]  2. 11/3/17 Left Flank Mass Excision by Dr. Gambino            Pain Assessment  Patient Currently in Pain: Yes  0-10 Pain Scale: 3  Primary Pain Location: Knee (left knee)             [facility] - [place], [place] - [facility] - [place], [place] - [address] [place] [address] [phone]        Allergies   Allergen Reactions     Isosorbide Mononitrate [Isosorbide] Shortness Of Breath     Chest pain. Patient stated that the medication made his breathing symptoms worse.      Clindamycin      Eggs Other (See Comments)     Pt states no longer having reaction, childhood allergy, eats eggs       Penicillins Other (See Comments)     Pt states PCN allergy is due to egg allergy     Propoxyphene Other (See Comments)     Pain     Tramadol      rash           Current Outpatient Medications   Medication     doxycycline hyclate (VIBRAMYCIN) 100 MG capsule     acetaminophen 500 MG CAPS     albuterol (PROAIR HFA) 108 (90 BASE) MCG/ACT Inhaler     ammonium lactate (AMLACTIN) 12 % cream     Artificial Tear Ointment (EQ RESTORE PM OP)     artificial tears SOLN     ASPIRIN PO     ATORVASTATIN CALCIUM PO     budesonide-formoterol (SYMBICORT) 160-4.5 MCG/ACT Inhaler     carboxymethylcellul-glycerin (OPTIVE/REFRESH OPTIVE) 0.5-0.9 % SOLN ophthalmic solution     Carboxymethylcellul-Glycerin 1-0.9 % GEL     ciclopirox (LOPROX) 0.77 % cream     clotrimazole (LOTRIMIN) 1 % external cream     cycloSPORINE (RESTASIS) 0.05 % ophthalmic emulsion     econazole nitrate 1 % cream     Efinaconazole 10 % SOLN     erythromycin (ROMYCIN) 5 MG/GM ophthalmic ointment     finasteride (PROSCAR) 5 MG tablet     fluocinonide  (LIDEX) 0.05 % external ointment     hydrocortisone 2.5 % ointment     ketotifen (ZADITOR/REFRESH ANTI-ITCH) 0.025 % SOLN ophthalmic solution     lifitegrast (XIIDRA) 5 % opthalmic solution     liraglutide (VICTOZA PEN) 18 MG/3ML solution     losartan (COZAAR) 50 MG tablet     METFORMIN HCL PO     metoprolol succinate (TOPROL-XL) 100 MG 24 hr tablet     omeprazole (PRILOSEC) 20 MG capsule     order for DME     senna-docusate (SENOKOT-S;PERICOLACE) 8.6-50 MG per tablet     triamcinolone (KENALOG) 0.1 % external ointment     VITAMIN D, CHOLECALCIFEROL, PO     No current facility-administered medications for this visit.         KOOS Knee Survey Assessment    Knee Outcome Survey ADL Scale (MARKEL Hamilton; BENJAMÍN Tipton; Pascale, RS; Fawad, FH; Lio, KAILYN; 1998) 7/26/2021   Do you have swelling in your knee? Always   Do you feel grinding, hear clicking or any other type of noise when your knee moves? Always   Does your knee catch or hang up when moving? Sometimes   Can you straighten your knee fully? Often   Can you bend your knee fully? Often   How severe is your knee joint stiffness after first wakening in the morning? Moderate   How severe is your knee stiffness after sitting, lying or resting LATER IN THE DAY? Moderate   How often do you experience knee pain? Always   Twisting/pivoting on your knee Moderate   Straightening knee fully Moderate   Bending knee fully Moderate   Walking on flat surface Moderate   Going up or down stairs Moderate   At night while in bed Moderate   Sitting or lying Moderate   Standing upright Moderate   Descending stairs Moderate   Ascending stairs Moderate   Rising from sitting Moderate   Standing Moderate   Bending to floor/ an object Moderate   Walking on flat surface Moderate   Getting in/out of car Moderate   Going shopping Moderate   Putting on socks/stockings Moderate   Rising from bed Moderate   Taking off socks/stockings Moderate   Lying in bed (turning over, maintaining knee  position) Moderate   Getting in/out of bath Moderate   Sitting Moderate   Getting on/off toilet Moderate   Heavy domestic duties (moving heavy boxes, scrubbing floors, etc) Severe   Light domestic duties (cooking, dusting, etc) Moderate   Squatting Severe   Running Severe   Jumping Extreme   Twisting/pivoting on your injured knee Severe   Kneeling Severe   How often are you aware of your knee problem? Constantly   Have you modified your life style to avoid potentially damaging activities to your knee? Totally   How much are you troubled with lack of confidence in your knee? Totally   In general, how much difficulty do you have with your knee? Severe   Pain Score 44.44   Symptoms Score 28.57   Function, Daily Living Score 48.52   Sports and Rec Score 20   Quality of Life Score 6.25              Promis 10 Assessment    PROMIS 10 7/26/2021   In general, would you say your health is: Good   In general, would you say your quality of life is: Fair   In general, how would you rate your physical health? Fair   In general, how would you rate your mental health, including your mood and your ability to think? Fair   In general, how would you rate your satisfaction with your social activities and relationships? Fair   In general, please rate how well you carry out your usual social activities and roles Fair   To what extent are you able to carry out your everyday physical activities such as walking, climbing stairs, carrying groceries, or moving a chair? A little   How often have you been bothered by emotional problems such as feeling anxious, depressed or irritable? Sometimes   How would you rate your fatigue on average? Moderate   How would you rate your pain on average?   0 = No Pain  to  10 = Worst Imaginable Pain 4   Global Physical Health Score : Raw Score -   Global Mental Health Score : Raw Score -   Total (Physical + Mental Health Score) -   In general, would you say your health is: 3   In general, would you say your  quality of life is: 2   In general, how would you rate your physical health? 2   In general, how would you rate your mental health, including your mood and your ability to think? 2   In general, how would you rate your satisfaction with your social activities and relationships? 2   In general, please rate how well you carry out your usual social activities and roles. (This includes activities at home, at work and in your community, and responsibilities as a parent, child, spouse, employee, friend, etc.) 2   To what extent are you able to carry out your everyday physical activities such as walking, climbing stairs, carrying groceries, or moving a chair? 2   In the past 7 days, how often have you been bothered by emotional problems such as feeling anxious, depressed, or irritable? 3   In the past 7 days, how would you rate your fatigue on average? 3   In the past 7 days, how would you rate your pain on average, where 0 means no pain, and 10 means worst imaginable pain? 4   Global Mental Health Score 9   Global Physical Health Score 10   PROMIS TOTAL - SUBSCORES 19   Some recent data might be hidden

## 2021-07-26 NOTE — LETTER
"    7/26/2021         RE: Lux Velasco  [address]  [facility] [address]        Dear Colleague,    Thank you for referring your patient, Lux Velasco, to the [facility] [place]. Please see a copy of my visit note below.        [facility]  Orthopaedic Surgery, Joint Replacement Consultation  by Orestes Casas M.D.    Lux Velasco MRN# [id number]   Age: 75 year old YOB: 1945     Requesting physician: Nam Hamlin            Assessment and Plan:   Assessment:  Status post left total knee arthroplasty with ongoing pain and flexion contracture.  Possibility of slight imbalance being tighter in extension and looser in flexion.     Plan:  Advised patient to work on obtaining full extension of the knee with stretching exercises.  He was given instructions with respect to performing stretching exercises 4 times per day.  Also given instructions regarding quadricep strengthening.  I advised him that it is only been 3 months after surgery and is quite possible and likely that improvements will occur over this next year as the swelling decreases over time.  I also advised him that the \"knocking\" sensation that he is appreciating is quite normal.  If his symptoms persist over the course of the next year he may follow-up for reassessment regarding the need for any type of intervention.  1 possibility would be to revise femoral component and increase extension gap by 1 to 2 mm as well as ensure all posterior soft tissue are released thoroughly.          History of Present Illness:   75 year old male  chief complaint    Patient presents for second opinion with regards to his left total knee arthroplasty performed April 22, 2021 at the [facility].  Patient notes \"locking\" that is painful and bothersome to him.  He believes that another surgical intervention is warranted.  He is using a cane when ambulating.  He has been " performing exercises at home.    Background history:  DX:  1. Degenerative joint disease with varus deformity of both knees.    TREATMENTS:  1. 4/22/2021, left total knee arthroplasty, [employer] mobile-bearing, [facility]                    Physical Exam:     EXAMINATION pertinent findings:   PSYCH: Pleasant, healthy-appearing, alert, oriented x3, cooperative. Normal mood and affect.  VITAL SIGNS: Height 1.829 m (6'), weight 123.4 kg (272 lb)..  Reviewed nursing intake notes.   Body mass index is 36.89 kg/m .  RESP: non labored breathing   ABD: benign, soft, non-tender, no acute peritoneal findings  SKIN: grossly normal   LYMPHATIC: grossly normal, no adenopathy, no extremity edema  NEURO: grossly normal , no motor deficits  VASCULAR: satisfactory perfusion of all extremities   MUSCULOSKELETAL:   Antalgic gait on the left side.  He lacks 15 degrees of full extension on the left side has further flexion to 90 degrees.  2+ swelling in the periarticular tissues.  No laxity to varus and valgus stress.  In the sagittal plane 2+ anterior drawer.  Posterior drawer results in a palpable clunk as the post as the femoral condyle which I explained the patient is quite normal.  Slight imbalance in the flexion extension gaps being tighter in extension.    Right knee has medial compartment osteoarthritis with a varus deformity.         Data:   All laboratory data reviewed  All imaging studies reviewed by me    Radiographs of 2018 as well as July 2021 are reviewed.  Mobile-bearing tibial tray with total knee components is visualized in satisfactory alignment.  Patellofemoral joint has been resurfaced as well.          DATA for DOCUMENTATION:         Past Medical History:     Patient Active Problem List   Diagnosis     Plantar fascial fibromatosis     History of agent Orange exposure     Degenerative arthritis of cervical spine     Stroke (H)     Skin exam, screening for cancer     Primary hyperparathyroidism (H)      Benign prostatic hyperplasia with weak urinary stream     Acquired cyst of kidney     Dyspnea on exertion     Mild intermittent asthma without complication     Diabetes mellitus, type 2 (H)     Past Medical History:   Diagnosis Date     Diabetes mellitus type II      History of agent Orange exposure 2013     Hypertension      Myocardial infarct (H) 1999     Primary hyperparathyroidism (H) Dx approx 2003     Stroke (H) 1998    recovered fully       Also see scanned health assessment forms.       Past Surgical History:     Past Surgical History:   Procedure Laterality Date     BIOPSY OF SKIN LESION       BYPASS GRAFT ARTERY CORONARY       CATARACT IOL, RT/LT [facility]    [facility]     EXCISE MASS LOWER EXTREMITY Left 11/3/2017    Procedure: EXCISE MASS LOWER EXTREMITY;  Excision Mass Left Flank;  Surgeon: Marybeth Gambino MD;  Location: [facility] OR     MOHS MICROGRAPHIC PROCEDURE       PARATHYROIDECTOMY N/A 3/21/2017    Procedure: PARATHYROIDECTOMY;  Surgeon: Julianna Short MD;  Location: [facility] OR     PARATHYROIDECTOMY              Social History:     Social History     Socioeconomic History     Marital status:      Spouse name: Not on file     Number of children: Not on file     Years of education: Not on file     Highest education level: Not on file   Occupational History     Not on file   Tobacco Use     Smoking status: Former Smoker     Quit date: 1970     Years since quittin.6     Smokeless tobacco: Former User     Tobacco comment: Doesn't remember how much he used to smoke - during service only.    Substance and Sexual Activity     Alcohol use: Yes     Comment: 1 beer/week     Drug use: No     Sexual activity: Not on file   Other Topics Concern     Parent/sibling w/ CABG, MI or angioplasty before 65F 55M? Not Asked   Social History Narrative     Not on file     Social Determinants of Health     Financial Resource Strain:      Difficulty of Paying Living Expenses:     Food Insecurity:      Worried About Running Out of Food in the Last Year:      Ran Out of Food in the Last Year:    Transportation Needs:      Lack of Transportation (Medical):      Lack of Transportation (Non-Medical):    Physical Activity:      Days of Exercise per Week:      Minutes of Exercise per Session:    Stress:      Feeling of Stress :    Social Connections:      Frequency of Communication with Friends and Family:      Frequency of Social Gatherings with Friends and Family:      Attends Scientologist Services:      Active Member of Clubs or Organizations:      Attends Club or Organization Meetings:      Marital Status:    Intimate Partner Violence:      Fear of Current or Ex-Partner:      Emotionally Abused:      Physically Abused:      Sexually Abused:             Family History:       Family History   Problem Relation Age of Onset     Melanoma Mother      Melanoma Father      Cancer No family hx of         no skin cancer     Glaucoma No family hx of      Macular Degeneration No family hx of             Medications:     Current Outpatient Medications   Medication Sig     doxycycline hyclate (VIBRAMYCIN) 100 MG capsule Take 1 capsule (100 mg) by mouth 2 times daily For 1 month, then decrease to daily     acetaminophen 500 MG CAPS Take 2 tablets by mouth daily.     albuterol (PROAIR HFA) 108 (90 BASE) MCG/ACT Inhaler Inhale 2 puffs into the lungs every 4 hours as needed for shortness of breath / dyspnea, wheezing or other (use prior to exertion/activities)     ammonium lactate (AMLACTIN) 12 % cream Apply  topically daily.     Artificial Tear Ointment (EQ RESTORE PM OP) Apply 1 strip to eye At Bedtime     artificial tears SOLN Use one drop to both eye PRN.     ASPIRIN PO Take 81 mg by mouth daily     ATORVASTATIN CALCIUM PO Take 40 mg by mouth daily     budesonide-formoterol (SYMBICORT) 160-4.5 MCG/ACT Inhaler Inhale 2 puffs into the lungs 2 times daily     carboxymethylcellul-glycerin (OPTIVE/REFRESH OPTIVE)  0.5-0.9 % SOLN ophthalmic solution Place 1 drop into both eyes 4 times daily     Carboxymethylcellul-Glycerin 1-0.9 % GEL Apply 1 Application to eye 4 times daily     ciclopirox (LOPROX) 0.77 % cream Apply topically 2 times daily To feet and toenails     clotrimazole (LOTRIMIN) 1 % external cream Apply topically 2 times daily To feet and toenails.     cycloSPORINE (RESTASIS) 0.05 % ophthalmic emulsion Place 1 drop into both eyes 2 times daily     econazole nitrate 1 % cream Apply topically daily To feet and toenails.     Efinaconazole 10 % SOLN Externally apply topically daily     erythromycin (ROMYCIN) 5 MG/GM ophthalmic ointment Place 0.5 inches into both eyes 6 times daily     finasteride (PROSCAR) 5 MG tablet Take 5 mg by mouth daily     fluocinonide (LIDEX) 0.05 % external ointment Apply topically 2 times daily     hydrocortisone 2.5 % ointment Apply topically 2 times daily     ketotifen (ZADITOR/REFRESH ANTI-ITCH) 0.025 % SOLN ophthalmic solution Place 1 drop into both eyes 2 times daily     lifitegrast (XIIDRA) 5 % opthalmic solution Place 1 drop into both eyes 2 times daily     liraglutide (VICTOZA PEN) 18 MG/3ML solution Inject 0.6 mg subcutaneous daily for 2 weeks then increase to 1.2 mg daily subcutaneous next 2 weeks then 1.8 mg daily subcutaneous after that.     losartan (COZAAR) 50 MG tablet Take 1 tablet (50 mg) by mouth daily     METFORMIN HCL PO Take by mouth 2 times daily (with meals)     metoprolol succinate (TOPROL-XL) 100 MG 24 hr tablet Take 0.5 tablets (50 mg) by mouth daily     omeprazole (PRILOSEC) 20 MG capsule Take 20 mg by mouth daily.     order for DME Equipment being ordered: velcro compression for lower legs and feet: ready wrap or juxta fit or farrow wrap     senna-docusate (SENOKOT-S;PERICOLACE) 8.6-50 MG per tablet Take 1-2 tablets by mouth 2 times daily as needed for constipation Take while on oral narcotics to prevent or treat constipation.     triamcinolone (KENALOG) 0.1 %  external ointment Apply topically 2 times daily     VITAMIN D, CHOLECALCIFEROL, PO Take 1,000 Units by mouth every morning      No current facility-administered medications for this visit.              Review of Systems:   A comprehensive 10 point review of systems (constitutional, ENT, cardiac, peripheral vascular, lymphatic, respiratory, GI, , Musculoskeletal, skin, Neurological) was performed and found to be negative except as described in this note.       HOOS Hip Dysfunction & Osteoarthritis Outcome Questionnaire    No flowsheet data found.           [unfilled]    KOOS Knee Survey Assessment    Knee Outcome Survey ADL Scale (MARKEL Hamilton; BENJAMÍN Tipton; Pascale, ANTONIO; Fawad, MYRON; Lio, KAILYN; 1998) 7/26/2021   Do you have swelling in your knee? Always   Do you feel grinding, hear clicking or any other type of noise when your knee moves? Always   Does your knee catch or hang up when moving? Sometimes   Can you straighten your knee fully? Often   Can you bend your knee fully? Often   How severe is your knee joint stiffness after first wakening in the morning? Moderate   How severe is your knee stiffness after sitting, lying or resting LATER IN THE DAY? Moderate   How often do you experience knee pain? Always   Twisting/pivoting on your knee Moderate   Straightening knee fully Moderate   Bending knee fully Moderate   Walking on flat surface Moderate   Going up or down stairs Moderate   At night while in bed Moderate   Sitting or lying Moderate   Standing upright Moderate   Descending stairs Moderate   Ascending stairs Moderate   Rising from sitting Moderate   Standing Moderate   Bending to floor/ an object Moderate   Walking on flat surface Moderate   Getting in/out of car Moderate   Going shopping Moderate   Putting on socks/stockings Moderate   Rising from bed Moderate   Taking off socks/stockings Moderate   Lying in bed (turning over, maintaining knee position) Moderate   Getting in/out of bath Moderate    Sitting Moderate   Getting on/off toilet Moderate   Heavy domestic duties (moving heavy boxes, scrubbing floors, etc) Severe   Light domestic duties (cooking, dusting, etc) Moderate   Squatting Severe   Running Severe   Jumping Extreme   Twisting/pivoting on your injured knee Severe   Kneeling Severe   How often are you aware of your knee problem? Constantly   Have you modified your life style to avoid potentially damaging activities to your knee? Totally   How much are you troubled with lack of confidence in your knee? Totally   In general, how much difficulty do you have with your knee? Severe   Pain Score 44.44   Symptoms Score 28.57   Function, Daily Living Score 48.52   Sports and Rec Score 20   Quality of Life Score 6.25              Promis 10 Assessment    PROMIS 10 7/26/2021   In general, would you say your health is: Good   In general, would you say your quality of life is: Fair   In general, how would you rate your physical health? Fair   In general, how would you rate your mental health, including your mood and your ability to think? Fair   In general, how would you rate your satisfaction with your social activities and relationships? Fair   In general, please rate how well you carry out your usual social activities and roles Fair   To what extent are you able to carry out your everyday physical activities such as walking, climbing stairs, carrying groceries, or moving a chair? A little   How often have you been bothered by emotional problems such as feeling anxious, depressed or irritable? Sometimes   How would you rate your fatigue on average? Moderate   How would you rate your pain on average?   0 = No Pain  to  10 = Worst Imaginable Pain 4   Global Physical Health Score : Raw Score -   Global Mental Health Score : Raw Score -   Total (Physical + Mental Health Score) -   In general, would you say your health is: 3   In general, would you say your quality of life is: 2   In general, how would you rate  your physical health? 2   In general, how would you rate your mental health, including your mood and your ability to think? 2   In general, how would you rate your satisfaction with your social activities and relationships? 2   In general, please rate how well you carry out your usual social activities and roles. (This includes activities at home, at work and in your community, and responsibilities as a parent, child, spouse, employee, friend, etc.) 2   To what extent are you able to carry out your everyday physical activities such as walking, climbing stairs, carrying groceries, or moving a chair? 2   In the past 7 days, how often have you been bothered by emotional problems such as feeling anxious, depressed, or irritable? 3   In the past 7 days, how would you rate your fatigue on average? 3   In the past 7 days, how would you rate your pain on average, where 0 means no pain, and 10 means worst imaginable pain? 4   Global Mental Health Score 9   Global Physical Health Score 10   PROMIS TOTAL - SUBSCORES 19   Some recent data might be hidden              [facility] Knee Questionnaire    No flowsheet data found.       See intake form completed by patient

## 2021-07-26 NOTE — NURSING NOTE
"[facility]:  PHQ-9 Screening Note    SITUATION/BACKGROUND                                                    Lux Velasco is a 75 year old male who completed the PHQ-9 assessment for depression and Score is >9.    Onset of symptoms: related to pain and disability associated with left knee  Trigger: pain and disability associated with left knee  Recent related events: pain and disability associated with left knee  Prior history of suicide attempt or self harm: No   Risk Factors: pain and disability associated with left knee  History of depression or mental illness: Yes  Medications reviewed: Yes     ASSESSMENT      A. Are any of the following present?      Suicidal thoughts with a plan and means to carry out the plan?    Intent to harm others    Altered mental status: confusion, delusional, psychotic YES  - Patient should be seen in the ED.  If patient is willing to go to the ED, call [facility] Non Emergent Transportation at [phone].  If patient is unwilling to go to the ED, call [phone].   Clinic staff to fill out the  Transportation Hold  form.    Place order for referral to behavioral health team for  regular  follow-up.    NO - go to B   B. Are any of the following present?      Suicidal thoughts without a plan or means to carry out the plan    New onset of delusional ideas    Past inpatient admission for depression    New onset and recent change or addition of new medication YES  - Patient should receive crises care within 2-4 hours. Offer emergency room care or connect with any of the *crisis resources.     Place referral to behavioral health team for \"regular\" follow-up.    NO - go to C   C. Are any of the following present?      Previous suicide attempts    Depression interfering with ability to work or function    Loss of appetite and eating poorly    Abrupt cessation of drugs (OTC or RX), alcohol or caffeine    Drug or alcohol abuse YES -  [facility] behavior health team. If no response, patient should " "receive crisis care within 24 hours.     Place referral to behavioral health team for \"regular\" follow-up.     NO - go to D   D. Are several of the following present?      Difficulty concentrating    Difficulty sleeping    Reduced interest in sexual activity or impotency    Irregular or absent menstruation    No interest in activity    Change in interpersonal relationships    Increased use/abuse of alcohol or drugs    Pregnant or recent child birth    Recent major life change    History of depression YES -  Follow-up with PCP for appointment and follow home care instructions.    Place referral to behavioral health team for \"regular\" follow-up.    NO - provide home care instructions.        PLAN      Home Care Instructions:   [facility]     Report the following to your PCP:   Depression interferes with daily activities    Seek emergency care immediately if any of the following occur:   Suicidal thoughts and plan and means to carry out the plan    BEHAVIORAL HEALTH TEAMS      [facility] - Behavioral Health Team    [facility] Pager: [phone]    [place]  - Behavioral Health Team    Pager number: [phone]    Referral to Behavioral Health    BEHAVIORAL / SPIRITUAL HEALTH SOWQ [66431}    RESOURCES      - 24/7 Crisis Hotlines: [place] Suicide Prevention Hotline  856-422-JUDV (1075)    Neeru Valderrama, RN    This RN offered for him to connect with a [facility] Mental Health Provider, patient stated \"they don't do anything, they just sit there in their 3 piece suits\".  Lux does not take  meds, does not see anyone. \"[facility] am fine[facility] all [facility]\". Lux does not have a plan to hurt himself or others. Lux refused to talk further with his RN. He made it clear he just needs to get his knee fixed.  Copyright 2016 [employer]      "

## 2021-07-26 NOTE — PROGRESS NOTES
"    [facility]  Orthopaedic Surgery, Joint Replacement Consultation  by Orestes Casas M.D.    Lux Velasco MRN# [id number]   Age: 75 year old YOB: 1945     Requesting physician: Nam Hamlin            Assessment and Plan:   Assessment:  Status post left total knee arthroplasty with ongoing pain and flexion contracture.  Possibility of slight imbalance being tighter in extension and looser in flexion.     Plan:  Advised patient to work on obtaining full extension of the knee with stretching exercises.  He was given instructions with respect to performing stretching exercises 4 times per day.  Also given instructions regarding quadricep strengthening.  I advised him that it is only been 3 months after surgery and is quite possible and likely that improvements will occur over this next year as the swelling decreases over time.  I also advised him that the \"knocking\" sensation that he is appreciating is quite normal.  If his symptoms persist over the course of the next year he may follow-up for reassessment regarding the need for any type of intervention.  1 possibility would be to revise femoral component and increase extension gap by 1 to 2 mm as well as ensure all posterior soft tissue are released thoroughly.          History of Present Illness:   75 year old male  chief complaint    Patient presents for second opinion with regards to his left total knee arthroplasty performed April 22, 2021 at the [facility].  Patient notes \"locking\" that is painful and bothersome to him.  He believes that another surgical intervention is warranted.  He is using a cane when ambulating.  He has been performing exercises at home.    Background history:  DX:  1. Degenerative joint disease with varus deformity of both knees.    TREATMENTS:  1. 4/22/2021, left total knee arthroplasty, [employer] mobile-bearing, [facility]                    Physical Exam: "     EXAMINATION pertinent findings:   PSYCH: Pleasant, healthy-appearing, alert, oriented x3, cooperative. Normal mood and affect.  VITAL SIGNS: Height 1.829 m (6'), weight 123.4 kg (272 lb)..  Reviewed nursing intake notes.   Body mass index is 36.89 kg/m .  RESP: non labored breathing   ABD: benign, soft, non-tender, no acute peritoneal findings  SKIN: grossly normal   LYMPHATIC: grossly normal, no adenopathy, no extremity edema  NEURO: grossly normal , no motor deficits  VASCULAR: satisfactory perfusion of all extremities   MUSCULOSKELETAL:   Antalgic gait on the left side.  He lacks 15 degrees of full extension on the left side has further flexion to 90 degrees.  2+ swelling in the periarticular tissues.  No laxity to varus and valgus stress.  In the sagittal plane 2+ anterior drawer.  Posterior drawer results in a palpable clunk as the post as the femoral condyle which I explained the patient is quite normal.  Slight imbalance in the flexion extension gaps being tighter in extension.    Right knee has medial compartment osteoarthritis with a varus deformity.         Data:   All laboratory data reviewed  All imaging studies reviewed by me    Radiographs of 2018 as well as July 2021 are reviewed.  Mobile-bearing tibial tray with total knee components is visualized in satisfactory alignment.  Patellofemoral joint has been resurfaced as well.          DATA for DOCUMENTATION:         Past Medical History:     Patient Active Problem List   Diagnosis     Plantar fascial fibromatosis     History of agent Orange exposure     Degenerative arthritis of cervical spine     Stroke (H)     Skin exam, screening for cancer     Primary hyperparathyroidism (H)     Benign prostatic hyperplasia with weak urinary stream     Acquired cyst of kidney     Dyspnea on exertion     Mild intermittent asthma without complication     Diabetes mellitus, type 2 (H)     Past Medical History:   Diagnosis Date     Diabetes mellitus type II 2005      History of agent Orange exposure 2013     Hypertension      Myocardial infarct (H) 1999     Primary hyperparathyroidism (H) Dx approx 2003     Stroke (H) 1998    recovered fully       Also see scanned health assessment forms.       Past Surgical History:     Past Surgical History:   Procedure Laterality Date     BIOPSY OF SKIN LESION       BYPASS GRAFT ARTERY CORONARY       CATARACT IOL, RT/LT Bilateral 2017    [facility]     EXCISE MASS LOWER EXTREMITY Left 11/3/2017    Procedure: EXCISE MASS LOWER EXTREMITY;  Excision Mass Left Flank;  Surgeon: Marybeth Gambino MD;  Location: [facility] OR     MOHS MICROGRAPHIC PROCEDURE       PARATHYROIDECTOMY N/A 3/21/2017    Procedure: PARATHYROIDECTOMY;  Surgeon: Julianna Short MD;  Location: [facility] OR     PARATHYROIDECTOMY              Social History:     Social History     Socioeconomic History     Marital status:      Spouse name: Not on file     Number of children: Not on file     Years of education: Not on file     Highest education level: Not on file   Occupational History     Not on file   Tobacco Use     Smoking status: Former Smoker     Quit date: 1970     Years since quittin.6     Smokeless tobacco: Former User     Tobacco comment: Doesn't remember how much he used to smoke - during service only.    Substance and Sexual Activity     Alcohol use: Yes     Comment: 1 beer/week     Drug use: No     Sexual activity: Not on file   Other Topics Concern     Parent/sibling w/ CABG, MI or angioplasty before 65F 55M? Not Asked   Social History Narrative     Not on file     Social Determinants of Health     Financial Resource Strain:      Difficulty of Paying Living Expenses:    Food Insecurity:      Worried About Running Out of Food in the Last Year:      Ran Out of Food in the Last Year:    Transportation Needs:      Lack of Transportation (Medical):      Lack of Transportation (Non-Medical):    Physical Activity:      Days of Exercise  per Week:      Minutes of Exercise per Session:    Stress:      Feeling of Stress :    Social Connections:      Frequency of Communication with Friends and Family:      Frequency of Social Gatherings with Friends and Family:      Attends Congregation Services:      Active Member of Clubs or Organizations:      Attends Club or Organization Meetings:      Marital Status:    Intimate Partner Violence:      Fear of Current or Ex-Partner:      Emotionally Abused:      Physically Abused:      Sexually Abused:             Family History:       Family History   Problem Relation Age of Onset     Melanoma Mother      Melanoma Father      Cancer No family hx of         no skin cancer     Glaucoma No family hx of      Macular Degeneration No family hx of             Medications:     Current Outpatient Medications   Medication Sig     doxycycline hyclate (VIBRAMYCIN) 100 MG capsule Take 1 capsule (100 mg) by mouth 2 times daily For 1 month, then decrease to daily     acetaminophen 500 MG CAPS Take 2 tablets by mouth daily.     albuterol (PROAIR HFA) 108 (90 BASE) MCG/ACT Inhaler Inhale 2 puffs into the lungs every 4 hours as needed for shortness of breath / dyspnea, wheezing or other (use prior to exertion/activities)     ammonium lactate (AMLACTIN) 12 % cream Apply  topically daily.     Artificial Tear Ointment (EQ RESTORE PM OP) Apply 1 strip to eye At Bedtime     artificial tears SOLN Use one drop to both eye PRN.     ASPIRIN PO Take 81 mg by mouth daily     ATORVASTATIN CALCIUM PO Take 40 mg by mouth daily     budesonide-formoterol (SYMBICORT) 160-4.5 MCG/ACT Inhaler Inhale 2 puffs into the lungs 2 times daily     carboxymethylcellul-glycerin (OPTIVE/REFRESH OPTIVE) 0.5-0.9 % SOLN ophthalmic solution Place 1 drop into both eyes 4 times daily     Carboxymethylcellul-Glycerin 1-0.9 % GEL Apply 1 Application to eye 4 times daily     ciclopirox (LOPROX) 0.77 % cream Apply topically 2 times daily To feet and toenails      clotrimazole (LOTRIMIN) 1 % external cream Apply topically 2 times daily To feet and toenails.     cycloSPORINE (RESTASIS) 0.05 % ophthalmic emulsion Place 1 drop into both eyes 2 times daily     econazole nitrate 1 % cream Apply topically daily To feet and toenails.     Efinaconazole 10 % SOLN Externally apply topically daily     erythromycin (ROMYCIN) 5 MG/GM ophthalmic ointment Place 0.5 inches into both eyes 6 times daily     finasteride (PROSCAR) 5 MG tablet Take 5 mg by mouth daily     fluocinonide (LIDEX) 0.05 % external ointment Apply topically 2 times daily     hydrocortisone 2.5 % ointment Apply topically 2 times daily     ketotifen (ZADITOR/REFRESH ANTI-ITCH) 0.025 % SOLN ophthalmic solution Place 1 drop into both eyes 2 times daily     lifitegrast (XIIDRA) 5 % opthalmic solution Place 1 drop into both eyes 2 times daily     liraglutide (VICTOZA PEN) 18 MG/3ML solution Inject 0.6 mg subcutaneous daily for 2 weeks then increase to 1.2 mg daily subcutaneous next 2 weeks then 1.8 mg daily subcutaneous after that.     losartan (COZAAR) 50 MG tablet Take 1 tablet (50 mg) by mouth daily     METFORMIN HCL PO Take by mouth 2 times daily (with meals)     metoprolol succinate (TOPROL-XL) 100 MG 24 hr tablet Take 0.5 tablets (50 mg) by mouth daily     omeprazole (PRILOSEC) 20 MG capsule Take 20 mg by mouth daily.     order for DME Equipment being ordered: velcro compression for lower legs and feet: ready wrap or juxta fit or farrow wrap     senna-docusate (SENOKOT-S;PERICOLACE) 8.6-50 MG per tablet Take 1-2 tablets by mouth 2 times daily as needed for constipation Take while on oral narcotics to prevent or treat constipation.     triamcinolone (KENALOG) 0.1 % external ointment Apply topically 2 times daily     VITAMIN D, CHOLECALCIFEROL, PO Take 1,000 Units by mouth every morning      No current facility-administered medications for this visit.              Review of Systems:   A comprehensive 10 point review of  systems (constitutional, ENT, cardiac, peripheral vascular, lymphatic, respiratory, GI, , Musculoskeletal, skin, Neurological) was performed and found to be negative except as described in this note.       HOOS Hip Dysfunction & Osteoarthritis Outcome Questionnaire    No flowsheet data found.           [unfilled]    KOOS Knee Survey Assessment    Knee Outcome Survey ADL Scale (Joy, MARKEL; BENJAMÍN Tipton; Pascale, RS; Fawad, FH; Lio, KAILYN; 1998) 7/26/2021   Do you have swelling in your knee? Always   Do you feel grinding, hear clicking or any other type of noise when your knee moves? Always   Does your knee catch or hang up when moving? Sometimes   Can you straighten your knee fully? Often   Can you bend your knee fully? Often   How severe is your knee joint stiffness after first wakening in the morning? Moderate   How severe is your knee stiffness after sitting, lying or resting LATER IN THE DAY? Moderate   How often do you experience knee pain? Always   Twisting/pivoting on your knee Moderate   Straightening knee fully Moderate   Bending knee fully Moderate   Walking on flat surface Moderate   Going up or down stairs Moderate   At night while in bed Moderate   Sitting or lying Moderate   Standing upright Moderate   Descending stairs Moderate   Ascending stairs Moderate   Rising from sitting Moderate   Standing Moderate   Bending to floor/ an object Moderate   Walking on flat surface Moderate   Getting in/out of car Moderate   Going shopping Moderate   Putting on socks/stockings Moderate   Rising from bed Moderate   Taking off socks/stockings Moderate   Lying in bed (turning over, maintaining knee position) Moderate   Getting in/out of bath Moderate   Sitting Moderate   Getting on/off toilet Moderate   Heavy domestic duties (moving heavy boxes, scrubbing floors, etc) Severe   Light domestic duties (cooking, dusting, etc) Moderate   Squatting Severe   Running Severe   Jumping Extreme   Twisting/pivoting  on your injured knee Severe   Kneeling Severe   How often are you aware of your knee problem? Constantly   Have you modified your life style to avoid potentially damaging activities to your knee? Totally   How much are you troubled with lack of confidence in your knee? Totally   In general, how much difficulty do you have with your knee? Severe   Pain Score 44.44   Symptoms Score 28.57   Function, Daily Living Score 48.52   Sports and Rec Score 20   Quality of Life Score 6.25              Promis 10 Assessment    PROMIS 10 7/26/2021   In general, would you say your health is: Good   In general, would you say your quality of life is: Fair   In general, how would you rate your physical health? Fair   In general, how would you rate your mental health, including your mood and your ability to think? Fair   In general, how would you rate your satisfaction with your social activities and relationships? Fair   In general, please rate how well you carry out your usual social activities and roles Fair   To what extent are you able to carry out your everyday physical activities such as walking, climbing stairs, carrying groceries, or moving a chair? A little   How often have you been bothered by emotional problems such as feeling anxious, depressed or irritable? Sometimes   How would you rate your fatigue on average? Moderate   How would you rate your pain on average?   0 = No Pain  to  10 = Worst Imaginable Pain 4   Global Physical Health Score : Raw Score -   Global Mental Health Score : Raw Score -   Total (Physical + Mental Health Score) -   In general, would you say your health is: 3   In general, would you say your quality of life is: 2   In general, how would you rate your physical health? 2   In general, how would you rate your mental health, including your mood and your ability to think? 2   In general, how would you rate your satisfaction with your social activities and relationships? 2   In general, please rate how  well you carry out your usual social activities and roles. (This includes activities at home, at work and in your community, and responsibilities as a parent, child, spouse, employee, friend, etc.) 2   To what extent are you able to carry out your everyday physical activities such as walking, climbing stairs, carrying groceries, or moving a chair? 2   In the past 7 days, how often have you been bothered by emotional problems such as feeling anxious, depressed, or irritable? 3   In the past 7 days, how would you rate your fatigue on average? 3   In the past 7 days, how would you rate your pain on average, where 0 means no pain, and 10 means worst imaginable pain? 4   Global Mental Health Score 9   Global Physical Health Score 10   PROMIS TOTAL - SUBSCORES 19   Some recent data might be hidden              Ortho [place] Knee Questionnaire    No flowsheet data found.             See intake form completed by patient

## 2021-08-04 ENCOUNTER — TRANSFERRED RECORDS (OUTPATIENT)
Dept: HEALTH INFORMATION MANAGEMENT | Facility: CLINIC | Age: 76
End: 2021-08-04

## 2021-08-17 ENCOUNTER — TELEPHONE (OUTPATIENT)
Dept: ORTHOPEDICS | Facility: CLINIC | Age: 76
End: 2021-08-17

## 2021-08-17 NOTE — TELEPHONE ENCOUNTER
[facility] Call Center    Phone Message    May a detailed message be left on voicemail: yes     Reason for Call: Other: Patient would like to discuss the next steps. He wants to know if Dr. Casas could help him. He was seen in clinic on 7/26. Please reach out to patient      Action Taken: Message routed to:  [facility]    Travel Screening: Not Applicable

## 2021-08-17 NOTE — TELEPHONE ENCOUNTER
"Attempted to call patient x2, no answer, unable to leave voice mail.  Dr. Casas did not recommend anything other than exercises at this point.    From his office visit on 7/26,  \"Advised patient to work on obtaining full extension of the knee with stretching exercises.  He was given instructions with respect to performing stretching exercises 4 times per day.  Also given instructions regarding quadricep strengthening.  I advised him that it is only been 3 months after surgery and is quite possible and likely that improvements will occur over this next year as the swelling decreases over time.  I also advised him that the \"knocking\" sensation that he is appreciating is quite normal.  If his symptoms persist over the course of the next year he may follow-up for reassessment regarding the need for any type of intervention\"    FUNMILAYO Mueller, RN  RN Care Coordinator, Dr. Augusto SERRATO [facility]      "

## 2021-08-18 ENCOUNTER — TELEPHONE (OUTPATIENT)
Dept: ORTHOPEDICS | Facility: CLINIC | Age: 76
End: 2021-08-18

## 2021-08-19 ENCOUNTER — TELEPHONE (OUTPATIENT)
Dept: ORTHOPEDICS | Facility: CLINIC | Age: 76
End: 2021-08-19

## 2021-08-19 NOTE — TELEPHONE ENCOUNTER
Returned call to Lux.  This RN shared that I spoke with Dr. Casas and Dr. Casas is not recommending anything other than physical therapy at this time.  Dr Casas did say he can get a second opinion if he would prefer.   This RN encouraged [facility] to [facility], Lux shared that they will not see him until Oct.  Lux verbalized on-going frustration with lack of mobility and pain issues.  This RN acknowledged his frustration. This RN stressed the importance of the exercises 4x day along with rest , ice and elevation.    Lux appreciated the call, Lux expressed that he wished that Dr. aCsas would do something for his pain.    CLAUDIO MuellerN, RN  RN Care Coordinator, Dr. Casas  [facility]

## 2021-08-20 DIAGNOSIS — M25.562 CHRONIC PAIN OF LEFT KNEE: Primary | ICD-10-CM

## 2021-08-20 DIAGNOSIS — G89.29 CHRONIC PAIN OF LEFT KNEE: Primary | ICD-10-CM

## 2021-09-13 ENCOUNTER — OFFICE VISIT (OUTPATIENT)
Dept: OPHTHALMOLOGY | Facility: CLINIC | Age: 76
End: 2021-09-13
Attending: OPHTHALMOLOGY
Payer: COMMERCIAL

## 2021-09-13 DIAGNOSIS — H01.01A ULCERATIVE BLEPHARITIS OF UPPER AND LOWER EYELIDS OF BOTH EYES: ICD-10-CM

## 2021-09-13 DIAGNOSIS — Z96.1 PSEUDOPHAKIA, BOTH EYES: ICD-10-CM

## 2021-09-13 DIAGNOSIS — H01.01B ULCERATIVE BLEPHARITIS OF UPPER AND LOWER EYELIDS OF BOTH EYES: ICD-10-CM

## 2021-09-13 DIAGNOSIS — H04.123 DRY EYES, BILATERAL: Primary | ICD-10-CM

## 2021-09-13 PROCEDURE — 68761 CLOSE TEAR DUCT OPENING: CPT | Mod: LT | Performed by: OPHTHALMOLOGY

## 2021-09-13 PROCEDURE — 99215 OFFICE O/P EST HI 40 MIN: CPT | Mod: 25 | Performed by: OPHTHALMOLOGY

## 2021-09-13 PROCEDURE — G0463 HOSPITAL OUTPT CLINIC VISIT: HCPCS | Mod: 25

## 2021-09-13 RX ORDER — CYCLOSPORINE 0.5 MG/ML
1 EMULSION OPHTHALMIC 2 TIMES DAILY
Qty: 60 EACH | Refills: 3 | Status: SHIPPED | OUTPATIENT
Start: 2021-09-13 | End: 2022-06-27

## 2021-09-13 RX ORDER — CYCLOSPORINE 0.5 MG/ML
1 EMULSION OPHTHALMIC 2 TIMES DAILY
Qty: 60 EACH | Refills: 3 | Status: SHIPPED | OUTPATIENT
Start: 2021-09-13 | End: 2021-09-13

## 2021-09-13 RX ORDER — CARBOXYMETHYLCELLULOSE SODIUM 10 MG/ML
1 GEL OPHTHALMIC 4 TIMES DAILY
Qty: 90 EACH | Refills: 4 | Status: SHIPPED | OUTPATIENT
Start: 2021-09-13 | End: 2022-06-27

## 2021-09-13 ASSESSMENT — EXTERNAL EXAM - LEFT EYE: OS_EXAM: FLOPPY LIDS

## 2021-09-13 ASSESSMENT — REFRACTION_WEARINGRX
OD_SPHERE: -0.50
OD_CYLINDER: +1.50
OD_AXIS: 007
OD_ADD: +2.00
OS_AXIS: 180
OS_SPHERE: -0.50
OS_CYLINDER: +0.50
OS_ADD: +2.00

## 2021-09-13 ASSESSMENT — EXTERNAL EXAM - RIGHT EYE: OD_EXAM: FLOPPY LIDS

## 2021-09-13 ASSESSMENT — TONOMETRY
IOP_METHOD: TONOPEN
OD_IOP_MMHG: 17
OS_IOP_MMHG: 19

## 2021-09-13 ASSESSMENT — VISUAL ACUITY
METHOD: SNELLEN - LINEAR
CORRECTION_TYPE: GLASSES
OS_CC: 20/20
OS_CC+: -1
OD_CC: 20/20

## 2021-09-13 ASSESSMENT — CONF VISUAL FIELD: OS_NORMAL: 1

## 2021-09-13 ASSESSMENT — SLIT LAMP EXAM - LIDS
COMMENTS: MGD, BLEPHARITIS
COMMENTS: MGD

## 2021-09-13 NOTE — NURSING NOTE
Chief Complaints and History of Present Illnesses   Patient presents with     Follow Up     Chief Complaint(s) and History of Present Illness(es)     Follow Up     Laterality: both eyes    Associated symptoms: dryness, burning and tearing.  Negative for eye pain              Comments     Pt here for 6 month follow up on Dry Eye and Blepharitis each eye. Pt struggling with his eye patch. Pt says it took a long time to get his erythromycin ointment.  Pt using:  PF gel (Celluvisc) or lubricating ointment or erythromycin ointment   Restasis BID each eye- having trouble getting this and is running low  Xiidra BID each eye - getting low also  Doxy 100 daily- took a while to get.  Ketotifen BID each eye   NATALIA MILLS 12:37 PM September 13, 2021

## 2021-09-13 NOTE — PROGRESS NOTES
CC: Dry eye    Referring provider: Dr. Hussein    HPI:  Lux Velasco is a(n) 75 year old male who presents for dry eye evaluation. Patient very bothered by ocular dryness, irritation, mild redness. 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 He is putting ointment in every 2-3 hours. He is very frustrated. No new flashes, floaters, or diplopia. No pain, redness, or tearing.     Interval:  Dry eye f/u, no significant changes.  Pt is here for 6 months f/u.  He has used the press n' seal on and off but finds it difficult to work with.  He is currently using a taped patch method.  He is using Restasis BID, Xiidra BID, and Celluvisc every 1-2 hours.  Eyes uncomfortable when not using lubrication, but feel okay when using drops or ointment, though vision may decrease with ointment.    POHx:  Pseuodphakia each eye     Family hx of eye disease: N/a  Social Hx: (-) smoking, (-) EtOH, (-) illicit drugs    Meds:  Using PF gel (Celluvisc) or lubricating ointment or erythromycin ointment about every 1-2 hours   Restasis BID OU - burns but pt thinks it helps (has been on since 2019)  Xiidra BID each eye   Doxy 100 daily  Ketotifen BID each eye     A/P:  #Dry eyes, both eyes  #Anterior & posterior blepharitis  - pt thinks Restasis and ointment are helping, but he is still symptomatic  - discussed many options including: dissolvable plugs (previously had silicone plugs per patient which fell out), adding Xiidra, bandage CL's, trial of brimonidine (for rosacea). Other future options include serum tears or scleral lens (pt not interested in sclerals)  - Placed [place] LLL plugs 9/13/2021 (180)  - continue Xiidra BID OU  - Cont Restasis BID  - pt thinks this is helping  - Continue warm compresses and eyelid scrubs at least daily  - Continue frequent gel and ointment  - Continue Doxycycline 100mg daily   - Either patching at night or Glad Press N Seal at nighttime    #Incomplete  blink left eye: Pt with symmetric face.  Does not remember any episode consistent with 7th nerve palsy.  Will repeat     #Allergic conjunctivitis  - Continue ketotifen BID - this is helping    #Floppy eyelids  - no h/o SEYMOUR - pt unsure if he had previous sleep study  - does not wear CPAP  - Could consider tarsal strip in future    Follow up: 6 months w/ VT, call if sx worsen to clinic.    Juan Garcia,   Fellow, Cornea & External Disease  Department of Ophthalmology  [facility]    Attending Physician Attestation:  Complete documentation of historical and exam elements from today's encounter can be found in the full encounter summary report (not reduplicated in this progress note).  I personally obtained the chief complaint(s) and history of present illness.  I confirmed and edited as necessary the review of systems, past medical/surgical history, family history, social history, and examination findings as documented by others; and I examined the patient myself.  I personally reviewed the relevant tests, images, and reports as documented above.  I formulated and edited as necessary the assessment and plan and discussed the findings and management plan with the patient and family. I was present for the key portions of the procedure and immediately available for the remainder. - Tarik Lyons MD    I personally spent great than 40min with the patient, of which >50% of the time was spent face to face with the patient, counseling and coordinating care with the patient. This excludes time spent performing the procedure. We discussed the complexity of his diagnosis, the need for further information, close monitoring and aggressive monitoring, and the unknown prognosis for the patient at this time.    Tarik Lyons MD

## 2021-09-28 ENCOUNTER — TELEPHONE (OUTPATIENT)
Dept: INTERNAL MEDICINE | Facility: CLINIC | Age: 76
End: 2021-09-28

## 2021-09-28 DIAGNOSIS — M25.562 LEFT KNEE PAIN: Primary | ICD-10-CM

## 2021-09-28 NOTE — TELEPHONE ENCOUNTER
[facility] Call Center    Phone Message    May a detailed message be left on voicemail: no     Reason for Call: Symptoms or Concerns     Current symptom or concern: Cant straighten out left leg, had knee replacement in April 2021.    Symptoms have been present for:  few month(s)    Has patient previously been seen for this? No      Are there any new or worsening symptoms? Yes: Pain and cant straighten out left leg.      Action Taken: Message routed to:  [facility]    Travel Screening: Not Applicable

## 2021-10-20 ENCOUNTER — THERAPY VISIT (OUTPATIENT)
Dept: PHYSICAL THERAPY | Facility: CLINIC | Age: 76
End: 2021-10-20
Payer: COMMERCIAL

## 2021-10-20 DIAGNOSIS — M25.562 LEFT KNEE PAIN, UNSPECIFIED CHRONICITY: Primary | ICD-10-CM

## 2021-10-20 PROCEDURE — 97110 THERAPEUTIC EXERCISES: CPT | Mod: GP | Performed by: PHYSICAL THERAPIST

## 2021-10-20 PROCEDURE — 97161 PT EVAL LOW COMPLEX 20 MIN: CPT | Mod: GP | Performed by: PHYSICAL THERAPIST

## 2021-10-20 NOTE — PROGRESS NOTES
"  [facility] Initial Evaluation  10/20/2021     Precautions/Restrictions/MD instructions: eval and treat    Therapist Assessment: Lux Velasco is a 75 year old male patient presenting to Physical Therapy with L knee pain. Patient demonstrates decreased knee extension and knee flexion AROM and PROM on L, decreased quad strength on L, decreased glute med and glute max strength on L. 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 These impairments limit their ability to walk, stand without pain. Skilled PT services are necessary in order to reduce impairments and improve independent function.    Subjective History    Injury/Condition Details:  Presenting Complaint L knee pain   Onset Timing/Date DOS = 4/22/2021, (Doctor's referral 8/20/2021)   Mechanism Surgery, pain after surgery limiting motion      Symptom Behavior Details    Primary Symptoms Constant symptoms; worsen with activity, pain (Location: \"entire knee\" no pain referred, Quality: Aching/Throbbing), stiffness, weakness     Denies locking, catching, giving way, or instability. Denies numbness, tingling, changes in sensation. Denies having related symptoms spreading to the L post thigh and L calf.    Worst Pain 10/10 (with walking)   Symptom Provocators Walking, standing    Best Pain 2/10    Symptom Relievers Sitting    Time of day dependent? Worse in evening after activity, Worse in morning and Stiffness in morning   Recent symptom change? no change in symptoms     Prior Testing/Intervention for current condition:  Prior Tests  x-ray   Prior Treatment PT , Injections: \"many\" (they were helpful) and Surgery(ies): 4/22/2021     Lifestyle & General Medical History:  Employment Retired   Usual physical activities  (within past year) Walking    Orthopaedic History  Plantar fascial fibromatosis, cervical pain   Medication  Pertinent medical history includes: obesity, non-healing wounds, " significant weakness.    Past Medical History:   Diagnosis Date     Diabetes mellitus type II 2005     History of agent Orange exposure 4/17/2013     Hypertension      Myocardial infarct (H) 01/01/1999     Primary hyperparathyroidism (H) Dx approx 2003     Stroke (H) 01/01/1998    recovered fully     Medical allergies includes: .   Isosorbide, Clindamycin, Eggs, Penicillins, Propoxyphene, and Tramadol  Surgical history includes: .  Past Surgical History:   Procedure Laterality Date     BIOPSY OF SKIN LESION       BYPASS GRAFT ARTERY CORONARY       CATARACT [facility]OL, RT/LT [facility]     EXCISE MASS LOWER EXTREMITY Left 11/3/2017    Procedure: EXCISE MASS LOWER EXTREMITY;  Excision Mass Left Flank;  Surgeon: Marybeth Gambino MD;  Location:  OR     MOHS MICROGRAPHIC PROCEDURE       PARATHYROIDECTOMY N/A 3/21/2017    Procedure: PARATHYROIDECTOMY;  Surgeon: Julianna Short MD;  [facility]     PARATHYROIDECTOMY       Current medications include:     Current Outpatient Medications:      acetaminophen 500 MG CAPS, Take 2 tablets by mouth daily., Disp: , Rfl:      albuterol (PROAIR HFA) 108 (90 BASE) MCG/ACT Inhaler, Inhale 2 puffs into the lungs every 4 hours as needed for shortness of breath / dyspnea, wheezing or other (use prior to exertion/activities), Disp: 1 Inhaler, Rfl: 3     ammonium lactate (AMLACTIN) 12 % cream, Apply  topically daily., Disp: , Rfl:      Artificial Tear Ointment (EQ RESTORE PM OP), Apply 1 strip to eye At Bedtime, Disp: , Rfl:      artificial tears SOLN, Use one drop to both eye PRN., Disp: , Rfl:      ASPIRIN PO, Take 81 mg by mouth daily, Disp: , Rfl:      ATORVASTATIN CALCIUM PO, Take 40 mg by mouth daily, Disp: , Rfl:      budesonide-formoterol (SYMBICORT) 160-4.5 MCG/ACT Inhaler, Inhale 2 puffs into the lungs 2 times daily, Disp: , Rfl:      carboxymethylcellul-glycerin (OPTIVE/REFRESH OPTIVE) 0.5-0.9 % SOLN ophthalmic solution, Place 1 drop into both  eyes 4 times daily, Disp: 3 Bottle, Rfl: 4     Carboxymethylcellul-Glycerin 1-0.9 % GEL, Apply 1 Application to eye 4 times daily, Disp: 1 Bottle, Rfl: 11     carboxymethylcellulose PF (REFRESH LIQUIGEL) 1 % ophthalmic gel, Place 1 drop into both eyes 4 times daily Dispose of dropperette within 24 hours after opening or if the tip is contaminated., Disp: 90 each, Rfl: 4     ciclopirox (LOPROX) 0.77 % cream, Apply topically 2 times daily To feet and toenails, Disp: 90 g, Rfl: 5     clotrimazole (LOTRIMIN) 1 % external cream, Apply topically 2 times daily To feet and toenails., Disp: 60 g, Rfl: 5     cycloSPORINE (RESTASIS) 0.05 % ophthalmic emulsion, Place 1 drop into both eyes 2 times daily, Disp: 60 each, Rfl: 3     doxycycline hyclate (VIBRAMYCIN) 100 MG capsule, Take 1 capsule (100 mg) by mouth 2 times daily For 1 month, then decrease to daily, Disp: 60 capsule, Rfl: 11     econazole nitrate 1 % cream, Apply topically daily To feet and toenails., Disp: 85 g, Rfl: 5     Efinaconazole 10 % SOLN, Externally apply topically daily, Disp: 4 mL, Rfl: 11     erythromycin (ROMYCIN) 5 MG/GM ophthalmic ointment, Place 0.5 inches into both eyes 6 times daily, Disp: 2 Tube, Rfl: 11     finasteride (PROSCAR) 5 MG tablet, Take 5 mg by mouth daily, Disp: , Rfl:      fluocinonide (LIDEX) 0.05 % external ointment, Apply topically 2 times daily, Disp: 60 g, Rfl: 6     hydrocortisone 2.5 % ointment, Apply topically 2 times daily, Disp: 30 g, Rfl: 6     ketotifen (ZADITOR/REFRESH ANTI-ITCH) 0.025 % SOLN ophthalmic solution, Place 1 drop into both eyes 2 times daily, Disp: , Rfl:      lifitegrast (XIIDRA) 5 % opthalmic solution, Place 1 drop into both eyes 2 times daily, Disp: 60 each, Rfl: 4     liraglutide (VICTOZA PEN) 18 MG/3ML solution, Inject 0.6 mg subcutaneous daily for 2 weeks then increase to 1.2 mg daily subcutaneous next 2 weeks then 1.8 mg daily subcutaneous after that., Disp: 15 mL, Rfl: 5     losartan (COZAAR) 50 MG  tablet, Take 1 tablet (50 mg) by mouth daily, Disp: , Rfl:      METFORMIN HCL PO, Take by mouth 2 times daily (with meals), Disp: , Rfl:      metoprolol succinate (TOPROL-XL) 100 MG 24 hr tablet, Take 0.5 tablets (50 mg) by mouth daily, Disp: , Rfl:      omeprazole (PRILOSEC) 20 MG capsule, Take 20 mg by mouth daily., Disp: , Rfl:      order for DME, Equipment being ordered: velcro compression for lower legs and feet: ready wrap or juxta fit or farrow wrap, Disp: 4 each, Rfl: 4     senna-docusate (SENOKOT-S;PERICOLACE) 8.6-50 MG per tablet, Take 1-2 tablets by mouth 2 times daily as needed for constipation Take while on oral narcotics to prevent or treat constipation., Disp: 10 tablet, Rfl: 0     triamcinolone (KENALOG) 0.1 % external ointment, Apply topically 2 times daily, Disp: 454 g, Rfl: 6     VITAMIN D, CHOLECALCIFEROL, PO, Take 1,000 Units by mouth every morning , Disp: , Rfl:      Notable medical history See Epic Chart   Patient goals Decrease pain, increase ROM    Patient Reported Health fair   Red Flags: (Bold when present) - reviewed the following and denies  Malaise, unexplained weight loss, night pain, fever           PHYSICAL THERAPY KNEE EXAM     Dynamic Movement Screen:  DL Squat: Anterior knee translation, Knee valgus, Hip internal rotation and Improper use of glutes/hips  1 leg stance:   Right: proprioceptive challenge and excessive contralateral pelvic drop  Left: proprioceptive challenge and excessive contralateral pelvic drop    Gait: HIP/TRUNK and Lateral trunk lean L  Step: Anterior knee translation, Knee valgus, Hip internal rotation and Improper use of glutes/hips    (*Indicates patient s pain)  Knee PROM L  R   Hyperextension 0 2   Extension 12 0   Flexion 98 115   (*Indicates patient s pain)    Hip PROM:     L R   IR 30 30   ER 30 30   Ayan's - -   Win - -       Special tests:   L R   Anterior Drawer - -   Posterior Drawer - -   Lachman's - -   Valgus 0 degrees - -   Valgus 30 degrees -  -   Varus 0 degrees - -   Varus 30 degrees - -   Ayana's - -   Appley's - -   Lateral Compression - -   Patellar Compression - -   (*Indicates patient's pain)    Resisted Tests Strength (MMT)    L R   Knee Ext 3+/5 4/5   Knee Flex 4/5 4+/5   Hip ABD 3+/5 3+/5   Hip Flex 4/5 4/5   Hip EXT 3+/5 3+/5   (*Indicates patient s pain)    Patellar tracking: normal    Other:  Joint Line Swelling: NT   Knee Joint Effusion (Stroke Test Assessment):  Right: 0  Left: 1+   ASSESSMENT/PLAN:  The patient is a 75 year old male with chief complaint of L knee pain.    The patient has the following significant findings with corresponding treatment plan.  Diagnosis 1:  Signs and symptoms consistent with knee flexion contracture after L TKA     Pain -  manual therapy, self management, education and home program  Decreased ROM/flexibility - manual therapy, therapeutic exercise and home program  Decreased joint mobility - manual therapy, therapeutic exercise and home program  Decreased strength - therapeutic exercise, therapeutic activities and home program  Impaired balance - neuro re-education, therapeutic activities and home program  Decreased proprioception - neuro re-education and therapeutic activities  Impaired gait - gait training and assistive devices  Impaired muscle performance - neuro re-education and home program  Decreased function - therapeutic activities and home program  Impaired posture - neuro re-education, therapeutic activities and home program  Instability -  Therapeutic Activity, Therapeutic Exercise, Neuromuscular Re-education, Splinting/Taping/Bracing/Orthotic, home program      Therapy Evaluation Codes:   1) History comprised of:   Personal factors that impact the plan of care:      Coping style and Time since onset of symptoms.    Comorbidity factors that impact the plan of care are:      Chest pain, Diabetes, High blood pressure, Overweight, Stroke and Weakness.     Medications impacting care: Cardiac, High  blood pressure, Muscle relaxant, Pain and Sleep.  2) Examination of Body Systems comprised of:   Body structures and functions that impact the plan of care:      Knee.   Activity limitations that impact the plan of care are:      Bending, Lifting, Sitting, Squatting/kneeling, Standing and Walking.  3) Clinical presentation characteristics are:   Stable/Uncomplicated.  4) Decision-Making    Low complexity using standardized patient assessment instrument and/or measureable assessment of functional outcome.  Cumulative Therapy Evaluation is: Low complexity.    Previous and current functional limitations:  (See Goal Flow Sheet for this information)    Short term and Long term goals: (See Goal Flow Sheet for this information)     Communication ability:  Patient appears to be able to clearly communicate and understand verbal and written communication and follow directions correctly.  Treatment Explanation - The following has been discussed with the patient:   RX ordered/plan of care  Anticipated outcomes  Possible risks and side effects  This patient would benefit from PT intervention to resume normal activities.   Rehab potential is fair.    Frequency:  1 X week, once daily  Duration:  for 8 visits  Discharge Plan: Achieve all LTGs, be independent in home treatment program, and reach maximal therapeutic benefit.    Please refer to the daily flowsheet for treatment today, total treatment time and time spent performing 1:1 timed codes.

## 2021-11-01 ENCOUNTER — THERAPY VISIT (OUTPATIENT)
Dept: PHYSICAL THERAPY | Facility: CLINIC | Age: 76
End: 2021-11-01
Payer: COMMERCIAL

## 2021-11-01 DIAGNOSIS — M25.562 LEFT KNEE PAIN, UNSPECIFIED CHRONICITY: ICD-10-CM

## 2021-11-01 PROCEDURE — 97140 MANUAL THERAPY 1/> REGIONS: CPT | Mod: GP | Performed by: PHYSICAL THERAPIST

## 2021-11-01 PROCEDURE — 97110 THERAPEUTIC EXERCISES: CPT | Mod: GP | Performed by: PHYSICAL THERAPIST

## 2021-11-17 ENCOUNTER — THERAPY VISIT (OUTPATIENT)
Dept: PHYSICAL THERAPY | Facility: CLINIC | Age: 76
End: 2021-11-17
Payer: COMMERCIAL

## 2021-11-17 DIAGNOSIS — M25.562 LEFT KNEE PAIN, UNSPECIFIED CHRONICITY: ICD-10-CM

## 2021-11-17 PROCEDURE — 97110 THERAPEUTIC EXERCISES: CPT | Mod: GP | Performed by: PHYSICAL THERAPIST

## 2021-11-17 PROCEDURE — 97140 MANUAL THERAPY 1/> REGIONS: CPT | Mod: GP | Performed by: PHYSICAL THERAPIST

## 2021-11-26 NOTE — LETTER
11/11/2019        RE: Lux Velasco  [address]  [place] [address]     Dear Colleague,    Thank you for referring your patient, Lux Velasco, to the [facility] at [facility]. Please see a copy of my visit note below.    Past Medical History:   Diagnosis Date     Diabetes mellitus type II 2005     History of agent Orange exposure 4/17/2013     Hypertension      Myocardial infarct (H) 01/01/1999     Primary hyperparathyroidism (H) Dx approx 2003     Stroke (H) 01/01/1998    recovered fully     Patient Active Problem List   Diagnosis     Plantar fascial fibromatosis     History of agent Orange exposure     Degenerative arthritis of cervical spine     Stroke (H)     Skin exam, screening for cancer     Primary hyperparathyroidism (H)     Benign prostatic hyperplasia with weak urinary stream     Acquired cyst of kidney     Past Surgical History:   Procedure Laterality Date     BIOPSY OF SKIN LESION       BYPASS GRAFT ARTERY CORONARY       CATARACT IOL, RT/LT [facility]ilateral 2017    [facility]     EXCISE MASS LOWER EXTREMITY Left 11/3/2017    Procedure: EXCISE MASS LOWER EXTREMITY;  Excision Mass Left Flank;  Surgeon: Marybeth Gambino MD;  [facility] OR     MOHS MICROGRAPHIC PROCEDURE       PARATHYROIDECTOMY N/A 3/21/2017    Procedure: PARATHYROIDECTOMY;  Surgeon: Julianna Short MD;  [facility]     PARATHYROIDECTOMY       Social History     Socioeconomic History     Marital status:      Spouse name: Not on file     Number of children: Not on file     Years of education: Not on file     Highest education level: Not on file   Occupational History     Not on file   Social Needs     Financial resource strain: Not on file     Food insecurity:     Worry: Not on file     Inability: Not on file     Transportation needs:     Medical: Not on file     Non-medical: Not on file   Tobacco Use     Smoking status: Former Smoker      Patient c/o new onset chest pain, states has not had since her admission. Midsternal and sharp in intensity 5/10 on pain scale, was resting in bed when it started. Rapid response called.  See notes Last attempt to quit: 1970     Years since quittin.8     Smokeless tobacco: Former User     Tobacco comment: Doesn't remember how much he used to smoke - during service only.    Substance and Sexual Activity     Alcohol use: Yes     Comment: 1 beer/week     Drug use: No     Sexual activity: Not on file   Lifestyle     Physical activity:     Days per week: Not on file     Minutes per session: Not on file     Stress: Not on file   Relationships     Social connections:     Talks on phone: Not on file     Gets together: Not on file     Attends Cheondoism service: Not on file     Active member of club or organization: Not on file     Attends meetings of clubs or organizations: Not on file     Relationship status: Not on file     Intimate partner violence:     Fear of current or ex partner: Not on file     Emotionally abused: Not on file     Physically abused: Not on file     Forced sexual activity: Not on file   Other Topics Concern     Parent/sibling w/ CABG, MI or angioplasty before 65F 55M? Not Asked   Social History Narrative     Not on file     Family History   Problem Relation Age of Onset     Melanoma Mother      Melanoma Father      Cancer No family hx of         no skin cancer     Glaucoma No family hx of      Macular Degeneration No family hx of      SUBJECTIVE FINDINGS:  A 73-year-old male returns to clinic for diabetic foot care, ingrown toenail, onychomycosis.  He relates he is doing okay.  His right medial nail border is hurting.  Relates no injury, no specific relieving or aggravating factors.  He is wearing diabetic shoes.  He relates he does have neuropathy in his feet.  No ulcers or sores since I have seen him last.      OBJECTIVE FINDINGS:  DP and PT are 2/4 bilaterally.  He has some peripheral edema bilaterally.  There is some mild dry scaly skin bilaterally.  He has thickened, dystrophic, incurvated hallux nail with brittleness and subungual debris and pressure changes in the medial nail border  with some pain on palpation.  There is no erythema, no drainage, no odor, no calor bilaterally.      ASSESSMENT AND PLAN:  Onychomycosis and ingrown toenail, right hallux.  Tinea pedis bilaterally.  He has got onychomycosis.  He is diabetic with peripheral neuropathy and vascular disease.  Diagnosis and treatment options discussed with him.  Right hallux nail is debrided upon consent.  He relates he has been using the econazole cream, not Lamisil.  Previous notes reviewed.  Prescription for Loprox cream given and use discussed with him.  He will d/c the econazole.  Return to clinic and see me in 2-3 months.         Again, thank you for allowing me to participate in the care of your patient.      Sincerely,    Colton Prieto DPM

## 2021-12-14 ENCOUNTER — TRANSFERRED RECORDS (OUTPATIENT)
Dept: HEALTH INFORMATION MANAGEMENT | Facility: CLINIC | Age: 76
End: 2021-12-14
Payer: COMMERCIAL

## 2021-12-28 PROBLEM — M25.562 LEFT KNEE PAIN, UNSPECIFIED CHRONICITY: Status: RESOLVED | Noted: 2021-10-20 | Resolved: 2021-12-28

## 2021-12-28 NOTE — PROGRESS NOTES
Discharge Note    Progress reporting period is from initial evaluation date (please see noted date below) to Nov 17, 2021.  Linked Episodes   Type: Episode: Status: Noted: Resolved: Last update: Updated by:   PHYSICAL THERAPY Left knee pain 10/20/2021 Active 10/20/2021  11/17/2021 10:39 AM María Ricks, PT      Comments:       Lux failed to follow up and current status is unknown.  Please see information below for last relevant information on current status.  Patient seen for 3 visits.    SUBJECTIVE  Subjective changes noted by patient:  Patient states that he is doing terrible. [facility] , and is frustrated by lack of progress of knee  .  Current pain level is 4/10.     Previous pain level was   .   Changes in function:  Yes (See Goal flowsheet attached for changes in current functional level)  Adverse reaction to treatment or activity: None    OBJECTIVE  Changes noted in objective findings: 0-7-110 (118 after mobilizations), pitting edema R calf      ASSESSMENT/PLAN  Diagnosis: s/p L TKA    Updated problem list and treatment plan:   Pain - HEP  Decreased ROM/flexibility - HEP  Decreased function - HEP  Decreased strength - HEP  STG/LTGs have been met or progress has been made towards goals:  Yes, please see goal flowsheet for most current information  Assessment of Progress: current status is unknown.    Last current status: Pt is progressing as expected   Self Management Plans:  HEP  I have re-evaluated this patient and find that the nature, scope, duration and intensity of the therapy is appropriate for the medical condition of the patient.  Lux continues to require the following intervention to meet STG and LTG's:  HEP.    Recommendations:  Discharge with current home program.  Patient to follow up with MD as needed.    Please refer to the daily flowsheet for treatment today, total treatment time and time spent performing 1:1 timed codes.

## 2022-01-10 ENCOUNTER — TRANSFERRED RECORDS (OUTPATIENT)
Dept: HEALTH INFORMATION MANAGEMENT | Facility: CLINIC | Age: 77
End: 2022-01-10
Payer: COMMERCIAL

## 2022-01-24 ENCOUNTER — TELEPHONE (OUTPATIENT)
Dept: INTERNAL MEDICINE | Facility: CLINIC | Age: 77
End: 2022-01-24
Payer: COMMERCIAL

## 2022-01-24 NOTE — TELEPHONE ENCOUNTER
[facility] Call Center    Phone Message    May a detailed message be left on voicemail: yes     Reason for Call: Other: The patient wanted to get medication prescribed because they don't feel well, I tried asking clarifying questions to help them determine if they should schedule an appointment sooner than the appointment they have already scheduled for 2/1/22, patient refused to disclose details and wants care team to follow up thank you.     Action Taken: Message routed to:  [facility]    Travel Screening: Not Applicable

## 2022-01-25 NOTE — CONFIDENTIAL NOTE
Spoke to Lux via telephone regarding recent call to clinic.    He stated that he was having a lot of phlegm, throwing up, cough,   Last time he threw up was 2-3 days ago  Denies knowing if he's had a fever, as his thermometer broke.     Reports having their vaccinations, but does need the booster.  I offered appointment this afternoon - he reports that his daughter wanted an appointment. Was able to get her scheduled. [facility]  Quin Osman RN

## 2022-02-27 NOTE — PROGRESS NOTES
HPI:    Last visit with me 7/7/2020 and additional details in that note. Overall stable. He is trying to exercise more and diet. He states L knee stable after knee replacement surgery but still can't quite straightened it out. No other HEENT, cardiopulmonary, abdominal, , neurological, systemic, psychiatric, lymphatic, endocrine complaints.     Past Medical History:   Diagnosis Date     Diabetes mellitus type II 2005     History of agent Orange exposure 4/17/2013     Hypertension      Myocardial infarct (H) 01/01/1999     Primary hyperparathyroidism (H) Dx approx 2003     Stroke (H) 01/01/1998    recovered fully     Past Surgical History:   Procedure Laterality Date     BIOPSY OF SKIN LESION       BYPASS GRAFT ARTERY CORONARY       CATARACT IOL, RT/LT Bilateral 2017    [facility]     EXCISE MASS LOWER EXTREMITY Left 11/3/2017    Procedure: EXCISE MASS LOWER EXTREMITY;  Excision Mass Left Flank;  Surgeon: Marybeth Gambino MD;  Location:  OR     MOHS MICROGRAPHIC PROCEDURE       PARATHYROIDECTOMY N/A 3/21/2017    Procedure: PARATHYROIDECTOMY;  Surgeon: Julianna Short MD;  [facility]     PARATHYROIDECTOMY       PE:    Vitals noted, gen, nad, cooperative, alert, neck supple nl rom, lungs with good air movement, RRR, S1, S2, no MRG, abdomen, no acute findings, neurological exam grossly normal L knee with surgical scar.     A/P:    1. Immunizations; Tdap 10/10/2017. Pneumococcal 23 1/3/2005, Prevnar 13 12/26/2016.COVID vaccine at [facility] x 2.  He states [facility]. Shingrix vaccine series?  2. Increased lipids on Atorvastatin; ordered labs   3. DM2; on Victoza; A1c 7.6% on 7/7/2020 and ordered  A1c on 2/27/2022  4. HTN; on Metoprolol and Losartan; he states he did not take her anti-hypertensive medication today. Ordered electrolytes today 2/28/2022.   5. PSA 0.12 on 7/7/2020  6. Scanned in note from [facility] orthopedics 1/10/2022, BENJAMÍN Acosta heel pain  7. Scanned in  note [facility] 8/4/2021 regarding knee pain. He had L TKA 4/22/2021. He saw Dr. Casas ortho 7/26/2021. He has completed PT. He states he will continue to follow at the [facility] for his L knee  8. Dermatology visit with Dr. Shore 5/10/2021  9. Colonoscopy [facility] 8/15 and repeat in 10 years    30 minutes spent on the date of the encounter doing chart review, history and exam, documentation and further activities as noted above

## 2022-02-28 ENCOUNTER — LAB (OUTPATIENT)
Dept: LAB | Facility: CLINIC | Age: 77
End: 2022-02-28
Payer: COMMERCIAL

## 2022-02-28 ENCOUNTER — OFFICE VISIT (OUTPATIENT)
Dept: INTERNAL MEDICINE | Facility: CLINIC | Age: 77
End: 2022-02-28
Payer: COMMERCIAL

## 2022-02-28 VITALS
OXYGEN SATURATION: 95 % | HEIGHT: 72 IN | RESPIRATION RATE: 16 BRPM | HEART RATE: 82 BPM | BODY MASS INDEX: 36.89 KG/M2 | SYSTOLIC BLOOD PRESSURE: 141 MMHG | DIASTOLIC BLOOD PRESSURE: 93 MMHG

## 2022-02-28 DIAGNOSIS — E78.00 HIGH BLOOD CHOLESTEROL: ICD-10-CM

## 2022-02-28 DIAGNOSIS — I10 BENIGN ESSENTIAL HYPERTENSION: ICD-10-CM

## 2022-02-28 DIAGNOSIS — R73.09 INCREASED GLUCOSE LEVEL: ICD-10-CM

## 2022-02-28 DIAGNOSIS — I10 BENIGN ESSENTIAL HYPERTENSION: Primary | ICD-10-CM

## 2022-02-28 LAB
ALBUMIN SERPL-MCNC: 3.7 G/DL (ref 3.4–5)
ALP SERPL-CCNC: 125 U/L (ref 40–150)
ALT SERPL W P-5'-P-CCNC: 41 U/L (ref 0–70)
ANION GAP SERPL CALCULATED.3IONS-SCNC: 7 MMOL/L (ref 3–14)
AST SERPL W P-5'-P-CCNC: 19 U/L (ref 0–45)
BASOPHILS # BLD AUTO: 0 10E3/UL (ref 0–0.2)
BASOPHILS NFR BLD AUTO: 1 %
BILIRUB SERPL-MCNC: 0.3 MG/DL (ref 0.2–1.3)
BUN SERPL-MCNC: 17 MG/DL (ref 7–30)
CALCIUM SERPL-MCNC: 8.8 MG/DL (ref 8.5–10.1)
CHLORIDE BLD-SCNC: 107 MMOL/L (ref 94–109)
CHOLEST SERPL-MCNC: 170 MG/DL
CO2 SERPL-SCNC: 29 MMOL/L (ref 20–32)
CREAT SERPL-MCNC: 1.04 MG/DL (ref 0.66–1.25)
EOSINOPHIL # BLD AUTO: 0.2 10E3/UL (ref 0–0.7)
EOSINOPHIL NFR BLD AUTO: 4 %
ERYTHROCYTE [DISTWIDTH] IN BLOOD BY AUTOMATED COUNT: 13.5 % (ref 10–15)
FASTING STATUS PATIENT QL REPORTED: NO
GFR SERPL CREATININE-BSD FRML MDRD: 74 ML/MIN/1.73M2
GLUCOSE BLD-MCNC: 153 MG/DL (ref 70–99)
HBA1C MFR BLD: 7.3 % (ref 0–5.6)
HCT VFR BLD AUTO: 41 % (ref 40–53)
HDLC SERPL-MCNC: 40 MG/DL
HGB BLD-MCNC: 13.1 G/DL (ref 13.3–17.7)
IMM GRANULOCYTES # BLD: 0 10E3/UL
IMM GRANULOCYTES NFR BLD: 0 %
LDLC SERPL CALC-MCNC: 66 MG/DL
LYMPHOCYTES # BLD AUTO: 1.8 10E3/UL (ref 0.8–5.3)
LYMPHOCYTES NFR BLD AUTO: 32 %
MCH RBC QN AUTO: 27.5 PG (ref 26.5–33)
MCHC RBC AUTO-ENTMCNC: 32 G/DL (ref 31.5–36.5)
MCV RBC AUTO: 86 FL (ref 78–100)
MONOCYTES # BLD AUTO: 0.4 10E3/UL (ref 0–1.3)
MONOCYTES NFR BLD AUTO: 7 %
NEUTROPHILS # BLD AUTO: 3.2 10E3/UL (ref 1.6–8.3)
NEUTROPHILS NFR BLD AUTO: 56 %
NONHDLC SERPL-MCNC: 130 MG/DL
NRBC # BLD AUTO: 0 10E3/UL
NRBC BLD AUTO-RTO: 0 /100
PLATELET # BLD AUTO: 152 10E3/UL (ref 150–450)
POTASSIUM BLD-SCNC: 4.9 MMOL/L (ref 3.4–5.3)
PROT SERPL-MCNC: 7.4 G/DL (ref 6.8–8.8)
RBC # BLD AUTO: 4.77 10E6/UL (ref 4.4–5.9)
SODIUM SERPL-SCNC: 143 MMOL/L (ref 133–144)
TRIGL SERPL-MCNC: 320 MG/DL
WBC # BLD AUTO: 5.7 10E3/UL (ref 4–11)

## 2022-02-28 PROCEDURE — 36415 COLL VENOUS BLD VENIPUNCTURE: CPT | Performed by: PATHOLOGY

## 2022-02-28 PROCEDURE — 83036 HEMOGLOBIN GLYCOSYLATED A1C: CPT | Performed by: PATHOLOGY

## 2022-02-28 PROCEDURE — 80061 LIPID PANEL: CPT | Performed by: PATHOLOGY

## 2022-02-28 PROCEDURE — 99214 OFFICE O/P EST MOD 30 MIN: CPT | Performed by: INTERNAL MEDICINE

## 2022-02-28 PROCEDURE — 85025 COMPLETE CBC W/AUTO DIFF WBC: CPT | Performed by: PATHOLOGY

## 2022-02-28 PROCEDURE — 80053 COMPREHEN METABOLIC PANEL: CPT | Performed by: PATHOLOGY

## 2022-02-28 RX ORDER — GABAPENTIN 300 MG/1
1 CAPSULE ORAL AT BEDTIME
COMMUNITY
Start: 2022-01-31

## 2022-02-28 RX ORDER — CHOLECALCIFEROL (VITAMIN D3) 100000/G
1000 POWDER (GRAM) MISCELLANEOUS
Status: ON HOLD | COMMUNITY
End: 2023-07-07

## 2022-02-28 RX ORDER — KETOCONAZOLE 20 MG/G
CREAM TOPICAL 2 TIMES DAILY
COMMUNITY
Start: 2021-06-25

## 2022-02-28 RX ORDER — HYDROCORTISONE CREAM 1% 10 MG/G
CREAM TOPICAL
Status: ON HOLD | COMMUNITY
Start: 2020-10-30 | End: 2023-07-07

## 2022-02-28 RX ORDER — OMEGA-3 FATTY ACIDS/FISH OIL 300-1000MG
1 CAPSULE ORAL EVERY 6 HOURS PRN
COMMUNITY
Start: 2020-06-15

## 2022-02-28 RX ORDER — LIDOCAINE 50 MG/G
PATCH TOPICAL
Status: ON HOLD | COMMUNITY
Start: 2021-12-23 | End: 2023-07-07

## 2022-02-28 ASSESSMENT — PAIN SCALES - GENERAL: PAINLEVEL: WORST PAIN (10)

## 2022-02-28 NOTE — NURSING NOTE
Lux Velasco is a 76 year old male patient that presents today in clinic for the following:    Chief Complaint   Patient presents with     Musculoskeletal Problem     The patient's allergies and medications were reviewed as noted. A set of vitals were recorded as noted without incident. The patient does not have any other questions for the provider.    Husam Walter, EMT at 2:26 PM on 2/28/2022

## 2022-03-04 ENCOUNTER — TELEPHONE (OUTPATIENT)
Dept: INTERNAL MEDICINE | Facility: CLINIC | Age: 77
End: 2022-03-04

## 2022-03-04 DIAGNOSIS — R73.09 INCREASED GLUCOSE LEVEL: Primary | ICD-10-CM

## 2022-03-04 NOTE — TELEPHONE ENCOUNTER
Placed endocrinology referral    Dear Lux;    Your A1c and glucose are elevated and I recommend you see the endocrinology providers. I placed a referral today and you can call [phone] to schedule this appointment.    JIM Duffy MD

## 2022-06-02 ENCOUNTER — TELEPHONE (OUTPATIENT)
Dept: INTERNAL MEDICINE | Facility: CLINIC | Age: 77
End: 2022-06-02
Payer: COMMERCIAL

## 2022-06-02 NOTE — TELEPHONE ENCOUNTER
[facility] Call Center    Phone Message    May a detailed message be left on voicemail: yes     Reason for Call: Form or Letter   Type or form/letter needing completion: Lux requesting call back to confirm if clinic has received section 8 home form from [facility] to verify medical expenses.   Provider: Dr Duffy  Date form needed: ASAp  Once completed: Fax form to: fax on form      Action Taken: Message routed to:  [facility]    Travel Screening: Not Applicable

## 2022-06-03 NOTE — CONFIDENTIAL NOTE
Spoke to Lux and advised hi that we have the forms from [facility] and Dr. Duffy has his in his in box and will complete it and send it in for him.    Gail Verma on 6/3/2022 at 8:01 AM

## 2022-06-27 ENCOUNTER — OFFICE VISIT (OUTPATIENT)
Dept: OPHTHALMOLOGY | Facility: CLINIC | Age: 77
End: 2022-06-27
Attending: OPHTHALMOLOGY
Payer: COMMERCIAL

## 2022-06-27 DIAGNOSIS — H01.01B ULCERATIVE BLEPHARITIS OF UPPER AND LOWER EYELIDS OF BOTH EYES: ICD-10-CM

## 2022-06-27 DIAGNOSIS — H01.01A ULCERATIVE BLEPHARITIS OF UPPER AND LOWER EYELIDS OF BOTH EYES: ICD-10-CM

## 2022-06-27 DIAGNOSIS — H04.123 DRY EYES, BILATERAL: Primary | ICD-10-CM

## 2022-06-27 DIAGNOSIS — H02.224 MECHANICAL LAGOPHTHALMOS OF LEFT UPPER EYELID: ICD-10-CM

## 2022-06-27 DIAGNOSIS — Z96.1 PSEUDOPHAKIA, BOTH EYES: ICD-10-CM

## 2022-06-27 PROCEDURE — 99214 OFFICE O/P EST MOD 30 MIN: CPT | Mod: GC | Performed by: OPHTHALMOLOGY

## 2022-06-27 PROCEDURE — G0463 HOSPITAL OUTPT CLINIC VISIT: HCPCS

## 2022-06-27 RX ORDER — CYCLOSPORINE 0.5 MG/ML
1 EMULSION OPHTHALMIC 2 TIMES DAILY
Qty: 60 EACH | Refills: 3 | Status: SHIPPED | OUTPATIENT
Start: 2022-06-27 | End: 2023-04-26

## 2022-06-27 RX ORDER — ERYTHROMYCIN 5 MG/G
0.5 OINTMENT OPHTHALMIC AT BEDTIME
Qty: 7 G | Refills: 11 | Status: SHIPPED | OUTPATIENT
Start: 2022-06-27 | End: 2023-07-25

## 2022-06-27 RX ORDER — CARBOXYMETHYLCELLULOSE SODIUM 10 MG/ML
1 GEL OPHTHALMIC 4 TIMES DAILY
Qty: 90 EACH | Refills: 4 | Status: SHIPPED | OUTPATIENT
Start: 2022-06-27 | End: 2023-04-26

## 2022-06-27 ASSESSMENT — REFRACTION_WEARINGRX
OD_ADD: +2.00
OD_CYLINDER: +1.50
OS_SPHERE: -0.50
OS_ADD: +2.00
OS_AXIS: 180
OD_AXIS: 007
OD_SPHERE: -0.50
OS_CYLINDER: +0.50

## 2022-06-27 ASSESSMENT — CONF VISUAL FIELD
OS_NORMAL: 1
OD_NORMAL: 1

## 2022-06-27 ASSESSMENT — EXTERNAL EXAM - LEFT EYE: OS_EXAM: FLOPPY LIDS

## 2022-06-27 ASSESSMENT — VISUAL ACUITY
CORRECTION_TYPE: GLASSES
OD_CC: 20/20
METHOD: SNELLEN - LINEAR
OS_CC: 20/20

## 2022-06-27 ASSESSMENT — EXTERNAL EXAM - RIGHT EYE: OD_EXAM: FLOPPY LIDS

## 2022-06-27 ASSESSMENT — SLIT LAMP EXAM - LIDS
COMMENTS: MGD, BLEPHARITIS
COMMENTS: MGD

## 2022-06-27 ASSESSMENT — TONOMETRY
IOP_METHOD: ICARE
OS_IOP_MMHG: 14
OD_IOP_MMHG: 14

## 2022-06-27 NOTE — NURSING NOTE
Chief Complaints and History of Present Illnesses   Patient presents with     Dry Eye(s) Follow-Up     Chief Complaint(s) and History of Present Illness(es)     Dry Eye(s) Follow-Up     Laterality: both eyes              Comments     Pt. States that he is doing well. Dryness has improved. Still having a lot of tearing BE. Has been using both Restasis and Xiidra BE. No change in [facility].   Kya Espinosa COT 12:17 PM June 27, 2022

## 2022-06-27 NOTE — PROGRESS NOTES
CC: Dry eye    Referring provider: Dr. Hussein    HPI:  Lux Velasco is a(n) 76 year old male who presents for dry eye evaluation. Patient very bothered by ocular dryness, irritation, mild redness. 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 He is putting ointment in every 2-3 hours. He is very frustrated. No new flashes, floaters, or diplopia. No pain, redness, or tearing.     Interval:  Dry eye f/u, no significant changes.  Pt is here for 9 months f/u.  He notes some pain in the left eye intermittently, rarely in the right eye.  He has used the press n' seal.   Eyes uncomfortable when not using lubrication, but feel okay when using drops or ointment, though vision may decrease with ointment.    POHx:  Pseuodphakia each eye     Family hx of eye disease: N/a  Social Hx: (-) smoking, (-) EtOH, (-) illicit drugs    Meds:  Using PF gel (Celluvisc) about hourly each eye   Lubricating ointment or erythromycin ointment at bedtime each eye   Restasis BID OU - burns but pt thinks it helps (has been on since 2019) using it 1-2 times daily  Xiidra BID each eye (using 1-2 times daily)  Doxy 100 daily  Ketotifen BID each eye (using 1-2 times daily)    A/P:  #Dry eyes, both eyes  #Anterior & posterior blepharitis  - pt thinks Restasis and ointment are helping, but he is still symptomatic  - discussed many options including: dissolvable plugs (previously had silicone plugs per patient which fell out), adding Xiidra, bandage CL's, trial of brimonidine (for rosacea). Other future options include serum tears or scleral lens (pt not interested in sclerals)  - Placed Rangely LLL plugs 9/13/2021 (180) - no improvement, defer additional  - continue Xiidra BID OU  - Cont Restasis BID  - pt thinks this is helping  - Continue warm compresses and eyelid scrubs at least daily  - Continue frequent gel and ointment  - Continue Doxycycline 100mg daily   - Continue either patching at night  or Glad Press N Seal at nighttime    #Incomplete blink left eye: Pt with symmetric face.  Does not remember any episode consistent with 7th nerve palsy.  Will repeat     #Allergic conjunctivitis  - Continue ketotifen BID - this is helping    #Floppy eyelids  - no h/o SEYMOUR - pt unsure if he had previous sleep study  - does not wear CPAP  - Could consider tarsal strip in future    Follow up: 6 months w/ comprehensive as Dr. Lyons is transitioning to 25% clinical, call if sx worsen to clinic.    Juan Garcia DO  Fellow, Cornea & External Disease  Department of Ophthalmology  [facility]    Attending Physician Attestation:  Complete documentation of historical and exam elements from today's encounter can be found in the full encounter summary report (not reduplicated in this progress note).  I personally obtained the chief complaint(s) and history of present illness.  I confirmed and edited as necessary the review of systems, past medical/surgical history, family history, social history, and examination findings as documented by others; and I examined the patient myself.  I personally reviewed the relevant tests, images, and reports as documented above.  I formulated and edited as necessary the assessment and plan and discussed the findings and management plan with the patient and family. - Tarik Lyons MD    I personally spent great than 40min with the patient, of which >50% of the time was spent face to face with the patient, counseling and coordinating care with the patient. We discussed the complexity of his diagnosis, the need for further information prior to proceeding with yet another surgery, and the unknown prognosis for the patient at this time.    Tarik Lyons MD

## 2022-07-14 NOTE — BRIEF OP NOTE
[facility]    Brief Operative Note    Pre-operative diagnosis: Mass  Post-operative diagnosis * No post-op diagnosis entered *  Procedure: Procedure(s):  Excision Mass Left Flank - Wound Class: I-Clean  Surgeon: Surgeon(s) and Role:     * Marybeth Gambino MD - Primary  Anesthesia: Local   Estimated blood loss: 40 mL  Drains: None  Specimens:   ID Type Source Tests Collected by Time Destination   A : Left Flank Back Mass 5 cm in length x 2 cm in width Tissue Back SURGICAL PATHOLOGY EXAM Marybeth Gambino MD 11/3/2017  3:58 PM      Findings:   5 cm long elliptical excision of skin and underlying capsule.  opening in skin probed on back table and no hole in capsule noted.  wound closed with several 3-0 vicryls in dermis and 6 indiviidual 2-0 nylon vertical mattress sutures in skin.patient tolerated procedure well.  Complications: None.  Implants: None.         Patient states she got a missed call from the clinic regarding her results.  She wanted to know what they were.  I explained her wet prep was positive.  The provider sent a prescription for Flagyl to the [facility] in [facility] in [place] for her to .    Patient said perfect, she will pick that up.    Elida Murphy, RN   07/14/22 5:25 PM  [facility] Nurse Advisor      Reason for Disposition    [1] Follow-up call to recent contact AND [2] information only call, no triage required    Additional Information    Negative: [1] Caller is not with the adult (patient) AND [2] reporting urgent symptoms    Negative: Lab result questions    Negative: Medication questions    Negative: Caller can't be reached by phone    Negative: Caller has already spoken to PCP or another triager    Negative: RN needs further essential information from caller in order to complete triage    Negative: Requesting regular office appointment    Negative: [1] Caller requesting NON-URGENT health information AND [2] PCP's office is the best resource    Negative: Health Information question, no triage required and triager able to answer question    Negative: General information question, no triage required and triager able to answer question    Negative: Question about upcoming scheduled test, no triage required and triager able to answer question    Negative: [1] Caller is not with the adult (patient) AND [2] probable NON-URGENT symptoms    Protocols used: INFORMATION ONLY CALL - NO TRIAGE-[facility]

## 2023-04-11 ENCOUNTER — TRANSFERRED RECORDS (OUTPATIENT)
Dept: HEALTH INFORMATION MANAGEMENT | Facility: CLINIC | Age: 78
End: 2023-04-11
Payer: COMMERCIAL

## 2023-04-24 ENCOUNTER — TRANSFERRED RECORDS (OUTPATIENT)
Dept: HEALTH INFORMATION MANAGEMENT | Facility: CLINIC | Age: 78
End: 2023-04-24

## 2023-04-24 LAB
ALT SERPL-CCNC: 33 U/L (ref 13–61)
AST SERPL-CCNC: 28 U/L (ref 15–37)
CHOLESTEROL (EXTERNAL): 161 MG/DL (ref 0–200)
CREATININE (EXTERNAL): 1 MG/DL (ref 0.7–1.2)
GFR ESTIMATED (EXTERNAL): 77 ML/MIN/1.73M2
GLUCOSE (EXTERNAL): 280 MG/DL (ref 74–106)
HBA1C MFR BLD: 11.5 % (ref 4–6)
HDLC SERPL-MCNC: 35 MG/DL
LDL CHOLESTEROL CALCULATED (EXTERNAL): 92 MG/DL
NON HDL CHOLESTEROL (EXTERNAL): 126 MG/DL
POTASSIUM (EXTERNAL): 4.4 MMOL/L (ref 3.5–5)
TRIGLYCERIDES (EXTERNAL): 170 MG/DL
TSH SERPL-ACNC: 1.03 UIU/ML (ref 0.35–4.94)

## 2023-04-25 ENCOUNTER — OFFICE VISIT (OUTPATIENT)
Dept: OPHTHALMOLOGY | Facility: CLINIC | Age: 78
End: 2023-04-25
Attending: OPHTHALMOLOGY
Payer: COMMERCIAL

## 2023-04-25 DIAGNOSIS — H04.123 DRY EYES, BILATERAL: ICD-10-CM

## 2023-04-25 DIAGNOSIS — H01.01A ULCERATIVE BLEPHARITIS OF UPPER AND LOWER EYELIDS OF BOTH EYES: ICD-10-CM

## 2023-04-25 DIAGNOSIS — H02.224 MECHANICAL LAGOPHTHALMOS OF LEFT UPPER EYELID: Primary | ICD-10-CM

## 2023-04-25 DIAGNOSIS — Z96.1 PSEUDOPHAKIA OF BOTH EYES: ICD-10-CM

## 2023-04-25 DIAGNOSIS — H01.01B ULCERATIVE BLEPHARITIS OF UPPER AND LOWER EYELIDS OF BOTH EYES: ICD-10-CM

## 2023-04-25 DIAGNOSIS — E11.9 TYPE 2 DIABETES MELLITUS WITHOUT COMPLICATION, WITHOUT LONG-TERM CURRENT USE OF INSULIN (H): ICD-10-CM

## 2023-04-25 PROCEDURE — G0463 HOSPITAL OUTPT CLINIC VISIT: HCPCS | Performed by: OPHTHALMOLOGY

## 2023-04-25 PROCEDURE — 92014 COMPRE OPH EXAM EST PT 1/>: CPT | Performed by: OPHTHALMOLOGY

## 2023-04-25 ASSESSMENT — VISUAL ACUITY
METHOD: SNELLEN - LINEAR
OD_CC: 20/20
OS_CC: 20/20
CORRECTION_TYPE: GLASSES

## 2023-04-25 ASSESSMENT — REFRACTION_WEARINGRX
OS_SPHERE: -0.50
OS_AXIS: 180
OD_CYLINDER: +1.50
OS_ADD: +2.00
OD_ADD: +2.00
OS_CYLINDER: +0.50
OD_SPHERE: -0.50
OD_AXIS: 007

## 2023-04-25 ASSESSMENT — CONF VISUAL FIELD
OS_SUPERIOR_NASAL_RESTRICTION: 0
OD_SUPERIOR_NASAL_RESTRICTION: 0
OD_NORMAL: 1
OD_INFERIOR_NASAL_RESTRICTION: 0
OS_INFERIOR_TEMPORAL_RESTRICTION: 0
OS_NORMAL: 1
OD_SUPERIOR_TEMPORAL_RESTRICTION: 0
OD_INFERIOR_TEMPORAL_RESTRICTION: 0
OS_INFERIOR_NASAL_RESTRICTION: 0
OS_SUPERIOR_TEMPORAL_RESTRICTION: 0

## 2023-04-25 ASSESSMENT — TONOMETRY
IOP_METHOD: TONOPEN
OD_IOP_MMHG: 17
OS_IOP_MMHG: 14

## 2023-04-25 NOTE — NURSING NOTE
Chief Complaints and History of Present Illnesses   Patient presents with     Dry Eye(s) Both Eyes     10 month follow up both eyes     Chief Complaint(s) and History of Present Illness(es)     Dry Eye(s) Both Eyes            Comments: 10 month follow up both eyes          Comments    Pt states vision is the same over the past few years. Still having dryness in BE, but it is about the same. Pt using artificial tears every 2 hours, AT ointment and EES ointment at bedtime both eyes.  No eye pain today. No new flashes or floaters.     DM2 BS:Pt does not check blood sugars daily.  Lab Results       Component                Value               Date                       A1C                      7.3                 02/28/2022                 A1C                      7.6                 07/07/2020                 A1C                      8.3                 06/26/2019                 A1C                      6.4                 04/25/2018                 A1C                      6.3                 11/30/2017                 A1C                      6.8                 10/10/2017              SB Flores April 25, 2023 2:36 PM

## 2023-04-25 NOTE — PROGRESS NOTES
Chief Complaint(s) and History of Present Illness(es)     Dry Eye(s) Both Eyes            Comments: 10 month follow up both eyes          Comments    Pt states vision is the same over the past few years. Still having dryness   in BE, but it is about the same. Pt using artificial tears every 2 hours,   AT ointment and EES ointment at bedtime both eyes.  No eye pain today. No new flashes or floaters.     DM2 BS:Pt does not check blood sugars daily.  Lab Results       Component                Value               Date                       A1C                      7.3                 02/28/2022                 A1C                      7.6                 07/07/2020                 A1C                      8.3                 06/26/2019                 A1C                      6.4                 04/25/2018                 A1C                      6.3                 11/30/2017                 A1C                      6.8                 10/10/2017              SB Flores April 25, 2023 2:36 PM             Review of systems for the eyes was negative other than the pertinent positives/negatives listed in the HPI.      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 Dry eyes, bilateral    3. Type 2 diabetes mellitus without complication, without long-term current use of insulin (H)    4. Pseudophakia of both eyes           Last seen by me in 2019.  More recently with [facility] and [facility]  Continues to have frequent dry eye symptoms.  Using artificial tears every two hours while awake  Ointment at bedtime   S/P plugs in 2021 without improvement  Using xiidra and restasis twice a day both eyes   Using doxycycline daily     Surface left eye much more dry than right eye   Poor blink.  LL malposition and lagophthalmos  Refer to oculoplastics for eval and treatment  Continue drops and ointment as above - refills sent    No  retinopathy  Stressed good glycemic and hypertensive control  Monitor yearly         Patient disposition:   Return for Oculoplastics next available.           Attending Physician Attestation:  Complete documentation of historical and exam elements from today's encounter can be found in the full encounter summary report (not reduplicated in this progress note).  I personally obtained the chief complaint(s) and history of present illness.  I confirmed and edited as necessary the review of systems, past medical/surgical history, family history, social history, and examination findings as documented by others; and I examined the patient myself.  I personally reviewed the relevant tests, images, and reports as documented above.  I formulated and edited as necessary the assessment and plan and discussed the findings and management plan with the patient and family. . - Tarik Campbell MD

## 2023-04-26 RX ORDER — CARBOXYMETHYLCELLULOSE SODIUM 10 MG/ML
1 GEL OPHTHALMIC 4 TIMES DAILY
Qty: 90 EACH | Refills: 4 | Status: SHIPPED | OUTPATIENT
Start: 2023-04-26

## 2023-04-26 RX ORDER — CYCLOSPORINE 0.5 MG/ML
1 EMULSION OPHTHALMIC 2 TIMES DAILY
Qty: 60 EACH | Refills: 3 | Status: ON HOLD | OUTPATIENT
Start: 2023-04-26 | End: 2024-02-19

## 2023-04-26 RX ORDER — DOXYCYCLINE 100 MG/1
100 CAPSULE ORAL 2 TIMES DAILY
Qty: 60 CAPSULE | Refills: 11 | Status: ON HOLD | OUTPATIENT
Start: 2023-04-26 | End: 2023-07-07

## 2023-04-26 ASSESSMENT — CUP TO DISC RATIO
OS_RATIO: 0.3
OD_RATIO: 0.3

## 2023-04-26 ASSESSMENT — EXTERNAL EXAM - RIGHT EYE: OD_EXAM: NORMAL

## 2023-04-26 ASSESSMENT — EXTERNAL EXAM - LEFT EYE: OS_EXAM: NORMAL

## 2023-04-26 NOTE — TELEPHONE ENCOUNTER
FUTURE VISIT INFORMATION      FUTURE VISIT INFORMATION:    Date: 5/9/23    Time: 8:00am    [place] [facility] [place]FORMATION:    Referring provider:  Tarik Campbell MD    Referring providers clinic:  [facility]    Reason for visit/diagnosis  Mechanical lagophthalmos of left upper eyelid     RECORDS REQUESTED FROM:       Clinic name Comments Records Status Imaging Status   [facility] OV/referral 4/25/23  Ov/notes 4/25/23-10/19/15 epic

## 2023-05-09 ENCOUNTER — PRE VISIT (OUTPATIENT)
Dept: OPHTHALMOLOGY | Facility: CLINIC | Age: 78
End: 2023-05-09

## 2023-05-09 ENCOUNTER — OFFICE VISIT (OUTPATIENT)
Dept: OPHTHALMOLOGY | Facility: CLINIC | Age: 78
End: 2023-05-09
Payer: COMMERCIAL

## 2023-05-09 DIAGNOSIS — H02.135 SENILE ECTROPION OF LEFT LOWER EYELID: ICD-10-CM

## 2023-05-09 DIAGNOSIS — H02.132 SENILE ECTROPION OF RIGHT LOWER EYELID: ICD-10-CM

## 2023-05-09 DIAGNOSIS — H04.123 DRY EYES, BILATERAL: Primary | ICD-10-CM

## 2023-05-09 PROCEDURE — 99214 OFFICE O/P EST MOD 30 MIN: CPT | Mod: GC | Performed by: OPHTHALMOLOGY

## 2023-05-09 PROCEDURE — 92285 EXTERNAL OCULAR PHOTOGRAPHY: CPT | Mod: GC | Performed by: OPHTHALMOLOGY

## 2023-05-09 ASSESSMENT — LAGOPHTHALMOS
OD_LAGOPHTHALMOS: 0
OS_LAGOPHTHALMOS: TRACE

## 2023-05-09 ASSESSMENT — EXTERNAL EXAM - RIGHT EYE: OD_EXAM: NORMAL

## 2023-05-09 ASSESSMENT — LEVATOR FUNCTION
OS_LEVATOR: 18
OD_LEVATOR: 18

## 2023-05-09 ASSESSMENT — MARGIN REFLEX DISTANCE
OD_MRD1: 3
OS_MRD1: 3

## 2023-05-09 ASSESSMENT — TONOMETRY
OD_IOP_MMHG: 11
OS_IOP_MMHG: 11
IOP_METHOD: ICARE

## 2023-05-09 ASSESSMENT — VISUAL ACUITY
METHOD: SNELLEN - LINEAR
OS_SC: 20/25
OD_SC: 20/30
OD_SC+: -2

## 2023-05-09 ASSESSMENT — EXTERNAL EXAM - LEFT EYE: OS_EXAM: NORMAL

## 2023-05-09 NOTE — LETTER
2023         RE:  :  MRN: Lux Velasco  1945  [id number]     Dear Dr. Campbell,    Thank you for asking me to see your patient, Lux Velasco, for an oculoplastic   consultation.  My assessment and plan are below.  For further details, please see my attached clinic note.      Chief Complaint(s) and History of Present Illness(es)     Lagophthalmos Evaluation            Associated signs and symptoms: Negative for eye pain    Comments: Pt here for mechanical lagophthalmos of BRUCE, referred by Dr Campbell.           Comments    Pt notes lid has been this way for several years.   Pt using drops for dryness.   Restasis BID each eye.  NATALIA Joseph on 2023 at 7:50 AM    Several years of bilateral dry eyes left > right. Using Xiidra, Restasis, Celluvisc, erythromycin ointment, artificial tears.      Assessment & Plan     Lux Velasco is a 77 year old male with the following diagnoses:   1. Dry eyes, bilateral    2. Senile ectropion of right lower eyelid    3. Senile ectropion of left lower eyelid      Left>right eye irritation/dry eyes.   Infrequent blink, but has relatively good closure.  Has significant lower eyelid ectropion which is contributing to dry eyes  Given he is very symptomatic despite every two hours artificial tears, Xiidra, and Restasis, and ointment, it is reasonable to proceed with bilateral lower lid ectropion repair. CPT: 97536    Discussed he will still have dry eye but goal is to improve lid closure by improving lower eyelid tone.     Patient disposition:   No follow-ups on file.     Gail Butt MD  PGY2 Ophthalmology        Again, thank you for allowing me to participate in the care of your patient.      Sincerely,    Gerald Harry MD  Department of Ophthalmology and Visual Neurosciences  [facility]    CC: Tarik Campbell MD  [address] [place] [id number]  Via In Basket

## 2023-05-09 NOTE — PROGRESS NOTES
Chief Complaint(s) and History of Present Illness(es)     Lagophthalmos Evaluation            Associated signs and symptoms: Negative for eye pain    Comments: Pt here for mechanical lagophthalmos of BRUCE, referred by Dr Campbell.           Comments    Pt notes lid has been this way for several years.   Pt using drops for dryness.   Restasis BID each eye.  NATALIA Joseph on 5/9/2023 at 7:50 AM    Several years of bilateral dry eyes left > right. Using Xiidra, Restasis, Celluvisc, erythromycin ointment, artificial tears.      Assessment & Plan     Lux Velasco is a 77 year old male with the following diagnoses:   1. Dry eyes, bilateral    2. Senile ectropion of right lower eyelid    3. Senile ectropion of left lower eyelid      Left>right eye irritation/dry eyes.   Infrequent blink, but has relatively good closure.  Has significant lower eyelid ectropion which is contributing to dry eyes  Given he is very symptomatic despite every two hours artificial tears, Xiidra, and Restasis, and ointment, it is reasonable to proceed with bilateral lower lid ectropion repair. CPT: 93825    Discussed he will still have dry eye but goal is to improve lid closure by improving lower eyelid tone.     Patient disposition:   No follow-ups on file.     Gail Butt MD  PGY2 Ophthalmology       Attending Physician Attestation: Complete documentation of historical and exam elements from today's encounter can be found in the full encounter summary report (not reduplicated in this progress note). I personally obtained the chief complaint(s) and history of present illness. I confirmed and edited as necessary the review of systems, past medical/surgical history, family history, social history, and examination findings as documented by others; and I examined the patient myself. I personally reviewed the relevant tests, images, and reports as documented above. I formulated and edited as necessary the assessment and plan and  discussed the findings and management plan with the patient.  -Gerald Harry MD      Today with Lux Velasco, I reviewed the indications, risks, benefits, and alternatives of the proposed surgical procedure including, but not limited to, failure obtain the desired result  and need for additional surgery, bleeding, infection, loss of vision, loss of the eye, and the remote possibility of permanent damage to any organ system or death with the use of anesthesia.  I provided multiple opportunities for the questions, answered all questions to the best of my ability, and confirmed that my answers and my discussion were understood.   Gerald Harry MD

## 2023-05-09 NOTE — NURSING NOTE
Chief Complaints and History of Present Illnesses   Patient presents with     Lagophthalmos Evaluation     Pt here for mechanical lagophthalmos of BRUCE, referred by Dr Campbell.      Chief Complaint(s) and History of Present Illness(es)     Lagophthalmos Evaluation            Associated signs and symptoms: Negative for eye pain    Comments: Pt here for mechanical lagophthalmos of BRUCE, referred by Dr Campbell.           Comments    Pt notes lid has been this way for several years.   Pt using drops for dryness.   Restasis BID each eye.  NATALIA Joseph on 5/9/2023 at 7:50 AM

## 2023-05-10 ENCOUNTER — TELEPHONE (OUTPATIENT)
Dept: OPHTHALMOLOGY | Facility: CLINIC | Age: 78
End: 2023-05-10
Payer: COMMERCIAL

## 2023-05-10 NOTE — TELEPHONE ENCOUNTER
Spoke with patient to schedule surgery with Dr Rivera    Surgery was scheduled on 6/16 at   Patient will have H&P at [facility]       Post-Op visit was scheduled on 7/11  Patient is aware a / is needed day of surgery.   Surgery packet was mailed, patient has my direct contact information for any further questions.

## 2023-06-05 ENCOUNTER — TRANSFERRED RECORDS (OUTPATIENT)
Dept: HEALTH INFORMATION MANAGEMENT | Facility: CLINIC | Age: 78
End: 2023-06-05

## 2023-06-13 ENCOUNTER — TRANSFERRED RECORDS (OUTPATIENT)
Dept: HEALTH INFORMATION MANAGEMENT | Facility: CLINIC | Age: 78
End: 2023-06-13

## 2023-07-05 ENCOUNTER — TELEPHONE (OUTPATIENT)
Dept: OPHTHALMOLOGY | Facility: CLINIC | Age: 78
End: 2023-07-05
Payer: COMMERCIAL

## 2023-07-05 NOTE — TELEPHONE ENCOUNTER
Writer rec's ANABELLA from Rehana at [facility] asking about patient H&P. Rehana stated that it will be one week out of date(completed on 5/30) on day of surgery and was inquiring it PT will be getting a new one completed. Writer reached out to  Dr Harry to see if he would update H&P DOS, he agreed. Writer called back to Rehana and left a message stating the above and that PT would be all set for surgery on 7/7.     Padmini Salvador on 7/5/2023 at 12:04 PM

## 2023-07-06 ENCOUNTER — ANESTHESIA EVENT (OUTPATIENT)
Dept: SURGERY | Facility: CLINIC | Age: 78
End: 2023-07-06
Payer: COMMERCIAL

## 2023-07-07 ENCOUNTER — ANESTHESIA (OUTPATIENT)
Dept: SURGERY | Facility: CLINIC | Age: 78
End: 2023-07-07
Payer: COMMERCIAL

## 2023-07-07 ENCOUNTER — HOSPITAL ENCOUNTER (OUTPATIENT)
Facility: CLINIC | Age: 78
Discharge: HOME | End: 2023-07-07
Attending: OPHTHALMOLOGY | Admitting: OPHTHALMOLOGY
Payer: COMMERCIAL

## 2023-07-07 VITALS
RESPIRATION RATE: 16 BRPM | TEMPERATURE: 97.3 F | WEIGHT: 275.6 LBS | OXYGEN SATURATION: 95 % | BODY MASS INDEX: 38.58 KG/M2 | HEIGHT: 71 IN | SYSTOLIC BLOOD PRESSURE: 121 MMHG | HEART RATE: 64 BPM | DIASTOLIC BLOOD PRESSURE: 65 MMHG

## 2023-07-07 DIAGNOSIS — Z98.890 POSTOPERATIVE EYE STATE: Primary | ICD-10-CM

## 2023-07-07 LAB
FASTING STATUS PATIENT QL REPORTED: YES
GLUCOSE SERPL-MCNC: 212 MG/DL (ref 70–99)
POTASSIUM SERPL-SCNC: 4 MMOL/L (ref 3.4–5.3)

## 2023-07-07 PROCEDURE — 84132 ASSAY OF SERUM POTASSIUM: CPT | Performed by: ANESTHESIOLOGY

## 2023-07-07 PROCEDURE — 250N000009 HC RX 250: Performed by: ANESTHESIOLOGY

## 2023-07-07 PROCEDURE — 82947 ASSAY GLUCOSE BLOOD QUANT: CPT | Performed by: ANESTHESIOLOGY

## 2023-07-07 PROCEDURE — 67917 REPAIR EYELID DEFECT: CPT | Mod: E2 | Performed by: OPHTHALMOLOGY

## 2023-07-07 PROCEDURE — 710N000009 HC RECOVERY PHASE 1, LEVEL 1, PER MIN: Performed by: OPHTHALMOLOGY

## 2023-07-07 PROCEDURE — 258N000003 HC RX IP 258 OP 636: Performed by: ANESTHESIOLOGY

## 2023-07-07 PROCEDURE — 272N000001 HC OR GENERAL SUPPLY STERILE: Performed by: OPHTHALMOLOGY

## 2023-07-07 PROCEDURE — 250N000013 HC RX MED GY IP 250 OP 250 PS 637: Performed by: ANESTHESIOLOGY

## 2023-07-07 PROCEDURE — 370N000017 HC ANESTHESIA TECHNICAL FEE, PER MIN: Performed by: OPHTHALMOLOGY

## 2023-07-07 PROCEDURE — 360N000076 HC SURGERY LEVEL 3, PER MIN: Performed by: OPHTHALMOLOGY

## 2023-07-07 PROCEDURE — 250N000011 HC RX IP 250 OP 636: Mod: JZ | Performed by: ANESTHESIOLOGY

## 2023-07-07 PROCEDURE — 710N000012 HC RECOVERY PHASE 2, PER MINUTE: Performed by: OPHTHALMOLOGY

## 2023-07-07 PROCEDURE — 36415 COLL VENOUS BLD VENIPUNCTURE: CPT | Performed by: ANESTHESIOLOGY

## 2023-07-07 PROCEDURE — 250N000009 HC RX 250: Performed by: OPHTHALMOLOGY

## 2023-07-07 PROCEDURE — 250N000011 HC RX IP 250 OP 636: Performed by: ANESTHESIOLOGY

## 2023-07-07 PROCEDURE — 999N000141 HC STATISTIC PRE-PROCEDURE NURSING ASSESSMENT: Performed by: OPHTHALMOLOGY

## 2023-07-07 RX ORDER — ONDANSETRON 2 MG/ML
4 INJECTION INTRAMUSCULAR; INTRAVENOUS EVERY 30 MIN PRN
Status: DISCONTINUED | OUTPATIENT
Start: 2023-07-07 | End: 2023-07-07 | Stop reason: HOSPADM

## 2023-07-07 RX ORDER — POLYVINYL ALCOHOL 14 MG/ML
1 SOLUTION/ DROPS OPHTHALMIC
Status: ON HOLD | COMMUNITY
End: 2024-02-19

## 2023-07-07 RX ORDER — PROPOFOL 10 MG/ML
INJECTION, EMULSION INTRAVENOUS PRN
Status: DISCONTINUED | OUTPATIENT
Start: 2023-07-07 | End: 2023-07-07

## 2023-07-07 RX ORDER — HYDROMORPHONE HCL IN WATER/PF 6 MG/30 ML
0.2 PATIENT CONTROLLED ANALGESIA SYRINGE INTRAVENOUS EVERY 5 MIN PRN
Status: DISCONTINUED | OUTPATIENT
Start: 2023-07-07 | End: 2023-07-07 | Stop reason: HOSPADM

## 2023-07-07 RX ORDER — TETRACAINE HYDROCHLORIDE 5 MG/ML
SOLUTION OPHTHALMIC
Status: DISCONTINUED
Start: 2023-07-07 | End: 2023-07-07 | Stop reason: HOSPADM

## 2023-07-07 RX ORDER — FENTANYL CITRATE 0.05 MG/ML
25 INJECTION, SOLUTION INTRAMUSCULAR; INTRAVENOUS EVERY 5 MIN PRN
Status: DISCONTINUED | OUTPATIENT
Start: 2023-07-07 | End: 2023-07-07 | Stop reason: HOSPADM

## 2023-07-07 RX ORDER — NEOMYCIN SULFATE, POLYMYXIN B SULFATE, AND DEXAMETHASONE 3.5; 10000; 1 MG/G; [USP'U]/G; MG/G
OINTMENT OPHTHALMIC
Qty: 3.5 G | Refills: 0 | Status: SHIPPED | OUTPATIENT
Start: 2023-07-07 | End: 2023-07-17

## 2023-07-07 RX ORDER — ONDANSETRON 2 MG/ML
INJECTION INTRAMUSCULAR; INTRAVENOUS PRN
Status: DISCONTINUED | OUTPATIENT
Start: 2023-07-07 | End: 2023-07-07

## 2023-07-07 RX ORDER — ONDANSETRON 4 MG/1
4 TABLET, ORALLY DISINTEGRATING ORAL EVERY 30 MIN PRN
Status: DISCONTINUED | OUTPATIENT
Start: 2023-07-07 | End: 2023-07-07 | Stop reason: HOSPADM

## 2023-07-07 RX ORDER — SODIUM CHLORIDE, SODIUM LACTATE, POTASSIUM CHLORIDE, CALCIUM CHLORIDE 600; 310; 30; 20 MG/100ML; MG/100ML; MG/100ML; MG/100ML
INJECTION, SOLUTION INTRAVENOUS CONTINUOUS
Status: DISCONTINUED | OUTPATIENT
Start: 2023-07-07 | End: 2023-07-07 | Stop reason: HOSPADM

## 2023-07-07 RX ORDER — OXYCODONE HYDROCHLORIDE 5 MG/1
5 TABLET ORAL EVERY 6 HOURS PRN
Qty: 12 TABLET | Refills: 0 | Status: SHIPPED | OUTPATIENT
Start: 2023-07-07 | End: 2023-07-10

## 2023-07-07 RX ORDER — SODIUM CHLORIDE, SODIUM LACTATE, POTASSIUM CHLORIDE, CALCIUM CHLORIDE 600; 310; 30; 20 MG/100ML; MG/100ML; MG/100ML; MG/100ML
INJECTION, SOLUTION INTRAVENOUS CONTINUOUS PRN
Status: DISCONTINUED | OUTPATIENT
Start: 2023-07-07 | End: 2023-07-07

## 2023-07-07 RX ORDER — ERYTHROMYCIN 5 MG/G
OINTMENT OPHTHALMIC PRN
Status: DISCONTINUED | OUTPATIENT
Start: 2023-07-07 | End: 2023-07-07 | Stop reason: HOSPADM

## 2023-07-07 RX ORDER — LIDOCAINE HCL/EPINEPHRINE/PF 2%-1:200K
VIAL (ML) INJECTION
Status: DISCONTINUED
Start: 2023-07-07 | End: 2023-07-07 | Stop reason: HOSPADM

## 2023-07-07 RX ORDER — HYDROMORPHONE HCL IN WATER/PF 6 MG/30 ML
0.4 PATIENT CONTROLLED ANALGESIA SYRINGE INTRAVENOUS EVERY 5 MIN PRN
Status: DISCONTINUED | OUTPATIENT
Start: 2023-07-07 | End: 2023-07-07 | Stop reason: HOSPADM

## 2023-07-07 RX ORDER — KETOCONAZOLE 20 MG/ML
SHAMPOO TOPICAL DAILY PRN
COMMUNITY

## 2023-07-07 RX ORDER — OXYCODONE HYDROCHLORIDE 5 MG/1
5 TABLET ORAL ONCE
Status: COMPLETED | OUTPATIENT
Start: 2023-07-07 | End: 2023-07-07

## 2023-07-07 RX ORDER — LIDOCAINE HCL/EPINEPHRINE/PF 2%-1:200K
VIAL (ML) INJECTION PRN
Status: DISCONTINUED | OUTPATIENT
Start: 2023-07-07 | End: 2023-07-07 | Stop reason: HOSPADM

## 2023-07-07 RX ORDER — LIDOCAINE HYDROCHLORIDE 20 MG/ML
INJECTION, SOLUTION INFILTRATION; PERINEURAL PRN
Status: DISCONTINUED | OUTPATIENT
Start: 2023-07-07 | End: 2023-07-07

## 2023-07-07 RX ORDER — FENTANYL CITRATE 0.05 MG/ML
50 INJECTION, SOLUTION INTRAMUSCULAR; INTRAVENOUS EVERY 5 MIN PRN
Status: DISCONTINUED | OUTPATIENT
Start: 2023-07-07 | End: 2023-07-07 | Stop reason: HOSPADM

## 2023-07-07 RX ORDER — TIOTROPIUM BROMIDE AND OLODATEROL 3.124; 2.736 UG/1; UG/1
2 SPRAY, METERED RESPIRATORY (INHALATION) DAILY
COMMUNITY

## 2023-07-07 RX ORDER — NITROGLYCERIN 0.4 MG/1
1 TABLET SUBLINGUAL EVERY 5 MIN PRN
COMMUNITY

## 2023-07-07 RX ORDER — ERYTHROMYCIN 5 MG/G
OINTMENT OPHTHALMIC
Status: DISCONTINUED
Start: 2023-07-07 | End: 2023-07-07 | Stop reason: HOSPADM

## 2023-07-07 RX ORDER — TETRACAINE HYDROCHLORIDE 5 MG/ML
SOLUTION OPHTHALMIC PRN
Status: DISCONTINUED | OUTPATIENT
Start: 2023-07-07 | End: 2023-07-07 | Stop reason: HOSPADM

## 2023-07-07 RX ORDER — BUPIVACAINE HYDROCHLORIDE 5 MG/ML
INJECTION, SOLUTION EPIDURAL; INTRACAUDAL
Status: DISCONTINUED
Start: 2023-07-07 | End: 2023-07-07 | Stop reason: HOSPADM

## 2023-07-07 RX ADMIN — SODIUM CHLORIDE, POTASSIUM CHLORIDE, SODIUM LACTATE AND CALCIUM CHLORIDE: 600; 310; 30; 20 INJECTION, SOLUTION INTRAVENOUS at 12:29

## 2023-07-07 RX ADMIN — SODIUM CHLORIDE, POTASSIUM CHLORIDE, SODIUM LACTATE AND CALCIUM CHLORIDE: 600; 310; 30; 20 INJECTION, SOLUTION INTRAVENOUS at 10:54

## 2023-07-07 RX ADMIN — HYDROMORPHONE HYDROCHLORIDE 0.2 MG: 0.2 INJECTION, SOLUTION INTRAMUSCULAR; INTRAVENOUS; SUBCUTANEOUS at 13:58

## 2023-07-07 RX ADMIN — OXYCODONE HYDROCHLORIDE 5 MG: 5 TABLET ORAL at 14:32

## 2023-07-07 RX ADMIN — PROPOFOL 100 MG: 10 INJECTION, EMULSION INTRAVENOUS at 12:44

## 2023-07-07 RX ADMIN — ONDANSETRON 4 MG: 2 INJECTION INTRAMUSCULAR; INTRAVENOUS at 12:44

## 2023-07-07 RX ADMIN — LIDOCAINE HYDROCHLORIDE 50 MG: 20 INJECTION, SOLUTION INFILTRATION; PERINEURAL at 12:44

## 2023-07-07 ASSESSMENT — ACTIVITIES OF DAILY LIVING (ADL)
ADLS_ACUITY_SCORE: 36

## 2023-07-07 ASSESSMENT — COPD QUESTIONNAIRES: COPD: 1

## 2023-07-07 NOTE — PROGRESS NOTES
Courtesy call to Lab to check on status of STAT labs. No results after 50+minutes. Results should be ready within the next 10minutes.

## 2023-07-07 NOTE — DISCHARGE INSTRUCTIONS
Post-operative Instructions  Ophthalmic Plastic and Reconstructive Surgery    Gerald Harry M.D.     All instructions apply to the operated eye(s) or eyelid(s).    Wound care and personal care    ? Apply ice compresses and gentle pressure 15 minutes on, 15 minutes off, for 2 days. If you are sleeping, you don't need to wake up to ice. As long as there is no further bleeding, after two days, switch to warm water compresses for five minutes, four times a day until seen by your physician.   ? You may shower or wash your hair the day after surgery. Do not go swimming for at least 2 weeks to prevent contamination of your wounds.  ? You may go for walks and light activity is ok, but no heavy (over 15 pounds) lifting, bending or excessive straining for one week.   ? Do not apply make-up to the eyes or eyelids for 2 weeks after surgery.  ? Expect some swelling, bruising, black eye (even into the lower eyelids and cheeks). Also expect serum caking, crusting and discharge from the eye and/or incisions. A small amount of surface bleeding, and depending on the type of surgery, bleeding from the inside of the eyelid, is normal for the first 48 hours.  ? Avoid straining, bending at the waist, or lifting more than 15 pounds for 1 week. Sleeping with your head elevated, such as in a recliner, for the first several days can help swelling resolve more quickly.   ? Do continue to ambulate (walk) as you normally would - being sedentary after surgery can cause blood clots.   ? Your eye(s) and eyelid(s) may be painful and tender. This is normal after surgery.      Contact information and follow-up  ? Return to the Eye Clinic for a follow-up appointment with your physician as scheduled. If no appointment has been scheduled:   - [facility] eye clinic: [phone] for an appointment with Dr. Harry within 2 to 3 weeks from your date of surgery.     ? For severe pain, bleeding, or loss of vision, call the [facility] at [phone] or [facility] at [phone].     After hours or on weekends and holidays, call [phone] and ask to speak with the ophthalmologist on call.    An on call person can be reached after hours for concerns. The on call doctor should not call in medication refill requests after hours or on weekends, so please plan accordingly. An effort has been made to provide adequate pain medications following every surgery, and refills will not be provided in most instances.     Medications  ? Restart all regular home medications and eye drops. If you take Plavix or Aspirin on a regular basis, wait for 72 hours after your surgery before restarting these in order to decrease the risk of bleeding complications.  ? Avoid aspirin and aspirin-like medications (Motrin, Aleve, Ibuprofen, Janet[place] etc) for 72 hours to reduce the risk of bleeding. You may take Tylenol (acetaminophen) for pain.  ? In addition to your home medications, take the following post-operative medications as prescribed by your physician.    ? Apply antibiotic ointment to all sutures three times a day, and into the operated eye(s) at night.   Once you run out, you can apply Vaseline or Aquaphor (over the counter) to the incisions. Don't put the Vaseline or Aquaphor into your eyes.   ? If you have ocular irritation, you can use over the counter artificial tears such as Refresh, Systane, or Blink. Do not use Visine, Clear Eyes, or any other drop that gets the red out.       Same Day Surgery Discharge Instructions for  Sedation and General Anesthesia     It's not unusual to feel dizzy, light-headed or faint for up to 24 hours after surgery or while taking pain medication.  If you have these symptoms: sit for a few minutes before standing and have someone assist you when you get up to walk or use the bathroom.    You should rest and relax for the next 24 hours. We recommend you make arrangements to have an  adult stay with you for at least 24 hours after your discharge.  Avoid hazardous and strenuous activity.    DO NOT DRIVE any vehicle or operate mechanical equipment for 24 hours following the end of your surgery.  Even though you may feel normal, your reactions may be affected by the medication you have received.    Do not drink alcoholic beverages for 24 hours following surgery.     Slowly progress to your regular diet as you feel able. It's not unusual to feel nauseated and/or vomit after receiving anesthesia.  If you develop these symptoms, drink clear liquids (apple juice, ginger ale, broth, 7-up, etc. ) until you feel better.  If your nausea and vomiting persists for 24 hours, please notify your surgeon.      All narcotic pain medications, along with inactivity and anesthesia, can cause constipation. Drinking plenty of liquids and increasing fiber intake will help.    For any questions of a medical nature, call your surgeon.    Do not make important decisions for 24 hours.    If you had general anesthesia, you may have a sore throat for a couple of days related to the breathing tube used during surgery.  You may use Cepacol lozenges to help with this discomfort.  If it worsens or if you develop a fever, contact your surgeon.     If you feel your pain is not well managed with the pain medications prescribed by your surgeon, please contact your surgeon's office to let them know so they can address your concerns.            **If you have questions or concerns about your procedure,   call Dr. Harry at [phone] [facility] and [phone] [place]**

## 2023-07-07 NOTE — OR NURSING
Patient resting calmly in chair, rates his pain 10/10. Repeatedly saying he thought he  and went to hell. Dr. Mcdonald notified of pain, received order to give Dilaudid 0.2mg IV. Patient demanding stronger pain medicine and prescription.

## 2023-07-07 NOTE — PROGRESS NOTES
Writer respond to previous note below. Interviewed patient and update PTA medication list.       Noam Topete, Jose  Medication [facility]       RN attempts to complete PTA medication list with patient. List may need to be updated and investigated further as patient believes he is not taking several of the medications listed and is seeking further information about how they made it on the list. Danyelle from [facility] contacted for further assistance.

## 2023-07-07 NOTE — ANESTHESIA CARE TRANSFER NOTE
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    Anesthesia Type:   MAC     Note:    Oropharynx: oropharynx clear of all foreign objects and spontaneously breathing  Level of Consciousness: awake  Oxygen Supplementation: room air    Independent Airway: airway patency satisfactory and stable  Dentition: dentition unchanged  Vital Signs Stable: post-procedure vital signs reviewed and stable  Report to RN Given: handoff report given  Patient transferred to: Phase II    Handoff Report: Identifed the Patient, Identified the Reponsible Provider, Reviewed the pertinent medical history, Discussed the surgical course, Reviewed Intra-OP anesthesia mangement and issues during anesthesia, Set expectations for post-procedure period and Allowed opportunity for questions and acknowledgement of understanding      Vitals:  Vitals Value Taken Time   /87 1308   Temp     Pulse 75 1308   Resp 15 1308   SpO2 96 1308       Electronically Signed By: ORION Tinsley CRNA  July 7, 2023  1:06 PM

## 2023-07-07 NOTE — ANESTHESIA PREPROCEDURE EVALUATION
Anesthesia Pre-Procedure Evaluation    Patient: Lux Velasco   MRN: [id number] : 1945        Procedure : Procedure(s):  Bilateral lower eyelid ectropion repair          Past Medical History:   Diagnosis Date     Diabetes mellitus type II      Diabetes mellitus, type 2 (H)      Gastroesophageal reflux disease      History of agent Orange exposure 2013     Hypertension      Malignant melanoma (H)     [facility]     Myocardial infarct (H) 1999     Primary hyperparathyroidism (H) Dx approx      Stroke (H) 1998    recovered fully      Past Surgical History:   Procedure Laterality Date     BIOPSY OF SKIN LESION       BYPASS GRAFT ARTERY CORONARY       CATARACT IOL, RT/LT Bilateral 2017    [facility]     EXCISE MASS LOWER EXTREMITY Left 11/3/2017    Procedure: EXCISE MASS LOWER EXTREMITY;  Excision Mass Left Flank;  Surgeon: Marybeth Gambino MD;  Location: [facility] OR     MOHS MICROGRAPHIC PROCEDURE       PARATHYROIDECTOMY N/A 3/21/2017    Procedure: PARATHYROIDECTOMY;  Surgeon: Julianna Short MD;  Location: [facility] OR     PARATHYROIDECTOMY        Allergies   Allergen Reactions     Isosorbide Nitrate Shortness Of Breath     Chest pain. Patient stated that the medication made his breathing symptoms worse.      Acetaminophen      Other reaction(s): ALT (SGPT) level raised, Aspartate aminotransferase serum level raised, ALT (SGPT) level raised, Aspartate aminotransferase serum level raised     Clindamycin      Eggs Other (See Comments)     Pt states no longer having reaction, childhood allergy, eats eggs       Etodolac Other (See Comments)     Other reaction(s): Liver enzymes abnormal     Lisinopril Cough     Penicillins Other (See Comments)     Pt states PCN allergy is due to egg allergy     Propoxyphene Other (See Comments)     Pain     Tramadol      rash     Tamsulosin Anxiety and Other (See Comments)     Other reaction(s): Fever, Chill, Anxiety, Fever,  Chill, Anxiety      Social History     Tobacco Use     Smoking status: Never     Smokeless tobacco: Former     Tobacco comments:     Doesn't remember how much he used to smoke - during service only.    Substance Use Topics     Alcohol use: Yes     Comment: 1 beer/week      Wt Readings from Last 1 Encounters:   07/07/23 125 kg (275 lb 9.6 oz)        Anesthesia Evaluation   Pt has had prior anesthetic.     No history of anesthetic complications       ROS/MED HX  ENT/Pulmonary:     (+) SEYMOUR risk factors, asthma COPD,     Neurologic: Comment: Fully recovered from CVA  Memory issues    (+) CVA,     Cardiovascular: Comment: CAD  -Stable    (+) hypertension--CAD ---ZAMARRIPA.     METS/Exercise Tolerance:     Hematologic:  - neg hematologic  ROS     Musculoskeletal:  - neg musculoskeletal ROS     GI/Hepatic:     (+) GERD,     Renal/Genitourinary: Comment: Cyst on kidney      Endo: Comment: H/o hyperparathyroid    (+) type II DM,     Psychiatric/Substance Use:  - neg psychiatric ROS     Infectious Disease:  - neg infectious disease ROS     Malignancy: Comment: H/o melanoma      Other: Comment: History of agent Orange exposure           Physical Exam    Airway  airway exam normal      Mallampati: II   TM distance: > 3 FB   Neck ROM: full   Mouth opening: > 3 cm    Respiratory Devices and Support         Dental       (+) Minor Abnormalities - some fillings, tiny chips      Cardiovascular   cardiovascular exam normal          Pulmonary   pulmonary exam normal                OUTSIDE LABS:  CBC:   Lab Results   Component Value Date    WBC 5.7 02/28/2022    WBC 5.8 07/07/2020    HGB 13.1 (L) 02/28/2022    HGB 12.6 (L) 07/07/2020    HCT 41.0 02/28/2022    HCT 39.4 (L) 07/07/2020     02/28/2022     (L) 07/07/2020     BMP:   Lab Results   Component Value Date     02/28/2022     07/07/2020    POTASSIUM 4.9 02/28/2022    POTASSIUM 4.0 07/07/2020    CHLORIDE 107 02/28/2022    CHLORIDE 107 07/07/2020    CO2 29  02/28/2022    CO2 26 07/07/2020    BUN 17 02/28/2022    BUN 16 07/07/2020    CR 1.04 02/28/2022    CR 0.84 07/07/2020     (H) 02/28/2022     (H) 07/07/2020     COAGS:   Lab Results   Component Value Date    PTT 29 04/26/2006    INR 1.03 10/29/2018     POC:   Lab Results   Component Value Date     (H) 11/03/2017     HEPATIC:   Lab Results   Component Value Date    ALBUMIN 3.7 02/28/2022    PROTTOTAL 7.4 02/28/2022    ALT 41 02/28/2022    AST 19 02/28/2022    ALKPHOS 125 02/28/2022    BILITOTAL 0.3 02/28/2022     OTHER:   Lab Results   Component Value Date    LACT 1.4 04/27/2016    A1C 7.3 (H) 02/28/2022    TRINA 8.8 02/28/2022    PHOS 2.7 04/05/2017    MAG 1.9 10/10/2017    LIPASE 89 04/27/2016    TSH 2.02 07/07/2020    CRP <2.9 01/05/2018    SED 13 09/02/2010       Anesthesia Plan    ASA Status:  3   NPO Status:  NPO Appropriate    Anesthesia Type: MAC.     - Reason for MAC: straight local not clinically adequate              Consents    Anesthesia Plan(s) and associated risks, benefits, and realistic alternatives discussed. Questions answered and patient/representative(s) expressed understanding.    - Discussed:     - Discussed with:  Patient         Postoperative Care    Pain management: IV analgesics.   PONV prophylaxis: Ondansetron (or other 5HT-3)     Comments:    Other Comments[facility]       H&P reviewed: Unable to attach H&P to encounter due to EHR limitations. H&P Update: appropriate H&P reviewed, patient examined. No interval changes since H&P (within 30 days).         Juan Mcdonald MD

## 2023-07-07 NOTE — OP NOTE
PREOPERATIVE DIAGNOSIS: Bilateral lower eyelid ectropion  POSTOPERATIVE DIAGNOSIS: Bilateral lower eyelid ectropion  PROCEDURE:  Bilateral lower eyelid ectropion repair by tarsal strip procedure   ANESTHESIA: Monitored with local infiltration of a 50/50 mixture of 2% lidocaine with epinephrine and 0.5% Marcaine.   SURGEON: Gerald Harry MD.  ASSISTANT: None  COMPLICATIONS: None.   ESTIMATED BLOOD LOSS: Less than 5 mL.   HISTORY: Lux Velasco  presented with tearing  due to lower lid ectropion. After the risks, benefits and alternatives to the proposed procedure were explained, informed consent was obtained.   DESCRIPTION OF PROCEDURE: Lux Velasco was brought to the operating room and placed supine on the operating table. IV sedation was given. The lower lids and lateral canthal areas were infiltrated with local anesthetic. The area was prepped and draped in the typical fashion. Attention was directed to the right side. Lateral canthotomy and inferior cantholysis was performed with Sharda scissors. A lateral tarsal strip was fashioned with the high temperature cautery and the sharda scissors. The tarsal strip was secured to the lateral orbital rim periosteum with a double-armed 5-0 vicryl suture in a horizontal mattress fashion. Lateral canthal angle was closed with a gray line to gray line suture of 5-0 Vicryl tied internally. Skin was closed with interrupted 6-0 plain gut sutures.  The patient tolerated the procedure well. Attention was directed to the left side where the same procedure was performed. Antibiotic ointment was applied to the incisions. Lux Velasco left the operating room in stable condition.   Gerald Harry MD

## 2023-07-07 NOTE — PROGRESS NOTES
RN and JULIAN attempts to contact daughter to be present with patient to meet with MD before surgery. Does not respond to call in lobby and does not answer cellphone.

## 2023-07-07 NOTE — PROGRESS NOTES
RN attempts to complete PTA medication list with patient. List may need to be updated and investigated further as patient believes he is not taking several of the medications listed and is seeking further information about how they made it on the list. Danyelle from [facility] contacted for further assistance.

## 2023-07-07 NOTE — OR NURSING
Patient's demeanor changed immediately after he was informed he is receiving a prescription for pain medicine. He is questioning strength and quantity.

## 2023-07-25 ENCOUNTER — OFFICE VISIT (OUTPATIENT)
Dept: OPHTHALMOLOGY | Facility: CLINIC | Age: 78
End: 2023-07-25
Payer: COMMERCIAL

## 2023-07-25 DIAGNOSIS — Z98.890 POST-OPERATIVE STATE: Primary | ICD-10-CM

## 2023-07-25 PROCEDURE — 99024 POSTOP FOLLOW-UP VISIT: CPT | Mod: GC | Performed by: OPHTHALMOLOGY

## 2023-07-25 ASSESSMENT — TONOMETRY
OD_IOP_MMHG: 11
IOP_METHOD: ICARE
OS_IOP_MMHG: 11

## 2023-07-25 ASSESSMENT — VISUAL ACUITY
OS_CC: 20/50
METHOD: SNELLEN - LINEAR
OD_CC: 20/20

## 2023-07-25 ASSESSMENT — EXTERNAL EXAM - LEFT EYE: OS_EXAM: NORMAL

## 2023-07-25 ASSESSMENT — EXTERNAL EXAM - RIGHT EYE: OD_EXAM: NORMAL

## 2023-07-25 NOTE — PROGRESS NOTES
Chief Complaint(s) and History of Present Illness(es)     Follow Up For Surgery Of Eye            Laterality: both eyes    Associated symptoms: Negative for discharge, swelling, foreign body   sensation and burning          Comments    S/p bilateral lower ectropion repair. Patient is unsure of how often too   use ointment.  NATALIA Diana July 25, 2023 8:25 AM      POW2 s/p bilateral lower ectropion repair. He experienced significant pain on the temporal sides of his eyelids after the surgery. Left side has resolved, but still expeirencing right sided pain. Describes pain as a dull ache for which he is taking tylenol. Overall happy with the results. When using AT's, he is not experiencing tearing.      Assessment & Plan     Lux Velasco is a 77 year old male with the following diagnoses:   Encounter Diagnosis   Name Primary?    Post-operative state Yes       POW2 s/p bilateral lower eyelid ectropion repair. Following expected post-surgical course and patient is happy with results. Experiencing some mild irritation temporally from sutures, but symptoms are improving.      Herminio Mills MD  PGY-2 Ophthalmology Resident  Agree with above, looks good. Symptomatically significantly improved, no longer has to patch his eyes at night.    Currently using artificial tears hourly.  Uses Refresh Celluvisc.    Patient Instructions     Warm compresses: Use 2x/day for 5-10 minutes over closed eyelids  -Soumya mask  -Tranquileyes beaded mask  -Mibo heating pad  -[place] REST & RELIEF Eye Mask (Hot or Cold)  -I-RELIEF Therapy Mask  -OcuTherm Essentials Kit    You can also use a warm wet washcloth - however this frequently loses heat quickly and can dry your skin out a bit so we recommend any of the above re-usable beaded/gel eyemasks      To purchase these products you can look over-the-counter at [employer] or purchase online at the following websites:  -[email or web address]  -[email or web address]    Has follow up with Dr. Campbell in  April     Attending Physician Attestation: Complete documentation of historical and exam elements from today's encounter can be found in the full encounter summary report (not reduplicated in this progress note). I personally obtained the chief complaint(s) and history of present illness. I confirmed and edited as necessary the review of systems, past medical/surgical history, family history, social history, and examination findings as documented by others; and I examined the patient myself. I personally reviewed the relevant tests, images, and reports as documented above. I formulated and edited as necessary the assessment and plan and discussed the findings and management plan with the patient.  -Gerald Harry MD

## 2023-07-25 NOTE — NURSING NOTE
Chief Complaints and History of Present Illnesses   Patient presents with    Follow Up For Surgery Of Eye     Chief Complaint(s) and History of Present Illness(es)       Follow Up For Surgery Of Eye              Laterality: both eyes    Associated symptoms: Negative for discharge, swelling, foreign body sensation and burning              Comments    S/p bilateral lower ectropion repair. Patient is unsure of how often too use ointment.  NATALIA Diana July 25, 2023 8:25 AM

## 2023-07-25 NOTE — PATIENT INSTRUCTIONS
Warm compresses: Use 2x/day for 5-10 minutes over closed eyelids  -Soumya mask  -Tranquileyes beaded mask  -Mibo heating pad  -[place] REST & RELIEF Eye Mask (Hot or Cold)  -I-RELIEF Therapy Mask  -OcuTherm Essentials Kit    You can also use a warm wet washcloth - however this frequently loses heat quickly and can dry your skin out a bit so we recommend any of the above re-usable beaded/gel eyemasks      To purchase these products you can look over-the-counter at drugstores or purchase online at the following websites:  -[email or web address]  -[email or web address]

## 2023-07-31 ENCOUNTER — DOCUMENTATION ONLY (OUTPATIENT)
Dept: INTERNAL MEDICINE | Facility: CLINIC | Age: 78
End: 2023-07-31
Payer: COMMERCIAL

## 2023-07-31 NOTE — PROGRESS NOTES
Type of Form Received:     Form Received (Date) 7/30/23   Form Filled out Yes 8/7/23   Placed in provider folder Yes

## 2023-12-12 ENCOUNTER — TRANSFERRED RECORDS (OUTPATIENT)
Dept: HEALTH INFORMATION MANAGEMENT | Facility: CLINIC | Age: 78
End: 2023-12-12
Payer: COMMERCIAL

## 2024-01-09 ENCOUNTER — TRANSFERRED RECORDS (OUTPATIENT)
Dept: HEALTH INFORMATION MANAGEMENT | Facility: CLINIC | Age: 79
End: 2024-01-09
Payer: COMMERCIAL

## 2024-02-18 ENCOUNTER — ANESTHESIA EVENT (OUTPATIENT)
Dept: SURGERY | Facility: CLINIC | Age: 79
End: 2024-02-18
Payer: COMMERCIAL

## 2024-02-19 ENCOUNTER — ANESTHESIA (OUTPATIENT)
Dept: SURGERY | Facility: CLINIC | Age: 79
End: 2024-02-19
Payer: COMMERCIAL

## 2024-02-19 ENCOUNTER — HOSPITAL ENCOUNTER (OUTPATIENT)
Facility: CLINIC | Age: 79
Discharge: HOME | End: 2024-02-20
Attending: ORTHOPAEDIC SURGERY | Admitting: ORTHOPAEDIC SURGERY
Payer: COMMERCIAL

## 2024-02-19 ENCOUNTER — ANCILLARY PROCEDURE (OUTPATIENT)
Dept: ULTRASOUND IMAGING | Facility: CLINIC | Age: 79
End: 2024-02-19
Attending: STUDENT IN AN ORGANIZED HEALTH CARE EDUCATION/TRAINING PROGRAM
Payer: COMMERCIAL

## 2024-02-19 ENCOUNTER — APPOINTMENT (OUTPATIENT)
Dept: RADIOLOGY | Facility: CLINIC | Age: 79
End: 2024-02-19
Attending: ORTHOPAEDIC SURGERY
Payer: COMMERCIAL

## 2024-02-19 DIAGNOSIS — Z96.611 STATUS POST REVERSE TOTAL REPLACEMENT OF RIGHT SHOULDER: Primary | ICD-10-CM

## 2024-02-19 PROBLEM — M19.011 DJD OF RIGHT SHOULDER: Status: ACTIVE | Noted: 2024-02-19

## 2024-02-19 LAB
CREAT SERPL-MCNC: 0.95 MG/DL (ref 0.67–1.17)
EGFRCR SERPLBLD CKD-EPI 2021: 81 ML/MIN/1.73M2
ERYTHROCYTE [DISTWIDTH] IN BLOOD BY AUTOMATED COUNT: 13.9 % (ref 10–15)
GLUCOSE BLDC GLUCOMTR-MCNC: 146 MG/DL (ref 70–99)
GLUCOSE BLDC GLUCOMTR-MCNC: 175 MG/DL (ref 70–99)
GLUCOSE BLDC GLUCOMTR-MCNC: 196 MG/DL (ref 70–99)
GLUCOSE BLDC GLUCOMTR-MCNC: 213 MG/DL (ref 70–99)
HBA1C MFR BLD: 7.8 %
HCT VFR BLD AUTO: 40.5 % (ref 40–53)
HGB BLD-MCNC: 13.1 G/DL (ref 13.3–17.7)
MCH RBC QN AUTO: 26.8 PG (ref 26.5–33)
MCHC RBC AUTO-ENTMCNC: 32.3 G/DL (ref 31.5–36.5)
MCV RBC AUTO: 83 FL (ref 78–100)
PLATELET # BLD AUTO: 153 10E3/UL (ref 150–450)
POTASSIUM SERPL-SCNC: 4.6 MMOL/L (ref 3.4–5.3)
RBC # BLD AUTO: 4.88 10E6/UL (ref 4.4–5.9)
WBC # BLD AUTO: 5.7 10E3/UL (ref 4–11)

## 2024-02-19 PROCEDURE — 250N000012 HC RX MED GY IP 250 OP 636 PS 637: Performed by: FAMILY MEDICINE

## 2024-02-19 PROCEDURE — 999N000141 HC STATISTIC PRE-PROCEDURE NURSING ASSESSMENT: Performed by: ORTHOPAEDIC SURGERY

## 2024-02-19 PROCEDURE — 85027 COMPLETE CBC AUTOMATED: CPT | Performed by: PHYSICIAN ASSISTANT

## 2024-02-19 PROCEDURE — 36415 COLL VENOUS BLD VENIPUNCTURE: CPT | Performed by: FAMILY MEDICINE

## 2024-02-19 PROCEDURE — 250N000013 HC RX MED GY IP 250 OP 250 PS 637: Performed by: ORTHOPAEDIC SURGERY

## 2024-02-19 PROCEDURE — C9290 INJ, BUPIVACAINE LIPOSOME: HCPCS | Performed by: STUDENT IN AN ORGANIZED HEALTH CARE EDUCATION/TRAINING PROGRAM

## 2024-02-19 PROCEDURE — 250N000013 HC RX MED GY IP 250 OP 250 PS 637: Performed by: PHYSICIAN ASSISTANT

## 2024-02-19 PROCEDURE — 250N000011 HC RX IP 250 OP 636: Performed by: STUDENT IN AN ORGANIZED HEALTH CARE EDUCATION/TRAINING PROGRAM

## 2024-02-19 PROCEDURE — 370N000017 HC ANESTHESIA TECHNICAL FEE, PER MIN: Performed by: ORTHOPAEDIC SURGERY

## 2024-02-19 PROCEDURE — 360N000077 HC SURGERY LEVEL 4, PER MIN: Performed by: ORTHOPAEDIC SURGERY

## 2024-02-19 PROCEDURE — 250N000025 HC SEVOFLURANE, PER MIN: Performed by: ORTHOPAEDIC SURGERY

## 2024-02-19 PROCEDURE — 82565 ASSAY OF CREATININE: CPT | Performed by: PHYSICIAN ASSISTANT

## 2024-02-19 PROCEDURE — 250N000009 HC RX 250: Performed by: NURSE ANESTHETIST, CERTIFIED REGISTERED

## 2024-02-19 PROCEDURE — 84132 ASSAY OF SERUM POTASSIUM: CPT | Performed by: PHYSICIAN ASSISTANT

## 2024-02-19 PROCEDURE — 36415 COLL VENOUS BLD VENIPUNCTURE: CPT | Performed by: PHYSICIAN ASSISTANT

## 2024-02-19 PROCEDURE — 83036 HEMOGLOBIN GLYCOSYLATED A1C: CPT | Performed by: FAMILY MEDICINE

## 2024-02-19 PROCEDURE — 250N000011 HC RX IP 250 OP 636: Performed by: PHYSICIAN ASSISTANT

## 2024-02-19 PROCEDURE — 710N000010 HC RECOVERY PHASE 1, LEVEL 2, PER MIN: Performed by: ORTHOPAEDIC SURGERY

## 2024-02-19 PROCEDURE — 99222 1ST HOSP IP/OBS MODERATE 55: CPT | Performed by: FAMILY MEDICINE

## 2024-02-19 PROCEDURE — 82962 GLUCOSE BLOOD TEST: CPT

## 2024-02-19 PROCEDURE — 250N000011 HC RX IP 250 OP 636: Performed by: ORTHOPAEDIC SURGERY

## 2024-02-19 PROCEDURE — C1713 ANCHOR/SCREW BN/BN,TIS/BN: HCPCS | Performed by: ORTHOPAEDIC SURGERY

## 2024-02-19 PROCEDURE — 258N000003 HC RX IP 258 OP 636: Performed by: ORTHOPAEDIC SURGERY

## 2024-02-19 PROCEDURE — 250N000011 HC RX IP 250 OP 636: Performed by: NURSE ANESTHETIST, CERTIFIED REGISTERED

## 2024-02-19 PROCEDURE — 999N000065 XR SHOULDER RIGHT PORT G/E 2 VIEWS: Mod: RT

## 2024-02-19 PROCEDURE — 258N000003 HC RX IP 258 OP 636: Performed by: NURSE ANESTHETIST, CERTIFIED REGISTERED

## 2024-02-19 PROCEDURE — 258N000003 HC RX IP 258 OP 636: Performed by: STUDENT IN AN ORGANIZED HEALTH CARE EDUCATION/TRAINING PROGRAM

## 2024-02-19 PROCEDURE — 250N000013 HC RX MED GY IP 250 OP 250 PS 637: Performed by: FAMILY MEDICINE

## 2024-02-19 PROCEDURE — C1776 JOINT DEVICE (IMPLANTABLE): HCPCS | Performed by: ORTHOPAEDIC SURGERY

## 2024-02-19 PROCEDURE — 272N000001 HC OR GENERAL SUPPLY STERILE: Performed by: ORTHOPAEDIC SURGERY

## 2024-02-19 DEVICE — IMPLANTABLE DEVICE
Type: IMPLANTABLE DEVICE | Site: SHOULDER | Status: FUNCTIONAL
Brand: COMPREHENSIVE® REVERSE SHOULDER

## 2024-02-19 DEVICE — IMPLANTABLE DEVICE
Type: IMPLANTABLE DEVICE | Site: SHOULDER | Status: FUNCTIONAL
Brand: COMPREHENSIVE REVERSE SHOULDER

## 2024-02-19 DEVICE — IMPLANTABLE DEVICE
Type: IMPLANTABLE DEVICE | Site: SHOULDER | Status: FUNCTIONAL
Brand: COMPREHENSIVE® SHOULDER SYSTEM

## 2024-02-19 DEVICE — IMPLANTABLE DEVICE
Type: IMPLANTABLE DEVICE | Site: SHOULDER | Status: FUNCTIONAL
Brand: COMPREHENSIVE® PROLONG®

## 2024-02-19 DEVICE — IMPLANTABLE DEVICE
Type: IMPLANTABLE DEVICE | Site: SHOULDER | Status: FUNCTIONAL
Brand: COMPREHENSIVE®

## 2024-02-19 RX ORDER — HALOPERIDOL 5 MG/ML
1 INJECTION INTRAMUSCULAR
Status: DISCONTINUED | OUTPATIENT
Start: 2024-02-19 | End: 2024-02-19 | Stop reason: HOSPADM

## 2024-02-19 RX ORDER — ERYTHROMYCIN 5 MG/G
OINTMENT OPHTHALMIC AT BEDTIME
COMMUNITY

## 2024-02-19 RX ORDER — NALOXONE HYDROCHLORIDE 0.4 MG/ML
0.2 INJECTION, SOLUTION INTRAMUSCULAR; INTRAVENOUS; SUBCUTANEOUS
Status: DISCONTINUED | OUTPATIENT
Start: 2024-02-19 | End: 2024-02-20 | Stop reason: HOSPADM

## 2024-02-19 RX ORDER — ONDANSETRON 2 MG/ML
4 INJECTION INTRAMUSCULAR; INTRAVENOUS EVERY 6 HOURS PRN
Status: DISCONTINUED | OUTPATIENT
Start: 2024-02-19 | End: 2024-02-20 | Stop reason: HOSPADM

## 2024-02-19 RX ORDER — ACETAMINOPHEN 325 MG/1
975 TABLET ORAL ONCE
Status: COMPLETED | OUTPATIENT
Start: 2024-02-19 | End: 2024-02-19

## 2024-02-19 RX ORDER — LOSARTAN POTASSIUM 50 MG/1
50 TABLET ORAL DAILY
Status: DISCONTINUED | OUTPATIENT
Start: 2024-02-20 | End: 2024-02-20 | Stop reason: HOSPADM

## 2024-02-19 RX ORDER — ACETAMINOPHEN 325 MG/1
975 TABLET ORAL EVERY 8 HOURS
Qty: 27 TABLET | Refills: 0 | Status: DISCONTINUED | OUTPATIENT
Start: 2024-02-19 | End: 2024-02-20 | Stop reason: HOSPADM

## 2024-02-19 RX ORDER — FENTANYL CITRATE 50 UG/ML
100 INJECTION, SOLUTION INTRAMUSCULAR; INTRAVENOUS ONCE
Status: COMPLETED | OUTPATIENT
Start: 2024-02-19 | End: 2024-02-19

## 2024-02-19 RX ORDER — HYDROMORPHONE HCL IN WATER/PF 6 MG/30 ML
0.4 PATIENT CONTROLLED ANALGESIA SYRINGE INTRAVENOUS EVERY 5 MIN PRN
Status: DISCONTINUED | OUTPATIENT
Start: 2024-02-19 | End: 2024-02-19 | Stop reason: HOSPADM

## 2024-02-19 RX ORDER — CEFAZOLIN SODIUM/WATER 2 G/20 ML
2 SYRINGE (ML) INTRAVENOUS
Status: DISCONTINUED | OUTPATIENT
Start: 2024-02-19 | End: 2024-02-19 | Stop reason: HOSPADM

## 2024-02-19 RX ORDER — DIPHENHYDRAMINE HCL 25 MG
50 CAPSULE ORAL
Status: DISCONTINUED | OUTPATIENT
Start: 2024-02-19 | End: 2024-02-19

## 2024-02-19 RX ORDER — DEXTROSE MONOHYDRATE 25 G/50ML
25-50 INJECTION, SOLUTION INTRAVENOUS
Status: DISCONTINUED | OUTPATIENT
Start: 2024-02-19 | End: 2024-02-20 | Stop reason: HOSPADM

## 2024-02-19 RX ORDER — CEFAZOLIN SODIUM/WATER 2 G/20 ML
2 SYRINGE (ML) INTRAVENOUS SEE ADMIN INSTRUCTIONS
Status: DISCONTINUED | OUTPATIENT
Start: 2024-02-19 | End: 2024-02-19 | Stop reason: HOSPADM

## 2024-02-19 RX ORDER — BUPIVACAINE HYDROCHLORIDE 5 MG/ML
INJECTION, SOLUTION EPIDURAL; INTRACAUDAL
Status: COMPLETED | OUTPATIENT
Start: 2024-02-19 | End: 2024-02-19

## 2024-02-19 RX ORDER — NITROGLYCERIN 0.4 MG/1
0.4 TABLET SUBLINGUAL EVERY 5 MIN PRN
Status: DISCONTINUED | OUTPATIENT
Start: 2024-02-19 | End: 2024-02-20 | Stop reason: HOSPADM

## 2024-02-19 RX ORDER — DIPHENHYDRAMINE HCL 12.5 MG/5ML
12.5 SOLUTION ORAL EVERY 6 HOURS PRN
Status: DISCONTINUED | OUTPATIENT
Start: 2024-02-19 | End: 2024-02-20 | Stop reason: HOSPADM

## 2024-02-19 RX ORDER — OXYCODONE HYDROCHLORIDE 5 MG/1
5 TABLET ORAL
Status: CANCELLED | OUTPATIENT
Start: 2024-02-19

## 2024-02-19 RX ORDER — DEXAMETHASONE SODIUM PHOSPHATE 4 MG/ML
INJECTION, SOLUTION INTRA-ARTICULAR; INTRALESIONAL; INTRAMUSCULAR; INTRAVENOUS; SOFT TISSUE PRN
Status: DISCONTINUED | OUTPATIENT
Start: 2024-02-19 | End: 2024-02-19

## 2024-02-19 RX ORDER — AMOXICILLIN 250 MG
1 CAPSULE ORAL 2 TIMES DAILY
Status: DISCONTINUED | OUTPATIENT
Start: 2024-02-19 | End: 2024-02-20 | Stop reason: HOSPADM

## 2024-02-19 RX ORDER — FAMOTIDINE 20 MG/1
20 TABLET, FILM COATED ORAL 2 TIMES DAILY
Status: DISCONTINUED | OUTPATIENT
Start: 2024-02-19 | End: 2024-02-20 | Stop reason: HOSPADM

## 2024-02-19 RX ORDER — FENTANYL CITRATE 50 UG/ML
50 INJECTION, SOLUTION INTRAMUSCULAR; INTRAVENOUS EVERY 5 MIN PRN
Status: DISCONTINUED | OUTPATIENT
Start: 2024-02-19 | End: 2024-02-19 | Stop reason: HOSPADM

## 2024-02-19 RX ORDER — ASPIRIN 81 MG/1
81 TABLET ORAL 2 TIMES DAILY
Start: 2024-02-19

## 2024-02-19 RX ORDER — ONDANSETRON 2 MG/ML
4 INJECTION INTRAMUSCULAR; INTRAVENOUS EVERY 30 MIN PRN
Status: DISCONTINUED | OUTPATIENT
Start: 2024-02-19 | End: 2024-02-19 | Stop reason: HOSPADM

## 2024-02-19 RX ORDER — CARBOXYMETHYLCELLULOSE SODIUM 10 MG/ML
1 GEL OPHTHALMIC
Status: DISCONTINUED | OUTPATIENT
Start: 2024-02-19 | End: 2024-02-20 | Stop reason: HOSPADM

## 2024-02-19 RX ORDER — POLYETHYLENE GLYCOL 3350 17 G/17G
17 POWDER, FOR SOLUTION ORAL DAILY
Status: DISCONTINUED | OUTPATIENT
Start: 2024-02-20 | End: 2024-02-20 | Stop reason: HOSPADM

## 2024-02-19 RX ORDER — AMOXICILLIN 250 MG
1-2 CAPSULE ORAL 2 TIMES DAILY PRN
Status: DISCONTINUED | OUTPATIENT
Start: 2024-02-19 | End: 2024-02-19

## 2024-02-19 RX ORDER — GLYCOPYRROLATE 0.2 MG/ML
INJECTION, SOLUTION INTRAMUSCULAR; INTRAVENOUS PRN
Status: DISCONTINUED | OUTPATIENT
Start: 2024-02-19 | End: 2024-02-19

## 2024-02-19 RX ORDER — ATORVASTATIN CALCIUM 40 MG/1
40 TABLET, FILM COATED ORAL EVERY EVENING
Status: DISCONTINUED | OUTPATIENT
Start: 2024-02-19 | End: 2024-02-20 | Stop reason: HOSPADM

## 2024-02-19 RX ORDER — METFORMIN HCL 500 MG
1000 TABLET, EXTENDED RELEASE 24 HR ORAL 2 TIMES DAILY WITH MEALS
COMMUNITY

## 2024-02-19 RX ORDER — TRANEXAMIC ACID 650 MG/1
1950 TABLET ORAL ONCE
Status: COMPLETED | OUTPATIENT
Start: 2024-02-19 | End: 2024-02-19

## 2024-02-19 RX ORDER — HYDROMORPHONE HCL IN WATER/PF 6 MG/30 ML
0.2 PATIENT CONTROLLED ANALGESIA SYRINGE INTRAVENOUS
Status: DISCONTINUED | OUTPATIENT
Start: 2024-02-19 | End: 2024-02-20 | Stop reason: HOSPADM

## 2024-02-19 RX ORDER — CEFAZOLIN SODIUM 2 G/100ML
2 INJECTION, SOLUTION INTRAVENOUS EVERY 8 HOURS
Qty: 200 ML | Refills: 0 | Status: COMPLETED | OUTPATIENT
Start: 2024-02-19 | End: 2024-02-20

## 2024-02-19 RX ORDER — SODIUM CHLORIDE, SODIUM LACTATE, POTASSIUM CHLORIDE, CALCIUM CHLORIDE 600; 310; 30; 20 MG/100ML; MG/100ML; MG/100ML; MG/100ML
INJECTION, SOLUTION INTRAVENOUS CONTINUOUS
Status: DISCONTINUED | OUTPATIENT
Start: 2024-02-19 | End: 2024-02-19 | Stop reason: HOSPADM

## 2024-02-19 RX ORDER — ERYTHROMYCIN 5 MG/G
OINTMENT OPHTHALMIC AT BEDTIME
Status: DISCONTINUED | OUTPATIENT
Start: 2024-02-19 | End: 2024-02-20 | Stop reason: HOSPADM

## 2024-02-19 RX ORDER — GABAPENTIN 300 MG/1
300 CAPSULE ORAL AT BEDTIME
Status: DISCONTINUED | OUTPATIENT
Start: 2024-02-19 | End: 2024-02-20 | Stop reason: HOSPADM

## 2024-02-19 RX ORDER — ASPIRIN 81 MG/1
81 TABLET ORAL 2 TIMES DAILY
Status: DISCONTINUED | OUTPATIENT
Start: 2024-02-19 | End: 2024-02-20 | Stop reason: HOSPADM

## 2024-02-19 RX ORDER — METOPROLOL SUCCINATE 50 MG/1
50 TABLET, EXTENDED RELEASE ORAL DAILY
Status: DISCONTINUED | OUTPATIENT
Start: 2024-02-20 | End: 2024-02-20 | Stop reason: HOSPADM

## 2024-02-19 RX ORDER — PROPOFOL 10 MG/ML
INJECTION, EMULSION INTRAVENOUS PRN
Status: DISCONTINUED | OUTPATIENT
Start: 2024-02-19 | End: 2024-02-19

## 2024-02-19 RX ORDER — NICOTINE POLACRILEX 4 MG
15-30 LOZENGE BUCCAL
Status: DISCONTINUED | OUTPATIENT
Start: 2024-02-19 | End: 2024-02-20 | Stop reason: HOSPADM

## 2024-02-19 RX ORDER — ONDANSETRON 4 MG/1
4 TABLET, ORALLY DISINTEGRATING ORAL EVERY 30 MIN PRN
Status: CANCELLED | OUTPATIENT
Start: 2024-02-19

## 2024-02-19 RX ORDER — HYDROMORPHONE HCL IN WATER/PF 6 MG/30 ML
0.2 PATIENT CONTROLLED ANALGESIA SYRINGE INTRAVENOUS EVERY 5 MIN PRN
Status: DISCONTINUED | OUTPATIENT
Start: 2024-02-19 | End: 2024-02-19 | Stop reason: HOSPADM

## 2024-02-19 RX ORDER — ACETAMINOPHEN 325 MG/1
650 TABLET ORAL EVERY 4 HOURS PRN
Status: DISCONTINUED | OUTPATIENT
Start: 2024-02-22 | End: 2024-02-20 | Stop reason: HOSPADM

## 2024-02-19 RX ORDER — CARBOXYMETHYLCELLULOSE SODIUM 10 MG/ML
1 GEL OPHTHALMIC 4 TIMES DAILY
Status: DISCONTINUED | OUTPATIENT
Start: 2024-02-19 | End: 2024-02-19

## 2024-02-19 RX ORDER — ONDANSETRON 2 MG/ML
4 INJECTION INTRAMUSCULAR; INTRAVENOUS EVERY 30 MIN PRN
Status: CANCELLED | OUTPATIENT
Start: 2024-02-19

## 2024-02-19 RX ORDER — ONDANSETRON 4 MG/1
4 TABLET, ORALLY DISINTEGRATING ORAL EVERY 30 MIN PRN
Status: DISCONTINUED | OUTPATIENT
Start: 2024-02-19 | End: 2024-02-19 | Stop reason: HOSPADM

## 2024-02-19 RX ORDER — OXYCODONE HYDROCHLORIDE 5 MG/1
5 TABLET ORAL EVERY 4 HOURS PRN
Status: DISCONTINUED | OUTPATIENT
Start: 2024-02-19 | End: 2024-02-20

## 2024-02-19 RX ORDER — METHOCARBAMOL 500 MG/1
500 TABLET, FILM COATED ORAL EVERY 6 HOURS PRN
Status: DISCONTINUED | OUTPATIENT
Start: 2024-02-19 | End: 2024-02-20 | Stop reason: HOSPADM

## 2024-02-19 RX ORDER — LORAZEPAM 2 MG/ML
.5-1 INJECTION INTRAMUSCULAR
Status: DISCONTINUED | OUTPATIENT
Start: 2024-02-19 | End: 2024-02-19 | Stop reason: HOSPADM

## 2024-02-19 RX ORDER — OXYCODONE HYDROCHLORIDE 5 MG/1
10 TABLET ORAL EVERY 4 HOURS PRN
Status: DISCONTINUED | OUTPATIENT
Start: 2024-02-19 | End: 2024-02-20

## 2024-02-19 RX ORDER — MAGNESIUM SULFATE 4 G/50ML
4 INJECTION INTRAVENOUS ONCE
Status: COMPLETED | OUTPATIENT
Start: 2024-02-19 | End: 2024-02-19

## 2024-02-19 RX ORDER — PROCHLORPERAZINE MALEATE 5 MG
5 TABLET ORAL EVERY 6 HOURS PRN
Status: DISCONTINUED | OUTPATIENT
Start: 2024-02-19 | End: 2024-02-20 | Stop reason: HOSPADM

## 2024-02-19 RX ORDER — ALBUTEROL SULFATE 90 UG/1
2 AEROSOL, METERED RESPIRATORY (INHALATION) EVERY 4 HOURS PRN
Status: DISCONTINUED | OUTPATIENT
Start: 2024-02-19 | End: 2024-02-20 | Stop reason: HOSPADM

## 2024-02-19 RX ORDER — BISACODYL 10 MG
10 SUPPOSITORY, RECTAL RECTAL DAILY PRN
Status: DISCONTINUED | OUTPATIENT
Start: 2024-02-19 | End: 2024-02-20 | Stop reason: HOSPADM

## 2024-02-19 RX ORDER — MINERAL OIL AND PETROLATUM 425; 573 MG/G; MG/G
OINTMENT OPHTHALMIC AT BEDTIME
Status: DISCONTINUED | OUTPATIENT
Start: 2024-02-19 | End: 2024-02-20 | Stop reason: HOSPADM

## 2024-02-19 RX ORDER — DIPHENHYDRAMINE HCL 50 MG
50 CAPSULE ORAL
COMMUNITY

## 2024-02-19 RX ORDER — NALOXONE HYDROCHLORIDE 0.4 MG/ML
0.4 INJECTION, SOLUTION INTRAMUSCULAR; INTRAVENOUS; SUBCUTANEOUS
Status: DISCONTINUED | OUTPATIENT
Start: 2024-02-19 | End: 2024-02-20 | Stop reason: HOSPADM

## 2024-02-19 RX ORDER — LIDOCAINE 40 MG/G
CREAM TOPICAL
Status: DISCONTINUED | OUTPATIENT
Start: 2024-02-19 | End: 2024-02-19 | Stop reason: HOSPADM

## 2024-02-19 RX ORDER — LIDOCAINE 40 MG/G
CREAM TOPICAL
Status: DISCONTINUED | OUTPATIENT
Start: 2024-02-19 | End: 2024-02-20 | Stop reason: HOSPADM

## 2024-02-19 RX ORDER — OXYCODONE HYDROCHLORIDE 5 MG/1
10 TABLET ORAL
Status: CANCELLED | OUTPATIENT
Start: 2024-02-19

## 2024-02-19 RX ORDER — FENTANYL CITRATE 50 UG/ML
INJECTION, SOLUTION INTRAMUSCULAR; INTRAVENOUS PRN
Status: DISCONTINUED | OUTPATIENT
Start: 2024-02-19 | End: 2024-02-19

## 2024-02-19 RX ORDER — ONDANSETRON 4 MG/1
4 TABLET, ORALLY DISINTEGRATING ORAL EVERY 6 HOURS PRN
Status: DISCONTINUED | OUTPATIENT
Start: 2024-02-19 | End: 2024-02-20 | Stop reason: HOSPADM

## 2024-02-19 RX ORDER — OXYCODONE HYDROCHLORIDE 5 MG/1
5-10 TABLET ORAL EVERY 4 HOURS PRN
Qty: 26 TABLET | Refills: 0 | Status: SHIPPED | OUTPATIENT
Start: 2024-02-19 | End: 2024-02-20

## 2024-02-19 RX ORDER — MEPERIDINE HYDROCHLORIDE 25 MG/ML
12.5 INJECTION INTRAMUSCULAR; INTRAVENOUS; SUBCUTANEOUS EVERY 5 MIN PRN
Status: DISCONTINUED | OUTPATIENT
Start: 2024-02-19 | End: 2024-02-19 | Stop reason: HOSPADM

## 2024-02-19 RX ORDER — HYDROMORPHONE HCL IN WATER/PF 6 MG/30 ML
0.4 PATIENT CONTROLLED ANALGESIA SYRINGE INTRAVENOUS
Status: DISCONTINUED | OUTPATIENT
Start: 2024-02-19 | End: 2024-02-20 | Stop reason: HOSPADM

## 2024-02-19 RX ORDER — PANTOPRAZOLE SODIUM 40 MG/1
40 TABLET, DELAYED RELEASE ORAL DAILY
Status: DISCONTINUED | OUTPATIENT
Start: 2024-02-19 | End: 2024-02-20 | Stop reason: HOSPADM

## 2024-02-19 RX ORDER — NITROGLYCERIN 0.4 MG/1
0.4 TABLET SUBLINGUAL EVERY 5 MIN PRN
Status: DISCONTINUED | OUTPATIENT
Start: 2024-02-19 | End: 2024-02-19

## 2024-02-19 RX ORDER — ONDANSETRON 2 MG/ML
INJECTION INTRAMUSCULAR; INTRAVENOUS PRN
Status: DISCONTINUED | OUTPATIENT
Start: 2024-02-19 | End: 2024-02-19

## 2024-02-19 RX ORDER — SODIUM CHLORIDE, SODIUM LACTATE, POTASSIUM CHLORIDE, CALCIUM CHLORIDE 600; 310; 30; 20 MG/100ML; MG/100ML; MG/100ML; MG/100ML
INJECTION, SOLUTION INTRAVENOUS CONTINUOUS
Status: DISCONTINUED | OUTPATIENT
Start: 2024-02-19 | End: 2024-02-20 | Stop reason: HOSPADM

## 2024-02-19 RX ORDER — FENTANYL CITRATE 50 UG/ML
25 INJECTION, SOLUTION INTRAMUSCULAR; INTRAVENOUS EVERY 5 MIN PRN
Status: DISCONTINUED | OUTPATIENT
Start: 2024-02-19 | End: 2024-02-19 | Stop reason: HOSPADM

## 2024-02-19 RX ADMIN — SODIUM CHLORIDE, POTASSIUM CHLORIDE, SODIUM LACTATE AND CALCIUM CHLORIDE: 600; 310; 30; 20 INJECTION, SOLUTION INTRAVENOUS at 17:05

## 2024-02-19 RX ADMIN — MIDAZOLAM 0.5 MG: 1 INJECTION INTRAMUSCULAR; INTRAVENOUS at 08:51

## 2024-02-19 RX ADMIN — FAMOTIDINE 20 MG: 20 TABLET ORAL at 20:01

## 2024-02-19 RX ADMIN — SODIUM CHLORIDE, POTASSIUM CHLORIDE, SODIUM LACTATE AND CALCIUM CHLORIDE: 600; 310; 30; 20 INJECTION, SOLUTION INTRAVENOUS at 08:09

## 2024-02-19 RX ADMIN — PHENYLEPHRINE HYDROCHLORIDE 50 MCG: 10 INJECTION INTRAVENOUS at 10:52

## 2024-02-19 RX ADMIN — PHENYLEPHRINE HYDROCHLORIDE 0.4 MCG/KG/MIN: 10 INJECTION INTRAVENOUS at 10:13

## 2024-02-19 RX ADMIN — PHENYLEPHRINE HYDROCHLORIDE 50 MCG: 10 INJECTION INTRAVENOUS at 10:30

## 2024-02-19 RX ADMIN — ACETAMINOPHEN 975 MG: 325 TABLET ORAL at 17:02

## 2024-02-19 RX ADMIN — SENNOSIDES AND DOCUSATE SODIUM 1 TABLET: 8.6; 5 TABLET ORAL at 20:01

## 2024-02-19 RX ADMIN — DEXAMETHASONE SODIUM PHOSPHATE 4 MG: 4 INJECTION, SOLUTION INTRA-ARTICULAR; INTRALESIONAL; INTRAMUSCULAR; INTRAVENOUS; SOFT TISSUE at 10:24

## 2024-02-19 RX ADMIN — GLYCOPYRROLATE 0.2 MG: 0.2 INJECTION INTRAMUSCULAR; INTRAVENOUS at 10:13

## 2024-02-19 RX ADMIN — PROPOFOL 30 MG: 10 INJECTION, EMULSION INTRAVENOUS at 11:18

## 2024-02-19 RX ADMIN — SUGAMMADEX 200 MG: 100 INJECTION, SOLUTION INTRAVENOUS at 11:43

## 2024-02-19 RX ADMIN — MINERAL OIL AND PETROLATUM: 425; 573 OINTMENT OPHTHALMIC at 21:47

## 2024-02-19 RX ADMIN — Medication 2 G: at 09:47

## 2024-02-19 RX ADMIN — MAGNESIUM SULFATE HEPTAHYDRATE 4 G: 4 INJECTION, SOLUTION INTRAVENOUS at 08:10

## 2024-02-19 RX ADMIN — ROCURONIUM BROMIDE 50 MG: 10 INJECTION, SOLUTION INTRAVENOUS at 09:56

## 2024-02-19 RX ADMIN — PROPOFOL 170 MG: 10 INJECTION, EMULSION INTRAVENOUS at 09:56

## 2024-02-19 RX ADMIN — ACETAMINOPHEN 975 MG: 325 TABLET ORAL at 08:03

## 2024-02-19 RX ADMIN — ONDANSETRON 4 MG: 2 INJECTION INTRAMUSCULAR; INTRAVENOUS at 11:38

## 2024-02-19 RX ADMIN — CEFAZOLIN SODIUM 2 G: 2 INJECTION, SOLUTION INTRAVENOUS at 17:02

## 2024-02-19 RX ADMIN — TRANEXAMIC ACID 1950 MG: 650 TABLET ORAL at 08:04

## 2024-02-19 RX ADMIN — FENTANYL CITRATE 75 MCG: 50 INJECTION INTRAMUSCULAR; INTRAVENOUS at 09:56

## 2024-02-19 RX ADMIN — CARBOXYMETHYLCELLULOSE SODIUM 1 DROP: 10 GEL OPHTHALMIC at 19:53

## 2024-02-19 RX ADMIN — ASPIRIN 81 MG: 81 TABLET, COATED ORAL at 20:01

## 2024-02-19 RX ADMIN — GABAPENTIN 300 MG: 300 CAPSULE ORAL at 21:46

## 2024-02-19 RX ADMIN — BUPIVACAINE 10 ML: 13.3 INJECTION, SUSPENSION, LIPOSOMAL INFILTRATION at 08:57

## 2024-02-19 RX ADMIN — PHENYLEPHRINE HYDROCHLORIDE 100 MCG: 10 INJECTION INTRAVENOUS at 10:13

## 2024-02-19 RX ADMIN — SODIUM CHLORIDE, POTASSIUM CHLORIDE, SODIUM LACTATE AND CALCIUM CHLORIDE: 600; 310; 30; 20 INJECTION, SOLUTION INTRAVENOUS at 13:13

## 2024-02-19 RX ADMIN — INSULIN ASPART 2 UNITS: 100 INJECTION, SOLUTION INTRAVENOUS; SUBCUTANEOUS at 20:01

## 2024-02-19 RX ADMIN — CARBOXYMETHYLCELLULOSE SODIUM 1 DROP: 10 GEL OPHTHALMIC at 21:49

## 2024-02-19 RX ADMIN — ERYTHROMYCIN: 5 OINTMENT OPHTHALMIC at 21:47

## 2024-02-19 RX ADMIN — CARBOXYMETHYLCELLULOSE SODIUM 1 DROP: 10 GEL OPHTHALMIC at 17:03

## 2024-02-19 RX ADMIN — FENTANYL CITRATE 50 MCG: 50 INJECTION, SOLUTION INTRAMUSCULAR; INTRAVENOUS at 12:34

## 2024-02-19 RX ADMIN — PANTOPRAZOLE SODIUM 40 MG: 40 TABLET, DELAYED RELEASE ORAL at 17:02

## 2024-02-19 RX ADMIN — FENTANYL CITRATE 50 MCG: 50 INJECTION INTRAMUSCULAR; INTRAVENOUS at 08:52

## 2024-02-19 RX ADMIN — FENTANYL CITRATE 25 MCG: 50 INJECTION INTRAMUSCULAR; INTRAVENOUS at 10:10

## 2024-02-19 RX ADMIN — BUPIVACAINE HYDROCHLORIDE 10 ML: 5 INJECTION, SOLUTION EPIDURAL; INTRACAUDAL; PERINEURAL at 08:57

## 2024-02-19 ASSESSMENT — ACTIVITIES OF DAILY LIVING (ADL)
ADLS_ACUITY_SCORE: 24
ADLS_ACUITY_SCORE: 21
ADLS_ACUITY_SCORE: 35
ADLS_ACUITY_SCORE: 21
ADLS_ACUITY_SCORE: 24
ADLS_ACUITY_SCORE: 21
ADLS_ACUITY_SCORE: 24
ADLS_ACUITY_SCORE: 21
ADLS_ACUITY_SCORE: 21

## 2024-02-19 ASSESSMENT — COPD QUESTIONNAIRES: COPD: 1

## 2024-02-19 NOTE — ANESTHESIA PROCEDURE NOTES
"Brachial plexus Procedure Note    Pre-Procedure   Staff -        Anesthesiologist:  Enoc Burrell MD       Performed By: anesthesiologist       Procedure Start/Stop Times: 2/19/2024 8:52 AM and 2/19/2024 8:57 AM       Pre-Anesthestic Checklist: patient identified, IV checked, site marked, risks and benefits discussed, informed consent, monitors and equipment checked, pre-op evaluation, at physician/surgeon's request and post-op pain management  Timeout:       Correct Patient: Yes        Correct Procedure: Yes        Correct Site: Yes        Correct Position: Yes        Correct Laterality: Yes        Site Marked: Yes  Procedure Documentation  Procedure: Brachial plexus       Laterality: right       Patient Position: sitting       Patient Prep/Sterile Barriers: sterile gloves, mask       Skin prep: Chloraprep (interscalene approach).       Needle Gauge: 20.        Needle Length (Inches): 4        1. Ultrasound was used to identify targeted nerve, plexus, vascular marker, or fascial plane and place a needle adjacent to it in real-time.       2. Ultrasound was used to visualize the spread of anesthetic in close proximity to the above referenced structure.       3. A permanent image is entered into the patient's record.  Medication(s) Administered   Bupivacaine 0.5% PF (Infiltration) - Infiltration   10 mL - 2/19/2024 8:57:00 AM  Bupivacaine liposome (Exparel) 1.3% LA inj susp (Infiltration) - Infiltration   10 mL - 2/19/2024 8:57:00 AM  Medication Administration Time: 2/19/2024 8:52 AM      FOR [facility] ([facility]) ONLY:   Pain Team Contact information: please page the Pain Team Via [employer]. Search \"Pain\". During daytime hours, please page the attending first. At night please page the resident first.      "

## 2024-02-19 NOTE — OP NOTE
Procedure Date: 02/19/24     PREOPERATIVE DIAGNOSIS:  Right shoulder rotator cuff tear with shoulder degenerative arthritis.     POSTOPERATIVE DIAGNOSIS:  same     PROCEDURE PERFORMED:  Right Reverse  total shoulder arthroplasty utilizing a Biomet size 14mini humeral stem, standard glenoid baseplate with central fixation screww and 4 locking screws, Standard 36mm glenosphere in offset postion C, 36mm  Standard glenoid with +0 Humeral tray.     SURGEON:  Win Oliver MD     ASSISTANT:  Yariel Medrano PA-C.  Yariel Medrano PA-C, was present throughout the procedure, critical for patient positioning, prepping, draping, safe progression of procedure, wound closure, and sling placement.     DESCRIPTION OF PROCEDURE:  After informed risks, benefits, alternatives, and expected outcomes of the procedure, the patient desired to proceed.  She was brought to the operating suite where he was placed under general anesthesia, received 2 grams of Ancef, 1 gram of tranexamic acid.  A timeout verification step was completed.  He was positioned in low beach chair position.  Her right upper extremity was prepped and draped in a manner appropriate for procedure.     A deltopectoral approach was taken to the shoulder.  The cephalic vein was protected and retracted laterally.  Cobell retractor was placed.  Conjoined tendon was identified.  Clavipectoral fascia was divided, and the biceps tendon was not  identified within the groove.   Attention was then directed towards the subscapularis tendon, it was elevated off the lesser tuberosity, tagged with a #1 Ethibond stitches and retracted.  It was then divided inferiorly.  Leash of vessels was ligated.  Retractor was placed between the subscap and the joint capsule to protect the axillary nerve throughout these procedures.  External rotation was maintained to protect the axillary nerve.  The inferior capsule was then sharply divided with Metzenbaum scissors.  The subscap was  retracted, and attention was directed towards completing a proximal humeral resection.  A 30-degree retroverted x 45-degree proximal resection was completed using the Biomet guide.  This was followed by placement of Fukuda retractor, circumferential labral excision.      The anterior labrum was cleared of soft tissue, and a Bankart retractor was placed.  The modified rabbit ear retractor was placed posteriorly and excellent exposure of the glenoid was achieved.  The central pin was placed utilizing a  glenoid guide.  This was reamed to correct slight retroversion.  A standard baseplate was then positioned over the guidepin all seating was demonstrated in the central lobe it was utilized 30 mm central fixation screw was then secured there was taken to him and straight complete seating of the central screw.  Peripheral locking screws were then placed 25 mm inferior superior and 15 mm locking screws were placed utilizing the fixed we will guide for each.  Table fixation was felt to been achieved.  A standard 36 mm glenosphere in the C offset position was then assembled on the back table and secured to the glenoid baseplate and checked for stability.     The humeral head could then be fully exposed using a brown retractor and Rodolfo retractors, canal finder, sequential reaming up to 14 mm, followed by broaching up to 14 mm was completed.  A trial++ 0 humeral tray and 36 mm standard bearing surface were positioned excellent range of motion stability and tensioning of the conjoined tendon with 90 degrees of internal rotation and 90 degrees of abduction demonstrated.  Final 14 mm mini humeral stem was secured the +0 humeral tray was standard bearing surface was assembled secured to the humeral stem.      Wounds were copiously irrigated and subcutaneous was closed with 2-0 Vicryl.  Skin was closed with a 3-0 Stratafix, Steri-Strips, and Aquacel dressing.  The patient tolerated the procedure well.  Returned to recovery in  stable condition.     ESTIMATED BLOOD LOSS:  100 mL.     Win Oliver MD

## 2024-02-19 NOTE — ANESTHESIA POSTPROCEDURE EVALUATION
Patient: Lux Velasco    Procedure: Procedure(s):  RIGHT REVERSE TOTAL SHOULDER ARTHROPLASTY       Anesthesia Type:  General    Note:  Disposition: Outpatient   Postop Pain Control: Uneventful            Sign Out: Well controlled pain   PONV: No   Neuro/Psych: Uneventful            Sign Out: Acceptable/Baseline neuro status   Airway/Respiratory: Uneventful            Sign Out: Acceptable/Baseline resp. status   CV/Hemodynamics: Uneventful            Sign Out: Acceptable CV status; No obvious hypovolemia; No obvious fluid overload   Other NRE: NONE   DID A NON-ROUTINE EVENT OCCUR? No           Last vitals:  Vitals Value Taken Time   /64 02/19/24 1300   Temp 36.4  C (97.6  F) 02/19/24 1151   Pulse 41 02/19/24 1301   Resp 21 02/19/24 1301   SpO2 95 % 02/19/24 1301   Vitals shown include unfiled device data.    Electronically Signed By: Enoc Burrell MD  February 19, 2024  1:03 PM

## 2024-02-19 NOTE — BRIEF OP NOTE
"[facility]    Brief Operative Note    Pre-operative diagnosis: Right shoulder pain [M25.511]  Traumatic tear of supraspinatus tendon of right shoulder [S46.811A]  Post-operative diagnosis Same as pre-operative diagnosis    Procedure: RIGHT REVERSE TOTAL SHOULDER ARTHROPLASTY, Right - Shoulder    Surgeon: Surgeon(s) and Role:     * Win Oliver MD - Primary     * Yariel Medrano PA-C - Assisting  Anesthesia: General   Estimated Blood Loss: Less than 100 ml    Drains: None  Specimens: * No specimens in log *  Findings:   None.  Complications: None.  Implants:   Implant Name Type Inv. Item Serial No.  Lot No. LRB No. Used Action   PIN STEINMANN BIOMET REV SHLDER 3.2MM 9\" THRD SS [id number] - [id number] Wire PIN STEINMANN BIOMET REV SHLDER 3.2MM 9\" THRD SS [id number]  [employer] [id number] Right 1 Implanted   IMP BASEPLATE MINI GLENOSPHERE BIOM REV SHLDR 25MM [id number] - [id number] Total Joint Component/Insert IMP BASEPLATE MINI GLENOSPHERE BIOM REV SHLDR 25MM [id number]  [employer] [id number] Right 1 Implanted   IMP SCR CENTRAL BIOMET REV SHLDR 6.5X30MM [id number] - [id number] Metallic Hardware/Big Flat IMP SCR CENTRAL BIOMET REV SHLDR 6.5X30MM [id number]  [employer] [id number] Right 1 Implanted   IMP SCR LOCKING BIOM REV SHLDR 3.5 HEX 4.14Y84FJ 676482 - [id number] Metallic Hardware/Big Flat IMP SCR LOCKING BIOM REV SHLDR 3.5 HEX 4.57R94SA 171371  [employer] [id number] Right 1 Implanted   IMP SCR LOCKING BIOM REV SHLDR 3.5 HEX 4.56A35SB 646462 - [id number] Metallic Hardware/Big Flat IMP SCR LOCKING BIOM REV SHLDR 3.5 HEX 4.63V14IY 788825  [employer] [id number] Right 1 Implanted   IMP SCR LOCKING BIOM REV SHLDR 3.5 HEX 4.60X64LC 237684 - [id number] Total Joint Component/Insert IMP SCR LOCKING BIOM REV SHLDR 3.5 HEX 4.43A44FF 811766  [employer] [id number] Right 1 Implanted   IMP SCR LOCKING BIOM REV SHLDR 3.5 HEX 4.50P46JU 037535 - [id number] Total Joint Component/Insert IMP " SCR LOCKING BIOM REV SHLDR 3.5 HEX 4.17T27PM 722359  [employer] [id number] Right 1 Implanted   IMP SCR LOCKING BIOM REV SHLDR 3.5 HEX 4.52V99LT 733616 - [id number] Total Joint Component/Insert IMP SCR LOCKING BIOM REV SHLDR 3.5 HEX 4.65M83QB 662398  [employer] [id number] Right 1 Implanted   IMP GLENOSPHERE BIOM REV SHLDR 36MM STD 257673 - [id number] Total Joint Component/Insert IMP GLENOSPHERE BIOM REV SHLDR 36MM STD 330363  [employer] [id number] Right 1 Implanted   IMP STEM MINI HUMERAL BIOM SHLDR 14MM 266814 - [id number] Total Joint Component/Insert IMP STEM MINI HUMERAL BIOM SHLDR 14MM 986826  [employer] [id number] Right 1 Implanted   IMP BEARING HUMERAL ZIM 36MM STD PRLNG [id number] - [id number] Total Joint Component/Insert IMP BEARING HUMERAL ZIM 36MM STD PRLNG [id number]  [employer] [id number] Right 1 Implanted   IMP HUMERAL TRAY ZIM RVRS SHLDR STD [id number] - [id number] Total Joint Component/Insert IMP HUMERAL TRAY ZIM RVRS SHLDR STD [id number]  [employer] [id number] Right 1 Implanted

## 2024-02-19 NOTE — PHARMACY-ADMISSION MEDICATION HISTORY
Pharmacist Admission Medication History    Admission medication history is complete. The information provided in this note is only as accurate as the sources available at the time of the update.    Information Source(s): Patient and CareEverywhere/SureScripts via in-person    Pertinent Information:      Changes made to PTA medication list:  Added: Erythromycin, Benadryl  Deleted: Symbicort  Changed: Metformin    Allergies reviewed with patient and updates made in EHR: yes    Medication History Completed By: Chad Alcaraz [facility] 2/19/2024 7:49 AM    PTA Med List   Medication Sig Last Dose    acetaminophen 500 MG CAPS Take 2 tablets by mouth daily as needed prn at prn    ammonium lactate (AMLACTIN) 12 % cream Apply  topically daily. 2/18/2024 at am    Artificial Tear Ointment (EQ RESTORE PM OP) Apply 1 strip to eye At Bedtime 2/18/2024 at pm - has with    atorvastatin (LIPITOR) 40 MG tablet Take 1 tablet by mouth daily 2/18/2024 at pm    carboxymethylcellulose PF (REFRESH LIQUIGEL) 1 % ophthalmic gel Place 1 drop into both eyes 4 times daily Dispose of dropperette within 24 hours after opening or if the tip is contaminated. 2/18/2024 at pm - has with    diclofenac (VOLTAREN) 1 % topical gel Apply 4 g topically as needed for moderate pain prn at prn    diphenhydrAMINE (BENADRYL) 50 MG capsule Take 50 mg by mouth nightly as needed prn at prn    erythromycin (ROMYCIN) 5 MG/GM ophthalmic ointment Place into both eyes at bedtime 2/18/2024 at pm - has with    gabapentin (NEURONTIN) 300 MG capsule Take 1 capsule by mouth At Bedtime 2/18/2024 at pm    ibuprofen (ADVIL/MOTRIN) 200 MG capsule Take 1 capsule by mouth every 6 hours as needed for moderate pain 2/12/2024    ketoconazole (NIZORAL) 2 % external cream Apply topically 2 times daily 2/18/2024 at pm    losartan (COZAAR) 50 MG tablet Take 1 tablet (50 mg) by mouth daily 2/19/2024 at am    metFORMIN (GLUCOPHAGE XR) 500 MG 24 hr tablet Take 1,000 mg by mouth 2 times daily (with  meals) 2/19/2024 at am    metoprolol succinate ER (TOPROL XL) 50 MG 24 hr tablet Take 1 tablet by mouth daily 2/19/2024 at am    nitroGLYcerin (NITROSTAT) 0.4 MG sublingual tablet Place 1 tablet under the tongue every 5 minutes as needed for chest pain For chest pain place 1 tablet under the tongue every 5 minutes for 3 doses. If symptoms persist 5 minutes after 1st dose call 911. prn at prn    omeprazole (PRILOSEC) 20 MG capsule Take 1 capsule by mouth daily 2/18/2024 at am    senna-docusate (SENOKOT-S;PERICOLACE) 8.6-50 MG per tablet Take 1-2 tablets by mouth 2 times daily as needed for constipation Take while on oral narcotics to prevent or treat constipation. prn at prn    tiotropium-olodaterol (STIOLTO RESPIMAT) 2.5-2.5 MCG/ACT AERS Inhale 2 puffs into the lungs daily 2/19/2024 at am - not with

## 2024-02-19 NOTE — CONSULTS
[facility] MEDICINE CONSULT NOTE   Physician requesting consult: Win Oliver MD    Reason for consult: Postoperative medical management of medical co-morbidities as below    Assessment and Plan    Lux Velasco is a 78 year old old male with a history of CAD, HTN, and HFpEF presents for elective shoulder arthroplasty. [facility] service was asked to evaluate patient for postoperative medical management as follows below. Please resume the home medications as reconciled and further noted below with ordered hold parameters.  Vital signs have been stable post-operatively including hemodynamically stable blood pressure and heart rate. Thank you for this consult; we will continue to follow this patient until discharge.    Status post right shoulder arthroplasty  Routine postoperative cares  PT and OT evaluation  Pain control  VTE prophylaxis per orthopedics  Likely home in a.m.    History of heart failure with preserved ejection fraction  Currently appears euvolemic  Home regimen  Monitor volume status closely    Hyperlipidemia  Statin    Essential hypertension  Home regimen with hold parameters  BMP in a.m. due to regimen    Type 2 diabetes  Hold metformin  Correction insulin    Class I obesity    GERD    Hyperparathyroidism    Chronic dyspnea on exertion    Asymptomatic bradycardia  Likely med related  Asymptomatic  Monitor for now  Hold parameters on beta-blocker    Procedure(s):  RIGHT REVERSE TOTAL SHOULDER ARTHROPLASTY  Post-operative Day: Day of Surgery  Code status:No Order       Estimated Blood Loss:  100 mL    Hospital Problem List   No problem-specific Assessment & Plan notes found for this encounter.    Principal Problem:    DJD of right shoulder      -Reviewed the patient's preoperative H and P and updated missing elements.  -Home medication reconciliation has been reviewed.  Medications have been ordered as noted from the home list and changes are documented above     HISTORY      Lux Velasco is a 78 year old old male with history of hypertension, coronary disease, and heart failure presents for an elective shoulder arthroplasty.  Please see the op note for details of the surgery.  Please see the H&P which was reviewed by me for preoperative course.  Role of hospital medicine discussed and questions answered.  Patient seen in the PACU.  Currently doing okay.  Denies much for pain except for in the right armpit.  I let the nurse know.  He is not having any chest pain or shortness of breath.  No nausea or vomiting.  He has longstanding hypertension.  [facility]  No recent med changes.  He has diabetes but is not on insulin.  No history of peptic ulcer or dysphagia etc.    Past Medical History     Patient Active Problem List    Diagnosis Date Noted    DJD of right shoulder 02/19/2024     Priority: Medium    Diabetes mellitus, type 2 (H) 12/07/2020     Priority: Medium    Dyspnea on exertion 11/12/2019     Priority: Medium    Mild intermittent asthma without complication 11/12/2019     Priority: Medium    Benign prostatic hyperplasia with weak urinary stream 11/18/2018     Priority: Medium    Acquired cyst of kidney 11/18/2018     Priority: Medium    Primary hyperparathyroidism (H24)      Priority: Medium    Skin exam, screening for cancer 08/20/2013     Priority: Medium    History of agent Orange exposure 04/17/2013     Priority: Medium    Degenerative arthritis of cervical spine 04/17/2013     Priority: Medium    Stroke (H) 04/17/2013     Priority: Medium    Plantar fascial fibromatosis 07/10/2012     Priority: Medium        Surgical History   He  has a past surgical history that includes Mohs micrographic procedure; biopsy of skin lesion; Bypass graft artery coronary; Parathyroidectomy (N/A, 3/21/2017); Parathyroidectomy; cataract iol, rt/lt (Bilateral, 2017); Excise mass lower extremity (Left, 11/3/2017); and Repair ectropion bilateral (Bilateral, 7/7/2023).     Past  Surgical History:   Procedure Laterality Date    BIOPSY OF SKIN LESION      BYPASS GRAFT ARTERY CORONARY      CATARACT IOL, RT/LT Bilateral 2017    [facility]    EXCISE MASS LOWER EXTREMITY Left 11/3/2017    Procedure: EXCISE MASS LOWER EXTREMITY;  Excision Mass Left Flank;  Surgeon: Marybeth Gambino MD;  Location:  OR    MOHS MICROGRAPHIC PROCEDURE      PARATHYROIDECTOMY N/A 3/21/2017    Procedure: PARATHYROIDECTOMY;  Surgeon: Julianna Short MD;  Location:  OR    PARATHYROIDECTOMY      REPAIR ECTROPION BILATERAL Bilateral 7/7/2023    Procedure: Bilateral lower eyelid ectropion repair;  Surgeon: Gerald Harry MD;  Location:  OR       Family History    Reviewed, and family history includes Coronary Artery Disease Early Onset in his father; Lung Cancer in his paternal grandfather; Melanoma in his father and mother.    Social History    Reviewed, and he  reports that he has never smoked. He has quit using smokeless tobacco. He reports current alcohol use. He reports that he does not use drugs.  Social History     Tobacco Use    Smoking status: Never    Smokeless tobacco: Former    Tobacco comments:     Doesn't remember how much he used to smoke - during service only.    Substance Use Topics    Alcohol use: Yes     Comment: 1 beer/week       Allergies     Allergies   Allergen Reactions    Isosorbide Nitrate Shortness Of Breath     Chest pain. Patient stated that the medication made his breathing symptoms worse.     Acetaminophen      Other reaction(s): ALT (SGPT) level raised, Aspartate aminotransferase serum level raised, ALT (SGPT) level raised, Aspartate aminotransferase serum level raised    Clindamycin     Eggs Other (See Comments)     Pt states no longer having reaction, childhood allergy, eats eggs      Etodolac Other (See Comments)     Other reaction(s): Liver enzymes abnormal    Lisinopril Cough    Penicillins Other (See Comments)     Pt states PCN allergy is due to egg allergy     Propoxyphene Other (See Comments)     Pain    Tramadol      rash    Tamsulosin Anxiety and Other (See Comments)     Other reaction(s): Fever, Chill, Anxiety, Fever, Chill, Anxiety       Prior to Admission Medications      Medications Prior to Admission   Medication Sig Dispense Refill Last Dose    acetaminophen 500 MG CAPS Take 2 tablets by mouth daily as needed   prn at prn    ammonium lactate (AMLACTIN) 12 % cream Apply  topically daily.   2/18/2024 at am    Artificial Tear Ointment (EQ RESTORE PM OP) Apply 1 strip to eye At Bedtime   2/18/2024 at pm - has with    atorvastatin (LIPITOR) 40 MG tablet Take 1 tablet by mouth daily   2/18/2024 at pm    carboxymethylcellulose PF (REFRESH LIQUIGEL) 1 % ophthalmic gel Place 1 drop into both eyes 4 times daily Dispose of dropperette within 24 hours after opening or if the tip is contaminated. 90 each 4 2/18/2024 at pm - has with    diclofenac (VOLTAREN) 1 % topical gel Apply 4 g topically as needed for moderate pain   prn at prn    diphenhydrAMINE (BENADRYL) 50 MG capsule Take 50 mg by mouth nightly as needed   prn at prn    erythromycin (ROMYCIN) 5 MG/GM ophthalmic ointment Place into both eyes at bedtime   2/18/2024 at pm - has with    gabapentin (NEURONTIN) 300 MG capsule Take 1 capsule by mouth At Bedtime   2/18/2024 at pm    ibuprofen (ADVIL/MOTRIN) 200 MG capsule Take 1 capsule by mouth every 6 hours as needed for moderate pain   2/12/2024    ketoconazole (NIZORAL) 2 % external cream Apply topically 2 times daily   2/18/2024 at pm    losartan (COZAAR) 50 MG tablet Take 1 tablet (50 mg) by mouth daily   2/19/2024 at am    metFORMIN (GLUCOPHAGE XR) 500 MG 24 hr tablet Take 1,000 mg by mouth 2 times daily (with meals)   2/19/2024 at am    metoprolol succinate ER (TOPROL XL) 50 MG 24 hr tablet Take 1 tablet by mouth daily   2/19/2024 at am    nitroGLYcerin (NITROSTAT) 0.4 MG sublingual tablet Place 1 tablet under the tongue every 5 minutes as needed for chest pain For  chest pain place 1 tablet under the tongue every 5 minutes for 3 doses. If symptoms persist 5 minutes after 1st dose call 911.   prn at prn    omeprazole (PRILOSEC) 20 MG capsule Take 1 capsule by mouth daily   2/18/2024 at am    senna-docusate (SENOKOT-S;PERICOLACE) 8.6-50 MG per tablet Take 1-2 tablets by mouth 2 times daily as needed for constipation Take while on oral narcotics to prevent or treat constipation. 10 tablet 0 prn at prn    tiotropium-olodaterol (STIOLTO RESPIMAT) 2.5-2.5 MCG/ACT AERS Inhale 2 puffs into the lungs daily   2/19/2024 at am - not with    albuterol (PROAIR HFA) 108 (90 BASE) MCG/ACT Inhaler Inhale 2 puffs into the lungs every 4 hours as needed for shortness of breath / dyspnea, wheezing or other (use prior to exertion/activities) 1 Inhaler 3 prn at not with    ketoconazole (NIZORAL) 2 % external shampoo Apply topically daily as needed for itching or irritation   prn at prn    order for DME Equipment being ordered: velcro compression for lower legs and feet: ready wrap or juxta fit or farrow wrap 4 each 4        Review of Systems   A 12 point comprehensive review of systems was negative except as noted above.    OBJECTIVE         Physical Exam   Temp:  [97.2  F (36.2  C)-97.6  F (36.4  C)] 97.6  F (36.4  C)  Pulse:  [41-58] 41  Resp:  [12-25] 25  BP: (117-179)/(58-91) 126/64  SpO2:  [93 %-99 %] 99 %  Body mass index is 34.03 kg/m .  GENERAL:  Alert, appears comfortable, in no acute distress, appears stated age   HEAD:  Normocephalic, without obvious abnormality, atraumatic   THROAT: Lips, mucosa, and tongue normal; teeth and gums normal, mouth moist   NECK: Supple, symmetrical, trachea midline   LUNGS:   Clear to auscultation bilaterally, no rales, rhonchi, or wheezing, symmetric chest rise on inhalation, respirations unlabored   HEART:  Regular rate and rhythm, S1 and S2 normal, no murmur, rub, or gallop    ABDOMEN:   Soft, non-tender, bowel sounds active all four quadrants, no masses,  "no organomegaly, no rebound or guarding   EXTREMITIES: Extremities normal, atraumatic, no cyanosis or edema , fingers are warm on the right hand and is able to move them with normal sensation and capillary refill   SKIN: Dry to touch, no exanthems in the visualized areas   NEURO: Alert, oriented x 4, moves all four extremities freely/spontaneously   PSYCH: Cooperative, behavior is appropriate          Imaging Reviewed Personally By Myself    Radiology Results:   Recent Results (from the past 24 hour(s))   POC US Guidance Needle Placement    Narrative    Ultrasound was performed as guidance to an anesthesia procedure.  Click   \"PACS images\" hyperlink below to view any stored images.  For specific   procedure details, view procedure note authored by anesthesia.       Labs Reviewed Personally By Myself     Results for orders placed or performed during the hospital encounter of 02/19/24 (from the past 24 hour(s))   CBC with platelets   Result Value Ref Range    WBC Count 5.7 4.0 - 11.0 10e3/uL    RBC Count 4.88 4.40 - 5.90 10e6/uL    Hemoglobin 13.1 (L) 13.3 - 17.7 g/dL    Hematocrit 40.5 40.0 - 53.0 %    MCV 83 78 - 100 fL    MCH 26.8 26.5 - 33.0 pg    MCHC 32.3 31.5 - 36.5 g/dL    RDW 13.9 10.0 - 15.0 %    Platelet Count 153 150 - 450 10e3/uL   Potassium   Result Value Ref Range    Potassium 4.6 3.4 - 5.3 mmol/L   Creatinine   Result Value Ref Range    Creatinine 0.95 0.67 - 1.17 mg/dL    GFR Estimate 81 >60 mL/min/1.73m2   Glucose by meter   Result Value Ref Range    GLUCOSE BY METER POCT 146 (H) 70 - 99 mg/dL   POC US Guidance Needle Placement    Narrative    Ultrasound was performed as guidance to an anesthesia procedure.  Click   \"PACS images\" hyperlink below to view any stored images.  For specific   procedure details, view procedure note authored by anesthesia.   Glucose by meter   Result Value Ref Range    GLUCOSE BY METER POCT 175 (H) 70 - 99 mg/dL   XR Shoulder Right Port G/E 2 Views    Narrative    EXAM: XR " SHOULDER RIGHT PORT G/E 2 VIEWS  LOCATION: [facility]  DATE: 2/19/2024    INDICATION: Status post surgery.  COMPARISON: None.      Impression    IMPRESSION: Recent reverse total shoulder arthroplasty. Excellent position and alignment of components. There is no evidence of complication or periprosthetic fracture.               Hemoglobin A1c   Result Value Ref Range    Hemoglobin A1C 7.8 (H) <5.7 %     Most Recent 3 CBC's:  Recent Labs   Lab Test 02/19/24  0747 02/28/22  1532 07/07/20  0846   WBC 5.7 5.7 5.8   HGB 13.1* 13.1* 12.6*   MCV 83 86 86    152 144*     Most Recent 3 BMP's:  Recent Labs   Lab Test 02/19/24  1157 02/19/24  0750 02/19/24  0747 07/07/23  1035 02/28/22  1532 07/07/20  0846 06/26/19  0831   NA  --   --   --   --  143 138 140   POTASSIUM  --   --  4.6 4.0 4.9 4.0 4.3   CHLORIDE  --   --   --   --  107 107 106   CO2  --   --   --   --  29 26 27   BUN  --   --   --   --  17 16 13   CR  --   --  0.95  --  1.04 0.84 0.86   ANIONGAP  --   --   --   --  7 5 7   TRINA  --   --   --   --  8.8 8.2* 9.1   * 146*  --  212* 153* 133* 138*     Most Recent 2 LFT's:  Recent Labs   Lab Test 02/28/22  1532 07/07/20  0846   AST 19 18   ALT 41 38   ALKPHOS 125 115   BILITOTAL 0.3 0.5     Most Recent 3 INR's:  Recent Labs   Lab Test 10/29/18  0929 09/18/18  1040   INR 1.03 1.04       Preoperative Labs Reviewed Personally By Myself     Thank you for this consultation.  Appreciate the opportunity to participate in the care of Lux Velasco, please feel free to contact us for any questions or concerns.    Domingo Hedrick MD  [facility]  [facility]  Phone: #[phone]  Total time 50 minutes

## 2024-02-19 NOTE — ANESTHESIA PROCEDURE NOTES
Airway       Patient location during procedure: OR       Procedure Start/Stop Times: 2/19/2024 10:01 AM  Staff -        CRNA: Amita Rosas APRN CRNA       Performed By: CRNA  Consent for Airway        Urgency: elective  Indications and Patient Condition       Indications for airway management: madina-procedural       Induction type:intravenous       Mask difficulty assessment: 1 - vent by mask    Final Airway Details       Final airway type: endotracheal airway       Successful airway: ETT - single  Endotracheal Airway Details        ETT size (mm): 8.0       Cuffed: yes       Successful intubation technique: direct laryngoscopy       DL Blade Type: Butt 2       Grade View of Cords: 1       Adjucts: stylet       Position: Left       Measured from: lips       Secured at (cm): 23       Bite block used: None    Post intubation assessment        Placement verified by: capnometry, equal breath sounds and chest rise        Number of attempts at approach: 1       Number of other approaches attempted: 0       Secured with: tape       Ease of procedure: easy       Dentition: Intact and Unchanged       Dental guard used and removed.    Medication(s) Administered   Medication Administration Time: 2/19/2024 10:01 AM

## 2024-02-19 NOTE — ANESTHESIA PREPROCEDURE EVALUATION
Anesthesia Pre-Procedure Evaluation    Patient: Lux Velasco   MRN: [id number] : 1945        Procedure : Procedure(s):  RIGHT REVERSE TOTAL SHOULDER ARTHROPLASTY          Past Medical History:   Diagnosis Date    Diabetes mellitus type II     Diabetes mellitus, type 2 (H)     Gastroesophageal reflux disease     History of agent Orange exposure 2013    Hypertension     Malignant melanoma (H)     [facility]    Myocardial infarct (H) 1999    Primary hyperparathyroidism (H24) Dx approx     Stroke (H) 1998    recovered fully      Past Surgical History:   Procedure Laterality Date    BIOPSY OF SKIN LESION      BYPASS GRAFT ARTERY CORONARY      CATARACT IOL, RT/LT Bilateral 2017    [facility]    EXCISE MASS LOWER EXTREMITY Left 11/3/2017    Procedure: EXCISE MASS LOWER EXTREMITY;  Excision Mass Left Flank;  Surgeon: Marybeth Gambino MD;  Location:  OR    MOHS MICROGRAPHIC PROCEDURE      PARATHYROIDECTOMY N/A 3/21/2017    Procedure: PARATHYROIDECTOMY;  Surgeon: Julianna Short MD;  Location:  OR    PARATHYROIDECTOMY      REPAIR ECTROPION BILATERAL Bilateral 2023    Procedure: Bilateral lower eyelid ectropion repair;  Surgeon: Gerald Harry MD;  Location:  OR      Allergies   Allergen Reactions    Isosorbide Nitrate Shortness Of Breath     Chest pain. Patient stated that the medication made his breathing symptoms worse.     Acetaminophen      Other reaction(s): ALT (SGPT) level raised, Aspartate aminotransferase serum level raised, ALT (SGPT) level raised, Aspartate aminotransferase serum level raised    Clindamycin     Eggs Other (See Comments)     Pt states no longer having reaction, childhood allergy, eats eggs      Etodolac Other (See Comments)     Other reaction(s): Liver enzymes abnormal    Lisinopril Cough    Penicillins Other (See Comments)     Pt states PCN allergy is due to egg allergy    Propoxyphene Other (See Comments)      Pain    Tramadol      rash    Tamsulosin Anxiety and Other (See Comments)     Other reaction(s): Fever, Chill, Anxiety, Fever, Chill, Anxiety      Social History     Tobacco Use    Smoking status: Never    Smokeless tobacco: Former    Tobacco comments:     Doesn't remember how much he used to smoke - during service only.    Substance Use Topics    Alcohol use: Yes     Comment: 1 beer/week      Wt Readings from Last 1 Encounters:   02/19/24 110.7 kg (244 lb)        Anesthesia Evaluation   Pt has had prior anesthetic.     No history of anesthetic complications       ROS/MED HX  ENT/Pulmonary:     (+)     SEYMOUR risk factors,                 asthma    COPD,              Neurologic: Comment: Fully recovered from CVA  Memory issues    (+)          CVA,                      Cardiovascular: Comment: CAD  -Stable    (+)  hypertension- -  CAD -  - -           ZAMARRIPA.                           METS/Exercise Tolerance:     Hematologic:  - neg hematologic  ROS     Musculoskeletal:  - neg musculoskeletal ROS     GI/Hepatic:     (+) GERD,                   Renal/Genitourinary: Comment: Cyst on kidney      Endo: Comment: H/o hyperparathyroid    (+)  type II DM,                    Psychiatric/Substance Use:  - neg psychiatric ROS     Infectious Disease:  - neg infectious disease ROS     Malignancy: Comment: H/o melanoma      Other: Comment: History of agent Orange exposure           Physical Exam    Airway  airway exam normal      Mallampati: II   TM distance: > 3 FB   Neck ROM: full   Mouth opening: > 3 cm    Respiratory Devices and Support         Dental       (+) Multiple crowns, permanant bridges      Cardiovascular   cardiovascular exam normal          Pulmonary   pulmonary exam normal                OUTSIDE LABS:  CBC:   Lab Results   Component Value Date    WBC 5.7 02/19/2024    WBC 5.7 02/28/2022    HGB 13.1 (L) 02/19/2024    HGB 13.1 (L) 02/28/2022    HCT 40.5 02/19/2024    HCT 41.0 02/28/2022     02/19/2024      02/28/2022     BMP:   Lab Results   Component Value Date     02/28/2022     07/07/2020    POTASSIUM 4.6 02/19/2024    POTASSIUM 4.0 07/07/2023    CHLORIDE 107 02/28/2022    CHLORIDE 107 07/07/2020    CO2 29 02/28/2022    CO2 26 07/07/2020    BUN 17 02/28/2022    BUN 16 07/07/2020    CR 0.95 02/19/2024    CR 1.04 02/28/2022     (H) 02/19/2024     (H) 07/07/2023     COAGS:   Lab Results   Component Value Date    PTT 29 04/26/2006    INR 1.03 10/29/2018     POC:   Lab Results   Component Value Date     (H) 11/03/2017     HEPATIC:   Lab Results   Component Value Date    ALBUMIN 3.7 02/28/2022    PROTTOTAL 7.4 02/28/2022    ALT 41 02/28/2022    AST 19 02/28/2022    ALKPHOS 125 02/28/2022    BILITOTAL 0.3 02/28/2022     OTHER:   Lab Results   Component Value Date    LACT 1.4 04/27/2016    A1C 7.3 (H) 02/28/2022    TRINA 8.8 02/28/2022    PHOS 2.7 04/05/2017    MAG 1.9 10/10/2017    LIPASE 89 04/27/2016    TSH 2.02 07/07/2020    CRP <2.9 01/05/2018    SED 13 09/02/2010       Anesthesia Plan    ASA Status:  3    NPO Status:  NPO Appropriate    Anesthesia Type: General.     - Airway: ETT              Consents    Anesthesia Plan(s) and associated risks, benefits, and realistic alternatives discussed. Questions answered and patient/representative(s) expressed understanding.     - Discussed:     - Discussed with:  Patient            Postoperative Care    Pain management: IV analgesics, Peripheral nerve block (Single Shot).   PONV prophylaxis: Ondansetron (or other 5HT-3)     Comments:    Other Comments: Patient history and physical exam reviewed. NPO status appropriate. Focused physical and history performed, pre-op evaluation updated. RBA discussed re: anesthesia and patients questions answered.     Single shot IS block w/ exparel.     Map > 75 when in beach chair position.        H&P reviewed: Unable to attach H&P to encounter due to EHR limitations. H&P Update: appropriate H&P reviewed, patient  examined. No interval changes since H&P (within 30 days).         Enoc Burrell MD

## 2024-02-19 NOTE — ANESTHESIA CARE TRANSFER NOTE
Patient: Lux Velasco    Procedure: Procedure(s):  RIGHT REVERSE TOTAL SHOULDER ARTHROPLASTY       Diagnosis: Right shoulder pain [M25.511]  Traumatic tear of supraspinatus tendon of right shoulder [S46.811A]  Diagnosis Additional Information: No value filed.    Anesthesia Type:   General     Note:    Oropharynx: oropharynx clear of all foreign objects  Level of Consciousness: drowsy  Oxygen Supplementation: face mask  Level of Supplemental Oxygen (L/min / FiO2): 8  Independent Airway: airway patency satisfactory and stable  Dentition: dentition unchanged  Vital Signs Stable: post-procedure vital signs reviewed and stable    Patient transferred to: PACU    Handoff Report: Identifed the Patient, Identified the Reponsible Provider, Reviewed the pertinent medical history, Discussed the surgical course, Reviewed Intra-OP anesthesia mangement and issues during anesthesia, Set expectations for post-procedure period and Allowed opportunity for questions and acknowledgement of understanding      Vitals:  Vitals Value Taken Time   /65 02/19/24 1152   Temp 36.4  C (97.6  F) 02/19/24 1151   Pulse 44 02/19/24 1152   Resp 22 02/19/24 1152   SpO2 98 % 02/19/24 1152   Vitals shown include unfiled device data.    Electronically Signed By: ORION Rondon CRNA  February 19, 2024  11:54 AM

## 2024-02-20 ENCOUNTER — APPOINTMENT (OUTPATIENT)
Dept: OCCUPATIONAL THERAPY | Facility: CLINIC | Age: 79
End: 2024-02-20
Attending: ORTHOPAEDIC SURGERY
Payer: COMMERCIAL

## 2024-02-20 VITALS
TEMPERATURE: 98.6 F | OXYGEN SATURATION: 95 % | DIASTOLIC BLOOD PRESSURE: 64 MMHG | HEART RATE: 63 BPM | BODY MASS INDEX: 34.16 KG/M2 | WEIGHT: 244 LBS | HEIGHT: 71 IN | SYSTOLIC BLOOD PRESSURE: 127 MMHG | RESPIRATION RATE: 19 BRPM

## 2024-02-20 LAB
ANION GAP SERPL CALCULATED.3IONS-SCNC: 7 MMOL/L (ref 7–15)
BUN SERPL-MCNC: 15.5 MG/DL (ref 8–23)
CALCIUM SERPL-MCNC: 9 MG/DL (ref 8.8–10.2)
CHLORIDE SERPL-SCNC: 102 MMOL/L (ref 98–107)
CREAT SERPL-MCNC: 1.07 MG/DL (ref 0.67–1.17)
DEPRECATED HCO3 PLAS-SCNC: 27 MMOL/L (ref 22–29)
EGFRCR SERPLBLD CKD-EPI 2021: 71 ML/MIN/1.73M2
GLUCOSE BLDC GLUCOMTR-MCNC: 148 MG/DL (ref 70–99)
GLUCOSE BLDC GLUCOMTR-MCNC: 156 MG/DL (ref 70–99)
GLUCOSE SERPL-MCNC: 164 MG/DL (ref 70–99)
HGB BLD-MCNC: 12.1 G/DL (ref 13.3–17.7)
POTASSIUM SERPL-SCNC: 4.5 MMOL/L (ref 3.4–5.3)
SODIUM SERPL-SCNC: 136 MMOL/L (ref 135–145)

## 2024-02-20 PROCEDURE — 97166 OT EVAL MOD COMPLEX 45 MIN: CPT | Mod: GO

## 2024-02-20 PROCEDURE — 250N000013 HC RX MED GY IP 250 OP 250 PS 637: Performed by: ORTHOPAEDIC SURGERY

## 2024-02-20 PROCEDURE — 82962 GLUCOSE BLOOD TEST: CPT

## 2024-02-20 PROCEDURE — 99231 SBSQ HOSP IP/OBS SF/LOW 25: CPT | Performed by: INTERNAL MEDICINE

## 2024-02-20 PROCEDURE — 36415 COLL VENOUS BLD VENIPUNCTURE: CPT | Performed by: FAMILY MEDICINE

## 2024-02-20 PROCEDURE — 80048 BASIC METABOLIC PNL TOTAL CA: CPT | Performed by: FAMILY MEDICINE

## 2024-02-20 PROCEDURE — 97535 SELF CARE MNGMENT TRAINING: CPT | Mod: GO

## 2024-02-20 PROCEDURE — 250N000011 HC RX IP 250 OP 636: Performed by: ORTHOPAEDIC SURGERY

## 2024-02-20 PROCEDURE — 85018 HEMOGLOBIN: CPT | Performed by: ORTHOPAEDIC SURGERY

## 2024-02-20 PROCEDURE — 250N000013 HC RX MED GY IP 250 OP 250 PS 637: Performed by: FAMILY MEDICINE

## 2024-02-20 PROCEDURE — 97110 THERAPEUTIC EXERCISES: CPT | Mod: GO

## 2024-02-20 RX ORDER — OXYCODONE HYDROCHLORIDE 5 MG/1
2.5-5 TABLET ORAL EVERY 4 HOURS PRN
Qty: 26 TABLET | Refills: 0 | Status: SHIPPED | OUTPATIENT
Start: 2024-02-20

## 2024-02-20 RX ADMIN — CARBOXYMETHYLCELLULOSE SODIUM 1 DROP: 10 GEL OPHTHALMIC at 09:23

## 2024-02-20 RX ADMIN — LOSARTAN POTASSIUM 50 MG: 50 TABLET, FILM COATED ORAL at 07:36

## 2024-02-20 RX ADMIN — FAMOTIDINE 20 MG: 20 TABLET ORAL at 09:24

## 2024-02-20 RX ADMIN — POLYETHYLENE GLYCOL 3350 17 G: 17 POWDER, FOR SOLUTION ORAL at 09:25

## 2024-02-20 RX ADMIN — CARBOXYMETHYLCELLULOSE SODIUM 1 DROP: 10 GEL OPHTHALMIC at 09:28

## 2024-02-20 RX ADMIN — ASPIRIN 81 MG: 81 TABLET, COATED ORAL at 09:24

## 2024-02-20 RX ADMIN — INSULIN ASPART 1 UNITS: 100 INJECTION, SOLUTION INTRAVENOUS; SUBCUTANEOUS at 09:17

## 2024-02-20 RX ADMIN — PANTOPRAZOLE SODIUM 40 MG: 40 TABLET, DELAYED RELEASE ORAL at 07:36

## 2024-02-20 RX ADMIN — ACETAMINOPHEN 975 MG: 325 TABLET ORAL at 01:36

## 2024-02-20 RX ADMIN — OXYCODONE HYDROCHLORIDE 10 MG: 5 TABLET ORAL at 07:34

## 2024-02-20 RX ADMIN — SENNOSIDES AND DOCUSATE SODIUM 1 TABLET: 8.6; 5 TABLET ORAL at 09:24

## 2024-02-20 RX ADMIN — CARBOXYMETHYLCELLULOSE SODIUM 1 DROP: 10 GEL OPHTHALMIC at 07:37

## 2024-02-20 RX ADMIN — METOPROLOL SUCCINATE 50 MG: 50 TABLET, EXTENDED RELEASE ORAL at 07:36

## 2024-02-20 RX ADMIN — ACETAMINOPHEN 975 MG: 325 TABLET ORAL at 09:24

## 2024-02-20 RX ADMIN — CEFAZOLIN SODIUM 2 G: 2 INJECTION, SOLUTION INTRAVENOUS at 01:36

## 2024-02-20 ASSESSMENT — ACTIVITIES OF DAILY LIVING (ADL)
ADLS_ACUITY_SCORE: 24

## 2024-02-20 NOTE — PLAN OF CARE
"Patient vital signs are at baseline: Yes  Patient able to ambulate as they were prior to admission or with assist devices provided by therapies during their stay:  Yes  Patient MUST void prior to discharge:  Yes  Patient able to tolerate oral intake:  Yes  Pain has adequate pain control using Oral analgesics:  Yes  Does patient have an identified :  Yes  Has goal D/C date and time been discussed with patient:  Yes  Patient insisted on leaving prior to seeing OT this afternoon. He was irritable and verbally abusive saying \"you can't keep me here\". Patient started calling the taxi before the MD could see him. De-escalation techniques provided and patient calmed down for an hour. After MD saw him, he got agitated and started walking towards the elevators stating \"I'm leaving, you can't keep me here\". RN discussed waiting until discharge paperwork was printed and explained to patient before leaving. Patient was agreeable to this.      "

## 2024-02-20 NOTE — PROGRESS NOTES
Discharge AVS reviewed with patient.  Questions answered and teachings acknowledged.  Belongings and paperwork sent with via Taxi transport pending arrival. Orthopedic [employer] acknowledged (YES)      [employer] @ [phone]

## 2024-02-20 NOTE — PROGRESS NOTES
"[facility] Orthopaedics Progress Note      Post-operative Day: 1 Day Post-Op    Procedure(s):  RIGHT REVERSE TOTAL SHOULDER ARTHROPLASTY      Subjective: Patient seen up resting in bedside chair, no acute events overnight. Reports minimal pain to RUE. Voiding without difficulty, per nursing and OT some concern for cognitive status. Currently answering questions appropriately. Tolerating a regular diet with no nausea or vomiting.     Pain: minimal  Chest pain, SOB:  No      Objective:  Blood pressure 127/64, pulse 63, temperature 98.6  F (37  C), temperature source Oral, resp. rate 19, height 1.803 m (5' 11\"), weight 110.7 kg (244 lb), SpO2 95%.    Patient Vitals for the past 24 hrs:   BP Temp Temp src Pulse Resp SpO2   02/20/24 0736 127/64 -- -- 63 -- --   02/20/24 0000 (!) 156/80 98.6  F (37  C) Oral 86 19 95 %   02/19/24 1840 (!) 165/83 98.2  F (36.8  C) Oral 80 20 --   02/19/24 1700 (!) 213/104 -- -- 93 20 --   02/19/24 1630 (!) 186/86 97  F (36.1  C) Oral 61 18 96 %   02/19/24 1600 (!) 169/76 97.6  F (36.4  C) Oral 81 18 95 %   02/19/24 1533 (!) 177/84 -- -- -- -- --   02/19/24 1530 (!) 189/83 -- -- 71 -- 94 %   02/19/24 1500 (!) 170/81 -- -- 59 -- 97 %   02/19/24 1430 (!) 153/76 -- -- 51 -- 95 %   02/19/24 1400 138/78 -- -- (!) 48 -- 95 %   02/19/24 1330 128/63 -- -- (!) 41 -- 95 %   02/19/24 1300 123/64 -- -- (!) 41 27 94 %   02/19/24 1240 118/67 -- -- (!) 40 17 94 %   02/19/24 1239 119/57 -- -- (!) 38 18 94 %   02/19/24 1234 121/58 -- -- (!) 41 19 95 %   02/19/24 1230 121/58 -- -- (!) 42 19 95 %   02/19/24 1220 126/64 -- -- (!) 41 25 99 %   02/19/24 1210 123/60 -- -- (!) 41 19 99 %   02/19/24 1200 117/58 -- -- (!) 43 19 98 %   02/19/24 1151 -- 97.6  F (36.4  C) Temporal (!) 44 18 --       Wt Readings from Last 4 Encounters:   02/19/24 110.7 kg (244 lb)   07/07/23 125 kg (275 lb 9.6 oz)   04/07/22 97.5 kg (215 lb)   07/26/21 123.4 kg (272 lb)       General: Alert and orientated, NAD  Respiratory: Non-labored " breathing on RA  RUE motor function, sensation, and circulation intact   Yes, Moves all other extremities with ease and purpose   Wound status: incisions are clean dry and intact. Yes  Calf tenderness: Bilateral  No    Pertinent Labs   Lab Results: personally reviewed.     Recent Labs   Lab Test 02/20/24  0615 02/19/24  0747 02/28/22  1532 07/07/20  0846 06/26/19  0831 10/29/18  0929 09/18/18  1040 04/25/18  1603 01/05/18  1559   INR  --   --   --   --   --  1.03 1.04  --   --    WBC  --  5.7 5.7 5.8   < > 5.1 5.7   < > 5.7   HGB 12.1* 13.1* 13.1* 12.6*   < > 12.9* 12.6*   < > 13.0*   HCT  --  40.5 41.0 39.4*   < > 40.2 40.0   < > 40.7   MCV  --  83 86 86   < > 85 86   < > 86   PLT  --  153 152 144*   < > 145* 151   < > 160     --  143 138   < > 140 138   < > 139   CRP  --   --   --   --   --   --   --   --  <2.9    < > = values in this interval not displayed.       Plan:   Anticoagulation protocol: Aspirin 81 mg BID  x 30  days  Pain medications: Oxycodone and tylenol, given concern for cognitive status with good pain control will decrease Oxycodone down to 2.5-5 mg.   Weight bearing status:  WBAT, active and passive range of motion of elbow and wrist OK  Disposition:  home pending therapies and clearance from hospital medicine    Continue cares and rehabilitation     Report completed by:  ORION Echols CNP  Date: 2/20/2024  Time: 10:09 AM

## 2024-02-20 NOTE — PROGRESS NOTES
Patient vital signs are at baseline: No,  Reason: HTN but improving  Patient able to ambulate as they were prior to admission or with assist devices provided by therapies during their stay:  Yes  Patient MUST void prior to discharge:  Yes  Patient able to tolerate oral intake:  Yes  Pain has adequate pain control using Oral analgesics:  Yes  Does patient have an identified :  Yes  Has goal D/C date and time been discussed with patient:  Yes  Patient frequently calls out and screams at staff becoming verbally abusive out of anger and frustration...intermittent confusion, redirected when able. [facility] team notified and do not approve of this pt discharging tonight. Surgical site CDI, ice applied. All alarms on for safety.  Demonstrates use of call light appropriately.

## 2024-02-20 NOTE — PROGRESS NOTES
02/20/24 0750   Appointment Info   Signing Clinician's Name / Credentials (OT) Tj Bill OTR/L   Quick Adds   Quick Adds Certification   Living Environment   People in Home child(anderson), adult  (daughter)   Current Living Arrangements apartment   Home Accessibility no concerns   Transportation Anticipated family or friend will provide   Living Environment Comments Pt has cane, FWW, walkin shower w/ grab bars and shower chair, standard toilets   Self-Care   Usual Activity Tolerance moderate   Current Activity Tolerance good   Equipment Currently Used at Home cane, straight;grab bar, tub/shower;shower chair;grab bar, toilet   Fall history within last six months no   Activity/Exercise/Self-Care Comment Pt IND w/ ADLs and IADLs at baseline   General Information   Onset of Illness/Injury or Date of Surgery 02/19/24   Referring Physician Win Oliver MD   Patient/Family Therapy Goal Statement (OT) to go home   Additional Occupational Profile Info/Pertinent History of Current Problem R TSA   Existing Precautions/Restrictions shoulder   Right Upper Extremity (Weight-bearing Status) (S)  non weight-bearing (NWB)   Cognitive Status Examination   Orientation Status orientation to person, place and time   Affect/Mental Status (Cognitive) confused   Cognitive Status Comments Pt having difficulty problem solving   Visual Perception   Visual Impairment/Limitations WFL   Sensory   Sensory Comments numbness along RUE post op   Pain Assessment   Patient Currently in Pain Yes, see Vital Sign flowsheet   Posture   Posture not impaired   Range of Motion Comprehensive   Comment, General Range of Motion no ROM of surgical shoulder post op   Strength Comprehensive (MMT)   General Manual Muscle Testing (MMT) Assessment no strength deficits identified   Bed Mobility   Bed Mobility supine-sit   Comment (Bed Mobility) SBA   Transfers   Transfers bed-chair transfer;sit-stand transfer;toilet transfer;shower transfer   Transfer Comments  SBA-CGA   Activities of Daily Living   BADL Assessment/Intervention bathing;upper body dressing;lower body dressing   Bathing Assessment/Intervention   [place] Level (Bathing) minimum assist (75% patient effort)   Upper Body Dressing Assessment/Training   [place] Level (Upper Body Dressing) moderate assist (50% patient effort)   Lower Body Dressing Assessment/Training   [place] Level (Lower Body Dressing) minimum assist (75% patient effort)   Clinical Impression   Criteria for Skilled Therapeutic Interventions Met (OT) Yes, treatment indicated   OT Diagnosis decreased ADLs   Influenced by the following impairments TSA   OT Problem List-Impairments impacting ADL activity tolerance impaired;range of motion (ROM);sensation;post-surgical precautions;cognition;pain   Assessment of Occupational Performance 3-5 Performance Deficits   Identified Performance Deficits dressing, bathing, transfers   Planned Therapy Interventions (OT) ADL retraining;ROM;home program guidelines;progressive activity/exercise;transfer training   Clinical Decision Making Complexity (OT) detailed assessment/moderate complexity   Risk & Benefits of therapy have been explained evaluation/treatment results reviewed;patient   OT Total Evaluation Time   OT Eval, Moderate Complexity Minutes (08161) 10   Therapy Certification   Medical Diagnosis TSA   Start of Care Date 02/20/24   Certification date from 02/20/24   Certification date to 03/21/24   OT Goals   Therapy Frequency (OT) 2 times/day   OT Predicted Duration/Target Date for Goal Attainment 02/23/24   OT Goals Upper Body Dressing;Lower Body Dressing;Toilet Transfer/Toileting;OT Goal 1   OT: Upper Body Dressing Minimal assist;within precautions   OT: Lower Body Dressing Minimal assist;Goal Met   OT: Toilet Transfer/Toileting Modified independent;toilet transfer;cleaning and garment management;within precautions;Goal Met   OT: Goal 1 Pt will be IND w/ HEP after initial teaching    Interventions   Interventions Quick Adds Self-Care/Home Management;Therapeutic Procedures/Exercise   Self-Care/Home Management   Self-Care/Home Mgmt/ADL, Compensatory, Meal Prep Minutes (11904) 35   Symptoms Noted During/After Treatment (Meal Preparation/Planning Training) none   Treatment Detail/Skilled Intervention Pt edu on shoulder px - pt verbalized understanding  and needing 3 cues during session. Pt edu on FB dressing including donning/doffing immobilizer - completed w/ Min A for buttoning jeans/donning shirt. Pt having difficulty problem solving dressing and continues attempting to use RUE even w/ numbness/cues to not use RUE. Edu handout given for compensatory strategies for UB dressing. Pt instructed on bed mobility techniques - completed Mod I. STS w/ SBA and amb. 150 ft w/ SPC and SBA, no LOB. Pt edu on safe toilet/walkin shower transfers -completed Mod I. Pt educ on bathing techniques/wear schedule for sling. Pt ended session seated in recliner.   Therapeutic Procedures/Exercise   Therapeutic Procedure: strength, endurance, ROM, flexibillity minutes (26967) 15   Symptoms Noted During/After Treatment none   Treatment Detail/Skilled Intervention Pt given edu handout on pendulum/UE exercises. Pt instructed on and completed 1x10 reps of pendelums, elbow flex/ext, supination/pronation, wrist flex/ext, ulnar/radial deviation, and fist pumps. Pt IND w/ HEP after initial cueing. Pt completed exercises using LUE due to numbness in RUE. Instructed to start exercises at home once sensation returns to RUE. Instructed to complete exercises 2x per day at home. Pt having difficulty following exercises even w/ cueing.   OT Discharge Planning   OT Plan review HEP, amb., cognition   OT Discharge Recommendation (DC Rec)   (defer to ortho)   OT Rationale for DC Rec Pt seems confused at times and having trouble problem solving -  moving well overall. Pt needing Min A w/ dressing/bathing and will need assistance w/ all  IADLs. Pt unsure of how much daughter can help at home. Pt has good home setup w/ all necessary DME.   OT Brief overview of current status Min A w/ dressingw/ ADLs, confusion,   Total Session Time   Timed Code Treatment Minutes 50   Total Session Time (sum of timed and untimed services) 60      [facility]  OUTPATIENT OCCUPATIONAL THERAPY  EVALUATION  PLAN OF TREATMENT FOR OUTPATIENT REHABILITATION  (COMPLETE FOR INITIAL CLAIMS ONLY)  Patient's Last Name, First Name, M.I.  YOB: 1945  Lux Velasco                             Provider's Name  [facility] Medical Record No.  [id number]                             Onset Date:  02/19/24   Start of Care Date:  02/20/24   Type:     ___PT   _X_OT   ___SLP Medical Diagnosis:  TSA                    OT Diagnosis:  decreased ADLs Visits from SOC:  1     See note for plan of treatment, functional goals and certification details    I CERTIFY THE NEED FOR THESE SERVICES FURNISHED UNDER        THIS PLAN OF TREATMENT AND WHILE UNDER MY CARE     (Physician co-signature of this document indicates review and certification of the therapy plan).              Win Oliver MD

## 2024-02-20 NOTE — DISCHARGE SUMMARY
[facility] Orthopedics Discharge Summary                                  [facility]     MANDY GARCIA [id number]   Age: 78 year old  PCP: Nam Duffy, [phone] 1945     Date of Admission:  2/19/2024  Date of Discharge::  2/20/2024  Discharge Provider:  ORION Echols CNP    Code status:  Full Code    Admission Information:  Admission Diagnosis:  Right shoulder pain [M25.511]  Traumatic tear of supraspinatus tendon of right shoulder [S46.811A]  DJD of right shoulder [M19.011]    Post-Operative Day: 1 Day Post-Op     Reason for admission:  The patient was admitted for the following:Procedure(s) (LRB):  RIGHT REVERSE TOTAL SHOULDER ARTHROPLASTY (Right)    Principal Problem:    DJD of right shoulder      Allergies:  Isosorbide nitrate, Acetaminophen, Clindamycin, Etodolac, Lisinopril, Penicillins, Propoxyphene, Tramadol, and Tamsulosin    Following the procedure noted above the patient was transferred to the post-op floor and started on:    Therapy:  physical therapy and occupational therapy  Anticoagulation Plan: Aspirin 81 mg BID  for 30 days  Pain Management: scopainmedication: oxycodone and tylenol  Weight bearing status: Weight bearing as tolerated     The patient was followed by Orthopedics during the inpatient treatment course:  Complications:  None  Additional consultations:  Hospital medicine      Pertinent Labs   Lab Results: personally reviewed.     Recent Labs   Lab Test 02/20/24  0615 02/19/24  0747 02/28/22  1532 07/07/20  0846 06/26/19  0831 10/29/18  0929 09/18/18  1040 04/25/18  1603 01/05/18  1559   INR  --   --   --   --   --  1.03 1.04  --   --    WBC  --  5.7 5.7 5.8   < > 5.1 5.7   < > 5.7   HGB 12.1* 13.1* 13.1* 12.6*   < > 12.9* 12.6*   < > 13.0*   HCT  --  40.5 41.0 39.4*   < > 40.2 40.0   < > 40.7   MCV  --  83 86 86   < > 85 86   < > 86   PLT  --  153 152 144*   < > 145* 151   < > 160     --  143 138   < > 140 138   < > 139   CRP  --   --   --   --    --   --   --   --  <2.9    < > = values in this interval not displayed.          Discharge Information:  Condition at discharge: Stable  Discharge destination:  Discharged to home     Medications at discharge:  Current Discharge Medication List        START taking these medications    Details   aspirin 81 MG EC tablet Take 1 tablet (81 mg) by mouth 2 times daily    Associated Diagnoses: Status post reverse total replacement of right shoulder      oxyCODONE (ROXICODONE) 5 MG tablet Take 0.5-1 tablets (2.5-5 mg) by mouth every 4 hours as needed for moderate to severe pain  Qty: 26 tablet, Refills: 0    Associated Diagnoses: Status post reverse total replacement of right shoulder           CONTINUE these medications which have NOT CHANGED    Details   acetaminophen 500 MG CAPS Take 2 tablets by mouth daily as needed      ammonium lactate (AMLACTIN) 12 % cream Apply  topically daily.      Artificial Tear Ointment (EQ RESTORE PM OP) Apply 1 strip to eye At Bedtime      atorvastatin (LIPITOR) 40 MG tablet Take 1 tablet by mouth daily      carboxymethylcellulose PF (REFRESH LIQUIGEL) 1 % ophthalmic gel Place 1 drop into both eyes 4 times daily Dispose of dropperette within 24 hours after opening or if the tip is contaminated.  Qty: 90 each, Refills: 4    Associated Diagnoses: Dry eyes, bilateral; Ulcerative blepharitis of upper and lower eyelids of both eyes      diclofenac (VOLTAREN) 1 % topical gel Apply 4 g topically as needed for moderate pain      diphenhydrAMINE (BENADRYL) 50 MG capsule Take 50 mg by mouth nightly as needed      erythromycin (ROMYCIN) 5 MG/GM ophthalmic ointment Place into both eyes at bedtime      gabapentin (NEURONTIN) 300 MG capsule Take 1 capsule by mouth At Bedtime      ibuprofen (ADVIL/MOTRIN) 200 MG capsule Take 1 capsule by mouth every 6 hours as needed for moderate pain      ketoconazole (NIZORAL) 2 % external cream Apply topically 2 times daily      losartan (COZAAR) 50 MG tablet Take 1  tablet (50 mg) by mouth daily      metFORMIN (GLUCOPHAGE XR) 500 MG 24 hr tablet Take 1,000 mg by mouth 2 times daily (with meals)      metoprolol succinate ER (TOPROL XL) 50 MG 24 hr tablet Take 1 tablet by mouth daily      nitroGLYcerin (NITROSTAT) 0.4 MG sublingual tablet Place 1 tablet under the tongue every 5 minutes as needed for chest pain For chest pain place 1 tablet under the tongue every 5 minutes for 3 doses. If symptoms persist 5 minutes after 1st dose call 911.      omeprazole (PRILOSEC) 20 MG capsule Take 1 capsule by mouth daily      senna-docusate (SENOKOT-S;PERICOLACE) 8.6-50 MG per tablet Take 1-2 tablets by mouth 2 times daily as needed for constipation Take while on oral narcotics to prevent or treat constipation.  Qty: 10 tablet, Refills: 0    Comments: While on narcotics  Associated Diagnoses: Hypercalcemia; Primary hyperparathyroidism (H24)      tiotropium-olodaterol (STIOLTO RESPIMAT) 2.5-2.5 MCG/ACT AERS Inhale 2 puffs into the lungs daily      albuterol (PROAIR HFA) 108 (90 BASE) MCG/ACT Inhaler Inhale 2 puffs into the lungs every 4 hours as needed for shortness of breath / dyspnea, wheezing or other (use prior to exertion/activities)  Qty: 1 Inhaler, Refills: 3    Associated Diagnoses: Shortness of breath      ketoconazole (NIZORAL) 2 % external shampoo Apply topically daily as needed for itching or irritation      order for DME Equipment being ordered: velcro compression for lower legs and feet: ready wrap or juxta fit or farrow wrap  Qty: 4 each, Refills: 4    Associated Diagnoses: Venous stasis; Lymphedema of both lower extremities                        Follow-Up Care:  Patient should be seen in the office in 14 days by the Orthopedic Surgeon/Physician Assistant.  Call [phone] for appointment or questions.    It was my pleasure to take care of the above patient.  ORION Echols CNP

## 2024-02-20 NOTE — PROGRESS NOTES
Occupational Therapy Discharge Summary    Reason for therapy discharge:    Discharged to home.    Progress towards therapy goal(s). See goals on Care Plan in [facility] electronic health record for goal details.  Goals partially met.  Barriers to achieving goals:   discharge from facility.    Therapy recommendation(s):    Continued therapy is recommended.  Rationale/Recommendations:  continue w/ HEP.

## 2024-02-20 NOTE — PROGRESS NOTES
[facility] Daily Progress Note    Assessment/Plan:  Status post right shoulder arthroplasty  Routine postoperative cares  Pain control  VTE prophylaxis per orthopedics     History of heart failure with preserved ejection fraction  Currently appears euvolemic  Home regimen  Monitor volume status closely     Hyperlipidemia  Statin     Essential hypertension  Home regimen with hold parameters  BMP in a.m. due to regimen     Type 2 diabetes  Resume metformin  Correction insulin     Class I obesity     GERD     Hyperparathyroidism     Chronic dyspnea on exertion     Asymptomatic bradycardia  Resolved    Principal Problem:    DJD of right shoulder     LOS: 0 days     Subjective:  Doing well.  Denies any complaints.  No shortness of breath, chest pain, urinary or bowel complaints.   acetaminophen  975 mg Oral Q8H    aspirin  81 mg Oral BID    atorvastatin  40 mg Oral QPM    carboxymethylcellulose PF  1 drop Both Eyes Q1H    EQ Restore PM   Ophthalmic At Bedtime    erythromycin   Both Eyes At Bedtime    famotidine  20 mg Oral BID    Or    famotidine  20 mg Intravenous BID    gabapentin  300 mg Oral At Bedtime    insulin aspart  1-7 Units Subcutaneous TID AC    insulin aspart  1-5 Units Subcutaneous At Bedtime    losartan  50 mg Oral Daily    metoprolol succinate ER  50 mg Oral Daily    pantoprazole  40 mg Oral Daily    polyethylene glycol  17 g Oral Daily    senna-docusate  1 tablet Oral BID    sodium chloride (PF)  3 mL Intracatheter Q8H    umeclidinium-vilanterol  1 puff Inhalation Daily       Objective:  Vital signs in last 24 hours:  Temp:  [97  F (36.1  C)-98.6  F (37  C)] 98.6  F (37  C)  Pulse:  [38-93] 63  Resp:  [17-27] 19  BP: (117-213)/() 127/64  SpO2:  [94 %-99 %] 95 %  Weight:   [unfilled]    Intake/Output last 3 shifts:  I/O last 3 completed shifts:  In: 1020 [P.O.:120; I.V.:900]  Out: 100 [Blood:100]  Intake/Output this shift:  No intake/output data recorded.    Review of Systems:   As per subjective, all  others negative.    Physical Exam:    General Appearance:  Alert, cooperative, no distress, appears stated age   Head:  Normocephalic, without obvious abnormality, atraumatic   Lungs:   Clear to auscultation bilaterally, respirations unlabored   Chest Wall:  No tenderness or deformity   Heart:  Regular rate and rhythm, S1, S2 normal,no murmur, rub or gallop   Abdomen:   Soft, non tender, non distended, bowel sounds present, no guarding or rigidity   Extremities: S/p right shoulder surgery.  Distal perfusion is adequate.   Skin: Skin color, texture, turgor normal, no rashes or lesions   Neurologic: Alert and oriented X 3, Moves all 4 extremities       Lab Results:  Personally Reviewed.   Recent Results (from the past 24 hour(s))   Glucose by meter    Collection Time: 02/19/24 11:57 AM   Result Value Ref Range    GLUCOSE BY METER POCT 175 (H) 70 - 99 mg/dL   Hemoglobin A1c    Collection Time: 02/19/24  1:44 PM   Result Value Ref Range    Hemoglobin A1C 7.8 (H) <5.7 %   Glucose by meter    Collection Time: 02/19/24  6:31 PM   Result Value Ref Range    GLUCOSE BY METER POCT 213 (H) 70 - 99 mg/dL   Glucose by meter    Collection Time: 02/19/24  9:22 PM   Result Value Ref Range    GLUCOSE BY METER POCT 196 (H) 70 - 99 mg/dL   Hemoglobin    Collection Time: 02/20/24  6:15 AM   Result Value Ref Range    Hemoglobin 12.1 (L) 13.3 - 17.7 g/dL   Basic metabolic panel    Collection Time: 02/20/24  6:15 AM   Result Value Ref Range    Sodium 136 135 - 145 mmol/L    Potassium 4.5 3.4 - 5.3 mmol/L    Chloride 102 98 - 107 mmol/L    Carbon Dioxide (CO2) 27 22 - 29 mmol/L    Anion Gap 7 7 - 15 mmol/L    Urea Nitrogen 15.5 8.0 - 23.0 mg/dL    Creatinine 1.07 0.67 - 1.17 mg/dL    GFR Estimate 71 >60 mL/min/1.73m2    Calcium 9.0 8.8 - 10.2 mg/dL    Glucose 164 (H) 70 - 99 mg/dL   Glucose by meter    Collection Time: 02/20/24  6:33 AM   Result Value Ref Range    GLUCOSE BY METER POCT 148 (H) 70 - 99 mg/dL   Glucose by meter    Collection  Time: 02/20/24  7:43 AM   Result Value Ref Range    GLUCOSE BY METER POCT 156 (H) 70 - 99 mg/dL         Brenda Gibbs M.D  [facility] Service  Internal Medicine

## 2024-03-05 ENCOUNTER — TRANSFERRED RECORDS (OUTPATIENT)
Dept: HEALTH INFORMATION MANAGEMENT | Facility: CLINIC | Age: 79
End: 2024-03-05
Payer: COMMERCIAL

## 2024-04-02 ENCOUNTER — TRANSFERRED RECORDS (OUTPATIENT)
Dept: HEALTH INFORMATION MANAGEMENT | Facility: CLINIC | Age: 79
End: 2024-04-02
Payer: COMMERCIAL

## 2024-06-04 ENCOUNTER — TRANSFERRED RECORDS (OUTPATIENT)
Dept: HEALTH INFORMATION MANAGEMENT | Facility: CLINIC | Age: 79
End: 2024-06-04
Payer: COMMERCIAL

## 2024-06-19 ENCOUNTER — DOCUMENTATION ONLY (OUTPATIENT)
Dept: INTERNAL MEDICINE | Facility: CLINIC | Age: 79
End: 2024-06-19
Payer: COMMERCIAL

## 2024-06-19 NOTE — PROGRESS NOTES
Type of Form Received: [facility] Expense Verification    Form Received (Date) 6/19/24   Form Filled out Yes, faxed 6/25   Placed in provider folder Yes

## 2024-07-16 ENCOUNTER — TRANSFERRED RECORDS (OUTPATIENT)
Dept: HEALTH INFORMATION MANAGEMENT | Facility: CLINIC | Age: 79
End: 2024-07-16
Payer: COMMERCIAL

## 2024-09-24 ENCOUNTER — TRANSFERRED RECORDS (OUTPATIENT)
Dept: HEALTH INFORMATION MANAGEMENT | Facility: CLINIC | Age: 79
End: 2024-09-24
Payer: COMMERCIAL

## 2025-02-13 ENCOUNTER — TRANSFERRED RECORDS (OUTPATIENT)
Dept: MULTI SPECIALTY CLINIC | Facility: CLINIC | Age: 80
End: 2025-02-13
Payer: COMMERCIAL

## 2025-02-13 LAB
ALT SERPL-CCNC: 32 U/L
AST SERPL-CCNC: 25 U/L (ref 11–35)
CREATININE (EXTERNAL): 1 MG/DL (ref 0.7–1.2)
GFR ESTIMATED (EXTERNAL): 76 ML/MIN/1.73M2
GLUCOSE (EXTERNAL): 128 MG/DL (ref 70–100)
HBA1C MFR BLD: 7.8 % (ref 4–6)
POTASSIUM (EXTERNAL): 4.4 MMOL/L (ref 3.5–5.1)
TSH SERPL-ACNC: 1.4 UIU/ML (ref 0.35–4.94)

## 2025-02-24 RX ORDER — ASPIRIN 81 MG/1
81 TABLET ORAL DAILY
Status: ON HOLD | COMMUNITY
End: 2025-03-04

## 2025-02-24 RX ORDER — AMOXICILLIN 250 MG
2 CAPSULE ORAL DAILY PRN
Status: ON HOLD | COMMUNITY
End: 2025-03-03

## 2025-02-24 RX ORDER — CARBOXYMETHYLCELLULOSE SODIUM 10 MG/ML
1 GEL OPHTHALMIC
Status: ON HOLD | COMMUNITY
End: 2025-03-03

## 2025-02-26 NOTE — PROGRESS NOTES
Planning to discharge home on POD 1 in the morning. His daughter lives with him, but she is in [facility] rehab hospital getting treatment for an infection.       02/24/25 1228   Discharge Planning   Patient/Family Anticipates Transition to home with help/services  (Patient has contacted [facility] to arrange home care PCA and nurse care for after surgery.)   Concerns to be Addressed discharge planning   Living Arrangements   People in Home alone   Type of Residence Private Residence   Is your private residence a single family home or apartment? Apartment   Number of Stairs, Within Home, Primary none  (elevator)   Once home, are you able to live on one level? Yes   Which level? Main Level   Bathroom Shower/Tub Walk-in shower   Equipment Currently Used at Home shower chair;grab bar, toilet;grab bar, tub/shower;cane, straight  (Has a walker at home)   Support System   Support Systems Home Care Staff   Do you have someone available to stay with you one or two nights once you are home? No   Education   Patient attended total joint pre-op class/received pre-op teaching  email/phone call

## 2025-02-28 NOTE — TREATMENT PLAN
Orthopedic Surgery Pre-Op Plan: Lux Velasco  pre-op review. This is NOT an H&P   Surgeon: Dr. Comfort    [facility]: [facility]  Name of Surgery: Right Total Knee Arthroplasty   Date of Surgery: 3/3/25  H&P: Completed on 2/14/25 by ORION Cerna, CNP at [facility].   History of ASA, NSAIDS, vitamin and/or herbal supplements, GLP-1 Agonist or SGLT Inhibitor medication taken within 10 days?: Yes: on ASA 81 mg daily, empagliflozin (Jardiance): Patient was instructed to hold ASA 81 mg for 7 days before surgery and to hold Jardiance for 3 days before surgery.    History of blood thinners?: No    Plan:   1) Discharge Plan: Home POD 1 with assist of [facility] PCA and Home Care services. Please see Discharge Planning section near bottom of this note for further details.     2) Coronary Artery Disease: reports history of MI in 1999. Denies any angina. Continues medical management of CAD on ASA 81 mg daily and statin.     3) History of CVA: patient reports history of stroke in 1998. Denies any residual deficits. On ASA 81 mg and statin.     4) Hyperlipidemia: On atorvastatin.    5) Hypertension: Appears well-controlled on losartan and metoprolol.  Patient was instructed to hold losartan on the morning of surgery but to continue taking metoprolol.    6) COPD: Per PCP: had recent bronchitis exacerbation which was treated with antibiotics and has cleared. On Stiolto and albuterol inhalers.     7) Type 2 Diabetes Mellitus: Fair control.  Most recent hemoglobin A1c 7.8 on 2/14/2025.  Blood glucose 128 on 2/14/2025.  On metformin and empagliflozin (Jardiance).  Not on insulin. I recommend blood glucose checks at least three times a day and at bedtime during hospital stay. Goal BG < 180 to decrease risk for infection and wound healing complications. Nursing to please notify Hospitalist if BG > 180.      8) Obesity: BMI 34.6, Wt: 248 lbs. Patient has been able to lose around 45 pounds.  Patient should be congratulated  on weight loss and encouraged to maintain healthy lifestyle habits.     9) Melanoma: patient [facility].     10) Peripheral Edema and Venous Dermatitis: Uses compression stockings and ammonium lactate cream.      Patient appears medically optimized for upcoming surgery. I would recommend Hospitalist Consult to assist with medical management. Please call me below with any questions on this patient.       Review of Systems Notable for: Coronary artery disease, history of CVA in 1998, hyperlipidemia, hypertension, COPD, type 2 diabetes mellitus-fair control, obesity, melanoma, peripheral edema and venous dermatitis.    Past Medical History:   Past Medical History:   Diagnosis Date    Arthritis     Diabetes mellitus type II 2005    Diabetes mellitus, type 2 (H)     Gastroesophageal reflux disease     History of agent Orange exposure 04/17/2013    Hypertension     Insomnia     Malignant melanoma (H)     [facility]    Myocardial infarct (H) 01/01/1999    Primary hyperparathyroidism Dx approx 2003    Stroke (H) 01/01/1998    recovered fully     Past Surgical History:   Procedure Laterality Date    BIOPSY OF SKIN LESION      CATARACT IOL, RT/LT Bilateral 2017    [facility]    EXCISE MASS LOWER EXTREMITY Left 11/03/2017    Procedure: EXCISE MASS LOWER EXTREMITY;  Excision Mass Left Flank;  Surgeon: Marybeth Gambino MD;  Location:  OR    HERNIA REPAIR      MOHS MICROGRAPHIC PROCEDURE      PARATHYROIDECTOMY N/A 03/21/2017    Procedure: PARATHYROIDECTOMY;  Surgeon: Julianna Short MD;  Location:  OR    REPAIR ECTROPION BILATERAL Bilateral 07/07/2023    Procedure: Bilateral lower eyelid ectropion repair;  Surgeon: Gerald Harry MD;  Location:  OR    REVERSE ARTHROPLASTY SHOULDER Right 02/19/2024    Procedure: RIGHT REVERSE TOTAL SHOULDER ARTHROPLASTY;  Surgeon: Win Oliver MD;  Location: [facility] OR    SHOULDER SURGERY  "Right 2000    TOTAL KNEE ARTHROPLASTY Left     \"[facility]\"       Current Medications:  Patient's Medications   New Prescriptions    No medications on file   Previous Medications    ALBUTEROL (PROAIR HFA) 108 (90 BASE) MCG/ACT INHALER    Inhale 2 puffs into the lungs every 4 hours as needed for shortness of breath / dyspnea, wheezing or other (use prior to exertion/activities)    AMMONIUM LACTATE (AMLACTIN) 12 % CREAM    Apply  topically daily.    ARTIFICIAL TEAR OINTMENT (EQ RESTORE PM OP)    Apply 1 strip to eye At Bedtime    ASPIRIN 81 MG EC TABLET    Take 81 mg by mouth daily. Stopping 2/25/25 before surgery    ATORVASTATIN (LIPITOR) 40 MG TABLET    Take 1 tablet by mouth daily    CARBOXYMETHYLCELLULOSE PF (REFRESH CELLUVISC) 1 % OPHTHALMIC GEL    1 drop every 2 hours as needed for dry eyes. Every hour during the day    DICLOFENAC (VOLTAREN) 1 % TOPICAL GEL    Apply 4 g topically as needed for moderate pain    DIPHENHYDRAMINE-ACETAMINOPHEN (TYLENOL PM)  MG TABLET    Take 1 tablet by mouth nightly as needed for sleep.    EMPAGLIFLOZIN (JARDIANCE) 25 MG TABS TABLET    Take 12.5 mg by mouth daily.    ERYTHROMYCIN (ROMYCIN) 5 MG/GM OPHTHALMIC OINTMENT    Place into both eyes nightly as needed (eye irritation).    GABAPENTIN (NEURONTIN) 300 MG CAPSULE    Take 1 capsule by mouth At Bedtime    KETOCONAZOLE (NIZORAL) 2 % EXTERNAL CREAM    Apply topically 2 times daily as needed for irritation or itching.    KETOCONAZOLE (NIZORAL) 2 % EXTERNAL SHAMPOO    Apply topically daily as needed for itching or irritation    LOSARTAN (COZAAR) 50 MG TABLET    Take 1 tablet (50 mg) by mouth daily    METFORMIN (GLUCOPHAGE XR) 500 MG 24 HR TABLET    Take 1,000 mg by mouth 2 times daily (with meals)    METOPROLOL SUCCINATE ER (TOPROL XL) 50 MG 24 HR TABLET    Take 1 tablet by mouth daily    OMEPRAZOLE (PRILOSEC) 20 MG CAPSULE    Take 1 capsule by mouth daily    SENNA-DOCUSATE (SENOKOT-S/PERICOLACE) 8.6-50 MG TABLET    " Take 2 tablets by mouth daily as needed for constipation.    TIOTROPIUM-OLODATEROL (STIOLTO RESPIMAT) 2.5-2.5 MCG/ACT AERS    Inhale 2 puffs into the lungs daily   Modified Medications    No medications on file   Discontinued Medications    ACETAMINOPHEN 500 MG CAPS    Take 2 tablets by mouth daily as needed    ASPIRIN 81 MG EC TABLET    Take 1 tablet (81 mg) by mouth 2 times daily    CARBOXYMETHYLCELLULOSE PF (REFRESH LIQUIGEL) 1 % OPHTHALMIC GEL    Place 1 drop into both eyes 4 times daily Dispose of dropperette within 24 hours after opening or if the tip is contaminated.    DIPHENHYDRAMINE (BENADRYL) 50 MG CAPSULE    Take 50 mg by mouth nightly as needed    IBUPROFEN (ADVIL/MOTRIN) 200 MG CAPSULE    Take 1 capsule by mouth every 6 hours as needed for moderate pain    NITROGLYCERIN (NITROSTAT) 0.4 MG SUBLINGUAL TABLET    Place 1 tablet under the tongue every 5 minutes as needed for chest pain For chest pain place 1 tablet under the tongue every 5 minutes for 3 doses. If symptoms persist 5 minutes after 1st dose call 911.    ORDER FOR DME    Equipment being ordered: velcro compression for lower legs and feet: ready wrap or juxta fit or farrow wrap    OXYCODONE (ROXICODONE) 5 MG TABLET    Take 0.5-1 tablets (2.5-5 mg) by mouth every 4 hours as needed for moderate to severe pain    SENNA-DOCUSATE (SENOKOT-S;PERICOLACE) 8.6-50 MG PER TABLET    Take 1-2 tablets by mouth 2 times daily as needed for constipation Take while on oral narcotics to prevent or treat constipation.       ALLERGIES:  Allergies   Allergen Reactions    Isosorbide Nitrate Shortness Of Breath     Chest pain. Patient stated that the medication made his breathing symptoms worse.     Clindamycin     Etodolac Other (See Comments)     Other reaction(s): Liver enzymes abnormal    Lisinopril Cough    Propoxyphene Other (See Comments)     Pain    Tamsulosin Anxiety and Other (See Comments)     Other reaction(s): Fever, Chill, Anxiety, Fever, Chill, Anxiety     Tramadol Rash     rash       Social History  Social History     Tobacco Use    Smoking status: Never    Smokeless tobacco: Never    Tobacco comments:     Tried smoking during  service only.    Substance Use Topics    Alcohol use: Yes     Comment: 1 beer/week    Drug use: No       Any Abnormal Recent Diagnostics? Yes  Hemoglobin A1c 7.8 on 2/13/2025: Shows fair but improved recent control of type 2 diabetes on metformin and empagliflozin (Jardiance).  Blood glucose 128 on 2/13/2025: We will monitor BG's closely during hospital stay.  Goal BG is <180.    Discharge Planning:   Planning to discharge home on POD 1 in the morning. His daughter lives with him, but she is in [place] getting treatment for an infection.       02/24/25 1228   Discharge Planning   Patient/Family Anticipates Transition to home with help/services  (Patient has contacted [facility] to arrange home care PCA and nurse care for after surgery.)   Concerns to be Addressed discharge planning   Living Arrangements   People in Home alone   Type of Residence Private Residence   Is your private residence a single family home or apartment? Apartment   Number of Stairs, Within Home, Primary none  (elevator)   Once home, are you able to live on one level? Yes   Which level? Main Level   Bathroom Shower/Tub Walk-in shower   Equipment Currently Used at Home shower chair;grab bar, toilet;grab bar, tub/shower;cane, straight  (Has a walker at home)   Support System   Support Systems Home Care Staff   Do you have someone available to stay with you one or two nights once you are home? No   Education   Patient attended total joint pre-op class/received pre-op teaching  email/phone call       ORION Spann, CNP   Advanced Practice Nurse Navigator- Orthopedics  [facility]   Phone: [phone]

## 2025-03-03 ENCOUNTER — APPOINTMENT (OUTPATIENT)
Dept: PHYSICAL THERAPY | Facility: CLINIC | Age: 80
End: 2025-03-03
Attending: ORTHOPAEDIC SURGERY
Payer: COMMERCIAL

## 2025-03-03 ENCOUNTER — APPOINTMENT (OUTPATIENT)
Dept: RADIOLOGY | Facility: CLINIC | Age: 80
End: 2025-03-03
Attending: ORTHOPAEDIC SURGERY
Payer: COMMERCIAL

## 2025-03-03 ENCOUNTER — ANESTHESIA (OUTPATIENT)
Dept: SURGERY | Facility: CLINIC | Age: 80
End: 2025-03-03
Payer: COMMERCIAL

## 2025-03-03 ENCOUNTER — HOSPITAL ENCOUNTER (OUTPATIENT)
Facility: CLINIC | Age: 80
Discharge: HOME HEALTH | End: 2025-03-04
Attending: ORTHOPAEDIC SURGERY | Admitting: ORTHOPAEDIC SURGERY
Payer: COMMERCIAL

## 2025-03-03 ENCOUNTER — ANESTHESIA EVENT (OUTPATIENT)
Dept: SURGERY | Facility: CLINIC | Age: 80
End: 2025-03-03
Payer: COMMERCIAL

## 2025-03-03 DIAGNOSIS — Z96.651 S/P TOTAL KNEE ARTHROPLASTY, RIGHT: Primary | ICD-10-CM

## 2025-03-03 PROBLEM — M17.11 RIGHT KNEE DJD: Status: ACTIVE | Noted: 2025-03-03

## 2025-03-03 LAB
CREAT SERPL-MCNC: 1.14 MG/DL (ref 0.67–1.17)
EGFRCR SERPLBLD CKD-EPI 2021: 65 ML/MIN/1.73M2
ERYTHROCYTE [DISTWIDTH] IN BLOOD BY AUTOMATED COUNT: 14.5 % (ref 10–15)
GLUCOSE BLDC GLUCOMTR-MCNC: 115 MG/DL (ref 70–99)
GLUCOSE BLDC GLUCOMTR-MCNC: 150 MG/DL (ref 70–99)
GLUCOSE BLDC GLUCOMTR-MCNC: 156 MG/DL (ref 70–99)
GLUCOSE BLDC GLUCOMTR-MCNC: 199 MG/DL (ref 70–99)
HCT VFR BLD AUTO: 38 % (ref 40–53)
HGB BLD-MCNC: 12.9 G/DL (ref 13.3–17.7)
MCH RBC QN AUTO: 27.2 PG (ref 26.5–33)
MCHC RBC AUTO-ENTMCNC: 33.9 G/DL (ref 31.5–36.5)
MCV RBC AUTO: 80 FL (ref 78–100)
PLATELET # BLD AUTO: 187 10E3/UL (ref 150–450)
POTASSIUM SERPL-SCNC: 4.3 MMOL/L (ref 3.4–5.3)
RBC # BLD AUTO: 4.74 10E6/UL (ref 4.4–5.9)
WBC # BLD AUTO: 7.4 10E3/UL (ref 4–11)

## 2025-03-03 PROCEDURE — 250N000009 HC RX 250: Performed by: ORTHOPAEDIC SURGERY

## 2025-03-03 PROCEDURE — 258N000001 HC RX 258: Performed by: ORTHOPAEDIC SURGERY

## 2025-03-03 PROCEDURE — 999N000065 XR KNEE PORT RIGHT 1/2 VIEWS: Mod: RT

## 2025-03-03 PROCEDURE — 360N000077 HC SURGERY LEVEL 4, PER MIN: Performed by: ORTHOPAEDIC SURGERY

## 2025-03-03 PROCEDURE — 250N000009 HC RX 250: Performed by: NURSE ANESTHETIST, CERTIFIED REGISTERED

## 2025-03-03 PROCEDURE — 64447 NJX AA&/STRD FEMORAL NRV IMG: CPT | Mod: RT,XU

## 2025-03-03 PROCEDURE — 85027 COMPLETE CBC AUTOMATED: CPT | Performed by: PHYSICIAN ASSISTANT

## 2025-03-03 PROCEDURE — 36415 COLL VENOUS BLD VENIPUNCTURE: CPT | Performed by: PHYSICIAN ASSISTANT

## 2025-03-03 PROCEDURE — 97116 GAIT TRAINING THERAPY: CPT | Mod: GP

## 2025-03-03 PROCEDURE — 97162 PT EVAL MOD COMPLEX 30 MIN: CPT | Mod: GP

## 2025-03-03 PROCEDURE — 250N000011 HC RX IP 250 OP 636: Performed by: NURSE ANESTHETIST, CERTIFIED REGISTERED

## 2025-03-03 PROCEDURE — 250N000012 HC RX MED GY IP 250 OP 636 PS 637: Performed by: STUDENT IN AN ORGANIZED HEALTH CARE EDUCATION/TRAINING PROGRAM

## 2025-03-03 PROCEDURE — 258N000003 HC RX IP 258 OP 636: Performed by: STUDENT IN AN ORGANIZED HEALTH CARE EDUCATION/TRAINING PROGRAM

## 2025-03-03 PROCEDURE — 272N000001 HC OR GENERAL SUPPLY STERILE: Performed by: ORTHOPAEDIC SURGERY

## 2025-03-03 PROCEDURE — 258N000003 HC RX IP 258 OP 636: Performed by: ORTHOPAEDIC SURGERY

## 2025-03-03 PROCEDURE — 370N000017 HC ANESTHESIA TECHNICAL FEE, PER MIN: Performed by: ORTHOPAEDIC SURGERY

## 2025-03-03 PROCEDURE — 250N000013 HC RX MED GY IP 250 OP 250 PS 637: Performed by: STUDENT IN AN ORGANIZED HEALTH CARE EDUCATION/TRAINING PROGRAM

## 2025-03-03 PROCEDURE — 82565 ASSAY OF CREATININE: CPT | Performed by: PHYSICIAN ASSISTANT

## 2025-03-03 PROCEDURE — C1713 ANCHOR/SCREW BN/BN,TIS/BN: HCPCS | Performed by: ORTHOPAEDIC SURGERY

## 2025-03-03 PROCEDURE — 258N000003 HC RX IP 258 OP 636: Performed by: NURSE ANESTHETIST, CERTIFIED REGISTERED

## 2025-03-03 PROCEDURE — 82962 GLUCOSE BLOOD TEST: CPT

## 2025-03-03 PROCEDURE — 250N000013 HC RX MED GY IP 250 OP 250 PS 637: Performed by: ORTHOPAEDIC SURGERY

## 2025-03-03 PROCEDURE — 84132 ASSAY OF SERUM POTASSIUM: CPT | Performed by: PHYSICIAN ASSISTANT

## 2025-03-03 PROCEDURE — 250N000009 HC RX 250: Performed by: PHYSICIAN ASSISTANT

## 2025-03-03 PROCEDURE — 250N000011 HC RX IP 250 OP 636: Performed by: STUDENT IN AN ORGANIZED HEALTH CARE EDUCATION/TRAINING PROGRAM

## 2025-03-03 PROCEDURE — C1776 JOINT DEVICE (IMPLANTABLE): HCPCS | Performed by: ORTHOPAEDIC SURGERY

## 2025-03-03 PROCEDURE — 250N000011 HC RX IP 250 OP 636: Performed by: ORTHOPAEDIC SURGERY

## 2025-03-03 PROCEDURE — 250N000013 HC RX MED GY IP 250 OP 250 PS 637: Performed by: PHYSICIAN ASSISTANT

## 2025-03-03 PROCEDURE — 250N000011 HC RX IP 250 OP 636: Performed by: PHYSICIAN ASSISTANT

## 2025-03-03 PROCEDURE — 710N000010 HC RECOVERY PHASE 1, LEVEL 2, PER MIN: Performed by: ORTHOPAEDIC SURGERY

## 2025-03-03 PROCEDURE — 97530 THERAPEUTIC ACTIVITIES: CPT | Mod: GP

## 2025-03-03 PROCEDURE — 999N000141 HC STATISTIC PRE-PROCEDURE NURSING ASSESSMENT: Performed by: ORTHOPAEDIC SURGERY

## 2025-03-03 DEVICE — CRUCIATE RETAINING FEMORAL
Type: IMPLANTABLE DEVICE | Site: KNEE | Status: FUNCTIONAL
Brand: TRIATHLON

## 2025-03-03 DEVICE — PATELLA
Type: IMPLANTABLE DEVICE | Site: KNEE | Status: FUNCTIONAL
Brand: TRIATHLON

## 2025-03-03 DEVICE — PRIMARY TIBIAL BASEPLATE
Type: IMPLANTABLE DEVICE | Site: KNEE | Status: FUNCTIONAL
Brand: TRIATHLON

## 2025-03-03 DEVICE — TIBIAL BEARING INSERT - CS
Type: IMPLANTABLE DEVICE | Site: KNEE | Status: FUNCTIONAL
Brand: TRIATHLON

## 2025-03-03 DEVICE — TOBRA FULL DOSE ANTIBIOTIC BONE CEMENT, 10 PACK CATALOG NUMBER IS 6197-9-010
Type: IMPLANTABLE DEVICE | Site: KNEE | Status: FUNCTIONAL
Brand: SIMPLEX

## 2025-03-03 RX ORDER — NALOXONE HYDROCHLORIDE 0.4 MG/ML
0.2 INJECTION, SOLUTION INTRAMUSCULAR; INTRAVENOUS; SUBCUTANEOUS
Status: DISCONTINUED | OUTPATIENT
Start: 2025-03-03 | End: 2025-03-04 | Stop reason: HOSPADM

## 2025-03-03 RX ORDER — TRANEXAMIC ACID 650 MG/1
1950 TABLET ORAL ONCE
Status: COMPLETED | OUTPATIENT
Start: 2025-03-03 | End: 2025-03-03

## 2025-03-03 RX ORDER — OXYCODONE HYDROCHLORIDE 5 MG/1
10 TABLET ORAL
Status: DISCONTINUED | OUTPATIENT
Start: 2025-03-03 | End: 2025-03-03 | Stop reason: HOSPADM

## 2025-03-03 RX ORDER — PANTOPRAZOLE SODIUM 40 MG/1
40 TABLET, DELAYED RELEASE ORAL DAILY
Status: DISCONTINUED | OUTPATIENT
Start: 2025-03-04 | End: 2025-03-04 | Stop reason: HOSPADM

## 2025-03-03 RX ORDER — CEFAZOLIN SODIUM 2 G/50ML
2 SOLUTION INTRAVENOUS EVERY 8 HOURS
Status: COMPLETED | OUTPATIENT
Start: 2025-03-03 | End: 2025-03-04

## 2025-03-03 RX ORDER — BUPIVACAINE HYDROCHLORIDE 5 MG/ML
INJECTION, SOLUTION EPIDURAL; INTRACAUDAL; PERINEURAL
Status: COMPLETED | OUTPATIENT
Start: 2025-03-03 | End: 2025-03-03

## 2025-03-03 RX ORDER — NALOXONE HYDROCHLORIDE 0.4 MG/ML
0.1 INJECTION, SOLUTION INTRAMUSCULAR; INTRAVENOUS; SUBCUTANEOUS
Status: DISCONTINUED | OUTPATIENT
Start: 2025-03-03 | End: 2025-03-03 | Stop reason: HOSPADM

## 2025-03-03 RX ORDER — ONDANSETRON 4 MG/1
4 TABLET, ORALLY DISINTEGRATING ORAL EVERY 6 HOURS PRN
Status: DISCONTINUED | OUTPATIENT
Start: 2025-03-03 | End: 2025-03-04 | Stop reason: HOSPADM

## 2025-03-03 RX ORDER — HYDROMORPHONE HCL IN WATER/PF 6 MG/30 ML
0.2 PATIENT CONTROLLED ANALGESIA SYRINGE INTRAVENOUS
Status: DISCONTINUED | OUTPATIENT
Start: 2025-03-03 | End: 2025-03-04 | Stop reason: HOSPADM

## 2025-03-03 RX ORDER — ACETAMINOPHEN 325 MG/1
975 TABLET ORAL ONCE
Status: COMPLETED | OUTPATIENT
Start: 2025-03-03 | End: 2025-03-03

## 2025-03-03 RX ORDER — ASPIRIN 325 MG
325 TABLET, DELAYED RELEASE (ENTERIC COATED) ORAL DAILY
Status: DISCONTINUED | OUTPATIENT
Start: 2025-03-03 | End: 2025-03-04 | Stop reason: HOSPADM

## 2025-03-03 RX ORDER — ACETAMINOPHEN 325 MG/1
975 TABLET ORAL ONCE
Status: DISCONTINUED | OUTPATIENT
Start: 2025-03-03 | End: 2025-03-03 | Stop reason: HOSPADM

## 2025-03-03 RX ORDER — HYDROMORPHONE HCL IN WATER/PF 6 MG/30 ML
0.2 PATIENT CONTROLLED ANALGESIA SYRINGE INTRAVENOUS EVERY 5 MIN PRN
Status: DISCONTINUED | OUTPATIENT
Start: 2025-03-03 | End: 2025-03-03 | Stop reason: HOSPADM

## 2025-03-03 RX ORDER — NALOXONE HYDROCHLORIDE 0.4 MG/ML
0.4 INJECTION, SOLUTION INTRAMUSCULAR; INTRAVENOUS; SUBCUTANEOUS
Status: DISCONTINUED | OUTPATIENT
Start: 2025-03-03 | End: 2025-03-04 | Stop reason: HOSPADM

## 2025-03-03 RX ORDER — OXYCODONE HYDROCHLORIDE 5 MG/1
5 TABLET ORAL EVERY 4 HOURS PRN
Status: DISCONTINUED | OUTPATIENT
Start: 2025-03-03 | End: 2025-03-04 | Stop reason: HOSPADM

## 2025-03-03 RX ORDER — DEXTROSE MONOHYDRATE 25 G/50ML
25-50 INJECTION, SOLUTION INTRAVENOUS
Status: DISCONTINUED | OUTPATIENT
Start: 2025-03-03 | End: 2025-03-04 | Stop reason: HOSPADM

## 2025-03-03 RX ORDER — FLUMAZENIL 0.1 MG/ML
0.2 INJECTION, SOLUTION INTRAVENOUS
Status: DISCONTINUED | OUTPATIENT
Start: 2025-03-03 | End: 2025-03-03 | Stop reason: HOSPADM

## 2025-03-03 RX ORDER — ASPIRIN 325 MG
325 TABLET, DELAYED RELEASE (ENTERIC COATED) ORAL DAILY
Status: SHIPPED
Start: 2025-03-03

## 2025-03-03 RX ORDER — IPRATROPIUM BROMIDE AND ALBUTEROL SULFATE 2.5; .5 MG/3ML; MG/3ML
3 SOLUTION RESPIRATORY (INHALATION)
Status: DISCONTINUED | OUTPATIENT
Start: 2025-03-03 | End: 2025-03-03 | Stop reason: HOSPADM

## 2025-03-03 RX ORDER — METHOCARBAMOL 500 MG/1
500 TABLET, FILM COATED ORAL 4 TIMES DAILY PRN
Qty: 60 TABLET | Refills: 1 | Status: SHIPPED | OUTPATIENT
Start: 2025-03-03

## 2025-03-03 RX ORDER — FENTANYL CITRATE 50 UG/ML
25-100 INJECTION, SOLUTION INTRAMUSCULAR; INTRAVENOUS
Status: DISCONTINUED | OUTPATIENT
Start: 2025-03-03 | End: 2025-03-03 | Stop reason: HOSPADM

## 2025-03-03 RX ORDER — GLYCOPYRROLATE 0.2 MG/ML
INJECTION, SOLUTION INTRAMUSCULAR; INTRAVENOUS PRN
Status: DISCONTINUED | OUTPATIENT
Start: 2025-03-03 | End: 2025-03-03

## 2025-03-03 RX ORDER — ONDANSETRON 2 MG/ML
4 INJECTION INTRAMUSCULAR; INTRAVENOUS EVERY 30 MIN PRN
Status: DISCONTINUED | OUTPATIENT
Start: 2025-03-03 | End: 2025-03-03 | Stop reason: HOSPADM

## 2025-03-03 RX ORDER — FENTANYL CITRATE 50 UG/ML
50 INJECTION, SOLUTION INTRAMUSCULAR; INTRAVENOUS EVERY 5 MIN PRN
Status: DISCONTINUED | OUTPATIENT
Start: 2025-03-03 | End: 2025-03-03 | Stop reason: HOSPADM

## 2025-03-03 RX ORDER — HYDROMORPHONE HCL IN WATER/PF 6 MG/30 ML
0.4 PATIENT CONTROLLED ANALGESIA SYRINGE INTRAVENOUS EVERY 5 MIN PRN
Status: DISCONTINUED | OUTPATIENT
Start: 2025-03-03 | End: 2025-03-03 | Stop reason: HOSPADM

## 2025-03-03 RX ORDER — SODIUM CHLORIDE, SODIUM LACTATE, POTASSIUM CHLORIDE, CALCIUM CHLORIDE 600; 310; 30; 20 MG/100ML; MG/100ML; MG/100ML; MG/100ML
INJECTION, SOLUTION INTRAVENOUS CONTINUOUS
Status: DISCONTINUED | OUTPATIENT
Start: 2025-03-03 | End: 2025-03-03 | Stop reason: HOSPADM

## 2025-03-03 RX ORDER — HYDROMORPHONE HCL IN WATER/PF 6 MG/30 ML
0.4 PATIENT CONTROLLED ANALGESIA SYRINGE INTRAVENOUS
Status: DISCONTINUED | OUTPATIENT
Start: 2025-03-03 | End: 2025-03-04 | Stop reason: HOSPADM

## 2025-03-03 RX ORDER — AMOXICILLIN 250 MG
1-2 CAPSULE ORAL 2 TIMES DAILY
Status: SHIPPED
Start: 2025-03-03

## 2025-03-03 RX ORDER — FENTANYL CITRATE 50 UG/ML
25 INJECTION, SOLUTION INTRAMUSCULAR; INTRAVENOUS EVERY 5 MIN PRN
Status: DISCONTINUED | OUTPATIENT
Start: 2025-03-03 | End: 2025-03-03 | Stop reason: HOSPADM

## 2025-03-03 RX ORDER — CARBOXYMETHYLCELLULOSE SODIUM 5 MG/ML
1 SOLUTION/ DROPS OPHTHALMIC
COMMUNITY

## 2025-03-03 RX ORDER — PROPOFOL 10 MG/ML
INJECTION, EMULSION INTRAVENOUS CONTINUOUS PRN
Status: DISCONTINUED | OUTPATIENT
Start: 2025-03-03 | End: 2025-03-03

## 2025-03-03 RX ORDER — AMOXICILLIN 250 MG
2 CAPSULE ORAL DAILY
Status: ON HOLD | COMMUNITY
End: 2025-03-04

## 2025-03-03 RX ORDER — LOSARTAN POTASSIUM 50 MG/1
50 TABLET ORAL DAILY
Status: DISCONTINUED | OUTPATIENT
Start: 2025-03-04 | End: 2025-03-04 | Stop reason: HOSPADM

## 2025-03-03 RX ORDER — LIDOCAINE 40 MG/G
CREAM TOPICAL
Status: DISCONTINUED | OUTPATIENT
Start: 2025-03-03 | End: 2025-03-03 | Stop reason: HOSPADM

## 2025-03-03 RX ORDER — AMOXICILLIN 250 MG
1 CAPSULE ORAL 2 TIMES DAILY
Status: DISCONTINUED | OUTPATIENT
Start: 2025-03-03 | End: 2025-03-04 | Stop reason: HOSPADM

## 2025-03-03 RX ORDER — OXYCODONE HYDROCHLORIDE 5 MG/1
5 TABLET ORAL
Status: DISCONTINUED | OUTPATIENT
Start: 2025-03-03 | End: 2025-03-03 | Stop reason: HOSPADM

## 2025-03-03 RX ORDER — LIDOCAINE 40 MG/G
CREAM TOPICAL
Status: DISCONTINUED | OUTPATIENT
Start: 2025-03-03 | End: 2025-03-04 | Stop reason: HOSPADM

## 2025-03-03 RX ORDER — CYCLOSPORINE 0.5 MG/ML
1 EMULSION OPHTHALMIC 2 TIMES DAILY
Status: DISCONTINUED | OUTPATIENT
Start: 2025-03-03 | End: 2025-03-04 | Stop reason: HOSPADM

## 2025-03-03 RX ORDER — LIDOCAINE HYDROCHLORIDE 10 MG/ML
INJECTION, SOLUTION INFILTRATION; PERINEURAL PRN
Status: DISCONTINUED | OUTPATIENT
Start: 2025-03-03 | End: 2025-03-03

## 2025-03-03 RX ORDER — CEFAZOLIN SODIUM/WATER 2 G/20 ML
2 SYRINGE (ML) INTRAVENOUS
Status: COMPLETED | OUTPATIENT
Start: 2025-03-03 | End: 2025-03-03

## 2025-03-03 RX ORDER — NICOTINE POLACRILEX 4 MG
15-30 LOZENGE BUCCAL
Status: DISCONTINUED | OUTPATIENT
Start: 2025-03-03 | End: 2025-03-04 | Stop reason: HOSPADM

## 2025-03-03 RX ORDER — ONDANSETRON 2 MG/ML
4 INJECTION INTRAMUSCULAR; INTRAVENOUS EVERY 6 HOURS PRN
Status: DISCONTINUED | OUTPATIENT
Start: 2025-03-03 | End: 2025-03-04 | Stop reason: HOSPADM

## 2025-03-03 RX ORDER — ATORVASTATIN CALCIUM 40 MG/1
40 TABLET, FILM COATED ORAL AT BEDTIME
Status: DISCONTINUED | OUTPATIENT
Start: 2025-03-03 | End: 2025-03-04 | Stop reason: HOSPADM

## 2025-03-03 RX ORDER — MAGNESIUM HYDROXIDE 1200 MG/15ML
LIQUID ORAL PRN
Status: DISCONTINUED | OUTPATIENT
Start: 2025-03-03 | End: 2025-03-03 | Stop reason: HOSPADM

## 2025-03-03 RX ORDER — POLYETHYLENE GLYCOL 3350 17 G/17G
17 POWDER, FOR SOLUTION ORAL DAILY
Status: DISCONTINUED | OUTPATIENT
Start: 2025-03-04 | End: 2025-03-04 | Stop reason: HOSPADM

## 2025-03-03 RX ORDER — CYCLOSPORINE 0.5 MG/ML
1 EMULSION OPHTHALMIC 2 TIMES DAILY
COMMUNITY

## 2025-03-03 RX ORDER — CEFAZOLIN SODIUM/WATER 2 G/20 ML
2 SYRINGE (ML) INTRAVENOUS SEE ADMIN INSTRUCTIONS
Status: DISCONTINUED | OUTPATIENT
Start: 2025-03-03 | End: 2025-03-03 | Stop reason: HOSPADM

## 2025-03-03 RX ORDER — DEXAMETHASONE SODIUM PHOSPHATE 4 MG/ML
4 INJECTION, SOLUTION INTRA-ARTICULAR; INTRALESIONAL; INTRAMUSCULAR; INTRAVENOUS; SOFT TISSUE
Status: DISCONTINUED | OUTPATIENT
Start: 2025-03-03 | End: 2025-03-03 | Stop reason: HOSPADM

## 2025-03-03 RX ORDER — ONDANSETRON 2 MG/ML
INJECTION INTRAMUSCULAR; INTRAVENOUS PRN
Status: DISCONTINUED | OUTPATIENT
Start: 2025-03-03 | End: 2025-03-03

## 2025-03-03 RX ORDER — PROCHLORPERAZINE MALEATE 5 MG/1
5 TABLET ORAL EVERY 6 HOURS PRN
Status: DISCONTINUED | OUTPATIENT
Start: 2025-03-03 | End: 2025-03-04 | Stop reason: HOSPADM

## 2025-03-03 RX ORDER — SODIUM CHLORIDE, SODIUM LACTATE, POTASSIUM CHLORIDE, CALCIUM CHLORIDE 600; 310; 30; 20 MG/100ML; MG/100ML; MG/100ML; MG/100ML
INJECTION, SOLUTION INTRAVENOUS CONTINUOUS
Status: DISCONTINUED | OUTPATIENT
Start: 2025-03-03 | End: 2025-03-04 | Stop reason: HOSPADM

## 2025-03-03 RX ORDER — DIAPER,BRIEF,INFANT-TODD,DISP
EACH MISCELLANEOUS 2 TIMES DAILY PRN
COMMUNITY

## 2025-03-03 RX ORDER — ONDANSETRON 4 MG/1
4 TABLET, ORALLY DISINTEGRATING ORAL EVERY 30 MIN PRN
Status: DISCONTINUED | OUTPATIENT
Start: 2025-03-03 | End: 2025-03-03 | Stop reason: HOSPADM

## 2025-03-03 RX ORDER — EPHEDRINE SULFATE 50 MG/ML
INJECTION, SOLUTION INTRAMUSCULAR; INTRAVENOUS; SUBCUTANEOUS PRN
Status: DISCONTINUED | OUTPATIENT
Start: 2025-03-03 | End: 2025-03-03

## 2025-03-03 RX ORDER — BUPIVACAINE HYDROCHLORIDE 7.5 MG/ML
INJECTION, SOLUTION INTRASPINAL
Status: COMPLETED | OUTPATIENT
Start: 2025-03-03 | End: 2025-03-03

## 2025-03-03 RX ORDER — ACETAMINOPHEN 325 MG/1
650 TABLET ORAL EVERY 4 HOURS PRN
Qty: 100 TABLET | Refills: 0 | Status: SHIPPED | OUTPATIENT
Start: 2025-03-03

## 2025-03-03 RX ORDER — OXYCODONE HYDROCHLORIDE 5 MG/1
5-10 TABLET ORAL EVERY 4 HOURS PRN
Qty: 30 TABLET | Refills: 0 | Status: SHIPPED | OUTPATIENT
Start: 2025-03-03

## 2025-03-03 RX ORDER — METHOCARBAMOL 500 MG/1
250 TABLET ORAL EVERY 6 HOURS PRN
Status: DISCONTINUED | OUTPATIENT
Start: 2025-03-03 | End: 2025-03-04 | Stop reason: HOSPADM

## 2025-03-03 RX ORDER — FLUTICASONE FUROATE AND VILANTEROL 200; 25 UG/1; UG/1
1 POWDER RESPIRATORY (INHALATION) DAILY
Status: DISCONTINUED | OUTPATIENT
Start: 2025-03-03 | End: 2025-03-04 | Stop reason: HOSPADM

## 2025-03-03 RX ORDER — KETAMINE HYDROCHLORIDE 10 MG/ML
INJECTION INTRAMUSCULAR; INTRAVENOUS PRN
Status: DISCONTINUED | OUTPATIENT
Start: 2025-03-03 | End: 2025-03-03

## 2025-03-03 RX ORDER — DEXAMETHASONE SODIUM PHOSPHATE 10 MG/ML
INJECTION, SOLUTION INTRAMUSCULAR; INTRAVENOUS PRN
Status: DISCONTINUED | OUTPATIENT
Start: 2025-03-03 | End: 2025-03-03

## 2025-03-03 RX ORDER — FAMOTIDINE 20 MG/1
20 TABLET, FILM COATED ORAL 2 TIMES DAILY
Status: DISCONTINUED | OUTPATIENT
Start: 2025-03-03 | End: 2025-03-04 | Stop reason: HOSPADM

## 2025-03-03 RX ORDER — KETOTIFEN FUMARATE 0.35 MG/ML
1 SOLUTION/ DROPS OPHTHALMIC EVERY 12 HOURS
COMMUNITY

## 2025-03-03 RX ORDER — LIDOCAINE HYDROCHLORIDE 20 MG/ML
JELLY TOPICAL
Status: COMPLETED | OUTPATIENT
Start: 2025-03-03 | End: 2025-03-03

## 2025-03-03 RX ORDER — ALBUTEROL SULFATE 90 UG/1
2 INHALANT RESPIRATORY (INHALATION) EVERY 4 HOURS PRN
Status: DISCONTINUED | OUTPATIENT
Start: 2025-03-03 | End: 2025-03-04 | Stop reason: HOSPADM

## 2025-03-03 RX ORDER — ACETAMINOPHEN 325 MG/1
975 TABLET ORAL EVERY 8 HOURS
Status: DISCONTINUED | OUTPATIENT
Start: 2025-03-03 | End: 2025-03-04 | Stop reason: HOSPADM

## 2025-03-03 RX ORDER — GABAPENTIN 300 MG/1
300 CAPSULE ORAL AT BEDTIME
Status: DISCONTINUED | OUTPATIENT
Start: 2025-03-03 | End: 2025-03-04 | Stop reason: HOSPADM

## 2025-03-03 RX ORDER — CARBOXYMETHYLCELLULOSE SODIUM 5 MG/ML
1 SOLUTION/ DROPS OPHTHALMIC
Status: DISCONTINUED | OUTPATIENT
Start: 2025-03-03 | End: 2025-03-04 | Stop reason: HOSPADM

## 2025-03-03 RX ORDER — METFORMIN HYDROCHLORIDE 500 MG/1
1000 TABLET, EXTENDED RELEASE ORAL 2 TIMES DAILY WITH MEALS
Status: DISCONTINUED | OUTPATIENT
Start: 2025-03-03 | End: 2025-03-04 | Stop reason: HOSPADM

## 2025-03-03 RX ORDER — DIPHENHYDRAMINE HCL 12.5 MG/5ML
12.5 SOLUTION ORAL EVERY 6 HOURS PRN
Status: DISCONTINUED | OUTPATIENT
Start: 2025-03-03 | End: 2025-03-04 | Stop reason: HOSPADM

## 2025-03-03 RX ORDER — METOPROLOL SUCCINATE 50 MG/1
50 TABLET, EXTENDED RELEASE ORAL DAILY
Status: DISCONTINUED | OUTPATIENT
Start: 2025-03-04 | End: 2025-03-04 | Stop reason: HOSPADM

## 2025-03-03 RX ORDER — OXYCODONE HYDROCHLORIDE 5 MG/1
10 TABLET ORAL EVERY 4 HOURS PRN
Status: DISCONTINUED | OUTPATIENT
Start: 2025-03-03 | End: 2025-03-04 | Stop reason: HOSPADM

## 2025-03-03 RX ORDER — BISACODYL 10 MG
10 SUPPOSITORY, RECTAL RECTAL DAILY PRN
Status: DISCONTINUED | OUTPATIENT
Start: 2025-03-03 | End: 2025-03-04 | Stop reason: HOSPADM

## 2025-03-03 RX ADMIN — FAMOTIDINE 20 MG: 20 TABLET, FILM COATED ORAL at 20:24

## 2025-03-03 RX ADMIN — GABAPENTIN 300 MG: 300 CAPSULE ORAL at 20:24

## 2025-03-03 RX ADMIN — DEXAMETHASONE SODIUM PHOSPHATE 5 MG: 10 INJECTION, SOLUTION INTRAMUSCULAR; INTRAVENOUS at 10:20

## 2025-03-03 RX ADMIN — ATORVASTATIN CALCIUM 40 MG: 40 TABLET, FILM COATED ORAL at 20:24

## 2025-03-03 RX ADMIN — Medication 1 DROP: at 16:09

## 2025-03-03 RX ADMIN — SODIUM CHLORIDE, POTASSIUM CHLORIDE, SODIUM LACTATE AND CALCIUM CHLORIDE: 600; 310; 30; 20 INJECTION, SOLUTION INTRAVENOUS at 11:31

## 2025-03-03 RX ADMIN — PHENYLEPHRINE HYDROCHLORIDE 100 MCG: 10 INJECTION INTRAVENOUS at 12:06

## 2025-03-03 RX ADMIN — SODIUM CHLORIDE, POTASSIUM CHLORIDE, SODIUM LACTATE AND CALCIUM CHLORIDE: 600; 310; 30; 20 INJECTION, SOLUTION INTRAVENOUS at 10:01

## 2025-03-03 RX ADMIN — Medication 5 MG: at 10:53

## 2025-03-03 RX ADMIN — INSULIN ASPART 2 UNITS: 100 INJECTION, SOLUTION INTRAVENOUS; SUBCUTANEOUS at 17:21

## 2025-03-03 RX ADMIN — GLYCOPYRROLATE 0.2 MCG: 0.2 INJECTION INTRAMUSCULAR; INTRAVENOUS at 11:57

## 2025-03-03 RX ADMIN — SENNOSIDES AND DOCUSATE SODIUM 1 TABLET: 50; 8.6 TABLET ORAL at 20:24

## 2025-03-03 RX ADMIN — BUPIVACAINE HYDROCHLORIDE IN DEXTROSE 1.6 ML: 7.5 INJECTION, SOLUTION SUBARACHNOID at 10:14

## 2025-03-03 RX ADMIN — CEFAZOLIN SODIUM 2 G: 2 SOLUTION INTRAVENOUS at 17:26

## 2025-03-03 RX ADMIN — Medication 10 MG: at 10:06

## 2025-03-03 RX ADMIN — FENTANYL CITRATE 50 MCG: 50 INJECTION INTRAMUSCULAR; INTRAVENOUS at 09:28

## 2025-03-03 RX ADMIN — Medication 5 MG: at 10:58

## 2025-03-03 RX ADMIN — Medication 5 MG: at 10:23

## 2025-03-03 RX ADMIN — Medication 1 DROP: at 18:41

## 2025-03-03 RX ADMIN — PHENYLEPHRINE HYDROCHLORIDE 100 MCG: 10 INJECTION INTRAVENOUS at 10:23

## 2025-03-03 RX ADMIN — MIDAZOLAM HYDROCHLORIDE 1 MG: 1 INJECTION, SOLUTION INTRAMUSCULAR; INTRAVENOUS at 09:27

## 2025-03-03 RX ADMIN — PROPOFOL 150 MCG/KG/MIN: 10 INJECTION, EMULSION INTRAVENOUS at 10:10

## 2025-03-03 RX ADMIN — ASPIRIN 325 MG: 325 TABLET ORAL at 16:09

## 2025-03-03 RX ADMIN — Medication 10 MG: at 10:15

## 2025-03-03 RX ADMIN — Medication 1 DROP: at 17:24

## 2025-03-03 RX ADMIN — DEXMEDETOMIDINE HYDROCHLORIDE 4 MCG: 100 INJECTION, SOLUTION INTRAVENOUS at 10:07

## 2025-03-03 RX ADMIN — ACETAMINOPHEN 975 MG: 325 TABLET ORAL at 16:09

## 2025-03-03 RX ADMIN — CYCLOSPORINE 1 DROP: 0.5 EMULSION OPHTHALMIC at 21:02

## 2025-03-03 RX ADMIN — ACETAMINOPHEN 975 MG: 325 TABLET ORAL at 08:01

## 2025-03-03 RX ADMIN — Medication 1 DROP: at 20:25

## 2025-03-03 RX ADMIN — Medication 5 MG: at 11:31

## 2025-03-03 RX ADMIN — BUPIVACAINE HYDROCHLORIDE 15 ML: 5 INJECTION, SOLUTION EPIDURAL; INTRACAUDAL; PERINEURAL at 09:36

## 2025-03-03 RX ADMIN — PHENYLEPHRINE HYDROCHLORIDE 0.2 MCG/KG/MIN: 10 INJECTION INTRAVENOUS at 10:32

## 2025-03-03 RX ADMIN — Medication 5 MG: at 10:33

## 2025-03-03 RX ADMIN — ONDANSETRON 4 MG: 2 INJECTION INTRAMUSCULAR; INTRAVENOUS at 10:10

## 2025-03-03 RX ADMIN — Medication 2 G: at 10:06

## 2025-03-03 RX ADMIN — METFORMIN ER 500 MG 1000 MG: 500 TABLET ORAL at 17:19

## 2025-03-03 RX ADMIN — SODIUM CHLORIDE, SODIUM LACTATE, POTASSIUM CHLORIDE, AND CALCIUM CHLORIDE: .6; .31; .03; .02 INJECTION, SOLUTION INTRAVENOUS at 20:56

## 2025-03-03 RX ADMIN — Medication 1 DROP: at 21:04

## 2025-03-03 RX ADMIN — PHENYLEPHRINE HYDROCHLORIDE 100 MCG: 10 INJECTION INTRAVENOUS at 10:26

## 2025-03-03 RX ADMIN — LIDOCAINE HYDROCHLORIDE 40 MG: 10 INJECTION, SOLUTION INFILTRATION; PERINEURAL at 10:10

## 2025-03-03 RX ADMIN — TRANEXAMIC ACID 1950 MG: 650 TABLET ORAL at 08:01

## 2025-03-03 ASSESSMENT — ACTIVITIES OF DAILY LIVING (ADL)
ADLS_ACUITY_SCORE: 30
ADLS_ACUITY_SCORE: 32
ADLS_ACUITY_SCORE: 29
ADLS_ACUITY_SCORE: 30
ADLS_ACUITY_SCORE: 29
ADLS_ACUITY_SCORE: 31
ADLS_ACUITY_SCORE: 32
ADLS_ACUITY_SCORE: 30
ADLS_ACUITY_SCORE: 30
ADLS_ACUITY_SCORE: 31
ADLS_ACUITY_SCORE: 30
ADLS_ACUITY_SCORE: 29
ADLS_ACUITY_SCORE: 30
ADLS_ACUITY_SCORE: 29
ADLS_ACUITY_SCORE: 31
ADLS_ACUITY_SCORE: 31

## 2025-03-03 ASSESSMENT — COPD QUESTIONNAIRES: COPD: 1

## 2025-03-03 NOTE — PHARMACY-ADMISSION MEDICATION HISTORY
"Pharmacist Admission Medication History    Admission medication history is complete. The information provided in this note is only as accurate as the sources available at the time of the update.    Information Source(s): Patient and Hospital records via in-person    Pertinent Information: has eye drops, inhaler with. [facility] as \"priority\" for them to delivery to him quickly    Allergies reviewed with patient and updates made in EHR: yes    Medication History Completed By: Yulisa Sauer [facility] 3/3/2025 9:04 AM    PTA Med List   Medication Sig Last Dose/Taking    albuterol (PROAIR HFA) 108 (90 BASE) MCG/ACT Inhaler Inhale 2 puffs into the lungs every 4 hours as needed for shortness of breath / dyspnea, wheezing or other (use prior to exertion/activities) 3/3/2025 Morning    ammonium lactate (AMLACTIN) 12 % cream Apply topically 2 times daily as needed. Taking As Needed    Artificial Tear Ointment (EQ RESTORE PM OP) Apply 1 strip to eye At Bedtime 3/2/2025 Bedtime    aspirin 81 MG EC tablet Take 81 mg by mouth daily. Stopping 2/25/25 before surgery Past Week    atorvastatin (LIPITOR) 40 MG tablet Take 1 tablet by mouth at bedtime. 3/2/2025 Bedtime    carboxymethylcellulose PF (REFRESH PLUS) 0.5 % ophthalmic solution Place 1 drop into both eyes every hour (while awake). 3/3/2025 Morning    cycloSPORINE (RESTASIS) 0.05 % ophthalmic emulsion Place 1 drop into both eyes 2 times daily. 3/2/2025    diphenhydrAMINE-acetaminophen (TYLENOL PM)  MG tablet Take 1 tablet by mouth nightly as needed for sleep. 3/2/2025 Bedtime    empagliflozin (JARDIANCE) 25 MG TABS tablet Take 12.5 mg by mouth daily. 3/2/2025 Morning    erythromycin (ROMYCIN) 5 MG/GM ophthalmic ointment Place into both eyes nightly as needed (eye irritation). Past Week    gabapentin (NEURONTIN) 300 MG capsule Take 1 capsule by mouth At Bedtime 3/2/2025 Bedtime    hydrocortisone (CORTAID) 1 % external ointment Apply " topically 2 times daily as needed for rash. Unknown    ketoconazole (NIZORAL) 2 % external cream Apply topically 2 times daily as needed for irritation or itching. Taking As Needed    ketoconazole (NIZORAL) 2 % external shampoo Apply topically daily as needed for itching or irritation Taking As Needed    ketotifen fumarate 0.035% 0.035 % SOLN ophthalmic solution Place 1 drop into both eyes every 12 hours. Unknown    losartan (COZAAR) 50 MG tablet Take 1 tablet (50 mg) by mouth daily 3/3/2025 Morning    metFORMIN (GLUCOPHAGE XR) 500 MG 24 hr tablet Take 1,000 mg by mouth 2 times daily (with meals) 3/3/2025 Morning    metoprolol succinate ER (TOPROL XL) 50 MG 24 hr tablet Take 1 tablet by mouth daily 3/3/2025 Morning    omeprazole (PRILOSEC) 20 MG capsule Take 1 capsule by mouth daily 3/3/2025 Morning    senna-docusate (SENOKOT-S/PERICOLACE) 8.6-50 MG tablet Take 2 tablets by mouth daily. Unknown    tiotropium-olodaterol (STIOLTO RESPIMAT) 2.5-2.5 MCG/ACT AERS Inhale 2 puffs into the lungs daily Past Week

## 2025-03-03 NOTE — OP NOTE
Total Knee Arthroplasty Operative Note        PLAN:  Weight bearing status: Weight bearing as tolerated   Activity: Activity as tolerated  Patient may move about with assist as indicated or with supervision   Anticoagulation plan:                 ASA 325mg po qd  for 42 days  Follow up plan                           Follow up in 2 week(s)        Name: Lux Velasco    PCP: Nam Duffy    Procedure Date: 3/3/2025    Pre-operative diagnosis: Osteoarthritis of knee [M17.9]  Right knee pain [M25.561]   Post-operative diagnosis: Same   Procedure: Total knee arthoplasty (Right)   Surgeon: Win Oliver MD     Assistant(s): Yariel Medrano PA-C   Anesthesia: Spinal Anesthesia   Estimated blood loss: Less than 50 ml   Drains: Hemovac   Specimens: None       Findings: See full dictated operative note for details   Complications: None       Comments:        Indications:    The patient has experienced progressive right knee pain despite use of  Analgesics, NSAID's, Injection therapy and physical therapy. Because of the failure of these therapies to control symptoms and limited walking, night pain and altered activities the patient has decided to move ahead with joint replacement surgery.    Procedure and Findings:    After being informed of risks, benefits, alternatives to the procedure, patient desired to proceed, brought to the operating suite where they were placed under spinal anesthetic. Patient received 2 grams of intravenous Ancef.  Yariel Medrano PA-C was present for the entire length of the case for the purposes of proper patient positioning, surgical exposure, and patient safety. A time-out verification step was completed.    Examination of the joint surfaces demonstrated full thickness cartilage loss involving themedial compartments of the right knee.    A midline incision, minimally invasive approach was utilized. A para-patellar arthrotomy was performed. Attention was first directed to the patella. The  patella was everted. It was measured at 38 mm. It was resected, remeasured and a 38 mm asymmetric patella was positioned. A protector plate was placed on the patella. Attention was directed toward the distal femur. IM guider katarina was placed. An 8 mm 7 degree distal femoral cut was completed. The IM guide katarina was then placed in the tibia. External guide katarina and tower were utilized and 9 mm button stylus was referenced off the lateral tibial plateau and cuts were completed. The tibia was sized to a 7. Attention was directed towards the distal femur. It was sized to a 6. The 4-in-1 cutting block was placed along the epicondylar axis. It was secured with 2 pins, anterior, posterior and chamfer cuts were completed. Soft tissue balancing was then carried out. Medial release was completed. Ultimately a 12 mm CS insert gave excellent range of motion and stability throughout the range of motion. Patella was noted to track symmetrically throughout the range to motion. The tibial tray rotation was marked, size was verified, it was secured with 2 pins and punched. Trial components were then removed. The cancellous surfaces were pulsatile lavaged. One batch of Simplex cement with antibiotics was mixed under vacuum. The tibia, femur and patella were sequentially cemented in place. The knee was held in extension with axial compression until the cement had hardened. The 12 mm insert was ultimately selected and secured Care was taken to remove any excess cement. Joint capsular injection with anesthetic solution was performed. Excellent range of motion and stability was demonstrated. A Hemovac drain was placed. The joint was copiously irrigated. Joint capsules were closed with interrupted number 1 running Stratafix. Subcutaneous tissues were closed with 2-0 Vicryl and skin was closed with 3-0 running Stratifix for wound closure and Aquacell. A sterile dressing was applied. Patient tolerated the procedure well without complication and  returned to the PAR in stable condition.      Win Oliver MD    Date: 3/3/2025 Time: 12:01 PM  ?    CONFIDENTIALITY NOTICE This message and any included attachments are from [facility] and are intended only for the addressee. The information contained in this message is confidential and may constitute inside or non-public information under international, federal, or state securities laws. Unauthorized forwarding, printing, copying, distribution, or use of such information is strictly prohibited and may be unlawful. If you are not the addressee, please promptly delete this message and notify the sender of the delivery error by e-mail.

## 2025-03-03 NOTE — PLAN OF CARE
Goal Outcome Evaluation:    Patient vital signs are at baseline: Yes  Patient able to ambulate as they were prior to admission or with assist devices provided by therapies during their stay:  Yes  Patient MUST void prior to discharge:  No,  Reason:  due to void,   Patient able to tolerate oral intake:  Yes  Pain has adequate pain control using Oral analgesics:  Yes  Does patient have an identified :  Yes  Has goal D/C date and time been discussed with patient:  Yes

## 2025-03-03 NOTE — BRIEF OP NOTE
[facility]    Brief Operative Note    Pre-operative diagnosis: Osteoarthritis of knee [M17.9]  Right knee pain [M25.561]  Post-operative diagnosis Same as pre-operative diagnosis    Procedure: RIGHT TOTAL KNEE ARTHROPLASTY, Right - Knee    Surgeon: Surgeons and Role:     * Win Oliver MD - Primary     * Yariel Medrano PA-C - Assisting  Anesthesia: Choice   Estimated Blood Loss: Less than 50 ml    Drains: Hemovac  Specimens: * No specimens in log *  Findings:   None.  Complications: None.  Implants:   Implant Name Type Inv. Item Serial No.  Lot No. LRB No. Used Action   PIN FIXATION SS FLUTE SQUARE L3.5 IN OD1/8 IN 6517-1327B - [id number] Metallic Hardware/[place] PIN FIXATION SS FLUTE SQUARE L3.5 IN OD1/8 IN 7667-3317C  [employer] [id number] Right 1 Wasted   IMP BONE CEMENT STRK SIMPLEX TOBRAMYCIN 40CC 6197-9-001 - [id number] Cement, Bone IMP BONE CEMENT STRK SIMPLEX TOBRAMYCIN 40CC 6197-9-001  [employer] [id number] Right 1 Implanted   IMP COMP PATELLA HOWM TRI 38 10MM 5551-L-381 - [id number] Total Joint Component/Insert IMP COMP PATELLA HOWM TRI 38 10MM 5551-L-381  [employer] [id number] Right 1 Implanted   IMP BASEPLATE TIBIAL HOWM TRI 7 5520-B-700 - [id number] Total Joint Component/Insert IMP BASEPLATE TIBIAL HOWM TRI 7 5520-B-700  [employer] [id number] Right 1 Implanted   IMP COMP FEM STRK TRIATHLN CR RT 6 5510-F-602 - [id number] Total Joint Component/Insert IMP COMP FEM STRK TRIATHLN CR RT 6 5510-F-602  [employer] [id number] Right 1 Implanted   INSERT TIB 7 12MM KN PE CNDRL STAB BRNG TRTHLN STRL LF - [id number] Total Joint Component/Insert INSERT TIB 7 12MM KN PE CNDRL STAB BRNG TRTHLN STRL LF  [employer] [facility] [id number] Right 1 Implanted

## 2025-03-03 NOTE — CONSULTS
[facility] MEDICINE CONSULT NOTE   Physician requesting consult: Win Oliver MD    Reason for consult: Postoperative medical management of medical co-morbidities as below    Identification/Summary:   Lux Velasco is a 79 year old male with a PMH of CAD, CVA, DM2, COPD, HTN, melanoma, obesity BMI 34. Underwent R knee arthroplasty on 3/3/2025.    Assessment and Plan:  1) Discharge Plan: Home POD 1 per ortho     2) Coronary Artery Disease: reports history of MI in 1999. Denies any angina. Continues medical management of CAD on ASA 81 mg daily and statin. Continue statin and ASA dose per ortho.     3) History of CVA: patient reports history of stroke in 1998. Denies any residual deficits. On ASA 81 mg and statin.      4) Hyperlipidemia: On atorvastatin.     5) Hypertension: Appears well-controlled on losartan and metoprolol.    -hold losartan  -continue metoprolol with parameters     6) COPD: On Stiolto and albuterol inhalers. He doesn't have Stiolto with him.      7) Type 2 Diabetes Mellitus: Fair control.  Most recent hemoglobin A1c 7.8 on 2/14/2025.  Sliding scale insulin     8) Obesity: BMI 34.6     9) Melanoma: [facility]      10) Peripheral Edema and Venous Dermatitis: Uses compression stockings and ammonium lactate cream.      11) Bradycardia  46-50 after surgery. HR was 61 on 1/15/2025 ECG.  Monitor with vitals per postop protocol    Anticoagulation.  Deferred to surgery      Code status:Prior   [facility] was asked to evaluate patient for postoperative medical management as follows below. Please resume the home medications as reconciled and further noted with ordered hold parameters.  Thank you for this consult; we will continue to follow this patient until discharge.    Procedure(s):  RIGHT TOTAL KNEE ARTHROPLASTY  Day of Surgery  Estimated Blood Loss:  * No values recorded between 3/3/2025 10:37 AM and 3/3/2025 12:11 PM  "*  Hospital Problem List   No problem-specific Assessment & Plan notes found for this encounter.    Active Problems:    Right knee DJD      -Reviewed the patient's preoperative H and P and updated missing elements.  -Home medication reconciliation has been reviewed.  Medications have been ordered as noted from the deni/e list and changes are documented above     Clinically Significant Risk Factors Present on Admission                 # Drug Induced Platelet Defect: home medication list includes an antiplatelet medication   # Hypertension: Home medication list includes antihypertensive(s)           # Obesity: Estimated body mass index is 34.59 kg/m  as calculated from the following:    Height as of this encounter: 1.803 m (5' 11\").    Weight as of this encounter: 112.5 kg (248 lb).       # Asthma: noted on problem list        HISTORY     Lux Velasco is a 79 year old male with a PMH of CAD, CVA, DM2, COPD, HTN, melanoma, obesity BMI 34. Underwent R TKA on 3/3/2025.    Patient denies chest pain, dyspnea, or nausea.  No hx of stent. Reports that he may have SEYMOUR but has not been tested.      Past Medical History     Past Medical History:  No date: Arthritis  2005: Diabetes mellitus type II  No date: Diabetes mellitus, type 2 (H)  No date: Gastroesophageal reflux disease  04/17/2013: History of agent Orange exposure  No date: Hypertension  No date: Insomnia  No date: Malignant melanoma (H)      Comment:  [facility]  01/01/1999: Myocardial infarct (H)  Dx approx 2003: Primary hyperparathyroidism  01/01/1998: Stroke (H)      Comment:  recovered fully     Patient Active Problem List    Diagnosis Date Noted    Right knee DJD 03/03/2025     Priority: Medium    DJD of right shoulder 02/19/2024     Priority: Medium    Diabetes mellitus, type 2 (H) 12/07/2020     Priority: Medium    Dyspnea on exertion 11/12/2019     Priority: Medium    Mild intermittent asthma without complication 11/12/2019     Priority: " "Medium    Benign prostatic hyperplasia with weak urinary stream 11/18/2018     Priority: Medium    Acquired cyst of kidney 11/18/2018     Priority: Medium    Primary hyperparathyroidism      Priority: Medium    Skin exam, screening for cancer 08/20/2013     Priority: Medium    History of agent Orange exposure 04/17/2013     Priority: Medium    Degenerative arthritis of cervical spine 04/17/2013     Priority: Medium    Stroke (H) 04/17/2013     Priority: Medium    Plantar fascial fibromatosis 07/10/2012     Priority: Medium     Surgical History     Past Surgical History:   Procedure Laterality Date    BIOPSY OF SKIN LESION      CATARACT IOL, RT/LT Bilateral 2017    [facility]    EXCISE MASS LOWER EXTREMITY Left 11/03/2017    Procedure: EXCISE MASS LOWER EXTREMITY;  Excision Mass Left Flank;  Surgeon: Marybeth Gambino MD;  Location: [facility] OR    HERNIA REPAIR      MOHS MICROGRAPHIC PROCEDURE      PARATHYROIDECTOMY N/A 03/21/2017    Procedure: PARATHYROIDECTOMY;  Surgeon: Julianna Short MD;  Location: [facility] OR    REPAIR ECTROPION BILATERAL Bilateral 07/07/2023    Procedure: Bilateral lower eyelid ectropion repair;  Surgeon: Gerald Harry MD;  Location: [facility] OR    REVERSE ARTHROPLASTY SHOULDER Right 02/19/2024    Procedure: RIGHT REVERSE TOTAL SHOULDER ARTHROPLASTY;  Surgeon: Win Oliver MD;  Location: [facility] Main OR    SHOULDER SURGERY Right 2000    TOTAL KNEE ARTHROPLASTY Left     \"[facility]\"     Family History      Family History   Problem Relation Age of Onset    Melanoma Mother     Melanoma Father     Coronary Artery Disease Early Onset Father     Lung Cancer Paternal Grandfather     Cancer No family hx of         no skin cancer    Glaucoma No family hx of     Macular Degeneration No family hx of       Social History      Social History     Tobacco Use    Smoking status: Never    Smokeless tobacco: Never    Tobacco comments:     Tried smoking during  service only.  "   Substance Use Topics    Alcohol use: Yes     Comment: 1 beer/week    Drug use: No      Allergies     Allergies   Allergen Reactions    Isosorbide Nitrate Shortness Of Breath     Chest pain. Patient stated that the medication made his breathing symptoms worse.     Clindamycin     Etodolac Other (See Comments)     Other reaction(s): Liver enzymes abnormal    Lisinopril Cough    Propoxyphene Other (See Comments)     Pain    Tamsulosin Anxiety and Other (See Comments)     Other reaction(s): Fever, Chill, Anxiety, Fever, Chill, Anxiety    Tramadol Rash     rash       Prior to Admission Medications      Prior to Admission Medications   Prescriptions Last Dose Informant Patient Reported? Taking?   Artificial Tear Ointment (EQ RESTORE PM OP) 3/2/2025 Bedtime Self Yes Yes   Sig: Apply 1 strip to eye At Bedtime   albuterol (PROAIR HFA) 108 (90 BASE) MCG/ACT Inhaler 3/3/2025 Morning Self No Yes   Sig: Inhale 2 puffs into the lungs every 4 hours as needed for shortness of breath / dyspnea, wheezing or other (use prior to exertion/activities)   ammonium lactate (AMLACTIN) 12 % cream  Self Yes Yes   Sig: Apply topically 2 times daily as needed.   aspirin 81 MG EC tablet Past Week  Yes Yes   Sig: Take 81 mg by mouth daily. Stopping 2/25/25 before surgery   atorvastatin (LIPITOR) 40 MG tablet 3/2/2025 Bedtime Self Yes Yes   Sig: Take 1 tablet by mouth at bedtime.   carboxymethylcellulose PF (REFRESH PLUS) 0.5 % ophthalmic solution 3/3/2025 Morning  Yes Yes   Sig: Place 1 drop into both eyes every hour (while awake).   cycloSPORINE (RESTASIS) 0.05 % ophthalmic emulsion 3/2/2025  Yes Yes   Sig: Place 1 drop into both eyes 2 times daily.   diphenhydrAMINE-acetaminophen (TYLENOL PM)  MG tablet 3/2/2025 Bedtime  Yes Yes   Sig: Take 1 tablet by mouth nightly as needed for sleep.   empagliflozin (JARDIANCE) 25 MG TABS tablet 3/2/2025 Morning  Yes Yes   Sig: Take 12.5 mg by mouth daily.   erythromycin (ROMYCIN) 5 MG/GM ophthalmic  ointment Past Week  Yes Yes   Sig: Place into both eyes nightly as needed (eye irritation).   gabapentin (NEURONTIN) 300 MG capsule 3/2/2025 Bedtime Self Yes Yes   Sig: Take 1 capsule by mouth At Bedtime   hydrocortisone (CORTAID) 1 % external ointment Unknown  Yes Yes   Sig: Apply topically 2 times daily as needed for rash.   ketoconazole (NIZORAL) 2 % external cream  Self Yes Yes   Sig: Apply topically 2 times daily as needed for irritation or itching.   ketoconazole (NIZORAL) 2 % external shampoo  Self Yes Yes   Sig: Apply topically daily as needed for itching or irritation   ketotifen fumarate 0.035% 0.035 % SOLN ophthalmic solution Unknown  Yes Yes   Sig: Place 1 drop into both eyes every 12 hours.   losartan (COZAAR) 50 MG tablet 3/3/2025 Morning Self Yes Yes   Sig: Take 1 tablet (50 mg) by mouth daily   metFORMIN (GLUCOPHAGE XR) 500 MG 24 hr tablet 3/3/2025 Morning  Yes Yes   Sig: Take 1,000 mg by mouth 2 times daily (with meals)   metoprolol succinate ER (TOPROL XL) 50 MG 24 hr tablet 3/3/2025 Morning Self Yes Yes   Sig: Take 1 tablet by mouth daily   omeprazole (PRILOSEC) 20 MG capsule 3/3/2025 Morning Self Yes Yes   Sig: Take 1 capsule by mouth daily   senna-docusate (SENOKOT-S/PERICOLACE) 8.6-50 MG tablet Unknown  Yes Yes   Sig: Take 2 tablets by mouth daily.   tiotropium-olodaterol (STIOLTO RESPIMAT) 2.5-2.5 MCG/ACT AERS Past Week Self Yes Yes   Sig: Inhale 2 puffs into the lungs daily      Facility-Administered Medications: None      Review of Systems     A 12 point comprehensive review of systems was negative except as noted above in HPI.    OBJECTIVE         Physical Exam   Temp:  [96.3  F (35.7  C)-97.9  F (36.6  C)] 96.3  F (35.7  C)  Pulse:  [46-55] 50  Resp:  [16-29] 20  BP: (126-164)/(61-76) 133/61  SpO2:  [92 %-100 %] 98 %  Body mass index is 34.59 kg/m .  General Appearance: Alert and wake, not in distress  Respiratory: clear lungs, no crackles or wheezing  Cardiovascular: rhythmic, normal S1  and S2, no murmur  GI: soft, non-tender, normal bowel sound  Neurology: oriented x 3  Psych: cooperative and calm, normal affect    I reviewed patient's preoperative evaluation note on 2/14/2025 by Dr. Russell  I reviewed patient's preoperative lab tests on 3/3/2025      Thank you for this consultation.  Appreciate the opportunity to participate in the care of Lux Velasco, please feel free to contact us for any questions or concerns.    YEMI COLÓN MD  [facility] Medicine  [facility]  Phone: #[phone]

## 2025-03-03 NOTE — ANESTHESIA POSTPROCEDURE EVALUATION
Patient: Lux Velasco    Procedure: Procedure(s):  RIGHT TOTAL KNEE ARTHROPLASTY       Anesthesia Type:  Spinal    Note:  Disposition: Outpatient   Postop Pain Control: Uneventful            Sign Out: Well controlled pain   PONV: No   Neuro/Psych: Uneventful            Sign Out: Acceptable/Baseline neuro status   Airway/Respiratory: Uneventful            Sign Out: Acceptable/Baseline resp. status   CV/Hemodynamics: Uneventful            Sign Out: Acceptable CV status; No obvious hypovolemia; No obvious fluid overload   Other NRE: NONE   DID A NON-ROUTINE EVENT OCCUR? No           Last vitals:  Vitals Value Taken Time   /60 03/03/25 1300   Temp 36.1  C (96.98  F) 03/03/25 1307   Pulse 46 03/03/25 1307   Resp 29 03/03/25 1307   SpO2 94 % 03/03/25 1307   Vitals shown include unfiled device data.    Electronically Signed By: Shirin Puckett MD  March 3, 2025  1:09 PM

## 2025-03-03 NOTE — ANESTHESIA CARE TRANSFER NOTE
Patient: Lux Velasco    Procedure: Procedure(s):  RIGHT TOTAL KNEE ARTHROPLASTY       Diagnosis: Osteoarthritis of knee [M17.9]  Right knee pain [M25.561]  Diagnosis Additional Information: No value filed.    Anesthesia Type:   Spinal     Note:    Oropharynx: oropharynx clear of all foreign objects and spontaneously breathing  Level of Consciousness: drowsy  Oxygen Supplementation: face mask  Level of Supplemental Oxygen (L/min / FiO2): 10  Independent Airway: airway patency satisfactory and stable  Dentition: dentition unchanged  Vital Signs Stable: post-procedure vital signs reviewed and stable  Report to RN Given: handoff report given  Patient transferred to: PACU    Handoff Report: Identifed the Patient, Identified the Reponsible Provider, Reviewed the pertinent medical history, Discussed the surgical course, Reviewed Intra-OP anesthesia mangement and issues during anesthesia, Set expectations for post-procedure period and Allowed opportunity for questions and acknowledgement of understanding      Vitals:  Vitals Value Taken Time   /72 03/03/25 1216   Temp 35.8  C (96.4  F) 03/03/25 1216   Pulse 55 03/03/25 1216   Resp 16 03/03/25 1216   SpO2 97 % 03/03/25 1216       Electronically Signed By: ORION Moser CRNA  March 3, 2025  12:17 PM

## 2025-03-03 NOTE — ANESTHESIA PROCEDURE NOTES
"Intrathecal injection Procedure Note    Pre-Procedure   Staff -        Anesthesiologist:  Shirin Puckett MD       Performed By: anesthesiologist       Location: OB       Procedure Start/Stop Times: 3/3/2025 10:12 AM and 3/3/2025 10:14 AM       Pre-Anesthestic Checklist: patient identified, IV checked, risks and benefits discussed, informed consent, monitors and equipment checked, pre-op evaluation, at physician/surgeon's request and post-op pain management  Timeout:       Correct Patient: Yes        Correct Procedure: Yes        Correct Site: Yes        Correct Position: Yes   Procedure Documentation  Procedure: intrathecal injection         Patient Position: sitting       Skin prep: Chloraprep       Insertion Site: L3-4. (midline approach).       Needle Gauge: 24.        Needle Length (Inches): 4        Spinal Needle Type: Pencan       Introducer used       Introducer: 20 G       # of attempts: 1 and  # of redirects:  3    Assessment/Narrative         Paresthesias: No.       CSF fluid: clear.       Opening pressure was cmH2O while  Sitting.      Medication(s) Administered   0.75% Hyperbaric Bupivacaine (Intrathecal) - Intrathecal   1.6 mL - 3/3/2025 10:14:00 AM  Medication Administration Time: 3/3/2025 10:12 AM     Comments:  Patient tolerated well, no complications noted. Clear, free-flowing CSF obtained. NO heme and NO paresthesias during procedure. +gentle barbotage at beginning, middle and end of injection. Easy injection. Setting up well.         FOR [facility] ([facility]) ONLY:   Pain Team Contact information: please page the Pain Team Via [employer]. Search \"Pain\". During daytime hours, please page the attending first. At night please page the resident first.      "

## 2025-03-03 NOTE — ANESTHESIA PROCEDURE NOTES
Adductor canal Procedure Note    Pre-Procedure   Staff -        Anesthesiologist:  Shirin Puckett MD       Performed By: anesthesiologist       Location: pre-op       Procedure Start/Stop Times: 3/3/2025 9:36 AM and 3/3/2025 9:37 AM       Pre-Anesthestic Checklist: patient identified, IV checked, site marked, risks and benefits discussed, informed consent, monitors and equipment checked, pre-op evaluation, at physician/surgeon's request and post-op pain management  Timeout:       Correct Patient: Yes        Correct Procedure: Yes        Correct Site: Yes        Correct Position: Yes        Correct Laterality: Yes        Site Marked: Yes  Procedure Documentation  Procedure: Adductor canal         Laterality: right       Patient Position: supine       Skin prep: Chloraprep       Needle Type: other       Needle Gauge: 20.        Needle Length (Inches): 4        Ultrasound guided       1. Ultrasound was used to identify targeted nerve, plexus, vascular marker, or fascial plane and place a needle adjacent to it in real-time.       2. Ultrasound was used to visualize the spread of anesthetic in close proximity to the above referenced structure.       3. A permanent image is entered into the patient's record.       4. The visualized anatomic structures appeared normal.       5. There were no apparent abnormal pathologic findings.    Assessment/Narrative         The placement was negative for: blood aspirated, painful injection and site bleeding       Paresthesias: No.       Bolus given via needle..        Secured via.        Insertion/Infusion Method: Single Shot       Complications: none       Injection made incrementally with aspirations every 5 mL.    Medication(s) Administered   Bupivacaine 0.5% PF (Infiltration) - Infiltration   15 mL - 3/3/2025 9:36:00 AM  Medication Administration Time: 3/3/2025 9:36 AM     Comments:  Patient tolerated well, no complications noted. NO heme and NO paresthesias during procedure  "or incremental injection.       FOR [facility] ([facility] ONLY:   Pain Team Contact information: please page the Pain Team Via [email or web address]. Search \"Pain\". During daytime hours, please page the attending first. At night please page the resident first.      "

## 2025-03-03 NOTE — INTERVAL H&P NOTE
"I have reviewed the surgical (or preoperative) H&P that is linked to this encounter, and examined the patient. There are no significant changes    Clinical Conditions Present on Arrival:  Clinically Significant Risk Factors Present on Admission                  # Drug Induced Platelet Defect: home medication list includes an antiplatelet medication      # Obesity: Estimated body mass index is 34.59 kg/m  as calculated from the following:    Height as of this encounter: 1.803 m (5' 11\").    Weight as of this encounter: 112.5 kg (248 lb).       "

## 2025-03-03 NOTE — PROVIDER NOTIFICATION
Patient got up to use restroom, accordion drain got disconnected at accordion site. Cleaned, decompressed, and re-attached.  Yariel was notified and okay with proceeding as is. No further intervention.

## 2025-03-03 NOTE — ANESTHESIA PREPROCEDURE EVALUATION
"Anesthesia Pre-Procedure Evaluation    Patient: Lux Velasco   MRN: [id number] : 1945        Procedure : Procedure(s):  RIGHT TOTAL KNEE ARTHROPLASTY          Past Medical History:   Diagnosis Date    Arthritis     Diabetes mellitus type II     Diabetes mellitus, type 2 (H)     Gastroesophageal reflux disease     History of agent Orange exposure 2013    Hypertension     Insomnia     Malignant melanoma (H)     [facility]    Myocardial infarct (H) 1999    Primary hyperparathyroidism Dx approx 2003    Stroke (H) 1998    recovered fully      Past Surgical History:   Procedure Laterality Date    BIOPSY OF SKIN LESION      CATARACT IOL, RT/LT Bilateral [facility]    [facility]    EXCISE MASS LOWER EXTREMITY Left 2017    Procedure: EXCISE MASS LOWER EXTREMITY;  Excision Mass Left Flank;  Surgeon: Marybeth Gambino MD;  [facility]: [facility]    HERNIA REPAIR      MOHS MICROGRAPHIC PROCEDURE      PARATHYROIDECTOMY N/A 2017    Procedure: PARATHYROIDECTOMY;  Surgeon: Julianna Short MD;  Location: [facility]    REPAIR ECTROPION BILATERAL Bilateral 2023    Procedure: Bilateral lower eyelid ectropion repair;  Surgeon: Gerald Harry MD;  Location: [facility] OR    REVERSE ARTHROPLASTY SHOULDER Right 2024    Procedure: RIGHT REVERSE TOTAL SHOULDER ARTHROPLASTY;  Surgeon: Win Oliver MD;  Location: [facility]    SHOULDER SURGERY Right     TOTAL KNEE ARTHROPLASTY Left     \"[facility]\"      Allergies   Allergen Reactions    Isosorbide Nitrate Shortness Of Breath     Chest pain. Patient stated that the medication made his breathing symptoms worse.     Clindamycin     Etodolac Other (See Comments)     Other reaction(s): Liver enzymes abnormal    Lisinopril Cough    Propoxyphene Other (See Comments)     Pain    Tamsulosin Anxiety and Other (See Comments)     Other reaction(s): Fever, Chill, Anxiety, Fever, Chill, Anxiety    Tramadol " Rash     rash      Social History     Tobacco Use    Smoking status: Never    Smokeless tobacco: Never    Tobacco comments:     Tried smoking during  service only.    Substance Use Topics    Alcohol use: Yes     Comment: 1 beer/week      Wt Readings from Last 1 Encounters:   03/03/25 112.5 kg (248 lb)        Anesthesia Evaluation   Pt has had prior anesthetic. Type: General.    No history of anesthetic complications       ROS/MED HX  ENT/Pulmonary:     (+)                          COPD,              Neurologic:     (+)          CVA,  without deficits,                    Cardiovascular:     (+)  hypertension- -  CAD -  - -                                      METS/Exercise Tolerance:     Hematologic:       Musculoskeletal:       GI/Hepatic:     (+) GERD,                   Renal/Genitourinary:       Endo:       Psychiatric/Substance Use:       Infectious Disease:       Malignancy:       Other:               OUTSIDE LABS:  CBC:   Lab Results   Component Value Date    WBC 7.4 03/03/2025    WBC 5.7 02/19/2024    HGB 12.9 (L) 03/03/2025    HGB 12.1 (L) 02/20/2024    HCT 38.0 (L) 03/03/2025    HCT 40.5 02/19/2024     03/03/2025     02/19/2024     BMP:   Lab Results   Component Value Date     02/20/2024     02/28/2022    POTASSIUM 4.5 02/20/2024    POTASSIUM 4.6 02/19/2024    CHLORIDE 102 02/20/2024    CHLORIDE 107 02/28/2022    CO2 27 02/20/2024    CO2 29 02/28/2022    BUN 15.5 02/20/2024    BUN 17 02/28/2022    CR 1.07 02/20/2024    CR 0.95 02/19/2024     (H) 03/03/2025     (H) 02/20/2024     COAGS:   Lab Results   Component Value Date    PTT 29 04/26/2006    INR 1.03 10/29/2018     POC:   Lab Results   Component Value Date     (H) 11/03/2017     HEPATIC:   Lab Results   Component Value Date    ALBUMIN 3.7 02/28/2022    PROTTOTAL 7.4 02/28/2022    ALT 41 02/28/2022    AST 19 02/28/2022    ALKPHOS 125 02/28/2022    BILITOTAL 0.3 02/28/2022     OTHER:   Lab Results    Component Value Date    LACT 1.4 04/27/2016    A1C 7.8 (H) 02/19/2024    TRINA 9.0 02/20/2024    PHOS 2.7 04/05/2017    MAG 1.9 10/10/2017    LIPASE 89 04/27/2016    TSH 2.02 07/07/2020    CRP <2.9 01/05/2018    SED 13 09/02/2010       Anesthesia Plan    ASA Status:  3    NPO Status:  NPO Appropriate    Anesthesia Type: Spinal.              Consents    Anesthesia Plan(s) and associated risks, benefits, and realistic alternatives discussed. Questions answered and patient/representative(s) expressed understanding.     - Discussed:     - Discussed with:  Patient      - Extended Intubation/Ventilatory Support Discussed: No.      - Patient is DNR/DNI Status: No     Use of blood products discussed: Yes.     - Discussed with: Patient.     - Consented: consented to blood products     Postoperative Care    Pain management: IV analgesics, Oral pain medications.   PONV prophylaxis: Ondansetron (or other 5HT-3), Dexamethasone or Solumedrol     Comments:    Other Comments: Patient reports stable infrequent angina that he has had for years that is well controlled with occasional nitroglycerin pills. [facility] nonobstructive [facility] that is medically managed. Previous echos with normal EF and no valvular disease. No chest pain with recent activity including walking into hospital today from parking lot. Also had total shoulder replacement last year 2/2024 without complication. Preop EKG okay. COPD is at baseline and took inhalers this morning.     Plan: spinal with AC block preop. Propofol gtt. Keep MAP> 70           Shirin Puckett MD    I have reviewed the pertinent notes and labs in the chart from the past 30 days and (re)examined the patient.  Any updates or changes from those notes are reflected in this note.    Clinically Significant Risk Factors Present on Admission                 # Drug Induced Platelet Defect: home medication list includes an antiplatelet medication   # Hypertension: Home  "medication list includes antihypertensive(s)           # Obesity: Estimated body mass index is 34.59 kg/m  as calculated from the following:    Height as of this encounter: 1.803 m (5' 11\").    Weight as of this encounter: 112.5 kg (248 lb).       # Asthma: noted on problem list          "

## 2025-03-03 NOTE — PROVIDER NOTIFICATION
Mda at bedside, talked about bradycardia which is patients baseline.  To keep heart rate above 40.  O2 saturation at 90's. Mda stated d/t COPD to keep sat at 88 or above

## 2025-03-03 NOTE — OR NURSING
Emergency Department    [address] [facility] [address]    Phone:  [phone]    Fax:  [phone]                                       Gloria VILLAFANA Marcos   MRN: [id number]    Department:   Emergency Department   Date of Visit:  2/23/2017           Patient Information     Date Of Birth          1993        Your diagnoses for this visit were:     Vomiting and diarrhea        You were seen by Elyssa Duncan PA-C.      Follow-up Information     Follow up with  Emergency Department.    Specialty:  EMERGENCY MEDICINE    Why:  If symptoms worsen    Contact information:    [address] [facility] [address] [phone]        Discharge Instructions       Discharge Instructions  Gastroenteritis    You have been seen today for vomiting and diarrhea, called gastroenteritis or the stomach flu. This is usually caused by a virus, but some bacteria, parasites, medicines or other medical conditions can cause similar symptoms. At this time your doctor does not find that your vomiting and diarrhea is a sign of anything dangerous or life-threatening.  However, sometimes the signs of serious illness do not show up right away.  If you have new or worse symptoms, you may need to be seen again in the Emergency Department or by your primary doctor. Remember that serious problems like appendicitis can look like gastroenteritis at first.       Return to the Emergency Department if:    You keep throwing up and you are not able to keep liquids down.    You feel you are getting dehydrated, such as being very thirsty, not urinating at least every 8-12 hours, or feeling faint or lightheaded.     You develop a new fever, or your fever continues for more than 2 days.    You have belly pain that seems worse than cramps, is in one spot, or is getting worse over time.     You have blood in your vomit or in your diarrhea.    You feel very weak.    You are not starting to improve within 24 hours  Arthroplasty bone and tissue disposed of in yellow hazard bag at end of procedure per hospital policy. Surgeon stated bone and tissue did not need to be sent to pathology.     of your visit here.    What can I do to help myself?    The most important thing to do is to drink clear liquids.  If you have been vomiting a lot, it is best to have only small, frequent sips of liquids.  Drinking too much at once may cause more vomiting. If you are vomiting often, you must replace minerals, sodium and potassium lost with your illness. Pedialyte  and sports drinks can help you replace these minerals.  You can also drink clear liquids such as water, weak tea, apple juice, and 7-Up . Avoid acid liquids (orange), caffeine (coffee) or alcohol. Do not drink milk until you no longer have diarrhea.    After liquids are staying down, you may start eating mild foods. Soda crackers, toast, plain noodles, gelatin, applesauce and bananas are good first choices.  Avoid foods that have acid, are spicy, fatty or fibrous (such as meats, coarse grains, vegetables). You may start eating these foods again in about 3 days when you are better.    Sometimes treatment includes prescription medicine to prevent nausea and vomiting and to prevent diarrhea. If your doctor prescribes these for you, take them as directed.    Nonprescription medicine is available for the treatment of diarrhea and can be very effective.  If you use it, make sure you use the dose recommended on the package. Avoid Lomotil . Check with your healthcare provider before you use any medicine for diarrhea.    Don t take ibuprofen, or other nonsteroidal anti-inflammatory medicines without checking with your healthcare provider.  If you were given a prescription for medicine here today, be sure to read all of the information (including the package insert) that comes with your prescription.  This will include important information about the medicine, its side effects, and any warnings that you need to know about.  The pharmacist who fills the prescription can provide more information and answer questions you may have about the medicine.  If you have  questions or concerns that the pharmacist cannot address, please call or return to the Emergency Department.       Remember that you can always come back to the Emergency Department if you are not able to see your regular doctor in the amount of time listed above, if you get any new symptoms, or if there is anything that worries you.      24 Hour Appointment Hotline       To make an appointment at any [facility], call [phone] ([phone]). If you don't have a family doctor or clinic, we will help you find one. [facility] are conveniently located to serve the needs of you and your family.             Review of your medicines      START taking        Dose / Directions Last dose taken    ondansetron 4 MG ODT tab   Commonly known as:  ZOFRAN ODT   Dose:  4 mg   Quantity:  10 tablet        Take 1 tablet (4 mg) by mouth every 4 hours as needed for nausea   Refills:  0                Prescriptions were sent or printed at these locations (1 Prescription)                   Other Prescriptions                Printed at Department/Unit printer (1 of 1)         ondansetron (ZOFRAN ODT) 4 MG ODT tab                Procedures and tests performed during your visit     Basic metabolic panel      Orders Needing Specimen Collection     None      Pending Results     No orders found from 2/21/2017 to 2/24/2017.            Pending Culture Results     No orders found from 2/21/2017 to 2/24/2017.             Test Results from your hospital stay     2/23/2017  4:38 PM - Interface, [place] Results      Component Results     Component Value Ref Range & Units Status    Sodium 140 133 - 144 mmol/L Final    Potassium 4.0 3.4 - 5.3 mmol/L Final    Chloride 108 94 - 109 mmol/L Final    Carbon Dioxide 19 (L) 20 - 32 mmol/L Final    Anion Gap 13 3 - 14 mmol/L Final    Glucose 120 (H) 70 - 99 mg/dL Final    Urea Nitrogen 12 7 - 30 mg/dL Final    Creatinine 0.72 0.52 - 1.04 mg/dL Final    GFR Estimate >90  Non   GFR Calc   >60 mL/min/1.7m2 Final    GFR Estimate If Black >90   GFR Calc   >60 mL/min/1.7m2 Final    Calcium 8.9 8.5 - 10.1 mg/dL Final                Clinical Quality Measure: Blood Pressure Screening     Your blood pressure was checked while you were in the emergency department today. The last reading we obtained was  BP: 113/68 . Please read the guidelines below about what these numbers mean and what you should do about them.  If your systolic blood pressure (the top number) is less than 120 and your diastolic blood pressure (the bottom number) is less than 80, then your blood pressure is normal. There is nothing more that you need to do about it.  If your systolic blood pressure (the top number) is 120-139 or your diastolic blood pressure (the bottom number) is 80-89, your blood pressure may be higher than it should be. You should have your blood pressure rechecked within a year by a primary care provider.  If your systolic blood pressure (the top number) is 140 or greater or your diastolic blood pressure (the bottom number) is 90 or greater, you may have high blood pressure. High blood pressure is treatable, but if left untreated over time it can put you at risk for heart attack, stroke, or kidney failure. You should have your blood pressure rechecked by a primary care provider within the next 4 weeks.  If your provider in the emergency department today gave you specific instructions to follow-up with your doctor or provider even sooner than that, you should follow that instruction and not wait for up to 4 weeks for your follow-up visit.        Thank you for choosing [facility]       Thank you for choosing [facility] for your care. Our goal is always to provide you with excellent care. Hearing back from our patients is one way we can continue to improve our services. Please take a few minutes to complete the written survey that you may receive in the mail after you visit with us. Thank you!       "  MyChart Information     [facility] lets you send messages to your doctor, view your test results, renew your prescriptions, schedule appointments and more. To sign up, go to [email or web address] . Click on \"Log in\" on the left side of the screen, which will take you to the Welcome page. Then click on \"Sign up Now\" on the right side of the page.     You will be asked to enter the access code listed below, as well as some personal information. Please follow the directions to create your username and password.     Your access code is: [email or web address]  Expires: 2017  4:01 PM     Your access code will  in 90 days. If you need help or a new code, please call your [place] clinic or [phone].        Care EveryWhere ID     This is your Care EveryWhere ID. This could be used by other organizations to access your [place] medical records  [id number]        After Visit Summary       This is your record. Keep this with you and show to your community pharmacist(s) and doctor(s) at your next visit.                  "

## 2025-03-04 ENCOUNTER — APPOINTMENT (OUTPATIENT)
Dept: OCCUPATIONAL THERAPY | Facility: CLINIC | Age: 80
End: 2025-03-04
Attending: ORTHOPAEDIC SURGERY
Payer: COMMERCIAL

## 2025-03-04 ENCOUNTER — APPOINTMENT (OUTPATIENT)
Dept: PHYSICAL THERAPY | Facility: CLINIC | Age: 80
End: 2025-03-04
Attending: ORTHOPAEDIC SURGERY
Payer: COMMERCIAL

## 2025-03-04 VITALS
BODY MASS INDEX: 34.72 KG/M2 | RESPIRATION RATE: 20 BRPM | WEIGHT: 248 LBS | SYSTOLIC BLOOD PRESSURE: 132 MMHG | OXYGEN SATURATION: 95 % | HEART RATE: 74 BPM | TEMPERATURE: 97.2 F | DIASTOLIC BLOOD PRESSURE: 64 MMHG | HEIGHT: 71 IN

## 2025-03-04 LAB
FASTING STATUS PATIENT QL REPORTED: ABNORMAL
GLUCOSE BLDC GLUCOMTR-MCNC: 116 MG/DL (ref 70–99)
GLUCOSE BLDC GLUCOMTR-MCNC: 126 MG/DL (ref 70–99)
GLUCOSE SERPL-MCNC: 165 MG/DL (ref 70–99)
HGB BLD-MCNC: 11.2 G/DL (ref 13.3–17.7)

## 2025-03-04 PROCEDURE — 82947 ASSAY GLUCOSE BLOOD QUANT: CPT | Performed by: ORTHOPAEDIC SURGERY

## 2025-03-04 PROCEDURE — 85018 HEMOGLOBIN: CPT | Performed by: ORTHOPAEDIC SURGERY

## 2025-03-04 PROCEDURE — 97150 GROUP THERAPEUTIC PROCEDURES: CPT | Mod: GP

## 2025-03-04 PROCEDURE — 97535 SELF CARE MNGMENT TRAINING: CPT | Mod: GO,XU

## 2025-03-04 PROCEDURE — 97116 GAIT TRAINING THERAPY: CPT | Mod: GP

## 2025-03-04 PROCEDURE — 82962 GLUCOSE BLOOD TEST: CPT

## 2025-03-04 PROCEDURE — 250N000013 HC RX MED GY IP 250 OP 250 PS 637: Performed by: ORTHOPAEDIC SURGERY

## 2025-03-04 PROCEDURE — 97166 OT EVAL MOD COMPLEX 45 MIN: CPT | Mod: GO

## 2025-03-04 PROCEDURE — 250N000011 HC RX IP 250 OP 636: Performed by: ORTHOPAEDIC SURGERY

## 2025-03-04 PROCEDURE — 250N000013 HC RX MED GY IP 250 OP 250 PS 637: Performed by: STUDENT IN AN ORGANIZED HEALTH CARE EDUCATION/TRAINING PROGRAM

## 2025-03-04 PROCEDURE — 36415 COLL VENOUS BLD VENIPUNCTURE: CPT | Performed by: ORTHOPAEDIC SURGERY

## 2025-03-04 RX ADMIN — METFORMIN ER 500 MG 1000 MG: 500 TABLET ORAL at 09:26

## 2025-03-04 RX ADMIN — Medication 1 DROP: at 09:26

## 2025-03-04 RX ADMIN — ACETAMINOPHEN 975 MG: 325 TABLET ORAL at 07:51

## 2025-03-04 RX ADMIN — PANTOPRAZOLE SODIUM 40 MG: 40 TABLET, DELAYED RELEASE ORAL at 09:26

## 2025-03-04 RX ADMIN — CEFAZOLIN SODIUM 2 G: 2 SOLUTION INTRAVENOUS at 01:02

## 2025-03-04 RX ADMIN — CYCLOSPORINE 1 DROP: 0.5 EMULSION OPHTHALMIC at 09:26

## 2025-03-04 RX ADMIN — POLYETHYLENE GLYCOL 3350 17 G: 17 POWDER, FOR SOLUTION ORAL at 09:27

## 2025-03-04 RX ADMIN — OXYCODONE 10 MG: 5 TABLET ORAL at 07:51

## 2025-03-04 RX ADMIN — LOSARTAN POTASSIUM 50 MG: 50 TABLET, FILM COATED ORAL at 09:26

## 2025-03-04 RX ADMIN — Medication 1 DROP: at 06:12

## 2025-03-04 RX ADMIN — ASPIRIN 325 MG: 325 TABLET ORAL at 09:26

## 2025-03-04 RX ADMIN — SENNOSIDES AND DOCUSATE SODIUM 1 TABLET: 50; 8.6 TABLET ORAL at 09:26

## 2025-03-04 RX ADMIN — FAMOTIDINE 20 MG: 20 TABLET, FILM COATED ORAL at 09:26

## 2025-03-04 RX ADMIN — METHOCARBAMOL 250 MG: 500 TABLET ORAL at 06:14

## 2025-03-04 RX ADMIN — EMPAGLIFLOZIN 25 MG: 25 TABLET, FILM COATED ORAL at 09:26

## 2025-03-04 ASSESSMENT — ACTIVITIES OF DAILY LIVING (ADL)
ADLS_ACUITY_SCORE: 30
DEPENDENT_IADLS:: INDEPENDENT
ADLS_ACUITY_SCORE: 30
PREVIOUS_RESPONSIBILITIES: MEAL PREP;HOUSEKEEPING;LAUNDRY;SHOPPING;MEDICATION MANAGEMENT
ADLS_ACUITY_SCORE: 30

## 2025-03-04 NOTE — PROGRESS NOTES
[facility] MEDICINE  PROGRESS NOTE       Securely message me with Anisha (more info)    Code Status: Full Code  Procedure(s):  RIGHT TOTAL KNEE ARTHROPLASTY  1 Day Post-Op  Identification/Summary:   Lux Velasco is a 79 year old male with a PMH of CAD, CVA, DM2, COPD, HTN, melanoma, obesity BMI 34. Underwent R knee arthroplasty on 3/3/2025.     He will be ready for discharge from medical standpoint once without oxygen requirement.    Assessment and Plan:  1) Discharge Plan: Home POD 1 per ortho     2) Coronary Artery Disease: reports history of MI in 1999. Denies any angina. Continues medical management of CAD on ASA 81 mg daily and statin. Continue statin and ASA dose per ortho.     3) History of CVA: patient reports history of stroke in 1998. Denies any residual deficits. On ASA 81 mg and statin.      4) Hyperlipidemia: On atorvastatin.     5) Hypertension: Appears well-controlled on losartan and metoprolol.    -hold losartan  -continue metoprolol with parameters     6) COPD: On Stiolto and albuterol inhalers. He doesn't have Stiolto with him.      7) Type 2 Diabetes Mellitus: Fair control.  Most recent hemoglobin A1c 7.8 on 2/14/2025.  Sliding scale insulin     8) Obesity: BMI 34.6     9) Melanoma: [facility]      10) Peripheral Edema and Venous Dermatitis: Uses compression stockings and ammonium lactate cream.       11) Bradycardia - improved to 70s  46-50 after surgery. HR was 61 on 1/15/2025 ECG.  Monitor with vitals per postop protocol     12)hypoxia  Likely related to atelectasis, continue IS      Anticoagulation   Orders (Includes Only Anticoagulants)    None      Therapy:   Lloyd:Not present  Lines: None       Current Diet  Orders Placed This Encounter      Advance Diet as Tolerated: Regular Diet Adult      Discharge Instruction - Regular Diet Adult        Clinically Significant Risk Factors Present on Admission             "     # Drug Induced Platelet Defect: home medication list includes an antiplatelet medication   # Hypertension: Home medication list includes antihypertensive(s)           # Obesity: Estimated body mass index is 34.59 kg/m  as calculated from the following:    Height as of this encounter: 1.803 m (5' 11\").    Weight as of this encounter: 112.5 kg (248 lb).       # Asthma: noted on problem list        Interval History/Subjective:  Feeling good. No complaint.      Last 24H PRN:     methocarbamol (ROBAXIN) half-tab 250 mg, 250 mg at 03/04/25 0614    oxyCODONE (ROXICODONE) tablet 5 mg **OR** oxyCODONE (ROXICODONE) tablet 10 mg, 10 mg at 03/04/25 0751    Physical Exam/Objective:  Temp:  [96.8  F (36  C)-98.2  F (36.8  C)] 97.2  F (36.2  C)  Pulse:  [46-74] 74  Resp:  [15-27] 20  BP: (119-160)/(57-75) 132/64  SpO2:  [85 %-96 %] 95 %  Wt Readings from Last 4 Encounters:   03/03/25 112.5 kg (248 lb)   02/19/24 110.7 kg (244 lb)   07/07/23 125 kg (275 lb 9.6 oz)   04/07/22 97.5 kg (215 lb)     Body mass index is 34.59 kg/m .    General Appearance: Alert and wake, not in distress  Respiratory: clear lungs, no crackles or wheezing  Cardiovascular: rhythmic, normal S1 and S2, no murmur  GI: soft, non-tender, normal bowel sound  Neurology: oriented x 3  Psych: cooperative and calm, normal affect    Medications:   Personally Reviewed.  Medications   Current Facility-Administered Medications   Medication Dose Route Frequency Provider Last Rate Last Admin    lactated ringers infusion   Intravenous Continuous Comfort, Win CONSTANTINO MD   Stopped at 03/04/25 0530     Current Facility-Administered Medications   Medication Dose Route Frequency Provider Last Rate Last Admin    acetaminophen (TYLENOL) tablet 975 mg  975 mg Oral Q8H Comfort, Win CONSTANTINO MD   975 mg at 03/04/25 0751    aspirin (ASA) EC tablet 325 mg  325 mg Oral Daily Melody, Win CONSTANTINO MD   325 mg at 03/04/25 0926    atorvastatin (LIPITOR) tablet 40 mg  40 mg Oral At Bedtime " Kamaljit Moore MD   40 mg at 03/03/25 2024    carboxymethylcellulose PF (REFRESH PLUS) 0.5 % ophthalmic solution 1 drop  1 drop Both Eyes Q1H While awake Kamaljit Moore MD   1 drop at 03/04/25 0926    cycloSPORINE (RESTASIS) 0.05 % ophthalmic emulsion 1 drop  1 drop Both Eyes BID Kamaljit Moore MD   1 drop at 03/04/25 0926    empagliflozin (JARDIANCE) tablet 25 mg  25 mg Oral Daily Kamaljit Moore MD   25 mg at 03/04/25 0926    famotidine (PEPCID) tablet 20 mg  20 mg Oral BID Win Oliver MD   20 mg at 03/04/25 0926    Or    famotidine (PEPCID) injection 20 mg  20 mg Intravenous BID Win Oliver MD        fluticasone-vilanterol (BREO ELLIPTA) 200-25 MCG/ACT inhaler 1 puff  1 puff Inhalation Daily Kamaljit Moore MD        gabapentin (NEURONTIN) capsule 300 mg  300 mg Oral At Bedtime Kamaljit Moore MD   300 mg at 03/03/25 2024    insulin aspart (NovoLOG) injection (RAPID ACTING)  1-7 Units Subcutaneous TID AC Kamaljit Moore MD   2 Units at 03/03/25 1721    insulin aspart (NovoLOG) injection (RAPID ACTING)  1-5 Units Subcutaneous At Bedtime Kamaljit Moore MD        losartan (COZAAR) tablet 50 mg  50 mg Oral Daily Kamaljit Moore MD   50 mg at 03/04/25 0926    metFORMIN (GLUCOPHAGE XR) 24 hr tablet 1,000 mg  1,000 mg Oral BID w/meals Kamaljit Moore MD   1,000 mg at 03/04/25 0926    metoprolol succinate ER (TOPROL XL) 24 hr tablet 50 mg  50 mg Oral Daily Kamaljit Moore MD        pantoprazole (PROTONIX) EC tablet 40 mg  40 mg Oral Daily Kamaljit Moore MD   40 mg at 03/04/25 0926    polyethylene glycol (MIRALAX) Packet 17 g  17 g Oral Daily Win Oliver MD   17 g at 03/04/25 0927    senna-docusate (SENOKOT-S/PERICOLACE) 8.6-50 MG per tablet 1 tablet  1 tablet Oral BID Win Oliver MD   1 tablet at 03/04/25 0926    sodium chloride (PF) 0.9% PF flush 3 mL  3 mL Intracatheter Q8H Win Oliver MD   3 mL at 03/04/25 0931       Data reviewed today: I personally reviewed all new medications, labs,  imaging/diagnostics reports over the past 24 hours. Pertinent findings include:    Imaging:   No results found for this or any previous visit (from the past 24 hours).    Labs:  XR Knee Port Right 1/2 Views   Final Result   IMPRESSION: Postop changes related to recent placement of a right total knee arthroplasty with patellar resurfacing. The components are in the expected position. No acute displaced periprosthetic fracture. Expected recent postop soft tissue and    intra-articular gas with surgical drain in place and anterior vertically-oriented midline skin staples. Arterial calcification.      POC US Guidance Needle Placement   Final Result        Recent Results (from the past 24 hours)   Glucose by meter    Collection Time: 03/03/25  4:03 PM   Result Value Ref Range    GLUCOSE BY METER POCT 199 (H) 70 - 99 mg/dL   Glucose by meter    Collection Time: 03/03/25  9:01 PM   Result Value Ref Range    GLUCOSE BY METER POCT 156 (H) 70 - 99 mg/dL   Glucose by meter    Collection Time: 03/04/25  6:36 AM   Result Value Ref Range    GLUCOSE BY METER POCT 116 (H) 70 - 99 mg/dL   Hemoglobin    Collection Time: 03/04/25  7:34 AM   Result Value Ref Range    Hemoglobin 11.2 (L) 13.3 - 17.7 g/dL   Glucose    Collection Time: 03/04/25  7:34 AM   Result Value Ref Range    Glucose 165 (H) 70 - 99 mg/dL    Patient Fasting > 8hrs? Unknown    Glucose by meter    Collection Time: 03/04/25 11:56 AM   Result Value Ref Range    GLUCOSE BY METER POCT 126 (H) 70 - 99 mg/dL       Pending Labs:  Unresulted Labs Ordered in the Past 30 Days of this Admission       No orders found for last 31 day(s).            YEMI COLÓN MD  [facility] Medicine  [facility]  Phone: #[phone]    Securely message me with e[employer] (more info)

## 2025-03-04 NOTE — PLAN OF CARE
Patient vital signs are at baseline: Yes  Patient able to ambulate as they were prior to admission or with assist devices provided by therapies during their stay:  Yes  Patient MUST void prior to discharge:  Yes  Patient able to tolerate oral intake:  Yes  Pain has adequate pain control using Oral analgesics:  Yes  Does patient have an identified :  Yes  Has goal D/C date and time been discussed with patient:  Yes    Pt is A&Ox4, VSS on 2L O2 overnight. A1 with walker and gait belt when ambulating. Voiding adequately. Dressing has scant drainage, marked. CMS intact. Drain removed this AM per order.  Pt is reporting mild to moderate pain during shift.

## 2025-03-04 NOTE — PLAN OF CARE
Physical Therapy Discharge Summary    Reason for therapy discharge:    Discharged to home with home therapy.    Progress towards therapy goal(s). See goals on Care Plan in [facility] electronic health record for goal details.  Goals partially met.  Barriers to achieving goals:   discharge from facility.    Therapy recommendation(s):    Continued therapy is recommended.  Rationale/Recommendations:  continued PT with home PT to improve functional mobility.  Continue home exercise program.

## 2025-03-04 NOTE — PROGRESS NOTES
"[facility] Orthopaedics Progress Note      Post-operative Day: 1 Day Post-Op    Procedure(s):  RIGHT TOTAL KNEE ARTHROPLASTY      Subjective: Lux is seen up resting in bedside chair, reports feeling well this AM, no acute events overnight. Pain to right knee has been tolerable. Tolerating a regular diet with no nausea or vomiting. Reports is voiding but unsure if able to empty bladder, passing minimal gas. Reports lives alone as daughter is in the hospital. Had arranged homecare prior to surgery. Unsure of level of assistance that will be provided. SW consulted and following. Drain removed this AM    Pain: moderate  Chest pain, SOB:  No      Objective:  Blood pressure 132/64, pulse 74, temperature 97.2  F (36.2  C), temperature source Oral, resp. rate 20, height 1.803 m (5' 11\"), weight 112.5 kg (248 lb), SpO2 95%.    Patient Vitals for the past 24 hrs:   BP Temp Temp src Pulse Resp SpO2   03/04/25 0822 132/64 97.2  F (36.2  C) Oral 74 20 95 %   03/04/25 0107 -- -- -- -- -- 94 %   03/04/25 0105 -- -- -- -- -- (!) 85 %   03/03/25 2330 124/59 97.6  F (36.4  C) Oral 54 16 92 %   03/03/25 2035 (!) 160/75 97.3  F (36.3  C) Oral 52 17 (!) 90 %   03/03/25 1848 -- -- -- -- -- (!) 91 %   03/03/25 1635 119/59 97.8  F (36.6  C) Oral 55 17 (!) 90 %   03/03/25 1535 136/63 97.9  F (36.6  C) Oral 55 15 92 %   03/03/25 1500 (!) 142/64 -- -- 55 -- (!) 89 %   03/03/25 1430 138/62 -- -- 53 -- 95 %   03/03/25 1400 132/63 -- -- (!) 48 -- 93 %   03/03/25 1340 139/62 98.2  F (36.8  C) Temporal (!) 48 -- (!) 91 %   03/03/25 1310 (!) 142/66 97  F (36.1  C) -- (!) 46 27 92 %   03/03/25 1300 132/60 96.8  F (36  C) -- (!) 47 21 96 %   03/03/25 1250 137/57 96.8  F (36  C) -- (!) 48 21 (!) 91 %   03/03/25 1240 139/65 (!) 96.6  F (35.9  C) -- (!) 49 24 92 %   03/03/25 1230 (!) 143/63 (!) 96.4  F (35.8  C) -- (!) 46 17 97 %   03/03/25 1220 133/61 (!) 96.3  F (35.7  C) -- 50 20 98 %   03/03/25 1216 (!) 164/72 (!) 96.4  F (35.8  C) Core 55 16 97 % "       Wt Readings from Last 4 Encounters:   03/03/25 112.5 kg (248 lb)   02/19/24 110.7 kg (244 lb)   07/07/23 125 kg (275 lb 9.6 oz)   04/07/22 97.5 kg (215 lb)       General: Alert and orientated, NAD  Respiratory: Non-labored breathing on RA  RLE motor function, sensation, and circulation intact   Yes, Dorsiflexion/plantarflexion intact and equal bilaterally. Moves all other extremities with ease and purpose   Wound status: incisions are clean dry and intact.  Mild drainage within dressing, does not go past island dressing   Calf tenderness: Bilateral  No    Pertinent Labs   Lab Results: personally reviewed.     Recent Labs   Lab Test 03/04/25  0734 03/03/25  0843 02/20/24  0615 02/19/24  0747 02/28/22  1532 02/28/22  1532 07/07/20  0846 06/26/19  0831 10/29/18  0929 09/18/18  1040 04/25/18  1603 01/05/18  1559   INR  --   --   --   --   --   --   --   --  1.03 1.04  --   --    WBC  --  7.4  --  5.7  --  5.7 5.8   < > 5.1 5.7   < > 5.7   HGB 11.2* 12.9* 12.1* 13.1*   < > 13.1* 12.6*   < > 12.9* 12.6*   < > 13.0*   HCT  --  38.0*  --  40.5  --  41.0 39.4*   < > 40.2 40.0   < > 40.7   MCV  --  80  --  83  --  86 86   < > 85 86   < > 86   PLT  --  187  --  153  --  152 144*   < > 145* 151   < > 160   NA  --   --  136  --   --  143 138   < > 140 138   < > 139   CRP  --   --   --   --   --   --   --   --   --   --   --  <2.9    < > = values in this interval not displayed.       Plan:   -Bladder scan today for PVR  -Anticoagulation protocol:  mg daily  x 42  days  -Pain medications:  scopainmedication: Tylenol Oxycodone, and Robaxin  -Weight bearing status:  WBAT  -Disposition:  SW consulted today to verify services at discharge with [facility].  If adequate services potentially discharge this afternoon.   -Continue cares and rehabilitation     Report completed by:  ORION Echols CNP  Date: 3/4/2025  Time: 11:34 AM

## 2025-03-04 NOTE — PROGRESS NOTES
[facility]                                                                                   OUTPATIENT PHYSICAL THERAPY    PLAN OF TREATMENT FOR OUTPATIENT REHABILITATION   Patient's Last Name, First Name, Lux Hamilton    YOB: 1945   Provider's Name   [facility]   Medical Record No.  [id number]     Onset Date: 03/03/25 Start of Care Date: 03/03/25     Medical Diagnosis:  Right knee DJD               PT Diagnosis:  impaired functional mobility Certification Dates:  From: 03/03/25  To: 03/06/25       See note for plan of treatment, functional goals, and certification details.    I CERTIFY THE NEED FOR THESE SERVICES FURNISHED UNDER        THIS PLAN OF TREATMENT AND WHILE UNDER MY CARE (Physician co-signature of this document indicates review and certification of the therapy plan).                 03/03/25 1615   Appointment Info   Signing Clinician's Name / Credentials (PT) Michell Moe, PT, DPT   Quick Adds   Quick Adds Certification   Living Environment   People in Home child(anderson), adult   Current Living Arrangements apartment   Home Accessibility no concerns   Number of Stairs, Within Home, Primary none   Self-Care   Equipment Currently Used at Home cane, straight;walker, rolling   General Information   Onset of Illness/Injury or Date of Surgery 03/03/25   Referring Physician Win Oliver MD   Patient/Family Therapy Goals Statement (PT) to get better   Pertinent History of Current Problem (include personal factors and/or comorbidities that impact the POC) 79 year old male with a PMH of CAD, CVA, DM2, COPD, HTN, melanoma, obesity BMI 34. Underwent R TKA on 3/3/2025   Existing Precautions/Restrictions weight bearing   Weight-Bearing Status - LLE full weight-bearing   Weight-Bearing Status - RLE weight-bearing as tolerated   Bed Mobility   Bed Mobility supine-sit   Supine-Sit [place] (Bed Mobility) verbal cues;minimum  assist (75% patient effort)   Comment, (Bed Mobility) Min assist x 1 with supine to sit transfer. Verbal cues for safety.   Transfers   Transfers sit-stand transfer   Comment, (Transfers) Min assist x 1 with 4WW for sit<>stand transfer. Verbal cues for safety and safe hand placemnet.   Sit-Stand Transfer   Sit-Stand [place] (Transfers) verbal cues;minimum assist (75% patient effort);1 person assist   Assistive Device (Sit-Stand Transfers) walker, 4-wheeled   Comment, (Sit-Stand Transfer) Min assist x 1 with 4WW for sit<>stand transfer. Verbal cues for safety and safe hand placement.   Gait/Stairs (Locomotion)   [place] Level (Gait) verbal cues;contact guard   Assistive Device (Gait) walker, 4-wheeled   Distance in Feet (Gait) 10'   Pattern (Gait) step-to   Deviations/Abnormal Patterns (Gait) base of support, wide;amanda decreased;gait speed decreased   Comment, (Gait/Stairs) Pt ambulates with CGA and 4WW. Verbal cues for safety and posture.   Clinical Impression   Criteria for Skilled Therapeutic Intervention Yes, treatment indicated   PT Diagnosis (PT) impaired functional mobility   Influenced by the following impairments weakness, pain   Functional limitations due to impairments transfers, ambulation, stairs   Clinical Presentation (PT Evaluation Complexity) stable   Clinical Presentation Rationale Pt presents as medically diagnosed   Clinical Decision Making (Complexity) moderate complexity   Planned Therapy Interventions (PT) balance training;bed mobility training;gait training;home exercise program;patient/family education;ROM (range of motion);stair training;strengthening;stretching;transfer training   Risk & Benefits of therapy have been explained evaluation/treatment results reviewed;care plan/treatment goals reviewed;patient   PT Total Evaluation Time   PT Ericka, Moderate Complexity Minutes (81369) 10   Therapy Certification   Start of care date 03/03/25   Certification date from 03/03/25    Certification date to 03/06/25   Medical Diagnosis Right knee DJD   Physical Therapy Goals   PT Frequency Daily   PT Predicted Duration/Target Date for Goal Attainment 03/06/25   PT Goals Transfers;Gait;PT Goal 1   PT: Transfers Modified independent;Sit to/from stand;Assistive device  (4WW)   PT: Gait Modified independent;Assistive device;Rolling walker;100 feet  (4WW)   PT: Goal 1 Independent with TKA HEP   Interventions   Interventions Quick Adds Gait Training;Therapeutic Activity;Therapeutic Procedure   Therapeutic Procedure/Exercise   Ther. Procedure: strength, endurance, ROM, flexibillity Minutes (12201) 5   Treatment Detail/Skilled Intervention Pt educated on and guided through supine BLE strengthening exercises/TKA HEP x 10 each. Verbal cues, tactile cues and assist with correct technique.   Therapeutic Activity   Therapeutic Activities: dynamic activities to improve functional performance Minutes (44660) 10   Treatment Detail/Skilled Intervention Min assist x 1 with supine to sit transfer. Min assist x 1 with 4WW for sit<>stand trnasfer. Verbal cues for safety and safe hand placement. Pt sitting up in chair at end of session with chair alarm on and call light within reach.   Gait Training   Gait Training Minutes (23252) 10   Treatment Detail/Skilled Intervention Pt ambulates with CGA and 4WW. Verbal cues for safety and posture. Cues to keep close to walker. Cues for WBAT and bending R knee.   Distance in Feet 75'   [place] Level (Gait Training) contact guard   Physical Assistance Level (Gait Training) verbal cues;1 person assist   Weight Bearing (Gait Training) weight-bearing as tolerated   Assistive Device (Gait Training) rolling walker   Pattern Analysis (Gait Training) swing-to gait   Gait Analysis Deviations decreased amanda;decreased step length;increased stride width   Impairments (Gait Analysis/Training) pain;ROM decreased;strength decreased   PT Discharge Planning   PT Plan progress transfers  and ambulation, review therex   PT Discharge Recommendation (DC Rec) (S)  other (see comments)  (defer to ortho)   PT Rationale for DC Rec Pt reports good home setup and good home support. Pt reports has 4WW at home.   PT Brief overview of current status Min assist x 1 with 4WW for transfers, CGA with 4WW for 75 feet   PT Total Distance Amb During Session (feet) 75   Physical Therapy Time and Intention   Timed Code Treatment Minutes 25   Total Session Time (sum of timed and untimed services) 35   MD Michell Meyer, PT, DPT

## 2025-03-04 NOTE — PROGRESS NOTES
03/04/25 0721   Appointment Info   Signing Clinician's Name / Credentials (OT) Jeff Grider, OTR/L   Quick Adds   Quick Adds Certification   Living Environment   People in Home alone  (Normally lives with his Dtr, but she is currently hospitalized and will not be discharged until mid March.  Pt mentioned [facility] is to provide cares after his discharge.)   Current Living Arrangements apartment   Living Environment Comments WIS with GB and shower bench. comfort ht toilet with GB on the side and in front of the toilet.   Self-Care   Usual Activity Tolerance good   Current Activity Tolerance moderate   Equipment Currently Used at Home cane, straight;walker, rolling;tub bench   Instrumental Activities of Daily Living (IADL)   Previous Responsibilities meal prep;housekeeping;laundry;shopping;medication management   IADL Comments Pt reports he and his Dtr assist each other with IADLs   General Information   Onset of Illness/Injury or Date of Surgery 03/03/25   Referring Physician Dr. Win Oliver   Patient/Family Therapy Goal Statement (OT) To return home.   Additional Occupational Profile Info/Pertinent History of Current Problem Adm for R TKA.  PMH:  CAD, CVA, DM2, COPD, HTN, melanoma, obesity BMI 34.  Had L TKA in Apr '21   Left Lower Extremity (Weight-bearing Status) weight-bearing as tolerated (WBAT)   Right Lower Extremity (Weight-bearing Status) weight-bearing as tolerated (WBAT)   Cognitive Status Examination   Follows Commands WFL   Cognitive Status Comments Forgetful.   Bed Mobility   Bed Mobility rolling left;rolling right;supine-sit;sit-supine   Rolling Left [place] (Bed Mobility) verbal cues;contact guard   Rolling Right [place] (Bed Mobility) verbal cues;contact guard   Supine-Sit [place] (Bed Mobility) verbal cues;contact guard   Sit-Supine [place] (Bed Mobility) verbal cues;contact guard   Transfers   Transfers bed-chair transfer;sit-stand transfer;toilet transfer   Transfer  Skill: Bed to Chair/Chair to Bed   Bed-Chair [place] (Transfers) verbal cues;contact guard   Assistive Device (Bed-Chair Transfers) rolling walker   Sit-Stand Transfer   Sit-Stand [place] (Transfers) verbal cues;contact guard   Assistive Device (Sit-Stand Transfers) walker, front-wheeled   Toilet Transfer   Type (Toilet Transfer) sit-stand;stand-sit   Martha Level (Toilet Transfer) verbal cues;contact guard   Assistive Device (Toilet Transfer) walker, front-wheeled   Balance   Balance Assessment standing dynamic balance   Standing Balance: Dynamic verbal cues;contact guard   Activities of Daily Living   BADL Assessment/Intervention lower body dressing;upper body dressing   Upper Body Dressing Assessment/Training   Position (Upper Body Dressing) supported sitting   [place]artha Level (Upper Body Dressing) upper body dressing skills;doff;front opening garment;verbal cues;minimum assist (75% patient effort)   Lower Body Dressing Assessment/Training   Position (Lower Body Dressing) supported sitting;supported standing   Comment, (Lower Body Dressing) Unable to don his BALTA socks.  Will need asssist at home.   M[place]a Level (Lower Body Dressing) lower body dressing skills;doff;don;pants/bottoms;shoes/slippers;socks;moderate assist (50% patient effort)   Clinical Impression   Criteria for Skilled Therapeutic Interventions Met (OT) Yes, treatment indicated   OT Diagnosis Decreased indep with ADLs and trsfs. due to TKA   OT Problem List-Impairments impacting ADL problems related to;mobility   Assessment of Occupational Performance 3-5 Performance Deficits   Identified Performance Deficits Dressing, toileting, bathing, G/H and trsfs   Planned Therapy Interventions (OT) ADL retraining   Clinical Decision Making Complexity (OT) detailed assessment/moderate complexity   Risk & Benefits of therapy have been explained evaluation/treatment results reviewed;participants included;patient   OT Total Evaluation  Time   OT Eval, Moderate Complexity Minutes (89248) 15   Therapy Certification   Medical Diagnosis TKA   Start of Care Date 03/04/25   Certification date from 03/04/25   Certification date to 03/07/25   OT Goals   Therapy Frequency (OT) Daily   OT Predicted Duration/Target Date for Goal Attainment 03/07/25   OT Goals Lower Body Dressing;Toilet Transfer/Toileting   OT: Lower Body Dressing Modified independent;within precautions;using adaptive equipment   OT: Toilet Transfer/Toileting Modified independent;within precautions;using adaptive equipment   Interventions   Interventions Quick Adds Self-Care/Home Management   Self-Care/Home Management   Self-Care/Home Mgmt/ADL, Compensatory, Meal Prep Minutes (78173) 30   Treatment Detail/Skilled Intervention Pt resting in bed when therapist arrived. Reviewed with Pt the TKA precautions. Pt had difficulty comprehending his knee precautions.  Worked on FB dressing. Pt was min A with U/B dressing while sitting - needed assist to line up his shirt buttons. Worked on L/B dressing with mod A for VC for sequencing and use of AE - long shoehorn.  Worked on room trsfs using his 4WW to the bed, chair, toilet and BR sink. Initially needing CGA of one to SBA for VC for correct hand placement. By end of session, Pt was SBA to CGA  using FWW. Pt was shown and practiced bed mobility using pillows between legs when resting on thier R or L side.  At end of session, Pt was sitting in the chair with alarm on and call light within reach. RN aware.  Therapist called  to verify the extent of his care that he will be receiving from [facility].  Concern for safety due to poor memory.   OT Discharge Planning   OT Plan If not discharged, continue to work on trsfs with 4WW, dressing and reveiw of his TKA precautions.   OT Discharge Recommendation (DC Rec) other (see comments)  (Defer to Ortho Team)   OT Rationale for DC Rec Pt is alone at home currently as his Dtr who he normally lives  with is in the hosptial until mid-March.  Is to have help from [facility] but unsure to what extent.  social service was called to verify the extent of care he will be receiving at home upon discharge.  Concerned for his safety due to poor memory.   OT Brief overview of current status Pt is CGA with trsfs using 4WW.  Min A with U/B dressing.  Mod A with L/B dressing.  Poor memory.   OT Total Distance Amb During Session (feet) 20   Total Session Time   Timed Code Treatment Minutes 30   Total Session Time (sum of timed and untimed services) 45   [facility]                                                                                   OUTPATIENT OCCUPATIONAL THERAPY    PLAN OF TREATMENT FOR OUTPATIENT REHABILITATION   Patient's Last Name, First Name, TOMASA VelascoLux    YOB: 1945   Provider's Name   [facility]   Medical Record No.  [id number]     Onset Date: 03/03/25 Start of Care Date: 03/04/25     Medical Diagnosis:  TKA               OT Diagnosis:  Decreased indep with ADLs and trsfs. due to TKA Certification Dates:  From: 03/04/25  To: 03/07/25     See note for plan of treatment, functional goals, and certification details.    I CERTIFY THE NEED FOR THESE SERVICES FURNISHED UNDER        THIS PLAN OF TREATMENT AND WHILE UNDER MY CARE (Physician co-signature of this document indicates review and certification of the therapy plan).             Win Oliver MD

## 2025-03-04 NOTE — CONSULTS
Care Management Initial Consult    General Information  Assessment completed with: Patient,    Type of CM/SW Visit: Initial Assessment    Primary Care Provider verified and updated as needed: Yes   Readmission within the last 30 days: no previous admission in last 30 days      Reason for Consult: discharge planning  Advance Care Planning:            Communication Assessment  Patient's communication style: spoken language (English or Bilingual)    Hearing Difficulty or Deaf: yes   Wear Glasses or Blind: no    Cognitive  Cognitive/Neuro/Behavioral: WDL, .WDL except, orientation  Level of Consciousness: alert  Arousal Level: opens eyes spontaneously  Orientation: other (see comments) (forgetful a times)        Speech: clear    Living Environment:   People in home: alone     Current living Arrangements: house      Able to return to prior arrangements: yes       Family/Social Support:  Care provided by: self  Provides care for: no one  Marital Status:   Support system: Children          Description of Support System: Supportive         Current Resources:   Patient receiving home care services: No        Community Resources: None  Equipment currently used at home: cane, straight, walker, rolling, tub bench  Supplies currently used at home: None    Employment/Financial:  Employment Status: retired        Financial Concerns: none   Referral to Financial Worker: No       Does the patient's insurance plan have a 3 day qualifying hospital stay waiver?  No    Lifestyle & Psychosocial Needs:  Social Drivers of Health     Food Insecurity: Low Risk  (3/3/2025)    Food Insecurity     Within the past 12 months, did you worry that your food would run out before you got money to buy more?: No     Within the past 12 months, did the food you bought just not last and you didn t have money to get more?: No   Depression: Not at risk (7/25/2023)    PHQ-2     PHQ-2 Score: 0   Housing Stability: Low Risk  (3/3/2025)    Housing  Stability     Do you have housing? : Yes     Are you worried about losing your housing?: No   Tobacco Use: Low Risk  (3/3/2025)    Patient History     Smoking Tobacco Use: Never     Smokeless Tobacco Use: Never     Passive Exposure: Not on file   Financial Resource Strain: Low Risk  (3/3/2025)    Financial Resource Strain     Within the past 12 months, have you or your family members you live with been unable to get utilities (heat, electricity) when it was really needed?: No   Alcohol Use: Not on file   Transportation Needs: Low Risk  (3/3/2025)    Transportation Needs     Within the past 12 months, has lack of transportation kept you from medical appointments, getting your medicines, non-medical meetings or appointments, work, or from getting things that you need?: No   Physical Activity: Not on file   Interpersonal Safety: Low Risk  (3/3/2025)    Interpersonal Safety     Do you feel physically and emotionally safe where you currently live?: Yes     Within the past 12 months, have you been hit, slapped, kicked or otherwise physically hurt by someone?: No     Within the past 12 months, have you been humiliated or emotionally abused in other ways by your partner or ex-partner?: No   Stress: Not on file   Social Connections: Unknown (1/1/2022)    [facility]    Social Connections     Frequency of Communication with Friends and Family: Not on file   Health Literacy: Not on file       Functional Status:  Prior to admission patient needed assistance:   Dependent ADLs:: Independent  Dependent IADLs:: Independent       Mental Health Status:  Mental Health Status: No Current Concerns       Chemical Dependency Status:  Chemical Dependency Status: No Current Concerns             Values/Beliefs:  Spiritual, Cultural Beliefs, Mosque Practices, Values that affect care: no               Discussed  Partnership in Safe Discharge  "Planning  document with patient/family: Yes:       Care Management Discharge Note    Discharge Date: 03/04/2025       Discharge Disposition: Home, Home Care    Discharge Services: Home Care    Discharge DME: None    Discharge Transportation: health plan transportation    Private pay costs discussed: Not applicable    Does the patient's insurance plan have a 3 day qualifying hospital stay waiver?  No    PAS Confirmation Code: N/A  Patient/family educated on Medicare website which has current facility and service quality ratings: yes    Education Provided on the Discharge Plan: Yes  Persons Notified of Discharge Plans: Pt and home Care  Patient/Family in Agreement with the Plan: yes    Handoff Referral Completed: No, handoff not indicated or clinically appropriate    Additional Information:    [facility] was notified that Pt has a daughter that is currently in the hospital may be until mid March.  Pt states that he is open to [facility].  [facility] sent referral to [facility] to verify.     1:51 PM  [facility] met and introduced self and CM services to Pt.  Pt lives in a house with daughter who is currently hospitalized in [facility] hospital.  Pt independent at baseline and drives.  Pt states that he has everything at home- food and medication.  Pt able to state date, who the President is and current issues.  [facility] Pt states that he will be \" fine \" at home.  Pt states that he has been alone for the past month.  [facility] verified [facility] and sent discharge orders.  Pt will arrange his own ride home through [employer].  [facility] tried to call daughter and no answer.  Pt did not feel it was necessary that [facility] talk with daughter and he has been in contact with her.  Pt discharged home.     LOI Poe    "

## 2025-03-04 NOTE — PROGRESS NOTES
Occupational Therapy Discharge Summary    Reason for therapy discharge:    Discharged to home with home therapy.    Progress towards therapy goal(s). See goals on Care Plan in [facility] electronic health record for goal details.  Goals partially met.  Barriers to achieving goals:   discharge from facility.    Therapy recommendation(s):    Continued therapy is recommended.  Rationale/Recommendations:  Pt would benefit from further OT to assure safety at home while following his TKA precaution.  Monitor cognition.  Lives alone.  Is scheduled to have home care through [facility].

## 2025-03-04 NOTE — PROGRESS NOTES
Patient discharged with personal transport service to transport to home. Vitally stable and cleared for discharge. Free of PIV access. Belongings remain with pt at discharge. AVS reviewed with pt, questions answered, education complete.

## 2025-03-25 ENCOUNTER — TRANSFERRED RECORDS (OUTPATIENT)
Dept: HEALTH INFORMATION MANAGEMENT | Facility: CLINIC | Age: 80
End: 2025-03-25
Payer: COMMERCIAL

## 2025-04-15 ENCOUNTER — TRANSFERRED RECORDS (OUTPATIENT)
Dept: HEALTH INFORMATION MANAGEMENT | Facility: CLINIC | Age: 80
End: 2025-04-15
Payer: COMMERCIAL

## (undated) DEVICE — SOL NACL 0.9% INJ 1000ML BAG 2B1324X

## (undated) DEVICE — SOL WATER IRRIG 500ML BOTTLE 2F7113

## (undated) DEVICE — LINEN TOWEL PACK X6 WHITE 5487

## (undated) DEVICE — CLIP HORIZON SM RED WIDE SLOT 001201

## (undated) DEVICE — DRAPE SHEET SHOULDER ARTHROSCOPY 89070

## (undated) DEVICE — SOL NACL 0.9% IRRIG 500ML BOTTLE 2F7123

## (undated) DEVICE — STPL SKIN 35W 6.9MM  PXW35

## (undated) DEVICE — SU STRATAFIX PDS PLUS 2 CTX SPIRAL L14 IN SXPP2B405

## (undated) DEVICE — NDL BUTTERFLY 21GA .75" 367296

## (undated) DEVICE — GLOVE SURG PI ULTRA TOUCH M SZ 7 LF 42670

## (undated) DEVICE — DRSG TEGADERM 4X4 3/4" 1626W

## (undated) DEVICE — SUCTION SLEEVE NEPTUNE 2 125MM 0703-005-125

## (undated) DEVICE — SU VICRYL 5-0 S-14DA 18" J571G

## (undated) DEVICE — GLOVE BIOGEL INDICATOR 7.5 LF 41675

## (undated) DEVICE — LINEN TOWEL PACK X5 5464

## (undated) DEVICE — ADHESIVE SWIFTSET 0.8ML OCTYL SS6

## (undated) DEVICE — ESU HIGH TEMP LOOP TIP AA03

## (undated) DEVICE — NDL 30GA 0.5" 305106

## (undated) DEVICE — SYR 03ML LL W/O NDL 309657

## (undated) DEVICE — TUBE ENDOTRACHEAL NIM TRIVANTAGE 7.0MM 8229707

## (undated) DEVICE — BIT DRILL BIOMET CENTRAL SCR 3.2MM SS 405883

## (undated) DEVICE — SURGICEL FIBRILLAR HEMOSTAT 1"X2" 1961

## (undated) DEVICE — GLOVE PROTEXIS W/NEU-THERA 6.5  2D73TE65

## (undated) DEVICE — GLOVE PROTEXIS MICRO 7.5  2D73PM75

## (undated) DEVICE — SUTURE VICRYL+ 2-0 36IN CT-1 UND VCP945H

## (undated) DEVICE — PREP CHLORAPREP W/ORANGE TINT 10.5ML 260715

## (undated) DEVICE — HOLDER LIMB VELCRO OR 0814-1533

## (undated) DEVICE — ESU ELEC BLADE 2.75" COATED/INSULATED E1455

## (undated) DEVICE — SUCTION MANIFOLD NEPTUNE 2 SYS 1 PORT 702-025-000

## (undated) DEVICE — SU MONOCRYL 5-0 P-3 18" UND Y493G

## (undated) DEVICE — SU VICRYL 2-0 SH 27" UND J417H

## (undated) DEVICE — SUCTION MANIFOLD NEPTUNE 2 SYS 4 PORT 0702-020-000

## (undated) DEVICE — SU CHROMIC 3-0 SH 27" G122H

## (undated) DEVICE — SUTURE

## (undated) DEVICE — SUTURE VICRYL+ 2-0 27IN CT-1 UND VCP259H

## (undated) DEVICE — ESU ELEC BLADE 6" COATED E1450-6

## (undated) DEVICE — PACK MINOR CUSTOM ASC

## (undated) DEVICE — GLOVE SURG PI ULTRA TOUCH M SZ 8 LF

## (undated) DEVICE — HOOD SURG T7 PLUS STRL LF DISP 0416-801-200

## (undated) DEVICE — CUSTOM PACK TOTAL KNEE ACCESSORY SOP5BTAHEA

## (undated) DEVICE — BLADE SAGITTAL WIDE (SO-618) 2108-118

## (undated) DEVICE — VIAL DECANTER STERILE WHITE DYNJDEC06

## (undated) DEVICE — GOWN IMPERVIOUS BREATHABLE 2XL/XLONG

## (undated) DEVICE — SU ETHIBOND 1 CT-1 30" X425H

## (undated) DEVICE — DRSG STERI STRIP 1/2X4" R1547

## (undated) DEVICE — BIT DRILL BIOMET PERIPHERAL SCR 2.7MM SS 405889

## (undated) DEVICE — DRSG PRIMAPORE 03 1/8X6" 66000318

## (undated) DEVICE — CUSTOM PACK OPEN SHOULDER SOP5BOSHEB

## (undated) DEVICE — TUBING SUCTION 12"X1/4" N612

## (undated) DEVICE — CUSTOM PACK TOTAL KNEE SOP5BTKHEC

## (undated) DEVICE — ELECTRODE PATIENT RETURN ADULT L10 FT 2 PLATE CORD 0855C

## (undated) DEVICE — SOL NACL 0.9% IRRIG 1000ML BOTTLE 2F7124

## (undated) DEVICE — NIM PROBE PRASS INCREMENTING TIP 8225825

## (undated) DEVICE — SOL WATER IRRIG 1000ML BOTTLE 2F7114

## (undated) DEVICE — GLOVE PROTEXIS BLUE W/NEU-THERA 8.0  2D73EB80

## (undated) DEVICE — GLOVE UNDER INDICATOR PI SZ 8.5 LF 41685

## (undated) DEVICE — DRAPE U SPLIT 74X120" 29440

## (undated) DEVICE — SU STRATAFIX MONOCRYL 3-0 SPIRAL PS-2 30CM SXMP1B106

## (undated) DEVICE — PACK ENT MINOR CUSTOM ASC

## (undated) DEVICE — ESU GROUND PAD ADULT W/CORD E7507

## (undated) DEVICE — CUFF TOURN 34IN STRL DISP

## (undated) DEVICE — PREP CHLORAPREP 26ML TINTED ORANGE  260815

## (undated) DEVICE — SPECIMEN CONTAINER URINE 90ML STERILE 75.1435.002

## (undated) DEVICE — KIT DRAIN CLOSED WOUND SUCTION MED 400ML RESVR

## (undated) DEVICE — SOLUTION IRRIG 2B7127 .9NS 3000ML BAG

## (undated) DEVICE — ESU PENCIL SMOKE EVAC W/ROCKER SWITCH 0703-047-000

## (undated) DEVICE — A3 SUPPLIES- SEE NURSING INFO PAGE

## (undated) DEVICE — BONE CLEANING TIP INTERPULSE  0210-010-000

## (undated) DEVICE — DRAPE POUCH INSTRUMENT 3 POCKET 1018L

## (undated) DEVICE — SUCTION TIP YANKAUER STR K87

## (undated) DEVICE — SUCTION IRR SYSTEM W/O TIP INTERPULSE HANDPIECE 0210-100-000

## (undated) DEVICE — DRSG AQUACEL AG HYDROFIBER  3.5X10" 422605

## (undated) DEVICE — Device

## (undated) DEVICE — BLADE SAW SAGITTAL STRK 18X90X1.27MM HD SYS 6 6118-127-090

## (undated) DEVICE — EYE PREP BETADINE 5% SOLUTION 30ML 0065-0411-30

## (undated) DEVICE — CLIP HORIZON MED BLUE 002200

## (undated) DEVICE — DISPOSABLE FLUTED HEADLESS PINS
Type: IMPLANTABLE DEVICE | Site: KNEE | Status: NON-FUNCTIONAL
Brand: INSTRUMENT

## (undated) DEVICE — GLOVE PROTEXIS POWDER FREE SMT 7.5  2D72PT75X

## (undated) DEVICE — SUTURE VICRYL+ 0 27IN CT-1 UND VCP260H

## (undated) DEVICE — SPECIMEN CONTAINER W/10% BUFFERED FORMALIN 120ML 591201

## (undated) RX ORDER — PROPOFOL 10 MG/ML
INJECTION, EMULSION INTRAVENOUS
Status: DISPENSED
Start: 2017-03-21

## (undated) RX ORDER — PHENYLEPHRINE HCL IN 0.9% NACL 1 MG/10 ML
SYRINGE (ML) INTRAVENOUS
Status: DISPENSED
Start: 2017-03-21

## (undated) RX ORDER — EPHEDRINE SULFATE 50 MG/ML
INJECTION, SOLUTION INTRAMUSCULAR; INTRAVENOUS; SUBCUTANEOUS
Status: DISPENSED
Start: 2017-03-21

## (undated) RX ORDER — HYDROMORPHONE HCL IN WATER/PF 6 MG/30 ML
PATIENT CONTROLLED ANALGESIA SYRINGE INTRAVENOUS
Status: DISPENSED
Start: 2023-07-07

## (undated) RX ORDER — PROPOFOL 10 MG/ML
INJECTION, EMULSION INTRAVENOUS
Status: DISPENSED
Start: 2025-03-03

## (undated) RX ORDER — OXYCODONE HYDROCHLORIDE 5 MG/1
TABLET ORAL
Status: DISPENSED
Start: 2017-03-21

## (undated) RX ORDER — REMIFENTANIL HYDROCHLORIDE 1 MG/ML
INJECTION, POWDER, LYOPHILIZED, FOR SOLUTION INTRAVENOUS
Status: DISPENSED
Start: 2017-03-21

## (undated) RX ORDER — OXYCODONE HYDROCHLORIDE 5 MG/1
TABLET ORAL
Status: DISPENSED
Start: 2023-07-07

## (undated) RX ORDER — BUPIVACAINE HYDROCHLORIDE 5 MG/ML
INJECTION, SOLUTION EPIDURAL; INTRACAUDAL
Status: DISPENSED
Start: 2017-11-03

## (undated) RX ORDER — ONDANSETRON 2 MG/ML
INJECTION INTRAMUSCULAR; INTRAVENOUS
Status: DISPENSED
Start: 2017-03-21

## (undated) RX ORDER — FENTANYL CITRATE 50 UG/ML
INJECTION, SOLUTION INTRAMUSCULAR; INTRAVENOUS
Status: DISPENSED
Start: 2017-03-21

## (undated) RX ORDER — METOPROLOL TARTRATE 100 MG
TABLET ORAL
Status: DISPENSED
Start: 2017-05-23

## (undated) RX ORDER — ACETAMINOPHEN 325 MG/1
TABLET ORAL
Status: DISPENSED
Start: 2017-03-21

## (undated) RX ORDER — DEXAMETHASONE SODIUM PHOSPHATE 4 MG/ML
INJECTION, SOLUTION INTRA-ARTICULAR; INTRALESIONAL; INTRAMUSCULAR; INTRAVENOUS; SOFT TISSUE
Status: DISPENSED
Start: 2017-03-21

## (undated) RX ORDER — GLYCOPYRROLATE 0.2 MG/ML
INJECTION, SOLUTION INTRAMUSCULAR; INTRAVENOUS
Status: DISPENSED
Start: 2025-03-03

## (undated) RX ORDER — FENTANYL CITRATE-0.9 % NACL/PF 10 MCG/ML
PLASTIC BAG, INJECTION (ML) INTRAVENOUS
Status: DISPENSED
Start: 2024-02-19

## (undated) RX ORDER — ONDANSETRON 2 MG/ML
INJECTION INTRAMUSCULAR; INTRAVENOUS
Status: DISPENSED
Start: 2025-03-03

## (undated) RX ORDER — DEXAMETHASONE SODIUM PHOSPHATE 10 MG/ML
INJECTION, SOLUTION INTRAMUSCULAR; INTRAVENOUS
Status: DISPENSED
Start: 2025-03-03

## (undated) RX ORDER — FENTANYL CITRATE 50 UG/ML
INJECTION, SOLUTION INTRAMUSCULAR; INTRAVENOUS
Status: DISPENSED
Start: 2024-02-19

## (undated) RX ORDER — EPHEDRINE SULFATE 50 MG/ML
INJECTION, SOLUTION INTRAMUSCULAR; INTRAVENOUS; SUBCUTANEOUS
Status: DISPENSED
Start: 2025-03-03

## (undated) RX ORDER — LIDOCAINE HYDROCHLORIDE 10 MG/ML
INJECTION, SOLUTION EPIDURAL; INFILTRATION; INTRACAUDAL; PERINEURAL
Status: DISPENSED
Start: 2025-03-03